# Patient Record
Sex: MALE | Race: WHITE
[De-identification: names, ages, dates, MRNs, and addresses within clinical notes are randomized per-mention and may not be internally consistent; named-entity substitution may affect disease eponyms.]

---

## 2017-04-13 ENCOUNTER — HOSPITAL ENCOUNTER (INPATIENT)
Dept: HOSPITAL 47 - 2ORMAIN | Age: 62
LOS: 29 days | Discharge: NURSING, REHAB OR LONG-TERM CARE | DRG: 235 | End: 2017-05-12
Payer: COMMERCIAL

## 2017-04-13 VITALS — BODY MASS INDEX: 30.7 KG/M2

## 2017-04-13 DIAGNOSIS — I48.0: ICD-10-CM

## 2017-04-13 DIAGNOSIS — D64.9: ICD-10-CM

## 2017-04-13 DIAGNOSIS — I25.2: ICD-10-CM

## 2017-04-13 DIAGNOSIS — I48.92: ICD-10-CM

## 2017-04-13 DIAGNOSIS — Z79.899: ICD-10-CM

## 2017-04-13 DIAGNOSIS — E86.1: ICD-10-CM

## 2017-04-13 DIAGNOSIS — E87.4: ICD-10-CM

## 2017-04-13 DIAGNOSIS — F17.220: ICD-10-CM

## 2017-04-13 DIAGNOSIS — J80: ICD-10-CM

## 2017-04-13 DIAGNOSIS — F10.231: ICD-10-CM

## 2017-04-13 DIAGNOSIS — K56.7: ICD-10-CM

## 2017-04-13 DIAGNOSIS — G72.81: ICD-10-CM

## 2017-04-13 DIAGNOSIS — T17.890A: ICD-10-CM

## 2017-04-13 DIAGNOSIS — R13.10: ICD-10-CM

## 2017-04-13 DIAGNOSIS — Z99.11: ICD-10-CM

## 2017-04-13 DIAGNOSIS — G62.81: ICD-10-CM

## 2017-04-13 DIAGNOSIS — J98.11: ICD-10-CM

## 2017-04-13 DIAGNOSIS — J95.89: ICD-10-CM

## 2017-04-13 DIAGNOSIS — I25.10: ICD-10-CM

## 2017-04-13 DIAGNOSIS — I11.0: ICD-10-CM

## 2017-04-13 DIAGNOSIS — K91.89: ICD-10-CM

## 2017-04-13 DIAGNOSIS — J44.0: ICD-10-CM

## 2017-04-13 DIAGNOSIS — J44.1: ICD-10-CM

## 2017-04-13 DIAGNOSIS — J95.821: ICD-10-CM

## 2017-04-13 DIAGNOSIS — B96.1: ICD-10-CM

## 2017-04-13 DIAGNOSIS — Z79.82: ICD-10-CM

## 2017-04-13 DIAGNOSIS — T82.855A: Primary | ICD-10-CM

## 2017-04-13 DIAGNOSIS — F17.210: ICD-10-CM

## 2017-04-13 DIAGNOSIS — M10.00: ICD-10-CM
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
## 2017-04-13 DIAGNOSIS — E11.65: ICD-10-CM

## 2017-04-13 DIAGNOSIS — Z79.01: ICD-10-CM

## 2017-04-13 DIAGNOSIS — Z82.49: ICD-10-CM

## 2017-04-13 DIAGNOSIS — E78.5: ICD-10-CM

## 2017-04-13 DIAGNOSIS — I50.9: ICD-10-CM

## 2017-04-13 DIAGNOSIS — J20.9: ICD-10-CM

## 2017-04-13 LAB
ALP SERPL-CCNC: 35 U/L (ref 38–126)
ALP SERPL-CCNC: 41 U/L (ref 38–126)
ALT SERPL-CCNC: 31 U/L (ref 21–72)
ALT SERPL-CCNC: 31 U/L (ref 21–72)
ANION GAP SERPL CALC-SCNC: 9 MMOL/L
ANION GAP SERPL CALC-SCNC: 9 MMOL/L
APTT BLD: 26.1 SEC (ref 22–30)
AST SERPL-CCNC: 21 U/L (ref 17–59)
AST SERPL-CCNC: 24 U/L (ref 17–59)
BASOPHILS # BLD AUTO: 0 K/UL (ref 0–0.2)
BASOPHILS NFR BLD AUTO: 0 %
BUN SERPL-SCNC: 10 MG/DL (ref 9–20)
BUN SERPL-SCNC: 9 MG/DL (ref 9–20)
CALCIUM SPEC-MCNC: 8.1 MG/DL (ref 8.4–10.2)
CALCIUM SPEC-MCNC: 8.3 MG/DL (ref 8.4–10.2)
CH: 33.5
CH: 33.5
CH: 33.8
CHCM: 34.7
CHCM: 34.9
CHCM: 35.9
CHLORIDE SERPL-SCNC: 100 MMOL/L (ref 98–107)
CHLORIDE SERPL-SCNC: 102 MMOL/L (ref 98–107)
CO2 BLDA-SCNC: 22 MMOL/L (ref 19–24)
CO2 BLDA-SCNC: 25 MMOL/L (ref 19–24)
CO2 SERPL-SCNC: 24 MMOL/L (ref 22–30)
CO2 SERPL-SCNC: 24 MMOL/L (ref 22–30)
EOSINOPHIL # BLD AUTO: 0.1 K/UL (ref 0–0.7)
EOSINOPHIL NFR BLD AUTO: 1 %
EOSINOPHIL NFR BLD AUTO: 2 %
EOSINOPHIL NFR BLD AUTO: 2 %
ERYTHROCYTE [DISTWIDTH] IN BLOOD BY AUTOMATED COUNT: 3.07 M/UL (ref 4.3–5.9)
ERYTHROCYTE [DISTWIDTH] IN BLOOD BY AUTOMATED COUNT: 3.38 M/UL (ref 4.3–5.9)
ERYTHROCYTE [DISTWIDTH] IN BLOOD BY AUTOMATED COUNT: 3.39 M/UL (ref 4.3–5.9)
ERYTHROCYTE [DISTWIDTH] IN BLOOD: 13.4 % (ref 11.5–15.5)
ERYTHROCYTE [DISTWIDTH] IN BLOOD: 13.7 % (ref 11.5–15.5)
ERYTHROCYTE [DISTWIDTH] IN BLOOD: 13.8 % (ref 11.5–15.5)
GLUCOSE BLD-MCNC: 108 MG/DL (ref 75–99)
GLUCOSE BLD-MCNC: 116 MG/DL (ref 75–99)
GLUCOSE BLD-MCNC: 117 MG/DL (ref 75–99)
GLUCOSE BLD-MCNC: 126 MG/DL (ref 75–99)
GLUCOSE BLD-MCNC: 128 MG/DL (ref 75–99)
GLUCOSE BLD-MCNC: 129 MG/DL (ref 75–99)
GLUCOSE BLD-MCNC: 138 MG/DL (ref 75–99)
GLUCOSE BLD-MCNC: 138 MG/DL (ref 75–99)
GLUCOSE BLD-MCNC: 140 MG/DL (ref 75–99)
GLUCOSE BLD-MCNC: 147 MG/DL (ref 75–99)
GLUCOSE BLD-MCNC: 192 MG/DL (ref 75–99)
GLUCOSE BLD-MCNC: 206 MG/DL (ref 75–99)
GLUCOSE SERPL-MCNC: 116 MG/DL (ref 74–99)
GLUCOSE SERPL-MCNC: 142 MG/DL (ref 74–99)
HCO3 BLDA-SCNC: 21 MMOL/L (ref 21–25)
HCO3 BLDA-SCNC: 24 MMOL/L (ref 21–25)
HCO3 BLDA-SCNC: 26 MMOL/L (ref 21–25)
HCT VFR BLD AUTO: 29.5 % (ref 39–53)
HCT VFR BLD AUTO: 32 % (ref 39–53)
HCT VFR BLD AUTO: 32.8 % (ref 39–53)
HDW: 2.31
HDW: 2.31
HDW: 2.4
HGB BLD-MCNC: 10 GM/DL (ref 13–17.5)
HGB BLD-MCNC: 11.1 GM/DL (ref 13–17.5)
HGB BLD-MCNC: 11.4 GM/DL (ref 13–17.5)
INR PPP: 1.2 (ref ?–1.1)
LUC NFR BLD AUTO: 1 %
LYMPHOCYTES # SPEC AUTO: 0.7 K/UL (ref 1–4.8)
LYMPHOCYTES # SPEC AUTO: 1 K/UL (ref 1–4.8)
LYMPHOCYTES # SPEC AUTO: 1.1 K/UL (ref 1–4.8)
LYMPHOCYTES NFR SPEC AUTO: 10 %
LYMPHOCYTES NFR SPEC AUTO: 16 %
LYMPHOCYTES NFR SPEC AUTO: 16 %
MAGNESIUM SPEC-SCNC: 1.9 MG/DL (ref 1.6–2.3)
MAGNESIUM SPEC-SCNC: 2 MG/DL (ref 1.6–2.3)
MCH RBC QN AUTO: 32.5 PG (ref 25–35)
MCH RBC QN AUTO: 32.8 PG (ref 25–35)
MCH RBC QN AUTO: 33.7 PG (ref 25–35)
MCHC RBC AUTO-ENTMCNC: 33.8 G/DL (ref 31–37)
MCHC RBC AUTO-ENTMCNC: 33.8 G/DL (ref 31–37)
MCHC RBC AUTO-ENTMCNC: 35.7 G/DL (ref 31–37)
MCV RBC AUTO: 94.4 FL (ref 80–100)
MCV RBC AUTO: 96.2 FL (ref 80–100)
MCV RBC AUTO: 97 FL (ref 80–100)
MONOCYTES # BLD AUTO: 0.1 K/UL (ref 0–1)
MONOCYTES # BLD AUTO: 0.2 K/UL (ref 0–1)
MONOCYTES # BLD AUTO: 0.2 K/UL (ref 0–1)
MONOCYTES NFR BLD AUTO: 2 %
MONOCYTES NFR BLD AUTO: 3 %
MONOCYTES NFR BLD AUTO: 3 %
NEUTROPHILS # BLD AUTO: 5.3 K/UL (ref 1.3–7.7)
NEUTROPHILS # BLD AUTO: 5.4 K/UL (ref 1.3–7.7)
NEUTROPHILS # BLD AUTO: 6.2 K/UL (ref 1.3–7.7)
NEUTROPHILS NFR BLD AUTO: 78 %
NEUTROPHILS NFR BLD AUTO: 79 %
NEUTROPHILS NFR BLD AUTO: 85 %
NON-AFRICAN AMERICAN GFR(MDRD): >60
NON-AFRICAN AMERICAN GFR(MDRD): >60
PCO2 BLDA: 30 MMHG (ref 35–45)
PCO2 BLDA: 39 MMHG (ref 35–45)
PCO2 BLDA: 43 MMHG (ref 35–45)
PH BLDA: 7.4 [PH] (ref 7.35–7.45)
PH BLDA: 7.41 [PH] (ref 7.35–7.45)
PH BLDA: 7.46 [PH] (ref 7.35–7.45)
PHOSPHATE SERPL-MCNC: 2.3 MG/DL (ref 2.5–4.5)
PO2 BLDA: 149 MMHG (ref 83–108)
PO2 BLDA: 84 MMHG (ref 83–108)
PO2 BLDA: 86 MMHG (ref 83–108)
POTASSIUM SERPL-SCNC: 3.9 MMOL/L (ref 3.5–5.1)
POTASSIUM SERPL-SCNC: 4.1 MMOL/L (ref 3.5–5.1)
PROT SERPL-MCNC: 5.7 G/DL (ref 6.3–8.2)
PROT SERPL-MCNC: 6.2 G/DL (ref 6.3–8.2)
PT BLD: 11.8 SEC (ref 9–12)
SODIUM SERPL-SCNC: 133 MMOL/L (ref 137–145)
SODIUM SERPL-SCNC: 135 MMOL/L (ref 137–145)
WBC # BLD AUTO: 0.06 10*3/UL
WBC # BLD AUTO: 0.07 10*3/UL
WBC # BLD AUTO: 0.08 10*3/UL
WBC # BLD AUTO: 6.7 K/UL (ref 3.8–10.6)
WBC # BLD AUTO: 6.9 K/UL (ref 3.8–10.6)
WBC # BLD AUTO: 7.3 K/UL (ref 3.8–10.6)
WBC (PEROX): 7.07
WBC (PEROX): 7.2
WBC (PEROX): 7.53

## 2017-04-13 PROCEDURE — 83735 ASSAY OF MAGNESIUM: CPT

## 2017-04-13 PROCEDURE — 74000: CPT

## 2017-04-13 PROCEDURE — 84132 ASSAY OF SERUM POTASSIUM: CPT

## 2017-04-13 PROCEDURE — 5A1955Z RESPIRATORY VENTILATION, GREATER THAN 96 CONSECUTIVE HOURS: ICD-10-PCS

## 2017-04-13 PROCEDURE — 80053 COMPREHEN METABOLIC PANEL: CPT

## 2017-04-13 PROCEDURE — 87252 VIRUS INOCULATION TISSUE: CPT

## 2017-04-13 PROCEDURE — 85520 HEPARIN ASSAY: CPT

## 2017-04-13 PROCEDURE — 80162 ASSAY OF DIGOXIN TOTAL: CPT

## 2017-04-13 PROCEDURE — 84439 ASSAY OF FREE THYROXINE: CPT

## 2017-04-13 PROCEDURE — 83036 HEMOGLOBIN GLYCOSYLATED A1C: CPT

## 2017-04-13 PROCEDURE — 0BCC8ZZ EXTIRPATION OF MATTER FROM RIGHT UPPER LUNG LOBE, VIA NATURAL OR ARTIFICIAL OPENING ENDOSCOPIC: ICD-10-PCS

## 2017-04-13 PROCEDURE — 80048 BASIC METABOLIC PNL TOTAL CA: CPT

## 2017-04-13 PROCEDURE — 87205 SMEAR GRAM STAIN: CPT

## 2017-04-13 PROCEDURE — 87086 URINE CULTURE/COLONY COUNT: CPT

## 2017-04-13 PROCEDURE — 84478 ASSAY OF TRIGLYCERIDES: CPT

## 2017-04-13 PROCEDURE — 82330 ASSAY OF CALCIUM: CPT

## 2017-04-13 PROCEDURE — 02100Z9 BYPASS CORONARY ARTERY, ONE ARTERY FROM LEFT INTERNAL MAMMARY, OPEN APPROACH: ICD-10-PCS

## 2017-04-13 PROCEDURE — 94002 VENT MGMT INPAT INIT DAY: CPT

## 2017-04-13 PROCEDURE — 94640 AIRWAY INHALATION TREATMENT: CPT

## 2017-04-13 PROCEDURE — 94760 N-INVAS EAR/PLS OXIMETRY 1: CPT

## 2017-04-13 PROCEDURE — 87186 SC STD MICRODIL/AGAR DIL: CPT

## 2017-04-13 PROCEDURE — 76604 US EXAM CHEST: CPT

## 2017-04-13 PROCEDURE — 86920 COMPATIBILITY TEST SPIN: CPT

## 2017-04-13 PROCEDURE — 82805 BLOOD GASES W/O2 SATURATION: CPT

## 2017-04-13 PROCEDURE — 0B9C8ZX DRAINAGE OF RIGHT UPPER LUNG LOBE, VIA NATURAL OR ARTIFICIAL OPENING ENDOSCOPIC, DIAGNOSTIC: ICD-10-PCS

## 2017-04-13 PROCEDURE — 86850 RBC ANTIBODY SCREEN: CPT

## 2017-04-13 PROCEDURE — 71260 CT THORAX DX C+: CPT

## 2017-04-13 PROCEDURE — 87102 FUNGUS ISOLATION CULTURE: CPT

## 2017-04-13 PROCEDURE — 87529 HSV DNA AMP PROBE: CPT

## 2017-04-13 PROCEDURE — 82272 OCCULT BLD FECES 1-3 TESTS: CPT

## 2017-04-13 PROCEDURE — 85027 COMPLETE CBC AUTOMATED: CPT

## 2017-04-13 PROCEDURE — 76937 US GUIDE VASCULAR ACCESS: CPT

## 2017-04-13 PROCEDURE — 31645 BRNCHSC W/THER ASPIR 1ST: CPT

## 2017-04-13 PROCEDURE — 87496 CYTOMEG DNA AMP PROBE: CPT

## 2017-04-13 PROCEDURE — 85730 THROMBOPLASTIN TIME PARTIAL: CPT

## 2017-04-13 PROCEDURE — 87116 MYCOBACTERIA CULTURE: CPT

## 2017-04-13 PROCEDURE — 85610 PROTHROMBIN TIME: CPT

## 2017-04-13 PROCEDURE — 74177 CT ABD & PELVIS W/CONTRAST: CPT

## 2017-04-13 PROCEDURE — 83880 ASSAY OF NATRIURETIC PEPTIDE: CPT

## 2017-04-13 PROCEDURE — 36620 INSERTION CATHETER ARTERY: CPT

## 2017-04-13 PROCEDURE — 36569 INSJ PICC 5 YR+ W/O IMAGING: CPT

## 2017-04-13 PROCEDURE — 87040 BLOOD CULTURE FOR BACTERIA: CPT

## 2017-04-13 PROCEDURE — 86901 BLOOD TYPING SEROLOGIC RH(D): CPT

## 2017-04-13 PROCEDURE — 84443 ASSAY THYROID STIM HORMONE: CPT

## 2017-04-13 PROCEDURE — 4A033BC MEASUREMENT OF ARTERIAL PRESSURE, CORONARY, PERCUTANEOUS APPROACH: ICD-10-PCS

## 2017-04-13 PROCEDURE — 31624 DX BRONCHOSCOPE/LAVAGE: CPT

## 2017-04-13 PROCEDURE — 87798 DETECT AGENT NOS DNA AMP: CPT

## 2017-04-13 PROCEDURE — 86900 BLOOD TYPING SEROLOGIC ABO: CPT

## 2017-04-13 PROCEDURE — 86891 AUTOLOGOUS BLOOD OP SALVAGE: CPT

## 2017-04-13 PROCEDURE — 94150 VITAL CAPACITY TEST: CPT

## 2017-04-13 PROCEDURE — 02100Z8 BYPASS CORONARY ARTERY, ONE ARTERY FROM RIGHT INTERNAL MAMMARY, OPEN APPROACH: ICD-10-PCS

## 2017-04-13 PROCEDURE — 94003 VENT MGMT INPAT SUBQ DAY: CPT

## 2017-04-13 PROCEDURE — 74230 X-RAY XM SWLNG FUNCJ C+: CPT

## 2017-04-13 PROCEDURE — 85025 COMPLETE CBC W/AUTO DIFF WBC: CPT

## 2017-04-13 PROCEDURE — B246ZZ4 ULTRASONOGRAPHY OF RIGHT AND LEFT HEART, TRANSESOPHAGEAL: ICD-10-PCS

## 2017-04-13 PROCEDURE — 84100 ASSAY OF PHOSPHORUS: CPT

## 2017-04-13 PROCEDURE — 87498 ENTEROVIRUS PROBE&REVRS TRNS: CPT

## 2017-04-13 PROCEDURE — 70450 CT HEAD/BRAIN W/O DYE: CPT

## 2017-04-13 PROCEDURE — 74020: CPT

## 2017-04-13 PROCEDURE — 87206 SMEAR FLUORESCENT/ACID STAI: CPT

## 2017-04-13 PROCEDURE — 36600 WITHDRAWAL OF ARTERIAL BLOOD: CPT

## 2017-04-13 PROCEDURE — 87077 CULTURE AEROBIC IDENTIFY: CPT

## 2017-04-13 PROCEDURE — 87502 INFLUENZA DNA AMP PROBE: CPT

## 2017-04-13 PROCEDURE — 87324 CLOSTRIDIUM AG IA: CPT

## 2017-04-13 PROCEDURE — 87070 CULTURE OTHR SPECIMN AEROBIC: CPT

## 2017-04-13 PROCEDURE — 0B958ZX DRAINAGE OF RIGHT MIDDLE LOBE BRONCHUS, VIA NATURAL OR ARTIFICIAL OPENING ENDOSCOPIC, DIAGNOSTIC: ICD-10-PCS

## 2017-04-13 PROCEDURE — 71010: CPT

## 2017-04-13 RX ADMIN — MAGNESIUM SULFATE IN DEXTROSE SCH MLS/HR: 10 INJECTION, SOLUTION INTRAVENOUS at 20:26

## 2017-04-13 RX ADMIN — MAGNESIUM SULFATE IN DEXTROSE SCH MLS/HR: 10 INJECTION, SOLUTION INTRAVENOUS at 21:25

## 2017-04-13 RX ADMIN — IPRATROPIUM BROMIDE AND ALBUTEROL SULFATE SCH ML: .5; 3 SOLUTION RESPIRATORY (INHALATION) at 23:15

## 2017-04-13 RX ADMIN — SODIUM CHLORIDE, PRESERVATIVE FREE SCH ML: 5 INJECTION INTRAVENOUS at 20:26

## 2017-04-13 RX ADMIN — INSULIN HUMAN SCH MLS/HR: 100 INJECTION, SOLUTION PARENTERAL at 21:16

## 2017-04-13 RX ADMIN — MORPHINE SULFATE PRN MG: 2 INJECTION, SOLUTION INTRAMUSCULAR; INTRAVENOUS at 14:30

## 2017-04-13 RX ADMIN — PROPOFOL SCH MLS/HR: 10 INJECTION, EMULSION INTRAVENOUS at 15:10

## 2017-04-13 RX ADMIN — PROPOFOL SCH MLS/HR: 10 INJECTION, EMULSION INTRAVENOUS at 21:50

## 2017-04-13 RX ADMIN — PROPOFOL SCH MLS/HR: 10 INJECTION, EMULSION INTRAVENOUS at 20:22

## 2017-04-13 RX ADMIN — CLEVIPIDINE SCH MLS/HR: 0.5 EMULSION INTRAVENOUS at 15:13

## 2017-04-13 RX ADMIN — MORPHINE SULFATE PRN MG: 2 INJECTION, SOLUTION INTRAMUSCULAR; INTRAVENOUS at 16:32

## 2017-04-13 RX ADMIN — PROPOFOL SCH MLS/HR: 10 INJECTION, EMULSION INTRAVENOUS at 16:45

## 2017-04-13 RX ADMIN — IPRATROPIUM BROMIDE AND ALBUTEROL SULFATE SCH ML: .5; 3 SOLUTION RESPIRATORY (INHALATION) at 16:44

## 2017-04-13 RX ADMIN — MORPHINE SULFATE PRN MG: 2 INJECTION, SOLUTION INTRAMUSCULAR; INTRAVENOUS at 18:07

## 2017-04-13 RX ADMIN — HEPARIN SODIUM SCH UNIT: 5000 INJECTION, SOLUTION INTRAVENOUS; SUBCUTANEOUS at 21:25

## 2017-04-13 RX ADMIN — METHYLPREDNISOLONE SODIUM SUCCINATE SCH MG: 40 INJECTION, POWDER, FOR SOLUTION INTRAMUSCULAR; INTRAVENOUS at 18:36

## 2017-04-13 RX ADMIN — IPRATROPIUM BROMIDE AND ALBUTEROL SULFATE SCH ML: .5; 3 SOLUTION RESPIRATORY (INHALATION) at 19:31

## 2017-04-13 RX ADMIN — ALBUMIN (HUMAN) PRN MLS/HR: 2.5 SOLUTION INTRAVENOUS at 21:51

## 2017-04-13 RX ADMIN — PROPOFOL SCH MLS/HR: 10 INJECTION, EMULSION INTRAVENOUS at 18:39

## 2017-04-13 RX ADMIN — POTASSIUM CHLORIDE SCH MLS/HR: 14.9 INJECTION, SOLUTION INTRAVENOUS at 15:11

## 2017-04-13 RX ADMIN — CLEVIPIDINE SCH MLS/HR: 0.5 EMULSION INTRAVENOUS at 19:00

## 2017-04-13 RX ADMIN — BUDESONIDE SCH MG: 1 SUSPENSION RESPIRATORY (INHALATION) at 19:31

## 2017-04-13 RX ADMIN — ACETAMINOPHEN SCH MLS/HR: 10 INJECTION, SOLUTION INTRAVENOUS at 18:12

## 2017-04-13 RX ADMIN — ALBUMIN (HUMAN) PRN MLS/HR: 2.5 SOLUTION INTRAVENOUS at 22:49

## 2017-04-13 NOTE — XR
EXAMINATION TYPE: XR chest 1V portable

 

DATE OF EXAM: 4/13/2017 4:52 PM

 

COMPARISON: NONE

 

INDICATION: Previous abnormal chest, post bronchoscopy.

 

TECHNIQUE: Single frontal view of the chest is obtained.

 

FINDINGS:  

The heart size is normal.  

The pulmonary vasculature is normal.  

Increased lung markings are on the right. An endotracheal tube is present with the tip above the enoch
na. Nasogastric tube transverses the thorax. There may be a mediastinal tube present on the right. [place]-Livier catheter is present with tip in the distal right pulmonary artery. Right-sided chest tube is 
present. No pneumothorax is evident. Left-sided chest tube is present. No left pneumothorax is presen
t.  

 

 

IMPRESSION:  

1. Improving aeration to the right lung compared to the previous abnormal chest x-ray.

2. Multiple lines and catheters discussed above.

## 2017-04-13 NOTE — CONS
DATE OF CONSULTATION:  



REASON FOR CONSULTATION: Management of elevated blood sugars.



Patient is a very pleasant 61-year-old gentleman who is admitted for 

coronary artery bypass grafting. Patient is intubated and sedated, 

calm at present. Patient is on 80% FiO2. Patient has 3 chest tubes, 

one right, left and sternal chest tube which is draining bloody fluid. 

Patient has near-complete whitening of the right chest, for which 

patient is undergoing extensive suctioning. Pulmonary will evaluate 

the patient as well. FiO2 is being tapered down. Patient will soon be 

extubated today or tomorrow, depending on his parameters and repeat 

chest x-ray findings. Patient's blood sugars are a bit elevated and 

patient is on IV insulin at this point of time. His elevated blood 

sugars are due to D5 nitroglycerin. Patient is getting IV insulin as 

per the protocol. Patient is also on clevidipine to prevent vasospasm. 

 



REVIEW OF SYSTEMS: Unable to obtain at this point of time because of 

his clinical condition.  



Medications were reviewed. Significant ones are discussed above. 



Past medical history is significant for: 

1. Coronary artery disease. 

2. Hypertension. 

3. Myocardial infarction in the past. 

4. Cardiac catheterization with stent placement in the past as well. 



SOCIAL HISTORY: Patient is a heavy smoker; smokes 3 to 4 cigarettes 

per day. Drinks about 6 to 8 beers a day. No drug abuse history.  



FAMILY HISTORY: Thyroid cancer. 



PHYSICAL EXAMINATION: 

VITAL SIGNS: Temperature 96.9, pulse of 68, respiratory rate of 12. 

Blood pressure is 117/77. Saturating at 98% on above-mentioned vent 

settings. Rest of the vent settings are PEEP of 5, set-up respiratory 

rate of 12, and patient is breathing over the ventilator. Patient is 

sedated, intubated, calm. RASS score of around minus 2 to minus 3.  

HEENT: Pupils are round and equally reacting to light. EOMI. No 

scleral icterus. No conjunctival pallor. Normocephalic, atraumatic. No 

pharyngeal erythema. No thyromegaly.  

CHEST EXAMINATION: Chest tubes as mentioned above. 

ABDOMEN: Soft, nontender, nondistended, normoactive bowel sounds. No 

palpable organomegaly.  

MUSCULOSKELETAL: No joint swelling or deformity. 

EXTREMITIES: No cyanosis, clubbing, or pedal edema. 

NEUROLOGICAL: RASS score minus 2 to minus 3. 

SKIN: No rashes. 



LABORATORY DATA: CBC, CMP essentially within normal limits. Chest 

x-ray findings as mentioned above. Moderate opacification of right 

hemithorax, for which patient already has a chest tube in, and also 

deep suctioning is being done.  



ASSESSMENT AND PLAN: 

1. Acute hypoxic respiratory failure post cardiac catheterization. 

Patient is intubated, sedated. Pulmonary will evaluate the patient as 

well. Patient has near-complete opacity of the right chest. Patient 

already has a right chest tube and also undergoing deep suctioning. 

Further management as per pulmonary service.  

2. Elevated blood sugars due to D5 with nitroglycerin. Continue with 

IV insulin as per the protocol from Cardiothoracic Surgery.  

3. Myocardial infarction in the past. 

4. Nicotine abuse. 

5. Alcohol abuse. Patient is expected to have withdrawals. Will watch 

for any alcohol withdrawals.  

6. Coronary artery disease, for which patient underwent CABG. 

Postoperative CABG management as per primary service.  



Thank you for letting me participate in this patient's care. Will 

continue to follow the patient.

## 2017-04-13 NOTE — XR
EXAMINATION TYPE: XR chest 1V portable

 

DATE OF EXAM: 4/13/2017 3:08 PM

 

HISTORY: Post Op CABG

 

COMPARISON: 4/13/2017

 

TECHNIQUE: Single view of the chest is submitted.

 

FINDINGS:

Endotracheal tube, NG tube, SG catheter, mediastianal drains and chest tubes are appropriately placed
.

 

Post operative changes of CABG.

 

No sizeable pneumothorax.

 

Moderate opacification right hemithorax with slight improvement in aeration. Correlate for atelectasi
s and/or pleural effusion. The left lung is clear.

 

The heart is not enlarged.

 

IMPRESSION: 

 

1.  Moderate opacification right hemithorax with slight improvement in aeration. Correlate for atelec
tasis and/or pleural effusion. The left lung is clear.

## 2017-04-13 NOTE — OP
DATE OF SERVICE:  



SURGEON:  Ines Madsen MD

ASSISTANT:  Marquez Rodríguez and Kathy VALDEZ



PREOPERATIVE DIAGNOSES:  

1. Coronary artery disease, status post prior stenting to his right 

coronary artery. 

2. Preserved Left ventricular function.

3. Hyperlipidemia.

4. Hypertension.

5. ETOH abuse. 



POSTOPERATIVE DIAGNOSES:  

1. Coronary artery disease, status post prior stenting to his right 

coronary artery. 

2. Preserved Left ventricular function.

3. Hyperlipidemia.

4. Hypertension.

5. ETOH abuse. 



OPERATION:       

1. Total arterial non- aortic touch off-pump double coronary artery 

bypass grafting using in situ skeletonized right internal mammary 

artery to the left anterior descending artery across the anterior 

midline, in situ totally skeletonized left internal mammary artery to 

the first obtuse marginal artery. 

2. Transesophageal echocardiogram and epiaortic scanning. 

3. Intraoperative graft flow measurements using the [employer] system. 



ANESTHESIA:  

ESTIMATED BLOOD LOSS:  

SPECIMENS REMOVED:  

COMPLICATIONS:  



OPERATIVE FINDINGS:  



INDICATION FOR SURGERY: Patient is a 61-year-old gentleman who 

underwent in 2001 stenting to his proximal right coronary artery. 

Patient failed a recent stress test and underwent cardiac 

catheterization that showed mild in-stent restenosis. However, he had 

severe stenosis of the left system. There is a small second obtuse 

marginal artery that is probably too small for bypass. Patient has 

been brought in for bypass to his LAD, and his first obtuse marginal 

artery with no plans to bypass the right coronary artery (non flow limiting in-
stent restenosis) and this was 

decided along with the patient cardiologist. We will be using 

bilateral internal mammary artery in view of the patient's age. Risks, 

benefits, and alternatives were discussed with him. He understood them 

and agreed to proceed.  



DESCRIPTION OF THE PROCEDURE: Patient had a right internal jugular 

[place]-Livier catheter and a right radial arterial line placed in the 

preoperative holding area. His PA pressure was 50/20 and cardiac index 

was 2.2. Subsequently he was brought to the operating room, where 

general endotracheal anesthesia was induced uneventfully. Patient 

received 2 grams of cefazolin intravenously. The Angelo catheter was 

inserted. The chest, abdomen and both lower extremities were prepped 

and draped using ChloraPrep. Ioban was used to cover the skin.  



Transesophageal echocardiogram revealed mild mitral valve 

regurgitation and overall preserved left ventricular function.  



Midline sternotomy was performed and no bone wax was used. The left 

hemisternum was elevated and the left internal mammary artery was 

harvested in a totally skeletonized fashion. The left pleura was 

intentionally opened and in this process and was drained with a 8 

[place] chest tube. The same was repeated on the right side. The right 

hemisternum was elevated and the right internal mammary artery was 

harvested in a totally skeletonized fashion. The right pleura was 

intentionally opened in this process was drained with 28 [place] chest 

tube the patient was given 5000 units of heparin initially before 

double clipping the left internal mammary artery before its 

bifurcation and transecting it and had an excellent pulsatile flow in 

it and was around 2.5 mm in diameter and we clipped the right internal 

mammary artery beyond the bifurcation and it had an excellent flow in 

it and was around 2 mm in diameter.  



Mediastinal fat was transected between 2 ties and epiaortic scanning 

revealed normal ascending aorta. Pericardium was opened in an inverted 

T fashion and pericardial cradle was created. Findings included a 

short aorta and a mildly enlarged heart.  



The [employer] system along with the exposed device were used to perform 

the surgery on a beating heart. Heparinization to achieve an ACT above 

250 seconds was administered. The ACT was repeated every 20 to 30 

minutes and additional heparin given if needed.  



The first distal anastomosis was between the in situ right internal 

mammary artery that crossed the midline and went anteriorly to the mid 

aspect of the left anterior descending artery, which was found in an 

intramyocardial position. That artery, accepted a 1.5 mm shunt, had 

profuse flow in it and the anastomosis was completed using Prolene 7-0 

in continuous fashion. The anastomosis appeared satisfactory and graft 

flow measurement revealed a flow of 68 mL per minute with diastolic filling at 

71%, showing excellent graft. Subsequently using the exposed device, 

we exposed the lateral wall. As predicted the second obtuse marginal 

artery was a small non- bypassable vessel and the first obtuse 

marginal artery site for bypass was seen proximally but was mainly 
intramyocardial. 

That artery was opened as it went intra-myocardial very proximally by the KAYLEIGH, 
accepted a 2 mm 

shunt, had profuse flow in it and the left internal mammary artery was 

anastomosed to it using Prolene 7-0 in continuous fashion. The shunt 

was removed before completing the anastomosis, which was well 

tolerated. A deep groove was made on the right and on the left pleural 

pericardial fat to accommodate respective mammary medial to the lung 

and away from the posterior sternal table. There was no kinking noted of the 
grafts.

Measurements of graft flow in the LIMA with the heart down and everything 

positioned remained excellent.  



Satisfied with the distal anastomoses, test dose and full dose 

protamine was given.  A 36 [place] chest tube was left in the 

substernal place. The pericardial fat was loosely approximated over 

the conduits however, I could not cover the last third of the right 

internal mammary artery on the left side in view of paucity of fat.  



After ensuring adequate hemostasis and hemodynamics and after correct 

sponge, instrument and needle count, the sternum was approximated 

using 5 figure-of-eight [place] cable after interposing fibrillar 

between the sternal edges. Thorough irrigation with cefazolin 

followed. The rest of the closure proceeded in layers. Skin glue was 

applied.  



Patient did not receive any blood bank product and received 250 mL of 

Cell Saver.Patient was transferred to the ICU on 

low-dose nitroglycerin with excellent hemodynamics and normal EKG.  

Transesophageal echocardiogram done at the end of the case showed no 

changes.  



MTDD

## 2017-04-13 NOTE — XR
EXAMINATION TYPE: XR chest 1V portable

 

DATE OF EXAM: 4/13/2017 8:29 PM

 

COMPARISON: Today

 

HISTORY: Hypoxemia

 

TECHNIQUE: Single frontal view of the chest is obtained.

 

FINDINGS:  There is no gross heart failure.. There is a right chest tube in good position. There is r
ight jugular catheter with the tip probably in the right ventricle. Nasogastric tube is noted. Endotr
acheal tube appears in good position. There is mild linear density at the lung bases. I see no pneumo
thorax. There is a left chest tube noted. There is blunting of costophrenic angles.

 

IMPRESSION:  Patchy atelectasis at the lung bases with pleural effusions. No significant change paula
red to exam earlier today at 5:00 PM.

## 2017-04-13 NOTE — XR
EXAMINATION TYPE: XR chest 1V portable

 

DATE OF EXAM: 4/13/2017 2:31 PM

 

HISTORY: Post Op CABG

 

COMPARISON: 4/6/2017

 

TECHNIQUE: Single view of the chest is submitted.

 

FINDINGS:

Endotracheal tube, NG tube, SG catheter, mediastianal drains and chest tubes are appropriately placed
.

 

Post operative changes of CABG.

 

No sizeable pneumothorax.

 

There is a near complete opacification of the right hemithorax which may reflect large pleural effusi
on or hemothorax.

 

The heart is not enlarged.

 

IMPRESSION: 

 

1.  Post operative changes of CABG.

2. There is a near complete opacification of the right hemithorax which may reflect large pleural eff
usion or hemothorax.

## 2017-04-14 LAB
ALP SERPL-CCNC: 33 U/L (ref 38–126)
ALT SERPL-CCNC: 22 U/L (ref 21–72)
ANION GAP SERPL CALC-SCNC: 17 MMOL/L
APTT BLD: 24.1 SEC (ref 22–30)
AST SERPL-CCNC: 20 U/L (ref 17–59)
BASOPHILS # BLD AUTO: 0 K/UL (ref 0–0.2)
BASOPHILS NFR BLD AUTO: 0 %
BUN SERPL-SCNC: 8 MG/DL (ref 9–20)
CALCIUM SPEC-MCNC: 8.5 MG/DL (ref 8.4–10.2)
CH: 33.2
CHCM: 34.3
CHLORIDE SERPL-SCNC: 102 MMOL/L (ref 98–107)
CO2 BLDA-SCNC: 20 MMOL/L (ref 19–24)
CO2 BLDA-SCNC: 21 MMOL/L (ref 19–24)
CO2 SERPL-SCNC: 19 MMOL/L (ref 22–30)
EOSINOPHIL # BLD AUTO: 0 K/UL (ref 0–0.7)
EOSINOPHIL NFR BLD AUTO: 0 %
ERYTHROCYTE [DISTWIDTH] IN BLOOD BY AUTOMATED COUNT: 3.12 M/UL (ref 4.3–5.9)
ERYTHROCYTE [DISTWIDTH] IN BLOOD: 13.7 % (ref 11.5–15.5)
GLUCOSE BLD-MCNC: 123 MG/DL (ref 75–99)
GLUCOSE BLD-MCNC: 124 MG/DL (ref 75–99)
GLUCOSE BLD-MCNC: 127 MG/DL (ref 75–99)
GLUCOSE BLD-MCNC: 127 MG/DL (ref 75–99)
GLUCOSE BLD-MCNC: 128 MG/DL (ref 75–99)
GLUCOSE BLD-MCNC: 131 MG/DL (ref 75–99)
GLUCOSE BLD-MCNC: 134 MG/DL (ref 75–99)
GLUCOSE BLD-MCNC: 141 MG/DL (ref 75–99)
GLUCOSE BLD-MCNC: 149 MG/DL (ref 75–99)
GLUCOSE BLD-MCNC: 159 MG/DL (ref 75–99)
GLUCOSE BLD-MCNC: 163 MG/DL (ref 75–99)
GLUCOSE BLD-MCNC: 165 MG/DL (ref 75–99)
GLUCOSE BLD-MCNC: 170 MG/DL (ref 75–99)
GLUCOSE BLD-MCNC: 170 MG/DL (ref 75–99)
GLUCOSE BLD-MCNC: 172 MG/DL (ref 75–99)
GLUCOSE BLD-MCNC: 175 MG/DL (ref 75–99)
GLUCOSE BLD-MCNC: 182 MG/DL (ref 75–99)
GLUCOSE SERPL-MCNC: 162 MG/DL (ref 74–99)
HCO3 BLDA-SCNC: 19 MMOL/L (ref 21–25)
HCO3 BLDA-SCNC: 20 MMOL/L (ref 21–25)
HCT VFR BLD AUTO: 30.3 % (ref 39–53)
HDW: 2.36
HGB BLD-MCNC: 10.4 GM/DL (ref 13–17.5)
INR PPP: 1.1 (ref ?–1.1)
LUC NFR BLD AUTO: 1 %
LYMPHOCYTES # SPEC AUTO: 0.4 K/UL (ref 1–4.8)
LYMPHOCYTES NFR SPEC AUTO: 6 %
MAGNESIUM SPEC-SCNC: 2.5 MG/DL (ref 1.6–2.3)
MCH RBC QN AUTO: 33.4 PG (ref 25–35)
MCHC RBC AUTO-ENTMCNC: 34.4 G/DL (ref 31–37)
MCV RBC AUTO: 97.3 FL (ref 80–100)
MONOCYTES # BLD AUTO: 0.1 K/UL (ref 0–1)
MONOCYTES NFR BLD AUTO: 2 %
NEUTROPHILS # BLD AUTO: 7.3 K/UL (ref 1.3–7.7)
NEUTROPHILS NFR BLD AUTO: 92 %
NON-AFRICAN AMERICAN GFR(MDRD): >60
PCO2 BLDA: 33 MMHG (ref 35–45)
PCO2 BLDA: 35 MMHG (ref 35–45)
PH BLDA: 7.36 [PH] (ref 7.35–7.45)
PH BLDA: 7.4 [PH] (ref 7.35–7.45)
PHOSPHATE SERPL-MCNC: 1.5 MG/DL (ref 2.5–4.5)
PO2 BLDA: 63 MMHG (ref 83–108)
PO2 BLDA: 76 MMHG (ref 83–108)
POTASSIUM SERPL-SCNC: 3.4 MMOL/L (ref 3.5–5.1)
PROT SERPL-MCNC: 6.1 G/DL (ref 6.3–8.2)
PT BLD: 11 SEC (ref 9–12)
SODIUM SERPL-SCNC: 138 MMOL/L (ref 137–145)
WBC # BLD AUTO: 0.04 10*3/UL
WBC # BLD AUTO: 7.9 K/UL (ref 3.8–10.6)
WBC (PEROX): 8.42

## 2017-04-14 RX ADMIN — ACETAMINOPHEN SCH MLS/HR: 10 INJECTION, SOLUTION INTRAVENOUS at 00:00

## 2017-04-14 RX ADMIN — ACETAMINOPHEN SCH MLS/HR: 10 INJECTION, SOLUTION INTRAVENOUS at 19:48

## 2017-04-14 RX ADMIN — METHYLPREDNISOLONE SODIUM SUCCINATE SCH MG: 40 INJECTION, POWDER, FOR SOLUTION INTRAMUSCULAR; INTRAVENOUS at 15:18

## 2017-04-14 RX ADMIN — METOPROLOL TARTRATE SCH MG: 25 TABLET, FILM COATED ORAL at 08:09

## 2017-04-14 RX ADMIN — PROPOFOL SCH MLS/HR: 10 INJECTION, EMULSION INTRAVENOUS at 18:38

## 2017-04-14 RX ADMIN — PROPOFOL SCH MLS/HR: 10 INJECTION, EMULSION INTRAVENOUS at 13:10

## 2017-04-14 RX ADMIN — PROPOFOL SCH MLS/HR: 10 INJECTION, EMULSION INTRAVENOUS at 14:07

## 2017-04-14 RX ADMIN — IPRATROPIUM BROMIDE AND ALBUTEROL SULFATE SCH ML: .5; 3 SOLUTION RESPIRATORY (INHALATION) at 15:18

## 2017-04-14 RX ADMIN — PROPOFOL SCH MLS/HR: 10 INJECTION, EMULSION INTRAVENOUS at 19:49

## 2017-04-14 RX ADMIN — METOPROLOL TARTRATE SCH: 25 TABLET, FILM COATED ORAL at 21:54

## 2017-04-14 RX ADMIN — INSULIN HUMAN SCH MLS/HR: 100 INJECTION, SOLUTION PARENTERAL at 19:50

## 2017-04-14 RX ADMIN — PROPOFOL SCH MLS/HR: 10 INJECTION, EMULSION INTRAVENOUS at 16:01

## 2017-04-14 RX ADMIN — MULTIVITAMIN SCH ML: LIQUID ORAL at 13:08

## 2017-04-14 RX ADMIN — METHYLPREDNISOLONE SODIUM SUCCINATE SCH MG: 40 INJECTION, POWDER, FOR SOLUTION INTRAMUSCULAR; INTRAVENOUS at 08:07

## 2017-04-14 RX ADMIN — DOCUSATE SODIUM AND SENNOSIDES SCH EACH: 50; 8.6 TABLET ORAL at 21:53

## 2017-04-14 RX ADMIN — PANTOPRAZOLE SODIUM SCH MG: 40 INJECTION, POWDER, FOR SOLUTION INTRAVENOUS at 08:10

## 2017-04-14 RX ADMIN — ACETAMINOPHEN SCH MLS/HR: 10 INJECTION, SOLUTION INTRAVENOUS at 13:08

## 2017-04-14 RX ADMIN — PROPOFOL SCH MLS/HR: 10 INJECTION, EMULSION INTRAVENOUS at 06:58

## 2017-04-14 RX ADMIN — BUDESONIDE SCH MG: 1 SUSPENSION RESPIRATORY (INHALATION) at 07:54

## 2017-04-14 RX ADMIN — METHYLPREDNISOLONE SODIUM SUCCINATE SCH MG: 40 INJECTION, POWDER, FOR SOLUTION INTRAMUSCULAR; INTRAVENOUS at 00:00

## 2017-04-14 RX ADMIN — SODIUM CHLORIDE, PRESERVATIVE FREE SCH ML: 5 INJECTION INTRAVENOUS at 21:51

## 2017-04-14 RX ADMIN — SODIUM CHLORIDE, PRESERVATIVE FREE SCH: 5 INJECTION INTRAVENOUS at 08:10

## 2017-04-14 RX ADMIN — ATORVASTATIN CALCIUM SCH MG: 40 TABLET, FILM COATED ORAL at 09:29

## 2017-04-14 RX ADMIN — LEUCINE, PHENYLALANINE, LYSINE, METHIONINE, ISOLEUCINE, VALINE, HISTIDINE, THREONINE, TRYPTOPHAN, ALANINE, GLYCINE, ARGININE, PROLINE, SERINE, TYROSINE, DEXTROSE SCH MLS/HR: 365; 280; 290; 200; 300; 290; 240; 210; 90; 1035; 515; 575; 340; 250; 20; 20 INJECTION INTRAVENOUS at 09:29

## 2017-04-14 RX ADMIN — POTASSIUM CHLORIDE SCH: 14.9 INJECTION, SOLUTION INTRAVENOUS at 14:08

## 2017-04-14 RX ADMIN — HEPARIN SODIUM SCH UNIT: 5000 INJECTION, SOLUTION INTRAVENOUS; SUBCUTANEOUS at 08:07

## 2017-04-14 RX ADMIN — POTASSIUM CHLORIDE SCH MLS/HR: 7.46 INJECTION, SOLUTION INTRAVENOUS at 08:05

## 2017-04-14 RX ADMIN — CLOPIDOGREL BISULFATE SCH MG: 75 TABLET ORAL at 09:29

## 2017-04-14 RX ADMIN — IPRATROPIUM BROMIDE AND ALBUTEROL SULFATE SCH ML: .5; 3 SOLUTION RESPIRATORY (INHALATION) at 23:24

## 2017-04-14 RX ADMIN — IPRATROPIUM BROMIDE AND ALBUTEROL SULFATE SCH ML: .5; 3 SOLUTION RESPIRATORY (INHALATION) at 07:54

## 2017-04-14 RX ADMIN — HEPARIN SODIUM SCH UNIT: 5000 INJECTION, SOLUTION INTRAVENOUS; SUBCUTANEOUS at 15:17

## 2017-04-14 RX ADMIN — LEUCINE, PHENYLALANINE, LYSINE, METHIONINE, ISOLEUCINE, VALINE, HISTIDINE, THREONINE, TRYPTOPHAN, ALANINE, GLYCINE, ARGININE, PROLINE, SERINE, TYROSINE, DEXTROSE SCH MLS/HR: 365; 280; 290; 200; 300; 290; 240; 210; 90; 1035; 515; 575; 340; 250; 20; 20 INJECTION INTRAVENOUS at 07:08

## 2017-04-14 RX ADMIN — POTASSIUM CHLORIDE SCH MLS/HR: 7.46 INJECTION, SOLUTION INTRAVENOUS at 07:00

## 2017-04-14 RX ADMIN — ASPIRIN 325 MG ORAL TABLET SCH MG: 325 PILL ORAL at 08:09

## 2017-04-14 RX ADMIN — ACETAMINOPHEN SCH MLS/HR: 10 INJECTION, SOLUTION INTRAVENOUS at 08:04

## 2017-04-14 RX ADMIN — BUDESONIDE SCH MG: 1 SUSPENSION RESPIRATORY (INHALATION) at 19:18

## 2017-04-14 RX ADMIN — IPRATROPIUM BROMIDE AND ALBUTEROL SULFATE SCH ML: .5; 3 SOLUTION RESPIRATORY (INHALATION) at 11:23

## 2017-04-14 RX ADMIN — PROPOFOL SCH MLS/HR: 10 INJECTION, EMULSION INTRAVENOUS at 08:26

## 2017-04-14 RX ADMIN — PROPOFOL SCH MLS/HR: 10 INJECTION, EMULSION INTRAVENOUS at 21:50

## 2017-04-14 RX ADMIN — PROPOFOL SCH MLS/HR: 10 INJECTION, EMULSION INTRAVENOUS at 05:14

## 2017-04-14 RX ADMIN — IPRATROPIUM BROMIDE AND ALBUTEROL SULFATE SCH ML: .5; 3 SOLUTION RESPIRATORY (INHALATION) at 19:18

## 2017-04-14 RX ADMIN — METOPROLOL TARTRATE SCH MG: 25 TABLET, FILM COATED ORAL at 21:51

## 2017-04-14 RX ADMIN — IPRATROPIUM BROMIDE AND ALBUTEROL SULFATE SCH ML: .5; 3 SOLUTION RESPIRATORY (INHALATION) at 03:07

## 2017-04-14 NOTE — P.PN
Subjective


Principal diagnosis: 





Status post CABG, postoperative day #1


This is a 61-year-old white male status post CABG, postoperative day #1, total 

arterial non-aortic patch off pump double coronary artery bypass grafting using 

in situ skeletonized right internal mammary artery to the left anterior 

descending across the anterior midline in situ totally skeletonized left 

internal mammary artery to the first obtuse marginal artery.  Postoperatively 

patient was on mechanical ventilation, and upon arrival to the ICU he was found 

to have opacified right lung, he required bronchoscopy and extraction of mucous 

plugs mostly in the right upper lobe and right middle lobe.  Remained on 

mechanical ventilation overnight, however this morning his gases are showing 

marginal oxygenation, his pO2 is only 63 on 50% and PEEP of 5.  Patient was 

given a weaning trial with a pressure support of 8 and CPAP, ABG in half an 

hour is definitely poor showing very poor oxygenation, and his O2 saturation 

was in the 91% range.  PO2 was only 63, hence I decided not to pursue any 

further weaning and extubation on this patient until his oxygenation improves 

better.  Gram stain and cultures from the right upper lobe are pending.  In the 

meantime the patient is receiving bronchodilators for underlying COPD, and he 

is also on steroids for significant wheezing noted yesterday after 

bronchoscopy.  Patient remains on DuoNeb updrafts 4 times a day and when 

necessary.








Objective





- Vital Signs


Vital signs: 


 Vital Signs











Temp  98.2 F   04/13/17 20:00


 


Pulse  77   04/14/17 13:00


 


Resp  5 L  04/14/17 13:00


 


BP  92/54   04/14/17 13:00


 


Pulse Ox  94 L  04/14/17 13:00








 Intake & Output











 04/13/17 04/14/17 04/14/17





 18:59 06:59 18:59


 


Intake Total 502.25 1819.469 871.466


 


Output Total 2351 1012 735


 


Balance -1848.75 807.469 136.466


 


Weight 89.61 kg 99.8 kg 99.8 kg


 


Intake:   


 


  Intake, IV Titration 502.25 1819.469 871.466





  Amount   


 


    ACETAMINOPHEN IV (For NPO  200 100





    ) 1,000 mg In Empty Bag 1   





    bag @ 400 mls/hr IVPB   





    Q6HR [facility] Rx#:[id number]   


 


    Albumin Human 5% 250 ml  500 





    In Empty Bag 1 bag @ 250   





    mls/hr IVPB Q1HR PRN Rx#:   





    [id number]   


 


    Clevidipine Butyrate 25 36 50.301 7.266





    mg In Empty Bag 1 bag @ 1   





    MG/HR 2 mls/hr IV .Q24H   





    [facility] Rx#:[id number]   


 


    Insulin Regular 100 unit 4 13.018 60.684





    In Sodium Chloride 0.9%   





    100 ml @ Per Protocol IV   





    .Q0M [facility] Rx#:[id number]   


 


    Lactated Ringers 1,000 ml 300 500 180





    @ 20 mls/hr IV .Q24H [facility]   





    Rx#:[id number]   


 


    Magnesium Sulfate-D5w Pmx  200 





    1 gm In Dextrose/Water 1   





    100ml.bag @ 100 mls/hr   





    IVPB Q1H [facility] Rx#:   





    [id number]   


 


    Nitroglycerin-D5w Pmx 50 10.5 52.65 17.600





    mg In Dextrose/Water 1   





    250ml.bag @ 5 MCG/MIN 1.5   





    mls/hr IV .Q24H [facility] Rx#:   





    [id number]   


 


    Potassium Chloride 10 meq   100





    In Water For Injection 1   





    100ml.bag @ 100 mls/hr   





    IVPB Q1H [facility] Rx#:   





    [id number]   


 


    Propofol 500 mg In Empty 151.75 178.50 55.916





    Bag 1 bag @ Titrate IV .   





    Q0M [facility] Rx#:[id number]   


 


    Sodium Phosphate 10 mmol  125 





    In Sodium Chloride 0.9%   





    250 ml @ 125 mls/hr IVPB   





    ONCE ONE Rx#:[id number]   


 


    Sodium Phosphate 10 mmol   250





    In Sodium Chloride 0.9%   





    250 ml @ 125 mls/hr IVPB   





    Q2H [facility] Rx#:[id number]   


 


    ceFAZolin 2 gm In Sodium   100





    Chloride 0.9% 100 ml @   





    100 mls/hr IVPB Q8HR [facility]   





    Rx#:[id number]   


 


Output:   


 


  Chest Tube Drainage 341 252 50


 


    Left Lateral Chest 20 62 20


 


    Mediastinal 185 160 0


 


    Right Lateral Chest 136 30 30


 


  Urine 1510 760 685


 


  Estimated Blood Loss 500  


 


Other:   


 


  Voiding Method Indwelling Catheter Indwelling Catheter Indwelling Catheter


 


  # Bowel Movements  0 0








 ABP, PAP, CO, CI - Last Documented











Arterial Blood Pressure        126/54


 


Pulmonary Artery Pressure      42/22


 


Cardiac Output                 7.7


 


Cardiac Index                  3.7

















- Exam








Physical Exam: Revealed a 61-year-old white male on mechanical ventilation, in 

no distress.


HEENT:[Neck is supple.] [No neck masses.] [No thyromegaly.] [No JVD.]

endotracheal tube was noted to be intact.


Chest: [diminished breath sounds on the right side with slight expiratory 

rhonchi and wheezes noted bilaterally.]


Cardiac Exam: [Normal S1 and S2, no S3 gallop, 2/6 systolic murmur throughout 

the precordium, positive pericardial rub]


Abdomen: [Soft, nontender,  no megaly, no rebound, no guarding, normal bowel 

sounds.]


Extremities: [No clubbing, no edema, no cyanosis.]


Neurological Exam: [No gross focal neurologic deficit.  Patient is awake, 

follows all instructions, but remains on mechanical ventilation.





- Labs


CBC & Chem 7: 


 04/14/17 04:30





 04/14/17 04:30


Labs: 


 Abnormal Lab Results - Last 24 Hours (Table)











  04/06/17 04/13/17 04/13/17 Range/Units





  10:10 14:10 14:11 


 


RBC    3.07 L  (4.30-5.90)  m/uL


 


Hgb    10.0 L D  (13.0-17.5)  gm/dL


 


Hct    29.5 L  (39.0-53.0)  %


 


Lymphocytes #     (1.0-4.8)  k/uL


 


ABG pH     (7.35-7.45)  


 


ABG pCO2     (35-45)  mmHg


 


ABG pO2     ()  mmHg


 


ABG HCO3     (21-25)  mmol/L


 


ABG Total CO2     (19-24)  mmol/L


 


ABG O2 Saturation     (94-97)  %


 


Sodium     (137-145)  mmol/L


 


Potassium     (3.5-5.1)  mmol/L


 


Carbon Dioxide     (22-30)  mmol/L


 


BUN     (9-20)  mg/dL


 


Glucose     (74-99)  mg/dL


 


POC Glucose (mg/dL)   117 H   (75-99)  mg/dL


 


Calcium     (8.4-10.2)  mg/dL


 


Phosphorus     (2.5-4.5)  mg/dL


 


Magnesium     (1.6-2.3)  mg/dL


 


Alkaline Phosphatase     ()  U/L


 


Total Protein     (6.3-8.2)  g/dL


 


Crossmatch  See Detail    














  04/13/17 04/13/17 04/13/17 Range/Units





  14:11 14:52 14:53 


 


RBC     (4.30-5.90)  m/uL


 


Hgb     (13.0-17.5)  gm/dL


 


Hct     (39.0-53.0)  %


 


Lymphocytes #     (1.0-4.8)  k/uL


 


ABG pH     (7.35-7.45)  


 


ABG pCO2     (35-45)  mmHg


 


ABG pO2   149 H   ()  mmHg


 


ABG HCO3     (21-25)  mmol/L


 


ABG Total CO2   25 H   (19-24)  mmol/L


 


ABG O2 Saturation   99.3 H   (94-97)  %


 


Sodium  135 L    (137-145)  mmol/L


 


Potassium     (3.5-5.1)  mmol/L


 


Carbon Dioxide     (22-30)  mmol/L


 


BUN     (9-20)  mg/dL


 


Glucose  116 H    (74-99)  mg/dL


 


POC Glucose (mg/dL)    138 H  (75-99)  mg/dL


 


Calcium  8.1 L    (8.4-10.2)  mg/dL


 


Phosphorus     (2.5-4.5)  mg/dL


 


Magnesium     (1.6-2.3)  mg/dL


 


Alkaline Phosphatase  35 L    ()  U/L


 


Total Protein  5.7 L    (6.3-8.2)  g/dL


 


Crossmatch     














  04/13/17 04/13/17 04/13/17 Range/Units





  16:03 16:55 16:55 


 


RBC    3.39 L  (4.30-5.90)  m/uL


 


Hgb    11.4 L  (13.0-17.5)  gm/dL


 


Hct    32.0 L  (39.0-53.0)  %


 


Lymphocytes #     (1.0-4.8)  k/uL


 


ABG pH     (7.35-7.45)  


 


ABG pCO2     (35-45)  mmHg


 


ABG pO2     ()  mmHg


 


ABG HCO3     (21-25)  mmol/L


 


ABG Total CO2     (19-24)  mmol/L


 


ABG O2 Saturation     (94-97)  %


 


Sodium     (137-145)  mmol/L


 


Potassium     (3.5-5.1)  mmol/L


 


Carbon Dioxide     (22-30)  mmol/L


 


BUN     (9-20)  mg/dL


 


Glucose     (74-99)  mg/dL


 


POC Glucose (mg/dL)  126 H  128 H   (75-99)  mg/dL


 


Calcium     (8.4-10.2)  mg/dL


 


Phosphorus     (2.5-4.5)  mg/dL


 


Magnesium     (1.6-2.3)  mg/dL


 


Alkaline Phosphatase     ()  U/L


 


Total Protein     (6.3-8.2)  g/dL


 


Crossmatch     














  04/13/17 04/13/17 04/13/17 Range/Units





  17:59 19:07 19:10 


 


RBC    3.38 L  (4.30-5.90)  m/uL


 


Hgb    11.1 L  (13.0-17.5)  gm/dL


 


Hct    32.8 L  (39.0-53.0)  %


 


Lymphocytes #    0.7 L  (1.0-4.8)  k/uL


 


ABG pH     (7.35-7.45)  


 


ABG pCO2     (35-45)  mmHg


 


ABG pO2     ()  mmHg


 


ABG HCO3     (21-25)  mmol/L


 


ABG Total CO2     (19-24)  mmol/L


 


ABG O2 Saturation     (94-97)  %


 


Sodium     (137-145)  mmol/L


 


Potassium     (3.5-5.1)  mmol/L


 


Carbon Dioxide     (22-30)  mmol/L


 


BUN     (9-20)  mg/dL


 


Glucose     (74-99)  mg/dL


 


POC Glucose (mg/dL)  140 H  147 H   (75-99)  mg/dL


 


Calcium     (8.4-10.2)  mg/dL


 


Phosphorus     (2.5-4.5)  mg/dL


 


Magnesium     (1.6-2.3)  mg/dL


 


Alkaline Phosphatase     ()  U/L


 


Total Protein     (6.3-8.2)  g/dL


 


Crossmatch     














  04/13/17 04/13/17 04/13/17 Range/Units





  19:10 19:58 19:59 


 


RBC     (4.30-5.90)  m/uL


 


Hgb     (13.0-17.5)  gm/dL


 


Hct     (39.0-53.0)  %


 


Lymphocytes #     (1.0-4.8)  k/uL


 


ABG pH   7.46 H   (7.35-7.45)  


 


ABG pCO2   30 L   (35-45)  mmHg


 


ABG pO2     ()  mmHg


 


ABG HCO3     (21-25)  mmol/L


 


ABG Total CO2     (19-24)  mmol/L


 


ABG O2 Saturation     (94-97)  %


 


Sodium  133 L    (137-145)  mmol/L


 


Potassium     (3.5-5.1)  mmol/L


 


Carbon Dioxide     (22-30)  mmol/L


 


BUN     (9-20)  mg/dL


 


Glucose  142 H    (74-99)  mg/dL


 


POC Glucose (mg/dL)    138 H  (75-99)  mg/dL


 


Calcium  8.3 L    (8.4-10.2)  mg/dL


 


Phosphorus  2.3 L    (2.5-4.5)  mg/dL


 


Magnesium     (1.6-2.3)  mg/dL


 


Alkaline Phosphatase     ()  U/L


 


Total Protein  6.2 L    (6.3-8.2)  g/dL


 


Crossmatch     














  04/13/17 04/13/17 04/13/17 Range/Units





  21:00 22:03 23:04 


 


RBC     (4.30-5.90)  m/uL


 


Hgb     (13.0-17.5)  gm/dL


 


Hct     (39.0-53.0)  %


 


Lymphocytes #     (1.0-4.8)  k/uL


 


ABG pH     (7.35-7.45)  


 


ABG pCO2     (35-45)  mmHg


 


ABG pO2     ()  mmHg


 


ABG HCO3     (21-25)  mmol/L


 


ABG Total CO2     (19-24)  mmol/L


 


ABG O2 Saturation     (94-97)  %


 


Sodium     (137-145)  mmol/L


 


Potassium     (3.5-5.1)  mmol/L


 


Carbon Dioxide     (22-30)  mmol/L


 


BUN     (9-20)  mg/dL


 


Glucose     (74-99)  mg/dL


 


POC Glucose (mg/dL)  192 H  206 H  209 H  (75-99)  mg/dL


 


Calcium     (8.4-10.2)  mg/dL


 


Phosphorus     (2.5-4.5)  mg/dL


 


Magnesium     (1.6-2.3)  mg/dL


 


Alkaline Phosphatase     ()  U/L


 


Total Protein     (6.3-8.2)  g/dL


 


Crossmatch     














  04/13/17 04/14/17 04/14/17 Range/Units





  23:58 01:31 02:34 


 


RBC     (4.30-5.90)  m/uL


 


Hgb     (13.0-17.5)  gm/dL


 


Hct     (39.0-53.0)  %


 


Lymphocytes #     (1.0-4.8)  k/uL


 


ABG pH     (7.35-7.45)  


 


ABG pCO2     (35-45)  mmHg


 


ABG pO2     ()  mmHg


 


ABG HCO3     (21-25)  mmol/L


 


ABG Total CO2     (19-24)  mmol/L


 


ABG O2 Saturation     (94-97)  %


 


Sodium     (137-145)  mmol/L


 


Potassium     (3.5-5.1)  mmol/L


 


Carbon Dioxide     (22-30)  mmol/L


 


BUN     (9-20)  mg/dL


 


Glucose     (74-99)  mg/dL


 


POC Glucose (mg/dL)  207 H  192 H  182 H  (75-99)  mg/dL


 


Calcium     (8.4-10.2)  mg/dL


 


Phosphorus     (2.5-4.5)  mg/dL


 


Magnesium     (1.6-2.3)  mg/dL


 


Alkaline Phosphatase     ()  U/L


 


Total Protein     (6.3-8.2)  g/dL


 


Crossmatch     














  04/14/17 04/14/17 04/14/17 Range/Units





  03:48 04:30 04:30 


 


RBC   3.12 L   (4.30-5.90)  m/uL


 


Hgb   10.4 L   (13.0-17.5)  gm/dL


 


Hct   30.3 L   (39.0-53.0)  %


 


Lymphocytes #   0.4 L   (1.0-4.8)  k/uL


 


ABG pH     (7.35-7.45)  


 


ABG pCO2     (35-45)  mmHg


 


ABG pO2     ()  mmHg


 


ABG HCO3     (21-25)  mmol/L


 


ABG Total CO2     (19-24)  mmol/L


 


ABG O2 Saturation     (94-97)  %


 


Sodium     (137-145)  mmol/L


 


Potassium    3.4 L  (3.5-5.1)  mmol/L


 


Carbon Dioxide    19 L  (22-30)  mmol/L


 


BUN    8 L  (9-20)  mg/dL


 


Glucose    162 H  (74-99)  mg/dL


 


POC Glucose (mg/dL)  175 H    (75-99)  mg/dL


 


Calcium     (8.4-10.2)  mg/dL


 


Phosphorus    1.5 L  (2.5-4.5)  mg/dL


 


Magnesium    2.5 H  (1.6-2.3)  mg/dL


 


Alkaline Phosphatase    33 L  ()  U/L


 


Total Protein    6.1 L  (6.3-8.2)  g/dL


 


Crossmatch     














  04/14/17 04/14/17 04/14/17 Range/Units





  04:43 05:47 06:49 


 


RBC     (4.30-5.90)  m/uL


 


Hgb     (13.0-17.5)  gm/dL


 


Hct     (39.0-53.0)  %


 


Lymphocytes #     (1.0-4.8)  k/uL


 


ABG pH     (7.35-7.45)  


 


ABG pCO2     (35-45)  mmHg


 


ABG pO2     ()  mmHg


 


ABG HCO3     (21-25)  mmol/L


 


ABG Total CO2     (19-24)  mmol/L


 


ABG O2 Saturation     (94-97)  %


 


Sodium     (137-145)  mmol/L


 


Potassium     (3.5-5.1)  mmol/L


 


Carbon Dioxide     (22-30)  mmol/L


 


BUN     (9-20)  mg/dL


 


Glucose     (74-99)  mg/dL


 


POC Glucose (mg/dL)  165 H  172 H  170 H  (75-99)  mg/dL


 


Calcium     (8.4-10.2)  mg/dL


 


Phosphorus     (2.5-4.5)  mg/dL


 


Magnesium     (1.6-2.3)  mg/dL


 


Alkaline Phosphatase     ()  U/L


 


Total Protein     (6.3-8.2)  g/dL


 


Crossmatch     














  04/14/17 04/14/17 04/14/17 Range/Units





  07:58 09:00 09:03 


 


RBC     (4.30-5.90)  m/uL


 


Hgb     (13.0-17.5)  gm/dL


 


Hct     (39.0-53.0)  %


 


Lymphocytes #     (1.0-4.8)  k/uL


 


ABG pH     (7.35-7.45)  


 


ABG pCO2     (35-45)  mmHg


 


ABG pO2    76 L  ()  mmHg


 


ABG HCO3    19 L  (21-25)  mmol/L


 


ABG Total CO2     (19-24)  mmol/L


 


ABG O2 Saturation     (94-97)  %


 


Sodium     (137-145)  mmol/L


 


Potassium     (3.5-5.1)  mmol/L


 


Carbon Dioxide     (22-30)  mmol/L


 


BUN     (9-20)  mg/dL


 


Glucose     (74-99)  mg/dL


 


POC Glucose (mg/dL)  163 H  170 H   (75-99)  mg/dL


 


Calcium     (8.4-10.2)  mg/dL


 


Phosphorus     (2.5-4.5)  mg/dL


 


Magnesium     (1.6-2.3)  mg/dL


 


Alkaline Phosphatase     ()  U/L


 


Total Protein     (6.3-8.2)  g/dL


 


Crossmatch     














  04/14/17 04/14/17 04/14/17 Range/Units





  10:10 11:08 11:13 


 


RBC     (4.30-5.90)  m/uL


 


Hgb     (13.0-17.5)  gm/dL


 


Hct     (39.0-53.0)  %


 


Lymphocytes #     (1.0-4.8)  k/uL


 


ABG pH     (7.35-7.45)  


 


ABG pCO2    33 L  (35-45)  mmHg


 


ABG pO2    63 L  ()  mmHg


 


ABG HCO3    20 L  (21-25)  mmol/L


 


ABG Total CO2     (19-24)  mmol/L


 


ABG O2 Saturation    92.0 L  (94-97)  %


 


Sodium     (137-145)  mmol/L


 


Potassium     (3.5-5.1)  mmol/L


 


Carbon Dioxide     (22-30)  mmol/L


 


BUN     (9-20)  mg/dL


 


Glucose     (74-99)  mg/dL


 


POC Glucose (mg/dL)  159 H  149 H   (75-99)  mg/dL


 


Calcium     (8.4-10.2)  mg/dL


 


Phosphorus     (2.5-4.5)  mg/dL


 


Magnesium     (1.6-2.3)  mg/dL


 


Alkaline Phosphatase     ()  U/L


 


Total Protein     (6.3-8.2)  g/dL


 


Crossmatch     














  04/14/17 Range/Units





  13:02 


 


RBC   (4.30-5.90)  m/uL


 


Hgb   (13.0-17.5)  gm/dL


 


Hct   (39.0-53.0)  %


 


Lymphocytes #   (1.0-4.8)  k/uL


 


ABG pH   (7.35-7.45)  


 


ABG pCO2   (35-45)  mmHg


 


ABG pO2   ()  mmHg


 


ABG HCO3   (21-25)  mmol/L


 


ABG Total CO2   (19-24)  mmol/L


 


ABG O2 Saturation   (94-97)  %


 


Sodium   (137-145)  mmol/L


 


Potassium   (3.5-5.1)  mmol/L


 


Carbon Dioxide   (22-30)  mmol/L


 


BUN   (9-20)  mg/dL


 


Glucose   (74-99)  mg/dL


 


POC Glucose (mg/dL)  127 H  (75-99)  mg/dL


 


Calcium   (8.4-10.2)  mg/dL


 


Phosphorus   (2.5-4.5)  mg/dL


 


Magnesium   (1.6-2.3)  mg/dL


 


Alkaline Phosphatase   ()  U/L


 


Total Protein   (6.3-8.2)  g/dL


 


Crossmatch   








 Microbiology - Last 24 Hours (Table)











 04/13/17 16:40 Gram Stain - Preliminary





 Lung Aspirate - Right Bronchial Washings Culture - Preliminary


 


 04/13/17 16:40 Fungal Culture - Preliminary





 Lung - Right 


 


 04/13/17 16:40 Acid Fast Bacilli Culture - Preliminary





 Lung - Right 














Assessment and Plan


Plan: 





impression:





1 status post CABG, postoperative day # 1.





2 history of nicotine dependence and suspect some component of COPD





3 history of documented coronary artery disease based on a recent cardiac 

catheterization





4 history ofpercutaneous revascularization of the proximal right coronary 

artery in 2001





5history of hypertension





6 history of myocardial infarction





7 status post bronchoscopy and bronchoalveolar lavage of the right upper lobe 

read lobe and right lower lobe upon arrival to the ICU.  From the OR 

postoperative day #1





8 failure to wean on his postoperative day #1 mostly because of his underlying 

COPD and marginal pO2 when patient was given a weaning trial with pressure 

support of 8 and CPAP, his pO2 was only 63 with FiO2 of 50% and PEEP of 5.  

Hence the patient will be placed back on PEEP of 8 and FiO2 of 50%.  We'll 

continue bronchodilators, and clearly not quite ready for extubation today.





Recommendation: Patient will be kept on mechanical ventilation today, continue 

bronchodilators, steroids, keep the patient on relatively high PEEP, and we 

will use a relatively higher tidal volumes.  Based on overall clinical 

improvement and improvement in his O2 saturation, may consider another weaning 

trial and extubation today.  If not we'll address this tomorrow in a.m.  

Critical care time is 32 minutes


Time with Patient: Greater than 30

## 2017-04-14 NOTE — PCN
DATE OF PROCEDURE:  



PROCEDURE: Bronchoscopy and bronchoalveolar lavage of the right upper 

lobe, right middle lobe and right lower lobe.  

PREOPERATIVE DIAGNOSIS: Acute right lung collapse secondary to mucus 

plugging involving mostly the right upper lobe and right middle lobe.  

POSTOPERATIVE DIAGNOSIS: Acute right lung collapse secondary to mucus 

plugging involving mostly the right upper lobe and right middle lobe.  

ANESTHESIA USED: Patient was already on propofol, and he received 

morphine sulfate 2 mg in addition to the propofol drip.  



PROCEDURE: Patient was already on mechanical ventilation 

postoperatively from CABG. He was monitored, we were able to monitor 

his O2 saturation continuously, blood pressure was monitored 

continuously and cardiac rhythm was also continuously monitored. 

Patient was already on mechanical ventilation with endotracheal tube 

in place, and an adapter was applied to the endotracheal tube, which 

was also connected to mechanical ventilation. After adequate sedation, 

the bronchoscope was advanced through the adapter down to the 

endotracheal tube and down to the distal the trachea and anderson. A 

thorough examination was done on the left side; there was no evidence 

of any endobronchial tumors or secretions on the left side. Left upper 

lobe, lingula, and left lower lobe were all well examined. However, as 

we entered the right mainstem bronchus, there was a significant mucous 

plugging involving the right upper lobe, completely occluding the 

right upper lobe. This was suctioned and the right upper lobe was 

lavaged. Minimal mucous plugs were noted in the right middle lobe and 

these were also suctioned. As we went down to the right lower lobe, 

there were minimal purulent secretions and these were suctioned 

easily. Procedure was well tolerated. No evidence of any immediate 

complications. The purulent secretions and mucous plugs were sent for 

different cultures.

## 2017-04-14 NOTE — CONS
DATE OF CONSULTATION:  



This is a 61-year-old gentleman who underwent aortocoronary bypass 

surgery with right internal mammary artery graft to LAD and a left 

internal mammary artery to OM.  He is still intubated. There is some 

atelectasis with some oxygenation issues and this patient is going to 

probably be bronchoscoped today. However, he is in sinus rhythm, not 

on any drips other than a small dose of nitroglycerin, seems to be 

stable hemodynamically.  



PAST MEDICAL HISTORY:  Remarkable for a recent cardiac cath by Dr. Almodovar, which revealed significant disease involving the circumflex 

and LAD with calcification, RCA had a moderate noncritical disease. 

His LV function revealed anteroapical hypokinesia was about 45% to 

50%. He also has a history of underlying hypertension and 

hyperlipidemia as well.  



Medications at home include aspirin, Lipitor, enalapril, metoprolol 

and thiamine.  



On examination, blood pressure is 150/80, pulse rate is about 90 per 

minute, sinus.  

HEENT unremarkable. Fundus was not examined by me. 

Neck is supple. No JVD. 

Heart exam reveals S1, S2 with distant heart sounds. No rub. 

Lungs reveal bilateral diminished air entry over both bases. 

Abdominal exam is soft. 

Central nervous system assessment was not performed. 



RECOMMENDATIONS: I am recommending that we will continue current 

medications.  Dr. Hall is considering bronchoscopy for this patient. 

I will resume his cardiac oral medications when he is extubated. For 

now we will continue supportive care and ventilatory management is per 

Dr. Hall.  



Thank you very much for the consult.

## 2017-04-14 NOTE — P.PN
<Makayla Enamorado - Last Filed: 04/14/17 11:11>





Subjective


Principal diagnosis: 





Coronary artery disease, status post prior stenting to his right coronary 

artery.  Preserved left ventricular function.  Hyperlipidemia.  Hypertension.  

ETOH abuse.


POD #1 total arterial non-aortic patch off-pump double coronary artery bypass 

grafting using in situ skeletonized right internal mammary artery to the left 

anterior descending artery across the anterior midline in situ totally 

skeletonized left internal mammary artery to the first obtuse marginal artery.  

Intraoperative transesophageal echocardiogram and epi-aortic scanning.  

Intraoperative graft flow measurements using the Medistim system





Patient currently sedated on mechanical ventilation.





Objective





- Vital Signs


Vital signs: 


 Vital Signs











Temp  98.2 F   04/13/17 20:00


 


Pulse  85   04/14/17 10:30


 


Resp  20   04/14/17 10:30


 


BP  158/82   04/14/17 10:30


 


Pulse Ox  93 L  04/14/17 10:30








 Intake & Output











 04/13/17 04/14/17 04/14/17





 18:59 06:59 18:59


 


Intake Total 502.25 1819.469 865.933


 


Output Total 2351 1012 735


 


Balance -1848.75 807.469 130.933


 


Weight 89.61 kg 99.8 kg 99.8 kg


 


Intake:   


 


  Intake, IV Titration 502.25 1819.469 865.933





  Amount   


 


    ACETAMINOPHEN IV (For NPO  200 100





    ) 1,000 mg In Empty Bag 1   





    bag @ 400 mls/hr IVPB   





    Q6HR [facility] Rx#:[id number]   


 


    Albumin Human 5% 250 ml  500 





    In Empty Bag 1 bag @ 250   





    mls/hr IVPB Q1HR PRN Rx#:   





    [id number]   


 


    Clevidipine Butyrate 25 36 50.301 1.733





    mg In Empty Bag 1 bag @ 1   





    MG/HR 2 mls/hr IV .Q24H   





    [facility] Rx#:[id number]   


 


    Insulin Regular 100 unit 4 13.018 60.684





    In Sodium Chloride 0.9%   





    100 ml @ Per Protocol IV   





    .Q0M [facility] Rx#:[id number]   


 


    Lactated Ringers 1,000 ml 300 500 180





    @ 20 mls/hr IV .Q24H [facility]   





    Rx#:[id number]   


 


    Magnesium Sulfate-D5w Pmx  200 





    1 gm In Dextrose/Water 1   





    100ml.bag @ 100 mls/hr   





    IVPB Q1H [facility] Rx#:   





    [id number]   


 


    Nitroglycerin-D5w Pmx 50 10.5 52.65 17.600





    mg In Dextrose/Water 1   





    250ml.bag @ 5 MCG/MIN 1.5   





    mls/hr IV .Q24H [facility] Rx#:   





    [id number]   


 


    Potassium Chloride 10 meq   100





    In Water For Injection 1   





    100ml.bag @ 100 mls/hr   





    IVPB Q1H [facility] Rx#:   





    [id number]   


 


    Propofol 500 mg In Empty 151.75 178.50 55.916





    Bag 1 bag @ Titrate IV .   





    Q0M [facility] Rx#:[id number]   


 


    Sodium Phosphate 10 mmol  125 





    In Sodium Chloride 0.9%   





    250 ml @ 125 mls/hr IVPB   





    ONCE ONE Rx#:[id number]   


 


    Sodium Phosphate 10 mmol   250





    In Sodium Chloride 0.9%   





    250 ml @ 125 mls/hr IVPB   





    Q2H [facility] Rx#:[id number]   


 


    ceFAZolin 2 gm In Sodium   100





    Chloride 0.9% 100 ml @   





    100 mls/hr IVPB Q8HR [facility]   





    Rx#:[id number]   


 


Output:   


 


  Chest Tube Drainage 341 252 50


 


    Left Lateral Chest 20 62 20


 


    Mediastinal 185 160 0


 


    Right Lateral Chest 136 30 30


 


  Urine 1510 760 685


 


  Estimated Blood Loss 500  


 


Other:   


 


  Voiding Method Indwelling Catheter Indwelling Catheter Indwelling Catheter


 


  # Bowel Movements  0 0








 ABP, PAP, CO, CI - Last Documented











Arterial Blood Pressure        158/63


 


Pulmonary Artery Pressure      53/27


 


Cardiac Output                 8.1


 


Cardiac Index                  3.9

















- Constitutional


General appearance: Present: no acute distress





- Respiratory


Details: 





Lungs sounds diminished bilaterally.  Respirations even, nonlabored on 

mechanical ventilation.  Current settings tidal Lyme 500, FiO2 50%, respiratory 

20, PEEP 8.  All chest tubes connected to -20 cm wall suction, no air leaks 

present.  Left pleural chest tube draining to 23 mL serosanguineous fluid in 

the last 8 hours, 100 mL since surgery.  Right pleural chest tube draining 10 

mL serosanguineous fluid in the last 8 hours 250 mL since surgery.  Mediastinal 

chest tube drained 110 mL serosanguineous fluid in the last 8 hours, 400 mL 

since surgery.





- Cardiovascular


Details: 





S1, S2 present.  Regular rate and rhythm, normal sinus rhythm on telemetry.  

Sternum stable.  Veblen-Livier catheter present through right internal jugular 

cordis line.  Left radial arterial line present.  Teds/SCDs present.





- Gastrointestinal


Gastrointestinal Comment(s): 





Abdomen soft, nontender, nondistended.  Hypoactive bowel sounds present.  OG 

tube present to low intermittent suction





- Genitourinary


Genitourinary Comment(s): 





Angelo present draining clear, yellow urine.  Urine output  mL per hour.





- Integumentary


Integumentary Comment(s): 





Anterior chest wall incision covered with dry intact silver dressing.





- Psychiatric


Psychiatric Comment(s): 





Currently sedated on mechanical ventilation.





- Labs


CBC & Chem 7: 


 04/14/17 04:30





 04/14/17 04:30


Labs: 


 Abnormal Lab Results - Last 24 Hours (Table)











  04/06/17 04/13/17 04/13/17 Range/Units





  10:10 11:14 11:58 


 


RBC     (4.30-5.90)  m/uL


 


Hgb     (13.0-17.5)  gm/dL


 


Hct     (39.0-53.0)  %


 


Lymphocytes #     (1.0-4.8)  k/uL


 


ABG pH     (7.35-7.45)  


 


ABG pCO2     (35-45)  mmHg


 


ABG pO2     ()  mmHg


 


ABG HCO3     (21-25)  mmol/L


 


ABG Total CO2     (19-24)  mmol/L


 


ABG O2 Saturation     (94-97)  %


 


Sodium     (137-145)  mmol/L


 


Potassium     (3.5-5.1)  mmol/L


 


Carbon Dioxide     (22-30)  mmol/L


 


BUN     (9-20)  mg/dL


 


Glucose     (74-99)  mg/dL


 


POC Glucose (mg/dL)   116 H  129 H  (75-99)  mg/dL


 


Calcium     (8.4-10.2)  mg/dL


 


Phosphorus     (2.5-4.5)  mg/dL


 


Magnesium     (1.6-2.3)  mg/dL


 


Alkaline Phosphatase     ()  U/L


 


Total Protein     (6.3-8.2)  g/dL


 


Crossmatch  See Detail    














  04/13/17 04/13/17 04/13/17 Range/Units





  12:52 14:10 14:11 


 


RBC    3.07 L  (4.30-5.90)  m/uL


 


Hgb    10.0 L D  (13.0-17.5)  gm/dL


 


Hct    29.5 L  (39.0-53.0)  %


 


Lymphocytes #     (1.0-4.8)  k/uL


 


ABG pH     (7.35-7.45)  


 


ABG pCO2     (35-45)  mmHg


 


ABG pO2     ()  mmHg


 


ABG HCO3     (21-25)  mmol/L


 


ABG Total CO2     (19-24)  mmol/L


 


ABG O2 Saturation     (94-97)  %


 


Sodium     (137-145)  mmol/L


 


Potassium     (3.5-5.1)  mmol/L


 


Carbon Dioxide     (22-30)  mmol/L


 


BUN     (9-20)  mg/dL


 


Glucose     (74-99)  mg/dL


 


POC Glucose (mg/dL)  128 H  117 H   (75-99)  mg/dL


 


Calcium     (8.4-10.2)  mg/dL


 


Phosphorus     (2.5-4.5)  mg/dL


 


Magnesium     (1.6-2.3)  mg/dL


 


Alkaline Phosphatase     ()  U/L


 


Total Protein     (6.3-8.2)  g/dL


 


Crossmatch     














  04/13/17 04/13/17 04/13/17 Range/Units





  14:11 14:52 14:53 


 


RBC     (4.30-5.90)  m/uL


 


Hgb     (13.0-17.5)  gm/dL


 


Hct     (39.0-53.0)  %


 


Lymphocytes #     (1.0-4.8)  k/uL


 


ABG pH     (7.35-7.45)  


 


ABG pCO2     (35-45)  mmHg


 


ABG pO2   149 H   ()  mmHg


 


ABG HCO3     (21-25)  mmol/L


 


ABG Total CO2   25 H   (19-24)  mmol/L


 


ABG O2 Saturation   99.3 H   (94-97)  %


 


Sodium  135 L    (137-145)  mmol/L


 


Potassium     (3.5-5.1)  mmol/L


 


Carbon Dioxide     (22-30)  mmol/L


 


BUN     (9-20)  mg/dL


 


Glucose  116 H    (74-99)  mg/dL


 


POC Glucose (mg/dL)    138 H  (75-99)  mg/dL


 


Calcium  8.1 L    (8.4-10.2)  mg/dL


 


Phosphorus     (2.5-4.5)  mg/dL


 


Magnesium     (1.6-2.3)  mg/dL


 


Alkaline Phosphatase  35 L    ()  U/L


 


Total Protein  5.7 L    (6.3-8.2)  g/dL


 


Crossmatch     














  04/13/17 04/13/17 04/13/17 Range/Units





  16:03 16:55 16:55 


 


RBC    3.39 L  (4.30-5.90)  m/uL


 


Hgb    11.4 L  (13.0-17.5)  gm/dL


 


Hct    32.0 L  (39.0-53.0)  %


 


Lymphocytes #     (1.0-4.8)  k/uL


 


ABG pH     (7.35-7.45)  


 


ABG pCO2     (35-45)  mmHg


 


ABG pO2     ()  mmHg


 


ABG HCO3     (21-25)  mmol/L


 


ABG Total CO2     (19-24)  mmol/L


 


ABG O2 Saturation     (94-97)  %


 


Sodium     (137-145)  mmol/L


 


Potassium     (3.5-5.1)  mmol/L


 


Carbon Dioxide     (22-30)  mmol/L


 


BUN     (9-20)  mg/dL


 


Glucose     (74-99)  mg/dL


 


POC Glucose (mg/dL)  126 H  128 H   (75-99)  mg/dL


 


Calcium     (8.4-10.2)  mg/dL


 


Phosphorus     (2.5-4.5)  mg/dL


 


Magnesium     (1.6-2.3)  mg/dL


 


Alkaline Phosphatase     ()  U/L


 


Total Protein     (6.3-8.2)  g/dL


 


Crossmatch     














  04/13/17 04/13/17 04/13/17 Range/Units





  17:59 19:07 19:10 


 


RBC    3.38 L  (4.30-5.90)  m/uL


 


Hgb    11.1 L  (13.0-17.5)  gm/dL


 


Hct    32.8 L  (39.0-53.0)  %


 


Lymphocytes #    0.7 L  (1.0-4.8)  k/uL


 


ABG pH     (7.35-7.45)  


 


ABG pCO2     (35-45)  mmHg


 


ABG pO2     ()  mmHg


 


ABG HCO3     (21-25)  mmol/L


 


ABG Total CO2     (19-24)  mmol/L


 


ABG O2 Saturation     (94-97)  %


 


Sodium     (137-145)  mmol/L


 


Potassium     (3.5-5.1)  mmol/L


 


Carbon Dioxide     (22-30)  mmol/L


 


BUN     (9-20)  mg/dL


 


Glucose     (74-99)  mg/dL


 


POC Glucose (mg/dL)  140 H  147 H   (75-99)  mg/dL


 


Calcium     (8.4-10.2)  mg/dL


 


Phosphorus     (2.5-4.5)  mg/dL


 


Magnesium     (1.6-2.3)  mg/dL


 


Alkaline Phosphatase     ()  U/L


 


Total Protein     (6.3-8.2)  g/dL


 


Crossmatch     














  04/13/17 04/13/17 04/13/17 Range/Units





  19:10 19:58 19:59 


 


RBC     (4.30-5.90)  m/uL


 


Hgb     (13.0-17.5)  gm/dL


 


Hct     (39.0-53.0)  %


 


Lymphocytes #     (1.0-4.8)  k/uL


 


ABG pH   7.46 H   (7.35-7.45)  


 


ABG pCO2   30 L   (35-45)  mmHg


 


ABG pO2     ()  mmHg


 


ABG HCO3     (21-25)  mmol/L


 


ABG Total CO2     (19-24)  mmol/L


 


ABG O2 Saturation     (94-97)  %


 


Sodium  133 L    (137-145)  mmol/L


 


Potassium     (3.5-5.1)  mmol/L


 


Carbon Dioxide     (22-30)  mmol/L


 


BUN     (9-20)  mg/dL


 


Glucose  142 H    (74-99)  mg/dL


 


POC Glucose (mg/dL)    138 H  (75-99)  mg/dL


 


Calcium  8.3 L    (8.4-10.2)  mg/dL


 


Phosphorus  2.3 L    (2.5-4.5)  mg/dL


 


Magnesium     (1.6-2.3)  mg/dL


 


Alkaline Phosphatase     ()  U/L


 


Total Protein  6.2 L    (6.3-8.2)  g/dL


 


Crossmatch     














  04/13/17 04/13/17 04/13/17 Range/Units





  21:00 22:03 23:04 


 


RBC     (4.30-5.90)  m/uL


 


Hgb     (13.0-17.5)  gm/dL


 


Hct     (39.0-53.0)  %


 


Lymphocytes #     (1.0-4.8)  k/uL


 


ABG pH     (7.35-7.45)  


 


ABG pCO2     (35-45)  mmHg


 


ABG pO2     ()  mmHg


 


ABG HCO3     (21-25)  mmol/L


 


ABG Total CO2     (19-24)  mmol/L


 


ABG O2 Saturation     (94-97)  %


 


Sodium     (137-145)  mmol/L


 


Potassium     (3.5-5.1)  mmol/L


 


Carbon Dioxide     (22-30)  mmol/L


 


BUN     (9-20)  mg/dL


 


Glucose     (74-99)  mg/dL


 


POC Glucose (mg/dL)  192 H  206 H  209 H  (75-99)  mg/dL


 


Calcium     (8.4-10.2)  mg/dL


 


Phosphorus     (2.5-4.5)  mg/dL


 


Magnesium     (1.6-2.3)  mg/dL


 


Alkaline Phosphatase     ()  U/L


 


Total Protein     (6.3-8.2)  g/dL


 


Crossmatch     














  04/13/17 04/14/17 04/14/17 Range/Units





  23:58 01:31 02:34 


 


RBC     (4.30-5.90)  m/uL


 


Hgb     (13.0-17.5)  gm/dL


 


Hct     (39.0-53.0)  %


 


Lymphocytes #     (1.0-4.8)  k/uL


 


ABG pH     (7.35-7.45)  


 


ABG pCO2     (35-45)  mmHg


 


ABG pO2     ()  mmHg


 


ABG HCO3     (21-25)  mmol/L


 


ABG Total CO2     (19-24)  mmol/L


 


ABG O2 Saturation     (94-97)  %


 


Sodium     (137-145)  mmol/L


 


Potassium     (3.5-5.1)  mmol/L


 


Carbon Dioxide     (22-30)  mmol/L


 


BUN     (9-20)  mg/dL


 


Glucose     (74-99)  mg/dL


 


POC Glucose (mg/dL)  207 H  192 H  182 H  (75-99)  mg/dL


 


Calcium     (8.4-10.2)  mg/dL


 


Phosphorus     (2.5-4.5)  mg/dL


 


Magnesium     (1.6-2.3)  mg/dL


 


Alkaline Phosphatase     ()  U/L


 


Total Protein     (6.3-8.2)  g/dL


 


Crossmatch     














  04/14/17 04/14/17 04/14/17 Range/Units





  03:48 04:30 04:30 


 


RBC   3.12 L   (4.30-5.90)  m/uL


 


Hgb   10.4 L   (13.0-17.5)  gm/dL


 


Hct   30.3 L   (39.0-53.0)  %


 


Lymphocytes #   0.4 L   (1.0-4.8)  k/uL


 


ABG pH     (7.35-7.45)  


 


ABG pCO2     (35-45)  mmHg


 


ABG pO2     ()  mmHg


 


ABG HCO3     (21-25)  mmol/L


 


ABG Total CO2     (19-24)  mmol/L


 


ABG O2 Saturation     (94-97)  %


 


Sodium     (137-145)  mmol/L


 


Potassium    3.4 L  (3.5-5.1)  mmol/L


 


Carbon Dioxide    19 L  (22-30)  mmol/L


 


BUN    8 L  (9-20)  mg/dL


 


Glucose    162 H  (74-99)  mg/dL


 


POC Glucose (mg/dL)  175 H    (75-99)  mg/dL


 


Calcium     (8.4-10.2)  mg/dL


 


Phosphorus    1.5 L  (2.5-4.5)  mg/dL


 


Magnesium    2.5 H  (1.6-2.3)  mg/dL


 


Alkaline Phosphatase    33 L  ()  U/L


 


Total Protein    6.1 L  (6.3-8.2)  g/dL


 


Crossmatch     














  04/14/17 04/14/17 04/14/17 Range/Units





  04:43 05:47 06:49 


 


RBC     (4.30-5.90)  m/uL


 


Hgb     (13.0-17.5)  gm/dL


 


Hct     (39.0-53.0)  %


 


Lymphocytes #     (1.0-4.8)  k/uL


 


ABG pH     (7.35-7.45)  


 


ABG pCO2     (35-45)  mmHg


 


ABG pO2     ()  mmHg


 


ABG HCO3     (21-25)  mmol/L


 


ABG Total CO2     (19-24)  mmol/L


 


ABG O2 Saturation     (94-97)  %


 


Sodium     (137-145)  mmol/L


 


Potassium     (3.5-5.1)  mmol/L


 


Carbon Dioxide     (22-30)  mmol/L


 


BUN     (9-20)  mg/dL


 


Glucose     (74-99)  mg/dL


 


POC Glucose (mg/dL)  165 H  172 H  170 H  (75-99)  mg/dL


 


Calcium     (8.4-10.2)  mg/dL


 


Phosphorus     (2.5-4.5)  mg/dL


 


Magnesium     (1.6-2.3)  mg/dL


 


Alkaline Phosphatase     ()  U/L


 


Total Protein     (6.3-8.2)  g/dL


 


Crossmatch     














  04/14/17 04/14/17 04/14/17 Range/Units





  07:58 09:00 09:03 


 


RBC     (4.30-5.90)  m/uL


 


Hgb     (13.0-17.5)  gm/dL


 


Hct     (39.0-53.0)  %


 


Lymphocytes #     (1.0-4.8)  k/uL


 


ABG pH     (7.35-7.45)  


 


ABG pCO2     (35-45)  mmHg


 


ABG pO2    76 L  ()  mmHg


 


ABG HCO3    19 L  (21-25)  mmol/L


 


ABG Total CO2     (19-24)  mmol/L


 


ABG O2 Saturation     (94-97)  %


 


Sodium     (137-145)  mmol/L


 


Potassium     (3.5-5.1)  mmol/L


 


Carbon Dioxide     (22-30)  mmol/L


 


BUN     (9-20)  mg/dL


 


Glucose     (74-99)  mg/dL


 


POC Glucose (mg/dL)  163 H  170 H   (75-99)  mg/dL


 


Calcium     (8.4-10.2)  mg/dL


 


Phosphorus     (2.5-4.5)  mg/dL


 


Magnesium     (1.6-2.3)  mg/dL


 


Alkaline Phosphatase     ()  U/L


 


Total Protein     (6.3-8.2)  g/dL


 


Crossmatch     














  04/14/17 04/14/17 Range/Units





  10:10 11:08 


 


RBC    (4.30-5.90)  m/uL


 


Hgb    (13.0-17.5)  gm/dL


 


Hct    (39.0-53.0)  %


 


Lymphocytes #    (1.0-4.8)  k/uL


 


ABG pH    (7.35-7.45)  


 


ABG pCO2    (35-45)  mmHg


 


ABG pO2    ()  mmHg


 


ABG HCO3    (21-25)  mmol/L


 


ABG Total CO2    (19-24)  mmol/L


 


ABG O2 Saturation    (94-97)  %


 


Sodium    (137-145)  mmol/L


 


Potassium    (3.5-5.1)  mmol/L


 


Carbon Dioxide    (22-30)  mmol/L


 


BUN    (9-20)  mg/dL


 


Glucose    (74-99)  mg/dL


 


POC Glucose (mg/dL)  159 H  149 H  (75-99)  mg/dL


 


Calcium    (8.4-10.2)  mg/dL


 


Phosphorus    (2.5-4.5)  mg/dL


 


Magnesium    (1.6-2.3)  mg/dL


 


Alkaline Phosphatase    ()  U/L


 


Total Protein    (6.3-8.2)  g/dL


 


Crossmatch    








 Microbiology - Last 24 Hours (Table)











 04/13/17 16:40 Gram Stain - Preliminary





 Lung Aspirate - Right Bronchial Washings Culture - Preliminary


 


 04/13/17 16:40 Fungal Culture - Preliminary





 Lung - Right 


 


 04/13/17 16:40 Acid Fast Bacilli Culture - Preliminary





 Lung - Right 














- Imaging and Cardiology


Chest x-ray: report reviewed, image reviewed





Assessment and Plan


(1) Status post coronary artery bypass graft


Status: Acute   





(2) Coronary artery disease


Status: Acute   





(3) Family history of heart disease


Status: Acute   





(4) History of PTCA


Status: Acute   





(5) Hyperlipidemia


Status: Acute   





(6) Hypertension


Status: Acute   





(7) Nicotine dependence, chewing tobacco, uncomplicated


Status: Acute   


Plan: 





1.  Continue aspirin, Lipitor, Plavix, heparin, Lopressor.


2.  Ventilator management per pulmonology, wean O2 as tolerated.


3.  Insulin drip/diabetic management per primary care service.


4.  Will DC Veblen today.


5.  Potassium, magnesium replacement per protocol.


6.  DC nitro drip.


7.  Daily labs, chest x-rays.


8.  GI/DVT prophylaxis.


9.  More recommendations as patient progresses.


Time with Patient: Greater than 30





<Oscar Porter - Last Filed: 04/14/17 16:32>





Objective





- Vital Signs


Vital signs: 


 Vital Signs











Temp  98.2 F   04/13/17 20:00


 


Pulse  62   04/14/17 16:00


 


Resp  20   04/14/17 16:00


 


BP  108/60   04/14/17 16:00


 


Pulse Ox  90 L  04/14/17 16:00








 Intake & Output











 04/13/17 04/14/17 04/14/17





 18:59 06:59 18:59


 


Intake Total 502.25 6112.929 7984.116


 


Output Total 2351 1012 1595


 


Balance -1848.75 807.469 -17.884


 


Weight 89.61 kg 99.8 kg 99.8 kg


 


Intake:   


 


  Intake, IV Titration 502.25 7957.417 4507.116





  Amount   


 


    ACETAMINOPHEN IV (For NPO  200 200





    ) 1,000 mg In Empty Bag 1   





    bag @ 400 mls/hr IVPB   





    Q6HR [facility] Rx#:[id number]   


 


    Albumin Human 5% 250 ml  500 





    In Empty Bag 1 bag @ 250   





    mls/hr IVPB Q1HR [facility] Rx#:   





    [id number]   


 


    Clevidipine Butyrate 25 36 50.301 7.266





    mg In Empty Bag 1 bag @ 1   





    MG/HR 2 mls/hr IV .Q24H   





    [facility] Rx#:[id number]   


 


    Insulin Regular 100 unit 4 13.018 60.684





    In Sodium Chloride 0.9%   





    100 ml @ Per Protocol IV   





    .Q0M [facility] Rx#:[id number]   


 


    Lactated Ringers 1,000 ml 300 500 430





    @ 20 mls/hr IV .Q24H [facility]   





    Rx#:[id number]   


 


    Magnesium Sulfate-D5w Pmx  200 





    1 gm In Dextrose/Water 1   





    100ml.bag @ 100 mls/hr   





    IVPB Q1H [facility] Rx#:   





    [id number]   


 


    Nitroglycerin-D5w Pmx 50 10.5 52.65 17.600





    mg In Dextrose/Water 1   





    250ml.bag @ 5 MCG/MIN 1.5   





    mls/hr IV .Q24H [facility] Rx#:   





    [id number]   


 


    Potassium Chloride 10 meq   100





    In Water For Injection 1   





    100ml.bag @ 100 mls/hr   





    IVPB Q1H [facility] Rx#:   





    [id number]   


 


    Potassium Chloride 20 meq   100





    In Water For Injection 1   





    100ml.bag @ 50 mls/hr   





    IVPB ONCE ONE Rx#:   





    [id number]   


 


    Propofol 500 mg In Empty 151.75 178.50 131.566





    Bag 1 bag @ Titrate IV .   





    Q0M [facility] Rx#:[id number]   


 


    Sodium Phosphate 10 mmol  125 





    In Sodium Chloride 0.9%   





    250 ml @ 125 mls/hr IVPB   





    ONCE ONE Rx#:[id number]   


 


    Sodium Phosphate 10 mmol   250





    In Sodium Chloride 0.9%   





    250 ml @ 125 mls/hr IVPB   





    Q2H [facility] Rx#:[id number]   


 


    ceFAZolin 2 gm In Sodium   100





    Chloride 0.9% 100 ml @   





    100 mls/hr IVPB Q8HR [facility]   





    Rx#:[id number]   


 


  Other   180


 


Output:   


 


  Chest Tube Drainage 341 252 140


 


    Left Lateral Chest 20 62 40


 


    Mediastinal 185 160 0


 


    Right Lateral Chest 136 30 100


 


  Urine 1414 383 9118


 


  Estimated Blood Loss 500  


 


Other:   


 


  Voiding Method Indwelling Catheter Indwelling Catheter Indwelling Catheter


 


  # Bowel Movements  0 0








 ABP, PAP, CO, CI - Last Documented











Arterial Blood Pressure        133/54


 


Pulmonary Artery Pressure      44/21


 


Cardiac Output                 6.8


 


Cardiac Index                  3.3

















- Labs


CBC & Chem 7: 


 04/14/17 04:30





 04/14/17 15:20


Labs: 


 Abnormal Lab Results - Last 24 Hours (Table)











  04/06/17 04/13/17 04/13/17 Range/Units





  10:10 16:55 16:55 


 


RBC    3.39 L  (4.30-5.90)  m/uL


 


Hgb    11.4 L  (13.0-17.5)  gm/dL


 


Hct    32.0 L  (39.0-53.0)  %


 


Lymphocytes #     (1.0-4.8)  k/uL


 


ABG pH     (7.35-7.45)  


 


ABG pCO2     (35-45)  mmHg


 


ABG pO2     ()  mmHg


 


ABG HCO3     (21-25)  mmol/L


 


ABG O2 Saturation     (94-97)  %


 


Sodium     (137-145)  mmol/L


 


Potassium     (3.5-5.1)  mmol/L


 


Carbon Dioxide     (22-30)  mmol/L


 


BUN     (9-20)  mg/dL


 


Glucose     (74-99)  mg/dL


 


POC Glucose (mg/dL)   128 H   (75-99)  mg/dL


 


Calcium     (8.4-10.2)  mg/dL


 


Phosphorus     (2.5-4.5)  mg/dL


 


Magnesium     (1.6-2.3)  mg/dL


 


Alkaline Phosphatase     ()  U/L


 


Total Protein     (6.3-8.2)  g/dL


 


Crossmatch  See Detail    














  04/13/17 04/13/17 04/13/17 Range/Units





  17:59 19:07 19:10 


 


RBC    3.38 L  (4.30-5.90)  m/uL


 


Hgb    11.1 L  (13.0-17.5)  gm/dL


 


Hct    32.8 L  (39.0-53.0)  %


 


Lymphocytes #    0.7 L  (1.0-4.8)  k/uL


 


ABG pH     (7.35-7.45)  


 


ABG pCO2     (35-45)  mmHg


 


ABG pO2     ()  mmHg


 


ABG HCO3     (21-25)  mmol/L


 


ABG O2 Saturation     (94-97)  %


 


Sodium     (137-145)  mmol/L


 


Potassium     (3.5-5.1)  mmol/L


 


Carbon Dioxide     (22-30)  mmol/L


 


BUN     (9-20)  mg/dL


 


Glucose     (74-99)  mg/dL


 


POC Glucose (mg/dL)  140 H  147 H   (75-99)  mg/dL


 


Calcium     (8.4-10.2)  mg/dL


 


Phosphorus     (2.5-4.5)  mg/dL


 


Magnesium     (1.6-2.3)  mg/dL


 


Alkaline Phosphatase     ()  U/L


 


Total Protein     (6.3-8.2)  g/dL


 


Crossmatch     














  04/13/17 04/13/17 04/13/17 Range/Units





  19:10 19:58 19:59 


 


RBC     (4.30-5.90)  m/uL


 


Hgb     (13.0-17.5)  gm/dL


 


Hct     (39.0-53.0)  %


 


Lymphocytes #     (1.0-4.8)  k/uL


 


ABG pH   7.46 H   (7.35-7.45)  


 


ABG pCO2   30 L   (35-45)  mmHg


 


ABG pO2     ()  mmHg


 


ABG HCO3     (21-25)  mmol/L


 


ABG O2 Saturation     (94-97)  %


 


Sodium  133 L    (137-145)  mmol/L


 


Potassium     (3.5-5.1)  mmol/L


 


Carbon Dioxide     (22-30)  mmol/L


 


BUN     (9-20)  mg/dL


 


Glucose  142 H    (74-99)  mg/dL


 


POC Glucose (mg/dL)    138 H  (75-99)  mg/dL


 


Calcium  8.3 L    (8.4-10.2)  mg/dL


 


Phosphorus  2.3 L    (2.5-4.5)  mg/dL


 


Magnesium     (1.6-2.3)  mg/dL


 


Alkaline Phosphatase     ()  U/L


 


Total Protein  6.2 L    (6.3-8.2)  g/dL


 


Crossmatch     














  04/13/17 04/13/17 04/13/17 Range/Units





  21:00 22:03 23:04 


 


RBC     (4.30-5.90)  m/uL


 


Hgb     (13.0-17.5)  gm/dL


 


Hct     (39.0-53.0)  %


 


Lymphocytes #     (1.0-4.8)  k/uL


 


ABG pH     (7.35-7.45)  


 


ABG pCO2     (35-45)  mmHg


 


ABG pO2     ()  mmHg


 


ABG HCO3     (21-25)  mmol/L


 


ABG O2 Saturation     (94-97)  %


 


Sodium     (137-145)  mmol/L


 


Potassium     (3.5-5.1)  mmol/L


 


Carbon Dioxide     (22-30)  mmol/L


 


BUN     (9-20)  mg/dL


 


Glucose     (74-99)  mg/dL


 


POC Glucose (mg/dL)  192 H  206 H  209 H  (75-99)  mg/dL


 


Calcium     (8.4-10.2)  mg/dL


 


Phosphorus     (2.5-4.5)  mg/dL


 


Magnesium     (1.6-2.3)  mg/dL


 


Alkaline Phosphatase     ()  U/L


 


Total Protein     (6.3-8.2)  g/dL


 


Crossmatch     














  04/13/17 04/14/17 04/14/17 Range/Units





  23:58 01:31 02:34 


 


RBC     (4.30-5.90)  m/uL


 


Hgb     (13.0-17.5)  gm/dL


 


Hct     (39.0-53.0)  %


 


Lymphocytes #     (1.0-4.8)  k/uL


 


ABG pH     (7.35-7.45)  


 


ABG pCO2     (35-45)  mmHg


 


ABG pO2     ()  mmHg


 


ABG HCO3     (21-25)  mmol/L


 


ABG O2 Saturation     (94-97)  %


 


Sodium     (137-145)  mmol/L


 


Potassium     (3.5-5.1)  mmol/L


 


Carbon Dioxide     (22-30)  mmol/L


 


BUN     (9-20)  mg/dL


 


Glucose     (74-99)  mg/dL


 


POC Glucose (mg/dL)  207 H  192 H  182 H  (75-99)  mg/dL


 


Calcium     (8.4-10.2)  mg/dL


 


Phosphorus     (2.5-4.5)  mg/dL


 


Magnesium     (1.6-2.3)  mg/dL


 


Alkaline Phosphatase     ()  U/L


 


Total Protein     (6.3-8.2)  g/dL


 


Crossmatch     














  04/14/17 04/14/17 04/14/17 Range/Units





  03:48 04:30 04:30 


 


RBC   3.12 L   (4.30-5.90)  m/uL


 


Hgb   10.4 L   (13.0-17.5)  gm/dL


 


Hct   30.3 L   (39.0-53.0)  %


 


Lymphocytes #   0.4 L   (1.0-4.8)  k/uL


 


ABG pH     (7.35-7.45)  


 


ABG pCO2     (35-45)  mmHg


 


ABG pO2     ()  mmHg


 


ABG HCO3     (21-25)  mmol/L


 


ABG O2 Saturation     (94-97)  %


 


Sodium     (137-145)  mmol/L


 


Potassium    3.4 L  (3.5-5.1)  mmol/L


 


Carbon Dioxide    19 L  (22-30)  mmol/L


 


BUN    8 L  (9-20)  mg/dL


 


Glucose    162 H  (74-99)  mg/dL


 


POC Glucose (mg/dL)  175 H    (75-99)  mg/dL


 


Calcium     (8.4-10.2)  mg/dL


 


Phosphorus    1.5 L  (2.5-4.5)  mg/dL


 


Magnesium    2.5 H  (1.6-2.3)  mg/dL


 


Alkaline Phosphatase    33 L  ()  U/L


 


Total Protein    6.1 L  (6.3-8.2)  g/dL


 


Crossmatch     














  04/14/17 04/14/17 04/14/17 Range/Units





  04:43 05:47 06:49 


 


RBC     (4.30-5.90)  m/uL


 


Hgb     (13.0-17.5)  gm/dL


 


Hct     (39.0-53.0)  %


 


Lymphocytes #     (1.0-4.8)  k/uL


 


ABG pH     (7.35-7.45)  


 


ABG pCO2     (35-45)  mmHg


 


ABG pO2     ()  mmHg


 


ABG HCO3     (21-25)  mmol/L


 


ABG O2 Saturation     (94-97)  %


 


Sodium     (137-145)  mmol/L


 


Potassium     (3.5-5.1)  mmol/L


 


Carbon Dioxide     (22-30)  mmol/L


 


BUN     (9-20)  mg/dL


 


Glucose     (74-99)  mg/dL


 


POC Glucose (mg/dL)  165 H  172 H  170 H  (75-99)  mg/dL


 


Calcium     (8.4-10.2)  mg/dL


 


Phosphorus     (2.5-4.5)  mg/dL


 


Magnesium     (1.6-2.3)  mg/dL


 


Alkaline Phosphatase     ()  U/L


 


Total Protein     (6.3-8.2)  g/dL


 


Crossmatch     














  04/14/17 04/14/17 04/14/17 Range/Units





  07:58 09:00 09:03 


 


RBC     (4.30-5.90)  m/uL


 


Hgb     (13.0-17.5)  gm/dL


 


Hct     (39.0-53.0)  %


 


Lymphocytes #     (1.0-4.8)  k/uL


 


ABG pH     (7.35-7.45)  


 


ABG pCO2     (35-45)  mmHg


 


ABG pO2    76 L  ()  mmHg


 


ABG HCO3    19 L  (21-25)  mmol/L


 


ABG O2 Saturation     (94-97)  %


 


Sodium     (137-145)  mmol/L


 


Potassium     (3.5-5.1)  mmol/L


 


Carbon Dioxide     (22-30)  mmol/L


 


BUN     (9-20)  mg/dL


 


Glucose     (74-99)  mg/dL


 


POC Glucose (mg/dL)  163 H  170 H   (75-99)  mg/dL


 


Calcium     (8.4-10.2)  mg/dL


 


Phosphorus     (2.5-4.5)  mg/dL


 


Magnesium     (1.6-2.3)  mg/dL


 


Alkaline Phosphatase     ()  U/L


 


Total Protein     (6.3-8.2)  g/dL


 


Crossmatch     














  04/14/17 04/14/17 04/14/17 Range/Units





  10:10 11:08 11:13 


 


RBC     (4.30-5.90)  m/uL


 


Hgb     (13.0-17.5)  gm/dL


 


Hct     (39.0-53.0)  %


 


Lymphocytes #     (1.0-4.8)  k/uL


 


ABG pH     (7.35-7.45)  


 


ABG pCO2    33 L  (35-45)  mmHg


 


ABG pO2    63 L  ()  mmHg


 


ABG HCO3    20 L  (21-25)  mmol/L


 


ABG O2 Saturation    92.0 L  (94-97)  %


 


Sodium     (137-145)  mmol/L


 


Potassium     (3.5-5.1)  mmol/L


 


Carbon Dioxide     (22-30)  mmol/L


 


BUN     (9-20)  mg/dL


 


Glucose     (74-99)  mg/dL


 


POC Glucose (mg/dL)  159 H  149 H   (75-99)  mg/dL


 


Calcium     (8.4-10.2)  mg/dL


 


Phosphorus     (2.5-4.5)  mg/dL


 


Magnesium     (1.6-2.3)  mg/dL


 


Alkaline Phosphatase     ()  U/L


 


Total Protein     (6.3-8.2)  g/dL


 


Crossmatch     














  04/14/17 04/14/17 04/14/17 Range/Units





  13:02 14:02 15:16 


 


RBC     (4.30-5.90)  m/uL


 


Hgb     (13.0-17.5)  gm/dL


 


Hct     (39.0-53.0)  %


 


Lymphocytes #     (1.0-4.8)  k/uL


 


ABG pH     (7.35-7.45)  


 


ABG pCO2     (35-45)  mmHg


 


ABG pO2     ()  mmHg


 


ABG HCO3     (21-25)  mmol/L


 


ABG O2 Saturation     (94-97)  %


 


Sodium     (137-145)  mmol/L


 


Potassium     (3.5-5.1)  mmol/L


 


Carbon Dioxide     (22-30)  mmol/L


 


BUN     (9-20)  mg/dL


 


Glucose     (74-99)  mg/dL


 


POC Glucose (mg/dL)  127 H  124 H  141 H  (75-99)  mg/dL


 


Calcium     (8.4-10.2)  mg/dL


 


Phosphorus     (2.5-4.5)  mg/dL


 


Magnesium     (1.6-2.3)  mg/dL


 


Alkaline Phosphatase     ()  U/L


 


Total Protein     (6.3-8.2)  g/dL


 


Crossmatch     














  04/14/17 Range/Units





  15:56 


 


RBC   (4.30-5.90)  m/uL


 


Hgb   (13.0-17.5)  gm/dL


 


Hct   (39.0-53.0)  %


 


Lymphocytes #   (1.0-4.8)  k/uL


 


ABG pH   (7.35-7.45)  


 


ABG pCO2   (35-45)  mmHg


 


ABG pO2   ()  mmHg


 


ABG HCO3   (21-25)  mmol/L


 


ABG O2 Saturation   (94-97)  %


 


Sodium   (137-145)  mmol/L


 


Potassium   (3.5-5.1)  mmol/L


 


Carbon Dioxide   (22-30)  mmol/L


 


BUN   (9-20)  mg/dL


 


Glucose   (74-99)  mg/dL


 


POC Glucose (mg/dL)  134 H  (75-99)  mg/dL


 


Calcium   (8.4-10.2)  mg/dL


 


Phosphorus   (2.5-4.5)  mg/dL


 


Magnesium   (1.6-2.3)  mg/dL


 


Alkaline Phosphatase   ()  U/L


 


Total Protein   (6.3-8.2)  g/dL


 


Crossmatch   








 Microbiology - Last 24 Hours (Table)











 04/13/17 16:40 Gram Stain - Preliminary





 Lung Aspirate - Right Bronchial Washings Culture - Preliminary


 


 04/13/17 16:40 Fungal Culture - Preliminary





 Lung - Right 


 


 04/13/17 16:40 Acid Fast Bacilli Culture - Preliminary





 Lung - Right 














Assessment and Plan


Plan: 


The patient was seen and examined.  I agree with above assessment and plan.  

Patient had bronchoscopy performed yesterday for mucous plug.  Today his chest x

-rays clear.  Pulmonary attempted to wean his ventilator setting afternoon 

however he failed.  He remains intubated at this time.  His Vebl[place] was removed.  

Otherwise he remains hemodynamically stable.  We will try extubation again in 

the morning.

## 2017-04-15 LAB
ALP SERPL-CCNC: 39 U/L (ref 38–126)
ALT SERPL-CCNC: 24 U/L (ref 21–72)
ANION GAP SERPL CALC-SCNC: 15 MMOL/L
AST SERPL-CCNC: 19 U/L (ref 17–59)
BASOPHILS # BLD AUTO: 0 K/UL (ref 0–0.2)
BASOPHILS NFR BLD AUTO: 0 %
BUN SERPL-SCNC: 8 MG/DL (ref 9–20)
CALCIUM SPEC-MCNC: 8.8 MG/DL (ref 8.4–10.2)
CH: 33.9
CHCM: 35.7
CHLORIDE SERPL-SCNC: 105 MMOL/L (ref 98–107)
CO2 BLDA-SCNC: 20 MMOL/L (ref 19–24)
CO2 SERPL-SCNC: 22 MMOL/L (ref 22–30)
EOSINOPHIL # BLD AUTO: 0 K/UL (ref 0–0.7)
EOSINOPHIL NFR BLD AUTO: 0 %
ERYTHROCYTE [DISTWIDTH] IN BLOOD BY AUTOMATED COUNT: 3.31 M/UL (ref 4.3–5.9)
ERYTHROCYTE [DISTWIDTH] IN BLOOD: 13.6 % (ref 11.5–15.5)
GLUCOSE BLD-MCNC: 121 MG/DL (ref 75–99)
GLUCOSE BLD-MCNC: 126 MG/DL (ref 75–99)
GLUCOSE BLD-MCNC: 128 MG/DL (ref 75–99)
GLUCOSE BLD-MCNC: 132 MG/DL (ref 75–99)
GLUCOSE BLD-MCNC: 135 MG/DL (ref 75–99)
GLUCOSE BLD-MCNC: 140 MG/DL (ref 75–99)
GLUCOSE BLD-MCNC: 146 MG/DL (ref 75–99)
GLUCOSE BLD-MCNC: 147 MG/DL (ref 75–99)
GLUCOSE BLD-MCNC: 154 MG/DL (ref 75–99)
GLUCOSE BLD-MCNC: 162 MG/DL (ref 75–99)
GLUCOSE BLD-MCNC: 164 MG/DL (ref 75–99)
GLUCOSE BLD-MCNC: 172 MG/DL (ref 75–99)
GLUCOSE SERPL-MCNC: 121 MG/DL (ref 74–99)
HCO3 BLDA-SCNC: 19 MMOL/L (ref 21–25)
HCT VFR BLD AUTO: 31.5 % (ref 39–53)
HDW: 2.5
HGB BLD-MCNC: 10.7 GM/DL (ref 13–17.5)
INR PPP: 1 (ref ?–1.1)
LUC NFR BLD AUTO: 1 %
LYMPHOCYTES # SPEC AUTO: 0.9 K/UL (ref 1–4.8)
LYMPHOCYTES NFR SPEC AUTO: 7 %
MAGNESIUM SPEC-SCNC: 2.3 MG/DL (ref 1.6–2.3)
MCH RBC QN AUTO: 32.3 PG (ref 25–35)
MCHC RBC AUTO-ENTMCNC: 33.9 G/DL (ref 31–37)
MCV RBC AUTO: 95.3 FL (ref 80–100)
MONOCYTES # BLD AUTO: 0.5 K/UL (ref 0–1)
MONOCYTES NFR BLD AUTO: 4 %
NEUTROPHILS # BLD AUTO: 12.5 K/UL (ref 1.3–7.7)
NEUTROPHILS NFR BLD AUTO: 89 %
NON-AFRICAN AMERICAN GFR(MDRD): >60
PCO2 BLDA: 33 MMHG (ref 35–45)
PH BLDA: 7.37 [PH] (ref 7.35–7.45)
PHOSPHATE SERPL-MCNC: 2.4 MG/DL (ref 2.5–4.5)
PO2 BLDA: 89 MMHG (ref 83–108)
POTASSIUM SERPL-SCNC: 3.8 MMOL/L (ref 3.5–5.1)
PROT SERPL-MCNC: 5.9 G/DL (ref 6.3–8.2)
PT BLD: 10.6 SEC (ref 9–12)
SODIUM SERPL-SCNC: 142 MMOL/L (ref 137–145)
WBC # BLD AUTO: 0.09 10*3/UL
WBC # BLD AUTO: 14 K/UL (ref 3.8–10.6)
WBC (PEROX): 14.51

## 2017-04-15 RX ADMIN — PROPOFOL SCH MLS/HR: 10 INJECTION, EMULSION INTRAVENOUS at 16:29

## 2017-04-15 RX ADMIN — METOPROLOL TARTRATE SCH: 25 TABLET, FILM COATED ORAL at 23:14

## 2017-04-15 RX ADMIN — CLOPIDOGREL BISULFATE SCH MG: 75 TABLET ORAL at 09:33

## 2017-04-15 RX ADMIN — LORAZEPAM PRN MG: 2 INJECTION, SOLUTION INTRAMUSCULAR; INTRAVENOUS at 12:02

## 2017-04-15 RX ADMIN — SODIUM CHLORIDE, PRESERVATIVE FREE SCH ML: 5 INJECTION INTRAVENOUS at 21:51

## 2017-04-15 RX ADMIN — IPRATROPIUM BROMIDE AND ALBUTEROL SULFATE SCH ML: .5; 3 SOLUTION RESPIRATORY (INHALATION) at 15:22

## 2017-04-15 RX ADMIN — ASPIRIN 325 MG ORAL TABLET SCH MG: 325 PILL ORAL at 09:33

## 2017-04-15 RX ADMIN — PROPOFOL SCH MLS/HR: 10 INJECTION, EMULSION INTRAVENOUS at 23:22

## 2017-04-15 RX ADMIN — HEPARIN SODIUM SCH UNIT: 5000 INJECTION, SOLUTION INTRAVENOUS; SUBCUTANEOUS at 16:16

## 2017-04-15 RX ADMIN — PROPOFOL SCH MLS/HR: 10 INJECTION, EMULSION INTRAVENOUS at 18:37

## 2017-04-15 RX ADMIN — BUDESONIDE SCH MG: 1 SUSPENSION RESPIRATORY (INHALATION) at 19:13

## 2017-04-15 RX ADMIN — Medication SCH MG: at 21:53

## 2017-04-15 RX ADMIN — MORPHINE SULFATE PRN MG: 2 INJECTION, SOLUTION INTRAMUSCULAR; INTRAVENOUS at 09:09

## 2017-04-15 RX ADMIN — PROPOFOL SCH MLS/HR: 10 INJECTION, EMULSION INTRAVENOUS at 09:35

## 2017-04-15 RX ADMIN — IPRATROPIUM BROMIDE AND ALBUTEROL SULFATE SCH ML: .5; 3 SOLUTION RESPIRATORY (INHALATION) at 11:29

## 2017-04-15 RX ADMIN — BUDESONIDE SCH MG: 1 SUSPENSION RESPIRATORY (INHALATION) at 07:52

## 2017-04-15 RX ADMIN — MORPHINE SULFATE PRN MG: 2 INJECTION, SOLUTION INTRAMUSCULAR; INTRAVENOUS at 14:22

## 2017-04-15 RX ADMIN — LORAZEPAM PRN MG: 2 INJECTION, SOLUTION INTRAMUSCULAR; INTRAVENOUS at 15:43

## 2017-04-15 RX ADMIN — METOPROLOL TARTRATE SCH MG: 25 TABLET, FILM COATED ORAL at 09:33

## 2017-04-15 RX ADMIN — METHYLPREDNISOLONE SODIUM SUCCINATE SCH MG: 40 INJECTION, POWDER, FOR SOLUTION INTRAMUSCULAR; INTRAVENOUS at 00:09

## 2017-04-15 RX ADMIN — LORAZEPAM STA: 2 INJECTION, SOLUTION INTRAMUSCULAR; INTRAVENOUS at 07:29

## 2017-04-15 RX ADMIN — MORPHINE SULFATE PRN MG: 2 INJECTION, SOLUTION INTRAMUSCULAR; INTRAVENOUS at 00:23

## 2017-04-15 RX ADMIN — PROPOFOL SCH MLS/HR: 10 INJECTION, EMULSION INTRAVENOUS at 12:02

## 2017-04-15 RX ADMIN — PANTOPRAZOLE SODIUM SCH MG: 40 INJECTION, POWDER, FOR SOLUTION INTRAVENOUS at 09:34

## 2017-04-15 RX ADMIN — SODIUM CHLORIDE, PRESERVATIVE FREE SCH ML: 5 INJECTION INTRAVENOUS at 09:34

## 2017-04-15 RX ADMIN — PROPOFOL SCH MLS/HR: 10 INJECTION, EMULSION INTRAVENOUS at 00:10

## 2017-04-15 RX ADMIN — LORAZEPAM STA MG: 2 INJECTION, SOLUTION INTRAMUSCULAR; INTRAVENOUS at 07:00

## 2017-04-15 RX ADMIN — IPRATROPIUM BROMIDE AND ALBUTEROL SULFATE SCH ML: .5; 3 SOLUTION RESPIRATORY (INHALATION) at 19:13

## 2017-04-15 RX ADMIN — HEPARIN SODIUM SCH UNIT: 5000 INJECTION, SOLUTION INTRAVENOUS; SUBCUTANEOUS at 09:33

## 2017-04-15 RX ADMIN — PROPOFOL SCH MLS/HR: 10 INJECTION, EMULSION INTRAVENOUS at 04:41

## 2017-04-15 RX ADMIN — IPRATROPIUM BROMIDE AND ALBUTEROL SULFATE SCH ML: .5; 3 SOLUTION RESPIRATORY (INHALATION) at 03:17

## 2017-04-15 RX ADMIN — IPRATROPIUM BROMIDE AND ALBUTEROL SULFATE SCH ML: .5; 3 SOLUTION RESPIRATORY (INHALATION) at 23:37

## 2017-04-15 RX ADMIN — IPRATROPIUM BROMIDE AND ALBUTEROL SULFATE SCH ML: .5; 3 SOLUTION RESPIRATORY (INHALATION) at 07:52

## 2017-04-15 RX ADMIN — CLEVIPIDINE SCH: 0.5 EMULSION INTRAVENOUS at 14:18

## 2017-04-15 RX ADMIN — PROPOFOL SCH MLS/HR: 10 INJECTION, EMULSION INTRAVENOUS at 07:17

## 2017-04-15 RX ADMIN — PROPOFOL SCH MLS/HR: 10 INJECTION, EMULSION INTRAVENOUS at 20:12

## 2017-04-15 RX ADMIN — Medication SCH MG: at 09:34

## 2017-04-15 RX ADMIN — POTASSIUM CHLORIDE SCH MLS/HR: 14.9 INJECTION, SOLUTION INTRAVENOUS at 14:19

## 2017-04-15 RX ADMIN — HEPARIN SODIUM SCH UNIT: 5000 INJECTION, SOLUTION INTRAVENOUS; SUBCUTANEOUS at 00:09

## 2017-04-15 RX ADMIN — METHYLPREDNISOLONE SODIUM SUCCINATE SCH MG: 40 INJECTION, POWDER, FOR SOLUTION INTRAMUSCULAR; INTRAVENOUS at 09:33

## 2017-04-15 RX ADMIN — PROPOFOL SCH MLS/HR: 10 INJECTION, EMULSION INTRAVENOUS at 21:49

## 2017-04-15 RX ADMIN — PROPOFOL SCH MLS/HR: 10 INJECTION, EMULSION INTRAVENOUS at 05:21

## 2017-04-15 RX ADMIN — LORAZEPAM PRN MG: 2 INJECTION, SOLUTION INTRAMUSCULAR; INTRAVENOUS at 09:47

## 2017-04-15 RX ADMIN — ATORVASTATIN CALCIUM SCH MG: 40 TABLET, FILM COATED ORAL at 09:33

## 2017-04-15 RX ADMIN — MULTIVITAMIN SCH ML: LIQUID ORAL at 12:06

## 2017-04-15 RX ADMIN — PROPOFOL SCH MLS/HR: 10 INJECTION, EMULSION INTRAVENOUS at 10:02

## 2017-04-15 RX ADMIN — METHYLPREDNISOLONE SODIUM SUCCINATE SCH MG: 40 INJECTION, POWDER, FOR SOLUTION INTRAMUSCULAR; INTRAVENOUS at 16:16

## 2017-04-15 RX ADMIN — DOCUSATE SODIUM AND SENNOSIDES SCH EACH: 50; 8.6 TABLET ORAL at 21:54

## 2017-04-15 NOTE — P.PN
Subjective


Principal diagnosis: 





Coronary artery disease, status post prior stenting to his right coronary 

artery.  Preserved left ventricular function.  Hyperlipidemia.  Hypertension.  

ETOH abuse.


POD #2 total arterial non-aortic patch off-pump double coronary artery bypass 

grafting using in situ skeletonized right internal mammary artery to the left 

anterior descending artery across the anterior midline in situ totally 

skeletonized left internal mammary artery to the first obtuse marginal artery.  

Intraoperative transesophageal echocardiogram and epi-aortic scanning.  

Intraoperative graft flow measurements using the [facility] system





Patient currently sedated on mechanical ventilation.  Issues this morning with 

alcohol withdrawal requiring increased sedation.





Objective





- Vital Signs


Vital signs: 


 Vital Signs











Temp  97.5 F L  04/15/17 09:00


 


Pulse  93   04/15/17 10:00


 


Resp  19   04/15/17 10:00


 


BP  109/57   04/15/17 10:00


 


Pulse Ox  95   04/15/17 10:00








 Intake & Output











 04/14/17 04/15/17 04/15/17





 18:59 06:59 18:59


 


Intake Total 1692.116 711.748 196.875


 


Output Total 1899 1422 185


 


Balance -206.884 -710.252 11.875


 


Weight 99.8 kg 98.3 kg 


 


Intake:   


 


  Intake, IV Titration 1512.116 711.748 196.875





  Amount   


 


    ACETAMINOPHEN IV (For   





    ) 1,000 mg In Empty Bag 1   





    bag @ 400 mls/hr IVPB   





    Q6HR [facility] Rx#:[id number]   


 


    Clevidipine Butyrate 25 7.266  





    mg In Empty Bag 1 bag @ 1   





    MG/HR 2 mls/hr IV .Q24H   





    [facility] Rx#:[id number]   


 


    Insulin Regular 100 unit 60.684 31.798 





    In Sodium Chloride 0.9%   





    100 ml @ Per Protocol IV   





    .Q0M [facility] Rx#:[id number]   


 


    Lactated Ringers 1,000 ml 495 480 80





    @ 20 mls/hr IV .Q24H [facility]   





    Rx#:[id number]   


 


    Nitroglycerin-D5w Pmx 50 17.600  





    mg In Dextrose/Water 1   





    250ml.bag @ 5 MCG/MIN 1.5   





    mls/hr IV .Q24H [facility] Rx#:   





    [id number]   


 


    Potassium Chloride 10 meq 100  





    In Water For Injection 1   





    100ml.bag @ 100 mls/hr   





    IVPB Q1H [facility] Rx#:   





    [id number]   


 


    Potassium Chloride 20 meq 100  





    In Water For Injection 1   





    100ml.bag @ 50 mls/hr   





    IVPB ONCE ONE Rx#:   





    [id number]   


 


    Propofol 500 mg In Empty 181.566 199.95 116.875





    Bag 1 bag @ Titrate IV .   





    Q0M [facility] Rx#:[id number]   


 


    Sodium Phosphate 10 mmol 250  





    In Sodium Chloride 0.9%   





    250 ml @ 125 mls/hr IVPB   





    Q2H [facility] Rx#:[id number]   


 


    ceFAZolin 2 gm In Sodium 100  





    Chloride 0.9% 100 ml @   





    100 mls/hr IVPB Q8HR [facility]   





    Rx#:[id number]   


 


  Other 180  


 


Output:   


 


  Chest Tube Drainage 224 172 10


 


    Left Lateral Chest 64 32 0


 


    Mediastinal 20 110 0


 


    Right Lateral Chest 140 30 10


 


  Urine 1675 1250 175


 


Other:   


 


  Voiding Method Indwelling Catheter Indwelling Catheter Indwelling Catheter


 


  # Bowel Movements 0 0 








 ABP, PAP, CO, CI - Last Documented











Arterial Blood Pressure        119/54


 


Pulmonary Artery Pressure      46/23


 


Cardiac Output                 6.8


 


Cardiac Index                  3.3

















- Constitutional


General appearance: Present: no acute distress





- Respiratory


Details: 





Lung sounds diminished bilaterally. Resp even/non-labored on mechanical 

ventilation.  Current settings: AC , FiO2 50%, RR 20, PEEP 8. Left 

pleural CT with 24 ml serosanguinous drainage in the last 8 hours, 50 ml in the 

last 24 hours.  Right pleural CT with 30 ml serosanguinous drainage in the last 

8 hours, 40 ml in the last 24 hours.  Mediastinal chest tube with 40 ml in the 

last 8 hours, 250 ml in the last 24 hours. No airleak present.





- Cardiovascular


Details: 





S1/S2 present.  Reg rate/rhythm, NSR on telemetry. Sternum stable. TEDs/SCDs 

present.  





- Gastrointestinal


Gastrointestinal Comment(s): 





Abd soft/NT/ND.  Hypoactive BS x 4 quad.  OGT to LIS. 





- Genitourinary


Genitourinary Comment(s): 





Angelo present draining clear, yellow urine.  Output 100-200 ml/hr.





- Integumentary


Integumentary Comment(s): 





Ant chest incision covered with dry/intact silver dressing.





- Psychiatric


Psychiatric Comment(s): 





Sedated on ventilator.  Increased sedation required 2/2 agitation.





- Allied health notes


Allied health notes reviewed: nursing





- Labs


CBC & Chem 7: 


 04/15/17 03:30





 04/15/17 03:30


Labs: 


 Abnormal Lab Results - Last 24 Hours (Table)











  04/14/17 04/14/17 04/14/17 Range/Units





  11:13 13:02 14:02 


 


WBC     (3.8-10.6)  k/uL


 


RBC     (4.30-5.90)  m/uL


 


Hgb     (13.0-17.5)  gm/dL


 


Hct     (39.0-53.0)  %


 


Neutrophils #     (1.3-7.7)  k/uL


 


Lymphocytes #     (1.0-4.8)  k/uL


 


ABG pCO2  33 L    (35-45)  mmHg


 


ABG pO2  63 L    ()  mmHg


 


ABG HCO3  20 L    (21-25)  mmol/L


 


ABG O2 Saturation  92.0 L    (94-97)  %


 


BUN     (9-20)  mg/dL


 


Glucose     (74-99)  mg/dL


 


POC Glucose (mg/dL)   127 H  124 H  (75-99)  mg/dL


 


Phosphorus     (2.5-4.5)  mg/dL


 


Total Protein     (6.3-8.2)  g/dL














  04/14/17 04/14/17 04/14/17 Range/Units





  15:16 15:56 17:18 


 


WBC     (3.8-10.6)  k/uL


 


RBC     (4.30-5.90)  m/uL


 


Hgb     (13.0-17.5)  gm/dL


 


Hct     (39.0-53.0)  %


 


Neutrophils #     (1.3-7.7)  k/uL


 


Lymphocytes #     (1.0-4.8)  k/uL


 


ABG pCO2     (35-45)  mmHg


 


ABG pO2     ()  mmHg


 


ABG HCO3     (21-25)  mmol/L


 


ABG O2 Saturation     (94-97)  %


 


BUN     (9-20)  mg/dL


 


Glucose     (74-99)  mg/dL


 


POC Glucose (mg/dL)  141 H  134 H  123 H  (75-99)  mg/dL


 


Phosphorus     (2.5-4.5)  mg/dL


 


Total Protein     (6.3-8.2)  g/dL














  04/14/17 04/14/17 04/14/17 Range/Units





  18:16 20:28 22:20 


 


WBC     (3.8-10.6)  k/uL


 


RBC     (4.30-5.90)  m/uL


 


Hgb     (13.0-17.5)  gm/dL


 


Hct     (39.0-53.0)  %


 


Neutrophils #     (1.3-7.7)  k/uL


 


Lymphocytes #     (1.0-4.8)  k/uL


 


ABG pCO2     (35-45)  mmHg


 


ABG pO2     ()  mmHg


 


ABG HCO3     (21-25)  mmol/L


 


ABG O2 Saturation     (94-97)  %


 


BUN     (9-20)  mg/dL


 


Glucose     (74-99)  mg/dL


 


POC Glucose (mg/dL)  127 H  131 H  128 H  (75-99)  mg/dL


 


Phosphorus     (2.5-4.5)  mg/dL


 


Total Protein     (6.3-8.2)  g/dL














  04/15/17 04/15/17 04/15/17 Range/Units





  00:36 03:27 03:30 


 


WBC     (3.8-10.6)  k/uL


 


RBC     (4.30-5.90)  m/uL


 


Hgb     (13.0-17.5)  gm/dL


 


Hct     (39.0-53.0)  %


 


Neutrophils #     (1.3-7.7)  k/uL


 


Lymphocytes #     (1.0-4.8)  k/uL


 


ABG pCO2     (35-45)  mmHg


 


ABG pO2     ()  mmHg


 


ABG HCO3     (21-25)  mmol/L


 


ABG O2 Saturation     (94-97)  %


 


BUN    8 L  (9-20)  mg/dL


 


Glucose    121 H  (74-99)  mg/dL


 


POC Glucose (mg/dL)  126 H  121 H   (75-99)  mg/dL


 


Phosphorus    2.4 L  (2.5-4.5)  mg/dL


 


Total Protein    5.9 L  (6.3-8.2)  g/dL














  04/15/17 04/15/17 04/15/17 Range/Units





  03:30 05:31 08:04 


 


WBC  14.0 H    (3.8-10.6)  k/uL


 


RBC  3.31 L    (4.30-5.90)  m/uL


 


Hgb  10.7 L    (13.0-17.5)  gm/dL


 


Hct  31.5 L    (39.0-53.0)  %


 


Neutrophils #  12.5 H    (1.3-7.7)  k/uL


 


Lymphocytes #  0.9 L    (1.0-4.8)  k/uL


 


ABG pCO2     (35-45)  mmHg


 


ABG pO2     ()  mmHg


 


ABG HCO3     (21-25)  mmol/L


 


ABG O2 Saturation     (94-97)  %


 


BUN     (9-20)  mg/dL


 


Glucose     (74-99)  mg/dL


 


POC Glucose (mg/dL)   147 H  146 H  (75-99)  mg/dL


 


Phosphorus     (2.5-4.5)  mg/dL


 


Total Protein     (6.3-8.2)  g/dL














  04/15/17 04/15/17 Range/Units





  09:26 09:54 


 


WBC    (3.8-10.6)  k/uL


 


RBC    (4.30-5.90)  m/uL


 


Hgb    (13.0-17.5)  gm/dL


 


Hct    (39.0-53.0)  %


 


Neutrophils #    (1.3-7.7)  k/uL


 


Lymphocytes #    (1.0-4.8)  k/uL


 


ABG pCO2  33 L   (35-45)  mmHg


 


ABG pO2    ()  mmHg


 


ABG HCO3  19 L   (21-25)  mmol/L


 


ABG O2 Saturation    (94-97)  %


 


BUN    (9-20)  mg/dL


 


Glucose    (74-99)  mg/dL


 


POC Glucose (mg/dL)   154 H  (75-99)  mg/dL


 


Phosphorus    (2.5-4.5)  mg/dL


 


Total Protein    (6.3-8.2)  g/dL








 Microbiology - Last 24 Hours (Table)











 04/13/17 16:40 Gram Stain - Final





 Lung Aspirate - Right Bronchial Washings Culture - Final


 


 04/13/17 16:40 Acid Fast Bacilli Smear - Final





 Lung - Right Acid Fast Bacilli Culture - Preliminary














- Imaging and Cardiology


Chest x-ray: report reviewed, image reviewed





Assessment and Plan


(1) Status post coronary artery bypass graft


Status: Acute   





(2) Coronary artery disease


Status: Acute   





(3) Family history of heart disease


Status: Acute   





(4) History of PTCA


Status: Acute   





(5) Hyperlipidemia


Status: Acute   





(6) Hypertension


Status: Acute   





(7) Nicotine dependence, chewing tobacco, uncomplicated


Status: Acute   


Plan: 





1.  Continue aspirin, Lipitor, Plavix, heparin, Lopressor.


2.  Ventilator management per pulmonology, wean O2 as tolerated.


3.  Insulin drip/diabetic management per primary care service.


4.  [facility] protocol added for alcohol withdrawal.


5.  Potassium replacement per protocol.


6.  Will DC mediastinal, right pleural CT today.


7.  Daily labs, chest x-rays.


8.  GI/DVT prophylaxis.


9.  More recommendations as patient progresses.


Time with Patient: Greater than 30

## 2017-04-15 NOTE — P.PN
Subjective


Principal diagnosis: 





Status post CABG, postoperative day #2


This is a 61-year-old white male status post CABG, postoperative day #1, total 

arterial non-aortic patch off pump double coronary artery bypass grafting using 

in situ skeletonized right internal mammary artery to the left anterior 

descending across the anterior midline in situ totally skeletonized left 

internal mammary artery to the first obtuse marginal artery.  Postoperatively 

patient was on mechanical ventilation, and upon arrival to the ICU he was found 

to have opacified right lung, he required bronchoscopy and extraction of mucous 

plugs mostly in the right upper lobe and right middle lobe.  Remained on 

mechanical ventilation overnight, however this morning his gases are showing 

marginal oxygenation, his pO2 is only 63 on 50% and PEEP of 5.  Patient was 

given a weaning trial with a pressure support of 8 and CPAP, ABG in half an 

hour is definitely poor showing very poor oxygenation, and his O2 saturation 

was in the 91% range.  PO2 was only 63, hence I decided not to pursue any 

further weaning and extubation on this patient until his oxygenation improves 

better.  Gram stain and cultures from the right upper lobe are pending.  In the 

meantime the patient is receiving bronchodilators for underlying COPD, and he 

is also on steroids for significant wheezing noted yesterday after 

bronchoscopy.  Patient remains on DuoNeb updrafts 4 times a day and when 

necessary.





Reevaluated today on 4/15/2017, patient is status post CABG, postoperative day #

2 remains intubated and on mechanical ventilation.  Yesterday patient could not 

be weaned and extubated mostly because of his marginal oxygenation.  His pO2 

was only 63 on 50% FiO2 and pressure support of 8 and CPAP.  Hence decided not 

to extubate the patient, today the patient is having what seems to be a picture 

of alcohol withdrawal as soon as the propofol was weaned off.  Hence we had to 

go back on the propofol, and the patient will be placed on the protocol.  For 

alcohol withdrawal.  ABG this morning seems to be a bit better, pO2 is 89 pCO2 

is 33 pH of 7.37.  Patient is slightly metabolically acidotic.  Bicarb is 19.  

Hemoglobin is 10.7 WBC count is 14.0.  Chest x-ray showed minimal areas of 

bands of atelectasis at the bases, but no further areas of collapse noted in 

the right upper lobe and right the lobe as seen 2 days ago.








Objective





- Vital Signs


Vital signs: 


 Vital Signs











Temp  97.5 F L  04/15/17 09:00


 


Pulse  74   04/15/17 11:30


 


Resp  20   04/15/17 11:00


 


BP  97/49   04/15/17 11:00


 


Pulse Ox  94 L  04/15/17 11:00








 Intake & Output











 04/14/17 04/15/17 04/15/17





 18:59 06:59 18:59


 


Intake Total 1692.116 711.748 216.875


 


Output Total 1899 1422 215


 


Balance -206.884 -710.252 1.875


 


Weight 99.8 kg 98.3 kg 


 


Intake:   


 


  Intake, IV Titration 1512.116 711.748 216.875





  Amount   


 


    ACETAMINOPHEN IV (For   





    ) 1,000 mg In Empty Bag 1   





    bag @ 400 mls/hr IVPB   





    Q6HR [facility] Rx#:[id number]   


 


    Clevidipine Butyrate 25 7.266  





    mg In Empty Bag 1 bag @ 1   





    MG/HR 2 mls/hr IV .Q24H   





    [facility] Rx#:[id number]   


 


    Insulin Regular 100 unit 60.684 31.798 





    In Sodium Chloride 0.9%   





    100 ml @ Per Protocol IV   





    .Q0M [facility] Rx#:[id number]   


 


    Lactated Ringers 1,000 ml 495 480 100





    @ 20 mls/hr IV .Q24H [facility]   





    Rx#:[id number]   


 


    Nitroglycerin-D5w Pmx 50 17.600  





    mg In Dextrose/Water 1   





    250ml.bag @ 5 MCG/MIN 1.5   





    mls/hr IV .Q24H [facility] Rx#:   





    [id number]   


 


    Potassium Chloride 10 meq 100  





    In Water For Injection 1   





    100ml.bag @ 100 mls/hr   





    IVPB Q1H [facility] Rx#:   





    [id number]   


 


    Potassium Chloride 20 meq 100  





    In Water For Injection 1   





    100ml.bag @ 50 mls/hr   





    IVPB ONCE ONE Rx#:   





    [id number]   


 


    Propofol 500 mg In Empty 181.566 199.95 116.875





    Bag 1 bag @ Titrate IV .   





    Q0M [facility] Rx#:[id number]   


 


    Sodium Phosphate 10 mmol 250  





    In Sodium Chloride 0.9%   





    250 ml @ 125 mls/hr IVPB   





    Q2H [facility] Rx#:[id number]   


 


    ceFAZolin 2 gm In Sodium 100  





    Chloride 0.9% 100 ml @   





    100 mls/hr IVPB Q8HR [facility]   





    Rx#:[id number]   


 


  Other 180  


 


Output:   


 


  Chest Tube Drainage 224 172 10


 


    Left Lateral Chest 64 32 0


 


    Mediastinal 20 110 0


 


    Right Lateral Chest 140 30 10


 


  Urine 1675 1250 205


 


Other:   


 


  Voiding Method Indwelling Catheter Indwelling Catheter Indwelling Catheter


 


  # Bowel Movements 0 0 








 ABP, PAP, CO, CI - Last Documented











Arterial Blood Pressure        100/47


 


Pulmonary Artery Pressure      46/23


 


Cardiac Output                 6.8


 


Cardiac Index                  3.3

















- Exam








Physical Exam: Revealed a 61-year-old white male on mechanical ventilation, in 

no distress.


HEENT:[Neck is supple.] [No neck masses.] [No thyromegaly.] [No JVD.]

endotracheal tube was noted to be intact.


Chest: [diminished breath sounds on the right side with slight expiratory 

rhonchi and wheezes noted bilaterally.]


Cardiac Exam: [Normal S1 and S2, no S3 gallop, 2/6 systolic murmur throughout 

the precordium, positive pericardial rub]


Abdomen: [Soft, nontender,  no megaly, no rebound, no guarding, normal bowel 

sounds.]


Extremities: [No clubbing, no edema, no cyanosis.]


Neurological Exam: [Cannot be assessed today because patient is on a relatively 

high dose of propofol, and this was given because of clinical picture of 

alcohol withdrawal.





- Labs


CBC & Chem 7: 


 04/15/17 03:30





 04/15/17 03:30


Labs: 


 Abnormal Lab Results - Last 24 Hours (Table)











  04/14/17 04/14/17 04/14/17 Range/Units





  13:02 14:02 15:16 


 


WBC     (3.8-10.6)  k/uL


 


RBC     (4.30-5.90)  m/uL


 


Hgb     (13.0-17.5)  gm/dL


 


Hct     (39.0-53.0)  %


 


Neutrophils #     (1.3-7.7)  k/uL


 


Lymphocytes #     (1.0-4.8)  k/uL


 


ABG pCO2     (35-45)  mmHg


 


ABG HCO3     (21-25)  mmol/L


 


BUN     (9-20)  mg/dL


 


Glucose     (74-99)  mg/dL


 


POC Glucose (mg/dL)  127 H  124 H  141 H  (75-99)  mg/dL


 


Phosphorus     (2.5-4.5)  mg/dL


 


Total Protein     (6.3-8.2)  g/dL














  04/14/17 04/14/17 04/14/17 Range/Units





  15:56 17:18 18:16 


 


WBC     (3.8-10.6)  k/uL


 


RBC     (4.30-5.90)  m/uL


 


Hgb     (13.0-17.5)  gm/dL


 


Hct     (39.0-53.0)  %


 


Neutrophils #     (1.3-7.7)  k/uL


 


Lymphocytes #     (1.0-4.8)  k/uL


 


ABG pCO2     (35-45)  mmHg


 


ABG HCO3     (21-25)  mmol/L


 


BUN     (9-20)  mg/dL


 


Glucose     (74-99)  mg/dL


 


POC Glucose (mg/dL)  134 H  123 H  127 H  (75-99)  mg/dL


 


Phosphorus     (2.5-4.5)  mg/dL


 


Total Protein     (6.3-8.2)  g/dL














  04/14/17 04/14/17 04/15/17 Range/Units





  20:28 22:20 00:36 


 


WBC     (3.8-10.6)  k/uL


 


RBC     (4.30-5.90)  m/uL


 


Hgb     (13.0-17.5)  gm/dL


 


Hct     (39.0-53.0)  %


 


Neutrophils #     (1.3-7.7)  k/uL


 


Lymphocytes #     (1.0-4.8)  k/uL


 


ABG pCO2     (35-45)  mmHg


 


ABG HCO3     (21-25)  mmol/L


 


BUN     (9-20)  mg/dL


 


Glucose     (74-99)  mg/dL


 


POC Glucose (mg/dL)  131 H  128 H  126 H  (75-99)  mg/dL


 


Phosphorus     (2.5-4.5)  mg/dL


 


Total Protein     (6.3-8.2)  g/dL














  04/15/17 04/15/17 04/15/17 Range/Units





  03:27 03:30 03:30 


 


WBC    14.0 H  (3.8-10.6)  k/uL


 


RBC    3.31 L  (4.30-5.90)  m/uL


 


Hgb    10.7 L  (13.0-17.5)  gm/dL


 


Hct    31.5 L  (39.0-53.0)  %


 


Neutrophils #    12.5 H  (1.3-7.7)  k/uL


 


Lymphocytes #    0.9 L  (1.0-4.8)  k/uL


 


ABG pCO2     (35-45)  mmHg


 


ABG HCO3     (21-25)  mmol/L


 


BUN   8 L   (9-20)  mg/dL


 


Glucose   121 H   (74-99)  mg/dL


 


POC Glucose (mg/dL)  121 H    (75-99)  mg/dL


 


Phosphorus   2.4 L   (2.5-4.5)  mg/dL


 


Total Protein   5.9 L   (6.3-8.2)  g/dL














  04/15/17 04/15/17 04/15/17 Range/Units





  05:31 08:04 09:26 


 


WBC     (3.8-10.6)  k/uL


 


RBC     (4.30-5.90)  m/uL


 


Hgb     (13.0-17.5)  gm/dL


 


Hct     (39.0-53.0)  %


 


Neutrophils #     (1.3-7.7)  k/uL


 


Lymphocytes #     (1.0-4.8)  k/uL


 


ABG pCO2    33 L  (35-45)  mmHg


 


ABG HCO3    19 L  (21-25)  mmol/L


 


BUN     (9-20)  mg/dL


 


Glucose     (74-99)  mg/dL


 


POC Glucose (mg/dL)  147 H  146 H   (75-99)  mg/dL


 


Phosphorus     (2.5-4.5)  mg/dL


 


Total Protein     (6.3-8.2)  g/dL














  04/15/17 Range/Units





  09:54 


 


WBC   (3.8-10.6)  k/uL


 


RBC   (4.30-5.90)  m/uL


 


Hgb   (13.0-17.5)  gm/dL


 


Hct   (39.0-53.0)  %


 


Neutrophils #   (1.3-7.7)  k/uL


 


Lymphocytes #   (1.0-4.8)  k/uL


 


ABG pCO2   (35-45)  mmHg


 


ABG HCO3   (21-25)  mmol/L


 


BUN   (9-20)  mg/dL


 


Glucose   (74-99)  mg/dL


 


POC Glucose (mg/dL)  154 H  (75-99)  mg/dL


 


Phosphorus   (2.5-4.5)  mg/dL


 


Total Protein   (6.3-8.2)  g/dL








 Microbiology - Last 24 Hours (Table)











 04/13/17 16:40 Gram Stain - Final





 Lung Aspirate - Right Bronchial Washings Culture - Final


 


 04/13/17 16:40 Acid Fast Bacilli Smear - Final





 Lung - Right Acid Fast Bacilli Culture - Preliminary














Assessment and Plan


Plan: 





impression:





1 status post CABG, postoperative day # 2





2 history of nicotine dependence and suspect some component of COPD





3 history of documented coronary artery disease based on a recent cardiac 

catheterization





4 history ofpercutaneous revascularization of the proximal right coronary 

artery in 2001





5history of hypertension





6 history of myocardial infarction





7 status post bronchoscopy and bronchoalveolar lavage of the right upper lobe 

read lobe and right lower lobe upon arrival to the ICU.  From the OR 

postoperative day #1





8 failure to wean on his postoperative day #1 mostly because of his underlying 

COPD and marginal pO2 when patient was given a weaning trial with pressure 

support of 8 and CPAP, his pO2 was only 63 with FiO2 of 50% and PEEP of 5.  

Hence the patient will be placed back on PEEP of 8 and FiO2 of 50%.  We'll 

continue bronchodilators, and clearly not quite ready for extubation today.





9 suspect acute alcohol withdrawal considering the patient is a heavy drinker, 

hence the patient will be placed on the protocol, and I will hold on weaning 

trials today.  PEEP will be placed on 5, FiO2 will remain at 50%.  Continue 

assist control mode of mechanical ventilation, and address nutritional support.





Recommendation: Patient will be kept on mechanical ventilation today, continue 

bronchodilators, steroids, we will address weeding and possibly extubation in 

the next 24 hours.  Critical care time is 34 minutes


Time with Patient: Greater than 30

## 2017-04-15 NOTE — P.PN
Subjective





Patient remains is intubated following coronary artery bypass grafting day to 

yesterday his weaning failed


He may have alcohol withdrawal


Electrolytes are reviewed





On examination he is afebrile 97.5F, pulse rate in the 70s blood pressure 97/

49 mmHg





Impression


Coronary artery disease status post coronary artery bypass grafting


History of smoking


COPD


PCI to the RCA in 2001 hypertension


Likely alcohol withdrawal





Suggest


Continue cardiac medications and continue ICU care post CABG and regarding 

alcohol withdrawal





Objective





- Vital Signs


Vital signs: 


 Vital Signs











Temp  97.5 F L  04/15/17 09:00


 


Pulse  74   04/15/17 13:00


 


Resp  20   04/15/17 13:00


 


BP  129/64   04/15/17 13:00


 


Pulse Ox  94 L  04/15/17 13:00








 Intake & Output











 04/14/17 04/15/17 04/15/17





 18:59 06:59 18:59


 


Intake Total 1692.116 711.748 372.458


 


Output Total 1899 1422 315


 


Balance -206.884 -710.252 57.458


 


Weight 99.8 kg 98.3 kg 


 


Intake:   


 


  Intake, IV Titration 1512.116 711.748 372.458





  Amount   


 


    ACETAMINOPHEN IV (For   





    ) 1,000 mg In Empty Bag 1   





    bag @ 400 mls/hr IVPB   





    Q6HR [facility] Rx#:[id number]   


 


    Clevidipine Butyrate 25 7.266  





    mg In Empty Bag 1 bag @ 1   





    MG/HR 2 mls/hr IV .Q24H   





    [facility] Rx#:[id number]   


 


    Insulin Regular 100 unit 60.684 31.798 65.583





    In Sodium Chloride 0.9%   





    100 ml @ Per Protocol IV   





    .Q0M [facility] Rx#:[id number]   


 


    Lactated Ringers 1,000 ml 495 480 140





    @ 20 mls/hr IV .Q24H [facility]   





    Rx#:[id number]   


 


    Nitroglycerin-D5w Pmx 50 17.600  





    mg In Dextrose/Water 1   





    250ml.bag @ 5 MCG/MIN 1.5   





    mls/hr IV .Q24H [facility] Rx#:   





    [id number]   


 


    Potassium Chloride 10 meq 100  





    In Water For Injection 1   





    100ml.bag @ 100 mls/hr   





    IVPB Q1H [facility] Rx#:   





    [id number]   


 


    Potassium Chloride 20 meq 100  





    In Water For Injection 1   





    100ml.bag @ 50 mls/hr   





    IVPB ONCE ONE Rx#:   





    [id number]   


 


    Propofol 500 mg In Empty 181.566 199.95 166.875





    Bag 1 bag @ Titrate IV .   





    Q0M [facility] Rx#:[id number]   


 


    Sodium Phosphate 10 mmol 250  





    In Sodium Chloride 0.9%   





    250 ml @ 125 mls/hr IVPB   





    Q2H [facility] Rx#:[id number]   


 


    ceFAZolin 2 gm In Sodium 100  





    Chloride 0.9% 100 ml @   





    100 mls/hr IVPB Q8HR [facility]   





    Rx#:[id number]   


 


  Other 180  


 


Output:   


 


  Chest Tube Drainage 224 172 40


 


    Left Lateral Chest 64 32 10


 


    Mediastinal 20 110 20


 


    Right Lateral Chest 140 30 10


 


  Urine 1675 1250 275


 


Other:   


 


  Voiding Method Indwelling Catheter Indwelling Catheter Indwelling Catheter


 


  # Bowel Movements 0 0 








 ABP, PAP, CO, CI - Last Documented











Arterial Blood Pressure        122/51


 


Pulmonary Artery Pressure      46/23


 


Cardiac Output                 6.8


 


Cardiac Index                  3.3

















- Labs


CBC & Chem 7: 


 04/15/17 03:30





 04/15/17 03:30


Labs: 


 Abnormal Lab Results - Last 24 Hours (Table)











  04/14/17 04/14/17 04/14/17 Range/Units





  14:02 15:16 15:56 


 


WBC     (3.8-10.6)  k/uL


 


RBC     (4.30-5.90)  m/uL


 


Hgb     (13.0-17.5)  gm/dL


 


Hct     (39.0-53.0)  %


 


Neutrophils #     (1.3-7.7)  k/uL


 


Lymphocytes #     (1.0-4.8)  k/uL


 


ABG pCO2     (35-45)  mmHg


 


ABG HCO3     (21-25)  mmol/L


 


BUN     (9-20)  mg/dL


 


Glucose     (74-99)  mg/dL


 


POC Glucose (mg/dL)  124 H  141 H  134 H  (75-99)  mg/dL


 


Phosphorus     (2.5-4.5)  mg/dL


 


Total Protein     (6.3-8.2)  g/dL














  04/14/17 04/14/17 04/14/17 Range/Units





  17:18 18:16 20:28 


 


WBC     (3.8-10.6)  k/uL


 


RBC     (4.30-5.90)  m/uL


 


Hgb     (13.0-17.5)  gm/dL


 


Hct     (39.0-53.0)  %


 


Neutrophils #     (1.3-7.7)  k/uL


 


Lymphocytes #     (1.0-4.8)  k/uL


 


ABG pCO2     (35-45)  mmHg


 


ABG HCO3     (21-25)  mmol/L


 


BUN     (9-20)  mg/dL


 


Glucose     (74-99)  mg/dL


 


POC Glucose (mg/dL)  123 H  127 H  131 H  (75-99)  mg/dL


 


Phosphorus     (2.5-4.5)  mg/dL


 


Total Protein     (6.3-8.2)  g/dL














  04/14/17 04/15/17 04/15/17 Range/Units





  22:20 00:36 03:27 


 


WBC     (3.8-10.6)  k/uL


 


RBC     (4.30-5.90)  m/uL


 


Hgb     (13.0-17.5)  gm/dL


 


Hct     (39.0-53.0)  %


 


Neutrophils #     (1.3-7.7)  k/uL


 


Lymphocytes #     (1.0-4.8)  k/uL


 


ABG pCO2     (35-45)  mmHg


 


ABG HCO3     (21-25)  mmol/L


 


BUN     (9-20)  mg/dL


 


Glucose     (74-99)  mg/dL


 


POC Glucose (mg/dL)  128 H  126 H  121 H  (75-99)  mg/dL


 


Phosphorus     (2.5-4.5)  mg/dL


 


Total Protein     (6.3-8.2)  g/dL














  04/15/17 04/15/17 04/15/17 Range/Units





  03:30 03:30 05:31 


 


WBC   14.0 H   (3.8-10.6)  k/uL


 


RBC   3.31 L   (4.30-5.90)  m/uL


 


Hgb   10.7 L   (13.0-17.5)  gm/dL


 


Hct   31.5 L   (39.0-53.0)  %


 


Neutrophils #   12.5 H   (1.3-7.7)  k/uL


 


Lymphocytes #   0.9 L   (1.0-4.8)  k/uL


 


ABG pCO2     (35-45)  mmHg


 


ABG HCO3     (21-25)  mmol/L


 


BUN  8 L    (9-20)  mg/dL


 


Glucose  121 H    (74-99)  mg/dL


 


POC Glucose (mg/dL)    147 H  (75-99)  mg/dL


 


Phosphorus  2.4 L    (2.5-4.5)  mg/dL


 


Total Protein  5.9 L    (6.3-8.2)  g/dL














  04/15/17 04/15/17 04/15/17 Range/Units





  08:04 09:26 09:54 


 


WBC     (3.8-10.6)  k/uL


 


RBC     (4.30-5.90)  m/uL


 


Hgb     (13.0-17.5)  gm/dL


 


Hct     (39.0-53.0)  %


 


Neutrophils #     (1.3-7.7)  k/uL


 


Lymphocytes #     (1.0-4.8)  k/uL


 


ABG pCO2   33 L   (35-45)  mmHg


 


ABG HCO3   19 L   (21-25)  mmol/L


 


BUN     (9-20)  mg/dL


 


Glucose     (74-99)  mg/dL


 


POC Glucose (mg/dL)  146 H   154 H  (75-99)  mg/dL


 


Phosphorus     (2.5-4.5)  mg/dL


 


Total Protein     (6.3-8.2)  g/dL














  04/15/17 Range/Units





  12:03 


 


WBC   (3.8-10.6)  k/uL


 


RBC   (4.30-5.90)  m/uL


 


Hgb   (13.0-17.5)  gm/dL


 


Hct   (39.0-53.0)  %


 


Neutrophils #   (1.3-7.7)  k/uL


 


Lymphocytes #   (1.0-4.8)  k/uL


 


ABG pCO2   (35-45)  mmHg


 


ABG HCO3   (21-25)  mmol/L


 


BUN   (9-20)  mg/dL


 


Glucose   (74-99)  mg/dL


 


POC Glucose (mg/dL)  172 H  (75-99)  mg/dL


 


Phosphorus   (2.5-4.5)  mg/dL


 


Total Protein   (6.3-8.2)  g/dL








 Microbiology - Last 24 Hours (Table)











 04/13/17 16:40 Gram Stain - Final





 Lung Aspirate - Right Bronchial Washings Culture - Final


 


 04/13/17 16:40 Acid Fast Bacilli Smear - Final





 Lung - Right Acid Fast Bacilli Culture - Preliminary

## 2017-04-15 NOTE — XR
EXAMINATION TYPE: XR chest 1V portable

 

DATE OF EXAM: 4/15/2017 6:56 AM

 

COMPARISON: Prior chest x-ray April 14th 2017

 

HISTORY: Status post cardiac surgery, intubated

 

TECHNIQUE: Single frontal view of the chest is obtained.

 

FINDINGS:  Endotracheal tube, NG tube are overlying appropriate positions, bilateral chest tubes are 
stable. No sizable pneumothorax or pleural effusion. Patient is post median sternotomy and the heart 
is enlarged. There are overlying cardiac leads. Lung volumes are somewhat low. Patchy basilar atelect
atic changes are present bilaterally.

 

IMPRESSION:  Similar findings. Basilar atelectasis and cardiomegaly. Postop findings.

## 2017-04-15 NOTE — PN
Patient is status post CABG, intubated at this point of time and 

patient is postoperative day 2. Please refer to pulmonology dictation 

for further details, unable to extubate. Suspicion is patient was 

having alcohol withdrawals as well and patient is presently on 

propofol and when it is being tapered off patient probably can go into 

withdrawals, right upper and lower lobe collapse appears to have 

improved. Patient continues to have 3 chest tubes.  



REVIEW OF SYSTEMS:  Unable to obtain due to his clinical condition. 



MEDICATIONS SIGNIFICANT ONES:  Patient is on atorvastatin, Plavix, 

clevidipine, hydrocodone acetaminophen, propofol, Reglan, metoprolol, 

morphine and thiamine supplementation. The patient is also on CIWA 

protocol.  



PHYSICAL EXAMINATION: Temperature is a 97.5, pulse of 74, respiratory 

rate of 19, blood pressure is 119/54.  

GENERAL: Patient is intubated, sedated, on propofol (      ) minus 2. 

HEENT: Pupils are round and equally reacting to light. EOMI. No 

scleral icterus. No conjunctival pallor. Normocephalic, atraumatic. No 

pharyngeal erythema. No thyromegaly.  

CARDIOVASCULAR: S1 and S2 present. A 2/6 systolic murmur in the aortic 

area.  

LUNG EXAMINATION: Minimal expiratory wheezing was appreciated. 

ABDOMEN: Soft, nontender, nondistended, normoactive bowel sounds. No 

palpable organomegaly.  

MUSCULOSKELETAL: No joint swelling or deformity. 

EXTREMITIES: No cyanosis, clubbing, or pedal edema. 

NEUROLOGICAL: Sedated on propofol. 

SKIN: No rashes. 



Laboratory data was reviewed. No significant abnormality since 

admission. Patient continues to be on IV insulin.  



ASSESSMENT AND PLAN: 

1. Respiratory failure, post coronary artery bypass grafting. Patient 

continues to be intubated.  

2. Chronic obstructive pulmonary disease with minimal exacerbation. 

3. Coronary artery disease, status post coronary artery bypass graft. 

4. Hypertension. 

5. Right upper and lower lobe collapse, status post bronchoscopy. 

6. Possible alcohol withdrawal. 



Plan is to continue with present medications. Weaning trials today and 

tomorrow.  Will (      ) with other consultants. Continue with IV 

insulin drip.

## 2017-04-16 LAB
ALP SERPL-CCNC: 38 U/L (ref 38–126)
ALT SERPL-CCNC: 30 U/L (ref 21–72)
ANION GAP SERPL CALC-SCNC: 11 MMOL/L
AST SERPL-CCNC: 22 U/L (ref 17–59)
BASOPHILS # BLD AUTO: 0 K/UL (ref 0–0.2)
BASOPHILS NFR BLD AUTO: 0 %
BUN SERPL-SCNC: 14 MG/DL (ref 9–20)
CALCIUM SPEC-MCNC: 8.6 MG/DL (ref 8.4–10.2)
CH: 33.6
CHCM: 35.2
CHLORIDE SERPL-SCNC: 106 MMOL/L (ref 98–107)
CO2 BLDA-SCNC: 25 MMOL/L (ref 19–24)
CO2 SERPL-SCNC: 24 MMOL/L (ref 22–30)
EOSINOPHIL # BLD AUTO: 0 K/UL (ref 0–0.7)
EOSINOPHIL NFR BLD AUTO: 0 %
ERYTHROCYTE [DISTWIDTH] IN BLOOD BY AUTOMATED COUNT: 3.14 M/UL (ref 4.3–5.9)
ERYTHROCYTE [DISTWIDTH] IN BLOOD: 14 % (ref 11.5–15.5)
GLUCOSE BLD-MCNC: 121 MG/DL (ref 75–99)
GLUCOSE BLD-MCNC: 133 MG/DL (ref 75–99)
GLUCOSE BLD-MCNC: 133 MG/DL (ref 75–99)
GLUCOSE BLD-MCNC: 139 MG/DL (ref 75–99)
GLUCOSE BLD-MCNC: 147 MG/DL (ref 75–99)
GLUCOSE BLD-MCNC: 151 MG/DL (ref 75–99)
GLUCOSE BLD-MCNC: 151 MG/DL (ref 75–99)
GLUCOSE BLD-MCNC: 153 MG/DL (ref 75–99)
GLUCOSE BLD-MCNC: 155 MG/DL (ref 75–99)
GLUCOSE BLD-MCNC: 161 MG/DL (ref 75–99)
GLUCOSE SERPL-MCNC: 151 MG/DL (ref 74–99)
HCO3 BLDA-SCNC: 24 MMOL/L (ref 21–25)
HCT VFR BLD AUTO: 30.2 % (ref 39–53)
HDW: 2.61
HGB BLD-MCNC: 10.8 GM/DL (ref 13–17.5)
LUC NFR BLD AUTO: 1 %
LYMPHOCYTES # SPEC AUTO: 0.9 K/UL (ref 1–4.8)
LYMPHOCYTES NFR SPEC AUTO: 6 %
MAGNESIUM SPEC-SCNC: 2.2 MG/DL (ref 1.6–2.3)
MCH RBC QN AUTO: 34.3 PG (ref 25–35)
MCHC RBC AUTO-ENTMCNC: 35.7 G/DL (ref 31–37)
MCV RBC AUTO: 96.1 FL (ref 80–100)
MONOCYTES # BLD AUTO: 0.6 K/UL (ref 0–1)
MONOCYTES NFR BLD AUTO: 4 %
NEUTROPHILS # BLD AUTO: 13.1 K/UL (ref 1.3–7.7)
NEUTROPHILS NFR BLD AUTO: 89 %
NON-AFRICAN AMERICAN GFR(MDRD): >60
PCO2 BLDA: 30 MMHG (ref 35–45)
PH BLDA: 7.51 [PH] (ref 7.35–7.45)
PO2 BLDA: 71 MMHG (ref 83–108)
POTASSIUM SERPL-SCNC: 3.8 MMOL/L (ref 3.5–5.1)
PROT SERPL-MCNC: 5.7 G/DL (ref 6.3–8.2)
SODIUM SERPL-SCNC: 141 MMOL/L (ref 137–145)
WBC # BLD AUTO: 0.08 10*3/UL
WBC # BLD AUTO: 14.7 K/UL (ref 3.8–10.6)
WBC (PEROX): 15.16

## 2017-04-16 RX ADMIN — PROPOFOL SCH MLS/HR: 10 INJECTION, EMULSION INTRAVENOUS at 23:22

## 2017-04-16 RX ADMIN — PROPOFOL SCH MLS/HR: 10 INJECTION, EMULSION INTRAVENOUS at 10:56

## 2017-04-16 RX ADMIN — DOCUSATE SODIUM AND SENNOSIDES SCH EACH: 50; 8.6 TABLET ORAL at 20:03

## 2017-04-16 RX ADMIN — METOPROLOL TARTRATE SCH MG: 25 TABLET, FILM COATED ORAL at 08:23

## 2017-04-16 RX ADMIN — MORPHINE SULFATE PRN MG: 2 INJECTION, SOLUTION INTRAMUSCULAR; INTRAVENOUS at 22:53

## 2017-04-16 RX ADMIN — IPRATROPIUM BROMIDE AND ALBUTEROL SULFATE SCH ML: .5; 3 SOLUTION RESPIRATORY (INHALATION) at 19:14

## 2017-04-16 RX ADMIN — ATORVASTATIN CALCIUM SCH MG: 40 TABLET, FILM COATED ORAL at 08:23

## 2017-04-16 RX ADMIN — MORPHINE SULFATE PRN MG: 2 INJECTION, SOLUTION INTRAMUSCULAR; INTRAVENOUS at 02:27

## 2017-04-16 RX ADMIN — IPRATROPIUM BROMIDE AND ALBUTEROL SULFATE SCH ML: .5; 3 SOLUTION RESPIRATORY (INHALATION) at 11:25

## 2017-04-16 RX ADMIN — SODIUM CHLORIDE, PRESERVATIVE FREE SCH ML: 5 INJECTION INTRAVENOUS at 19:35

## 2017-04-16 RX ADMIN — CHLORHEXIDINE GLUCONATE SCH ML: 1.2 RINSE ORAL at 20:03

## 2017-04-16 RX ADMIN — IPRATROPIUM BROMIDE AND ALBUTEROL SULFATE SCH ML: .5; 3 SOLUTION RESPIRATORY (INHALATION) at 03:15

## 2017-04-16 RX ADMIN — PROPOFOL SCH MLS/HR: 10 INJECTION, EMULSION INTRAVENOUS at 00:55

## 2017-04-16 RX ADMIN — PROPOFOL SCH MLS/HR: 10 INJECTION, EMULSION INTRAVENOUS at 19:34

## 2017-04-16 RX ADMIN — HEPARIN SODIUM SCH UNIT: 5000 INJECTION, SOLUTION INTRAVENOUS; SUBCUTANEOUS at 00:13

## 2017-04-16 RX ADMIN — METHYLPREDNISOLONE SODIUM SUCCINATE SCH MG: 40 INJECTION, POWDER, FOR SOLUTION INTRAMUSCULAR; INTRAVENOUS at 23:21

## 2017-04-16 RX ADMIN — MULTIVITAMIN SCH ML: LIQUID ORAL at 14:52

## 2017-04-16 RX ADMIN — METHYLPREDNISOLONE SODIUM SUCCINATE SCH MG: 40 INJECTION, POWDER, FOR SOLUTION INTRAMUSCULAR; INTRAVENOUS at 08:24

## 2017-04-16 RX ADMIN — IPRATROPIUM BROMIDE AND ALBUTEROL SULFATE SCH ML: .5; 3 SOLUTION RESPIRATORY (INHALATION) at 07:14

## 2017-04-16 RX ADMIN — HEPARIN SODIUM SCH UNIT: 5000 INJECTION, SOLUTION INTRAVENOUS; SUBCUTANEOUS at 16:25

## 2017-04-16 RX ADMIN — LORAZEPAM PRN MG: 2 INJECTION, SOLUTION INTRAMUSCULAR; INTRAVENOUS at 08:54

## 2017-04-16 RX ADMIN — BUDESONIDE SCH MG: 1 SUSPENSION RESPIRATORY (INHALATION) at 07:14

## 2017-04-16 RX ADMIN — PROPOFOL SCH MLS/HR: 10 INJECTION, EMULSION INTRAVENOUS at 10:29

## 2017-04-16 RX ADMIN — Medication SCH MG: at 08:24

## 2017-04-16 RX ADMIN — METOPROLOL TARTRATE SCH MG: 25 TABLET, FILM COATED ORAL at 20:04

## 2017-04-16 RX ADMIN — LORAZEPAM PRN MG: 2 INJECTION, SOLUTION INTRAMUSCULAR; INTRAVENOUS at 05:29

## 2017-04-16 RX ADMIN — PROPOFOL SCH MLS/HR: 10 INJECTION, EMULSION INTRAVENOUS at 04:01

## 2017-04-16 RX ADMIN — IPRATROPIUM BROMIDE AND ALBUTEROL SULFATE SCH ML: .5; 3 SOLUTION RESPIRATORY (INHALATION) at 15:27

## 2017-04-16 RX ADMIN — CHLORHEXIDINE GLUCONATE SCH ML: 1.2 RINSE ORAL at 10:27

## 2017-04-16 RX ADMIN — PROPOFOL SCH MLS/HR: 10 INJECTION, EMULSION INTRAVENOUS at 18:05

## 2017-04-16 RX ADMIN — Medication SCH MG: at 20:03

## 2017-04-16 RX ADMIN — PROPOFOL SCH MLS/HR: 10 INJECTION, EMULSION INTRAVENOUS at 02:28

## 2017-04-16 RX ADMIN — PANTOPRAZOLE SODIUM SCH MG: 40 INJECTION, POWDER, FOR SOLUTION INTRAVENOUS at 08:24

## 2017-04-16 RX ADMIN — IPRATROPIUM BROMIDE AND ALBUTEROL SULFATE SCH ML: .5; 3 SOLUTION RESPIRATORY (INHALATION) at 23:15

## 2017-04-16 RX ADMIN — POTASSIUM CHLORIDE SCH MLS/HR: 14.9 INJECTION, SOLUTION INTRAVENOUS at 14:52

## 2017-04-16 RX ADMIN — HEPARIN SODIUM SCH UNIT: 5000 INJECTION, SOLUTION INTRAVENOUS; SUBCUTANEOUS at 23:21

## 2017-04-16 RX ADMIN — PROPOFOL SCH MLS/HR: 10 INJECTION, EMULSION INTRAVENOUS at 05:29

## 2017-04-16 RX ADMIN — METHYLPREDNISOLONE SODIUM SUCCINATE SCH MG: 40 INJECTION, POWDER, FOR SOLUTION INTRAMUSCULAR; INTRAVENOUS at 16:25

## 2017-04-16 RX ADMIN — BUDESONIDE SCH MG: 1 SUSPENSION RESPIRATORY (INHALATION) at 19:14

## 2017-04-16 RX ADMIN — CLEVIPIDINE SCH: 0.5 EMULSION INTRAVENOUS at 13:54

## 2017-04-16 RX ADMIN — LORAZEPAM PRN MG: 2 INJECTION, SOLUTION INTRAMUSCULAR; INTRAVENOUS at 03:20

## 2017-04-16 RX ADMIN — SODIUM CHLORIDE, PRESERVATIVE FREE SCH ML: 5 INJECTION INTRAVENOUS at 08:24

## 2017-04-16 RX ADMIN — PROPOFOL SCH MLS/HR: 10 INJECTION, EMULSION INTRAVENOUS at 21:48

## 2017-04-16 RX ADMIN — METHYLPREDNISOLONE SODIUM SUCCINATE SCH MG: 40 INJECTION, POWDER, FOR SOLUTION INTRAMUSCULAR; INTRAVENOUS at 00:13

## 2017-04-16 RX ADMIN — INSULIN HUMAN SCH MLS/HR: 100 INJECTION, SOLUTION PARENTERAL at 06:44

## 2017-04-16 RX ADMIN — ASPIRIN 325 MG ORAL TABLET SCH MG: 325 PILL ORAL at 08:23

## 2017-04-16 RX ADMIN — PROPOFOL SCH MLS/HR: 10 INJECTION, EMULSION INTRAVENOUS at 13:59

## 2017-04-16 RX ADMIN — PROPOFOL SCH MLS/HR: 10 INJECTION, EMULSION INTRAVENOUS at 15:58

## 2017-04-16 RX ADMIN — PROPOFOL SCH MLS/HR: 10 INJECTION, EMULSION INTRAVENOUS at 06:59

## 2017-04-16 RX ADMIN — BISACODYL PRN MG: 10 SUPPOSITORY RECTAL at 10:27

## 2017-04-16 RX ADMIN — MORPHINE SULFATE PRN MG: 2 INJECTION, SOLUTION INTRAMUSCULAR; INTRAVENOUS at 04:07

## 2017-04-16 RX ADMIN — HEPARIN SODIUM SCH UNIT: 5000 INJECTION, SOLUTION INTRAVENOUS; SUBCUTANEOUS at 08:23

## 2017-04-16 RX ADMIN — CLOPIDOGREL BISULFATE SCH MG: 75 TABLET ORAL at 08:23

## 2017-04-16 NOTE — XR
EXAMINATION TYPE: XR chest 1V portable

 

DATE OF EXAM: 4/16/2017 6:02 AM

 

COMPARISON: Prior chest x-ray 15 April 2017

 

HISTORY: Postop cardiac surgery, intubated

 

TECHNIQUE: Single frontal view of the chest is obtained.

 

FINDINGS:  Endotracheal tube, NG tube, left chest tube are present and are overlying appropriate posi
tions. No sizable pneumothorax. Patient is rotated. Retrocardiac density is present, there is improve
d aeration at the right lung base, right-sided chest tube has been removed. Median sternal drain is n
o longer seen.

 

IMPRESSION:  Improvement in aeration, interval chest tube removal.

## 2017-04-16 NOTE — P.PN
Subjective


Principal diagnosis: 





Status post CABG, postoperative day #3


This is a 61-year-old white male status post CABG, postoperative day #1, total 

arterial non-aortic patch off pump double coronary artery bypass grafting using 

in situ skeletonized right internal mammary artery to the left anterior 

descending across the anterior midline in situ totally skeletonized left 

internal mammary artery to the first obtuse marginal artery.  Postoperatively 

patient was on mechanical ventilation, and upon arrival to the ICU he was found 

to have opacified right lung, he required bronchoscopy and extraction of mucous 

plugs mostly in the right upper lobe and right middle lobe.  Remained on 

mechanical ventilation overnight, however this morning his gases are showing 

marginal oxygenation, his pO2 is only 63 on 50% and PEEP of 5.  Patient was 

given a weaning trial with a pressure support of 8 and CPAP, ABG in half an 

hour is definitely poor showing very poor oxygenation, and his O2 saturation 

was in the 91% range.  PO2 was only 63, hence I decided not to pursue any 

further weaning and extubation on this patient until his oxygenation improves 

better.  Gram stain and cultures from the right upper lobe are pending.  In the 

meantime the patient is receiving bronchodilators for underlying COPD, and he 

is also on steroids for significant wheezing noted yesterday after 

bronchoscopy.  Patient remains on DuoNeb updrafts 4 times a day and when 

necessary.





Reevaluated today on 4/15/2017, patient is status post CABG, postoperative day #

2 remains intubated and on mechanical ventilation.  Yesterday patient could not 

be weaned and extubated mostly because of his marginal oxygenation.  His pO2 

was only 63 on 50% FiO2 and pressure support of 8 and CPAP.  Hence decided not 

to extubate the patient, today the patient is having what seems to be a picture 

of alcohol withdrawal as soon as the propofol was weaned off.  Hence we had to 

go back on the propofol, and the patient will be placed on the protocol.  For 

alcohol withdrawal.  ABG this morning seems to be a bit better, pO2 is 89 pCO2 

is 33 pH of 7.37.  Patient is slightly metabolically acidotic.  Bicarb is 19.  

Hemoglobin is 10.7 WBC count is 14.0.  Chest x-ray showed minimal areas of 

bands of atelectasis at the bases, but no further areas of collapse noted in 

the right upper lobe and right the lobe as seen 2 days ago.





Reevaluated today on 4/16/2017, patient is status post CABG, postoperative day #

3 remains intubated on mechanical ventilation.  His gases are marginal.  Today 

I awakened the patient, and I tried to evaluate his mental status, however the 

patient was noted to be extremely obtunded, his eyes were deviated upwards, and 

I could not assess his mental status because he was basically unresponsive.  

This was off propofol, patient also became extremely agitated, and we had to 

place him back on propofol.  In the meantime I ordered a CT of the brain.  

Ending at this point.  His blood pressure was noted to go high off propofol, 

patient was given Apresoline for high blood pressure.  He is also on beta 

blockers.  And he is on multiple other meds for hypertension.  ABG today showed 

a pO2 of 71 pCO2 of 30 pH of 7.51, and this is on a FiO2 of 50% and PEEP of 5.  

Chest x-ray showed minimal atelectasis at the bases.  And overall significant 

improvement in aeration of both lungs.  Patient remains on the protocol for 

alcohol withdrawal in the meantime.








Objective





- Vital Signs


Vital signs: 


 Vital Signs











Temp  97.4 F L  04/16/17 08:00


 


Pulse  63   04/16/17 11:35


 


Resp  16   04/16/17 10:00


 


BP  119/68   04/16/17 10:00


 


Pulse Ox  94 L  04/16/17 10:00








 Intake & Output











 04/15/17 04/16/17 04/16/17





 18:59 06:59 18:59


 


Intake Total 678.256 784.299 265.532


 


Output Total 1370 366 125


 


Balance -691.744 418.299 140.532


 


Weight 98.3 kg 96.4 kg 


 


Intake:   


 


  Intake, IV Titration 608.256 604.299 215.532





  Amount   


 


    Insulin Regular 100 unit 85.301 15.699 8.282





    In Sodium Chloride 0.9%   





    100 ml @ Per Protocol IV   





    .Q0M ELIER Rx#:[id number]   


 


    Lactated Ringers 1,000 ml 260 240 160





    @ 20 mls/hr IV .Q24H ELIER   





    Rx#:[id number]   


 


    Propofol 500 mg In Empty 262.955 348.6 47.25





    Bag 1 bag @ Titrate IV .   





    Q0M ELIER Rx#:[id number]   


 


  Tube Feeding 40 120 50


 


  Other 30 60 


 


Output:   


 


  Chest Tube Drainage 40 20 4


 


    Left Lateral Chest 10 20 4


 


    Mediastinal 20  


 


    Right Lateral Chest 10  


 


  Gastric Drainage 400  


 


  Urine 930 346 121


 


Other:   


 


  Voiding Method Indwelling Catheter Indwelling Catheter 


 


  # Bowel Movements  0 








 ABP, PAP, CO, CI - Last Documented











Arterial Blood Pressure        136/55


 


Pulmonary Artery Pressure      46/23


 


Cardiac Output                 6.8


 


Cardiac Index                  3.3

















- Exam








Physical Exam: Revealed a 61-year-old white male on mechanical ventilation, in 

no distress.


HEENT:[Neck is supple.] [No neck masses.] [No thyromegaly.] [No JVD.]

endotracheal tube was noted to be intact.


Chest: [diminished breath sounds on the right side with slight expiratory 

rhonchi and wheezes noted bilaterally.]


Cardiac Exam: [Normal S1 and S2, no S3 gallop, 2/6 systolic murmur throughout 

the precordium, positive pericardial rub]


Abdomen: [Soft, nontender,  no megaly, no rebound, no guarding, normal bowel 

sounds.]


Extremities: [No clubbing, no edema, no cyanosis.]


Neurological Exam: [Patient was taken off propofol to assess his mental status, 

however he became extremely obtunded, stiff and rigid, and his eyes were noted 

to be deviating upwards, hence I recommended placing him back on propofol, 

given Ativan, and recommended a CT of the brain.  Clearly the patient is not 

ready to be weaned or extubated today. 





- Labs


CBC & Chem 7: 


 04/16/17 05:20





 04/16/17 05:20


Labs: 


 Abnormal Lab Results - Last 24 Hours (Table)











  04/15/17 04/15/17 04/15/17 Range/Units





  14:14 16:18 17:55 


 


WBC     (3.8-10.6)  k/uL


 


RBC     (4.30-5.90)  m/uL


 


Hgb     (13.0-17.5)  gm/dL


 


Hct     (39.0-53.0)  %


 


Neutrophils #     (1.3-7.7)  k/uL


 


Lymphocytes #     (1.0-4.8)  k/uL


 


ABG pH     (7.35-7.45)  


 


ABG pCO2     (35-45)  mmHg


 


ABG pO2     ()  mmHg


 


ABG Total CO2     (19-24)  mmol/L


 


Glucose     (74-99)  mg/dL


 


POC Glucose (mg/dL)  140 H  128 H  132 H  (75-99)  mg/dL


 


Total Protein     (6.3-8.2)  g/dL


 


Albumin     (3.5-5.0)  g/dL














  04/15/17 04/15/17 04/15/17 Range/Units





  18:05 20:21 21:56 


 


WBC     (3.8-10.6)  k/uL


 


RBC     (4.30-5.90)  m/uL


 


Hgb     (13.0-17.5)  gm/dL


 


Hct     (39.0-53.0)  %


 


Neutrophils #     (1.3-7.7)  k/uL


 


Lymphocytes #     (1.0-4.8)  k/uL


 


ABG pH     (7.35-7.45)  


 


ABG pCO2     (35-45)  mmHg


 


ABG pO2     ()  mmHg


 


ABG Total CO2     (19-24)  mmol/L


 


Glucose     (74-99)  mg/dL


 


POC Glucose (mg/dL)  135 H  162 H  164 H  (75-99)  mg/dL


 


Total Protein     (6.3-8.2)  g/dL


 


Albumin     (3.5-5.0)  g/dL














  04/16/17 04/16/17 04/16/17 Range/Units





  00:06 02:18 04:09 


 


WBC     (3.8-10.6)  k/uL


 


RBC     (4.30-5.90)  m/uL


 


Hgb     (13.0-17.5)  gm/dL


 


Hct     (39.0-53.0)  %


 


Neutrophils #     (1.3-7.7)  k/uL


 


Lymphocytes #     (1.0-4.8)  k/uL


 


ABG pH     (7.35-7.45)  


 


ABG pCO2     (35-45)  mmHg


 


ABG pO2     ()  mmHg


 


ABG Total CO2     (19-24)  mmol/L


 


Glucose     (74-99)  mg/dL


 


POC Glucose (mg/dL)  151 H  155 H  147 H  (75-99)  mg/dL


 


Total Protein     (6.3-8.2)  g/dL


 


Albumin     (3.5-5.0)  g/dL














  04/16/17 04/16/17 04/16/17 Range/Units





  05:20 05:20 06:21 


 


WBC  14.7 H    (3.8-10.6)  k/uL


 


RBC  3.14 L    (4.30-5.90)  m/uL


 


Hgb  10.8 L    (13.0-17.5)  gm/dL


 


Hct  30.2 L    (39.0-53.0)  %


 


Neutrophils #  13.1 H    (1.3-7.7)  k/uL


 


Lymphocytes #  0.9 L    (1.0-4.8)  k/uL


 


ABG pH     (7.35-7.45)  


 


ABG pCO2     (35-45)  mmHg


 


ABG pO2     ()  mmHg


 


ABG Total CO2     (19-24)  mmol/L


 


Glucose   151 H   (74-99)  mg/dL


 


POC Glucose (mg/dL)    153 H  (75-99)  mg/dL


 


Total Protein   5.7 L   (6.3-8.2)  g/dL


 


Albumin   3.2 L   (3.5-5.0)  g/dL














  04/16/17 04/16/17 04/16/17 Range/Units





  08:08 08:48 10:10 


 


WBC     (3.8-10.6)  k/uL


 


RBC     (4.30-5.90)  m/uL


 


Hgb     (13.0-17.5)  gm/dL


 


Hct     (39.0-53.0)  %


 


Neutrophils #     (1.3-7.7)  k/uL


 


Lymphocytes #     (1.0-4.8)  k/uL


 


ABG pH   7.51 H   (7.35-7.45)  


 


ABG pCO2   30 L   (35-45)  mmHg


 


ABG pO2   71 L   ()  mmHg


 


ABG Total CO2   25 H   (19-24)  mmol/L


 


Glucose     (74-99)  mg/dL


 


POC Glucose (mg/dL)  161 H   147 H  (75-99)  mg/dL


 


Total Protein     (6.3-8.2)  g/dL


 


Albumin     (3.5-5.0)  g/dL








 Microbiology - Last 24 Hours (Table)











 04/13/17 16:40 Gram Stain - Final





 Lung Aspirate - Right Bronchial Washings Culture - Final














Assessment and Plan


Plan: 





impression:





1 status post CABG, postoperative day # 3





2 history of nicotine dependence and suspect some component of COPD





3 history of documented coronary artery disease based on a recent cardiac 

catheterization





4 history ofpercutaneous revascularization of the proximal right coronary 

artery in 2001





5history of hypertension





6 history of myocardial infarction





7 status post bronchoscopy and bronchoalveolar lavage of the right upper lobe 

read lobe and right lower lobe upon arrival to the ICU.





8 failure to wean mostly secondary to relative hypoxemia, O2 saturation is very 

marginal, and it is in the 70s at 50%.  And because of the mental status and 

suspected alcohol withdrawal is another major factor that delaying the weaning 

process.





9 suspect acute alcohol withdrawal considering the patient is a heavy drinker, 

hence the patient will be placed on the protocol, and I will hold on weaning 

trials today.  PEEP will be placed on 5, FiO2 will remain at 50%.  Continue 

assist control mode of mechanical ventilation, and address nutritional support.

  Arrange for a CT of the brain today





Recommendation: Patient will be kept on mechanical ventilation today, continue 

bronchodilators, steroids, we'll continue to assess weaning process on a daily 

basis.  Clearly the patient is not ready to be weaned or extubated today.  

Critical care time is 35 minutes.


Time with Patient: Greater than 30

## 2017-04-16 NOTE — P.PN
Subjective


Principal diagnosis: 





Coronary artery disease, status post prior stenting to his right coronary 

artery.  Preserved left ventricular function.  Hyperlipidemia.  Hypertension.  

ETOH abuse.





POD #3 total arterial non-aortic patch off-pump double coronary artery bypass 

grafting using in situ skeletonized right internal mammary artery to the left 

anterior descending artery across the anterior midline in situ totally 

skeletonized left internal mammary artery to the first obtuse marginal artery.  

Intraoperative transesophageal echocardiogram and epi-aortic scanning.  

Intraoperative graft flow measurements using the Flom[employer] system





Patient had postoperative intraparenchymal hemorrhage with increasing oxygen 

demand requiring bronchoscopy the night of surgery.  





Patient currently sedated on mechanical ventilation.  The patient had 

hypertension overnight, and upon assessment this morning appeared to have an 

upward gaze.





Objective





- Vital Signs


Vital signs: 


 Vital Signs











Temp  97.4 F L  04/16/17 08:00


 


Pulse  73   04/16/17 11:25


 


Resp  16   04/16/17 10:00


 


BP  119/68   04/16/17 10:00


 


Pulse Ox  94 L  04/16/17 10:00








 Intake & Output











 04/15/17 04/16/17 04/16/17





 18:59 06:59 18:59


 


Intake Total 678.256 784.299 265.532


 


Output Total 1370 366 125


 


Balance -691.744 418.299 140.532


 


Weight 98.3 kg 96.4 kg 


 


Intake:   


 


  Intake, IV Titration 608.256 604.299 215.532





  Amount   


 


    Insulin Regular 100 unit 85.301 15.699 8.282





    In Sodium Chloride 0.9%   





    100 ml @ Per Protocol IV   





    .Q0M ELIER Rx#:[id number]   


 


    Lactated Ringers 1,000 ml 260 240 160





    @ 20 mls/hr IV .Q24H ELIER   





    Rx#:[id number]   


 


    Propofol 500 mg In Empty 262.955 348.6 47.25





    Bag 1 bag @ Titrate IV .   





    Q0M ELIER Rx#:[id number]   


 


  Tube Feeding 40 120 50


 


  Other 30 60 


 


Output:   


 


  Chest Tube Drainage 40 20 4


 


    Left Lateral Chest 10 20 4


 


    Mediastinal 20  


 


    Right Lateral Chest 10  


 


  Gastric Drainage 400  


 


  Urine 930 346 121


 


Other:   


 


  Voiding Method Indwelling Catheter Indwelling Catheter 


 


  # Bowel Movements  0 








 ABP, PAP, CO, CI - Last Documented











Arterial Blood Pressure        136/55


 


Pulmonary Artery Pressure      46/23


 


Cardiac Output                 6.8


 


Cardiac Index                  3.3

















- Constitutional


General appearance: Present: no acute distress





- Respiratory


Details: 





Lungs sounds diminished bilaterally.  Respirations even, nonlabored on 

mechanical ventilation.  Current settings tidal volume 500, FiO2 50%, 

respiratory rate 20, PEEP 5.  Left pleural chest tube drained 14 mL serous 

drainage overnight, 50 mL in the last 24 hours.





- Cardiovascular


Details: 





S1, S2 present.  Regular rate and rhythm, normal sinus rhythm on telemetry, was 

sinus bradycardia in the 50s overnight.  Sternum stable.  No edema present.  

Teds, SCDs present.





- Gastrointestinal


Gastrointestinal Comment(s): 


Abdomen soft, nontender, nondistended.   Hypoactive bowel sounds 4 quadrants.  

Vital tube feeding at 20 mL/h.  





- Genitourinary


Genitourinary Comment(s): 





Angelo present draining clear, yellow urine.





- Integumentary


Integumentary Comment(s): 





Anterior chest wall incision covered with dry intact silver dressing.





- Musculoskeletal


Musculoskeletal: Present: generalized weakness





- Labs


CBC & Chem 7: 


 04/16/17 05:20





 04/16/17 05:20


Labs: 


 Abnormal Lab Results - Last 24 Hours (Table)











  04/15/17 04/15/17 04/15/17 Range/Units





  12:03 14:14 16:18 


 


WBC     (3.8-10.6)  k/uL


 


RBC     (4.30-5.90)  m/uL


 


Hgb     (13.0-17.5)  gm/dL


 


Hct     (39.0-53.0)  %


 


Neutrophils #     (1.3-7.7)  k/uL


 


Lymphocytes #     (1.0-4.8)  k/uL


 


ABG pH     (7.35-7.45)  


 


ABG pCO2     (35-45)  mmHg


 


ABG pO2     ()  mmHg


 


ABG Total CO2     (19-24)  mmol/L


 


Glucose     (74-99)  mg/dL


 


POC Glucose (mg/dL)  172 H  140 H  128 H  (75-99)  mg/dL


 


Total Protein     (6.3-8.2)  g/dL


 


Albumin     (3.5-5.0)  g/dL














  04/15/17 04/15/17 04/15/17 Range/Units





  17:55 18:05 20:21 


 


WBC     (3.8-10.6)  k/uL


 


RBC     (4.30-5.90)  m/uL


 


Hgb     (13.0-17.5)  gm/dL


 


Hct     (39.0-53.0)  %


 


Neutrophils #     (1.3-7.7)  k/uL


 


Lymphocytes #     (1.0-4.8)  k/uL


 


ABG pH     (7.35-7.45)  


 


ABG pCO2     (35-45)  mmHg


 


ABG pO2     ()  mmHg


 


ABG Total CO2     (19-24)  mmol/L


 


Glucose     (74-99)  mg/dL


 


POC Glucose (mg/dL)  132 H  135 H  162 H  (75-99)  mg/dL


 


Total Protein     (6.3-8.2)  g/dL


 


Albumin     (3.5-5.0)  g/dL














  04/15/17 04/16/17 04/16/17 Range/Units





  21:56 00:06 02:18 


 


WBC     (3.8-10.6)  k/uL


 


RBC     (4.30-5.90)  m/uL


 


Hgb     (13.0-17.5)  gm/dL


 


Hct     (39.0-53.0)  %


 


Neutrophils #     (1.3-7.7)  k/uL


 


Lymphocytes #     (1.0-4.8)  k/uL


 


ABG pH     (7.35-7.45)  


 


ABG pCO2     (35-45)  mmHg


 


ABG pO2     ()  mmHg


 


ABG Total CO2     (19-24)  mmol/L


 


Glucose     (74-99)  mg/dL


 


POC Glucose (mg/dL)  164 H  151 H  155 H  (75-99)  mg/dL


 


Total Protein     (6.3-8.2)  g/dL


 


Albumin     (3.5-5.0)  g/dL














  04/16/17 04/16/17 04/16/17 Range/Units





  04:09 05:20 05:20 


 


WBC   14.7 H   (3.8-10.6)  k/uL


 


RBC   3.14 L   (4.30-5.90)  m/uL


 


Hgb   10.8 L   (13.0-17.5)  gm/dL


 


Hct   30.2 L   (39.0-53.0)  %


 


Neutrophils #   13.1 H   (1.3-7.7)  k/uL


 


Lymphocytes #   0.9 L   (1.0-4.8)  k/uL


 


ABG pH     (7.35-7.45)  


 


ABG pCO2     (35-45)  mmHg


 


ABG pO2     ()  mmHg


 


ABG Total CO2     (19-24)  mmol/L


 


Glucose    151 H  (74-99)  mg/dL


 


POC Glucose (mg/dL)  147 H    (75-99)  mg/dL


 


Total Protein    5.7 L  (6.3-8.2)  g/dL


 


Albumin    3.2 L  (3.5-5.0)  g/dL














  04/16/17 04/16/17 04/16/17 Range/Units





  06:21 08:08 08:48 


 


WBC     (3.8-10.6)  k/uL


 


RBC     (4.30-5.90)  m/uL


 


Hgb     (13.0-17.5)  gm/dL


 


Hct     (39.0-53.0)  %


 


Neutrophils #     (1.3-7.7)  k/uL


 


Lymphocytes #     (1.0-4.8)  k/uL


 


ABG pH    7.51 H  (7.35-7.45)  


 


ABG pCO2    30 L  (35-45)  mmHg


 


ABG pO2    71 L  ()  mmHg


 


ABG Total CO2    25 H  (19-24)  mmol/L


 


Glucose     (74-99)  mg/dL


 


POC Glucose (mg/dL)  153 H  161 H   (75-99)  mg/dL


 


Total Protein     (6.3-8.2)  g/dL


 


Albumin     (3.5-5.0)  g/dL














  04/16/17 Range/Units





  10:10 


 


WBC   (3.8-10.6)  k/uL


 


RBC   (4.30-5.90)  m/uL


 


Hgb   (13.0-17.5)  gm/dL


 


Hct   (39.0-53.0)  %


 


Neutrophils #   (1.3-7.7)  k/uL


 


Lymphocytes #   (1.0-4.8)  k/uL


 


ABG pH   (7.35-7.45)  


 


ABG pCO2   (35-45)  mmHg


 


ABG pO2   ()  mmHg


 


ABG Total CO2   (19-24)  mmol/L


 


Glucose   (74-99)  mg/dL


 


POC Glucose (mg/dL)  147 H  (75-99)  mg/dL


 


Total Protein   (6.3-8.2)  g/dL


 


Albumin   (3.5-5.0)  g/dL








 Microbiology - Last 24 Hours (Table)











 04/13/17 16:40 Gram Stain - Final





 Lung Aspirate - Right Bronchial Washings Culture - Final














- Imaging and Cardiology


Chest x-ray: report reviewed, image reviewed





Assessment and Plan


(1) Status post coronary artery bypass graft


Status: Acute   





(2) Coronary artery disease


Status: Acute   





(3) Family history of heart disease


Status: Acute   





(4) History of PTCA


Status: Acute   





(5) Hyperlipidemia


Status: Acute   





(6) Hypertension


Status: Acute   





(7) Nicotine dependence, chewing tobacco, uncomplicated


Status: Acute   


Plan: 





1.  Continue aspirin, Lipitor, Plavix, heparin.  Increase Lopressor to 25 mg 

twice a day.  Will add lisinopril 5 mg daily.


2.  Ventilator management per pulmonology, wean O2 as tolerated.


3.  Will obtain CT of the brain per pulmonary.


4.  Insulin drip/diabetic management per primary care service.


5.  Continue [place] protocol for alcohol withdrawal.


6.  Obtain peripheral IVs, DC Cordis.  DC pleural chest tube.


7.  Give PRN Dulcolax suppository today.


8.  Daily labs, chest x-rays.


9.  GI/DVT prophylaxis.


10.  More recommendations as patient progresses.


Time with Patient: Greater than 30

## 2017-04-16 NOTE — CT
EXAMINATION TYPE: CT brain wo con

 

DATE OF EXAM: 4/16/2017 12:26 PM

 

COMPARISON: NONE

 

HISTORY: possible stroke post open heart

 

CT DLP: 1037.1 mGycm

Automated exposure control for dose reduction was used.

 

FINDINGS: 

There is cerebral cortical atrophy. There is no mass effect nor midline shift. There is no sign of in
tracranial hemorrhage. There is mild patchy hypodensity in the periventricular white matter. The calv
arium is intact.

 

IMPRESSION: 

Mild atrophy and chronic small vessel ischemia. No acute intracranial abnormality.

## 2017-04-17 LAB
ALP SERPL-CCNC: 46 U/L (ref 38–126)
ALT SERPL-CCNC: 25 U/L (ref 21–72)
ANION GAP SERPL CALC-SCNC: 8 MMOL/L
AST SERPL-CCNC: 21 U/L (ref 17–59)
BASOPHILS # BLD AUTO: 0 K/UL (ref 0–0.2)
BASOPHILS NFR BLD AUTO: 0 %
BUN SERPL-SCNC: 26 MG/DL (ref 9–20)
CALCIUM SPEC-MCNC: 8.7 MG/DL (ref 8.4–10.2)
CH: 33.3
CHCM: 34
CHLORIDE SERPL-SCNC: 107 MMOL/L (ref 98–107)
CO2 BLDA-SCNC: 22 MMOL/L (ref 19–24)
CO2 BLDA-SCNC: 25 MMOL/L (ref 19–24)
CO2 BLDA-SCNC: 26 MMOL/L (ref 19–24)
CO2 SERPL-SCNC: 25 MMOL/L (ref 22–30)
EOSINOPHIL # BLD AUTO: 0.1 K/UL (ref 0–0.7)
EOSINOPHIL NFR BLD AUTO: 1 %
ERYTHROCYTE [DISTWIDTH] IN BLOOD BY AUTOMATED COUNT: 3.3 M/UL (ref 4.3–5.9)
ERYTHROCYTE [DISTWIDTH] IN BLOOD: 14.1 % (ref 11.5–15.5)
GLUCOSE BLD-MCNC: 124 MG/DL (ref 75–99)
GLUCOSE BLD-MCNC: 126 MG/DL (ref 75–99)
GLUCOSE BLD-MCNC: 129 MG/DL (ref 75–99)
GLUCOSE BLD-MCNC: 131 MG/DL (ref 75–99)
GLUCOSE BLD-MCNC: 133 MG/DL (ref 75–99)
GLUCOSE BLD-MCNC: 143 MG/DL (ref 75–99)
GLUCOSE BLD-MCNC: 149 MG/DL (ref 75–99)
GLUCOSE BLD-MCNC: 150 MG/DL (ref 75–99)
GLUCOSE BLD-MCNC: 152 MG/DL (ref 75–99)
GLUCOSE BLD-MCNC: 158 MG/DL (ref 75–99)
GLUCOSE BLD-MCNC: 160 MG/DL (ref 75–99)
GLUCOSE BLD-MCNC: 181 MG/DL (ref 75–99)
GLUCOSE SERPL-MCNC: 160 MG/DL (ref 74–99)
HCO3 BLDA-SCNC: 21 MMOL/L (ref 21–25)
HCO3 BLDA-SCNC: 23 MMOL/L (ref 21–25)
HCO3 BLDA-SCNC: 24 MMOL/L (ref 21–25)
HCO3 BLDA-SCNC: 24 MMOL/L (ref 21–25)
HCO3 BLDA-SCNC: 25 MMOL/L (ref 21–25)
HCT VFR BLD AUTO: 32.4 % (ref 39–53)
HDW: 2.53
HGB BLD-MCNC: 11.3 GM/DL (ref 13–17.5)
LUC NFR BLD AUTO: 1 %
LYMPHOCYTES # SPEC AUTO: 1.3 K/UL (ref 1–4.8)
LYMPHOCYTES NFR SPEC AUTO: 10 %
MAGNESIUM SPEC-SCNC: 2.6 MG/DL (ref 1.6–2.3)
MCH RBC QN AUTO: 34.2 PG (ref 25–35)
MCHC RBC AUTO-ENTMCNC: 34.8 G/DL (ref 31–37)
MCV RBC AUTO: 98.3 FL (ref 80–100)
MONOCYTES # BLD AUTO: 0.6 K/UL (ref 0–1)
MONOCYTES NFR BLD AUTO: 4 %
NEUTROPHILS # BLD AUTO: 10.9 K/UL (ref 1.3–7.7)
NEUTROPHILS NFR BLD AUTO: 85 %
NON-AFRICAN AMERICAN GFR(MDRD): >60
PCO2 BLDA: 30 MMHG (ref 35–45)
PCO2 BLDA: 32 MMHG (ref 35–45)
PCO2 BLDA: 34 MMHG (ref 35–45)
PCO2 BLDA: 34 MMHG (ref 35–45)
PCO2 BLDA: 41 MMHG (ref 35–45)
PH BLDA: 7.38 [PH] (ref 7.35–7.45)
PH BLDA: 7.46 [PH] (ref 7.35–7.45)
PH BLDA: 7.46 [PH] (ref 7.35–7.45)
PH BLDA: 7.47 [PH] (ref 7.35–7.45)
PH BLDA: 7.48 [PH] (ref 7.35–7.45)
PH BLDA: 7.49 [PH] (ref 7.35–7.45)
PO2 BLDA: 108 MMHG (ref 83–108)
PO2 BLDA: 142 MMHG (ref 83–108)
PO2 BLDA: 149 MMHG (ref 83–108)
PO2 BLDA: 149 MMHG (ref 83–108)
PO2 BLDA: 84 MMHG (ref 83–108)
PO2 BLDA: 96 MMHG (ref 83–108)
POTASSIUM SERPL-SCNC: 4.6 MMOL/L (ref 3.5–5.1)
PROT SERPL-MCNC: 5.7 G/DL (ref 6.3–8.2)
SODIUM SERPL-SCNC: 140 MMOL/L (ref 137–145)
WBC # BLD AUTO: 0.09 10*3/UL
WBC # BLD AUTO: 12.9 K/UL (ref 3.8–10.6)
WBC (PEROX): 13.57

## 2017-04-17 RX ADMIN — DOCUSATE SODIUM AND SENNOSIDES SCH EACH: 50; 8.6 TABLET ORAL at 20:27

## 2017-04-17 RX ADMIN — LISINOPRIL SCH MG: 5 TABLET ORAL at 08:03

## 2017-04-17 RX ADMIN — MULTIVITAMIN SCH ML: LIQUID ORAL at 13:53

## 2017-04-17 RX ADMIN — HYDROCODONE BITARTRATE AND ACETAMINOPHEN PRN EACH: 5; 325 TABLET ORAL at 20:12

## 2017-04-17 RX ADMIN — POTASSIUM CHLORIDE SCH MLS/HR: 14.9 INJECTION, SOLUTION INTRAVENOUS at 17:21

## 2017-04-17 RX ADMIN — PROPOFOL SCH MLS/HR: 10 INJECTION, EMULSION INTRAVENOUS at 19:07

## 2017-04-17 RX ADMIN — CHLORHEXIDINE GLUCONATE SCH ML: 1.2 RINSE ORAL at 08:02

## 2017-04-17 RX ADMIN — PROPOFOL SCH MLS/HR: 10 INJECTION, EMULSION INTRAVENOUS at 22:18

## 2017-04-17 RX ADMIN — IPRATROPIUM BROMIDE AND ALBUTEROL SULFATE SCH ML: .5; 3 SOLUTION RESPIRATORY (INHALATION) at 07:27

## 2017-04-17 RX ADMIN — HEPARIN SODIUM SCH UNIT: 5000 INJECTION, SOLUTION INTRAVENOUS; SUBCUTANEOUS at 07:50

## 2017-04-17 RX ADMIN — CLOPIDOGREL BISULFATE SCH MG: 75 TABLET ORAL at 08:03

## 2017-04-17 RX ADMIN — LORAZEPAM PRN MG: 2 INJECTION, SOLUTION INTRAMUSCULAR; INTRAVENOUS at 23:48

## 2017-04-17 RX ADMIN — PANTOPRAZOLE SODIUM SCH MG: 40 INJECTION, POWDER, FOR SOLUTION INTRAVENOUS at 08:03

## 2017-04-17 RX ADMIN — METHYLPREDNISOLONE SODIUM SUCCINATE SCH MG: 40 INJECTION, POWDER, FOR SOLUTION INTRAMUSCULAR; INTRAVENOUS at 08:03

## 2017-04-17 RX ADMIN — HEPARIN SODIUM SCH UNIT: 5000 INJECTION, SOLUTION INTRAVENOUS; SUBCUTANEOUS at 23:48

## 2017-04-17 RX ADMIN — SODIUM CHLORIDE, PRESERVATIVE FREE SCH ML: 5 INJECTION INTRAVENOUS at 20:14

## 2017-04-17 RX ADMIN — HYDROCODONE BITARTRATE AND ACETAMINOPHEN PRN EACH: 5; 325 TABLET ORAL at 23:48

## 2017-04-17 RX ADMIN — LORAZEPAM PRN MG: 2 INJECTION, SOLUTION INTRAMUSCULAR; INTRAVENOUS at 20:12

## 2017-04-17 RX ADMIN — PROPOFOL SCH MLS/HR: 10 INJECTION, EMULSION INTRAVENOUS at 00:50

## 2017-04-17 RX ADMIN — METHYLPREDNISOLONE SODIUM SUCCINATE SCH MG: 40 INJECTION, POWDER, FOR SOLUTION INTRAMUSCULAR; INTRAVENOUS at 20:13

## 2017-04-17 RX ADMIN — SODIUM CHLORIDE, PRESERVATIVE FREE SCH ML: 5 INJECTION INTRAVENOUS at 08:04

## 2017-04-17 RX ADMIN — Medication SCH MG: at 08:04

## 2017-04-17 RX ADMIN — PROPOFOL SCH MLS/HR: 10 INJECTION, EMULSION INTRAVENOUS at 05:37

## 2017-04-17 RX ADMIN — IPRATROPIUM BROMIDE AND ALBUTEROL SULFATE SCH ML: .5; 3 SOLUTION RESPIRATORY (INHALATION) at 19:39

## 2017-04-17 RX ADMIN — PROPOFOL SCH MLS/HR: 10 INJECTION, EMULSION INTRAVENOUS at 23:46

## 2017-04-17 RX ADMIN — PROPOFOL SCH MLS/HR: 10 INJECTION, EMULSION INTRAVENOUS at 02:36

## 2017-04-17 RX ADMIN — IPRATROPIUM BROMIDE AND ALBUTEROL SULFATE SCH ML: .5; 3 SOLUTION RESPIRATORY (INHALATION) at 11:14

## 2017-04-17 RX ADMIN — METOPROLOL TARTRATE SCH MG: 25 TABLET, FILM COATED ORAL at 08:02

## 2017-04-17 RX ADMIN — BUDESONIDE SCH MG: 1 SUSPENSION RESPIRATORY (INHALATION) at 07:27

## 2017-04-17 RX ADMIN — PROPOFOL SCH MLS/HR: 10 INJECTION, EMULSION INTRAVENOUS at 13:51

## 2017-04-17 RX ADMIN — IPRATROPIUM BROMIDE AND ALBUTEROL SULFATE SCH ML: .5; 3 SOLUTION RESPIRATORY (INHALATION) at 02:59

## 2017-04-17 RX ADMIN — HEPARIN SODIUM SCH UNIT: 5000 INJECTION, SOLUTION INTRAVENOUS; SUBCUTANEOUS at 16:33

## 2017-04-17 RX ADMIN — CHLORHEXIDINE GLUCONATE SCH ML: 1.2 RINSE ORAL at 20:13

## 2017-04-17 RX ADMIN — ASPIRIN 325 MG ORAL TABLET SCH MG: 325 PILL ORAL at 08:02

## 2017-04-17 RX ADMIN — BUDESONIDE SCH MG: 1 SUSPENSION RESPIRATORY (INHALATION) at 19:39

## 2017-04-17 RX ADMIN — PROPOFOL SCH MLS/HR: 10 INJECTION, EMULSION INTRAVENOUS at 17:19

## 2017-04-17 RX ADMIN — IPRATROPIUM BROMIDE AND ALBUTEROL SULFATE SCH ML: .5; 3 SOLUTION RESPIRATORY (INHALATION) at 22:43

## 2017-04-17 RX ADMIN — Medication SCH MG: at 20:13

## 2017-04-17 RX ADMIN — ATORVASTATIN CALCIUM SCH MG: 40 TABLET, FILM COATED ORAL at 08:02

## 2017-04-17 RX ADMIN — PROPOFOL SCH MLS/HR: 10 INJECTION, EMULSION INTRAVENOUS at 11:41

## 2017-04-17 RX ADMIN — IPRATROPIUM BROMIDE AND ALBUTEROL SULFATE SCH ML: .5; 3 SOLUTION RESPIRATORY (INHALATION) at 15:49

## 2017-04-17 RX ADMIN — METOPROLOL TARTRATE SCH MG: 25 TABLET, FILM COATED ORAL at 20:13

## 2017-04-17 NOTE — P.PN
Subjective


Principal diagnosis: 





Coronary artery disease, status post prior stenting to his right coronary 

artery.  Preserved left ventricular function.  Hyperlipidemia.  Hypertension.  

ETOH abuse.





POD #4 total arterial non-aortic patch off-pump double coronary artery bypass 

grafting using in situ skeletonized right internal mammary artery to the left 

anterior descending artery across the anterior midline in situ totally 

skeletonized left internal mammary artery to the first obtuse marginal artery.  

Intraoperative transesophageal echocardiogram and epi-aortic scanning.  

Intraoperative graft flow measurements using the [employer] system





Patient had postoperative mucous plugging with increasing oxygen demand 

requiring bronchoscopy the night of surgery.  Bronchial washings negative for 

bacteria/viruses to date.





Pt developed postoperative alcohol withdrawal requiring increased sedation and 

continued mechanical ventilation.





Patient currently sedated on mechanical ventilation.  CT of brain performed 

yesterday negative for any acute process.





Objective





- Vital Signs


Vital signs: 


 Vital Signs











Temp  98.3 F   04/17/17 08:00


 


Pulse  58 L  04/17/17 09:00


 


Resp  15   04/17/17 09:00


 


BP  106/65   04/17/17 09:00


 


Pulse Ox  93 L  04/17/17 09:00








 Intake & Output











 04/16/17 04/17/17 04/17/17





 18:59 06:59 18:59


 


Intake Total 1367.276 3392.154 128.972


 


Output Total 396 506 40


 


Balance 762.628 927.154 88.972


 


Weight  97.4 kg 


 


Intake:   


 


  Intake, IV Titration 538.628 553.154 48.972





  Amount   


 


    Insulin Regular 100 unit 21.378 10.317 1.7





    In Sodium Chloride 0.9%   





    100 ml @ Per Protocol IV   





    .Q0M ELIER Rx#:[id number]   


 


    Lactated Ringers 1,000 ml 320 260 20





    @ 20 mls/hr IV .Q24H ELIER   





    Rx#:[id number]   


 


    Propofol 1,000 mg In   27.272





    Empty Bag 1 bag @ Titrate   





    IV .Q0M ELIER Rx#:   





    [id number]   


 


    Propofol 500 mg In Empty 197.25 282.837 





    Bag 1 bag @ Titrate IV .   





    Q0M ELIER Rx#:[id number]   


 


  Tube Feeding 560 790 50


 


  Other 60 90 30


 


Output:   


 


  Chest Tube Drainage 4  


 


    Left Lateral Chest 4  


 


  Urine 392 506 40


 


Other:   


 


  Voiding Method Indwelling Catheter Indwelling Catheter 


 


  # Bowel Movements 1 1 








 ABP, PAP, CO, CI - Last Documented











Arterial Blood Pressure        124/88


 


Pulmonary Artery Pressure      46/23


 


Cardiac Output                 6.8


 


Cardiac Index                  3.3

















- Constitutional


General appearance: Present: no acute distress





- Respiratory


Details: 





Lungs sounds diminished bilaterally.  Respirations even, nonlabored on 

mechanical ventilation.  Current settings tidal volume 500, FiO2 60%, 

respiratory rate 16, PEEP 5.





- Cardiovascular


Details: 





S1, S2 present.  Regular rate and rhythm, normal sinus rhythm on telemetry, no 

events noted overnight on telemetry.  Sternum stable.  Teds/SCDs present.





- Gastrointestinal


Gastrointestinal Comment(s): 





Abdomen soft, nontender, nondistended.  Active bowel sounds 4 quadrants.  

Vital tube feeding at 50 mL per hour through OG tube.





- Genitourinary


Genitourinary Comment(s): 





Angelo present draining clear, yellow urine.





- Integumentary


Integumentary Comment(s): 





Anterior chest incision covered dry intact silver dressing.





- Neurologic


Neurologic Comment(s): 





Patient Sedated overnight, sedation holiday to be trialed this morning with 

hopes of weaning from mechanical ventilation.





- Labs


CBC & Chem 7: 


 04/17/17 06:50





 04/17/17 06:50


Labs: 


 Abnormal Lab Results - Last 24 Hours (Table)











  04/16/17 04/16/17 04/16/17 Range/Units





  10:10 13:31 15:09 


 


WBC     (3.8-10.6)  k/uL


 


RBC     (4.30-5.90)  m/uL


 


Hgb     (13.0-17.5)  gm/dL


 


Hct     (39.0-53.0)  %


 


Neutrophils #     (1.3-7.7)  k/uL


 


ABG pH     (7.35-7.45)  


 


ABG pCO2     (35-45)  mmHg


 


ABG Total CO2     (19-24)  mmol/L


 


BUN     (9-20)  mg/dL


 


Glucose     (74-99)  mg/dL


 


POC Glucose (mg/dL)  147 H  121 H  133 H  (75-99)  mg/dL


 


Magnesium     (1.6-2.3)  mg/dL


 


Total Protein     (6.3-8.2)  g/dL


 


Albumin     (3.5-5.0)  g/dL














  04/16/17 04/16/17 04/16/17 Range/Units





  16:00 17:41 19:54 


 


WBC     (3.8-10.6)  k/uL


 


RBC     (4.30-5.90)  m/uL


 


Hgb     (13.0-17.5)  gm/dL


 


Hct     (39.0-53.0)  %


 


Neutrophils #     (1.3-7.7)  k/uL


 


ABG pH     (7.35-7.45)  


 


ABG pCO2     (35-45)  mmHg


 


ABG Total CO2     (19-24)  mmol/L


 


BUN     (9-20)  mg/dL


 


Glucose     (74-99)  mg/dL


 


POC Glucose (mg/dL)  147 H  139 H  133 H  (75-99)  mg/dL


 


Magnesium     (1.6-2.3)  mg/dL


 


Total Protein     (6.3-8.2)  g/dL


 


Albumin     (3.5-5.0)  g/dL














  04/16/17 04/17/17 04/17/17 Range/Units





  21:53 00:48 02:04 


 


WBC     (3.8-10.6)  k/uL


 


RBC     (4.30-5.90)  m/uL


 


Hgb     (13.0-17.5)  gm/dL


 


Hct     (39.0-53.0)  %


 


Neutrophils #     (1.3-7.7)  k/uL


 


ABG pH     (7.35-7.45)  


 


ABG pCO2     (35-45)  mmHg


 


ABG Total CO2     (19-24)  mmol/L


 


BUN     (9-20)  mg/dL


 


Glucose     (74-99)  mg/dL


 


POC Glucose (mg/dL)  151 H  133 H  181 H  (75-99)  mg/dL


 


Magnesium     (1.6-2.3)  mg/dL


 


Total Protein     (6.3-8.2)  g/dL


 


Albumin     (3.5-5.0)  g/dL














  04/17/17 04/17/17 04/17/17 Range/Units





  04:32 06:01 06:50 


 


WBC    12.9 H  (3.8-10.6)  k/uL


 


RBC    3.30 L  (4.30-5.90)  m/uL


 


Hgb    11.3 L  (13.0-17.5)  gm/dL


 


Hct    32.4 L  (39.0-53.0)  %


 


Neutrophils #    10.9 H  (1.3-7.7)  k/uL


 


ABG pH     (7.35-7.45)  


 


ABG pCO2     (35-45)  mmHg


 


ABG Total CO2     (19-24)  mmol/L


 


BUN     (9-20)  mg/dL


 


Glucose     (74-99)  mg/dL


 


POC Glucose (mg/dL)  152 H  160 H   (75-99)  mg/dL


 


Magnesium     (1.6-2.3)  mg/dL


 


Total Protein     (6.3-8.2)  g/dL


 


Albumin     (3.5-5.0)  g/dL














  04/17/17 04/17/17 04/17/17 Range/Units





  06:50 07:32 07:33 


 


WBC     (3.8-10.6)  k/uL


 


RBC     (4.30-5.90)  m/uL


 


Hgb     (13.0-17.5)  gm/dL


 


Hct     (39.0-53.0)  %


 


Neutrophils #     (1.3-7.7)  k/uL


 


ABG pH   7.48 H   (7.35-7.45)  


 


ABG pCO2   34 L   (35-45)  mmHg


 


ABG Total CO2   26 H   (19-24)  mmol/L


 


BUN  26 H    (9-20)  mg/dL


 


Glucose  160 H    (74-99)  mg/dL


 


POC Glucose (mg/dL)    149 H  (75-99)  mg/dL


 


Magnesium  2.6 H    (1.6-2.3)  mg/dL


 


Total Protein  5.7 L    (6.3-8.2)  g/dL


 


Albumin  2.9 L    (3.5-5.0)  g/dL














- Imaging and Cardiology


Chest x-ray: report reviewed, image reviewed





Assessment and Plan


(1) Status post coronary artery bypass graft


Status: Acute   





(2) Coronary artery disease


Status: Acute   





(3) Family history of heart disease


Status: Acute   





(4) History of PTCA


Status: Acute   





(5) Hyperlipidemia


Status: Acute   





(6) Hypertension


Status: Acute   





(7) Nicotine dependence, chewing tobacco, uncomplicated


Status: Acute   


Plan: 





1.  Continue aspirin, Lipitor, Plavix, heparin, Lopressor, lisinopril.


2.  Ventilator management per pulmonology, wean O2 as tolerated.


3.  Insulin/diabetic management per primary care service.


4.  Continue [facility] protocol for alcohol withdrawal.


5.  Daily labs, chest x-rays.


6.  GI/DVT prophylaxis.


7.  More recommendations as patient progresses.


Time with Patient: Greater than 30

## 2017-04-17 NOTE — P.PN
Subjective


Principal diagnosis: 





Status post coronary artery bypass grafting, postoperative day #4.





This is a 61-year-old white male status post CABG, postoperative day #1, total 

arterial non-aortic patch off pump double coronary artery bypass grafting using 

in situ skeletonized right internal mammary artery to the left anterior 

descending across the anterior midline in situ totally skeletonized left 

internal mammary artery to the first obtuse marginal artery.  Postoperatively 

patient was on mechanical ventilation, and upon arrival to the ICU he was found 

to have opacified right lung, he required bronchoscopy and extraction of mucous 

plugs mostly in the right upper lobe and right middle lobe.  Remained on 

mechanical ventilation overnight, however this morning his gases are showing 

marginal oxygenation, his pO2 is only 63 on 50% and PEEP of 5.  Patient was 

given a weaning trial with a pressure support of 8 and CPAP, ABG in half an 

hour is definitely poor showing very poor oxygenation, and his O2 saturation 

was in the 91% range.  PO2 was only 63, hence I decided not to pursue any 

further weaning and extubation on this patient until his oxygenation improves 

better.  Gram stain and cultures from the right upper lobe are pending.  In the 

meantime the patient is receiving bronchodilators for underlying COPD, and he 

is also on steroids for significant wheezing noted yesterday after 

bronchoscopy.  Patient remains on DuoNeb updrafts 4 times a day and when 

necessary.





Reevaluated today on 4/15/2017, patient is status post CABG, postoperative day #

2 remains intubated and on mechanical ventilation.  Yesterday patient could not 

be weaned and extubated mostly because of his marginal oxygenation.  His pO2 

was only 63 on 50% FiO2 and pressure support of 8 and CPAP.  Hence decided not 

to extubate the patient, today the patient is having what seems to be a picture 

of alcohol withdrawal as soon as the propofol was weaned off.  Hence we had to 

go back on the propofol, and the patient will be placed on the protocol.  For 

alcohol withdrawal.  ABG this morning seems to be a bit better, pO2 is 89 pCO2 

is 33 pH of 7.37.  Patient is slightly metabolically acidotic.  Bicarb is 19.  

Hemoglobin is 10.7 WBC count is 14.0.  Chest x-ray showed minimal areas of 

bands of atelectasis at the bases, but no further areas of collapse noted in 

the right upper lobe and right the lobe as seen 2 days ago.





Reevaluated today on 4/16/2017, patient is status post CABG, postoperative day #

3 remains intubated on mechanical ventilation.  His gases are marginal.  Today 

I awakened the patient, and I tried to evaluate his mental status, however the 

patient was noted to be extremely obtunded, his eyes were deviated upwards, and 

I could not assess his mental status because he was basically unresponsive.  

This was off propofol, patient also became extremely agitated, and we had to 

place him back on propofol.  In the meantime I ordered a CT of the brain.  

Ending at this point.  His blood pressure was noted to go high off propofol, 

patient was given Apresoline for high blood pressure.  He is also on beta 

blockers.  And he is on multiple other meds for hypertension.  ABG today showed 

a pO2 of 71 pCO2 of 30 pH of 7.51, and this is on a FiO2 of 50% and PEEP of 5.  

Chest x-ray showed minimal atelectasis at the bases.  And overall significant 

improvement in aeration of both lungs.  Patient remains on the protocol for 

alcohol withdrawal in the meantime.





The patient is seen again today 04/17/2017 in follow-up.  He is status post CABG

, postoperative day #4.  He remains intubated on mechanical ventilator.  Assist-

control of 16, tidal volume 500, FiO2 60%, PEEP of 5.  Arterial blood gases 

reveal a pO2 of 84, pCO2 34, pH 7.48.  Mild respiratory alkalosis.  Previous 

attempts to wean the patient failed based on significant hypertension with 

systolic pressure greater than 200, tachycardia as well.  There was concern 

regarding possible alcohol withdrawal and he was started on the CIWA protocol.  

His initial chest x-ray had revealed a near complete white out of the right 

lung and he had undergone bronchoscopy with extraction of mucous plugging in 

the right upper and middle lobes.  Today's chest x-ray show some mild pulmonary 

vascular congestion was small left pleural effusion/atelectasis.  His current 

receiving lactated Ringer's at a KVO, Brovana at 40 mcg/kg/m.  He is also 

receiving Vital HP tube feedings at 50 mL per hour which is at goal. 





Objective





- Vital Signs


Vital signs: 


 Vital Signs











Temp  98.3 F   04/17/17 08:00


 


Pulse  59 L  04/17/17 10:00


 


Resp  16   04/17/17 10:00


 


BP  119/69   04/17/17 10:00


 


Pulse Ox  94 L  04/17/17 10:00








 Intake & Output











 04/16/17 04/17/17 04/17/17





 18:59 06:59 18:59


 


Intake Total 7423.802 0200.154 319.527


 


Output Total 396 506 120


 


Balance 762.628 927.154 199.527


 


Weight  97.4 kg 


 


Intake:   


 


  Intake, IV Titration 538.628 553.154 139.527





  Amount   


 


    Insulin Regular 100 unit 21.378 10.317 1.7





    In Sodium Chloride 0.9%   





    100 ml @ Per Protocol IV   





    .Q0M ELIER Rx#:[id number]   


 


    Lactated Ringers 1,000 ml 320 260 70





    @ 20 mls/hr IV .Q24H ELIER   





    Rx#:[id number]   


 


    Propofol 1,000 mg In   67.827





    Empty Bag 1 bag @ Titrate   





    IV .Q0M ELIER Rx#:   





    [id number]   


 


    Propofol 500 mg In Empty 197.25 282.837 





    Bag 1 bag @ Titrate IV .   





    Q0M ELIER Rx#:[id number]   


 


  Tube Feeding 560 790 150


 


  Other 60 90 30


 


Output:   


 


  Chest Tube Drainage 4  


 


    Left Lateral Chest 4  


 


  Urine 392 506 120


 


Other:   


 


  Voiding Method Indwelling Catheter Indwelling Catheter 


 


  # Bowel Movements 1 1 








 ABP, PAP, CO, CI - Last Documented











Arterial Blood Pressure        126/60


 


Pulmonary Artery Pressure      46/23


 


Cardiac Output                 6.8


 


Cardiac Index                  3.3

















- Exam





GENERAL EXAM: Intubated, sedated.


HEAD: Normocephalic.


EYES: Sluggish reaction of pupils, equal size.


NOSE: Clear with pink turbinates.


THROAT: No erythema or exudates.


NECK: No masses, no JVD.


CHEST: No chest wall deformity.


LUNGS: Equal air entry with faint crackles in the bilateral posterior bases, 

more so on the left.


CVS: S1 and S2 normal with no audible murmurs, regular rhythm.


ABDOMEN: No hepatosplenomegaly, normal bowel sounds, no guarding or rigidity.


Extremities: There is trace peripheral edema.  No clubbing, no cyanosis.  

Peripheral pulses are intact.





- Labs


CBC & Chem 7: 


 04/17/17 06:50





 04/17/17 06:50


Labs: 


 Abnormal Lab Results - Last 24 Hours (Table)











  04/16/17 04/16/17 04/16/17 Range/Units





  13:31 15:09 16:00 


 


WBC     (3.8-10.6)  k/uL


 


RBC     (4.30-5.90)  m/uL


 


Hgb     (13.0-17.5)  gm/dL


 


Hct     (39.0-53.0)  %


 


Neutrophils #     (1.3-7.7)  k/uL


 


ABG pH     (7.35-7.45)  


 


ABG pCO2     (35-45)  mmHg


 


ABG Total CO2     (19-24)  mmol/L


 


BUN     (9-20)  mg/dL


 


Glucose     (74-99)  mg/dL


 


POC Glucose (mg/dL)  121 H  133 H  147 H  (75-99)  mg/dL


 


Magnesium     (1.6-2.3)  mg/dL


 


Total Protein     (6.3-8.2)  g/dL


 


Albumin     (3.5-5.0)  g/dL














  04/16/17 04/16/17 04/16/17 Range/Units





  17:41 19:54 21:53 


 


WBC     (3.8-10.6)  k/uL


 


RBC     (4.30-5.90)  m/uL


 


Hgb     (13.0-17.5)  gm/dL


 


Hct     (39.0-53.0)  %


 


Neutrophils #     (1.3-7.7)  k/uL


 


ABG pH     (7.35-7.45)  


 


ABG pCO2     (35-45)  mmHg


 


ABG Total CO2     (19-24)  mmol/L


 


BUN     (9-20)  mg/dL


 


Glucose     (74-99)  mg/dL


 


POC Glucose (mg/dL)  139 H  133 H  151 H  (75-99)  mg/dL


 


Magnesium     (1.6-2.3)  mg/dL


 


Total Protein     (6.3-8.2)  g/dL


 


Albumin     (3.5-5.0)  g/dL














  04/17/17 04/17/17 04/17/17 Range/Units





  00:48 02:04 04:32 


 


WBC     (3.8-10.6)  k/uL


 


RBC     (4.30-5.90)  m/uL


 


Hgb     (13.0-17.5)  gm/dL


 


Hct     (39.0-53.0)  %


 


Neutrophils #     (1.3-7.7)  k/uL


 


ABG pH     (7.35-7.45)  


 


ABG pCO2     (35-45)  mmHg


 


ABG Total CO2     (19-24)  mmol/L


 


BUN     (9-20)  mg/dL


 


Glucose     (74-99)  mg/dL


 


POC Glucose (mg/dL)  133 H  181 H  152 H  (75-99)  mg/dL


 


Magnesium     (1.6-2.3)  mg/dL


 


Total Protein     (6.3-8.2)  g/dL


 


Albumin     (3.5-5.0)  g/dL














  04/17/17 04/17/17 04/17/17 Range/Units





  06:01 06:50 06:50 


 


WBC   12.9 H   (3.8-10.6)  k/uL


 


RBC   3.30 L   (4.30-5.90)  m/uL


 


Hgb   11.3 L   (13.0-17.5)  gm/dL


 


Hct   32.4 L   (39.0-53.0)  %


 


Neutrophils #   10.9 H   (1.3-7.7)  k/uL


 


ABG pH     (7.35-7.45)  


 


ABG pCO2     (35-45)  mmHg


 


ABG Total CO2     (19-24)  mmol/L


 


BUN    26 H  (9-20)  mg/dL


 


Glucose    160 H  (74-99)  mg/dL


 


POC Glucose (mg/dL)  160 H    (75-99)  mg/dL


 


Magnesium    2.6 H  (1.6-2.3)  mg/dL


 


Total Protein    5.7 L  (6.3-8.2)  g/dL


 


Albumin    2.9 L  (3.5-5.0)  g/dL














  04/17/17 04/17/17 04/17/17 Range/Units





  07:32 07:33 09:46 


 


WBC     (3.8-10.6)  k/uL


 


RBC     (4.30-5.90)  m/uL


 


Hgb     (13.0-17.5)  gm/dL


 


Hct     (39.0-53.0)  %


 


Neutrophils #     (1.3-7.7)  k/uL


 


ABG pH  7.48 H    (7.35-7.45)  


 


ABG pCO2  34 L    (35-45)  mmHg


 


ABG Total CO2  26 H    (19-24)  mmol/L


 


BUN     (9-20)  mg/dL


 


Glucose     (74-99)  mg/dL


 


POC Glucose (mg/dL)   149 H  158 H  (75-99)  mg/dL


 


Magnesium     (1.6-2.3)  mg/dL


 


Total Protein     (6.3-8.2)  g/dL


 


Albumin     (3.5-5.0)  g/dL














Assessment and Plan


Plan: 





Impression:





1 status post CABG, postoperative day # 4





2 history of nicotine dependence and suspect some component of COPD





3 history of documented coronary artery disease based on a recent cardiac 

catheterization





4 history of percutaneous revascularization of the proximal right coronary 

artery in 2001





5 history of hypertension





6 history of myocardial infarction





7 status post bronchoscopy and bronchoalveolar lavage of the right upper lobe 

read lobe and right lower lobe upon arrival to the ICU.  Bronchial washings are

  pending.





8 failure to wean mostly secondary to relative hypoxemia, O2 saturation is very 

marginal, and it is in the 70s at 50%.  And because of the mental status and 

suspected alcohol withdrawal is another major factor that delaying the weaning 

process.





9 suspect acute alcohol withdrawal considering the patient is a heavy drinker





Plan:





The patient was seen and evaluated by Dr. Munoz.  His chest x-ray ABGs and labs 

were reviewed.  We will initiate a daily interruption of sedation and weaning 

parameters if tolerated.  We will continue with his current medications.  He 

remains on heparin for DVT prophylaxis and Protonix for GI prophylaxis.  

Continue bronchodilators and IV Solu-Medrol.  We will continue to follow and 

make further recommendations based on his clinical status.





Critical care time 38 minutes.


Time with Patient: Greater than 30

## 2017-04-17 NOTE — XR
EXAMINATION TYPE: XR chest 1V portable

 

DATE OF EXAM: 4/17/2017 6:56 AM

 

Comparison: 4/16/2017

 

Clinical History: 61 year-old male tube placement

 

Findings:

ET tube tip difficult to clearly seen but estimated at 2.3 cm from the anderson. NG tube courses below 
the diaphragm. Median sternotomy wires are present with post-CABG clips. Heart remains mildly enlarge
d with diffuse interstitial dominance. Small left pleural effusion with patchy left basilar and retro
cardiac opacity remains. No appreciable pneumothorax.

 

 

Impression:

Suspect small left pleural effusion with adjacent left basilar atelectasis and/or consolidation, not 
significantly changed from 4/16/2017. Correlate to exclude mild pulmonary vascular congestion.

## 2017-04-17 NOTE — P.PN
Subjective


Principal diagnosis: 


Status post bypass surgery





Patient is status post aorto coronary bypass surgery and has been having issues 

with respiratory failure requiring mechanical ventilation.  Patient's 

postoperative day 4.  Patient had acute pulmonary edema patient also had been 

having issues with alcohol withdrawal.  At times are being made to wean him off 

the respirator.  There is also history of COPD and any couldn't in dependents.  

Failure to wean seemed to mostly related to hypoxia.  His blood pressure 

running about 135/57.  Pulse rate is about 75.  Patient is unresponsive at this 

time.  Sedation is being held





Objective





- Vital Signs


Vital signs: 


 Vital Signs











Temp  98.6 F   04/17/17 16:00


 


Pulse  75   04/17/17 16:11


 


Resp  23   04/17/17 16:00


 


BP  97/61   04/17/17 16:00


 


Pulse Ox  91 L  04/17/17 16:00








 Intake & Output











 04/16/17 04/17/17 04/17/17





 18:59 06:59 18:59


 


Intake Total 8276.795 0211.154 873.292


 


Output Total 396 506 435


 


Balance 762.628 927.154 438.292


 


Weight  97.4 kg 97.4 kg


 


Intake:   


 


  Intake, IV Titration 538.628 553.154 333.292





  Amount   


 


    Insulin Regular 100 unit 21.378 10.317 3.167





    In Sodium Chloride 0.9%   





    100 ml @ Per Protocol IV   





    .Q0M ELIER Rx#:[id number]   


 


    Lactated Ringers 1,000 ml 320 260 190





    @ 20 mls/hr IV .Q24H ELIER   





    Rx#:[id number]   


 


    Propofol 1,000 mg In   82.724





    Empty Bag 1 bag @ Titrate   





    IV .Q0M ELIER Rx#:   





    [id number]   


 


    Propofol 500 mg In Empty 197.25 282.837 





    Bag 1 bag @ Titrate IV .   





    Q0M ELIER Rx#:[id number]   


 


    Propofol 500 mg In Empty   57.401





    Bag 1 bag @ Titrate IV .   





    Q0M ELIER Rx#:[id number]   


 


  Tube Feeding 560 790 450


 


  Other 60 90 90


 


Output:   


 


  Chest Tube Drainage 4  


 


    Left Lateral Chest 4  


 


  Urine 392 506 435


 


Other:   


 


  Voiding Method Indwelling Catheter Indwelling Catheter Indwelling Catheter


 


  # Bowel Movements 1 1 








 ABP, PAP, CO, CI - Last Documented











Arterial Blood Pressure        135/57


 


Pulmonary Artery Pressure      46/23


 


Cardiac Output                 6.8


 


Cardiac Index                  3.3

















- Labs


CBC & Chem 7: 


 04/17/17 06:50





 04/17/17 06:50


Labs: 


 Abnormal Lab Results - Last 24 Hours (Table)











  04/13/17 04/13/17 04/13/17 Range/Units





  08:53 10:35 11:13 


 


WBC     (3.8-10.6)  k/uL


 


RBC     (4.30-5.90)  m/uL


 


Hgb     (13.0-17.5)  gm/dL


 


Hct     (39.0-53.0)  %


 


Neutrophils #     (1.3-7.7)  k/uL


 


ABG pH  7.46 H  7.49 H  7.47 H  (7.35-7.45)  


 


ABG pCO2   32 L  30 L  (35-45)  mmHg


 


ABG pO2  149 H   149 H  ()  mmHg


 


ABG Total CO2   25 H   (19-24)  mmol/L


 


ABG O2 Saturation   98.7 H  99.5 H  (94-97)  %


 


ABG Hematocrit    31 L  (34.0-46.0)  %


 


BUN     (9-20)  mg/dL


 


Glucose     (74-99)  mg/dL


 


POC Glucose (mg/dL)     (75-99)  mg/dL


 


Magnesium     (1.6-2.3)  mg/dL


 


Total Protein     (6.3-8.2)  g/dL


 


Albumin     (3.5-5.0)  g/dL














  04/13/17 04/13/17 04/16/17 Range/Units





  11:59 12:52 17:41 


 


WBC     (3.8-10.6)  k/uL


 


RBC     (4.30-5.90)  m/uL


 


Hgb     (13.0-17.5)  gm/dL


 


Hct     (39.0-53.0)  %


 


Neutrophils #     (1.3-7.7)  k/uL


 


ABG pH  7.46 H    (7.35-7.45)  


 


ABG pCO2  34 L    (35-45)  mmHg


 


ABG pO2   142 H   ()  mmHg


 


ABG Total CO2  25 H  25 H   (19-24)  mmol/L


 


ABG O2 Saturation  97.9 H  99.2 H   (94-97)  %


 


ABG Hematocrit  31 L  30 L   (34.0-46.0)  %


 


BUN     (9-20)  mg/dL


 


Glucose     (74-99)  mg/dL


 


POC Glucose (mg/dL)    139 H  (75-99)  mg/dL


 


Magnesium     (1.6-2.3)  mg/dL


 


Total Protein     (6.3-8.2)  g/dL


 


Albumin     (3.5-5.0)  g/dL














  04/16/17 04/16/17 04/17/17 Range/Units





  19:54 21:53 00:48 


 


WBC     (3.8-10.6)  k/uL


 


RBC     (4.30-5.90)  m/uL


 


Hgb     (13.0-17.5)  gm/dL


 


Hct     (39.0-53.0)  %


 


Neutrophils #     (1.3-7.7)  k/uL


 


ABG pH     (7.35-7.45)  


 


ABG pCO2     (35-45)  mmHg


 


ABG pO2     ()  mmHg


 


ABG Total CO2     (19-24)  mmol/L


 


ABG O2 Saturation     (94-97)  %


 


ABG Hematocrit     (34.0-46.0)  %


 


BUN     (9-20)  mg/dL


 


Glucose     (74-99)  mg/dL


 


POC Glucose (mg/dL)  133 H  151 H  133 H  (75-99)  mg/dL


 


Magnesium     (1.6-2.3)  mg/dL


 


Total Protein     (6.3-8.2)  g/dL


 


Albumin     (3.5-5.0)  g/dL














  04/17/17 04/17/17 04/17/17 Range/Units





  02:04 04:32 06:01 


 


WBC     (3.8-10.6)  k/uL


 


RBC     (4.30-5.90)  m/uL


 


Hgb     (13.0-17.5)  gm/dL


 


Hct     (39.0-53.0)  %


 


Neutrophils #     (1.3-7.7)  k/uL


 


ABG pH     (7.35-7.45)  


 


ABG pCO2     (35-45)  mmHg


 


ABG pO2     ()  mmHg


 


ABG Total CO2     (19-24)  mmol/L


 


ABG O2 Saturation     (94-97)  %


 


ABG Hematocrit     (34.0-46.0)  %


 


BUN     (9-20)  mg/dL


 


Glucose     (74-99)  mg/dL


 


POC Glucose (mg/dL)  181 H  152 H  160 H  (75-99)  mg/dL


 


Magnesium     (1.6-2.3)  mg/dL


 


Total Protein     (6.3-8.2)  g/dL


 


Albumin     (3.5-5.0)  g/dL














  04/17/17 04/17/17 04/17/17 Range/Units





  06:50 06:50 07:32 


 


WBC  12.9 H    (3.8-10.6)  k/uL


 


RBC  3.30 L    (4.30-5.90)  m/uL


 


Hgb  11.3 L    (13.0-17.5)  gm/dL


 


Hct  32.4 L    (39.0-53.0)  %


 


Neutrophils #  10.9 H    (1.3-7.7)  k/uL


 


ABG pH    7.48 H  (7.35-7.45)  


 


ABG pCO2    34 L  (35-45)  mmHg


 


ABG pO2     ()  mmHg


 


ABG Total CO2    26 H  (19-24)  mmol/L


 


ABG O2 Saturation     (94-97)  %


 


ABG Hematocrit     (34.0-46.0)  %


 


BUN   26 H   (9-20)  mg/dL


 


Glucose   160 H   (74-99)  mg/dL


 


POC Glucose (mg/dL)     (75-99)  mg/dL


 


Magnesium   2.6 H   (1.6-2.3)  mg/dL


 


Total Protein   5.7 L   (6.3-8.2)  g/dL


 


Albumin   2.9 L   (3.5-5.0)  g/dL














  04/17/17 04/17/17 04/17/17 Range/Units





  07:33 09:46 11:35 


 


WBC     (3.8-10.6)  k/uL


 


RBC     (4.30-5.90)  m/uL


 


Hgb     (13.0-17.5)  gm/dL


 


Hct     (39.0-53.0)  %


 


Neutrophils #     (1.3-7.7)  k/uL


 


ABG pH     (7.35-7.45)  


 


ABG pCO2     (35-45)  mmHg


 


ABG pO2     ()  mmHg


 


ABG Total CO2     (19-24)  mmol/L


 


ABG O2 Saturation     (94-97)  %


 


ABG Hematocrit     (34.0-46.0)  %


 


BUN     (9-20)  mg/dL


 


Glucose     (74-99)  mg/dL


 


POC Glucose (mg/dL)  149 H  158 H  129 H  (75-99)  mg/dL


 


Magnesium     (1.6-2.3)  mg/dL


 


Total Protein     (6.3-8.2)  g/dL


 


Albumin     (3.5-5.0)  g/dL














  04/17/17 04/17/17 Range/Units





  13:57 15:34 


 


WBC    (3.8-10.6)  k/uL


 


RBC    (4.30-5.90)  m/uL


 


Hgb    (13.0-17.5)  gm/dL


 


Hct    (39.0-53.0)  %


 


Neutrophils #    (1.3-7.7)  k/uL


 


ABG pH    (7.35-7.45)  


 


ABG pCO2    (35-45)  mmHg


 


ABG pO2    ()  mmHg


 


ABG Total CO2    (19-24)  mmol/L


 


ABG O2 Saturation    (94-97)  %


 


ABG Hematocrit    (34.0-46.0)  %


 


BUN    (9-20)  mg/dL


 


Glucose    (74-99)  mg/dL


 


POC Glucose (mg/dL)  150 H  126 H  (75-99)  mg/dL


 


Magnesium    (1.6-2.3)  mg/dL


 


Total Protein    (6.3-8.2)  g/dL


 


Albumin    (3.5-5.0)  g/dL














Assessment and Plan


(1) Status post coronary artery bypass graft


Status: Acute   





(2) Family history of heart disease


Status: Acute   





(3) Hyperlipidemia


Status: Acute   





(4) Hypertension


Status: Acute   





(5) Nicotine dependence, chewing tobacco, uncomplicated


Status: Acute   


Plan: 


Continue current support.  Attempts to wean him of the ventilator.  Patient is 

currently on Plavix and aspirin and Lipitor and metoprolol 25 mg by mouth twice 

a day.  Patient will continue current medical therapy.  Continue current 

supportive measures.  Will follow

## 2017-04-18 LAB
ALP SERPL-CCNC: 38 U/L (ref 38–126)
ALT SERPL-CCNC: 26 U/L (ref 21–72)
ANION GAP SERPL CALC-SCNC: 7 MMOL/L
AST SERPL-CCNC: 23 U/L (ref 17–59)
BASOPHILS # BLD AUTO: 0 K/UL (ref 0–0.2)
BASOPHILS NFR BLD AUTO: 0 %
BUN SERPL-SCNC: 31 MG/DL (ref 9–20)
CALCIUM SPEC-MCNC: 8.5 MG/DL (ref 8.4–10.2)
CH: 33
CHCM: 33
CHLORIDE SERPL-SCNC: 106 MMOL/L (ref 98–107)
CO2 BLDA-SCNC: 27 MMOL/L (ref 19–24)
CO2 BLDA-SCNC: 29 MMOL/L (ref 19–24)
CO2 SERPL-SCNC: 27 MMOL/L (ref 22–30)
EOSINOPHIL # BLD AUTO: 0.1 K/UL (ref 0–0.7)
EOSINOPHIL NFR BLD AUTO: 1 %
ERYTHROCYTE [DISTWIDTH] IN BLOOD BY AUTOMATED COUNT: 3.19 M/UL (ref 4.3–5.9)
ERYTHROCYTE [DISTWIDTH] IN BLOOD: 14.3 % (ref 11.5–15.5)
GLUCOSE BLD-MCNC: 126 MG/DL (ref 75–99)
GLUCOSE BLD-MCNC: 135 MG/DL (ref 75–99)
GLUCOSE BLD-MCNC: 141 MG/DL (ref 75–99)
GLUCOSE BLD-MCNC: 141 MG/DL (ref 75–99)
GLUCOSE BLD-MCNC: 148 MG/DL (ref 75–99)
GLUCOSE BLD-MCNC: 148 MG/DL (ref 75–99)
GLUCOSE BLD-MCNC: 149 MG/DL (ref 75–99)
GLUCOSE BLD-MCNC: 153 MG/DL (ref 75–99)
GLUCOSE BLD-MCNC: 156 MG/DL (ref 75–99)
GLUCOSE BLD-MCNC: 166 MG/DL (ref 75–99)
GLUCOSE BLD-MCNC: 178 MG/DL (ref 75–99)
GLUCOSE SERPL-MCNC: 170 MG/DL (ref 74–99)
HCO3 BLDA-SCNC: 26 MMOL/L (ref 21–25)
HCO3 BLDA-SCNC: 28 MMOL/L (ref 21–25)
HCT VFR BLD AUTO: 32 % (ref 39–53)
HDW: 2.32
HGB BLD-MCNC: 10.3 GM/DL (ref 13–17.5)
LUC NFR BLD AUTO: 1 %
LYMPHOCYTES # SPEC AUTO: 1.5 K/UL (ref 1–4.8)
LYMPHOCYTES NFR SPEC AUTO: 14 %
MAGNESIUM SPEC-SCNC: 2.5 MG/DL (ref 1.6–2.3)
MCH RBC QN AUTO: 32.3 PG (ref 25–35)
MCHC RBC AUTO-ENTMCNC: 32.2 G/DL (ref 31–37)
MCV RBC AUTO: 100.4 FL (ref 80–100)
MONOCYTES # BLD AUTO: 0.5 K/UL (ref 0–1)
MONOCYTES NFR BLD AUTO: 5 %
NEUTROPHILS # BLD AUTO: 8.6 K/UL (ref 1.3–7.7)
NEUTROPHILS NFR BLD AUTO: 79 %
NON-AFRICAN AMERICAN GFR(MDRD): >60
PCO2 BLDA: 38 MMHG (ref 35–45)
PCO2 BLDA: 44 MMHG (ref 35–45)
PH BLDA: 7.4 [PH] (ref 7.35–7.45)
PH BLDA: 7.45 [PH] (ref 7.35–7.45)
PO2 BLDA: 77 MMHG (ref 83–108)
PO2 BLDA: 84 MMHG (ref 83–108)
POTASSIUM SERPL-SCNC: 4.8 MMOL/L (ref 3.5–5.1)
PROT SERPL-MCNC: 5.6 G/DL (ref 6.3–8.2)
SODIUM SERPL-SCNC: 140 MMOL/L (ref 137–145)
WBC # BLD AUTO: 0.14 10*3/UL
WBC # BLD AUTO: 10.9 K/UL (ref 3.8–10.6)
WBC (PEROX): 11.81

## 2017-04-18 RX ADMIN — PANTOPRAZOLE SODIUM SCH MG: 40 INJECTION, POWDER, FOR SOLUTION INTRAVENOUS at 08:12

## 2017-04-18 RX ADMIN — HEPARIN SODIUM SCH UNIT: 5000 INJECTION, SOLUTION INTRAVENOUS; SUBCUTANEOUS at 15:49

## 2017-04-18 RX ADMIN — Medication SCH MG: at 08:13

## 2017-04-18 RX ADMIN — MULTIVITAMIN SCH ML: LIQUID ORAL at 12:47

## 2017-04-18 RX ADMIN — Medication SCH MG: at 21:44

## 2017-04-18 RX ADMIN — PROPOFOL SCH MLS/HR: 10 INJECTION, EMULSION INTRAVENOUS at 15:50

## 2017-04-18 RX ADMIN — IPRATROPIUM BROMIDE AND ALBUTEROL SULFATE SCH ML: .5; 3 SOLUTION RESPIRATORY (INHALATION) at 23:13

## 2017-04-18 RX ADMIN — PROPOFOL SCH MLS/HR: 10 INJECTION, EMULSION INTRAVENOUS at 05:42

## 2017-04-18 RX ADMIN — SODIUM CHLORIDE, PRESERVATIVE FREE SCH ML: 5 INJECTION INTRAVENOUS at 08:13

## 2017-04-18 RX ADMIN — PROPOFOL SCH MLS/HR: 10 INJECTION, EMULSION INTRAVENOUS at 03:51

## 2017-04-18 RX ADMIN — PROPOFOL SCH MLS/HR: 10 INJECTION, EMULSION INTRAVENOUS at 23:08

## 2017-04-18 RX ADMIN — IPRATROPIUM BROMIDE AND ALBUTEROL SULFATE SCH ML: .5; 3 SOLUTION RESPIRATORY (INHALATION) at 11:27

## 2017-04-18 RX ADMIN — IPRATROPIUM BROMIDE AND ALBUTEROL SULFATE SCH ML: .5; 3 SOLUTION RESPIRATORY (INHALATION) at 15:26

## 2017-04-18 RX ADMIN — IPRATROPIUM BROMIDE AND ALBUTEROL SULFATE SCH ML: .5; 3 SOLUTION RESPIRATORY (INHALATION) at 07:53

## 2017-04-18 RX ADMIN — HEPARIN SODIUM SCH UNIT: 5000 INJECTION, SOLUTION INTRAVENOUS; SUBCUTANEOUS at 08:12

## 2017-04-18 RX ADMIN — HYDROCODONE BITARTRATE AND ACETAMINOPHEN PRN EACH: 5; 325 TABLET ORAL at 08:18

## 2017-04-18 RX ADMIN — METHYLPREDNISOLONE SODIUM SUCCINATE SCH MG: 40 INJECTION, POWDER, FOR SOLUTION INTRAMUSCULAR; INTRAVENOUS at 21:46

## 2017-04-18 RX ADMIN — LORAZEPAM PRN MG: 2 INJECTION, SOLUTION INTRAMUSCULAR; INTRAVENOUS at 18:19

## 2017-04-18 RX ADMIN — BUDESONIDE SCH MG: 1 SUSPENSION RESPIRATORY (INHALATION) at 07:53

## 2017-04-18 RX ADMIN — CHLORHEXIDINE GLUCONATE SCH ML: 1.2 RINSE ORAL at 08:12

## 2017-04-18 RX ADMIN — CHLORHEXIDINE GLUCONATE SCH ML: 1.2 RINSE ORAL at 21:46

## 2017-04-18 RX ADMIN — SODIUM CHLORIDE, PRESERVATIVE FREE SCH ML: 5 INJECTION INTRAVENOUS at 21:46

## 2017-04-18 RX ADMIN — PROPOFOL SCH MLS/HR: 10 INJECTION, EMULSION INTRAVENOUS at 01:11

## 2017-04-18 RX ADMIN — BUDESONIDE SCH MG: 1 SUSPENSION RESPIRATORY (INHALATION) at 19:37

## 2017-04-18 RX ADMIN — LISINOPRIL SCH MG: 5 TABLET ORAL at 09:25

## 2017-04-18 RX ADMIN — HYDROCODONE BITARTRATE AND ACETAMINOPHEN PRN EACH: 5; 325 TABLET ORAL at 03:52

## 2017-04-18 RX ADMIN — INSULIN HUMAN SCH MLS/HR: 100 INJECTION, SOLUTION PARENTERAL at 15:06

## 2017-04-18 RX ADMIN — IPRATROPIUM BROMIDE AND ALBUTEROL SULFATE SCH ML: .5; 3 SOLUTION RESPIRATORY (INHALATION) at 19:37

## 2017-04-18 RX ADMIN — HYDROCODONE BITARTRATE AND ACETAMINOPHEN PRN EACH: 5; 325 TABLET ORAL at 15:49

## 2017-04-18 RX ADMIN — POTASSIUM CHLORIDE SCH MLS/HR: 14.9 INJECTION, SOLUTION INTRAVENOUS at 12:48

## 2017-04-18 RX ADMIN — DOCUSATE SODIUM AND SENNOSIDES SCH EACH: 50; 8.6 TABLET ORAL at 23:07

## 2017-04-18 RX ADMIN — PROPOFOL SCH MLS/HR: 10 INJECTION, EMULSION INTRAVENOUS at 08:29

## 2017-04-18 RX ADMIN — ASPIRIN 325 MG ORAL TABLET SCH MG: 325 PILL ORAL at 08:12

## 2017-04-18 RX ADMIN — METHYLPREDNISOLONE SODIUM SUCCINATE SCH MG: 40 INJECTION, POWDER, FOR SOLUTION INTRAMUSCULAR; INTRAVENOUS at 08:12

## 2017-04-18 RX ADMIN — IPRATROPIUM BROMIDE AND ALBUTEROL SULFATE SCH ML: .5; 3 SOLUTION RESPIRATORY (INHALATION) at 03:25

## 2017-04-18 RX ADMIN — ATORVASTATIN CALCIUM SCH MG: 40 TABLET, FILM COATED ORAL at 08:12

## 2017-04-18 RX ADMIN — METOPROLOL TARTRATE SCH MG: 25 TABLET, FILM COATED ORAL at 08:12

## 2017-04-18 RX ADMIN — METOPROLOL TARTRATE SCH MG: 25 TABLET, FILM COATED ORAL at 23:06

## 2017-04-18 RX ADMIN — LORAZEPAM PRN MG: 2 INJECTION, SOLUTION INTRAMUSCULAR; INTRAVENOUS at 03:51

## 2017-04-18 RX ADMIN — PROPOFOL SCH MLS/HR: 10 INJECTION, EMULSION INTRAVENOUS at 12:47

## 2017-04-18 RX ADMIN — LORAZEPAM PRN MG: 2 INJECTION, SOLUTION INTRAMUSCULAR; INTRAVENOUS at 08:04

## 2017-04-18 RX ADMIN — LORAZEPAM PRN MG: 2 INJECTION, SOLUTION INTRAMUSCULAR; INTRAVENOUS at 01:43

## 2017-04-18 RX ADMIN — PROPOFOL SCH MLS/HR: 10 INJECTION, EMULSION INTRAVENOUS at 18:18

## 2017-04-18 RX ADMIN — LORAZEPAM PRN MG: 2 INJECTION, SOLUTION INTRAMUSCULAR; INTRAVENOUS at 21:45

## 2017-04-18 RX ADMIN — CLOPIDOGREL BISULFATE SCH MG: 75 TABLET ORAL at 08:13

## 2017-04-18 NOTE — P.PN
Subjective


Principal diagnosis: 


Status post bypass surgery, respiratory failure








This is 60-year-old gentleman is status post prior to coronary bypass rigidity, 

postop day #5.  Attempts to wean him up respirator have resulted in agitation 

and tachycardia.  Patient apparently also showed evidence of alcohol 

withdrawal.  Currently is back on full respiratory support and at times will be 

made for slow weaning process.  Hemodynamically relatively stable.  His blood 

[place] [address].  Respiration of 20.  Pulse is 71. 

chest x-ray shows left-sided pleural effusion.  His renal function appear to be 

maintaining and patient has good urinary output.  Lab values showed hemoglobin 

10.3.  His creatinine is 0.7





Objective





- Vital Signs


Vital signs: 


 Vital Signs











Temp  98.3 F   04/18/17 16:00


 


Pulse  73   04/18/17 20:03


 


Resp  20   04/18/17 19:00


 


BP  109/68   04/18/17 19:00


 


Pulse Ox  94 L  04/18/17 19:00








 Intake & Output











 04/18/17 04/18/17 04/19/17





 06:59 18:59 06:59


 


Intake Total 8225.169 3170.175 70


 


Output Total 600 815 


 


Balance 689.272 548.175 70


 


Weight 97.3 kg 97.3 kg 


 


Intake:   


 


  Intake, IV Titration 499.272 423.175 20





  Amount   


 


    Insulin Regular 100 unit  0 





    In Sodium Chloride 0.9%   





    100 ml @ Per Protocol IV   





    .Q0M [facility] Rx#:[id number]   


 


    Lactated Ringers 1,000 ml 240 240 20





    @ 20 mls/hr IV .Q24H EL[facility]   





    Rx#:[id number]   


 


    Propofol 500 mg In Empty 259.272 183.175 





    Bag 1 bag @ Titrate IV .   





    Q0M [facility] Rx#:[id number]   


 


  Tube Feeding 700 850 50


 


  Other 90 90 


 


Output:   


 


  Urine 600 815 


 


Other:   


 


  Voiding Method Indwelling Catheter Indwelling Catheter 








 ABP, PAP, CO, CI - Last Documented











Arterial Blood Pressure        87/58


 


Pulmonary Artery Pressure      46/23


 


Cardiac Output                 6.8


 


Cardiac Index                  3.3

















- Exam





GENERAL EXAM: Patient is intubated and sedated


HEENT: Normocephalic.


NECK: No masses, no nuchal rigidity.


CHEST: No chest wall deformity.


LUNGS: Breath sounds at bases


HEART: S1 and S2 normal with no audible mumurs or gallops. Regular rhythm, 

femorals equal on both sides..


ABDOMEN: No hepatosplenomegaly, normal bowel sounds, no guarding or rigidity.


SKIN: No rashes


CENTRAL NERVOUS SYSTEM:  Sedated


EXTREMITIES: No cyanosis, clubbing or edema.





- Labs


CBC & Chem 7: 


 04/18/17 04:00





 04/18/17 04:00


Labs: 


 Abnormal Lab Results - Last 24 Hours (Table)











  04/17/17 04/18/17 04/18/17 Range/Units





  22:37 00:27 02:27 


 


WBC     (3.8-10.6)  k/uL


 


RBC     (4.30-5.90)  m/uL


 


Hgb     (13.0-17.5)  gm/dL


 


Hct     (39.0-53.0)  %


 


MCV     (80.0-100.0)  fL


 


Neutrophils #     (1.3-7.7)  k/uL


 


ABG pO2     ()  mmHg


 


ABG HCO3     (21-25)  mmol/L


 


ABG Total CO2     (19-24)  mmol/L


 


BUN     (9-20)  mg/dL


 


Glucose     (74-99)  mg/dL


 


POC Glucose (mg/dL)  131 H  153 H  148 H  (75-99)  mg/dL


 


Magnesium     (1.6-2.3)  mg/dL


 


Total Protein     (6.3-8.2)  g/dL


 


Albumin     (3.5-5.0)  g/dL














  04/18/17 04/18/17 04/18/17 Range/Units





  03:59 04:00 04:00 


 


WBC    10.9 H  (3.8-10.6)  k/uL


 


RBC    3.19 L  (4.30-5.90)  m/uL


 


Hgb    10.3 L  (13.0-17.5)  gm/dL


 


Hct    32.0 L  (39.0-53.0)  %


 


MCV    100.4 H  (80.0-100.0)  fL


 


Neutrophils #    8.6 H  (1.3-7.7)  k/uL


 


ABG pO2     ()  mmHg


 


ABG HCO3     (21-25)  mmol/L


 


ABG Total CO2     (19-24)  mmol/L


 


BUN   31 H   (9-20)  mg/dL


 


Glucose   170 H   (74-99)  mg/dL


 


POC Glucose (mg/dL)  178 H    (75-99)  mg/dL


 


Magnesium   2.5 H   (1.6-2.3)  mg/dL


 


Total Protein   5.6 L   (6.3-8.2)  g/dL


 


Albumin   2.9 L   (3.5-5.0)  g/dL














  04/18/17 04/18/17 04/18/17 Range/Units





  05:01 07:37 10:24 


 


WBC     (3.8-10.6)  k/uL


 


RBC     (4.30-5.90)  m/uL


 


Hgb     (13.0-17.5)  gm/dL


 


Hct     (39.0-53.0)  %


 


MCV     (80.0-100.0)  fL


 


Neutrophils #     (1.3-7.7)  k/uL


 


ABG pO2     ()  mmHg


 


ABG HCO3  28 H    (21-25)  mmol/L


 


ABG Total CO2  29 H    (19-24)  mmol/L


 


BUN     (9-20)  mg/dL


 


Glucose     (74-99)  mg/dL


 


POC Glucose (mg/dL)   166 H  148 H  (75-99)  mg/dL


 


Magnesium     (1.6-2.3)  mg/dL


 


Total Protein     (6.3-8.2)  g/dL


 


Albumin     (3.5-5.0)  g/dL














  04/18/17 04/18/17 04/18/17 Range/Units





  10:56 12:44 14:14 


 


WBC     (3.8-10.6)  k/uL


 


RBC     (4.30-5.90)  m/uL


 


Hgb     (13.0-17.5)  gm/dL


 


Hct     (39.0-53.0)  %


 


MCV     (80.0-100.0)  fL


 


Neutrophils #     (1.3-7.7)  k/uL


 


ABG pO2  77 L    ()  mmHg


 


ABG HCO3  26 H    (21-25)  mmol/L


 


ABG Total CO2  27 H    (19-24)  mmol/L


 


BUN     (9-20)  mg/dL


 


Glucose     (74-99)  mg/dL


 


POC Glucose (mg/dL)   141 H  149 H  (75-99)  mg/dL


 


Magnesium     (1.6-2.3)  mg/dL


 


Total Protein     (6.3-8.2)  g/dL


 


Albumin     (3.5-5.0)  g/dL














  04/18/17 04/18/17 04/18/17 Range/Units





  16:08 18:10 20:12 


 


WBC     (3.8-10.6)  k/uL


 


RBC     (4.30-5.90)  m/uL


 


Hgb     (13.0-17.5)  gm/dL


 


Hct     (39.0-53.0)  %


 


MCV     (80.0-100.0)  fL


 


Neutrophils #     (1.3-7.7)  k/uL


 


ABG pO2     ()  mmHg


 


ABG HCO3     (21-25)  mmol/L


 


ABG Total CO2     (19-24)  mmol/L


 


BUN     (9-20)  mg/dL


 


Glucose     (74-99)  mg/dL


 


POC Glucose (mg/dL)  156 H  135 H  141 H  (75-99)  mg/dL


 


Magnesium     (1.6-2.3)  mg/dL


 


Total Protein     (6.3-8.2)  g/dL


 


Albumin     (3.5-5.0)  g/dL














Assessment and Plan


(1) Status post coronary artery bypass graft


Status: Acute   





(2) Family history of heart disease


Status: Acute   





(3) Hyperlipidemia


Status: Acute   





(4) Hypertension


Status: Acute   





(5) Nicotine dependence, chewing tobacco, uncomplicated


Status: Acute   


Plan: 


Patient has respiratory failure and difficulty weaning off.  Vital signs are 

otherwise stable and kidney functions remained stable.  Respirator management 

as per the pulmonary.  Further recommendations depend upon the clinical course.

  Patient may need a tracheostomy.

## 2017-04-18 NOTE — P.PN
Subjective





Date of service 04/17/2017.





Progress note being dictated for Dr. Griffin.











Interval history: This is a 61-year-old gentleman status post CABG, status post 

bronchoscopy related to significant mucus plugging, failure to wean, suspected 

EtOH abuse with early DTs and multiple other medical issues.  Preliminary 

Bronchoscopy cultures negative.Unsuccessful prior weaning trials as patient 

becomes significantly hypertensive, tachycardic. Vent dependent, maintained on 

FiO2 of 60%/5 of PEEP.  Continues on nebulized bronchodilators, IV steroids 

.Chest x-ray reporting mild pulmonary vascular congestion, small left pleural 

effusion, bibasilar atelectasis .Receiving vital HP at goal of 50ml/

hr.Maintained on insulin, Diprovan drips and CIWA protocol.  Brain CT nonacute.





Objective





- Vital Signs


Vital signs: 


 Vital Signs











Temp  98.6 F   04/17/17 16:00


 


Pulse  73   04/17/17 18:00


 


Resp  19   04/17/17 18:00


 


BP  119/68   04/17/17 18:00


 


Pulse Ox  96   04/17/17 18:00








 Intake & Output











 04/16/17 04/17/17 04/17/17





 18:59 06:59 18:59


 


Intake Total 7074.494 5474.154 1055.891


 


Output Total 396 506 520


 


Balance 762.628 927.154 535.891


 


Weight  97.4 kg 97.4 kg


 


Intake:   


 


  Intake, IV Titration 538.628 553.154 415.891





  Amount   


 


    Insulin Regular 100 unit 21.378 10.317 3.167





    In Sodium Chloride 0.9%   





    100 ml @ Per Protocol IV   





    .Q0M ELIER Rx#:[id number]   


 


    Lactated Ringers 1,000 ml 320 260 230





    @ 20 mls/hr IV .Q24H ELIER   





    Rx#:[id number]   


 


    Propofol 1,000 mg In   82.724





    Empty Bag 1 bag @ Titrate   





    IV .Q0M ELIER Rx#:   





    [id number]   


 


    Propofol 500 mg In Empty 197.25 282.837 





    Bag 1 bag @ Titrate IV .   





    Q0M ELIER Rx#:[id number]   


 


    Propofol 500 mg In Empty   100.000





    Bag 1 bag @ Titrate IV .   





    Q0M ELIER Rx#:[id number]   


 


  Tube Feeding 560 790 550


 


  Other 60 90 90


 


Output:   


 


  Chest Tube Drainage 4  


 


    Left Lateral Chest 4  


 


  Urine 392 506 520


 


Other:   


 


  Voiding Method Indwelling Catheter Indwelling Catheter Indwelling Catheter


 


  # Bowel Movements 1 1 








 ABP, PAP, CO, CI - Last Documented











Arterial Blood Pressure        148/59


 


Pulmonary Artery Pressure      46/23


 


Cardiac Output                 6.8


 


Cardiac Index                  3.3

















- Exam


PHYSICAL EXAM:


VITAL SIGNS: As above


GENERAL: [Sedated, intubated 


HEENT: [Pupils equal, conjunctiva normal.]


NECK: [Supple, no JVD]


RESPIRATORY EFFORT:[Normal]


LUNGS:  [Diminished throughout, bibasilar crackles]


CARDIOVASCULAR[regular S1 and S2, no murmurs rubs or gallops, mild edema]


GI: [Abdomen soft, nontender, positive bowel sounds.]


PSYCH: [Alert and oriented -3, mood and affect normal.]


NEURO: Unable to assess, patient sedated on Diprovan and vent dependent





 Microbiology





04/13/17 16:40   Lung Aspirate - Right   Gram Stain - Final


04/13/17 16:40   Lung Aspirate - Right   Bronchial Washings Culture - Final


04/13/17 16:40   Lung - Right   Acid Fast Bacilli Smear - Final


04/13/17 16:40   Lung - Right   Acid Fast Bacilli Culture - Preliminary


04/13/17 16:40   Lung - Right   Fungal Culture - Preliminary











- Labs


CBC & Chem 7: 


 04/18/17 04:00





 04/18/17 04:00


Labs: 


 Abnormal Lab Results - Last 24 Hours (Table)











  04/13/17 04/13/17 04/13/17 Range/Units





  08:53 10:35 11:13 


 


WBC     (3.8-10.6)  k/uL


 


RBC     (4.30-5.90)  m/uL


 


Hgb     (13.0-17.5)  gm/dL


 


Hct     (39.0-53.0)  %


 


Neutrophils #     (1.3-7.7)  k/uL


 


ABG pH  7.46 H  7.49 H  7.47 H  (7.35-7.45)  


 


ABG pCO2   32 L  30 L  (35-45)  mmHg


 


ABG pO2  149 H   149 H  ()  mmHg


 


ABG Total CO2   25 H   (19-24)  mmol/L


 


ABG O2 Saturation   98.7 H  99.5 H  (94-97)  %


 


ABG Hematocrit    31 L  (34.0-46.0)  %


 


BUN     (9-20)  mg/dL


 


Glucose     (74-99)  mg/dL


 


POC Glucose (mg/dL)     (75-99)  mg/dL


 


Magnesium     (1.6-2.3)  mg/dL


 


Total Protein     (6.3-8.2)  g/dL


 


Albumin     (3.5-5.0)  g/dL














  04/13/17 04/13/17 04/16/17 Range/Units





  11:59 12:52 19:54 


 


WBC     (3.8-10.6)  k/uL


 


RBC     (4.30-5.90)  m/uL


 


Hgb     (13.0-17.5)  gm/dL


 


Hct     (39.0-53.0)  %


 


Neutrophils #     (1.3-7.7)  k/uL


 


ABG pH  7.46 H    (7.35-7.45)  


 


ABG pCO2  34 L    (35-45)  mmHg


 


ABG pO2   142 H   ()  mmHg


 


ABG Total CO2  25 H  25 H   (19-24)  mmol/L


 


ABG O2 Saturation  97.9 H  99.2 H   (94-97)  %


 


ABG Hematocrit  31 L  30 L   (34.0-46.0)  %


 


BUN     (9-20)  mg/dL


 


Glucose     (74-99)  mg/dL


 


POC Glucose (mg/dL)    133 H  (75-99)  mg/dL


 


Magnesium     (1.6-2.3)  mg/dL


 


Total Protein     (6.3-8.2)  g/dL


 


Albumin     (3.5-5.0)  g/dL














  04/16/17 04/17/17 04/17/17 Range/Units





  21:53 00:48 02:04 


 


WBC     (3.8-10.6)  k/uL


 


RBC     (4.30-5.90)  m/uL


 


Hgb     (13.0-17.5)  gm/dL


 


Hct     (39.0-53.0)  %


 


Neutrophils #     (1.3-7.7)  k/uL


 


ABG pH     (7.35-7.45)  


 


ABG pCO2     (35-45)  mmHg


 


ABG pO2     ()  mmHg


 


ABG Total CO2     (19-24)  mmol/L


 


ABG O2 Saturation     (94-97)  %


 


ABG Hematocrit     (34.0-46.0)  %


 


BUN     (9-20)  mg/dL


 


Glucose     (74-99)  mg/dL


 


POC Glucose (mg/dL)  151 H  133 H  181 H  (75-99)  mg/dL


 


Magnesium     (1.6-2.3)  mg/dL


 


Total Protein     (6.3-8.2)  g/dL


 


Albumin     (3.5-5.0)  g/dL














  04/17/17 04/17/17 04/17/17 Range/Units





  04:32 06:01 06:50 


 


WBC    12.9 H  (3.8-10.6)  k/uL


 


RBC    3.30 L  (4.30-5.90)  m/uL


 


Hgb    11.3 L  (13.0-17.5)  gm/dL


 


Hct    32.4 L  (39.0-53.0)  %


 


Neutrophils #    10.9 H  (1.3-7.7)  k/uL


 


ABG pH     (7.35-7.45)  


 


ABG pCO2     (35-45)  mmHg


 


ABG pO2     ()  mmHg


 


ABG Total CO2     (19-24)  mmol/L


 


ABG O2 Saturation     (94-97)  %


 


ABG Hematocrit     (34.0-46.0)  %


 


BUN     (9-20)  mg/dL


 


Glucose     (74-99)  mg/dL


 


POC Glucose (mg/dL)  152 H  160 H   (75-99)  mg/dL


 


Magnesium     (1.6-2.3)  mg/dL


 


Total Protein     (6.3-8.2)  g/dL


 


Albumin     (3.5-5.0)  g/dL














  04/17/17 04/17/17 04/17/17 Range/Units





  06:50 07:32 07:33 


 


WBC     (3.8-10.6)  k/uL


 


RBC     (4.30-5.90)  m/uL


 


Hgb     (13.0-17.5)  gm/dL


 


Hct     (39.0-53.0)  %


 


Neutrophils #     (1.3-7.7)  k/uL


 


ABG pH   7.48 H   (7.35-7.45)  


 


ABG pCO2   34 L   (35-45)  mmHg


 


ABG pO2     ()  mmHg


 


ABG Total CO2   26 H   (19-24)  mmol/L


 


ABG O2 Saturation     (94-97)  %


 


ABG Hematocrit     (34.0-46.0)  %


 


BUN  26 H    (9-20)  mg/dL


 


Glucose  160 H    (74-99)  mg/dL


 


POC Glucose (mg/dL)    149 H  (75-99)  mg/dL


 


Magnesium  2.6 H    (1.6-2.3)  mg/dL


 


Total Protein  5.7 L    (6.3-8.2)  g/dL


 


Albumin  2.9 L    (3.5-5.0)  g/dL














  04/17/17 04/17/17 04/17/17 Range/Units





  09:46 11:35 13:57 


 


WBC     (3.8-10.6)  k/uL


 


RBC     (4.30-5.90)  m/uL


 


Hgb     (13.0-17.5)  gm/dL


 


Hct     (39.0-53.0)  %


 


Neutrophils #     (1.3-7.7)  k/uL


 


ABG pH     (7.35-7.45)  


 


ABG pCO2     (35-45)  mmHg


 


ABG pO2     ()  mmHg


 


ABG Total CO2     (19-24)  mmol/L


 


ABG O2 Saturation     (94-97)  %


 


ABG Hematocrit     (34.0-46.0)  %


 


BUN     (9-20)  mg/dL


 


Glucose     (74-99)  mg/dL


 


POC Glucose (mg/dL)  158 H  129 H  150 H  (75-99)  mg/dL


 


Magnesium     (1.6-2.3)  mg/dL


 


Total Protein     (6.3-8.2)  g/dL


 


Albumin     (3.5-5.0)  g/dL














  04/17/17 04/17/17 Range/Units





  15:34 18:09 


 


WBC    (3.8-10.6)  k/uL


 


RBC    (4.30-5.90)  m/uL


 


Hgb    (13.0-17.5)  gm/dL


 


Hct    (39.0-53.0)  %


 


Neutrophils #    (1.3-7.7)  k/uL


 


ABG pH    (7.35-7.45)  


 


ABG pCO2    (35-45)  mmHg


 


ABG pO2    ()  mmHg


 


ABG Total CO2    (19-24)  mmol/L


 


ABG O2 Saturation    (94-97)  %


 


ABG Hematocrit    (34.0-46.0)  %


 


BUN    (9-20)  mg/dL


 


Glucose    (74-99)  mg/dL


 


POC Glucose (mg/dL)  126 H  124 H  (75-99)  mg/dL


 


Magnesium    (1.6-2.3)  mg/dL


 


Total Protein    (6.3-8.2)  g/dL


 


Albumin    (3.5-5.0)  g/dL














Assessment and Plan


Plan: 


1.  CAD, [Status post CABG].


2. [Acute hypoxic respiratory failure secondary to the above].


3. [Exacerbation COPD].


4. [Possible early DTs, on CIWA protocol].


5.  Right upper and lower lobe collapse secondary to mucus plugging status post 

bronchoscopy].


6. [Hypertension].


7. [].














Plan: Continue on current medication regime ,monitoring and symptomatic 

treatment.  Maintain CIWA protocol. Weaning trials as per pulmonary/

intensivist.  Tube feeds at goal, maintain insulin drip.  GI and DVT 

prophylaxis in place.  Further recommendations to follow.








The impression and plan of care has been dictated as directed.





:


I performed a H&P examination of this patient and discussed the same with the 

dictator.  I agree with the dictator's note.  Any additional findings/opinions/

etc. will be noted.

## 2017-04-18 NOTE — P.PN
Subjective





Progress note dated 04/18/2017





This is a 61-year-old male who is status post bypass grafting.  He is postop 

day #5.  He remains intubated on mechanical ventilator.  His vent settings 

include the assist control mode rate is 16 to be increased to 20 a tidal [place] 

of 500 be decreased to 450 and FiO2 of 90% and a PEEP of 5 which will be 

increased to 10.  His arterial blood gases on those settings show a pO2 of only 

84 pCO2 of 45 and appears to some 0.40.  This was on 90%.  He is getting 

lactated Ringer's at 20 mL an hour the Diprovan at 25 mics per kilogram per 

minute and insulin drip at 1 unit per hour and vital HP 50 with a goal of 50.  

Chest x-rays essentially unchanged.  He does have a small left pleural effusion.





Objective





- Vital Signs


Vital signs: 


 Vital Signs











Temp  98.9 F   04/18/17 09:00


 


Pulse  71   04/18/17 09:00


 


Resp  20   04/18/17 09:00


 


BP  117/65   04/18/17 06:00


 


Pulse Ox  93 L  04/18/17 09:00








 Intake & Output











 04/17/17 04/18/17 04/18/17





 18:59 06:59 18:59


 


Intake Total 6432.794 5165.272 379.934


 


Output Total 520 600 180


 


Balance 535.891 689.272 199.934


 


Weight 97.4 kg 97.3 kg 


 


Intake:   


 


  Intake, IV Titration 415.891 499.272 99.934





  Amount   


 


    Insulin Regular 100 unit 3.167  





    In Sodium Chloride 0.9%   





    100 ml @ Per Protocol IV   





    .Q0M ELIER Rx#:[id number]   


 


    Lactated Ringers 1,000 ml 230 240 60





    @ 20 mls/hr IV .Q24H ELIER   





    Rx#:[id number]   


 


    Propofol 1,000 mg In 82.724  





    Empty Bag 1 bag @ Titrate   





    IV .Q0M ELIER Rx#:   





    [id number]   


 


    Propofol 500 mg In Empty 100.000 259.272 39.934





    Bag 1 bag @ Titrate IV .   





    Q0M ELIER Rx#:[id number]   


 


  Tube Feeding 550 700 250


 


  Other 90 90 30


 


Output:   


 


  Urine 520 600 180


 


Other:   


 


  Voiding Method Indwelling Catheter Indwelling Catheter Indwelling Catheter








 ABP, PAP, CO, CI - Last Documented











Arterial Blood Pressure        125/49


 


Pulmonary Artery Pressure      46/23


 


Cardiac Output                 6.8


 


Cardiac Index                  3.3

















- Exam





Now no acute distress.  The patient remains intubated and sedated.





HEENT examination is grossly unremarkable.  Mucous members are moist.  

Endotracheal and NG tube noted.





Neck supple.  Full range of motion.  No adenopathy or thyromegaly.  Neck veins 

are flat.





Cardiovascular examination reveals regular rhythm rate.  S1-S2 normal.  No 

distinct murmur noted.





Lungs reveal diminished breath sounds throughout.  A few scattered crackles.  A 

few rhonchi are noted.





Abdomen soft bowel sounds are heard.





Extremities are intact.  There is mild edema.





Skin without rash.





Neurologic examination cannot be adequately performed.





- Labs


CBC & Chem 7: 


 04/18/17 04:00





 04/18/17 04:00


Labs: 


 Abnormal Lab Results - Last 24 Hours (Table)











  04/13/17 04/13/17 04/13/17 Range/Units





  08:53 10:35 11:13 


 


WBC     (3.8-10.6)  k/uL


 


RBC     (4.30-5.90)  m/uL


 


Hgb     (13.0-17.5)  gm/dL


 


Hct     (39.0-53.0)  %


 


MCV     (80.0-100.0)  fL


 


Neutrophils #     (1.3-7.7)  k/uL


 


ABG pH  7.46 H  7.49 H  7.47 H  (7.35-7.45)  


 


ABG pCO2   32 L  30 L  (35-45)  mmHg


 


ABG pO2  149 H   149 H  ()  mmHg


 


ABG HCO3     (21-25)  mmol/L


 


ABG Total CO2   25 H   (19-24)  mmol/L


 


ABG O2 Saturation   98.7 H  99.5 H  (94-97)  %


 


ABG Hematocrit    31 L  (34.0-46.0)  %


 


BUN     (9-20)  mg/dL


 


Glucose     (74-99)  mg/dL


 


POC Glucose (mg/dL)     (75-99)  mg/dL


 


Magnesium     (1.6-2.3)  mg/dL


 


Total Protein     (6.3-8.2)  g/dL


 


Albumin     (3.5-5.0)  g/dL














  04/13/17 04/13/17 04/17/17 Range/Units





  11:59 12:52 09:46 


 


WBC     (3.8-10.6)  k/uL


 


RBC     (4.30-5.90)  m/uL


 


Hgb     (13.0-17.5)  gm/dL


 


Hct     (39.0-53.0)  %


 


MCV     (80.0-100.0)  fL


 


Neutrophils #     (1.3-7.7)  k/uL


 


ABG pH  7.46 H    (7.35-7.45)  


 


ABG pCO2  34 L    (35-45)  mmHg


 


ABG pO2   142 H   ()  mmHg


 


ABG HCO3     (21-25)  mmol/L


 


ABG Total CO2  25 H  25 H   (19-24)  mmol/L


 


ABG O2 Saturation  97.9 H  99.2 H   (94-97)  %


 


ABG Hematocrit  31 L  30 L   (34.0-46.0)  %


 


BUN     (9-20)  mg/dL


 


Glucose     (74-99)  mg/dL


 


POC Glucose (mg/dL)    158 H  (75-99)  mg/dL


 


Magnesium     (1.6-2.3)  mg/dL


 


Total Protein     (6.3-8.2)  g/dL


 


Albumin     (3.5-5.0)  g/dL














  04/17/17 04/17/17 04/17/17 Range/Units





  11:35 13:57 15:34 


 


WBC     (3.8-10.6)  k/uL


 


RBC     (4.30-5.90)  m/uL


 


Hgb     (13.0-17.5)  gm/dL


 


Hct     (39.0-53.0)  %


 


MCV     (80.0-100.0)  fL


 


Neutrophils #     (1.3-7.7)  k/uL


 


ABG pH     (7.35-7.45)  


 


ABG pCO2     (35-45)  mmHg


 


ABG pO2     ()  mmHg


 


ABG HCO3     (21-25)  mmol/L


 


ABG Total CO2     (19-24)  mmol/L


 


ABG O2 Saturation     (94-97)  %


 


ABG Hematocrit     (34.0-46.0)  %


 


BUN     (9-20)  mg/dL


 


Glucose     (74-99)  mg/dL


 


POC Glucose (mg/dL)  129 H  150 H  126 H  (75-99)  mg/dL


 


Magnesium     (1.6-2.3)  mg/dL


 


Total Protein     (6.3-8.2)  g/dL


 


Albumin     (3.5-5.0)  g/dL














  04/17/17 04/17/17 04/17/17 Range/Units





  18:09 20:25 22:37 


 


WBC     (3.8-10.6)  k/uL


 


RBC     (4.30-5.90)  m/uL


 


Hgb     (13.0-17.5)  gm/dL


 


Hct     (39.0-53.0)  %


 


MCV     (80.0-100.0)  fL


 


Neutrophils #     (1.3-7.7)  k/uL


 


ABG pH     (7.35-7.45)  


 


ABG pCO2     (35-45)  mmHg


 


ABG pO2     ()  mmHg


 


ABG HCO3     (21-25)  mmol/L


 


ABG Total CO2     (19-24)  mmol/L


 


ABG O2 Saturation     (94-97)  %


 


ABG Hematocrit     (34.0-46.0)  %


 


BUN     (9-20)  mg/dL


 


Glucose     (74-99)  mg/dL


 


POC Glucose (mg/dL)  124 H  143 H  131 H  (75-99)  mg/dL


 


Magnesium     (1.6-2.3)  mg/dL


 


Total Protein     (6.3-8.2)  g/dL


 


Albumin     (3.5-5.0)  g/dL














  04/18/17 04/18/17 04/18/17 Range/Units





  00:27 02:27 03:59 


 


WBC     (3.8-10.6)  k/uL


 


RBC     (4.30-5.90)  m/uL


 


Hgb     (13.0-17.5)  gm/dL


 


Hct     (39.0-53.0)  %


 


MCV     (80.0-100.0)  fL


 


Neutrophils #     (1.3-7.7)  k/uL


 


ABG pH     (7.35-7.45)  


 


ABG pCO2     (35-45)  mmHg


 


ABG pO2     ()  mmHg


 


ABG HCO3     (21-25)  mmol/L


 


ABG Total CO2     (19-24)  mmol/L


 


ABG O2 Saturation     (94-97)  %


 


ABG Hematocrit     (34.0-46.0)  %


 


BUN     (9-20)  mg/dL


 


Glucose     (74-99)  mg/dL


 


POC Glucose (mg/dL)  153 H  148 H  178 H  (75-99)  mg/dL


 


Magnesium     (1.6-2.3)  mg/dL


 


Total Protein     (6.3-8.2)  g/dL


 


Albumin     (3.5-5.0)  g/dL














  04/18/17 04/18/17 04/18/17 Range/Units





  04:00 04:00 05:01 


 


WBC   10.9 H   (3.8-10.6)  k/uL


 


RBC   3.19 L   (4.30-5.90)  m/uL


 


Hgb   10.3 L   (13.0-17.5)  gm/dL


 


Hct   32.0 L   (39.0-53.0)  %


 


MCV   100.4 H   (80.0-100.0)  fL


 


Neutrophils #   8.6 H   (1.3-7.7)  k/uL


 


ABG pH     (7.35-7.45)  


 


ABG pCO2     (35-45)  mmHg


 


ABG pO2     ()  mmHg


 


ABG HCO3    28 H  (21-25)  mmol/L


 


ABG Total CO2    29 H  (19-24)  mmol/L


 


ABG O2 Saturation     (94-97)  %


 


ABG Hematocrit     (34.0-46.0)  %


 


BUN  31 H    (9-20)  mg/dL


 


Glucose  170 H    (74-99)  mg/dL


 


POC Glucose (mg/dL)     (75-99)  mg/dL


 


Magnesium  2.5 H    (1.6-2.3)  mg/dL


 


Total Protein  5.6 L    (6.3-8.2)  g/dL


 


Albumin  2.9 L    (3.5-5.0)  g/dL














  04/18/17 Range/Units





  07:37 


 


WBC   (3.8-10.6)  k/uL


 


RBC   (4.30-5.90)  m/uL


 


Hgb   (13.0-17.5)  gm/dL


 


Hct   (39.0-53.0)  %


 


MCV   (80.0-100.0)  fL


 


Neutrophils #   (1.3-7.7)  k/uL


 


ABG pH   (7.35-7.45)  


 


ABG pCO2   (35-45)  mmHg


 


ABG pO2   ()  mmHg


 


ABG HCO3   (21-25)  mmol/L


 


ABG Total CO2   (19-24)  mmol/L


 


ABG O2 Saturation   (94-97)  %


 


ABG Hematocrit   (34.0-46.0)  %


 


BUN   (9-20)  mg/dL


 


Glucose   (74-99)  mg/dL


 


POC Glucose (mg/dL)  166 H  (75-99)  mg/dL


 


Magnesium   (1.6-2.3)  mg/dL


 


Total Protein   (6.3-8.2)  g/dL


 


Albumin   (3.5-5.0)  g/dL














Assessment and Plan


(1) Postoperative respiratory failure


Status: Acute   





(2) Status post coronary artery bypass graft


Status: Acute   





(3) Coronary artery disease


Status: Acute   





(4) History of PTCA


Status: Acute   





(5) Hyperlipidemia


Status: Acute   





(6) Hypertension


Status: Acute   





(7) Nicotine dependence, chewing tobacco, uncomplicated


Status: Acute   


Plan: 





Plan dated 04/18/2017





The patient's vent will be changed a bit.  We'll bump his rate up from 16-20.  

We'll decrease the tidal volume from 500 to 450.  We'll increase the PEEP of 5-

10.  I've asked respiratory to try to titrate down the FiO2.  Hopefully this 

will happen once we increase the PEEP from 5-10.  We'll continue to follow 

closely.  X-rays labs medications are all reviewed.  Prognosis is guarded.


Time with Patient: Greater than 30

## 2017-04-18 NOTE — XR
EXAMINATION TYPE: XR chest 1V portable

 

DATE OF EXAM: 4/18/2017 6:40 AM

 

COMPARISON: 4/17/2017

 

INDICATION: Endotracheal tube adjustment

 

TECHNIQUE: Single frontal view of the chest is obtained.

 

FINDINGS:  

The heart size is mildly prominent.  

The pulmonary vasculature is normal.  

Mild left lower lobe infiltrate is present. Small left pleural effusion is not excluded.  

 

 

IMPRESSION:  

1. Clinical correlation recommended for small left pleural effusion.

2. Endotracheal tube tip located 3 cm above the anderson.

## 2017-04-18 NOTE — PN
DATE OF SERVICE: 04/18/2017



This 61-year-old gentleman who admitted with CAD, CABG, also had 

multiple medical problems, including acute respiratory failure. The 

patient was in DTs. The patient is on mechanical ventilation. The FiO2 

is 90%. Seen and evaluated the patient along with the nurse 

practitioner. Please refer to the nurse practitioner's notes and 

impressions documented as a scribe for further information. Further 

recommendations to follow. Discussed with staff.

## 2017-04-18 NOTE — P.PN
<Makayla Enamorado - Last Filed: 04/18/17 08:42>





Subjective


Principal diagnosis: 





Coronary artery disease, status post prior stenting to his right coronary 

artery.  Preserved left ventricular function.  Hyperlipidemia.  Hypertension.  

ETOH abuse.





POD #5 total arterial non-aortic patch off-pump double coronary artery bypass 

grafting using in situ skeletonized right internal mammary artery to the left 

anterior descending artery across the anterior midline in situ totally 

skeletonized left internal mammary artery to the first obtuse marginal artery.  

Intraoperative transesophageal echocardiogram and epi-aortic scanning.  

Intraoperative graft flow measurements using the Medistim system





Patient had postoperative mucous plugging with increasing oxygen demand 

requiring bronchoscopy the night of surgery.  Bronchial washings negative for 

bacteria/viruses to date.





Pt developed postoperative alcohol withdrawal requiring increased sedation and 

continued mechanical ventilation.





Patient remains sedated on mechanical ventilation.  Trial wean yesterday 

resulted in pt having tachypnea and agitation, wean terminated quickly. Had 

increased oxygen demands last night, placed on 100% FiO2 per Dr. Munoz with 

orders to wean down. Night nurse decreased propofol and utilized ativan per 

[facility] protocol.





Objective





- Vital Signs


Vital signs: 


 Vital Signs











Temp  98.3 F   04/18/17 04:00


 


Pulse  73   04/18/17 08:16


 


Resp  20   04/18/17 08:00


 


BP  117/65   04/18/17 06:00


 


Pulse Ox  99   04/18/17 08:00








 Intake & Output











 04/17/17 04/18/17 04/18/17





 18:59 06:59 18:59


 


Intake Total 3395.249 9235.272 259.934


 


Output Total 520 600 90


 


Balance 535.891 689.272 169.934


 


Weight 97.4 kg 97.3 kg 


 


Intake:   


 


  Intake, IV Titration 415.891 499.272 79.934





  Amount   


 


    Insulin Regular 100 unit 3.167  





    In Sodium Chloride 0.9%   





    100 ml @ Per Protocol IV   





    .Q0M ELIER Rx#:[id number]   


 


    Lactated Ringers 1,000 ml 230 240 40





    @ 20 mls/hr IV .Q24H ELIER   





    Rx#:[id number]   


 


    Propofol 1,000 mg In 82.724  





    Empty Bag 1 bag @ Titrate   





    IV .Q0M ELIER Rx#:   





    [id number]   


 


    Propofol 500 mg In Empty 100.000 259.272 39.934





    Bag 1 bag @ Titrate IV .   





    Q0M ELIER Rx#:[id number]   


 


  Tube Feeding 550 700 150


 


  Other 90 90 30


 


Output:   


 


  Urine 520 600 90


 


Other:   


 


  Voiding Method Indwelling Catheter Indwelling Catheter Indwelling Catheter








 ABP, PAP, CO, CI - Last Documented











Arterial Blood Pressure        133/57


 


Pulmonary Artery Pressure      46/23


 


Cardiac Output                 6.8


 


Cardiac Index                  3.3

















- Constitutional


General appearance: Present: no acute distress





- Respiratory


Details: 





Lung sounds diminished with ins/exp wheezes present.  Resp even/non-labored on 

mechanical ventilation.  Current settings , FiO2 90%, RR 20, PEEP 10.  





- Cardiovascular


Details: 





S1/S2 present.  Reg rate/rhythm, NSR on telemetry, no events noted overnight.  

Sternum stable. Heart hugger/teds/scds present.  No edema present. 





- Gastrointestinal


Gastrointestinal Comment(s): 





Abd soft/NT/ND.  Active BS x 4 quad.  Vital TF @ 50 ml/hr through OGT.





- Genitourinary


Genitourinary Comment(s): 





Angelo present draining clear, yellow urine.  Output 45-60 ml/hr.





- Integumentary


Integumentary Comment(s): 





Ant chest incision covered with dry/intact silver dressing.  





- Psychiatric


Psychiatric Comment(s): 





Sedated on mechanical ventilation.





- Allied health notes


Allied health notes reviewed: nursing





- Labs


CBC & Chem 7: 


 04/18/17 04:00





 04/18/17 04:00


Labs: 


 Abnormal Lab Results - Last 24 Hours (Table)











  04/13/17 04/13/17 04/13/17 Range/Units





  08:53 10:35 11:13 


 


WBC     (3.8-10.6)  k/uL


 


RBC     (4.30-5.90)  m/uL


 


Hgb     (13.0-17.5)  gm/dL


 


Hct     (39.0-53.0)  %


 


MCV     (80.0-100.0)  fL


 


Neutrophils #     (1.3-7.7)  k/uL


 


ABG pH  7.46 H  7.49 H  7.47 H  (7.35-7.45)  


 


ABG pCO2   32 L  30 L  (35-45)  mmHg


 


ABG pO2  149 H   149 H  ()  mmHg


 


ABG HCO3     (21-25)  mmol/L


 


ABG Total CO2   25 H   (19-24)  mmol/L


 


ABG O2 Saturation   98.7 H  99.5 H  (94-97)  %


 


ABG Hematocrit    31 L  (34.0-46.0)  %


 


BUN     (9-20)  mg/dL


 


Glucose     (74-99)  mg/dL


 


POC Glucose (mg/dL)     (75-99)  mg/dL


 


Magnesium     (1.6-2.3)  mg/dL


 


Total Protein     (6.3-8.2)  g/dL


 


Albumin     (3.5-5.0)  g/dL














  04/13/17 04/13/17 04/17/17 Range/Units





  11:59 12:52 09:46 


 


WBC     (3.8-10.6)  k/uL


 


RBC     (4.30-5.90)  m/uL


 


Hgb     (13.0-17.5)  gm/dL


 


Hct     (39.0-53.0)  %


 


MCV     (80.0-100.0)  fL


 


Neutrophils #     (1.3-7.7)  k/uL


 


ABG pH  7.46 H    (7.35-7.45)  


 


ABG pCO2  34 L    (35-45)  mmHg


 


ABG pO2   142 H   ()  mmHg


 


ABG HCO3     (21-25)  mmol/L


 


ABG Total CO2  25 H  25 H   (19-24)  mmol/L


 


ABG O2 Saturation  97.9 H  99.2 H   (94-97)  %


 


ABG Hematocrit  31 L  30 L   (34.0-46.0)  %


 


BUN     (9-20)  mg/dL


 


Glucose     (74-99)  mg/dL


 


POC Glucose (mg/dL)    158 H  (75-99)  mg/dL


 


Magnesium     (1.6-2.3)  mg/dL


 


Total Protein     (6.3-8.2)  g/dL


 


Albumin     (3.5-5.0)  g/dL














  04/17/17 04/17/17 04/17/17 Range/Units





  11:35 13:57 15:34 


 


WBC     (3.8-10.6)  k/uL


 


RBC     (4.30-5.90)  m/uL


 


Hgb     (13.0-17.5)  gm/dL


 


Hct     (39.0-53.0)  %


 


MCV     (80.0-100.0)  fL


 


Neutrophils #     (1.3-7.7)  k/uL


 


ABG pH     (7.35-7.45)  


 


ABG pCO2     (35-45)  mmHg


 


ABG pO2     ()  mmHg


 


ABG HCO3     (21-25)  mmol/L


 


ABG Total CO2     (19-24)  mmol/L


 


ABG O2 Saturation     (94-97)  %


 


ABG Hematocrit     (34.0-46.0)  %


 


BUN     (9-20)  mg/dL


 


Glucose     (74-99)  mg/dL


 


POC Glucose (mg/dL)  129 H  150 H  126 H  (75-99)  mg/dL


 


Magnesium     (1.6-2.3)  mg/dL


 


Total Protein     (6.3-8.2)  g/dL


 


Albumin     (3.5-5.0)  g/dL














  04/17/17 04/17/17 04/17/17 Range/Units





  18:09 20:25 22:37 


 


WBC     (3.8-10.6)  k/uL


 


RBC     (4.30-5.90)  m/uL


 


Hgb     (13.0-17.5)  gm/dL


 


Hct     (39.0-53.0)  %


 


MCV     (80.0-100.0)  fL


 


Neutrophils #     (1.3-7.7)  k/uL


 


ABG pH     (7.35-7.45)  


 


ABG pCO2     (35-45)  mmHg


 


ABG pO2     ()  mmHg


 


ABG HCO3     (21-25)  mmol/L


 


ABG Total CO2     (19-24)  mmol/L


 


ABG O2 Saturation     (94-97)  %


 


ABG Hematocrit     (34.0-46.0)  %


 


BUN     (9-20)  mg/dL


 


Glucose     (74-99)  mg/dL


 


POC Glucose (mg/dL)  124 H  143 H  131 H  (75-99)  mg/dL


 


Magnesium     (1.6-2.3)  mg/dL


 


Total Protein     (6.3-8.2)  g/dL


 


Albumin     (3.5-5.0)  g/dL














  04/18/17 04/18/17 04/18/17 Range/Units





  00:27 02:27 03:59 


 


WBC     (3.8-10.6)  k/uL


 


RBC     (4.30-5.90)  m/uL


 


Hgb     (13.0-17.5)  gm/dL


 


Hct     (39.0-53.0)  %


 


MCV     (80.0-100.0)  fL


 


Neutrophils #     (1.3-7.7)  k/uL


 


ABG pH     (7.35-7.45)  


 


ABG pCO2     (35-45)  mmHg


 


ABG pO2     ()  mmHg


 


ABG HCO3     (21-25)  mmol/L


 


ABG Total CO2     (19-24)  mmol/L


 


ABG O2 Saturation     (94-97)  %


 


ABG Hematocrit     (34.0-46.0)  %


 


BUN     (9-20)  mg/dL


 


Glucose     (74-99)  mg/dL


 


POC Glucose (mg/dL)  153 H  148 H  178 H  (75-99)  mg/dL


 


Magnesium     (1.6-2.3)  mg/dL


 


Total Protein     (6.3-8.2)  g/dL


 


Albumin     (3.5-5.0)  g/dL














  04/18/17 04/18/17 04/18/17 Range/Units





  04:00 04:00 05:01 


 


WBC   10.9 H   (3.8-10.6)  k/uL


 


RBC   3.19 L   (4.30-5.90)  m/uL


 


Hgb   10.3 L   (13.0-17.5)  gm/dL


 


Hct   32.0 L   (39.0-53.0)  %


 


MCV   100.4 H   (80.0-100.0)  fL


 


Neutrophils #   8.6 H   (1.3-7.7)  k/uL


 


ABG pH     (7.35-7.45)  


 


ABG pCO2     (35-45)  mmHg


 


ABG pO2     ()  mmHg


 


ABG HCO3    28 H  (21-25)  mmol/L


 


ABG Total CO2    29 H  (19-24)  mmol/L


 


ABG O2 Saturation     (94-97)  %


 


ABG Hematocrit     (34.0-46.0)  %


 


BUN  31 H    (9-20)  mg/dL


 


Glucose  170 H    (74-99)  mg/dL


 


POC Glucose (mg/dL)     (75-99)  mg/dL


 


Magnesium  2.5 H    (1.6-2.3)  mg/dL


 


Total Protein  5.6 L    (6.3-8.2)  g/dL


 


Albumin  2.9 L    (3.5-5.0)  g/dL














  04/18/17 Range/Units





  07:37 


 


WBC   (3.8-10.6)  k/uL


 


RBC   (4.30-5.90)  m/uL


 


Hgb   (13.0-17.5)  gm/dL


 


Hct   (39.0-53.0)  %


 


MCV   (80.0-100.0)  fL


 


Neutrophils #   (1.3-7.7)  k/uL


 


ABG pH   (7.35-7.45)  


 


ABG pCO2   (35-45)  mmHg


 


ABG pO2   ()  mmHg


 


ABG HCO3   (21-25)  mmol/L


 


ABG Total CO2   (19-24)  mmol/L


 


ABG O2 Saturation   (94-97)  %


 


ABG Hematocrit   (34.0-46.0)  %


 


BUN   (9-20)  mg/dL


 


Glucose   (74-99)  mg/dL


 


POC Glucose (mg/dL)  166 H  (75-99)  mg/dL


 


Magnesium   (1.6-2.3)  mg/dL


 


Total Protein   (6.3-8.2)  g/dL


 


Albumin   (3.5-5.0)  g/dL














- Imaging and Cardiology


Chest x-ray: report reviewed, image reviewed





Assessment and Plan


(1) Status post coronary artery bypass graft


Status: Acute   





(2) Coronary artery disease


Status: Acute   





(3) Family history of heart disease


Status: Acute   





(4) History of PTCA


Status: Acute   





(5) Hyperlipidemia


Status: Acute   





(6) Hypertension


Status: Acute   





(7) Nicotine dependence, chewing tobacco, uncomplicated


Status: Acute   


Plan: 





1.  Continue aspirin, Lipitor, Plavix, heparin, Lopressor, lisinopril.


2.  Ventilator management per pulmonology, wean O2 as tolerated.


3.  Insulin/diabetic management per primary care service.


4.  Continue [facility] protocol for alcohol withdrawal.


5.  Daily labs, chest x-rays.


6.  GI/DVT prophylaxis.


7.  More recommendations as patient progresses.


Time with Patient: Greater than 30





<Oscar Porter - Last Filed: 04/18/17 16:31>





Objective





- Vital Signs


Vital signs: 


 Vital Signs











Temp  98.3 F   04/18/17 16:00


 


Pulse  55 L  04/18/17 16:00


 


Resp  20   04/18/17 16:00


 


BP  116/61   04/18/17 16:00


 


Pulse Ox  96   04/18/17 16:00








 Intake & Output











 04/17/17 04/18/17 04/18/17





 18:59 06:59 18:59


 


Intake Total 7151.887 7323.272 1179.934


 


Output Total 520 600 665


 


Balance 535.891 689.272 514.934


 


Weight 97.4 kg 97.3 kg 97.3 kg


 


Intake:   


 


  Intake, IV Titration 415.891 499.272 339.934





  Amount   


 


    Insulin Regular 100 unit 3.167  0





    In Sodium Chloride 0.9%   





    100 ml @ Per Protocol IV   





    .Q0M ELIER Rx#:[id number]   


 


    Lactated Ringers 1,000 ml 230 240 200





    @ 20 mls/hr IV .Q24H ELIER   





    Rx#:[id number]   


 


    Propofol 1,000 mg In 82.724  





    Empty Bag 1 bag @ Titrate   





    IV .Q0M ELIER Rx#:   





    [id number]   


 


    Propofol 500 mg In Empty 100.000 259.272 139.934





    Bag 1 bag @ Titrate IV .   





    Q0M ELIER Rx#:[id number]   


 


  Tube Feeding 550 700 750


 


  Other 90 90 90


 


Output:   


 


  Urine 520 600 665


 


Other:   


 


  Voiding Method Indwelling Catheter Indwelling Catheter Indwelling Catheter








 ABP, PAP, CO, CI - Last Documented











Arterial Blood Pressure        89/63


 


Pulmonary Artery Pressure      46/23


 


Cardiac Output                 6.8


 


Cardiac Index                  3.3

















- Labs


CBC & Chem 7: 


 04/18/17 04:00





 04/18/17 04:00


Labs: 


 Abnormal Lab Results - Last 24 Hours (Table)











  04/17/17 04/17/17 04/17/17 Range/Units





  18:09 20:25 22:37 


 


WBC     (3.8-10.6)  k/uL


 


RBC     (4.30-5.90)  m/uL


 


Hgb     (13.0-17.5)  gm/dL


 


Hct     (39.0-53.0)  %


 


MCV     (80.0-100.0)  fL


 


Neutrophils #     (1.3-7.7)  k/uL


 


ABG pO2     ()  mmHg


 


ABG HCO3     (21-25)  mmol/L


 


ABG Total CO2     (19-24)  mmol/L


 


BUN     (9-20)  mg/dL


 


Glucose     (74-99)  mg/dL


 


POC Glucose (mg/dL)  124 H  143 H  131 H  (75-99)  mg/dL


 


Magnesium     (1.6-2.3)  mg/dL


 


Total Protein     (6.3-8.2)  g/dL


 


Albumin     (3.5-5.0)  g/dL














  04/18/17 04/18/17 04/18/17 Range/Units





  00:27 02:27 03:59 


 


WBC     (3.8-10.6)  k/uL


 


RBC     (4.30-5.90)  m/uL


 


Hgb     (13.0-17.5)  gm/dL


 


Hct     (39.0-53.0)  %


 


MCV     (80.0-100.0)  fL


 


Neutrophils #     (1.3-7.7)  k/uL


 


ABG pO2     ()  mmHg


 


ABG HCO3     (21-25)  mmol/L


 


ABG Total CO2     (19-24)  mmol/L


 


BUN     (9-20)  mg/dL


 


Glucose     (74-99)  mg/dL


 


POC Glucose (mg/dL)  153 H  148 H  178 H  (75-99)  mg/dL


 


Magnesium     (1.6-2.3)  mg/dL


 


Total Protein     (6.3-8.2)  g/dL


 


Albumin     (3.5-5.0)  g/dL














  04/18/17 04/18/17 04/18/17 Range/Units





  04:00 04:00 05:01 


 


WBC   10.9 H   (3.8-10.6)  k/uL


 


RBC   3.19 L   (4.30-5.90)  m/uL


 


Hgb   10.3 L   (13.0-17.5)  gm/dL


 


Hct   32.0 L   (39.0-53.0)  %


 


MCV   100.4 H   (80.0-100.0)  fL


 


Neutrophils #   8.6 H   (1.3-7.7)  k/uL


 


ABG pO2     ()  mmHg


 


ABG HCO3    28 H  (21-25)  mmol/L


 


ABG Total CO2    29 H  (19-24)  mmol/L


 


BUN  31 H    (9-20)  mg/dL


 


Glucose  170 H    (74-99)  mg/dL


 


POC Glucose (mg/dL)     (75-99)  mg/dL


 


Magnesium  2.5 H    (1.6-2.3)  mg/dL


 


Total Protein  5.6 L    (6.3-8.2)  g/dL


 


Albumin  2.9 L    (3.5-5.0)  g/dL














  04/18/17 04/18/17 04/18/17 Range/Units





  07:37 10:24 10:56 


 


WBC     (3.8-10.6)  k/uL


 


RBC     (4.30-5.90)  m/uL


 


Hgb     (13.0-17.5)  gm/dL


 


Hct     (39.0-53.0)  %


 


MCV     (80.0-100.0)  fL


 


Neutrophils #     (1.3-7.7)  k/uL


 


ABG pO2    77 L  ()  mmHg


 


ABG HCO3    26 H  (21-25)  mmol/L


 


ABG Total CO2    27 H  (19-24)  mmol/L


 


BUN     (9-20)  mg/dL


 


Glucose     (74-99)  mg/dL


 


POC Glucose (mg/dL)  166 H  148 H   (75-99)  mg/dL


 


Magnesium     (1.6-2.3)  mg/dL


 


Total Protein     (6.3-8.2)  g/dL


 


Albumin     (3.5-5.0)  g/dL














  04/18/17 04/18/17 04/18/17 Range/Units





  12:44 14:14 16:08 


 


WBC     (3.8-10.6)  k/uL


 


RBC     (4.30-5.90)  m/uL


 


Hgb     (13.0-17.5)  gm/dL


 


Hct     (39.0-53.0)  %


 


MCV     (80.0-100.0)  fL


 


Neutrophils #     (1.3-7.7)  k/uL


 


ABG pO2     ()  mmHg


 


ABG HCO3     (21-25)  mmol/L


 


ABG Total CO2     (19-24)  mmol/L


 


BUN     (9-20)  mg/dL


 


Glucose     (74-99)  mg/dL


 


POC Glucose (mg/dL)  141 H  149 H  156 H  (75-99)  mg/dL


 


Magnesium     (1.6-2.3)  mg/dL


 


Total Protein     (6.3-8.2)  g/dL


 


Albumin     (3.5-5.0)  g/dL














Assessment and Plan


Plan: 


The patient was seen and examined.  I agree with the above assessment and plan.

  He is currently on CIWA protocol secondary to alcohol withdrawal.he remains 

ventilated on 10 of PEEP and 90% FiO2.  His chest x-ray appears to be 

relatively clear.  From a hemodynamic standpoint he is stable.  He is receiving 

tube feeds. His creatinine is normal and he continues to make urine. His white 

blood cell count appears to be trending down. We will continue with supportive 

care for now and wean the ventilator as tolerated.

## 2017-04-19 LAB
ALP SERPL-CCNC: 35 U/L (ref 38–126)
ALP SERPL-CCNC: 56 U/L (ref 38–126)
ALT SERPL-CCNC: 31 U/L (ref 21–72)
ALT SERPL-CCNC: 47 U/L (ref 21–72)
ANION GAP SERPL CALC-SCNC: 5 MMOL/L
ANION GAP SERPL CALC-SCNC: 9 MMOL/L
APTT BLD: 19.5 SEC (ref 22–30)
AST SERPL-CCNC: 38 U/L (ref 17–59)
AST SERPL-CCNC: 41 U/L (ref 17–59)
BASOPHILS # BLD AUTO: 0 K/UL (ref 0–0.2)
BASOPHILS NFR BLD AUTO: 0 %
BUN SERPL-SCNC: 33 MG/DL (ref 9–20)
BUN SERPL-SCNC: 40 MG/DL (ref 9–20)
CALCIUM SPEC-MCNC: 8.5 MG/DL (ref 8.4–10.2)
CALCIUM SPEC-MCNC: 8.5 MG/DL (ref 8.4–10.2)
CH: 32.8
CHCM: 32.8
CHLORIDE SERPL-SCNC: 104 MMOL/L (ref 98–107)
CHLORIDE SERPL-SCNC: 107 MMOL/L (ref 98–107)
CO2 BLDA-SCNC: 28 MMOL/L (ref 19–24)
CO2 SERPL-SCNC: 25 MMOL/L (ref 22–30)
CO2 SERPL-SCNC: 30 MMOL/L (ref 22–30)
EOSINOPHIL # BLD AUTO: 0.1 K/UL (ref 0–0.7)
EOSINOPHIL NFR BLD AUTO: 0 %
ERYTHROCYTE [DISTWIDTH] IN BLOOD BY AUTOMATED COUNT: 3.2 M/UL (ref 4.3–5.9)
ERYTHROCYTE [DISTWIDTH] IN BLOOD: 14.1 % (ref 11.5–15.5)
GLUCOSE BLD-MCNC: 112 MG/DL (ref 75–99)
GLUCOSE BLD-MCNC: 126 MG/DL (ref 75–99)
GLUCOSE BLD-MCNC: 136 MG/DL (ref 75–99)
GLUCOSE BLD-MCNC: 140 MG/DL (ref 75–99)
GLUCOSE BLD-MCNC: 143 MG/DL (ref 75–99)
GLUCOSE BLD-MCNC: 144 MG/DL (ref 75–99)
GLUCOSE BLD-MCNC: 144 MG/DL (ref 75–99)
GLUCOSE BLD-MCNC: 153 MG/DL (ref 75–99)
GLUCOSE BLD-MCNC: 153 MG/DL (ref 75–99)
GLUCOSE BLD-MCNC: 160 MG/DL (ref 75–99)
GLUCOSE SERPL-MCNC: 129 MG/DL (ref 74–99)
GLUCOSE SERPL-MCNC: 144 MG/DL (ref 74–99)
HCO3 BLDA-SCNC: 27 MMOL/L (ref 21–25)
HCT VFR BLD AUTO: 32.2 % (ref 39–53)
HDW: 2.39
HGB BLD-MCNC: 10.7 GM/DL (ref 13–17.5)
INR PPP: 1 (ref ?–1.1)
LUC NFR BLD AUTO: 2 %
LYMPHOCYTES # SPEC AUTO: 1.7 K/UL (ref 1–4.8)
LYMPHOCYTES NFR SPEC AUTO: 13 %
MAGNESIUM SPEC-SCNC: 2.4 MG/DL (ref 1.6–2.3)
MAGNESIUM SPEC-SCNC: 2.5 MG/DL (ref 1.6–2.3)
MCH RBC QN AUTO: 33.4 PG (ref 25–35)
MCHC RBC AUTO-ENTMCNC: 33.2 G/DL (ref 31–37)
MCV RBC AUTO: 100.6 FL (ref 80–100)
MONOCYTES # BLD AUTO: 0.9 K/UL (ref 0–1)
MONOCYTES NFR BLD AUTO: 7 %
NEUTROPHILS # BLD AUTO: 10.3 K/UL (ref 1.3–7.7)
NEUTROPHILS NFR BLD AUTO: 78 %
NON-AFRICAN AMERICAN GFR(MDRD): >60
NON-AFRICAN AMERICAN GFR(MDRD): >60
PCO2 BLDA: 39 MMHG (ref 35–45)
PH BLDA: 7.46 [PH] (ref 7.35–7.45)
PHOSPHATE SERPL-MCNC: 4.2 MG/DL (ref 2.5–4.5)
PO2 BLDA: 79 MMHG (ref 83–108)
POTASSIUM SERPL-SCNC: 4 MMOL/L (ref 3.5–5.1)
POTASSIUM SERPL-SCNC: 5.2 MMOL/L (ref 3.5–5.1)
PROT SERPL-MCNC: 5.5 G/DL (ref 6.3–8.2)
PROT SERPL-MCNC: 6 G/DL (ref 6.3–8.2)
PT BLD: 9.9 SEC (ref 9–12)
SODIUM SERPL-SCNC: 139 MMOL/L (ref 137–145)
SODIUM SERPL-SCNC: 141 MMOL/L (ref 137–145)
WBC # BLD AUTO: 0.27 10*3/UL
WBC # BLD AUTO: 13.2 K/UL (ref 3.8–10.6)
WBC (PEROX): 13.83

## 2017-04-19 PROCEDURE — 4A133B1 MONITORING OF ARTERIAL PRESSURE, PERIPHERAL, PERCUTANEOUS APPROACH: ICD-10-PCS

## 2017-04-19 PROCEDURE — 03HY32Z INSERTION OF MONITORING DEVICE INTO UPPER ARTERY, PERCUTANEOUS APPROACH: ICD-10-PCS

## 2017-04-19 PROCEDURE — 4A133J1 MONITORING OF ARTERIAL PULSE, PERIPHERAL, PERCUTANEOUS APPROACH: ICD-10-PCS

## 2017-04-19 RX ADMIN — PROPOFOL SCH MLS/HR: 10 INJECTION, EMULSION INTRAVENOUS at 16:55

## 2017-04-19 RX ADMIN — LISINOPRIL SCH MG: 5 TABLET ORAL at 09:16

## 2017-04-19 RX ADMIN — LISINOPRIL SCH: 5 TABLET ORAL at 22:03

## 2017-04-19 RX ADMIN — BUDESONIDE SCH MG: 1 SUSPENSION RESPIRATORY (INHALATION) at 07:44

## 2017-04-19 RX ADMIN — SODIUM CHLORIDE, PRESERVATIVE FREE SCH ML: 5 INJECTION INTRAVENOUS at 22:03

## 2017-04-19 RX ADMIN — MULTIVITAMIN SCH ML: LIQUID ORAL at 12:24

## 2017-04-19 RX ADMIN — PROPOFOL SCH MLS/HR: 10 INJECTION, EMULSION INTRAVENOUS at 14:39

## 2017-04-19 RX ADMIN — IPRATROPIUM BROMIDE AND ALBUTEROL SULFATE SCH ML: .5; 3 SOLUTION RESPIRATORY (INHALATION) at 11:25

## 2017-04-19 RX ADMIN — DOCUSATE SODIUM AND SENNOSIDES SCH EACH: 50; 8.6 TABLET ORAL at 22:05

## 2017-04-19 RX ADMIN — PROPOFOL SCH MLS/HR: 10 INJECTION, EMULSION INTRAVENOUS at 22:03

## 2017-04-19 RX ADMIN — PROPOFOL SCH MLS/HR: 10 INJECTION, EMULSION INTRAVENOUS at 09:58

## 2017-04-19 RX ADMIN — BUDESONIDE SCH MG: 1 SUSPENSION RESPIRATORY (INHALATION) at 20:04

## 2017-04-19 RX ADMIN — HEPARIN SODIUM SCH UNIT: 5000 INJECTION, SOLUTION INTRAVENOUS; SUBCUTANEOUS at 00:29

## 2017-04-19 RX ADMIN — CLOPIDOGREL BISULFATE SCH MG: 75 TABLET ORAL at 09:16

## 2017-04-19 RX ADMIN — Medication SCH MG: at 22:03

## 2017-04-19 RX ADMIN — METOPROLOL TARTRATE SCH MG: 25 TABLET, FILM COATED ORAL at 19:41

## 2017-04-19 RX ADMIN — PROPOFOL SCH MLS/HR: 10 INJECTION, EMULSION INTRAVENOUS at 03:04

## 2017-04-19 RX ADMIN — CHLORHEXIDINE GLUCONATE SCH ML: 1.2 RINSE ORAL at 22:03

## 2017-04-19 RX ADMIN — CHLORHEXIDINE GLUCONATE SCH ML: 1.2 RINSE ORAL at 09:16

## 2017-04-19 RX ADMIN — IPRATROPIUM BROMIDE AND ALBUTEROL SULFATE SCH ML: .5; 3 SOLUTION RESPIRATORY (INHALATION) at 15:43

## 2017-04-19 RX ADMIN — IPRATROPIUM BROMIDE AND ALBUTEROL SULFATE SCH ML: .5; 3 SOLUTION RESPIRATORY (INHALATION) at 23:51

## 2017-04-19 RX ADMIN — HEPARIN SODIUM SCH UNIT: 5000 INJECTION, SOLUTION INTRAVENOUS; SUBCUTANEOUS at 16:32

## 2017-04-19 RX ADMIN — LORAZEPAM PRN MG: 2 INJECTION, SOLUTION INTRAMUSCULAR; INTRAVENOUS at 01:38

## 2017-04-19 RX ADMIN — METHYLPREDNISOLONE SODIUM SUCCINATE SCH MG: 40 INJECTION, POWDER, FOR SOLUTION INTRAMUSCULAR; INTRAVENOUS at 09:16

## 2017-04-19 RX ADMIN — PROPOFOL SCH MLS/HR: 10 INJECTION, EMULSION INTRAVENOUS at 23:40

## 2017-04-19 RX ADMIN — IPRATROPIUM BROMIDE AND ALBUTEROL SULFATE SCH ML: .5; 3 SOLUTION RESPIRATORY (INHALATION) at 07:44

## 2017-04-19 RX ADMIN — METHYLPREDNISOLONE SODIUM SUCCINATE SCH MG: 40 INJECTION, POWDER, FOR SOLUTION INTRAMUSCULAR; INTRAVENOUS at 22:03

## 2017-04-19 RX ADMIN — METOPROLOL TARTRATE SCH MG: 25 TABLET, FILM COATED ORAL at 09:17

## 2017-04-19 RX ADMIN — IPRATROPIUM BROMIDE AND ALBUTEROL SULFATE SCH ML: .5; 3 SOLUTION RESPIRATORY (INHALATION) at 03:15

## 2017-04-19 RX ADMIN — PROPOFOL SCH MLS/HR: 10 INJECTION, EMULSION INTRAVENOUS at 18:56

## 2017-04-19 RX ADMIN — HYDROCODONE BITARTRATE AND ACETAMINOPHEN PRN EACH: 5; 325 TABLET ORAL at 00:36

## 2017-04-19 RX ADMIN — PANTOPRAZOLE SODIUM SCH MG: 40 INJECTION, POWDER, FOR SOLUTION INTRAVENOUS at 09:17

## 2017-04-19 RX ADMIN — ATORVASTATIN CALCIUM SCH MG: 40 TABLET, FILM COATED ORAL at 09:16

## 2017-04-19 RX ADMIN — BISACODYL PRN MG: 10 SUPPOSITORY RECTAL at 17:18

## 2017-04-19 RX ADMIN — ASPIRIN 325 MG ORAL TABLET SCH MG: 325 PILL ORAL at 09:16

## 2017-04-19 RX ADMIN — Medication SCH MG: at 09:18

## 2017-04-19 RX ADMIN — HEPARIN SODIUM SCH UNIT: 5000 INJECTION, SOLUTION INTRAVENOUS; SUBCUTANEOUS at 09:15

## 2017-04-19 RX ADMIN — IPRATROPIUM BROMIDE AND ALBUTEROL SULFATE SCH ML: .5; 3 SOLUTION RESPIRATORY (INHALATION) at 20:04

## 2017-04-19 RX ADMIN — POTASSIUM CHLORIDE SCH MLS/HR: 14.9 INJECTION, SOLUTION INTRAVENOUS at 16:33

## 2017-04-19 RX ADMIN — SODIUM CHLORIDE, PRESERVATIVE FREE SCH ML: 5 INJECTION INTRAVENOUS at 09:17

## 2017-04-19 NOTE — XR
EXAMINATION TYPE: XR chest 1V portable

 

DATE OF EXAM: 4/19/2017 7:08 AM

 

COMPARISON: 4/18/2017

 

HISTORY: SOB, Follow Up

 

FINDINGS:

 

Indwelling tubes and catheters are unchanged.

 

No change in bibasilar opacities.  

 

Stable appearance of the cardio-mediastinal structures at this time.

 

Pleural effusion unchanged.

 

IMPRESSION:

1.  Stable portable chest.  Clinical correlation and follow up until resolution is recommended.

## 2017-04-19 NOTE — P.PN
Subjective


Principal diagnosis: 


Status post CABG, respiratory failure








This 60-year-old gentleman is status post prior to coronary bypass surgery.  

Patient has been having difficulty oxygenating and has been ventilator 

dependent.  Patient also had history of alcohol withdrawal.  Adjustments are 

being made in the respiratory settings and cutting down on PEEP.  

Hemodynamically patient is otherwise stable.  Kidney function remained stable.  

He'll output is good.  Patient may require tracheostomy if his pulmonary status 

doesn't improve.





Objective





- Vital Signs


Vital signs: 


 Vital Signs











Temp  100.3 F H  04/19/17 16:00


 


Pulse  90   04/19/17 17:00


 


Resp  22   04/19/17 17:00


 


BP  84/52   04/19/17 15:00


 


Pulse Ox  91 L  04/19/17 17:00








 Intake & Output











 04/18/17 04/19/17 04/19/17





 18:59 06:59 18:59


 


Intake Total 1666.752 6117.083 1036.870


 


Output Total 815 1100 2210


 


Balance 548.175 48.083 -1173.130


 


Weight 97.3 kg 97.5 kg 97.5 kg


 


Intake:   


 


  Intake, IV Titration 423.175 348.083 376.870





  Amount   


 


    Insulin Regular 100 unit 0 12.142 6.870





    In Sodium Chloride 0.9%   





    100 ml @ Per Protocol IV   





    .Q0M ELIER Rx#:[id number]   


 


    Lactated Ringers 1,000 ml 240 240 220





    @ 20 mls/hr IV .Q24H ELIER   





    Rx#:[id number]   


 


    Propofol 500 mg In Empty 183.175 95.941 150.000





    Bag 1 bag @ Titrate IV .   





    Q0M ELIER Rx#:[id number]   


 


  Tube Feeding 850 800 550


 


  Other 90  110


 


Output:   


 


  Urine 815 1100 2210


 


Other:   


 


  Voiding Method Indwelling Catheter Indwelling Catheter Indwelling Catheter


 


  # Bowel Movements  0 0








 ABP, PAP, CO, CI - Last Documented











Arterial Blood Pressure        100/40


 


Pulmonary Artery Pressure      46/23


 


Cardiac Output                 6.8


 


Cardiac Index                  3.3

















- Exam





GENERAL EXAM: Patient is intubated and sedated


HEENT: Normocephalic.


NECK: No masses, no nuchal rigidity.


CHEST: No chest wall deformity.


LUNGS: Breath sounds at bases


HEART: S1 and S2 normal with no audible mumurs or gallops. Regular rhythm, 

femorals equal on both sides..


ABDOMEN: No hepatosplenomegaly, normal bowel sounds, no guarding or rigidity.


SKIN: No rashes


CENTRAL NERVOUS SYSTEM:  Sedated


EXTREMITIES: No cyanosis, clubbing or edema.





- Labs


CBC & Chem 7: 


 04/19/17 05:55





 04/19/17 05:55


Labs: 


 Abnormal Lab Results - Last 24 Hours (Table)











  04/18/17 04/18/17 04/18/17 Range/Units





  18:10 20:12 22:19 


 


WBC     (3.8-10.6)  k/uL


 


RBC     (4.30-5.90)  m/uL


 


Hgb     (13.0-17.5)  gm/dL


 


Hct     (39.0-53.0)  %


 


MCV     (80.0-100.0)  fL


 


Neutrophils #     (1.3-7.7)  k/uL


 


APTT     (22.0-30.0)  sec


 


ABG pH     (7.35-7.45)  


 


ABG pO2     ()  mmHg


 


ABG HCO3     (21-25)  mmol/L


 


ABG Total CO2     (19-24)  mmol/L


 


Potassium     (3.5-5.1)  mmol/L


 


BUN     (9-20)  mg/dL


 


Creatinine     (0.66-1.25)  mg/dL


 


Glucose     (74-99)  mg/dL


 


POC Glucose (mg/dL)  135 H  141 H  126 H  (75-99)  mg/dL


 


Magnesium     (1.6-2.3)  mg/dL


 


Alkaline Phosphatase     ()  U/L


 


Total Protein     (6.3-8.2)  g/dL


 


Albumin     (3.5-5.0)  g/dL














  04/19/17 04/19/17 04/19/17 Range/Units





  00:09 03:13 05:53 


 


WBC     (3.8-10.6)  k/uL


 


RBC     (4.30-5.90)  m/uL


 


Hgb     (13.0-17.5)  gm/dL


 


Hct     (39.0-53.0)  %


 


MCV     (80.0-100.0)  fL


 


Neutrophils #     (1.3-7.7)  k/uL


 


APTT     (22.0-30.0)  sec


 


ABG pH    7.46 H  (7.35-7.45)  


 


ABG pO2    79 L  ()  mmHg


 


ABG HCO3    27 H  (21-25)  mmol/L


 


ABG Total CO2    28 H  (19-24)  mmol/L


 


Potassium     (3.5-5.1)  mmol/L


 


BUN     (9-20)  mg/dL


 


Creatinine     (0.66-1.25)  mg/dL


 


Glucose     (74-99)  mg/dL


 


POC Glucose (mg/dL)  144 H  126 H   (75-99)  mg/dL


 


Magnesium     (1.6-2.3)  mg/dL


 


Alkaline Phosphatase     ()  U/L


 


Total Protein     (6.3-8.2)  g/dL


 


Albumin     (3.5-5.0)  g/dL














  04/19/17 04/19/17 04/19/17 Range/Units





  05:55 05:55 05:55 


 


WBC  13.2 H    (3.8-10.6)  k/uL


 


RBC  3.20 L    (4.30-5.90)  m/uL


 


Hgb  10.7 L    (13.0-17.5)  gm/dL


 


Hct  32.2 L    (39.0-53.0)  %


 


MCV  100.6 H    (80.0-100.0)  fL


 


Neutrophils #  10.3 H    (1.3-7.7)  k/uL


 


APTT    19.5 L  (22.0-30.0)  sec


 


ABG pH     (7.35-7.45)  


 


ABG pO2     ()  mmHg


 


ABG HCO3     (21-25)  mmol/L


 


ABG Total CO2     (19-24)  mmol/L


 


Potassium   5.2 H   (3.5-5.1)  mmol/L


 


BUN   33 H   (9-20)  mg/dL


 


Creatinine   0.62 L   (0.66-1.25)  mg/dL


 


Glucose   144 H   (74-99)  mg/dL


 


POC Glucose (mg/dL)     (75-99)  mg/dL


 


Magnesium   2.5 H   (1.6-2.3)  mg/dL


 


Alkaline Phosphatase   35 L   ()  U/L


 


Total Protein   6.0 L   (6.3-8.2)  g/dL


 


Albumin   3.0 L   (3.5-5.0)  g/dL














  04/19/17 04/19/17 04/19/17 Range/Units





  07:50 09:57 12:19 


 


WBC     (3.8-10.6)  k/uL


 


RBC     (4.30-5.90)  m/uL


 


Hgb     (13.0-17.5)  gm/dL


 


Hct     (39.0-53.0)  %


 


MCV     (80.0-100.0)  fL


 


Neutrophils #     (1.3-7.7)  k/uL


 


APTT     (22.0-30.0)  sec


 


ABG pH     (7.35-7.45)  


 


ABG pO2     ()  mmHg


 


ABG HCO3     (21-25)  mmol/L


 


ABG Total CO2     (19-24)  mmol/L


 


Potassium     (3.5-5.1)  mmol/L


 


BUN     (9-20)  mg/dL


 


Creatinine     (0.66-1.25)  mg/dL


 


Glucose     (74-99)  mg/dL


 


POC Glucose (mg/dL)  143 H  136 H  160 H  (75-99)  mg/dL


 


Magnesium     (1.6-2.3)  mg/dL


 


Alkaline Phosphatase     ()  U/L


 


Total Protein     (6.3-8.2)  g/dL


 


Albumin     (3.5-5.0)  g/dL














  04/19/17 04/19/17 Range/Units





  13:47 16:35 


 


WBC    (3.8-10.6)  k/uL


 


RBC    (4.30-5.90)  m/uL


 


Hgb    (13.0-17.5)  gm/dL


 


Hct    (39.0-53.0)  %


 


MCV    (80.0-100.0)  fL


 


Neutrophils #    (1.3-7.7)  k/uL


 


APTT    (22.0-30.0)  sec


 


ABG pH    (7.35-7.45)  


 


ABG pO2    ()  mmHg


 


ABG HCO3    (21-25)  mmol/L


 


ABG Total CO2    (19-24)  mmol/L


 


Potassium    (3.5-5.1)  mmol/L


 


BUN    (9-20)  mg/dL


 


Creatinine    (0.66-1.25)  mg/dL


 


Glucose    (74-99)  mg/dL


 


POC Glucose (mg/dL)  153 H  144 H  (75-99)  mg/dL


 


Magnesium    (1.6-2.3)  mg/dL


 


Alkaline Phosphatase    ()  U/L


 


Total Protein    (6.3-8.2)  g/dL


 


Albumin    (3.5-5.0)  g/dL














Assessment and Plan


(1) Status post coronary artery bypass graft


Status: Acute   





(2) Family history of heart disease


Status: Acute   





(3) Hyperlipidemia


Status: Acute   





(4) Hypertension


Status: Acute   





(5) Nicotine dependence, chewing tobacco, uncomplicated


Status: Acute   


Plan: 


Continue with respiratory support.  This is being managed by pulmonologist.  

Hemodynamically otherwise stable.  Continue current medical therapy.  Follow up 

as needed

## 2017-04-19 NOTE — P.PN
Subjective


Principal diagnosis: 





Coronary artery disease, status post prior stenting to his right coronary 

artery.  Preserved left ventricular function.  Hyperlipidemia.  Hypertension.  

ETOH abuse.





POD #6 total arterial non-aortic patch off-pump double coronary artery bypass 

grafting using in situ skeletonized right internal mammary artery to the left 

anterior descending artery across the anterior midline in situ totally 

skeletonized left internal mammary artery to the first obtuse marginal artery.  

Intraoperative transesophageal echocardiogram and epi-aortic scanning.  

Intraoperative graft flow measurements using the "[employer]im system





Patient had postoperative mucous plugging with increasing oxygen demand 

requiring bronchoscopy the night of surgery.  Bronchial washings negative for 

bacteria/viruses to date.





Pt developed postoperative alcohol withdrawal requiring increased sedation and 

continued mechanical ventilation.





Patient remains sedated on mechanical ventilation.  ABGs reviewed from this 

morning on 80% FiO2 with a PEEP of 10.  Per pulmonary PEEP increased to 13 with 

orders to try and wean down FiO2.  No weaning trial today, no sedation holiday 

per pulmonology.





Objective





- Vital Signs


Vital signs: 


 Vital Signs











Temp  99.0 F   04/19/17 08:00


 


Pulse  72   04/19/17 09:00


 


Resp  24   04/19/17 09:00


 


BP  120/71   04/19/17 09:00


 


Pulse Ox  95   04/19/17 09:00








 Intake & Output











 04/18/17 04/19/17 04/19/17





 18:59 06:59 18:59


 


Intake Total 5588.719 6099.083 360


 


Output Total 815 1100 550


 


Balance 548.175 48.083 -190


 


Weight 97.3 kg 97.5 kg 


 


Intake:   


 


  Intake, IV Titration 423.175 348.083 130





  Amount   


 


    Insulin Regular 100 unit 0 12.142 





    In Sodium Chloride 0.9%   





    100 ml @ Per Protocol IV   





    .Q0M [facility] Rx#:[id number]   


 


    Lactated Ringers 1,000 ml 240 240 80





    @ 20 mls/hr IV .Q24H [facility]   





    Rx#:[id number]   


 


    Propofol 500 mg In Empty 183.175 95.941 50





    Bag 1 bag @ Titrate IV .   





    Q0M [facility] Rx#:[id number]   


 


  Tube Feeding 850 800 200


 


  Other 90  30


 


Output:   


 


  Urine 815 1100 550


 


Other:   


 


  Voiding Method Indwelling Catheter Indwelling Catheter 


 


  # Bowel Movements  0 








 ABP, PAP, CO, CI - Last Documented











Arterial Blood Pressure        138/75


 


Pulmonary Artery Pressure      46/23


 


Cardiac Output                 6.8


 


Cardiac Index                  3.3

















- Constitutional


General appearance: Present: no acute distress





- Respiratory


Details: 





Lungs sounds diminished bilaterally.  Respirations even, nonlabored on 

mechanical ventilation.  Current settings title volume 450, FiO2 80%, 

respiratory rate 20, PEEP of 13.





- Cardiovascular


Details: 





S1, S2 present.  Regular rate and rhythm, normal sinus rhythm on telemetry, no 

events noted overnight on telemetry.  Heart hugger/SCDs/teds present.  Sternum 

stable.





- Gastrointestinal


Gastrointestinal Comment(s): 





Abdomen soft, nontender, nondistended.  Active bowel sounds 4 quadrants.  

Vital tube feeding infusing and 50 mL per hour through OG tube.





- Genitourinary


Genitourinary Comment(s): 





Angelo present draining clear, yellow urine.  Output approximately 100 mL/h 

overnight.





- Psychiatric


Psychiatric Comment(s): 





Remains sedated on mechanical ventilation.





- Allied health notes


Allied health notes reviewed: nursing





- Labs


CBC & Chem 7: 


 04/19/17 05:55





 04/19/17 05:55


Labs: 


 Abnormal Lab Results - Last 24 Hours (Table)











  04/18/17 04/18/17 04/18/17 Range/Units





  10:24 10:56 12:44 


 


WBC     (3.8-10.6)  k/uL


 


RBC     (4.30-5.90)  m/uL


 


Hgb     (13.0-17.5)  gm/dL


 


Hct     (39.0-53.0)  %


 


MCV     (80.0-100.0)  fL


 


Neutrophils #     (1.3-7.7)  k/uL


 


APTT     (22.0-30.0)  sec


 


ABG pH     (7.35-7.45)  


 


ABG pO2   77 L   ()  mmHg


 


ABG HCO3   26 H   (21-25)  mmol/L


 


ABG Total CO2   27 H   (19-24)  mmol/L


 


Potassium     (3.5-5.1)  mmol/L


 


BUN     (9-20)  mg/dL


 


Creatinine     (0.66-1.25)  mg/dL


 


Glucose     (74-99)  mg/dL


 


POC Glucose (mg/dL)  148 H   141 H  (75-99)  mg/dL


 


Magnesium     (1.6-2.3)  mg/dL


 


Alkaline Phosphatase     ()  U/L


 


Total Protein     (6.3-8.2)  g/dL


 


Albumin     (3.5-5.0)  g/dL














  04/18/17 04/18/17 04/18/17 Range/Units





  14:14 16:08 18:10 


 


WBC     (3.8-10.6)  k/uL


 


RBC     (4.30-5.90)  m/uL


 


Hgb     (13.0-17.5)  gm/dL


 


Hct     (39.0-53.0)  %


 


MCV     (80.0-100.0)  fL


 


Neutrophils #     (1.3-7.7)  k/uL


 


APTT     (22.0-30.0)  sec


 


ABG pH     (7.35-7.45)  


 


ABG pO2     ()  mmHg


 


ABG HCO3     (21-25)  mmol/L


 


ABG Total CO2     (19-24)  mmol/L


 


Potassium     (3.5-5.1)  mmol/L


 


BUN     (9-20)  mg/dL


 


Creatinine     (0.66-1.25)  mg/dL


 


Glucose     (74-99)  mg/dL


 


POC Glucose (mg/dL)  149 H  156 H  135 H  (75-99)  mg/dL


 


Magnesium     (1.6-2.3)  mg/dL


 


Alkaline Phosphatase     ()  U/L


 


Total Protein     (6.3-8.2)  g/dL


 


Albumin     (3.5-5.0)  g/dL














  04/18/17 04/18/17 04/19/17 Range/Units





  20:12 22:19 00:09 


 


WBC     (3.8-10.6)  k/uL


 


RBC     (4.30-5.90)  m/uL


 


Hgb     (13.0-17.5)  gm/dL


 


Hct     (39.0-53.0)  %


 


MCV     (80.0-100.0)  fL


 


Neutrophils #     (1.3-7.7)  k/uL


 


APTT     (22.0-30.0)  sec


 


ABG pH     (7.35-7.45)  


 


ABG pO2     ()  mmHg


 


ABG HCO3     (21-25)  mmol/L


 


ABG Total CO2     (19-24)  mmol/L


 


Potassium     (3.5-5.1)  mmol/L


 


BUN     (9-20)  mg/dL


 


Creatinine     (0.66-1.25)  mg/dL


 


Glucose     (74-99)  mg/dL


 


POC Glucose (mg/dL)  141 H  126 H  144 H  (75-99)  mg/dL


 


Magnesium     (1.6-2.3)  mg/dL


 


Alkaline Phosphatase     ()  U/L


 


Total Protein     (6.3-8.2)  g/dL


 


Albumin     (3.5-5.0)  g/dL














  04/19/17 04/19/17 04/19/17 Range/Units





  03:13 05:53 05:55 


 


WBC    13.2 H  (3.8-10.6)  k/uL


 


RBC    3.20 L  (4.30-5.90)  m/uL


 


Hgb    10.7 L  (13.0-17.5)  gm/dL


 


Hct    32.2 L  (39.0-53.0)  %


 


MCV    100.6 H  (80.0-100.0)  fL


 


Neutrophils #    10.3 H  (1.3-7.7)  k/uL


 


APTT     (22.0-30.0)  sec


 


ABG pH   7.46 H   (7.35-7.45)  


 


ABG pO2   79 L   ()  mmHg


 


ABG HCO3   27 H   (21-25)  mmol/L


 


ABG Total CO2   28 H   (19-24)  mmol/L


 


Potassium     (3.5-5.1)  mmol/L


 


BUN     (9-20)  mg/dL


 


Creatinine     (0.66-1.25)  mg/dL


 


Glucose     (74-99)  mg/dL


 


POC Glucose (mg/dL)  126 H    (75-99)  mg/dL


 


Magnesium     (1.6-2.3)  mg/dL


 


Alkaline Phosphatase     ()  U/L


 


Total Protein     (6.3-8.2)  g/dL


 


Albumin     (3.5-5.0)  g/dL














  04/19/17 04/19/17 04/19/17 Range/Units





  05:55 05:55 07:50 


 


WBC     (3.8-10.6)  k/uL


 


RBC     (4.30-5.90)  m/uL


 


Hgb     (13.0-17.5)  gm/dL


 


Hct     (39.0-53.0)  %


 


MCV     (80.0-100.0)  fL


 


Neutrophils #     (1.3-7.7)  k/uL


 


APTT   19.5 L   (22.0-30.0)  sec


 


ABG pH     (7.35-7.45)  


 


ABG pO2     ()  mmHg


 


ABG HCO3     (21-25)  mmol/L


 


ABG Total CO2     (19-24)  mmol/L


 


Potassium  5.2 H    (3.5-5.1)  mmol/L


 


BUN  33 H    (9-20)  mg/dL


 


Creatinine  0.62 L    (0.66-1.25)  mg/dL


 


Glucose  144 H    (74-99)  mg/dL


 


POC Glucose (mg/dL)    143 H  (75-99)  mg/dL


 


Magnesium  2.5 H    (1.6-2.3)  mg/dL


 


Alkaline Phosphatase  35 L    ()  U/L


 


Total Protein  6.0 L    (6.3-8.2)  g/dL


 


Albumin  3.0 L    (3.5-5.0)  g/dL














  04/19/17 Range/Units





  09:57 


 


WBC   (3.8-10.6)  k/uL


 


RBC   (4.30-5.90)  m/uL


 


Hgb   (13.0-17.5)  gm/dL


 


Hct   (39.0-53.0)  %


 


MCV   (80.0-100.0)  fL


 


Neutrophils #   (1.3-7.7)  k/uL


 


APTT   (22.0-30.0)  sec


 


ABG pH   (7.35-7.45)  


 


ABG pO2   ()  mmHg


 


ABG HCO3   (21-25)  mmol/L


 


ABG Total CO2   (19-24)  mmol/L


 


Potassium   (3.5-5.1)  mmol/L


 


BUN   (9-20)  mg/dL


 


Creatinine   (0.66-1.25)  mg/dL


 


Glucose   (74-99)  mg/dL


 


POC Glucose (mg/dL)  136 H  (75-99)  mg/dL


 


Magnesium   (1.6-2.3)  mg/dL


 


Alkaline Phosphatase   ()  U/L


 


Total Protein   (6.3-8.2)  g/dL


 


Albumin   (3.5-5.0)  g/dL














- Imaging and Cardiology


Chest x-ray: image reviewed





Assessment and Plan


(1) Status post coronary artery bypass graft


Status: Acute   





(2) Coronary artery disease


Status: Acute   





(3) Family history of heart disease


Status: Acute   





(4) History of PTCA


Status: Acute   





(5) Hyperlipidemia


Status: Acute   





(6) Hypertension


Status: Acute   





(7) Nicotine dependence, chewing tobacco, uncomplicated


Status: Acute   


Plan: 





1.  Continue aspirin, Lipitor, Plavix, heparin, Lopressor, lisinopril.


2.  Ventilator management per pulmonology, wean O2 as tolerated.  Wean Solu-

Medrol per pulmonology.


3.  Will give Lasix 20 mg IV push 1 today.


4.  Insulin/diabetic management per primary care service.


5.  Continue [facility] protocol for alcohol withdrawal.


6.  Daily labs, chest x-rays.


7.  GI/DVT prophylaxis.


8.  More recommendations as patient progresses.


Time with Patient: Greater than 30

## 2017-04-19 NOTE — PN
DATE OF SERVICE: 04/19/2017



This 61-year-old gentleman admitted to the hospital after CAD/CABG on 

mechanical ventilation.   The patient needs high flow, high percentage 

oxygen at this time.  



Seen and evaluated the patient along with nurse practitioner.  Please 

refer to the nurse practitioner notes and impression documented as a 

scribe for further information.     Continue to monitor.  Further 

recommendations to follow.

## 2017-04-19 NOTE — PCN
ARTERIAL LINE PLACEMENT



Indication:  Hemodynamic monitoring.



A time-out was completed verifying correct patient, procedure, site, 

positioning, and implant(s) or special equipment if applicable. 



Tavo's test was performed to ensure adequate perfusion.  The 

patient's right wrist was prepped and draped in sterile fashion.  1% 

Lidocaine was used to anesthetize the area.  An 18G Arrow arterial 

line was introduced into the  radial artery.  The catheter was 

threaded over the guide wire and the needle was removed with 

appropriate pulsatile blood return.  Blood loss was minimal.  The 

catheter was then sutured in place to the skin and a sterile dressing 

applied.  Perfusion to the extremity distal to the point of catheter 

insertion was checked and found to be adequate. 



The patient tolerated the procedure well and there were no 

complications.

## 2017-04-19 NOTE — P.PN
Subjective





Date of service 04/18/2017.





Progress note being dictated for Dr. Hess.











Interval history: This is a 61-year-old gentleman status post CABG, status post 

bronchoscopy related to significant mucus plugging, failure to wean, suspected 

DTs and multiple other medical issues.  Chest x-ray noted. Remains vent 

dependent currently on FiO2 of 90% with PEEP increased to +10.  Unsuccessful  

weaning trials as patient becomes significantly agitated, hypertensive, 

tachycardic.  Continues on nebulized bronchodilators, IV steroids .Receiving 

vital HP at goal of 50ml/hr.Maintained on insulin, Diprovan drips and CIWA 

protocol. 








Unable to perform review systems as patient sedated and on mechanical 

ventilation








Active Medications





Hydrocodone Bitart/Acetaminophen (Norco 5-325)  2 each PO Q4HR PRN


   PRN Reason: Severe Pain


   Last Admin: 04/19/17 00:36 Dose:  2 each


Hydrocodone Bitart/Acetaminophen (Norco 5-325)  1 each PO Q4HR PRN


   PRN Reason: Moderate Pain


Albuterol/Ipratropium (Duoneb 0.5 Mg-3 Mg/3 Ml Soln)  3 ml INHALATION RT-Q4H [facility]


   Last Admin: 04/19/17 15:43 Dose:  3 ml


Albuterol/Ipratropium (Duoneb 0.5 Mg-3 Mg/3 Ml Soln)  3 ml INHALATION RT-Q2H PRN


   PRN Reason: Shortness Of Breath Or Wheezing


Aspirin (Aspirin)  325 mg PO DAILY [facility]


   Last Admin: 04/19/17 09:16 Dose:  325 mg


Atorvastatin Calcium (Lipitor)  40 mg PO DAILY [facility]


   Last Admin: 04/19/17 09:16 Dose:  40 mg


Benzocaine (Hurricaine Spray)  1 applic MUCOUS MEM QID PRN


   PRN Reason: Mouth Irritation


   Last Admin: 04/17/17 11:36 Dose:  1 applic


Benzocaine/Menthol (Cepacol Lozenge)  1 each MUCOUS MEM Q2H PRN


   PRN Reason: Sore Throat


Bisacodyl (Dulcolax)  10 mg RECTAL DAILY PRN


   PRN Reason: Constipation


   Last Admin: 04/16/17 10:27 Dose:  10 mg


Budesonide (Pulmicort)  1 mg INHALATION RT-BID [facility]


   Last Admin: 04/19/17 07:44 Dose:  1 mg


Chlorhexidine Gluconate (Peridex)  15 ml MUCOUS MEM BID [facility]


   Last Admin: 04/19/17 09:16 Dose:  15 ml


Clopidogrel Bisulfate (Plavix)  75 mg PO DAILY [facility]


   Last Admin: 04/19/17 09:16 Dose:  75 mg


Heparin Sodium (Porcine) (Heparin)  5,000 unit SQ Q8HR [facility]


   Last Admin: 04/19/17 09:15 Dose:  5,000 unit


Insulin Human Regular 100 unit (/ Sodium Chloride)  101 mls @ 0 mls/hr IV .Q0M 

[facility]; Per Protocol


   PRN Reason: Protocol


   Last Titration: 04/19/17 12:26 Dose:  2.5 units/hr, 2.52 mls/hr


Lactated Ringer's (Lactated Ringers)  1,000 mls @ 20 mls/hr IV .Q24H [facility]


   Last Admin: 04/18/17 12:48 Dose:  20 mls/hr


Propofol 500 mg/ IV Solution  50 mls @ 0 mls/hr IV .Q0M [facility]; Titrate


   PRN Reason: Protocol


   Last Admin: 04/19/17 14:39 Dose:  40 mcg/kg/min, 23.37 mls/hr


Iron/Minerals/Multivitamins (Theragran Liquid)  15 ml PO DAILY@1200 [facility]


   Last Admin: 04/19/17 12:24 Dose:  15 ml


Lisinopril (Zestril)  5 mg PO DAILY [facility]


   Last Admin: 04/19/17 09:16 Dose:  5 mg


Lorazepam (Ativan)  1 mg IV Q2HR PRN


   PRN Reason: CIWA 8 or 9


   Last Admin: 04/18/17 21:45 Dose:  1 mg


Lorazepam (Ativan)  1 mg IV Q1HR PRN


   PRN Reason: CIWA 10 to 15


   Last Admin: 04/18/17 03:51 Dose:  1 mg


Lorazepam (Ativan)  2 mg IV Q1HR PRN


   PRN Reason: CIWA 16 or higher


   Last Admin: 04/19/17 01:38 Dose:  2 mg


Magnesium Hydroxide (Milk Of Magnesia)  2,400 mg PO BID PRN


   PRN Reason: Constipation


Methylprednisolone Sodium Succinate (Solu-Medrol)  40 mg IV Q12HR [facility]


   Last Admin: 04/19/17 09:16 Dose:  40 mg


Metoclopramide HCl (Reglan)  10 mg IVP Q4H PRN


   PRN Reason: Nausea And Vomiting


Metoprolol Tartrate (Lopressor)  25 mg PO BID [facility]


   Last Admin: 04/19/17 09:17 Dose:  25 mg


Miscellaneous Information (Magnesium Per Protocol)  1 each MISCELLANE DAILY PRN

; Protocol


   PRN Reason: Per Protocol


Miscellaneous Information (Phosphorus Per Protocol)  1 each MISCELLANE DAILY PRN

; Protocol


   PRN Reason: Per Protocol


Miscellaneous Information (Potassium Per Protocol)  1 each MISCELLANE DAILY PRN

; Protocol


   PRN Reason: Per Protocol


Morphine Sulfate (Morphine Sulfate (Inj))  2 mg IVP Q2H PRN


   PRN Reason: Severe Pain


   Last Admin: 04/16/17 22:53 Dose:  2 mg


Ondansetron HCl (Zofran)  4 mg IVP Q6HR PRN


   PRN Reason: Nausea And Vomiting


Pantoprazole Sodium (Protonix)  40 mg IVP DAILY [facility]


   Last Admin: 04/19/17 09:17 Dose:  40 mg


Senna/Docusate Sodium (Senokot-S)  2 each PO HS [facility]


   Last Admin: 04/18/17 23:07 Dose:  2 each


Sodium Chloride (Saline Flush)  10 ml IV BID [facility]


   Last Admin: 04/19/17 09:17 Dose:  10 ml


Thiamine HCl (Vitamin B-1)  100 mg PO BID [facility]


   Last Admin: 04/19/17 09:18 Dose:  100 mg











Objective





- Vital Signs


Vital signs: 


 Vital Signs











Temp  98.3 F   04/18/17 16:00


 


Pulse  52 L  04/18/17 17:00


 


Resp  20   04/18/17 17:00


 


BP  104/56   04/18/17 17:00


 


Pulse Ox  96   04/18/17 17:00








 Intake & Output











 04/17/17 04/18/17 04/18/17





 18:59 06:59 18:59


 


Intake Total 5288.559 5750.272 1179.934


 


Output Total 520 600 665


 


Balance 535.891 689.272 514.934


 


Weight 97.4 kg 97.3 kg 97.3 kg


 


Intake:   


 


  Intake, IV Titration 415.891 499.272 339.934





  Amount   


 


    Insulin Regular 100 unit 3.167  0





    In Sodium Chloride 0.9%   





    100 ml @ Per Protocol IV   





    .Q0M [facility] Rx#:[id number]   


 


    Lactated Ringers 1,000 ml 230 240 200





    @ 20 mls/hr IV .Q24H [facility]   





    Rx#:[id number]   


 


    Propofol 1,000 mg In 82.724  





    Empty Bag 1 bag @ Titrate   





    IV .Q0M [facility] Rx#:   





    [id number]   


 


    Propofol 500 mg In Empty 100.000 259.272 139.934





    Bag 1 bag @ Titrate IV .   





    Q0M [facility] Rx#:[id number]   


 


  Tube Feeding 550 700 750


 


  Other 90 90 90


 


Output:   


 


  Urine 520 600 665


 


Other:   


 


  Voiding Method Indwelling Catheter Indwelling Catheter Indwelling Catheter








 ABP, PAP, CO, CI - Last Documented











Arterial Blood Pressure        83/52


 


Pulmonary Artery Pressure      46/23


 


Cardiac Output                 6.8


 


Cardiac Index                  3.3

















- Exam


PHYSICAL EXAM:


VITAL SIGNS: As above


GENERAL: [Sedated, intubated 


HEENT: [Pupils equal, conjunctiva normal.  Mucosa moist. ]


NECK: [Supple, no JVD]


RESPIRATORY EFFORT:[Normal]


LUNGS:  [Diminished throughout, bibasilar crackles, occasional rhonchi]


CARDIOVASCULAR[regular S1 and S2, no murmurs rubs or gallops, mild edema]


GI: [Abdomen soft, nontender, positive bowel sounds.]


PSYCH: [Alert and oriented -3, mood and affect normal.]


NEURO: Unable to assess, patient sedated on Diprovan and vent dependent





 








 Microbiology





04/13/17 16:40   Lung Aspirate - Right   Gram Stain - Final


04/13/17 16:40   Lung Aspirate - Right   Bronchial Washings Culture - Final


04/13/17 16:40   Lung - Right   Acid Fast Bacilli Smear - Final


04/13/17 16:40   Lung - Right   Acid Fast Bacilli Culture - Preliminary


04/13/17 16:40   Lung - Right   Fungal Culture - Preliminary














- Labs


CBC & Chem 7: 


 04/19/17 05:55





 04/19/17 05:55


Labs: 


 Abnormal Lab Results - Last 24 Hours (Table)











  04/17/17 04/17/17 04/17/17 Range/Units





  18:09 20:25 22:37 


 


WBC     (3.8-10.6)  k/uL


 


RBC     (4.30-5.90)  m/uL


 


Hgb     (13.0-17.5)  gm/dL


 


Hct     (39.0-53.0)  %


 


MCV     (80.0-100.0)  fL


 


Neutrophils #     (1.3-7.7)  k/uL


 


ABG pO2     ()  mmHg


 


ABG HCO3     (21-25)  mmol/L


 


ABG Total CO2     (19-24)  mmol/L


 


BUN     (9-20)  mg/dL


 


Glucose     (74-99)  mg/dL


 


POC Glucose (mg/dL)  124 H  143 H  131 H  (75-99)  mg/dL


 


Magnesium     (1.6-2.3)  mg/dL


 


Total Protein     (6.3-8.2)  g/dL


 


Albumin     (3.5-5.0)  g/dL














  04/18/17 04/18/17 04/18/17 Range/Units





  00:27 02:27 03:59 


 


WBC     (3.8-10.6)  k/uL


 


RBC     (4.30-5.90)  m/uL


 


Hgb     (13.0-17.5)  gm/dL


 


Hct     (39.0-53.0)  %


 


MCV     (80.0-100.0)  fL


 


Neutrophils #     (1.3-7.7)  k/uL


 


ABG pO2     ()  mmHg


 


ABG HCO3     (21-25)  mmol/L


 


ABG Total CO2     (19-24)  mmol/L


 


BUN     (9-20)  mg/dL


 


Glucose     (74-99)  mg/dL


 


POC Glucose (mg/dL)  153 H  148 H  178 H  (75-99)  mg/dL


 


Magnesium     (1.6-2.3)  mg/dL


 


Total Protein     (6.3-8.2)  g/dL


 


Albumin     (3.5-5.0)  g/dL














  04/18/17 04/18/17 04/18/17 Range/Units





  04:00 04:00 05:01 


 


WBC   10.9 H   (3.8-10.6)  k/uL


 


RBC   3.19 L   (4.30-5.90)  m/uL


 


Hgb   10.3 L   (13.0-17.5)  gm/dL


 


Hct   32.0 L   (39.0-53.0)  %


 


MCV   100.4 H   (80.0-100.0)  fL


 


Neutrophils #   8.6 H   (1.3-7.7)  k/uL


 


ABG pO2     ()  mmHg


 


ABG HCO3    28 H  (21-25)  mmol/L


 


ABG Total CO2    29 H  (19-24)  mmol/L


 


BUN  31 H    (9-20)  mg/dL


 


Glucose  170 H    (74-99)  mg/dL


 


POC Glucose (mg/dL)     (75-99)  mg/dL


 


Magnesium  2.5 H    (1.6-2.3)  mg/dL


 


Total Protein  5.6 L    (6.3-8.2)  g/dL


 


Albumin  2.9 L    (3.5-5.0)  g/dL














  04/18/17 04/18/17 04/18/17 Range/Units





  07:37 10:24 10:56 


 


WBC     (3.8-10.6)  k/uL


 


RBC     (4.30-5.90)  m/uL


 


Hgb     (13.0-17.5)  gm/dL


 


Hct     (39.0-53.0)  %


 


MCV     (80.0-100.0)  fL


 


Neutrophils #     (1.3-7.7)  k/uL


 


ABG pO2    77 L  ()  mmHg


 


ABG HCO3    26 H  (21-25)  mmol/L


 


ABG Total CO2    27 H  (19-24)  mmol/L


 


BUN     (9-20)  mg/dL


 


Glucose     (74-99)  mg/dL


 


POC Glucose (mg/dL)  166 H  148 H   (75-99)  mg/dL


 


Magnesium     (1.6-2.3)  mg/dL


 


Total Protein     (6.3-8.2)  g/dL


 


Albumin     (3.5-5.0)  g/dL














  04/18/17 04/18/17 04/18/17 Range/Units





  12:44 14:14 16:08 


 


WBC     (3.8-10.6)  k/uL


 


RBC     (4.30-5.90)  m/uL


 


Hgb     (13.0-17.5)  gm/dL


 


Hct     (39.0-53.0)  %


 


MCV     (80.0-100.0)  fL


 


Neutrophils #     (1.3-7.7)  k/uL


 


ABG pO2     ()  mmHg


 


ABG HCO3     (21-25)  mmol/L


 


ABG Total CO2     (19-24)  mmol/L


 


BUN     (9-20)  mg/dL


 


Glucose     (74-99)  mg/dL


 


POC Glucose (mg/dL)  141 H  149 H  156 H  (75-99)  mg/dL


 


Magnesium     (1.6-2.3)  mg/dL


 


Total Protein     (6.3-8.2)  g/dL


 


Albumin     (3.5-5.0)  g/dL














Assessment and Plan


Plan: 


1.  CAD, [Status post CABG].


2. [Acute hypoxic respiratory failure secondary to the above].


3. [Exacerbation COPD].


4. [Possible early DTs, on CIWA protocol].


5.  Right upper and lower lobe collapse secondary to mucus plugging status post 

bronchoscopy].


6. [Hypertension].


7. [Ongoing Nicotine dependence, chewing tobacco














Plan: Continue on current medication regime ,monitoring and symptomatic 

treatment.  Maintain CIWA protocol.  Tube feeds at goal, maintained on IV 

steroids, continue with insulin drip.  GI and DVT prophylaxis in place.  

Further recommendations to follow.








The impression and plan of care has been dictated as directed.





.:


I performed a H&P examination of this patient and discussed the same with the 

dictator.  I agree with the dictator's note.  Any additional findings/opinions/

etc. will be noted.

## 2017-04-19 NOTE — P.PN
Subjective





Date of service 04/19/2017.





Progress note being dictated for Dr. Hess.











Interval history: This is a 61-year-old gentleman status post CABG, status post 

bronchoscopy related to significant mucus plugging, failure to wean, suspected 

DTs , history of EtOH abuse and multiple other medical issues.  ABGs noted, 

FiO2 maintained at 80%/PEEP increased to +13.  Chest x-ray stable. Maintained 

on diprovan, CIWA protocol and insulin drips. Continues on nebulized 

bronchodilators, IV steroids. No weaning trials today per pulmonary.  

Tolerating tube feeds at goal With minimal to no residuals.  T-max 99.3, and 

WBC mildly increased up to 13.2 in a patient on steroids.  Potassium 5.2 

.Telemetry sinus rhythm.  





Objective





- Vital Signs


Vital signs: 


 Vital Signs











Temp  99.0 F   04/19/17 08:00


 


Pulse  78   04/19/17 13:00


 


Resp  17   04/19/17 13:00


 


BP  103/70   04/19/17 13:00


 


Pulse Ox  93 L  04/19/17 13:00








 Intake & Output











 04/18/17 04/19/17 04/19/17





 18:59 06:59 18:59


 


Intake Total 4078.970 8591.083 639.574


 


Output Total 815 1100 1900


 


Balance 548.175 48.083 -1260.426


 


Weight 97.3 kg 97.5 kg 97.5 kg


 


Intake:   


 


  Intake, IV Titration 423.175 348.083 229.574





  Amount   


 


    Insulin Regular 100 unit 0 12.142 6.870





    In Sodium Chloride 0.9%   





    100 ml @ Per Protocol IV   





    .Q0M ELIER Rx#:[id number]   


 


    Lactated Ringers 1,000 ml 240 240 140





    @ 20 mls/hr IV .Q24H ELIER   





    Rx#:[id number]   


 


    Propofol 500 mg In Empty 183.175 95.941 82.704





    Bag 1 bag @ Titrate IV .   





    Q0M ELIER Rx#:[id number]   


 


  Tube Feeding 850 800 350


 


  Other 90  60


 


Output:   


 


  Urine 815 1100 1900


 


Other:   


 


  Voiding Method Indwelling Catheter Indwelling Catheter Indwelling Catheter


 


  # Bowel Movements  0 0








 ABP, PAP, CO, CI - Last Documented











Arterial Blood Pressure        124/64


 


Pulmonary Artery Pressure      46/23


 


Cardiac Output                 6.8


 


Cardiac Index                  3.3

















- Exam


PHYSICAL EXAM:


VITAL SIGNS: As above


GENERAL: [Sedated, intubated 


HEENT: [Pupils equal, conjunctiva normal.  Mucosa moist.  Endotracheal tube 

present. ]


NECK: [Supple, no JVD]


RESPIRATORY EFFORT:[Normal]


LUNGS:  [Diminished throughout, bibasilar crackles, rhonchi]


CARDIOVASCULAR[regular S1 and S2, no murmurs rubs or gallops, mild edema]


GI: [Abdomen soft, nontender, positive bowel sounds.]


PSYCH: [Alert and oriented -3, mood and affect normal.]


NEURO: Unable to assess, patient sedated on Diprovan and vent dependent





 








 Microbiology





04/13/17 16:40   Lung Aspirate - Right   Gram Stain - Final


04/13/17 16:40   Lung Aspirate - Right   Bronchial Washings Culture - Final


04/13/17 16:40   Lung - Right   Acid Fast Bacilli Smear - Final


04/13/17 16:40   Lung - Right   Acid Fast Bacilli Culture - Preliminary


04/13/17 16:40   Lung - Right   Fungal Culture - Preliminary














- Labs


CBC & Chem 7: 


 04/19/17 05:55





 04/19/17 05:55


Labs: 


 Abnormal Lab Results - Last 24 Hours (Table)











  04/18/17 04/18/17 04/18/17 Range/Units





  14:14 16:08 18:10 


 


WBC     (3.8-10.6)  k/uL


 


RBC     (4.30-5.90)  m/uL


 


Hgb     (13.0-17.5)  gm/dL


 


Hct     (39.0-53.0)  %


 


MCV     (80.0-100.0)  fL


 


Neutrophils #     (1.3-7.7)  k/uL


 


APTT     (22.0-30.0)  sec


 


ABG pH     (7.35-7.45)  


 


ABG pO2     ()  mmHg


 


ABG HCO3     (21-25)  mmol/L


 


ABG Total CO2     (19-24)  mmol/L


 


Potassium     (3.5-5.1)  mmol/L


 


BUN     (9-20)  mg/dL


 


Creatinine     (0.66-1.25)  mg/dL


 


Glucose     (74-99)  mg/dL


 


POC Glucose (mg/dL)  149 H  156 H  135 H  (75-99)  mg/dL


 


Magnesium     (1.6-2.3)  mg/dL


 


Alkaline Phosphatase     ()  U/L


 


Total Protein     (6.3-8.2)  g/dL


 


Albumin     (3.5-5.0)  g/dL














  04/18/17 04/18/17 04/19/17 Range/Units





  20:12 22:19 00:09 


 


WBC     (3.8-10.6)  k/uL


 


RBC     (4.30-5.90)  m/uL


 


Hgb     (13.0-17.5)  gm/dL


 


Hct     (39.0-53.0)  %


 


MCV     (80.0-100.0)  fL


 


Neutrophils #     (1.3-7.7)  k/uL


 


APTT     (22.0-30.0)  sec


 


ABG pH     (7.35-7.45)  


 


ABG pO2     ()  mmHg


 


ABG HCO3     (21-25)  mmol/L


 


ABG Total CO2     (19-24)  mmol/L


 


Potassium     (3.5-5.1)  mmol/L


 


BUN     (9-20)  mg/dL


 


Creatinine     (0.66-1.25)  mg/dL


 


Glucose     (74-99)  mg/dL


 


POC Glucose (mg/dL)  141 H  126 H  144 H  (75-99)  mg/dL


 


Magnesium     (1.6-2.3)  mg/dL


 


Alkaline Phosphatase     ()  U/L


 


Total Protein     (6.3-8.2)  g/dL


 


Albumin     (3.5-5.0)  g/dL














  04/19/17 04/19/17 04/19/17 Range/Units





  03:13 05:53 05:55 


 


WBC    13.2 H  (3.8-10.6)  k/uL


 


RBC    3.20 L  (4.30-5.90)  m/uL


 


Hgb    10.7 L  (13.0-17.5)  gm/dL


 


Hct    32.2 L  (39.0-53.0)  %


 


MCV    100.6 H  (80.0-100.0)  fL


 


Neutrophils #    10.3 H  (1.3-7.7)  k/uL


 


APTT     (22.0-30.0)  sec


 


ABG pH   7.46 H   (7.35-7.45)  


 


ABG pO2   79 L   ()  mmHg


 


ABG HCO3   27 H   (21-25)  mmol/L


 


ABG Total CO2   28 H   (19-24)  mmol/L


 


Potassium     (3.5-5.1)  mmol/L


 


BUN     (9-20)  mg/dL


 


Creatinine     (0.66-1.25)  mg/dL


 


Glucose     (74-99)  mg/dL


 


POC Glucose (mg/dL)  126 H    (75-99)  mg/dL


 


Magnesium     (1.6-2.3)  mg/dL


 


Alkaline Phosphatase     ()  U/L


 


Total Protein     (6.3-8.2)  g/dL


 


Albumin     (3.5-5.0)  g/dL














  04/19/17 04/19/17 04/19/17 Range/Units





  05:55 05:55 07:50 


 


WBC     (3.8-10.6)  k/uL


 


RBC     (4.30-5.90)  m/uL


 


Hgb     (13.0-17.5)  gm/dL


 


Hct     (39.0-53.0)  %


 


MCV     (80.0-100.0)  fL


 


Neutrophils #     (1.3-7.7)  k/uL


 


APTT   19.5 L   (22.0-30.0)  sec


 


ABG pH     (7.35-7.45)  


 


ABG pO2     ()  mmHg


 


ABG HCO3     (21-25)  mmol/L


 


ABG Total CO2     (19-24)  mmol/L


 


Potassium  5.2 H    (3.5-5.1)  mmol/L


 


BUN  33 H    (9-20)  mg/dL


 


Creatinine  0.62 L    (0.66-1.25)  mg/dL


 


Glucose  144 H    (74-99)  mg/dL


 


POC Glucose (mg/dL)    143 H  (75-99)  mg/dL


 


Magnesium  2.5 H    (1.6-2.3)  mg/dL


 


Alkaline Phosphatase  35 L    ()  U/L


 


Total Protein  6.0 L    (6.3-8.2)  g/dL


 


Albumin  3.0 L    (3.5-5.0)  g/dL














  04/19/17 04/19/17 04/19/17 Range/Units





  09:57 12:19 13:47 


 


WBC     (3.8-10.6)  k/uL


 


RBC     (4.30-5.90)  m/uL


 


Hgb     (13.0-17.5)  gm/dL


 


Hct     (39.0-53.0)  %


 


MCV     (80.0-100.0)  fL


 


Neutrophils #     (1.3-7.7)  k/uL


 


APTT     (22.0-30.0)  sec


 


ABG pH     (7.35-7.45)  


 


ABG pO2     ()  mmHg


 


ABG HCO3     (21-25)  mmol/L


 


ABG Total CO2     (19-24)  mmol/L


 


Potassium     (3.5-5.1)  mmol/L


 


BUN     (9-20)  mg/dL


 


Creatinine     (0.66-1.25)  mg/dL


 


Glucose     (74-99)  mg/dL


 


POC Glucose (mg/dL)  136 H  160 H  153 H  (75-99)  mg/dL


 


Magnesium     (1.6-2.3)  mg/dL


 


Alkaline Phosphatase     ()  U/L


 


Total Protein     (6.3-8.2)  g/dL


 


Albumin     (3.5-5.0)  g/dL














Assessment and Plan


Plan: 


1.  CAD, [Status post CABG].


2. [Acute hypoxic respiratory failure secondary to the above].


3. [Exacerbation COPD].


4. [Possible early DTs, on CIWA protocol].


5.  Right upper and lower lobe collapse secondary to mucus plugging status post 

bronchoscopy].


6. [Hypertension].


7. [Ongoing Nicotine dependence, chewing tobacco


8.  Bibasilar atelectasis

















Plan: Continue on current medication regime ,monitoring and symptomatic 

treatment.  Maintain CIWA protocol.  Continues on insulin drip, as patient is 

maintained on IV steroids.  Close monitoring of electrolytes. GI and DVT 

prophylaxis in place.  Further recommendations to follow.








The impression and plan of care has been dictated as directed.





:


I performed a H&P examination of this patient and discussed the same with the 

dictator.  I agree with the dictator's note.  Any additional findings/opinions/

etc. will be noted.

## 2017-04-19 NOTE — P.PN
Subjective





Progress note dated 04/18/2017





This is a 61-year-old male who is status post bypass grafting.  He is postop 

day #5.  He remains intubated on mechanical ventilator.  His vent settings 

include the assist control mode rate is 16 to be increased to 20 a tidal [place]yme 

of 500 be decreased to 450 and FiO2 of 90% and a PEEP of 5 which will be 

increased to 10.  His arterial blood gases on those settings show a pO2 of only 

84 pCO2 of 45 and appears to some 0.40.  This was on 90%.  He is getting 

lactated Ringer's at 20 mL an hour the Diprovan at 25 mics per kilogram per 

minute and insulin drip at 1 unit per hour and vital HP 50 with a goal of 50.  

Chest x-rays essentially unchanged.  He does have a small left pleural effusion.





Progress note dated 04/19/2017





61-year-old male who is status post bypass grafting.  He is postop day #6.  He 

is intubated and on mechanical ventilator.  He's had problems with hypoxemia.  

We will try to increase his PEEP up to 13.  Currently he is on the assist 

control mode rate of 2010 of I'm 4 5080% PEEP of 13.  It was just increased.  

Blood gases on a PEEP of 10 show a PaO2 of 79 a PaCO2 of 39 and a pH of 7.46.  

The patient is currently on propofol at 20 mics per kilogram per minute 

lactated Ringer's at 20 mL an hour insulin at 1 unit per hour and vital high 

protein at a rate of 50 with a goal of 50.  He has not made much in the way of 

progress.  Chest x-ray though is stable.





Objective





- Vital Signs


Vital signs: 


 Vital Signs











Temp  99.0 F   04/19/17 08:00


 


Pulse  86   04/19/17 10:00


 


Resp  20   04/19/17 10:00


 


BP  124/73   04/19/17 10:00


 


Pulse Ox  93 L  04/19/17 10:00








 Intake & Output











 04/18/17 04/19/17 04/19/17





 18:59 06:59 18:59


 


Intake Total 5566.292 3164.083 360


 


Output Total 815 1100 900


 


Balance 548.175 48.083 -540


 


Weight 97.3 kg 97.5 kg 


 


Intake:   


 


  Intake, IV Titration 423.175 348.083 130





  Amount   


 


    Insulin Regular 100 unit 0 12.142 





    In Sodium Chloride 0.9%   





    100 ml @ Per Protocol IV   





    .Q0M ELIER Rx#:[id number]   


 


    Lactated Ringers 1,000 ml 240 240 80





    @ 20 mls/hr IV .Q24H ELIER   





    Rx#:[id number]   


 


    Propofol 500 mg In Empty 183.175 95.941 50





    Bag 1 bag @ Titrate IV .   





    Q0M ELIER Rx#:[id number]   


 


  Tube Feeding 850 800 200


 


  Other 90  30


 


Output:   


 


  Urine 815 1100 900


 


Other:   


 


  Voiding Method Indwelling Catheter Indwelling Catheter 


 


  # Bowel Movements  0 








 ABP, PAP, CO, CI - Last Documented











Arterial Blood Pressure        151/88


 


Pulmonary Artery Pressure      46/23


 


Cardiac Output                 6.8


 


Cardiac Index                  3.3

















- Exam





Now no acute distress.  The patient remains intubated and sedated.





HEENT examination is grossly unremarkable.  Mucous members are moist.  

Endotracheal and NG tube noted.





Neck supple.  Full range of motion.  No adenopathy or thyromegaly.  Neck veins 

are flat.





Cardiovascular examination reveals regular rhythm rate.  S1-S2 normal.  No 

distinct murmur noted.





Lungs reveal diminished breath sounds throughout.  A few scattered crackles.  A 

few rhonchi are noted.





Abdomen soft bowel sounds are heard.





Extremities are intact.  There is mild edema.





Skin without rash.





Neurologic examination cannot be adequately performed.





- Labs


CBC & Chem 7: 


 04/19/17 05:55





 04/19/17 05:55


Labs: 


 Abnormal Lab Results - Last 24 Hours (Table)











  04/18/17 04/18/17 04/18/17 Range/Units





  10:24 10:56 12:44 


 


WBC     (3.8-10.6)  k/uL


 


RBC     (4.30-5.90)  m/uL


 


Hgb     (13.0-17.5)  gm/dL


 


Hct     (39.0-53.0)  %


 


MCV     (80.0-100.0)  fL


 


Neutrophils #     (1.3-7.7)  k/uL


 


APTT     (22.0-30.0)  sec


 


ABG pH     (7.35-7.45)  


 


ABG pO2   77 L   ()  mmHg


 


ABG HCO3   26 H   (21-25)  mmol/L


 


ABG Total CO2   27 H   (19-24)  mmol/L


 


Potassium     (3.5-5.1)  mmol/L


 


BUN     (9-20)  mg/dL


 


Creatinine     (0.66-1.25)  mg/dL


 


Glucose     (74-99)  mg/dL


 


POC Glucose (mg/dL)  148 H   141 H  (75-99)  mg/dL


 


Magnesium     (1.6-2.3)  mg/dL


 


Alkaline Phosphatase     ()  U/L


 


Total Protein     (6.3-8.2)  g/dL


 


Albumin     (3.5-5.0)  g/dL














  04/18/17 04/18/17 04/18/17 Range/Units





  14:14 16:08 18:10 


 


WBC     (3.8-10.6)  k/uL


 


RBC     (4.30-5.90)  m/uL


 


Hgb     (13.0-17.5)  gm/dL


 


Hct     (39.0-53.0)  %


 


MCV     (80.0-100.0)  fL


 


Neutrophils #     (1.3-7.7)  k/uL


 


APTT     (22.0-30.0)  sec


 


ABG pH     (7.35-7.45)  


 


ABG pO2     ()  mmHg


 


ABG HCO3     (21-25)  mmol/L


 


ABG Total CO2     (19-24)  mmol/L


 


Potassium     (3.5-5.1)  mmol/L


 


BUN     (9-20)  mg/dL


 


Creatinine     (0.66-1.25)  mg/dL


 


Glucose     (74-99)  mg/dL


 


POC Glucose (mg/dL)  149 H  156 H  135 H  (75-99)  mg/dL


 


Magnesium     (1.6-2.3)  mg/dL


 


Alkaline Phosphatase     ()  U/L


 


Total Protein     (6.3-8.2)  g/dL


 


Albumin     (3.5-5.0)  g/dL














  04/18/17 04/18/17 04/19/17 Range/Units





  20:12 22:19 00:09 


 


WBC     (3.8-10.6)  k/uL


 


RBC     (4.30-5.90)  m/uL


 


Hgb     (13.0-17.5)  gm/dL


 


Hct     (39.0-53.0)  %


 


MCV     (80.0-100.0)  fL


 


Neutrophils #     (1.3-7.7)  k/uL


 


APTT     (22.0-30.0)  sec


 


ABG pH     (7.35-7.45)  


 


ABG pO2     ()  mmHg


 


ABG HCO3     (21-25)  mmol/L


 


ABG Total CO2     (19-24)  mmol/L


 


Potassium     (3.5-5.1)  mmol/L


 


BUN     (9-20)  mg/dL


 


Creatinine     (0.66-1.25)  mg/dL


 


Glucose     (74-99)  mg/dL


 


POC Glucose (mg/dL)  141 H  126 H  144 H  (75-99)  mg/dL


 


Magnesium     (1.6-2.3)  mg/dL


 


Alkaline Phosphatase     ()  U/L


 


Total Protein     (6.3-8.2)  g/dL


 


Albumin     (3.5-5.0)  g/dL














  04/19/17 04/19/17 04/19/17 Range/Units





  03:13 05:53 05:55 


 


WBC    13.2 H  (3.8-10.6)  k/uL


 


RBC    3.20 L  (4.30-5.90)  m/uL


 


Hgb    10.7 L  (13.0-17.5)  gm/dL


 


Hct    32.2 L  (39.0-53.0)  %


 


MCV    100.6 H  (80.0-100.0)  fL


 


Neutrophils #    10.3 H  (1.3-7.7)  k/uL


 


APTT     (22.0-30.0)  sec


 


ABG pH   7.46 H   (7.35-7.45)  


 


ABG pO2   79 L   ()  mmHg


 


ABG HCO3   27 H   (21-25)  mmol/L


 


ABG Total CO2   28 H   (19-24)  mmol/L


 


Potassium     (3.5-5.1)  mmol/L


 


BUN     (9-20)  mg/dL


 


Creatinine     (0.66-1.25)  mg/dL


 


Glucose     (74-99)  mg/dL


 


POC Glucose (mg/dL)  126 H    (75-99)  mg/dL


 


Magnesium     (1.6-2.3)  mg/dL


 


Alkaline Phosphatase     ()  U/L


 


Total Protein     (6.3-8.2)  g/dL


 


Albumin     (3.5-5.0)  g/dL














  04/19/17 04/19/17 04/19/17 Range/Units





  05:55 05:55 07:50 


 


WBC     (3.8-10.6)  k/uL


 


RBC     (4.30-5.90)  m/uL


 


Hgb     (13.0-17.5)  gm/dL


 


Hct     (39.0-53.0)  %


 


MCV     (80.0-100.0)  fL


 


Neutrophils #     (1.3-7.7)  k/uL


 


APTT   19.5 L   (22.0-30.0)  sec


 


ABG pH     (7.35-7.45)  


 


ABG pO2     ()  mmHg


 


ABG HCO3     (21-25)  mmol/L


 


ABG Total CO2     (19-24)  mmol/L


 


Potassium  5.2 H    (3.5-5.1)  mmol/L


 


BUN  33 H    (9-20)  mg/dL


 


Creatinine  0.62 L    (0.66-1.25)  mg/dL


 


Glucose  144 H    (74-99)  mg/dL


 


POC Glucose (mg/dL)    143 H  (75-99)  mg/dL


 


Magnesium  2.5 H    (1.6-2.3)  mg/dL


 


Alkaline Phosphatase  35 L    ()  U/L


 


Total Protein  6.0 L    (6.3-8.2)  g/dL


 


Albumin  3.0 L    (3.5-5.0)  g/dL














  04/19/17 Range/Units





  09:57 


 


WBC   (3.8-10.6)  k/uL


 


RBC   (4.30-5.90)  m/uL


 


Hgb   (13.0-17.5)  gm/dL


 


Hct   (39.0-53.0)  %


 


MCV   (80.0-100.0)  fL


 


Neutrophils #   (1.3-7.7)  k/uL


 


APTT   (22.0-30.0)  sec


 


ABG pH   (7.35-7.45)  


 


ABG pO2   ()  mmHg


 


ABG HCO3   (21-25)  mmol/L


 


ABG Total CO2   (19-24)  mmol/L


 


Potassium   (3.5-5.1)  mmol/L


 


BUN   (9-20)  mg/dL


 


Creatinine   (0.66-1.25)  mg/dL


 


Glucose   (74-99)  mg/dL


 


POC Glucose (mg/dL)  136 H  (75-99)  mg/dL


 


Magnesium   (1.6-2.3)  mg/dL


 


Alkaline Phosphatase   ()  U/L


 


Total Protein   (6.3-8.2)  g/dL


 


Albumin   (3.5-5.0)  g/dL














Assessment and Plan


(1) Postoperative respiratory failure


Status: Acute   





(2) Status post coronary artery bypass graft


Status: Acute   





(3) Coronary artery disease


Status: Acute   





(4) History of PTCA


Status: Acute   





(5) Hyperlipidemia


Status: Acute   





(6) Hypertension


Status: Acute   





(7) Nicotine dependence, chewing tobacco, uncomplicated


Status: Acute   


Plan: 





Plan dated 04/18/2017





The patient's vent will be changed a bit.  We'll bump his rate up from 16-20.  

We'll decrease the tidal volume from 500 to 450.  We'll increase the PEEP of 5-

10.  I've asked respiratory to try to titrate down the FiO2.  Hopefully this 

will happen once we increase the PEEP from 5-10.  We'll continue to follow 

closely.  X-rays labs medications are all reviewed.  Prognosis is guarded.





Plan dated 04/19/2017





The patient's vent will be changed a bit as we increase the PEEP to 13.  I 

asked the respiratory therapist guarded titrate down the FiO2.  Labs x-rays and 

medications are all reviewed.  Additional recommendations suggestions are 

forthcoming.  Prognosis is guarded.


Time with Patient: Greater than 30

## 2017-04-20 LAB
ALP SERPL-CCNC: 57 U/L (ref 38–126)
ALT SERPL-CCNC: 42 U/L (ref 21–72)
ANION GAP SERPL CALC-SCNC: 9 MMOL/L
APTT BLD: 22.3 SEC (ref 22–30)
AST SERPL-CCNC: 31 U/L (ref 17–59)
BASOPHILS # BLD AUTO: 0 K/UL (ref 0–0.2)
BASOPHILS NFR BLD AUTO: 0 %
BUN SERPL-SCNC: 48 MG/DL (ref 9–20)
CALCIUM SPEC-MCNC: 8.6 MG/DL (ref 8.4–10.2)
CH: 33.1
CHCM: 34.4
CHLORIDE SERPL-SCNC: 105 MMOL/L (ref 98–107)
CO2 BLDA-SCNC: 21 MMOL/L (ref 19–24)
CO2 SERPL-SCNC: 25 MMOL/L (ref 22–30)
EOSINOPHIL # BLD AUTO: 0 K/UL (ref 0–0.7)
EOSINOPHIL NFR BLD AUTO: 0 %
ERYTHROCYTE [DISTWIDTH] IN BLOOD BY AUTOMATED COUNT: 3.16 M/UL (ref 4.3–5.9)
ERYTHROCYTE [DISTWIDTH] IN BLOOD: 14.3 % (ref 11.5–15.5)
GLUCOSE BLD-MCNC: 119 MG/DL (ref 75–99)
GLUCOSE BLD-MCNC: 127 MG/DL (ref 75–99)
GLUCOSE BLD-MCNC: 130 MG/DL (ref 75–99)
GLUCOSE BLD-MCNC: 134 MG/DL (ref 75–99)
GLUCOSE BLD-MCNC: 141 MG/DL (ref 75–99)
GLUCOSE BLD-MCNC: 142 MG/DL (ref 75–99)
GLUCOSE BLD-MCNC: 145 MG/DL (ref 75–99)
GLUCOSE BLD-MCNC: 145 MG/DL (ref 75–99)
GLUCOSE BLD-MCNC: 151 MG/DL (ref 75–99)
GLUCOSE BLD-MCNC: 155 MG/DL (ref 75–99)
GLUCOSE BLD-MCNC: 157 MG/DL (ref 75–99)
GLUCOSE BLD-MCNC: 160 MG/DL (ref 75–99)
GLUCOSE BLD-MCNC: 162 MG/DL (ref 75–99)
GLUCOSE BLD-MCNC: 174 MG/DL (ref 75–99)
GLUCOSE BLD-MCNC: 177 MG/DL (ref 75–99)
GLUCOSE BLD-MCNC: 178 MG/DL (ref 75–99)
GLUCOSE BLD-MCNC: 182 MG/DL (ref 75–99)
GLUCOSE BLD-MCNC: 191 MG/DL (ref 75–99)
GLUCOSE SERPL-MCNC: 162 MG/DL (ref 74–99)
HCO3 BLDA-SCNC: 21 MMOL/L (ref 21–25)
HCT VFR BLD AUTO: 30.5 % (ref 39–53)
HDW: 2.36
HGB BLD-MCNC: 10.5 GM/DL (ref 13–17.5)
INR PPP: 1 (ref ?–1.1)
LUC NFR BLD AUTO: 2 %
LYMPHOCYTES # SPEC AUTO: 1.2 K/UL (ref 1–4.8)
LYMPHOCYTES NFR SPEC AUTO: 9 %
MAGNESIUM SPEC-SCNC: 2.4 MG/DL (ref 1.6–2.3)
MCH RBC QN AUTO: 33.3 PG (ref 25–35)
MCHC RBC AUTO-ENTMCNC: 34.4 G/DL (ref 31–37)
MCV RBC AUTO: 96.7 FL (ref 80–100)
MONOCYTES # BLD AUTO: 1.1 K/UL (ref 0–1)
MONOCYTES NFR BLD AUTO: 8 %
NEUTROPHILS # BLD AUTO: 11.4 K/UL (ref 1.3–7.7)
NEUTROPHILS NFR BLD AUTO: 82 %
NON-AFRICAN AMERICAN GFR(MDRD): >60
PCO2 BLDA: 29 MMHG (ref 35–45)
PH BLDA: 7.47 [PH] (ref 7.35–7.45)
PHOSPHATE SERPL-MCNC: 5.2 MG/DL (ref 2.5–4.5)
PO2 BLDA: 169 MMHG (ref 83–108)
POTASSIUM SERPL-SCNC: 4.4 MMOL/L (ref 3.5–5.1)
PROT SERPL-MCNC: 5.4 G/DL (ref 6.3–8.2)
PT BLD: 9.9 SEC (ref 9–12)
SODIUM SERPL-SCNC: 139 MMOL/L (ref 137–145)
WBC # BLD AUTO: 0.25 10*3/UL
WBC # BLD AUTO: 14 K/UL (ref 3.8–10.6)
WBC (PEROX): 14.79

## 2017-04-20 PROCEDURE — B548ZZA ULTRASONOGRAPHY OF SUPERIOR VENA CAVA, GUIDANCE: ICD-10-PCS

## 2017-04-20 PROCEDURE — 02HV33Z INSERTION OF INFUSION DEVICE INTO SUPERIOR VENA CAVA, PERCUTANEOUS APPROACH: ICD-10-PCS

## 2017-04-20 RX ADMIN — PROPOFOL SCH MLS/HR: 10 INJECTION, EMULSION INTRAVENOUS at 03:14

## 2017-04-20 RX ADMIN — PROPOFOL SCH MLS/HR: 10 INJECTION, EMULSION INTRAVENOUS at 20:24

## 2017-04-20 RX ADMIN — HEPARIN SODIUM SCH UNIT: 5000 INJECTION, SOLUTION INTRAVENOUS; SUBCUTANEOUS at 23:58

## 2017-04-20 RX ADMIN — MORPHINE SULFATE PRN MG: 2 INJECTION, SOLUTION INTRAMUSCULAR; INTRAVENOUS at 22:59

## 2017-04-20 RX ADMIN — AMIODARONE HYDROCHLORIDE SCH MLS/HR: 50 INJECTION, SOLUTION INTRAVENOUS at 00:53

## 2017-04-20 RX ADMIN — PROPOFOL SCH MLS/HR: 10 INJECTION, EMULSION INTRAVENOUS at 09:41

## 2017-04-20 RX ADMIN — IPRATROPIUM BROMIDE AND ALBUTEROL SULFATE SCH ML: .5; 3 SOLUTION RESPIRATORY (INHALATION) at 19:22

## 2017-04-20 RX ADMIN — LORAZEPAM PRN MG: 2 INJECTION, SOLUTION INTRAMUSCULAR; INTRAVENOUS at 21:25

## 2017-04-20 RX ADMIN — CHLORHEXIDINE GLUCONATE SCH ML: 1.2 RINSE ORAL at 20:17

## 2017-04-20 RX ADMIN — DIGOXIN SCH MCG: 0.25 INJECTION INTRAMUSCULAR; INTRAVENOUS at 10:38

## 2017-04-20 RX ADMIN — IPRATROPIUM BROMIDE AND ALBUTEROL SULFATE SCH ML: .5; 3 SOLUTION RESPIRATORY (INHALATION) at 07:42

## 2017-04-20 RX ADMIN — PROPOFOL SCH MLS/HR: 10 INJECTION, EMULSION INTRAVENOUS at 13:08

## 2017-04-20 RX ADMIN — IPRATROPIUM BROMIDE AND ALBUTEROL SULFATE SCH ML: .5; 3 SOLUTION RESPIRATORY (INHALATION) at 11:44

## 2017-04-20 RX ADMIN — PROPOFOL SCH MLS/HR: 10 INJECTION, EMULSION INTRAVENOUS at 18:21

## 2017-04-20 RX ADMIN — PROPOFOL SCH MLS/HR: 10 INJECTION, EMULSION INTRAVENOUS at 15:06

## 2017-04-20 RX ADMIN — METHYLPREDNISOLONE SODIUM SUCCINATE SCH MG: 40 INJECTION, POWDER, FOR SOLUTION INTRAMUSCULAR; INTRAVENOUS at 20:08

## 2017-04-20 RX ADMIN — HYDROCODONE BITARTRATE AND ACETAMINOPHEN PRN EACH: 5; 325 TABLET ORAL at 21:24

## 2017-04-20 RX ADMIN — IPRATROPIUM BROMIDE AND ALBUTEROL SULFATE SCH ML: .5; 3 SOLUTION RESPIRATORY (INHALATION) at 17:02

## 2017-04-20 RX ADMIN — ATORVASTATIN CALCIUM SCH MG: 40 TABLET, FILM COATED ORAL at 08:11

## 2017-04-20 RX ADMIN — HEPARIN SODIUM SCH UNIT: 5000 INJECTION, SOLUTION INTRAVENOUS; SUBCUTANEOUS at 00:53

## 2017-04-20 RX ADMIN — PROPOFOL SCH MLS/HR: 10 INJECTION, EMULSION INTRAVENOUS at 07:27

## 2017-04-20 RX ADMIN — PANTOPRAZOLE SODIUM SCH MG: 40 INJECTION, POWDER, FOR SOLUTION INTRAVENOUS at 08:12

## 2017-04-20 RX ADMIN — METOPROLOL TARTRATE SCH: 25 TABLET, FILM COATED ORAL at 09:38

## 2017-04-20 RX ADMIN — PROPOFOL SCH MLS/HR: 10 INJECTION, EMULSION INTRAVENOUS at 12:46

## 2017-04-20 RX ADMIN — IPRATROPIUM BROMIDE AND ALBUTEROL SULFATE SCH ML: .5; 3 SOLUTION RESPIRATORY (INHALATION) at 23:21

## 2017-04-20 RX ADMIN — METOPROLOL TARTRATE SCH MG: 25 TABLET, FILM COATED ORAL at 21:24

## 2017-04-20 RX ADMIN — IPRATROPIUM BROMIDE AND ALBUTEROL SULFATE SCH ML: .5; 3 SOLUTION RESPIRATORY (INHALATION) at 03:32

## 2017-04-20 RX ADMIN — SODIUM CHLORIDE, PRESERVATIVE FREE SCH ML: 5 INJECTION INTRAVENOUS at 20:08

## 2017-04-20 RX ADMIN — HEPARIN SODIUM SCH UNIT: 5000 INJECTION, SOLUTION INTRAVENOUS; SUBCUTANEOUS at 08:11

## 2017-04-20 RX ADMIN — CHLORHEXIDINE GLUCONATE SCH ML: 1.2 RINSE ORAL at 08:11

## 2017-04-20 RX ADMIN — INSULIN HUMAN SCH MLS/HR: 100 INJECTION, SOLUTION PARENTERAL at 11:48

## 2017-04-20 RX ADMIN — DOCUSATE SODIUM AND SENNOSIDES SCH EACH: 50; 8.6 TABLET ORAL at 20:11

## 2017-04-20 RX ADMIN — METHYLPREDNISOLONE SODIUM SUCCINATE SCH MG: 40 INJECTION, POWDER, FOR SOLUTION INTRAMUSCULAR; INTRAVENOUS at 08:11

## 2017-04-20 RX ADMIN — BUDESONIDE SCH MG: 1 SUSPENSION RESPIRATORY (INHALATION) at 19:22

## 2017-04-20 RX ADMIN — AMIODARONE HYDROCHLORIDE SCH MLS/HR: 50 INJECTION, SOLUTION INTRAVENOUS at 08:05

## 2017-04-20 RX ADMIN — LISINOPRIL SCH: 5 TABLET ORAL at 09:38

## 2017-04-20 RX ADMIN — LISINOPRIL SCH: 5 TABLET ORAL at 19:37

## 2017-04-20 RX ADMIN — ASPIRIN 325 MG ORAL TABLET SCH MG: 325 PILL ORAL at 08:11

## 2017-04-20 RX ADMIN — CLOPIDOGREL BISULFATE SCH MG: 75 TABLET ORAL at 08:11

## 2017-04-20 RX ADMIN — MULTIVITAMIN SCH ML: LIQUID ORAL at 13:08

## 2017-04-20 RX ADMIN — Medication SCH MG: at 08:12

## 2017-04-20 RX ADMIN — HEPARIN SODIUM SCH UNIT: 5000 INJECTION, SOLUTION INTRAVENOUS; SUBCUTANEOUS at 17:12

## 2017-04-20 RX ADMIN — PROPOFOL SCH MLS/HR: 10 INJECTION, EMULSION INTRAVENOUS at 22:58

## 2017-04-20 RX ADMIN — SODIUM CHLORIDE, PRESERVATIVE FREE SCH ML: 5 INJECTION INTRAVENOUS at 08:12

## 2017-04-20 RX ADMIN — BUDESONIDE SCH MG: 1 SUSPENSION RESPIRATORY (INHALATION) at 07:42

## 2017-04-20 RX ADMIN — POTASSIUM CHLORIDE SCH MLS/HR: 14.9 INJECTION, SOLUTION INTRAVENOUS at 17:11

## 2017-04-20 RX ADMIN — PROPOFOL SCH MLS/HR: 10 INJECTION, EMULSION INTRAVENOUS at 00:52

## 2017-04-20 RX ADMIN — Medication SCH MG: at 20:08

## 2017-04-20 NOTE — XR
EXAMINATION TYPE: XR chest 1V portable

 

DATE OF EXAM: 4/20/2017 7:03 AM

 

COMPARISON: NONE

 

INDICATION: Difficulty breathing, previous abnormal chest

 

TECHNIQUE: Single frontal view of the chest is obtained.

 

FINDINGS:  

The heart size is normal.  

The pulmonary vasculature is normal.  

Left lower lobe infiltrate is present. Small left pleural effusion is not excluded. Findings appear s
table.  

Endotracheal tube is present with tip above the anderson. Nasogastric tube transverses the thorax. Ster
notomy wires are present.

 

IMPRESSION:  

1. Left lower lobe infiltrate and/or small left pleural effusion, stable.

2. Lines and catheters discussed above

## 2017-04-20 NOTE — US
EXAMINATION TYPE: US chest

 

DATE OF EXAM: 4/20/2017 12:29 PM

 

COMPARISON: NONE

 

CLINICAL HISTORY: left pleural effusion. left pleural effusion, exam done portable in ICU

 

EXAM MEASUREMENTS:

 

Left Pleural Effusion fluid pocket:  2.7 cm

**  Left skin to fluid thickness:  3.5 cm

 

Left side marked for possible thoracentesis outside the dept.

 

Pulmonologists are able to review the images in the patient?s EMR.  

 

 

 

 

 

IMPRESSIONS: 

1. Left-sided pleural effusion

## 2017-04-20 NOTE — P.PN
Subjective


Principal diagnosis: 


Status post CABG, respiratory failure, atrial fibrillation








This 60-year-old gentleman is status post prior to coronary bypass surgery.  

Patient has been having difficulty oxygenating and has been ventilator 

dependent.  Patient also had history of alcohol withdrawal.  Adjustments are 

being made in the respiratory settings and cutting down on PEEP.  

Hemodynamically patient is otherwise stable.  Kidney function remained stable.  

Patient went into atrial fibrillation last night.  He was started on IV 

amiodarone bolus and drip.  Additional amiodarone was given this morning.  His 

heart rate in the 60-90 range.  Patient also had a PICC line placement





Objective





- Vital Signs


Vital signs: 


 Vital Signs











Temp  99.4 F   04/20/17 20:00


 


Pulse  61   04/20/17 20:00


 


Resp  20   04/20/17 20:00


 


BP  99/64   04/20/17 18:00


 


Pulse Ox  90 L  04/20/17 20:00








 Intake & Output











 04/20/17 04/20/17 04/21/17





 06:59 18:59 06:59


 


Intake Total 3935.139 6784.702 247.384


 


Output Total 440 505 100


 


Balance 636.581 7626.702 147.384


 


Weight 99.2 kg 99.2 kg 99.2 kg


 


Intake:   


 


  Intake, IV Titration 437.205 847.702 131.384





  Amount   


 


    Amiodarone 450 mg In  248.616 





    Dextrose 5% in Water 250   





    ml @ 1 MG/MIN 34.53 mls/   





    hr IV .Q7H31M [facility] Rx#:   





    [id number]   


 


    Dextrose 5% in Water 100  150 





    ml @ 618 mls/hr IV .Q10M   





    ONE with Amiodarone 150   





    mg Rx#:[id number]   


 


    Insulin Regular 100 unit 34.902 60.517 11.190





    In Sodium Chloride 0.9%   





    100 ml @ Per Protocol IV   





    .Q0M ELIER Rx#:[id number]   


 


    Lactated Ringers 1,000 ml 220 220 40





    @ 20 mls/hr IV .Q24H ELIER   





    Rx#:[id number]   


 


    Propofol 500 mg In Empty 182.303 168.569 80.194





    Bag 1 bag @ Titrate IV .   





    Q0M ELIER Rx#:[id number]   


 


  Tube Feeding 750 658 86


 


  Other 30 90 30


 


Output:   


 


  Urine 440 505 100


 


Other:   


 


  Voiding Method Indwelling Catheter Indwelling Catheter Indwelling Catheter


 


  # Bowel Movements 1 1 








 ABP, PAP, CO, CI - Last Documented











Arterial Blood Pressure        119/72


 


Pulmonary Artery Pressure      46/23


 


Cardiac Output                 6.8


 


Cardiac Index                  3.3

















- Exam





GENERAL EXAM: Patient is intubated and sedated


HEENT: Normocephalic.


NECK: No masses, no nuchal rigidity.


CHEST: No chest wall deformity.


LUNGS: Breath sounds at bases


HEART: S1 and S2 normal with no audible mumurs or gallops. Regular rhythm, 

femorals equal on both sides..


ABDOMEN: No hepatosplenomegaly, normal bowel sounds, no guarding or rigidity.


SKIN: No rashes


CENTRAL NERVOUS SYSTEM:  Sedated


EXTREMITIES: No cyanosis, clubbing or edema.





- Labs


CBC & Chem 7: 


 04/20/17 04:00





 04/20/17 04:00


Labs: 


 Abnormal Lab Results - Last 24 Hours (Table)











  04/19/17 04/19/17 04/19/17 Range/Units





  22:00 22:10 23:08 


 


WBC     (3.8-10.6)  k/uL


 


RBC     (4.30-5.90)  m/uL


 


Hgb     (13.0-17.5)  gm/dL


 


Hct     (39.0-53.0)  %


 


Neutrophils #     (1.3-7.7)  k/uL


 


Monocytes #     (0-1.0)  k/uL


 


ABG pH     (7.35-7.45)  


 


ABG pCO2     (35-45)  mmHg


 


ABG pO2     ()  mmHg


 


ABG O2 Saturation     (94-97)  %


 


BUN  40 H    (9-20)  mg/dL


 


Glucose  129 H    (74-99)  mg/dL


 


POC Glucose (mg/dL)   126 H  153 H  (75-99)  mg/dL


 


Phosphorus     (2.5-4.5)  mg/dL


 


Magnesium  2.4 H    (1.6-2.3)  mg/dL


 


Total Protein  5.5 L    (6.3-8.2)  g/dL


 


Albumin  2.8 L    (3.5-5.0)  g/dL














  04/20/17 04/20/17 04/20/17 Range/Units





  02:17 04:00 04:00 


 


WBC   14.0 H   (3.8-10.6)  k/uL


 


RBC   3.16 L   (4.30-5.90)  m/uL


 


Hgb   10.5 L   (13.0-17.5)  gm/dL


 


Hct   30.5 L   (39.0-53.0)  %


 


Neutrophils #   11.4 H   (1.3-7.7)  k/uL


 


Monocytes #   1.1 H   (0-1.0)  k/uL


 


ABG pH     (7.35-7.45)  


 


ABG pCO2     (35-45)  mmHg


 


ABG pO2     ()  mmHg


 


ABG O2 Saturation     (94-97)  %


 


BUN    48 H  (9-20)  mg/dL


 


Glucose    162 H  (74-99)  mg/dL


 


POC Glucose (mg/dL)  191 H    (75-99)  mg/dL


 


Phosphorus    5.2 H  (2.5-4.5)  mg/dL


 


Magnesium    2.4 H  (1.6-2.3)  mg/dL


 


Total Protein    5.4 L  (6.3-8.2)  g/dL


 


Albumin    2.7 L  (3.5-5.0)  g/dL














  04/20/17 04/20/17 04/20/17 Range/Units





  04:57 06:46 08:04 


 


WBC     (3.8-10.6)  k/uL


 


RBC     (4.30-5.90)  m/uL


 


Hgb     (13.0-17.5)  gm/dL


 


Hct     (39.0-53.0)  %


 


Neutrophils #     (1.3-7.7)  k/uL


 


Monocytes #     (0-1.0)  k/uL


 


ABG pH  7.47 H    (7.35-7.45)  


 


ABG pCO2  29 L    (35-45)  mmHg


 


ABG pO2  169 H    ()  mmHg


 


ABG O2 Saturation  100.0 H    (94-97)  %


 


BUN     (9-20)  mg/dL


 


Glucose     (74-99)  mg/dL


 


POC Glucose (mg/dL)   134 H  162 H  (75-99)  mg/dL


 


Phosphorus     (2.5-4.5)  mg/dL


 


Magnesium     (1.6-2.3)  mg/dL


 


Total Protein     (6.3-8.2)  g/dL


 


Albumin     (3.5-5.0)  g/dL














  04/20/17 04/20/17 04/20/17 Range/Units





  10:04 10:20 11:46 


 


WBC     (3.8-10.6)  k/uL


 


RBC     (4.30-5.90)  m/uL


 


Hgb     (13.0-17.5)  gm/dL


 


Hct     (39.0-53.0)  %


 


Neutrophils #     (1.3-7.7)  k/uL


 


Monocytes #     (0-1.0)  k/uL


 


ABG pH     (7.35-7.45)  


 


ABG pCO2     (35-45)  mmHg


 


ABG pO2     ()  mmHg


 


ABG O2 Saturation     (94-97)  %


 


BUN     (9-20)  mg/dL


 


Glucose     (74-99)  mg/dL


 


POC Glucose (mg/dL)  157 H  155 H  174 H  (75-99)  mg/dL


 


Phosphorus     (2.5-4.5)  mg/dL


 


Magnesium     (1.6-2.3)  mg/dL


 


Total Protein     (6.3-8.2)  g/dL


 


Albumin     (3.5-5.0)  g/dL














  04/20/17 04/20/17 04/20/17 Range/Units





  12:17 13:05 14:00 


 


WBC     (3.8-10.6)  k/uL


 


RBC     (4.30-5.90)  m/uL


 


Hgb     (13.0-17.5)  gm/dL


 


Hct     (39.0-53.0)  %


 


Neutrophils #     (1.3-7.7)  k/uL


 


Monocytes #     (0-1.0)  k/uL


 


ABG pH     (7.35-7.45)  


 


ABG pCO2     (35-45)  mmHg


 


ABG pO2     ()  mmHg


 


ABG O2 Saturation     (94-97)  %


 


BUN     (9-20)  mg/dL


 


Glucose     (74-99)  mg/dL


 


POC Glucose (mg/dL)  177 H  178 H  160 H  (75-99)  mg/dL


 


Phosphorus     (2.5-4.5)  mg/dL


 


Magnesium     (1.6-2.3)  mg/dL


 


Total Protein     (6.3-8.2)  g/dL


 


Albumin     (3.5-5.0)  g/dL














  04/20/17 04/20/17 04/20/17 Range/Units





  15:03 16:00 17:10 


 


WBC     (3.8-10.6)  k/uL


 


RBC     (4.30-5.90)  m/uL


 


Hgb     (13.0-17.5)  gm/dL


 


Hct     (39.0-53.0)  %


 


Neutrophils #     (1.3-7.7)  k/uL


 


Monocytes #     (0-1.0)  k/uL


 


ABG pH     (7.35-7.45)  


 


ABG pCO2     (35-45)  mmHg


 


ABG pO2     ()  mmHg


 


ABG O2 Saturation     (94-97)  %


 


BUN     (9-20)  mg/dL


 


Glucose     (74-99)  mg/dL


 


POC Glucose (mg/dL)  145 H  141 H  151 H  (75-99)  mg/dL


 


Phosphorus     (2.5-4.5)  mg/dL


 


Magnesium     (1.6-2.3)  mg/dL


 


Total Protein     (6.3-8.2)  g/dL


 


Albumin     (3.5-5.0)  g/dL














  04/20/17 04/20/17 04/20/17 Range/Units





  18:15 19:04 20:05 


 


WBC     (3.8-10.6)  k/uL


 


RBC     (4.30-5.90)  m/uL


 


Hgb     (13.0-17.5)  gm/dL


 


Hct     (39.0-53.0)  %


 


Neutrophils #     (1.3-7.7)  k/uL


 


Monocytes #     (0-1.0)  k/uL


 


ABG pH     (7.35-7.45)  


 


ABG pCO2     (35-45)  mmHg


 


ABG pO2     ()  mmHg


 


ABG O2 Saturation     (94-97)  %


 


BUN     (9-20)  mg/dL


 


Glucose     (74-99)  mg/dL


 


POC Glucose (mg/dL)  182 H  145 H  127 H  (75-99)  mg/dL


 


Phosphorus     (2.5-4.5)  mg/dL


 


Magnesium     (1.6-2.3)  mg/dL


 


Total Protein     (6.3-8.2)  g/dL


 


Albumin     (3.5-5.0)  g/dL














  04/20/17 Range/Units





  21:13 


 


WBC   (3.8-10.6)  k/uL


 


RBC   (4.30-5.90)  m/uL


 


Hgb   (13.0-17.5)  gm/dL


 


Hct   (39.0-53.0)  %


 


Neutrophils #   (1.3-7.7)  k/uL


 


Monocytes #   (0-1.0)  k/uL


 


ABG pH   (7.35-7.45)  


 


ABG pCO2   (35-45)  mmHg


 


ABG pO2   ()  mmHg


 


ABG O2 Saturation   (94-97)  %


 


BUN   (9-20)  mg/dL


 


Glucose   (74-99)  mg/dL


 


POC Glucose (mg/dL)  130 H  (75-99)  mg/dL


 


Phosphorus   (2.5-4.5)  mg/dL


 


Magnesium   (1.6-2.3)  mg/dL


 


Total Protein   (6.3-8.2)  g/dL


 


Albumin   (3.5-5.0)  g/dL














Assessment and Plan


(1) Status post coronary artery bypass graft


Status: Acute   





(2) Family history of heart disease


Status: Acute   





(3) Hyperlipidemia


Status: Acute   





(4) Hypertension


Status: Acute   





(5) Nicotine dependence, chewing tobacco, uncomplicated


Status: Acute   





(6) Atrial fibrillation


Status: Acute   


Plan: 


Patient developed atrial fibrillation last night requiring IV amiodarone.  

Patient is still having difficulty with oxygenation.  Adjustments are being 

made by pulmonologist.  Patient may require tracheostomy.  Prognosis is still 

guarded

## 2017-04-20 NOTE — XR
EXAMINATION TYPE: XR chest 1V confirm line [facility]

 

DATE OF EXAM: 4/20/2017 2:49 PM

 

COMPARISON: Prior chest x-ray 20th of April 2017

 

HISTORY: Status post PICC line placement

 

TECHNIQUE: Single frontal view of the chest is obtained.

 

FINDINGS:  Interval placement of a left-sided PICC line, the tip is at the cavoatrial junction level.
 No pneumothorax. There may be small effusions. Heart remains enlarged, patient is post median sterno
jessica. Endotracheal and NG tube are overlying appropriate positions. There are overlying cardiac leads
. There is improvement in volume status, aeration.

 

IMPRESSION:  No evident complication status post PICC line placement.

## 2017-04-20 NOTE — P.PN
Subjective


Principal diagnosis: 





Coronary artery disease, status post prior stenting to his right coronary 

artery.  Preserved left ventricular function.  Hyperlipidemia.  Hypertension.  

ETOH abuse.





POD #7 total arterial non-aortic patch off-pump double coronary artery bypass 

grafting using in situ skeletonized right internal mammary artery to the left 

anterior descending artery across the anterior midline in situ totally 

skeletonized left internal mammary artery to the first obtuse marginal artery.  

Intraoperative transesophageal echocardiogram and epi-aortic scanning.  

Intraoperative graft flow measurements using the Stylyt system





Patient had postoperative mucous plugging with increasing oxygen demand 

requiring bronchoscopy the night of surgery.  Bronchial washings negative for 

bacteria/viruses to date.





Pt developed postoperative alcohol withdrawal requiring increased sedation and 

continued mechanical ventilation.





Patient remains sedated on mechanical ventilation.  ABGs reviewed from this 

morning on 70% FiO2 with a PEEP of 13.  Patient went into A. fib RVR last night

, started on amiodarone drip per cardiology.  Blood pressure now very marginal 

with systolic pressures in the high 80s low 90s and maps in the 50s.





Objective





- Vital Signs


Vital signs: 


 Vital Signs











Temp  98.8 F   04/20/17 08:00


 


Pulse  104 H  04/20/17 08:09


 


Resp  19   04/20/17 08:00


 


BP  85/60   04/20/17 08:00


 


Pulse Ox  97   04/20/17 08:00








 Intake & Output











 04/19/17 04/20/17 04/20/17





 18:59 06:59 18:59


 


Intake Total 1618.005 4171.205 468.616


 


Output Total 2310 440 55


 


Balance -1086.000 777.205 413.616


 


Weight 97.5 kg 99.2 kg 


 


Intake:   


 


  Intake, IV Titration 464.000 437.205 338.616





  Amount   


 


    Amiodarone 450 mg In   248.616





    Dextrose 5% in Water 250   





    ml @ 1 MG/MIN 34.53 mls/   





    hr IV .Q7H31M ELIER Rx#:   





    [id number]   


 


    Insulin Regular 100 unit 6.870 34.902 





    In Sodium Chloride 0.9%   





    100 ml @ Per Protocol IV   





    .Q0M ELIER Rx#:[id number]   


 


    Lactated Ringers 1,000 ml 260 220 40





    @ 20 mls/hr IV .Q24H ELIER   





    Rx#:[id number]   


 


    Propofol 500 mg In Empty 197.130 182.303 50





    Bag 1 bag @ Titrate IV .   





    Q0M ELIER Rx#:[id number]   


 


  Tube Feeding 650 750 100


 


  Other 110 30 30


 


Output:   


 


  Urine 2310 440 55


 


Other:   


 


  Voiding Method Indwelling Catheter Indwelling Catheter 


 


  # Bowel Movements 0 1 








 ABP, PAP, CO, CI - Last Documented











Arterial Blood Pressure        99/49


 


Pulmonary Artery Pressure      46/23


 


Cardiac Output                 6.8


 


Cardiac Index                  3.3

















- Constitutional


General appearance: Present: no acute distress





- Respiratory


Details: 





Lungs sounds diminished bilaterally.  Respirations even, nonlabored on 

mechanical ventilation.  Current settings tidal volume 450, FiO2 60%, 

respiratory rate 20, PEEP 13.  Chest x-ray reviewed.





- Cardiovascular


Details: 





S1, S2 present.  Tachycardia, irregular rate and rhythm, A. fib RVR on telemetry

, currently on amiodarone drip.  Sternum stable.  No edema present.  Heart 

hugger/teds/SCDs present.  Blood pressure marginal this time.





- Gastrointestinal


Gastrointestinal Comment(s): 





Abdomen soft, nontender, nondistended.  Active bowel sounds 4 quadrants.  Tube 

feeding infusing at 50 mL per hour per OG tube.





- Genitourinary


Genitourinary Comment(s): 





Angelo present draining marginal clear yellow urine.  Output 30-40 mL per hour 

overnight





- Integumentary


Integumentary Comment(s): 





Anterior chest incision covered with dry intact silver dressing, changed 

yesterday.





- Neurologic


Neurologic Comment(s): 





Patient does open eyes, but does not follow commands.





- Allied health notes


Allied health notes reviewed: nursing





- Labs


CBC & Chem 7: 


 04/20/17 04:00





 04/20/17 04:00


Labs: 


 Abnormal Lab Results - Last 24 Hours (Table)











  04/19/17 04/19/17 04/19/17 Range/Units





  09:57 12:19 13:47 


 


WBC     (3.8-10.6)  k/uL


 


RBC     (4.30-5.90)  m/uL


 


Hgb     (13.0-17.5)  gm/dL


 


Hct     (39.0-53.0)  %


 


Neutrophils #     (1.3-7.7)  k/uL


 


Monocytes #     (0-1.0)  k/uL


 


ABG pH     (7.35-7.45)  


 


ABG pCO2     (35-45)  mmHg


 


ABG pO2     ()  mmHg


 


ABG O2 Saturation     (94-97)  %


 


BUN     (9-20)  mg/dL


 


Glucose     (74-99)  mg/dL


 


POC Glucose (mg/dL)  136 H  160 H  153 H  (75-99)  mg/dL


 


Phosphorus     (2.5-4.5)  mg/dL


 


Magnesium     (1.6-2.3)  mg/dL


 


Total Protein     (6.3-8.2)  g/dL


 


Albumin     (3.5-5.0)  g/dL














  04/19/17 04/19/17 04/19/17 Range/Units





  16:35 18:07 19:12 


 


WBC     (3.8-10.6)  k/uL


 


RBC     (4.30-5.90)  m/uL


 


Hgb     (13.0-17.5)  gm/dL


 


Hct     (39.0-53.0)  %


 


Neutrophils #     (1.3-7.7)  k/uL


 


Monocytes #     (0-1.0)  k/uL


 


ABG pH     (7.35-7.45)  


 


ABG pCO2     (35-45)  mmHg


 


ABG pO2     ()  mmHg


 


ABG O2 Saturation     (94-97)  %


 


BUN     (9-20)  mg/dL


 


Glucose     (74-99)  mg/dL


 


POC Glucose (mg/dL)  144 H  126 H  140 H  (75-99)  mg/dL


 


Phosphorus     (2.5-4.5)  mg/dL


 


Magnesium     (1.6-2.3)  mg/dL


 


Total Protein     (6.3-8.2)  g/dL


 


Albumin     (3.5-5.0)  g/dL














  04/19/17 04/19/17 04/19/17 Range/Units





  21:00 22:00 22:10 


 


WBC     (3.8-10.6)  k/uL


 


RBC     (4.30-5.90)  m/uL


 


Hgb     (13.0-17.5)  gm/dL


 


Hct     (39.0-53.0)  %


 


Neutrophils #     (1.3-7.7)  k/uL


 


Monocytes #     (0-1.0)  k/uL


 


ABG pH     (7.35-7.45)  


 


ABG pCO2     (35-45)  mmHg


 


ABG pO2     ()  mmHg


 


ABG O2 Saturation     (94-97)  %


 


BUN   40 H   (9-20)  mg/dL


 


Glucose   129 H   (74-99)  mg/dL


 


POC Glucose (mg/dL)  112 H   126 H  (75-99)  mg/dL


 


Phosphorus     (2.5-4.5)  mg/dL


 


Magnesium   2.4 H   (1.6-2.3)  mg/dL


 


Total Protein   5.5 L   (6.3-8.2)  g/dL


 


Albumin   2.8 L   (3.5-5.0)  g/dL














  04/19/17 04/20/17 04/20/17 Range/Units





  23:08 02:17 04:00 


 


WBC    14.0 H  (3.8-10.6)  k/uL


 


RBC    3.16 L  (4.30-5.90)  m/uL


 


Hgb    10.5 L  (13.0-17.5)  gm/dL


 


Hct    30.5 L  (39.0-53.0)  %


 


Neutrophils #    11.4 H  (1.3-7.7)  k/uL


 


Monocytes #    1.1 H  (0-1.0)  k/uL


 


ABG pH     (7.35-7.45)  


 


ABG pCO2     (35-45)  mmHg


 


ABG pO2     ()  mmHg


 


ABG O2 Saturation     (94-97)  %


 


BUN     (9-20)  mg/dL


 


Glucose     (74-99)  mg/dL


 


POC Glucose (mg/dL)  153 H  191 H   (75-99)  mg/dL


 


Phosphorus     (2.5-4.5)  mg/dL


 


Magnesium     (1.6-2.3)  mg/dL


 


Total Protein     (6.3-8.2)  g/dL


 


Albumin     (3.5-5.0)  g/dL














  04/20/17 04/20/17 04/20/17 Range/Units





  04:00 04:57 06:46 


 


WBC     (3.8-10.6)  k/uL


 


RBC     (4.30-5.90)  m/uL


 


Hgb     (13.0-17.5)  gm/dL


 


Hct     (39.0-53.0)  %


 


Neutrophils #     (1.3-7.7)  k/uL


 


Monocytes #     (0-1.0)  k/uL


 


ABG pH   7.47 H   (7.35-7.45)  


 


ABG pCO2   29 L   (35-45)  mmHg


 


ABG pO2   169 H   ()  mmHg


 


ABG O2 Saturation   100.0 H   (94-97)  %


 


BUN  48 H    (9-20)  mg/dL


 


Glucose  162 H    (74-99)  mg/dL


 


POC Glucose (mg/dL)    134 H  (75-99)  mg/dL


 


Phosphorus  5.2 H    (2.5-4.5)  mg/dL


 


Magnesium  2.4 H    (1.6-2.3)  mg/dL


 


Total Protein  5.4 L    (6.3-8.2)  g/dL


 


Albumin  2.7 L    (3.5-5.0)  g/dL














  04/20/17 Range/Units





  08:04 


 


WBC   (3.8-10.6)  k/uL


 


RBC   (4.30-5.90)  m/uL


 


Hgb   (13.0-17.5)  gm/dL


 


Hct   (39.0-53.0)  %


 


Neutrophils #   (1.3-7.7)  k/uL


 


Monocytes #   (0-1.0)  k/uL


 


ABG pH   (7.35-7.45)  


 


ABG pCO2   (35-45)  mmHg


 


ABG pO2   ()  mmHg


 


ABG O2 Saturation   (94-97)  %


 


BUN   (9-20)  mg/dL


 


Glucose   (74-99)  mg/dL


 


POC Glucose (mg/dL)  162 H  (75-99)  mg/dL


 


Phosphorus   (2.5-4.5)  mg/dL


 


Magnesium   (1.6-2.3)  mg/dL


 


Total Protein   (6.3-8.2)  g/dL


 


Albumin   (3.5-5.0)  g/dL














- Imaging and Cardiology


Chest x-ray: report reviewed, image reviewed





Assessment and Plan


(1) Status post coronary artery bypass graft


Status: Acute   





(2) Coronary artery disease


Status: Acute   





(3) Family history of heart disease


Status: Acute   





(4) History of PTCA


Status: Acute   





(5) Hyperlipidemia


Status: Acute   





(6) Hypertension


Status: Acute   





(7) Nicotine dependence, chewing tobacco, uncomplicated


Status: Acute   


Plan: 





1.  Continue aspirin, Lipitor, Plavix, heparin. Lopressor and lisinopril to be 

held for systolic blood pressure less than 100.


2.  Continue amiodarone drip for atrial fibrillation.


3.  Added 250 g IV push digoxin 1 today.


4.  Ventilator management per pulmonology, wean O2 as tolerated.  Wean Solu-

Medrol per pulmonology.


5.  Insulin/diabetic management per primary care service.


6.  Continue [facility] protocol for alcohol withdrawal.


7.  Daily labs, chest x-rays.


8.  GI/DVT prophylaxis.


9.  More recommendations as patient progresses.


Time with Patient: Greater than 30

## 2017-04-20 NOTE — IR
EXAMINATION TYPE: IR cvc insert >=5 years

 

DATE OF EXAM: 4/20/2017 2:58 PM

 

COMPARISON: NONE

 

HISTORY: Needs long-term intravenous access for therapy, intubated, congestive heart failure

 

FINDINGS: Maximal barrier technique was utilized.  The skin overlying the left basilic vein was local
ized with ultrasound and noted to be compressible and patent by ultrasound.  An ultrasound image was 
obtained and submitted on patient's chart. Sterile technique utilized with the ultrasound machine. Th
e skin overlying was prepped and draped and Lidocaine used for local anesthesia.  A skin nick was mad
e with a scalpel.  Access was gained to the vein under direct ultrasound guidance with a 21-gauge nee
dle and a 0.018 inch wire was advanced.  Access site was dilated with a peel-away sheath and the cath
eter tailored to length.  Catheter advanced centrally and a post procedure chest x-ray verified place
ment with the tip at the cavoatrial junction.  Catheter was fixed to the skin with suture and a steri
le dressing placed.  Hemostasis achieved and the catheter was aspirated and flushed with sterile sali
ne.  The patient remained in stable condition.

 

IMPRESSION: STATUS POST ULTRASOUND GUIDED PICC LINE PLACEMENT, READY FOR USE.  THIS PROCEDURE WAS PER
FORMED BY THE UNDERSIGNED.

## 2017-04-20 NOTE — P.PN
Subjective


Principal diagnosis: 





Status post coronary artery bypass grafting, postoperative day #4.





This is a 61-year-old white male status post CABG, postoperative day #1, total 

arterial non-aortic patch off pump double coronary artery bypass grafting using 

in situ skeletonized right internal mammary artery to the left anterior 

descending across the anterior midline in situ totally skeletonized left 

internal mammary artery to the first obtuse marginal artery.  Postoperatively 

patient was on mechanical ventilation, and upon arrival to the ICU he was found 

to have opacified right lung, he required bronchoscopy and extraction of mucous 

plugs mostly in the right upper lobe and right middle lobe.  Remained on 

mechanical ventilation overnight, however this morning his gases are showing 

marginal oxygenation, his pO2 is only 63 on 50% and PEEP of 5.  Patient was 

given a weaning trial with a pressure support of 8 and CPAP, ABG in half an 

hour is definitely poor showing very poor oxygenation, and his O2 saturation 

was in the 91% range.  PO2 was only 63, hence I decided not to pursue any 

further weaning and extubation on this patient until his oxygenation improves 

better.  Gram stain and cultures from the right upper lobe are pending.  In the 

meantime the patient is receiving bronchodilators for underlying COPD, and he 

is also on steroids for significant wheezing noted yesterday after 

bronchoscopy.  Patient remains on DuoNeb updrafts 4 times a day and when 

necessary.





Reevaluated today on 4/15/2017, patient is status post CABG, postoperative day #

2 remains intubated and on mechanical ventilation.  Yesterday patient could not 

be weaned and extubated mostly because of his marginal oxygenation.  His pO2 

was only 63 on 50% FiO2 and pressure support of 8 and CPAP.  Hence decided not 

to extubate the patient, today the patient is having what seems to be a picture 

of alcohol withdrawal as soon as the propofol was weaned off.  Hence we had to 

go back on the propofol, and the patient will be placed on the protocol.  For 

alcohol withdrawal.  ABG this morning seems to be a bit better, pO2 is 89 pCO2 

is 33 pH of 7.37.  Patient is slightly metabolically acidotic.  Bicarb is 19.  

Hemoglobin is 10.7 WBC count is 14.0.  Chest x-ray showed minimal areas of 

bands of atelectasis at the bases, but no further areas of collapse noted in 

the right upper lobe and right the lobe as seen 2 days ago.





Reevaluated today on 4/16/2017, patient is status post CABG, postoperative day #

3 remains intubated on mechanical ventilation.  His gases are marginal.  Today 

I awakened the patient, and I tried to evaluate his mental status, however the 

patient was noted to be extremely obtunded, his eyes were deviated upwards, and 

I could not assess his mental status because he was basically unresponsive.  

This was off propofol, patient also became extremely agitated, and we had to 

place him back on propofol.  In the meantime I ordered a CT of the brain.  

Ending at this point.  His blood pressure was noted to go high off propofol, 

patient was given Apresoline for high blood pressure.  He is also on beta 

blockers.  And he is on multiple other meds for hypertension.  ABG today showed 

a pO2 of 71 pCO2 of 30 pH of 7.51, and this is on a FiO2 of 50% and PEEP of 5.  

Chest x-ray showed minimal atelectasis at the bases.  And overall significant 

improvement in aeration of both lungs.  Patient remains on the protocol for 

alcohol withdrawal in the meantime.





The patient is seen again today 04/17/2017 in follow-up.  He is status post CABG

, postoperative day #4.  He remains intubated on mechanical ventilator.  Assist-

control of 16, tidal volume 500, FiO2 60%, PEEP of 5.  Arterial blood gases 

reveal a pO2 of 84, pCO2 34, pH 7.48.  Mild respiratory alkalosis.  Previous 

attempts to wean the patient failed based on significant hypertension with 

systolic pressure greater than 200, tachycardia as well.  There was concern 

regarding possible alcohol withdrawal and he was started on the CIWA protocol.  

His initial chest x-ray had revealed a near complete white out of the right 

lung and he had undergone bronchoscopy with extraction of mucous plugging in 

the right upper and middle lobes.  Today's chest x-ray show some mild pulmonary 

vascular congestion was small left pleural effusion/atelectasis.  His current 

receiving lactated Ringer's at a KVO, Brovana at 40 mcg/kg/m.  He is also 

receiving Vital HP tube feedings at 50 mL per hour which is at goal. 





Progress note dated 04/18/2017





This is a 61-year-old male who is status post bypass grafting.  He is postop 

day #5.  He remains intubated on mechanical ventilator.  His vent settings 

include the assist control mode rate is 16 to be increased to 20 a tidal Lyme 

of 500 be decreased to 450 and FiO2 of 90% and a PEEP of 5 which will be 

increased to 10.  His arterial blood gases on those settings show a pO2 of only 

84 pCO2 of 45 and appears to some 0.40.  This was on 90%.  He is getting 

lactated Ringer's at 20 mL an hour the Diprovan at 25 mics per kilogram per 

minute and insulin drip at 1 unit per hour and vital HP 50 with a goal of 50.  

Chest x-rays essentially unchanged.  He does have a small left pleural effusion.





Progress note dated 04/19/2017





61-year-old male who is status post bypass grafting.  He is postop day #6.  He 

is intubated and on mechanical ventilator.  He's had problems with hypoxemia.  

We will try to increase his PEEP up to 13.  Currently he is on the assist 

control mode rate of 2010 of I'm 4 5080% PEEP of 13.  It was just increased.  

Blood gases on a PEEP of 10 show a PaO2 of 79 a PaCO2 of 39 and a pH of 7.46.  

The patient is currently on propofol at 20 mics per kilogram per minute 

lactated Ringer's at 20 mL an hour insulin at 1 unit per hour and vital high 

protein at a rate of 50 with a goal of 50.  He has not made much in the way of 

progress.  Chest x-ray though is stable.





The patient was seen again today 04/20/2017 in follow-up.  He is postoperative 

day #7.  He remains intubated and on the mechanical ventilator.  Current 

settings are assist-control 20 tidal volume 450 FiO2 60% and a PEEP of 13.  

Morning blood gases revealed a pO2 of 169, pCO2 of 29, pH 7.47 on 70% FiO2.  He 

remains on lactated Ringer's at KVO.  Insulin at 2.5 units per hour.  Propofol 

at 30 mcg/m.  Amiodarone at 0.5 mg/m.  He is on Vital HP 50 mL per hour which 

is at goal.  His chest x-ray reveals a left lower lobe infiltrate and evidence 

of fluid volume overload. 





Objective





- Vital Signs


Vital signs: 


 Vital Signs











Temp  98.8 F   04/20/17 08:00


 


Pulse  119 H  04/20/17 10:00


 


Resp  20   04/20/17 10:00


 


BP  85/60   04/20/17 08:00


 


Pulse Ox  95   04/20/17 10:00








 Intake & Output











 04/19/17 04/20/17 04/20/17





 18:59 06:59 18:59


 


Intake Total 3428.398 2502.205 680.818


 


Output Total 2310 440 130


 


Balance -1086.000 777.205 550.818


 


Weight 97.5 kg 99.2 kg 


 


Intake:   


 


  Intake, IV Titration 464.000 437.205 450.818





  Amount   


 


    Amiodarone 450 mg In   248.616





    Dextrose 5% in Water 250   





    ml @ 1 MG/MIN 34.53 mls/   





    hr IV .Q7H31M [facility] Rx#:   





    [id number]   


 


    Insulin Regular 100 unit 6.870 34.902 





    In Sodium Chloride 0.9%   





    100 ml @ Per Protocol IV   





    .Q0M [facility] Rx#:[id number]   


 


    Lactated Ringers 1,000 ml 260 220 80





    @ 20 mls/hr IV .Q24H [facility]   





    Rx#:[id number]   


 


    Propofol 500 mg In Empty 197.130 182.303 122.202





    Bag 1 bag @ Titrate IV .   





    Q0M [facility] Rx#:[id number]   


 


  Tube Feeding 650 750 200


 


  Other 110 30 30


 


Output:   


 


  Urine 2310 440 130


 


Other:   


 


  Voiding Method Indwelling Catheter Indwelling Catheter 


 


  # Bowel Movements 0 1 








 ABP, PAP, CO, CI - Last Documented











Arterial Blood Pressure        90/47


 


Pulmonary Artery Pressure      46/23


 


Cardiac Output                 6.8


 


Cardiac Index                  3.3

















- Exam





GENERAL EXAM: Intubated, sedated.


HEAD: Normocephalic.


EYES: Sluggish reaction of pupils, equal size.


NOSE: Clear with pink turbinates.


THROAT: No erythema or exudates.


NECK: No masses, no JVD.


CHEST: No chest wall deformity.


LUNGS: Equal air entry with faint crackles in the bilateral posterior bases, 

more so on the left.


CVS: S1 and S2 normal with no audible murmurs, regular rhythm.


ABDOMEN: No hepatosplenomegaly, normal bowel sounds, no guarding or rigidity.


Extremities: There is trace peripheral edema.  No clubbing, no cyanosis.  

Peripheral pulses are intact.





- Labs


CBC & Chem 7: 


 04/20/17 04:00





 04/20/17 04:00


Labs: 


 Abnormal Lab Results - Last 24 Hours (Table)











  04/19/17 04/19/17 04/19/17 Range/Units





  12:19 13:47 16:35 


 


WBC     (3.8-10.6)  k/uL


 


RBC     (4.30-5.90)  m/uL


 


Hgb     (13.0-17.5)  gm/dL


 


Hct     (39.0-53.0)  %


 


Neutrophils #     (1.3-7.7)  k/uL


 


Monocytes #     (0-1.0)  k/uL


 


ABG pH     (7.35-7.45)  


 


ABG pCO2     (35-45)  mmHg


 


ABG pO2     ()  mmHg


 


ABG O2 Saturation     (94-97)  %


 


BUN     (9-20)  mg/dL


 


Glucose     (74-99)  mg/dL


 


POC Glucose (mg/dL)  160 H  153 H  144 H  (75-99)  mg/dL


 


Phosphorus     (2.5-4.5)  mg/dL


 


Magnesium     (1.6-2.3)  mg/dL


 


Total Protein     (6.3-8.2)  g/dL


 


Albumin     (3.5-5.0)  g/dL














  04/19/17 04/19/17 04/19/17 Range/Units





  18:07 19:12 21:00 


 


WBC     (3.8-10.6)  k/uL


 


RBC     (4.30-5.90)  m/uL


 


Hgb     (13.0-17.5)  gm/dL


 


Hct     (39.0-53.0)  %


 


Neutrophils #     (1.3-7.7)  k/uL


 


Monocytes #     (0-1.0)  k/uL


 


ABG pH     (7.35-7.45)  


 


ABG pCO2     (35-45)  mmHg


 


ABG pO2     ()  mmHg


 


ABG O2 Saturation     (94-97)  %


 


BUN     (9-20)  mg/dL


 


Glucose     (74-99)  mg/dL


 


POC Glucose (mg/dL)  126 H  140 H  112 H  (75-99)  mg/dL


 


Phosphorus     (2.5-4.5)  mg/dL


 


Magnesium     (1.6-2.3)  mg/dL


 


Total Protein     (6.3-8.2)  g/dL


 


Albumin     (3.5-5.0)  g/dL














  04/19/17 04/19/17 04/19/17 Range/Units





  22:00 22:10 23:08 


 


WBC     (3.8-10.6)  k/uL


 


RBC     (4.30-5.90)  m/uL


 


Hgb     (13.0-17.5)  gm/dL


 


Hct     (39.0-53.0)  %


 


Neutrophils #     (1.3-7.7)  k/uL


 


Monocytes #     (0-1.0)  k/uL


 


ABG pH     (7.35-7.45)  


 


ABG pCO2     (35-45)  mmHg


 


ABG pO2     ()  mmHg


 


ABG O2 Saturation     (94-97)  %


 


BUN  40 H    (9-20)  mg/dL


 


Glucose  129 H    (74-99)  mg/dL


 


POC Glucose (mg/dL)   126 H  153 H  (75-99)  mg/dL


 


Phosphorus     (2.5-4.5)  mg/dL


 


Magnesium  2.4 H    (1.6-2.3)  mg/dL


 


Total Protein  5.5 L    (6.3-8.2)  g/dL


 


Albumin  2.8 L    (3.5-5.0)  g/dL














  04/20/17 04/20/17 04/20/17 Range/Units





  02:17 04:00 04:00 


 


WBC   14.0 H   (3.8-10.6)  k/uL


 


RBC   3.16 L   (4.30-5.90)  m/uL


 


Hgb   10.5 L   (13.0-17.5)  gm/dL


 


Hct   30.5 L   (39.0-53.0)  %


 


Neutrophils #   11.4 H   (1.3-7.7)  k/uL


 


Monocytes #   1.1 H   (0-1.0)  k/uL


 


ABG pH     (7.35-7.45)  


 


ABG pCO2     (35-45)  mmHg


 


ABG pO2     ()  mmHg


 


ABG O2 Saturation     (94-97)  %


 


BUN    48 H  (9-20)  mg/dL


 


Glucose    162 H  (74-99)  mg/dL


 


POC Glucose (mg/dL)  191 H    (75-99)  mg/dL


 


Phosphorus    5.2 H  (2.5-4.5)  mg/dL


 


Magnesium    2.4 H  (1.6-2.3)  mg/dL


 


Total Protein    5.4 L  (6.3-8.2)  g/dL


 


Albumin    2.7 L  (3.5-5.0)  g/dL














  04/20/17 04/20/17 04/20/17 Range/Units





  04:57 06:46 08:04 


 


WBC     (3.8-10.6)  k/uL


 


RBC     (4.30-5.90)  m/uL


 


Hgb     (13.0-17.5)  gm/dL


 


Hct     (39.0-53.0)  %


 


Neutrophils #     (1.3-7.7)  k/uL


 


Monocytes #     (0-1.0)  k/uL


 


ABG pH  7.47 H    (7.35-7.45)  


 


ABG pCO2  29 L    (35-45)  mmHg


 


ABG pO2  169 H    ()  mmHg


 


ABG O2 Saturation  100.0 H    (94-97)  %


 


BUN     (9-20)  mg/dL


 


Glucose     (74-99)  mg/dL


 


POC Glucose (mg/dL)   134 H  162 H  (75-99)  mg/dL


 


Phosphorus     (2.5-4.5)  mg/dL


 


Magnesium     (1.6-2.3)  mg/dL


 


Total Protein     (6.3-8.2)  g/dL


 


Albumin     (3.5-5.0)  g/dL














  04/20/17 04/20/17 Range/Units





  10:04 10:20 


 


WBC    (3.8-10.6)  k/uL


 


RBC    (4.30-5.90)  m/uL


 


Hgb    (13.0-17.5)  gm/dL


 


Hct    (39.0-53.0)  %


 


Neutrophils #    (1.3-7.7)  k/uL


 


Monocytes #    (0-1.0)  k/uL


 


ABG pH    (7.35-7.45)  


 


ABG pCO2    (35-45)  mmHg


 


ABG pO2    ()  mmHg


 


ABG O2 Saturation    (94-97)  %


 


BUN    (9-20)  mg/dL


 


Glucose    (74-99)  mg/dL


 


POC Glucose (mg/dL)  157 H  155 H  (75-99)  mg/dL


 


Phosphorus    (2.5-4.5)  mg/dL


 


Magnesium    (1.6-2.3)  mg/dL


 


Total Protein    (6.3-8.2)  g/dL


 


Albumin    (3.5-5.0)  g/dL














Assessment and Plan


Plan: 





Impression:





1 status post CABG, postoperative day #7





2 history of nicotine dependence and suspect some component of COPD





3 history of documented coronary artery disease based on a recent cardiac 

catheterization





4 history of percutaneous revascularization of the proximal right coronary 

artery in 2001





5 history of hypertension





6 history of myocardial infarction





7 status post bronchoscopy and bronchoalveolar lavage of the right upper lobe 

read lobe and right lower lobe upon arrival to the ICU.  Bronchial washings are

  pending.





8 failure to wean mostly secondary to relative hypoxemia, O2 saturation is very 

marginal, and it is in the 70s at 50%.  And because of the mental status and 

suspected alcohol withdrawal is another major factor that delaying the weaning 

process.





9 suspect acute alcohol withdrawal considering the patient is a heavy drinker





Plan:





The patient was seen and evaluated by Dr. Munoz.  His chest x-ray ABGs and labs 

were reviewed.  His shunt has improved on the higher PEEP.  We will continue to 

titrate down the FiO2 and while he maintains good O2 saturations we'll start to 

titrate down the PEEP as well.  In the interim we will obtain ultrasound of the 

left chest to rule out any significant left pleural effusion that may require 

thoracentesis.  We will continue to follow make further recommendations based 

on his clinical status.





Critical care time 36 minutes.


Time with Patient: Greater than 30

## 2017-04-21 LAB
ALP SERPL-CCNC: 57 U/L (ref 38–126)
ALT SERPL-CCNC: 47 U/L (ref 21–72)
ANION GAP SERPL CALC-SCNC: 8 MMOL/L
APTT BLD: 22.2 SEC (ref 22–30)
AST SERPL-CCNC: 32 U/L (ref 17–59)
BASOPHILS # BLD AUTO: 0 K/UL (ref 0–0.2)
BASOPHILS NFR BLD AUTO: 0 %
BUN SERPL-SCNC: 52 MG/DL (ref 9–20)
CALCIUM SPEC-MCNC: 8.4 MG/DL (ref 8.4–10.2)
CH: 32.9
CHCM: 33.4
CHLORIDE SERPL-SCNC: 106 MMOL/L (ref 98–107)
CO2 BLDA-SCNC: 27 MMOL/L (ref 19–24)
CO2 SERPL-SCNC: 27 MMOL/L (ref 22–30)
EOSINOPHIL # BLD AUTO: 0 K/UL (ref 0–0.7)
EOSINOPHIL NFR BLD AUTO: 0 %
ERYTHROCYTE [DISTWIDTH] IN BLOOD BY AUTOMATED COUNT: 2.96 M/UL (ref 4.3–5.9)
ERYTHROCYTE [DISTWIDTH] IN BLOOD: 13.9 % (ref 11.5–15.5)
GLUCOSE BLD-MCNC: 106 MG/DL (ref 75–99)
GLUCOSE BLD-MCNC: 109 MG/DL (ref 75–99)
GLUCOSE BLD-MCNC: 119 MG/DL (ref 75–99)
GLUCOSE BLD-MCNC: 119 MG/DL (ref 75–99)
GLUCOSE BLD-MCNC: 124 MG/DL (ref 75–99)
GLUCOSE BLD-MCNC: 124 MG/DL (ref 75–99)
GLUCOSE BLD-MCNC: 132 MG/DL (ref 75–99)
GLUCOSE BLD-MCNC: 132 MG/DL (ref 75–99)
GLUCOSE BLD-MCNC: 134 MG/DL (ref 75–99)
GLUCOSE BLD-MCNC: 136 MG/DL (ref 75–99)
GLUCOSE BLD-MCNC: 136 MG/DL (ref 75–99)
GLUCOSE BLD-MCNC: 137 MG/DL (ref 75–99)
GLUCOSE BLD-MCNC: 140 MG/DL (ref 75–99)
GLUCOSE SERPL-MCNC: 132 MG/DL (ref 74–99)
HCO3 BLDA-SCNC: 26 MMOL/L (ref 21–25)
HCT VFR BLD AUTO: 29.2 % (ref 39–53)
HDW: 2.26
HGB BLD-MCNC: 9.6 GM/DL (ref 13–17.5)
INR PPP: 1 (ref ?–1.1)
LUC NFR BLD AUTO: 2 %
LYMPHOCYTES # SPEC AUTO: 1.2 K/UL (ref 1–4.8)
LYMPHOCYTES NFR SPEC AUTO: 8 %
MCH RBC QN AUTO: 32.5 PG (ref 25–35)
MCHC RBC AUTO-ENTMCNC: 32.9 G/DL (ref 31–37)
MCV RBC AUTO: 98.8 FL (ref 80–100)
MONOCYTES # BLD AUTO: 0.9 K/UL (ref 0–1)
MONOCYTES NFR BLD AUTO: 6 %
NEUTROPHILS # BLD AUTO: 11.8 K/UL (ref 1.3–7.7)
NEUTROPHILS NFR BLD AUTO: 84 %
NON-AFRICAN AMERICAN GFR(MDRD): >60
PCO2 BLDA: 38 MMHG (ref 35–45)
PH BLDA: 7.46 [PH] (ref 7.35–7.45)
PHOSPHATE SERPL-MCNC: 4.4 MG/DL (ref 2.5–4.5)
PO2 BLDA: 92 MMHG (ref 83–108)
POTASSIUM SERPL-SCNC: 4.3 MMOL/L (ref 3.5–5.1)
PROT SERPL-MCNC: 5.3 G/DL (ref 6.3–8.2)
PT BLD: 10 SEC (ref 9–12)
SODIUM SERPL-SCNC: 141 MMOL/L (ref 137–145)
WBC # BLD AUTO: 0.22 10*3/UL
WBC # BLD AUTO: 14.1 K/UL (ref 3.8–10.6)
WBC (PEROX): 15.06

## 2017-04-21 RX ADMIN — Medication SCH MG: at 10:15

## 2017-04-21 RX ADMIN — PROPOFOL SCH MLS/HR: 10 INJECTION, EMULSION INTRAVENOUS at 05:44

## 2017-04-21 RX ADMIN — SODIUM CHLORIDE, PRESERVATIVE FREE SCH ML: 5 INJECTION INTRAVENOUS at 20:05

## 2017-04-21 RX ADMIN — BUDESONIDE SCH MG: 1 SUSPENSION RESPIRATORY (INHALATION) at 08:19

## 2017-04-21 RX ADMIN — CHLORHEXIDINE GLUCONATE SCH ML: 1.2 RINSE ORAL at 10:17

## 2017-04-21 RX ADMIN — AMIODARONE HYDROCHLORIDE SCH: 50 INJECTION, SOLUTION INTRAVENOUS at 05:43

## 2017-04-21 RX ADMIN — IPRATROPIUM BROMIDE AND ALBUTEROL SULFATE SCH ML: .5; 3 SOLUTION RESPIRATORY (INHALATION) at 08:19

## 2017-04-21 RX ADMIN — LISINOPRIL SCH MG: 10 TABLET ORAL at 20:03

## 2017-04-21 RX ADMIN — PANTOPRAZOLE SODIUM SCH MG: 40 INJECTION, POWDER, FOR SOLUTION INTRAVENOUS at 10:15

## 2017-04-21 RX ADMIN — BUDESONIDE SCH MG: 1 SUSPENSION RESPIRATORY (INHALATION) at 20:35

## 2017-04-21 RX ADMIN — SODIUM CHLORIDE, PRESERVATIVE FREE SCH ML: 5 INJECTION INTRAVENOUS at 10:16

## 2017-04-21 RX ADMIN — IPRATROPIUM BROMIDE AND ALBUTEROL SULFATE SCH ML: .5; 3 SOLUTION RESPIRATORY (INHALATION) at 23:39

## 2017-04-21 RX ADMIN — LORAZEPAM PRN MG: 2 INJECTION, SOLUTION INTRAMUSCULAR; INTRAVENOUS at 00:48

## 2017-04-21 RX ADMIN — IPRATROPIUM BROMIDE AND ALBUTEROL SULFATE SCH ML: .5; 3 SOLUTION RESPIRATORY (INHALATION) at 16:47

## 2017-04-21 RX ADMIN — LORAZEPAM PRN MG: 2 INJECTION, SOLUTION INTRAMUSCULAR; INTRAVENOUS at 05:46

## 2017-04-21 RX ADMIN — DOCUSATE SODIUM AND SENNOSIDES SCH EACH: 50; 8.6 TABLET ORAL at 20:03

## 2017-04-21 RX ADMIN — Medication SCH MG: at 20:03

## 2017-04-21 RX ADMIN — METOPROLOL TARTRATE SCH MG: 25 TABLET, FILM COATED ORAL at 20:03

## 2017-04-21 RX ADMIN — ATORVASTATIN CALCIUM SCH MG: 40 TABLET, FILM COATED ORAL at 10:36

## 2017-04-21 RX ADMIN — METHYLPREDNISOLONE SODIUM SUCCINATE SCH MG: 40 INJECTION, POWDER, FOR SOLUTION INTRAMUSCULAR; INTRAVENOUS at 20:05

## 2017-04-21 RX ADMIN — METOPROLOL TARTRATE SCH MG: 25 TABLET, FILM COATED ORAL at 10:35

## 2017-04-21 RX ADMIN — IPRATROPIUM BROMIDE AND ALBUTEROL SULFATE SCH ML: .5; 3 SOLUTION RESPIRATORY (INHALATION) at 11:32

## 2017-04-21 RX ADMIN — CHLORHEXIDINE GLUCONATE SCH ML: 1.2 RINSE ORAL at 20:05

## 2017-04-21 RX ADMIN — AMIODARONE HYDROCHLORIDE SCH MG: 200 TABLET ORAL at 20:04

## 2017-04-21 RX ADMIN — PROPOFOL SCH MLS/HR: 10 INJECTION, EMULSION INTRAVENOUS at 10:14

## 2017-04-21 RX ADMIN — IPRATROPIUM BROMIDE AND ALBUTEROL SULFATE SCH ML: .5; 3 SOLUTION RESPIRATORY (INHALATION) at 03:34

## 2017-04-21 RX ADMIN — IPRATROPIUM BROMIDE AND ALBUTEROL SULFATE SCH ML: .5; 3 SOLUTION RESPIRATORY (INHALATION) at 20:35

## 2017-04-21 RX ADMIN — ASPIRIN 325 MG ORAL TABLET SCH MG: 325 PILL ORAL at 10:17

## 2017-04-21 RX ADMIN — HEPARIN SODIUM SCH UNIT: 5000 INJECTION, SOLUTION INTRAVENOUS; SUBCUTANEOUS at 10:15

## 2017-04-21 RX ADMIN — METHYLPREDNISOLONE SODIUM SUCCINATE SCH MG: 40 INJECTION, POWDER, FOR SOLUTION INTRAMUSCULAR; INTRAVENOUS at 10:17

## 2017-04-21 RX ADMIN — HYDROCODONE BITARTRATE AND ACETAMINOPHEN PRN EACH: 5; 325 TABLET ORAL at 05:44

## 2017-04-21 RX ADMIN — CLOPIDOGREL BISULFATE SCH MG: 75 TABLET ORAL at 10:37

## 2017-04-21 RX ADMIN — POTASSIUM CHLORIDE SCH MLS/HR: 14.9 INJECTION, SOLUTION INTRAVENOUS at 17:49

## 2017-04-21 RX ADMIN — INSULIN HUMAN SCH MLS/HR: 100 INJECTION, SOLUTION PARENTERAL at 10:16

## 2017-04-21 RX ADMIN — AMIODARONE HYDROCHLORIDE SCH: 50 INJECTION, SOLUTION INTRAVENOUS at 04:52

## 2017-04-21 RX ADMIN — HEPARIN SODIUM SCH UNIT: 5000 INJECTION, SOLUTION INTRAVENOUS; SUBCUTANEOUS at 16:21

## 2017-04-21 RX ADMIN — HYDROCODONE BITARTRATE AND ACETAMINOPHEN PRN EACH: 5; 325 TABLET ORAL at 02:30

## 2017-04-21 RX ADMIN — MULTIVITAMIN SCH ML: LIQUID ORAL at 12:34

## 2017-04-21 NOTE — PN
DATE OF SERVICE: 04/20/2017 



This 61-year-old gentleman who was admitted after CAD, CABG also had 

acute hypoxic respiratory failure.  The patient is on 50% FiO2. 

Possible delirium tremens also seen. Seen and evaluated the patient 

along with the nurse practitioner. Please refer to the nurse 

practitioner notes and impression documented for further information.  

See orders for details.

## 2017-04-21 NOTE — PN
DATE OF SERVICE: 04/21/2017



This 61-year-old gentleman who was admitted after CAD, CABG also had 

acute hypoxic respiratory failure. The patient is necessitating 

currently 80% FiO2 and 450 tidal volume, 13 of PEEP. 



Seen and evaluated the patient along with nurse practitioner.  Please 

refer to the nurse practitioner notes and impression documented as a 

scribe for further information.     



The patient had features ARDS.  Please refer to nurse practitioner 

notes and impressions documented as a scribe for further information.

## 2017-04-21 NOTE — P.PN
Subjective





Date of service 04/21/2017.





Progress note being dictated for Dr. Hess.











Interval history: This is a 61-year-old gentleman status post CABG, status post 

bronchoscopy related to significant mucus plugging, failure to wean,  DTs and 

multiple other medical issues.   ABGs noted, FiO2 increased to 80%, +13 peep.  

Chest x-ray reporting mild pulmonary vascular congestion, small to moderate 

left pleural effusion with adjacent atelectasis/consolidation increased.  

Maintained on oral amiodarone, diprovan, CIWA protocol and insulin drips.  














Objective





- Vital Signs


Vital signs: 


 Vital Signs











Temp  99 F   04/21/17 11:00


 


Pulse  79   04/21/17 15:00


 


Resp  24   04/21/17 15:00


 


BP  120/64   04/21/17 15:00


 


Pulse Ox  94 L  04/21/17 15:00








 Intake & Output











 04/20/17 04/21/17 04/21/17





 18:59 06:59 18:59


 


Intake Total 9165.975 6609.947 857.099


 


Output Total 505 655 885


 


Balance 1090.702 510.947 -27.901


 


Weight 99.2 kg 96.5 kg 96.5 kg


 


Intake:   


 


  Intake, IV Titration 847.702 430.947 281.099





  Amount   


 


    Amiodarone 450 mg In 248.616  





    Dextrose 5% in Water 250   





    ml @ 1 MG/MIN 34.53 mls/   





    hr IV .Q7H31M [facility] Rx#:   





    [id number]   


 


    Dextrose 5% in Water 100 150  





    ml @ 618 mls/hr IV .Q10M   





    ONE with Amiodarone 150   





    mg Rx#:[id number]   


 


    Insulin Regular 100 unit 60.517 26.542 52.335





    In Sodium Chloride 0.9%   





    100 ml @ Per Protocol IV   





    .Q0M [facility] Rx#:[id number]   


 


    Lactated Ringers 1,000 ml 220 240 180





    @ 20 mls/hr IV .Q24H [facility]   





    Rx#:[id number]   


 


    Propofol 500 mg In Empty 168.569 164.405 48.764





    Bag 1 bag @ Titrate IV .   





    Q0M [facility] Rx#:[id number]   


 


  Tube Feeding 658 645 516


 


  Other 90 90 60


 


Output:   


 


  Urine 505 655 885


 


Other:   


 


  Voiding Method Indwelling Catheter Indwelling Catheter Indwelling Catheter


 


  # Bowel Movements 1  








 ABP, PAP, CO, CI - Last Documented











Arterial Blood Pressure        152/64


 


Pulmonary Artery Pressure      46/23


 


Cardiac Output                 6.8


 


Cardiac Index                  3.3

















- Exam


PHYSICAL EXAM:


VITAL SIGNS: As above


GENERAL: [Sedated, intubated 


HEENT: [Pupils equal, conjunctiva normal.  Mucosa moist.  Endotracheal tube 

present. ]


NECK: [Supple, no JVD]


RESPIRATORY EFFORT:[Normal]


LUNGS:  [Diminished throughout, bibasilar crackles greater on the left, rhonchi]


CARDIOVASCULAR[regular S1 and S2, no murmurs rubs or gallops, mild edema]


GI: [Abdomen soft, nontender, positive bowel sounds.]


NEURO: Unable to assess, patient sedated on Diprovan and vent dependent





 








 


 








 Microbiology





04/13/17 16:40   Lung Aspirate - Right   Gram Stain - Final


04/13/17 16:40   Lung Aspirate - Right   Bronchial Washings Culture - Final


04/13/17 16:40   Lung - Right   Acid Fast Bacilli Smear - Final


04/13/17 16:40   Lung - Right   Acid Fast Bacilli Culture - Preliminary


04/13/17 16:40   Lung - Right   Fungal Culture - Preliminary


























- Labs


CBC & Chem 7: 


 04/21/17 04:00





 04/21/17 04:00


Labs: 


 Abnormal Lab Results - Last 24 Hours (Table)











  04/20/17 04/20/17 04/20/17 Range/Units





  16:00 17:10 18:15 


 


WBC     (3.8-10.6)  k/uL


 


RBC     (4.30-5.90)  m/uL


 


Hgb     (13.0-17.5)  gm/dL


 


Hct     (39.0-53.0)  %


 


Neutrophils #     (1.3-7.7)  k/uL


 


ABG pH     (7.35-7.45)  


 


ABG HCO3     (21-25)  mmol/L


 


ABG Total CO2     (19-24)  mmol/L


 


ABG O2 Saturation     (94-97)  %


 


BUN     (9-20)  mg/dL


 


Glucose     (74-99)  mg/dL


 


POC Glucose (mg/dL)  141 H  151 H  182 H  (75-99)  mg/dL


 


Magnesium     (1.6-2.3)  mg/dL


 


Total Protein     (6.3-8.2)  g/dL


 


Albumin     (3.5-5.0)  g/dL














  04/20/17 04/20/17 04/20/17 Range/Units





  19:04 20:05 21:13 


 


WBC     (3.8-10.6)  k/uL


 


RBC     (4.30-5.90)  m/uL


 


Hgb     (13.0-17.5)  gm/dL


 


Hct     (39.0-53.0)  %


 


Neutrophils #     (1.3-7.7)  k/uL


 


ABG pH     (7.35-7.45)  


 


ABG HCO3     (21-25)  mmol/L


 


ABG Total CO2     (19-24)  mmol/L


 


ABG O2 Saturation     (94-97)  %


 


BUN     (9-20)  mg/dL


 


Glucose     (74-99)  mg/dL


 


POC Glucose (mg/dL)  145 H  127 H  130 H  (75-99)  mg/dL


 


Magnesium     (1.6-2.3)  mg/dL


 


Total Protein     (6.3-8.2)  g/dL


 


Albumin     (3.5-5.0)  g/dL














  04/20/17 04/20/17 04/21/17 Range/Units





  21:58 23:53 02:28 


 


WBC     (3.8-10.6)  k/uL


 


RBC     (4.30-5.90)  m/uL


 


Hgb     (13.0-17.5)  gm/dL


 


Hct     (39.0-53.0)  %


 


Neutrophils #     (1.3-7.7)  k/uL


 


ABG pH     (7.35-7.45)  


 


ABG HCO3     (21-25)  mmol/L


 


ABG Total CO2     (19-24)  mmol/L


 


ABG O2 Saturation     (94-97)  %


 


BUN     (9-20)  mg/dL


 


Glucose     (74-99)  mg/dL


 


POC Glucose (mg/dL)  119 H  142 H  140 H  (75-99)  mg/dL


 


Magnesium     (1.6-2.3)  mg/dL


 


Total Protein     (6.3-8.2)  g/dL


 


Albumin     (3.5-5.0)  g/dL














  04/21/17 04/21/17 04/21/17 Range/Units





  03:58 04:00 04:00 


 


WBC    14.1 H  (3.8-10.6)  k/uL


 


RBC    2.96 L  (4.30-5.90)  m/uL


 


Hgb    9.6 L  (13.0-17.5)  gm/dL


 


Hct    29.2 L  (39.0-53.0)  %


 


Neutrophils #    11.8 H  (1.3-7.7)  k/uL


 


ABG pH     (7.35-7.45)  


 


ABG HCO3     (21-25)  mmol/L


 


ABG Total CO2     (19-24)  mmol/L


 


ABG O2 Saturation     (94-97)  %


 


BUN   52 H   (9-20)  mg/dL


 


Glucose   132 H   (74-99)  mg/dL


 


POC Glucose (mg/dL)  137 H    (75-99)  mg/dL


 


Magnesium     (1.6-2.3)  mg/dL


 


Total Protein   5.3 L   (6.3-8.2)  g/dL


 


Albumin   2.6 L   (3.5-5.0)  g/dL














  04/21/17 04/21/17 04/21/17 Range/Units





  04:00 04:19 07:11 


 


WBC     (3.8-10.6)  k/uL


 


RBC     (4.30-5.90)  m/uL


 


Hgb     (13.0-17.5)  gm/dL


 


Hct     (39.0-53.0)  %


 


Neutrophils #     (1.3-7.7)  k/uL


 


ABG pH   7.46 H   (7.35-7.45)  


 


ABG HCO3   26 H   (21-25)  mmol/L


 


ABG Total CO2   27 H   (19-24)  mmol/L


 


ABG O2 Saturation   98.0 H   (94-97)  %


 


BUN     (9-20)  mg/dL


 


Glucose     (74-99)  mg/dL


 


POC Glucose (mg/dL)    119 H  (75-99)  mg/dL


 


Magnesium  2.8 H    (1.6-2.3)  mg/dL


 


Total Protein     (6.3-8.2)  g/dL


 


Albumin     (3.5-5.0)  g/dL














  04/21/17 04/21/17 04/21/17 Range/Units





  08:17 10:09 12:09 


 


WBC     (3.8-10.6)  k/uL


 


RBC     (4.30-5.90)  m/uL


 


Hgb     (13.0-17.5)  gm/dL


 


Hct     (39.0-53.0)  %


 


Neutrophils #     (1.3-7.7)  k/uL


 


ABG pH     (7.35-7.45)  


 


ABG HCO3     (21-25)  mmol/L


 


ABG Total CO2     (19-24)  mmol/L


 


ABG O2 Saturation     (94-97)  %


 


BUN     (9-20)  mg/dL


 


Glucose     (74-99)  mg/dL


 


POC Glucose (mg/dL)  132 H  124 H  109 H  (75-99)  mg/dL


 


Magnesium     (1.6-2.3)  mg/dL


 


Total Protein     (6.3-8.2)  g/dL


 


Albumin     (3.5-5.0)  g/dL














  04/21/17 04/21/17 Range/Units





  12:25 13:34 


 


WBC    (3.8-10.6)  k/uL


 


RBC    (4.30-5.90)  m/uL


 


Hgb    (13.0-17.5)  gm/dL


 


Hct    (39.0-53.0)  %


 


Neutrophils #    (1.3-7.7)  k/uL


 


ABG pH    (7.35-7.45)  


 


ABG HCO3    (21-25)  mmol/L


 


ABG Total CO2    (19-24)  mmol/L


 


ABG O2 Saturation    (94-97)  %


 


BUN    (9-20)  mg/dL


 


Glucose    (74-99)  mg/dL


 


POC Glucose (mg/dL)  124 H  134 H  (75-99)  mg/dL


 


Magnesium    (1.6-2.3)  mg/dL


 


Total Protein    (6.3-8.2)  g/dL


 


Albumin    (3.5-5.0)  g/dL














Assessment and Plan


Plan: 


1.  CAD, [Status post CABG].


2. [Acute hypoxic respiratory failure secondary to the above].


3. [Exacerbation COPD].


4. [Possible early DTs, on CIWA protocol].


5.  Right upper and lower lobe collapse secondary to mucus plugging status post 

bronchoscopy].


6. [Hypertension].


7. [Ongoing Nicotine dependence, chewing tobacco


8.  Bibasilar atelectasis


9.  Left pleural effusion


10.  A. fib with RVR

















Plan: Continue on current medication regime , amiodarone, IV steroids, 

nebulized bronchodilators , insulin drip monitoring and symptomatic treatment.  

Potential thoracentesis and PEG tube pending patient's progress over the 

weekend.  Maintain CIWA protocol.  GI and DVT prophylaxis in place.  Further 

recommendations to follow.








The impression and plan of care has been dictated as directed.





:


I performed a H&P examination of this patient and discussed the same with the 

dictator.  I agree with the dictator's note.  Any additional findings/opinions/

etc. will be noted.

## 2017-04-21 NOTE — P.PN
Subjective





Progress note dated 04/18/2017





This is a 61-year-old male who is status post bypass grafting.  He is postop 

day #5.  He remains intubated on mechanical ventilator.  His vent settings 

include the assist control mode rate is 16 to be increased to 20 a tidal [place] 

of 500 be decreased to 450 and FiO2 of 90% and a PEEP of 5 which will be 

increased to 10.  His arterial blood gases on those settings show a pO2 of only 

84 pCO2 of 45 and appears to some 0.40.  This was on 90%.  He is getting 

lactated Ringer's at 20 mL an hour the Diprovan at 25 mics per kilogram per 

minute and insulin drip at 1 unit per hour and vital HP 50 with a goal of 50.  

Chest x-rays essentially unchanged.  He does have a small left pleural effusion.





Progress note dated 04/19/2017





61-year-old male who is status post bypass grafting.  He is postop day #6.  He 

is intubated and on mechanical ventilator.  He's had problems with hypoxemia.  

We will try to increase his PEEP up to 13.  Currently he is on the assist 

control mode rate of 2010 of I'm 4 5080% PEEP of 13.  It was just increased.  

Blood gases on a PEEP of 10 show a PaO2 of 79 a PaCO2 of 39 and a pH of 7.46.  

The patient is currently on propofol at 20 mics per kilogram per minute 

lactated Ringer's at 20 mL an hour insulin at 1 unit per hour and vital high 

protein at a rate of 50 with a goal of 50.  He has not made much in the way of 

progress.  Chest x-ray though is stable.





Progress note dated 04/21/2017





61-year-old male status post bypass grafting.  He is postop day #8.  Surgery 

remains on the mechanical ventilator.  Still requiring high concentrations of 

oxygen and high PEEP levels.  Likely has developed acute lung injury/ARDS.  The 

patient is currently on lactated Ringer's at 20 mL an hour.  The patient's also 

on vital high protein at a rate of 43 with a goal of 43 mL an hour.  Receiving 

propofol at 15 mics per kilogram per minute and insulin drip at 4 units an 

hour.  The patient's vent settings with the assist control mode rate of 20 

tidal volume 450 FiO2 80% PEEP of 13.  Blood gases show a PaO2 of 92 a pCO2 of 

38 patient some 0.46.  In my opinion, all that's patient starts to show some 

significant improvement and, he likely end up with a tracheostomy and a feeding 

tube.  His overall prognosis remains very poor as he is not really making much 

progress.  Yesterday we did him down to 60% and 13 a PEEP and I thought that 

point I had broken shot and improved his respiratory status but today I see is 

back up to 80%.  Again I think if he doesn't show much improvement over the 

weekend, I next week he'll be a candidate for tracheostomy and PEG tube 

placement.





Objective





- Vital Signs


Vital signs: 


 Vital Signs











Temp  98.0 F   04/21/17 04:00


 


Pulse  67   04/21/17 08:19


 


Resp  21   04/21/17 07:00


 


BP  115/63   04/21/17 07:00


 


Pulse Ox  97   04/21/17 07:00








 Intake & Output











 04/20/17 04/21/17 04/21/17





 18:59 06:59 18:59


 


Intake Total 0595.361 1565.947 137.330


 


Output Total 505 655 150


 


Balance 1090.702 510.947 -12.670


 


Weight 99.2 kg 96.5 kg 


 


Intake:   


 


  Intake, IV Titration 847.702 430.947 94.330





  Amount   


 


    Amiodarone 450 mg In 248.616  





    Dextrose 5% in Water 250   





    ml @ 1 MG/MIN 34.53 mls/   





    hr IV .Q7H31M [facility] Rx#:   





    [id number]   


 


    Dextrose 5% in Water 100 150  





    ml @ 618 mls/hr IV .Q10M   





    ONE with Amiodarone 150   





    mg Rx#:[id number]   


 


    Insulin Regular 100 unit 60.517 26.542 42.504





    In Sodium Chloride 0.9%   





    100 ml @ Per Protocol IV   





    .Q0M [facility] Rx#:[id number]   


 


    Lactated Ringers 1,000 ml 220 240 40





    @ 20 mls/hr IV .Q24H [facility]   





    Rx#:[id number]   


 


    Propofol 500 mg In Empty 168.569 164.405 11.826





    Bag 1 bag @ Titrate IV .   





    Q0M [facility] Rx#:[id number]   


 


  Tube Feeding 658 645 43


 


  Other 90 90 


 


Output:   


 


  Urine 505 655 150


 


Other:   


 


  Voiding Method Indwelling Catheter Indwelling Catheter 


 


  # Bowel Movements 1  








 ABP, PAP, CO, CI - Last Documented











Arterial Blood Pressure        147/58


 


Pulmonary Artery Pressure      46/23


 


Cardiac Output                 6.8


 


Cardiac Index                  3.3

















- Exam





Now no acute distress.  The patient remains intubated and sedated.





HEENT examination is grossly unremarkable.  Mucous members are moist.  

Endotracheal and NG tube noted.





Neck supple.  Full range of motion.  No adenopathy or thyromegaly.  Neck veins 

are flat.





Cardiovascular examination reveals regular rhythm rate.  S1-S2 normal.  No 

distinct murmur noted.





Lungs reveal diminished breath sounds throughout.  A few scattered crackles.  A 

few rhonchi are noted.





Abdomen soft bowel sounds are heard.





Extremities are intact.  There is mild edema.





Skin without rash.





Neurologic examination cannot be adequately performed.





- Labs


CBC & Chem 7: 


 04/21/17 04:00





 04/21/17 04:00


Labs: 


 Abnormal Lab Results - Last 24 Hours (Table)











  04/20/17 04/20/17 04/20/17 Range/Units





  10:04 10:20 11:46 


 


WBC     (3.8-10.6)  k/uL


 


RBC     (4.30-5.90)  m/uL


 


Hgb     (13.0-17.5)  gm/dL


 


Hct     (39.0-53.0)  %


 


Neutrophils #     (1.3-7.7)  k/uL


 


ABG pH     (7.35-7.45)  


 


ABG HCO3     (21-25)  mmol/L


 


ABG Total CO2     (19-24)  mmol/L


 


ABG O2 Saturation     (94-97)  %


 


BUN     (9-20)  mg/dL


 


Glucose     (74-99)  mg/dL


 


POC Glucose (mg/dL)  157 H  155 H  174 H  (75-99)  mg/dL


 


Total Protein     (6.3-8.2)  g/dL


 


Albumin     (3.5-5.0)  g/dL














  04/20/17 04/20/17 04/20/17 Range/Units





  12:17 13:05 14:00 


 


WBC     (3.8-10.6)  k/uL


 


RBC     (4.30-5.90)  m/uL


 


Hgb     (13.0-17.5)  gm/dL


 


Hct     (39.0-53.0)  %


 


Neutrophils #     (1.3-7.7)  k/uL


 


ABG pH     (7.35-7.45)  


 


ABG HCO3     (21-25)  mmol/L


 


ABG Total CO2     (19-24)  mmol/L


 


ABG O2 Saturation     (94-97)  %


 


BUN     (9-20)  mg/dL


 


Glucose     (74-99)  mg/dL


 


POC Glucose (mg/dL)  177 H  178 H  160 H  (75-99)  mg/dL


 


Total Protein     (6.3-8.2)  g/dL


 


Albumin     (3.5-5.0)  g/dL














  04/20/17 04/20/17 04/20/17 Range/Units





  15:03 16:00 17:10 


 


WBC     (3.8-10.6)  k/uL


 


RBC     (4.30-5.90)  m/uL


 


Hgb     (13.0-17.5)  gm/dL


 


Hct     (39.0-53.0)  %


 


Neutrophils #     (1.3-7.7)  k/uL


 


ABG pH     (7.35-7.45)  


 


ABG HCO3     (21-25)  mmol/L


 


ABG Total CO2     (19-24)  mmol/L


 


ABG O2 Saturation     (94-97)  %


 


BUN     (9-20)  mg/dL


 


Glucose     (74-99)  mg/dL


 


POC Glucose (mg/dL)  145 H  141 H  151 H  (75-99)  mg/dL


 


Total Protein     (6.3-8.2)  g/dL


 


Albumin     (3.5-5.0)  g/dL














  04/20/17 04/20/17 04/20/17 Range/Units





  18:15 19:04 20:05 


 


WBC     (3.8-10.6)  k/uL


 


RBC     (4.30-5.90)  m/uL


 


Hgb     (13.0-17.5)  gm/dL


 


Hct     (39.0-53.0)  %


 


Neutrophils #     (1.3-7.7)  k/uL


 


ABG pH     (7.35-7.45)  


 


ABG HCO3     (21-25)  mmol/L


 


ABG Total CO2     (19-24)  mmol/L


 


ABG O2 Saturation     (94-97)  %


 


BUN     (9-20)  mg/dL


 


Glucose     (74-99)  mg/dL


 


POC Glucose (mg/dL)  182 H  145 H  127 H  (75-99)  mg/dL


 


Total Protein     (6.3-8.2)  g/dL


 


Albumin     (3.5-5.0)  g/dL














  04/20/17 04/20/17 04/20/17 Range/Units





  21:13 21:58 23:53 


 


WBC     (3.8-10.6)  k/uL


 


RBC     (4.30-5.90)  m/uL


 


Hgb     (13.0-17.5)  gm/dL


 


Hct     (39.0-53.0)  %


 


Neutrophils #     (1.3-7.7)  k/uL


 


ABG pH     (7.35-7.45)  


 


ABG HCO3     (21-25)  mmol/L


 


ABG Total CO2     (19-24)  mmol/L


 


ABG O2 Saturation     (94-97)  %


 


BUN     (9-20)  mg/dL


 


Glucose     (74-99)  mg/dL


 


POC Glucose (mg/dL)  130 H  119 H  142 H  (75-99)  mg/dL


 


Total Protein     (6.3-8.2)  g/dL


 


Albumin     (3.5-5.0)  g/dL














  04/21/17 04/21/17 04/21/17 Range/Units





  02:28 03:58 04:00 


 


WBC     (3.8-10.6)  k/uL


 


RBC     (4.30-5.90)  m/uL


 


Hgb     (13.0-17.5)  gm/dL


 


Hct     (39.0-53.0)  %


 


Neutrophils #     (1.3-7.7)  k/uL


 


ABG pH     (7.35-7.45)  


 


ABG HCO3     (21-25)  mmol/L


 


ABG Total CO2     (19-24)  mmol/L


 


ABG O2 Saturation     (94-97)  %


 


BUN    52 H  (9-20)  mg/dL


 


Glucose    132 H  (74-99)  mg/dL


 


POC Glucose (mg/dL)  140 H  137 H   (75-99)  mg/dL


 


Total Protein    5.3 L  (6.3-8.2)  g/dL


 


Albumin    2.6 L  (3.5-5.0)  g/dL














  04/21/17 04/21/17 04/21/17 Range/Units





  04:00 04:19 07:11 


 


WBC  14.1 H    (3.8-10.6)  k/uL


 


RBC  2.96 L    (4.30-5.90)  m/uL


 


Hgb  9.6 L    (13.0-17.5)  gm/dL


 


Hct  29.2 L    (39.0-53.0)  %


 


Neutrophils #  11.8 H    (1.3-7.7)  k/uL


 


ABG pH   7.46 H   (7.35-7.45)  


 


ABG HCO3   26 H   (21-25)  mmol/L


 


ABG Total CO2   27 H   (19-24)  mmol/L


 


ABG O2 Saturation   98.0 H   (94-97)  %


 


BUN     (9-20)  mg/dL


 


Glucose     (74-99)  mg/dL


 


POC Glucose (mg/dL)    119 H  (75-99)  mg/dL


 


Total Protein     (6.3-8.2)  g/dL


 


Albumin     (3.5-5.0)  g/dL














  04/21/17 Range/Units





  08:17 


 


WBC   (3.8-10.6)  k/uL


 


RBC   (4.30-5.90)  m/uL


 


Hgb   (13.0-17.5)  gm/dL


 


Hct   (39.0-53.0)  %


 


Neutrophils #   (1.3-7.7)  k/uL


 


ABG pH   (7.35-7.45)  


 


ABG HCO3   (21-25)  mmol/L


 


ABG Total CO2   (19-24)  mmol/L


 


ABG O2 Saturation   (94-97)  %


 


BUN   (9-20)  mg/dL


 


Glucose   (74-99)  mg/dL


 


POC Glucose (mg/dL)  132 H  (75-99)  mg/dL


 


Total Protein   (6.3-8.2)  g/dL


 


Albumin   (3.5-5.0)  g/dL














Assessment and Plan


(1) Postoperative respiratory failure


Status: Acute   





(2) Status post coronary artery bypass graft


Status: Acute   





(3) Coronary artery disease


Status: Acute   





(4) History of PTCA


Status: Acute   





(5) Hyperlipidemia


Status: Acute   





(6) Hypertension


Status: Acute   





(7) Nicotine dependence, chewing tobacco, uncomplicated


Status: Acute   


Plan: 





Plan dated 04/18/2017





The patient's vent will be changed a bit.  We'll bump his rate up from 16-20.  

We'll decrease the tidal volume from 500 to 450.  We'll increase the PEEP of 5-

10.  I've asked respiratory to try to titrate down the FiO2.  Hopefully this 

will happen once we increase the PEEP from 5-10.  We'll continue to follow 

closely.  X-rays labs medications are all reviewed.  Prognosis is guarded.





Plan dated 04/19/2017





The patient's vent will be changed a bit as we increase the PEEP to 13.  I 

asked the respiratory therapist guarded titrate down the FiO2.  Labs x-rays and 

medications are all reviewed.  Additional recommendations suggestions are 

forthcoming.  Prognosis is guarded.





Plan dated 04/21/2017





The patient is doing about the same.  Oxygen requirements have actually gone 

up.  Chest x-ray still showing a pattern of fluid overload.  Currently the 

patient is on all the appropriate medications.  We will attempt a daily 

eruption of sedation.  Labs x-rays a medications are all reviewed.  Again 

prognosis is very guarded.


Time with Patient: Greater than 30

## 2017-04-21 NOTE — P.PN
Subjective


Principal diagnosis: 


Status post CABG, respiratory failure, atrial fibrillation








This 60-year-old gentleman is status post prior to coronary bypass surgery.  

Patient has been having difficulty oxygenating and has been ventilator 

dependent.  Patient also had history of alcohol withdrawal.  Adjustments are 

being made in the respiratory settings and cutting down on PEEP.  

Hemodynamically patient is otherwise stable.  Kidney function remained stable.  

Patient went into atrial fibrillation last night.  He was started on IV 

amiodarone bolus and drip.  Additional amiodarone was given this morning.  His 

heart rate in the 60-90 range.  Patient also had a PICC line placement.





Patient is reevaluated on 21st of April.  Patient is still intubated and 

sedated.  Having difficulty with oxygenation.  Patient may need a tracheostomy.

  His heart rate is well controlled.  His kidney function remained stable.  We'

ll continue current medical therapy.





Objective





- Vital Signs


Vital signs: 


 Vital Signs











Temp  99 F   04/21/17 11:00


 


Pulse  79   04/21/17 15:00


 


Resp  24   04/21/17 15:00


 


BP  120/64   04/21/17 15:00


 


Pulse Ox  94 L  04/21/17 15:00








 Intake & Output











 04/20/17 04/21/17 04/21/17





 18:59 06:59 18:59


 


Intake Total 6290.182 2117.947 857.099


 


Output Total 505 655 885


 


Balance 1090.702 510.947 -27.901


 


Weight 99.2 kg 96.5 kg 96.5 kg


 


Intake:   


 


  Intake, IV Titration 847.702 430.947 281.099





  Amount   


 


    Amiodarone 450 mg In 248.616  





    Dextrose 5% in Water 250   





    ml @ 1 MG/MIN 34.53 mls/   





    hr IV .Q7H31M [facility] Rx#:   





    [id number]   


 


    Dextrose 5% in Water 100 150  





    ml @ 618 mls/hr IV .Q10M   





    ONE with Amiodarone 150   





    mg Rx#:[id number]   


 


    Insulin Regular 100 unit 60.517 26.542 52.335





    In Sodium Chloride 0.9%   





    100 ml @ Per Protocol IV   





    .Q0M [facility] Rx#:[id number]   


 


    Lactated Ringers 1,000 ml 220 240 180





    @ 20 mls/hr IV .Q24H [facility]   





    Rx#:[id number]   


 


    Propofol 500 mg In Empty 168.569 164.405 48.764





    Bag 1 bag @ Titrate IV .   





    Q0M [facility] Rx#:[id number]   


 


  Tube Feeding 658 645 516


 


  Other 90 90 60


 


Output:   


 


  Urine 505 655 885


 


Other:   


 


  Voiding Method Indwelling Catheter Indwelling Catheter Indwelling Catheter


 


  # Bowel Movements 1  








 ABP, PAP, CO, CI - Last Documented











Arterial Blood Pressure        152/64


 


Pulmonary Artery Pressure      46/23


 


Cardiac Output                 6.8


 


Cardiac Index                  3.3

















- Labs


CBC & Chem 7: 


 04/21/17 04:00





 04/21/17 04:00


Labs: 


 Abnormal Lab Results - Last 24 Hours (Table)











  04/20/17 04/20/17 04/20/17 Range/Units





  16:00 17:10 18:15 


 


WBC     (3.8-10.6)  k/uL


 


RBC     (4.30-5.90)  m/uL


 


Hgb     (13.0-17.5)  gm/dL


 


Hct     (39.0-53.0)  %


 


Neutrophils #     (1.3-7.7)  k/uL


 


ABG pH     (7.35-7.45)  


 


ABG HCO3     (21-25)  mmol/L


 


ABG Total CO2     (19-24)  mmol/L


 


ABG O2 Saturation     (94-97)  %


 


BUN     (9-20)  mg/dL


 


Glucose     (74-99)  mg/dL


 


POC Glucose (mg/dL)  141 H  151 H  182 H  (75-99)  mg/dL


 


Magnesium     (1.6-2.3)  mg/dL


 


Total Protein     (6.3-8.2)  g/dL


 


Albumin     (3.5-5.0)  g/dL














  04/20/17 04/20/17 04/20/17 Range/Units





  19:04 20:05 21:13 


 


WBC     (3.8-10.6)  k/uL


 


RBC     (4.30-5.90)  m/uL


 


Hgb     (13.0-17.5)  gm/dL


 


Hct     (39.0-53.0)  %


 


Neutrophils #     (1.3-7.7)  k/uL


 


ABG pH     (7.35-7.45)  


 


ABG HCO3     (21-25)  mmol/L


 


ABG Total CO2     (19-24)  mmol/L


 


ABG O2 Saturation     (94-97)  %


 


BUN     (9-20)  mg/dL


 


Glucose     (74-99)  mg/dL


 


POC Glucose (mg/dL)  145 H  127 H  130 H  (75-99)  mg/dL


 


Magnesium     (1.6-2.3)  mg/dL


 


Total Protein     (6.3-8.2)  g/dL


 


Albumin     (3.5-5.0)  g/dL














  04/20/17 04/20/17 04/21/17 Range/Units





  21:58 23:53 02:28 


 


WBC     (3.8-10.6)  k/uL


 


RBC     (4.30-5.90)  m/uL


 


Hgb     (13.0-17.5)  gm/dL


 


Hct     (39.0-53.0)  %


 


Neutrophils #     (1.3-7.7)  k/uL


 


ABG pH     (7.35-7.45)  


 


ABG HCO3     (21-25)  mmol/L


 


ABG Total CO2     (19-24)  mmol/L


 


ABG O2 Saturation     (94-97)  %


 


BUN     (9-20)  mg/dL


 


Glucose     (74-99)  mg/dL


 


POC Glucose (mg/dL)  119 H  142 H  140 H  (75-99)  mg/dL


 


Magnesium     (1.6-2.3)  mg/dL


 


Total Protein     (6.3-8.2)  g/dL


 


Albumin     (3.5-5.0)  g/dL














  04/21/17 04/21/17 04/21/17 Range/Units





  03:58 04:00 04:00 


 


WBC    14.1 H  (3.8-10.6)  k/uL


 


RBC    2.96 L  (4.30-5.90)  m/uL


 


Hgb    9.6 L  (13.0-17.5)  gm/dL


 


Hct    29.2 L  (39.0-53.0)  %


 


Neutrophils #    11.8 H  (1.3-7.7)  k/uL


 


ABG pH     (7.35-7.45)  


 


ABG HCO3     (21-25)  mmol/L


 


ABG Total CO2     (19-24)  mmol/L


 


ABG O2 Saturation     (94-97)  %


 


BUN   52 H   (9-20)  mg/dL


 


Glucose   132 H   (74-99)  mg/dL


 


POC Glucose (mg/dL)  137 H    (75-99)  mg/dL


 


Magnesium     (1.6-2.3)  mg/dL


 


Total Protein   5.3 L   (6.3-8.2)  g/dL


 


Albumin   2.6 L   (3.5-5.0)  g/dL














  04/21/17 04/21/17 04/21/17 Range/Units





  04:00 04:19 07:11 


 


WBC     (3.8-10.6)  k/uL


 


RBC     (4.30-5.90)  m/uL


 


Hgb     (13.0-17.5)  gm/dL


 


Hct     (39.0-53.0)  %


 


Neutrophils #     (1.3-7.7)  k/uL


 


ABG pH   7.46 H   (7.35-7.45)  


 


ABG HCO3   26 H   (21-25)  mmol/L


 


ABG Total CO2   27 H   (19-24)  mmol/L


 


ABG O2 Saturation   98.0 H   (94-97)  %


 


BUN     (9-20)  mg/dL


 


Glucose     (74-99)  mg/dL


 


POC Glucose (mg/dL)    119 H  (75-99)  mg/dL


 


Magnesium  2.8 H    (1.6-2.3)  mg/dL


 


Total Protein     (6.3-8.2)  g/dL


 


Albumin     (3.5-5.0)  g/dL














  04/21/17 04/21/17 04/21/17 Range/Units





  08:17 10:09 12:09 


 


WBC     (3.8-10.6)  k/uL


 


RBC     (4.30-5.90)  m/uL


 


Hgb     (13.0-17.5)  gm/dL


 


Hct     (39.0-53.0)  %


 


Neutrophils #     (1.3-7.7)  k/uL


 


ABG pH     (7.35-7.45)  


 


ABG HCO3     (21-25)  mmol/L


 


ABG Total CO2     (19-24)  mmol/L


 


ABG O2 Saturation     (94-97)  %


 


BUN     (9-20)  mg/dL


 


Glucose     (74-99)  mg/dL


 


POC Glucose (mg/dL)  132 H  124 H  109 H  (75-99)  mg/dL


 


Magnesium     (1.6-2.3)  mg/dL


 


Total Protein     (6.3-8.2)  g/dL


 


Albumin     (3.5-5.0)  g/dL














  04/21/17 04/21/17 Range/Units





  12:25 13:34 


 


WBC    (3.8-10.6)  k/uL


 


RBC    (4.30-5.90)  m/uL


 


Hgb    (13.0-17.5)  gm/dL


 


Hct    (39.0-53.0)  %


 


Neutrophils #    (1.3-7.7)  k/uL


 


ABG pH    (7.35-7.45)  


 


ABG HCO3    (21-25)  mmol/L


 


ABG Total CO2    (19-24)  mmol/L


 


ABG O2 Saturation    (94-97)  %


 


BUN    (9-20)  mg/dL


 


Glucose    (74-99)  mg/dL


 


POC Glucose (mg/dL)  124 H  134 H  (75-99)  mg/dL


 


Magnesium    (1.6-2.3)  mg/dL


 


Total Protein    (6.3-8.2)  g/dL


 


Albumin    (3.5-5.0)  g/dL














Assessment and Plan


(1) Status post coronary artery bypass graft


Status: Acute   





(2) Family history of heart disease


Status: Acute   





(3) Hyperlipidemia


Status: Acute   





(4) Hypertension


Status: Acute   





(5) Nicotine dependence, chewing tobacco, uncomplicated


Status: Acute   





(6) Atrial fibrillation


Status: Acute   


Plan: 


No significant improvement in clinical status.  Patient's heart rate is well 

controlled.  Patient is on an amiodarone.  He is not on anticoagulation at this 

time.  If patient were to go into atrial fibrillation again, anticoagulation 

therapy to be started.  Prognosis is guarded

## 2017-04-21 NOTE — P.PN
Subjective


Principal diagnosis: 





Coronary artery disease, status post prior stenting to his right coronary 

artery.  Preserved left ventricular function.  Hyperlipidemia.  Hypertension.  

ETOH abuse.





POD #8 total arterial non-aortic patch off-pump double coronary artery bypass 

grafting using in situ skeletonized right internal mammary artery to the left 

anterior descending artery across the anterior midline in situ totally 

skeletonized left internal mammary artery to the first obtuse marginal artery.  

Intraoperative transesophageal echocardiogram and epi-aortic scanning.  

Intraoperative graft flow measurements using the [employer] system





Patient had postoperative mucous plugging with increasing oxygen demand 

requiring bronchoscopy the night of surgery.  Bronchial washings negative for 

bacteria/viruses to date.





Pt developed postoperative alcohol withdrawal requiring increased sedation and 

continued mechanical ventilation.





Patient remains sedated on mechanical ventilation.  ABGs reviewed from this 

morning on 80% FiO2 with a PEEP of 13.  Patient continues to be intolerant of 

any attempt to wean down FiO2.  Patient started yesterday back into normal 

sinus rhythm.  Off amiodarone drip, transition to oral amiodarone with digoxin 

IV push daily.





Objective





- Vital Signs


Vital signs: 


 Vital Signs











Temp  98.0 F   04/21/17 04:00


 


Pulse  67   04/21/17 08:19


 


Resp  21   04/21/17 07:00


 


BP  115/63   04/21/17 07:00


 


Pulse Ox  97   04/21/17 07:00








 Intake & Output











 04/20/17 04/21/17 04/21/17





 18:59 06:59 18:59


 


Intake Total 0226.440 3505.947 137.330


 


Output Total 505 655 150


 


Balance 1090.702 510.947 -12.670


 


Weight 99.2 kg 96.5 kg 


 


Intake:   


 


  Intake, IV Titration 847.702 430.947 94.330





  Amount   


 


    Amiodarone 450 mg In 248.616  





    Dextrose 5% in Water 250   





    ml @ 1 MG/MIN 34.53 mls/   





    hr IV .Q7H31M [facility] Rx#:   





    [id number]   


 


    Dextrose 5% in Water 100 150  





    ml @ 618 mls/hr IV .Q10M   





    ONE with Amiodarone 150   





    mg Rx#:[id number]   


 


    Insulin Regular 100 unit 60.517 26.542 42.504





    In Sodium Chloride 0.9%   





    100 ml @ Per Protocol IV   





    .Q0M [facility] Rx#:[id number]   


 


    Lactated Ringers 1,000 ml 220 240 40





    @ 20 mls/hr IV .Q24H [facility]   





    Rx#:[id number]   


 


    Propofol 500 mg In Empty 168.569 164.405 11.826





    Bag 1 bag @ Titrate IV .   





    Q0M [facility] Rx#:[id number]   


 


  Tube Feeding 658 645 43


 


  Other 90 90 


 


Output:   


 


  Urine 505 655 150


 


Other:   


 


  Voiding Method Indwelling Catheter Indwelling Catheter 


 


  # Bowel Movements 1  








 ABP, PAP, CO, CI - Last Documented











Arterial Blood Pressure        147/58


 


Pulmonary Artery Pressure      46/23


 


Cardiac Output                 6.8


 


Cardiac Index                  3.3

















- Constitutional


General appearance: Present: no acute distress





- Respiratory


Details: 





Lungs sounds diminished bilaterally.  Respirations even, nonlabored on 

mechanical ventilation.  Current settings tidal volume 450, FiO2 80%, 

respiratory rate 20, PEEP 13.





- Cardiovascular


Details: 





S1, S2 present.  Bradycardic rate and rhythm, sinus bradycardia on telemetry.  

Sternum stable.  Heart hugger/teds for/SCDs present.  Left arm PICC line 

present.





- Gastrointestinal


Gastrointestinal Comment(s): 





Abdomen soft, nontender, nondistended.  Active bowel sounds 4 quadrants.  Tube 

feeding infusing and 43 mL/h through OG tube.





- Genitourinary


Genitourinary Comment(s): 





Angelo present draining clear, yellow urine.  Urine output 50 mL/h overnight.





- Integumentary


Integumentary Comment(s): 





Anterior chest incision covered with dry intact silver dressing.





- Psychiatric


Psychiatric Comment(s): 





Remains sedated on mechanical ventilation.





- Allied health notes


Allied health notes reviewed: nursing





- Labs


CBC & Chem 7: 


 04/21/17 04:00





 04/21/17 04:00


Labs: 


 Abnormal Lab Results - Last 24 Hours (Table)











  04/20/17 04/20/17 04/20/17 Range/Units





  10:04 10:20 11:46 


 


WBC     (3.8-10.6)  k/uL


 


RBC     (4.30-5.90)  m/uL


 


Hgb     (13.0-17.5)  gm/dL


 


Hct     (39.0-53.0)  %


 


Neutrophils #     (1.3-7.7)  k/uL


 


ABG pH     (7.35-7.45)  


 


ABG HCO3     (21-25)  mmol/L


 


ABG Total CO2     (19-24)  mmol/L


 


ABG O2 Saturation     (94-97)  %


 


BUN     (9-20)  mg/dL


 


Glucose     (74-99)  mg/dL


 


POC Glucose (mg/dL)  157 H  155 H  174 H  (75-99)  mg/dL


 


Total Protein     (6.3-8.2)  g/dL


 


Albumin     (3.5-5.0)  g/dL














  04/20/17 04/20/17 04/20/17 Range/Units





  12:17 13:05 14:00 


 


WBC     (3.8-10.6)  k/uL


 


RBC     (4.30-5.90)  m/uL


 


Hgb     (13.0-17.5)  gm/dL


 


Hct     (39.0-53.0)  %


 


Neutrophils #     (1.3-7.7)  k/uL


 


ABG pH     (7.35-7.45)  


 


ABG HCO3     (21-25)  mmol/L


 


ABG Total CO2     (19-24)  mmol/L


 


ABG O2 Saturation     (94-97)  %


 


BUN     (9-20)  mg/dL


 


Glucose     (74-99)  mg/dL


 


POC Glucose (mg/dL)  177 H  178 H  160 H  (75-99)  mg/dL


 


Total Protein     (6.3-8.2)  g/dL


 


Albumin     (3.5-5.0)  g/dL














  04/20/17 04/20/17 04/20/17 Range/Units





  15:03 16:00 17:10 


 


WBC     (3.8-10.6)  k/uL


 


RBC     (4.30-5.90)  m/uL


 


Hgb     (13.0-17.5)  gm/dL


 


Hct     (39.0-53.0)  %


 


Neutrophils #     (1.3-7.7)  k/uL


 


ABG pH     (7.35-7.45)  


 


ABG HCO3     (21-25)  mmol/L


 


ABG Total CO2     (19-24)  mmol/L


 


ABG O2 Saturation     (94-97)  %


 


BUN     (9-20)  mg/dL


 


Glucose     (74-99)  mg/dL


 


POC Glucose (mg/dL)  145 H  141 H  151 H  (75-99)  mg/dL


 


Total Protein     (6.3-8.2)  g/dL


 


Albumin     (3.5-5.0)  g/dL














  04/20/17 04/20/17 04/20/17 Range/Units





  18:15 19:04 20:05 


 


WBC     (3.8-10.6)  k/uL


 


RBC     (4.30-5.90)  m/uL


 


Hgb     (13.0-17.5)  gm/dL


 


Hct     (39.0-53.0)  %


 


Neutrophils #     (1.3-7.7)  k/uL


 


ABG pH     (7.35-7.45)  


 


ABG HCO3     (21-25)  mmol/L


 


ABG Total CO2     (19-24)  mmol/L


 


ABG O2 Saturation     (94-97)  %


 


BUN     (9-20)  mg/dL


 


Glucose     (74-99)  mg/dL


 


POC Glucose (mg/dL)  182 H  145 H  127 H  (75-99)  mg/dL


 


Total Protein     (6.3-8.2)  g/dL


 


Albumin     (3.5-5.0)  g/dL














  04/20/17 04/20/17 04/20/17 Range/Units





  21:13 21:58 23:53 


 


WBC     (3.8-10.6)  k/uL


 


RBC     (4.30-5.90)  m/uL


 


Hgb     (13.0-17.5)  gm/dL


 


Hct     (39.0-53.0)  %


 


Neutrophils #     (1.3-7.7)  k/uL


 


ABG pH     (7.35-7.45)  


 


ABG HCO3     (21-25)  mmol/L


 


ABG Total CO2     (19-24)  mmol/L


 


ABG O2 Saturation     (94-97)  %


 


BUN     (9-20)  mg/dL


 


Glucose     (74-99)  mg/dL


 


POC Glucose (mg/dL)  130 H  119 H  142 H  (75-99)  mg/dL


 


Total Protein     (6.3-8.2)  g/dL


 


Albumin     (3.5-5.0)  g/dL














  04/21/17 04/21/17 04/21/17 Range/Units





  02:28 03:58 04:00 


 


WBC     (3.8-10.6)  k/uL


 


RBC     (4.30-5.90)  m/uL


 


Hgb     (13.0-17.5)  gm/dL


 


Hct     (39.0-53.0)  %


 


Neutrophils #     (1.3-7.7)  k/uL


 


ABG pH     (7.35-7.45)  


 


ABG HCO3     (21-25)  mmol/L


 


ABG Total CO2     (19-24)  mmol/L


 


ABG O2 Saturation     (94-97)  %


 


BUN    52 H  (9-20)  mg/dL


 


Glucose    132 H  (74-99)  mg/dL


 


POC Glucose (mg/dL)  140 H  137 H   (75-99)  mg/dL


 


Total Protein    5.3 L  (6.3-8.2)  g/dL


 


Albumin    2.6 L  (3.5-5.0)  g/dL














  04/21/17 04/21/17 04/21/17 Range/Units





  04:00 04:19 07:11 


 


WBC  14.1 H    (3.8-10.6)  k/uL


 


RBC  2.96 L    (4.30-5.90)  m/uL


 


Hgb  9.6 L    (13.0-17.5)  gm/dL


 


Hct  29.2 L    (39.0-53.0)  %


 


Neutrophils #  11.8 H    (1.3-7.7)  k/uL


 


ABG pH   7.46 H   (7.35-7.45)  


 


ABG HCO3   26 H   (21-25)  mmol/L


 


ABG Total CO2   27 H   (19-24)  mmol/L


 


ABG O2 Saturation   98.0 H   (94-97)  %


 


BUN     (9-20)  mg/dL


 


Glucose     (74-99)  mg/dL


 


POC Glucose (mg/dL)    119 H  (75-99)  mg/dL


 


Total Protein     (6.3-8.2)  g/dL


 


Albumin     (3.5-5.0)  g/dL














  04/21/17 Range/Units





  08:17 


 


WBC   (3.8-10.6)  k/uL


 


RBC   (4.30-5.90)  m/uL


 


Hgb   (13.0-17.5)  gm/dL


 


Hct   (39.0-53.0)  %


 


Neutrophils #   (1.3-7.7)  k/uL


 


ABG pH   (7.35-7.45)  


 


ABG HCO3   (21-25)  mmol/L


 


ABG Total CO2   (19-24)  mmol/L


 


ABG O2 Saturation   (94-97)  %


 


BUN   (9-20)  mg/dL


 


Glucose   (74-99)  mg/dL


 


POC Glucose (mg/dL)  132 H  (75-99)  mg/dL


 


Total Protein   (6.3-8.2)  g/dL


 


Albumin   (3.5-5.0)  g/dL














- Imaging and Cardiology


Chest x-ray: image reviewed





Assessment and Plan


(1) Status post coronary artery bypass graft


Status: Acute   





(2) Coronary artery disease


Status: Acute   





(3) Family history of heart disease


Status: Acute   





(4) History of PTCA


Status: Acute   





(5) Hyperlipidemia


Status: Acute   





(6) Hypertension


Status: Acute   





(7) Nicotine dependence, chewing tobacco, uncomplicated


Status: Acute   


Plan: 





1.  Continue aspirin, Lipitor, Plavix, heparin, Lopressor, lisinopril, digoxin.


2.  Continue amiodarone for atrial fibrillation prophylaxis.


3.  Ventilator management per pulmonology, wean O2 as tolerated.  Wean Solu-

Medrol per pulmonology.


4.  If/when able to wean from ventilator, may want to consider switching 

propofol to Precedex for sedation.


5.  Insulin/diabetic management per primary care service.


6.  Continue CIWA protocol for alcohol withdrawal.  


7.  Daily labs, chest x-rays.


8.  GI/DVT prophylaxis.


9.  More recommendations as patient progresses.


Time with Patient: Greater than 30

## 2017-04-21 NOTE — CDI
In responding to this query, please exercise your independent professional 
judgment. The [facility] Coding Staff and Clinical Documentation Specialists

 appreciate your assistance in clarifying documentation, maintaining compliance 
with coding guidelines, accurately documenting patients condition

 and capturing severity of illness. The fact that a question is asked does not 
imply that any particular answer is desired or expected. Communication

 forms are a method of clarifying documentation and are not made part of the 
Legal Health Record. Thank you in advance for your clarification.

 Last Revision, March 2016



Chinmay Camarillo

[address]

[place] [address]

         Documentation Clarification Form

Date: 4/21/2017 9:36:00 AM

From: Yvonne Crooks RN, CCDS

Phone: [phone]

MRN: [id number]

Admit Date: 4/13/2017 5:40:00 AM

Patient Name: Epi Bear

Visit Number: [id number]



Dr. Ines Madsen/Makayla Enamorado CNP



Postoperative Respiratory failure is documented in the Pulmonary and Medical 
Progress Notes.  



Patients Admitting Diagnosis: CAD in for Elective CABG

Post-Operative Diagnosis: 1. Coronary artery disease, status post prior 
stenting to his right 

coronary artery. 2. Preserved Left ventricular function. 3. Hyperlipidemia. 4. 
Hypertension. 5. ETOH abuse. 

Procedure performed: Total arterial non- aortic touch off-pump double coronary 
artery bypass grafting using in situ skeletonized right internal mammary artery 
to the left anterior descending artery across the anterior midline, in situ 
totally skeletonized left internal mammary artery to the first obtuse marginal 
artery. 

2. Transesophageal echocardiogram and epiaortic scanning. 

3. Intraoperative graft flow measurements using the [employer]istim system. 



History/Risk Factors:

ETOH, current smoker, possible COPD, CABG with post operative mucus plugging 
and bronchoscopy



Clinical Indicators:  

4/21 Pulmonary Progress Note: "Patient is at current P-op Day #8. Still 
requiring high concentrations of oxygen and high PEEP levels. Likely has 
developed acute lung injury/ARDS. The patient's vent settings with the assist 
control mode rate of 20 tidal volume 450 FiO2 80% PEEP of 13. Blood gases show 
a PaO2 of 92 a pCO2 of 38 patients some 0.46. In my opinion, all that's patient 
starts to show some significant improvement and, he likely end up with a 
tracheostomy and a feeding tube. His overall prognosis remains very poor as he 
is not really making much progress. Yesterday we did him down to 60% and 13 a 
PEEP and I thought that point I had broken shot and improved his respiratory 
status but today I see is back up to 80%. Again I think if he doesn't show much 
improvement over the weekend, I next week he'll be a candidate for tracheostomy 
and PEG tube placement. Postoperative respiratory failure."



Treatment:  (see above Pulmonary Progress note)

Consults: Pulmonary, Cardiology

Ventilation is maintained beyond 48hrs after surgery 



In order to accurately reflect this patients severity of illness, please 
clarify if the post-operative diagnosis is:  



   An expected post-procedural or post-surgical condition

   Integral to the procedure

            Inherent to the procedure

   An unexpected post-procedural or post-surgical condition, related to the 
patients underlying medical comorbidities

   Other, please specify 

   Unable to determine

              

Please document in your progress notes and discharge summary in order to 
capture severity of illness and risk of mortality. Include clinical findings 
that support your diagnosis.



FYI: Press F11 to launch patient chart



_____ Place X here if this finding has no clinical significance, is not 
applicable or if you are not able to provide any additional documentation.

ARELI

## 2017-04-21 NOTE — XR
EXAMINATION TYPE: XR chest 1V portable

 

DATE OF EXAM: 4/21/2017 6:33 AM

 

Comparison: 4/20/2017

 

Clinical History: 61-year-old male with tube placement

 

Findings:

ET tube is satisfactory. NG tube courses below the diaphragm. Median sternotomy wires are present wit
h post-CABG clips in the mediastinum. Heart remains mildly enlarged. Mild diffuse interstitial promin
ence is relatively similar. Left PICC tip appears to be in the right atrium. There is a small-to-mode
rate left pleural effusion with adjacent left basilar opacity.

 

 

Impression:

 

1. Correlate for continued mild pulmonary vascular congestion.

2. Small to moderate left pleural effusion with adjacent atelectasis and/or consolidation, slightly i
ncreased in the interval.

## 2017-04-21 NOTE — P.PN
Subjective





Date of service 04/20/2017.





Progress note being dictated for Dr. Hess.











Interval history: This is a 61-year-old gentleman status post CABG, status post 

bronchoscopy related to significant mucus plugging, failure to wean, suspected 

DTs , history of EtOH abuse and multiple other medical issues.   ABGs noted, 

FiO2 weaned down to 50%, peep maintained at +13. Maintained on amiodarone, 

diprovan, CIWA protocol and insulin drips.  During the night patient went into 

A. fib with RVR bolused and placed on amiodarone drip .chest x-ray reporting 

left lower lobe infiltrate, fluid volume overload.Ultrasound reporting left 

pleural effusion fluid pocket 2.7 cm, marked for potential thoracentesis.  








Unable to perform review of systems: Patient intubated and sedated








Active Medications





Hydrocodone Bitart/Acetaminophen (Norco 5-325)  2 each PO Q4HR PRN


   PRN Reason: Severe Pain


   Last Admin: 04/21/17 05:44 Dose:  2 each


Hydrocodone Bitart/Acetaminophen (Norco 5-325)  1 each PO Q4HR PRN


   PRN Reason: Moderate Pain


Albuterol/Ipratropium (Duoneb 0.5 Mg-3 Mg/3 Ml Soln)  3 ml INHALATION RT-Q4H [facility]


   Last Admin: 04/21/17 03:34 Dose:  3 ml


Albuterol/Ipratropium (Duoneb 0.5 Mg-3 Mg/3 Ml Soln)  3 ml INHALATION RT-Q2H PRN


   PRN Reason: Shortness Of Breath Or Wheezing


Amiodarone HCl (Cordarone)  300 mg PO BID [facility]


Aspirin (Aspirin)  325 mg PO DAILY [facility]


   Last Admin: 04/20/17 08:11 Dose:  325 mg


Atorvastatin Calcium (Lipitor)  40 mg PO DAILY [facility]


   Last Admin: 04/20/17 08:11 Dose:  40 mg


Benzocaine (Hurricaine Spray)  1 applic MUCOUS MEM QID PRN


   PRN Reason: Mouth Irritation


   Last Admin: 04/17/17 11:36 Dose:  1 applic


Benzocaine/Menthol (Cepacol Lozenge)  1 each MUCOUS MEM Q2H PRN


   PRN Reason: Sore Throat


Bisacodyl (Dulcolax)  10 mg RECTAL DAILY PRN


   PRN Reason: Constipation


   Last Admin: 04/19/17 17:18 Dose:  10 mg


Budesonide (Pulmicort)  1 mg INHALATION RT-BID [facility]


   Last Admin: 04/20/17 19:22 Dose:  1 mg


Chlorhexidine Gluconate (Peridex)  15 ml MUCOUS MEM BID [facility]


   Last Admin: 04/20/17 20:17 Dose:  15 ml


Clopidogrel Bisulfate (Plavix)  75 mg PO DAILY [facility]


   Last Admin: 04/20/17 08:11 Dose:  75 mg


Digoxin (Lanoxin)  250 mcg IVP DAILY [facility]


   Last Admin: 04/20/17 10:38 Dose:  250 mcg


Heparin Sodium (Porcine) (Heparin)  5,000 unit SQ Q8HR [facility]


   Last Admin: 04/20/17 23:58 Dose:  5,000 unit


Insulin Human Regular 100 unit (/ Sodium Chloride)  101 mls @ 0 mls/hr IV .Q0M 

[facility]; Per Protocol


   PRN Reason: Protocol


   Last Titration: 04/20/17 23:55 Dose:  5 units/hr, 5.05 mls/hr


Lactated Ringer's (Lactated Ringers)  1,000 mls @ 20 mls/hr IV .Q24H [facility]


   Last Admin: 04/20/17 17:11 Dose:  20 mls/hr


Propofol 500 mg/ IV Solution  50 mls @ 0 mls/hr IV .Q0M [facility]; Titrate


   PRN Reason: Protocol


   Last Titration: 04/21/17 06:58 Dose:  15 mcg/kg/min, 8.76 mls/hr


Iron/Minerals/Multivitamins (Theragran Liquid)  15 ml PO DAILY@1200 [facility]


   Last Admin: 04/20/17 13:08 Dose:  15 ml


Lisinopril (Zestril)  5 mg PO BID [facility]


   Last Admin: 04/20/17 19:37 Dose:  Not Given


Lorazepam (Ativan)  1 mg IV Q2HR PRN


   PRN Reason: CIWA 8 or 9


   Last Admin: 04/20/17 21:25 Dose:  1 mg


Lorazepam (Ativan)  1 mg IV Q1HR PRN


   PRN Reason: CIWA 10 to 15


   Last Admin: 04/18/17 03:51 Dose:  1 mg


Lorazepam (Ativan)  2 mg IV Q1HR PRN


   PRN Reason: CIWA 16 or higher


   Last Admin: 04/21/17 05:46 Dose:  2 mg


Magnesium Hydroxide (Milk Of Magnesia)  2,400 mg PO BID PRN


   PRN Reason: Constipation


Methylprednisolone Sodium Succinate (Solu-Medrol)  40 mg IV Q12HR [facility]


   Last Admin: 04/20/17 20:08 Dose:  40 mg


Metoclopramide HCl (Reglan)  10 mg IVP Q4H PRN


   PRN Reason: Nausea And Vomiting


Metoprolol Tartrate (Lopressor)  25 mg PO BID [facility]


   Last Admin: 04/20/17 21:24 Dose:  25 mg


Miscellaneous Information (Magnesium Per Protocol)  1 each MISCELLANE DAILY PRN

; Protocol


   PRN Reason: Per Protocol


Miscellaneous Information (Phosphorus Per Protocol)  1 each MISCELLANE DAILY PRN

; Protocol


   PRN Reason: Per Protocol


Miscellaneous Information (Potassium Per Protocol)  1 each MISCELLANE DAILY PRN

; Protocol


   PRN Reason: Per Protocol


Morphine Sulfate (Morphine Sulfate (Inj))  2 mg IVP Q2H PRN


   PRN Reason: Severe Pain


   Last Admin: 04/20/17 22:59 Dose:  2 mg


Ondansetron HCl (Zofran)  4 mg IVP Q6HR PRN


   PRN Reason: Nausea And Vomiting


Pantoprazole Sodium (Protonix)  40 mg IVP DAILY [facility]


   Last Admin: 04/20/17 08:12 Dose:  40 mg


Senna/Docusate Sodium (Senokot-S)  2 each PO HS [facility]


   Last Admin: 04/20/17 20:11 Dose:  2 each


Sodium Chloride (Saline Flush)  10 ml IV BID [facility]


   Last Admin: 04/20/17 20:08 Dose:  10 ml


Sodium Chloride (Saline Flush)  20 ml IV Q4HR PRN


   PRN Reason: PICC Line


Sodium Chloride (Saline Flush)  10 ml IV WEEKLY [facility]


Sodium Chloride (Saline Flush)  10 ml IV Q4HR PRN


   PRN Reason: PICC Line


Thiamine HCl (Vitamin B-1)  100 mg PO BID [facility]


   Last Admin: 04/20/17 20:08 Dose:  100 mg











Objective





- Vital Signs


Vital signs: 


 Vital Signs











Temp  98.7 F   04/20/17 16:00


 


Pulse  64   04/20/17 17:21


 


Resp  22   04/20/17 17:00


 


BP  99/64   04/20/17 17:00


 


Pulse Ox  89 L  04/20/17 17:00








 Intake & Output











 04/19/17 04/20/17 04/20/17





 18:59 06:59 18:59


 


Intake Total 5607.674 6253.205 1427.023


 


Output Total 2310 440 455


 


Balance -1086.000 777.205 972.023


 


Weight 97.5 kg 99.2 kg 99.2 kg


 


Intake:   


 


  Intake, IV Titration 464.000 437.205 808.023





  Amount   


 


    Amiodarone 450 mg In   248.616





    Dextrose 5% in Water 250   





    ml @ 1 MG/MIN 34.53 mls/   





    hr IV .Q7H31M [facility] Rx#:   





    [id number]   


 


    Dextrose 5% in Water 100   150





    ml @ 618 mls/hr IV .Q10M   





    ONE with Amiodarone 150   





    mg Rx#:[id number]   


 


    Insulin Regular 100 unit 6.870 34.902 45.838





    In Sodium Chloride 0.9%   





    100 ml @ Per Protocol IV   





    .Q0M [facility] Rx#:[id number]   


 


    Lactated Ringers 1,000 ml 260 220 200





    @ 20 mls/hr IV .Q24H [facility]   





    Rx#:[id number]   


 


    Propofol 500 mg In Empty 197.130 182.303 163.569





    Bag 1 bag @ Titrate IV .   





    Q0M [facility] Rx#:[id number]   


 


  Tube Feeding 650 750 529


 


  Other 110 30 90


 


Output:   


 


  Urine 2310 440 455


 


Other:   


 


  Voiding Method Indwelling Catheter Indwelling Catheter Indwelling Catheter


 


  # Bowel Movements 0 1 1








 ABP, PAP, CO, CI - Last Documented











Arterial Blood Pressure        121/51


 


Pulmonary Artery Pressure      46/23


 


Cardiac Output                 6.8


 


Cardiac Index                  3.3

















- Exam


PHYSICAL EXAM:


VITAL SIGNS: As above


GENERAL: [Sedated, intubated 


HEENT: [Pupils equal, conjunctiva normal.  Mucosa moist.  Endotracheal tube 

present. ]


NECK: [Supple, no JVD]


RESPIRATORY EFFORT:[Normal]


LUNGS:  [Diminished throughout, bibasilar crackles greater on the left, rhonchi]


CARDIOVASCULAR[regular S1 and S2, no murmurs rubs or gallops, mild edema]


GI: [Abdomen soft, nontender, positive bowel sounds.]


NEURO: Unable to assess, patient sedated on Diprovan and vent dependent





 








 


 Microbiology





04/13/17 16:40   Lung Aspirate - Right   Gram Stain - Final


04/13/17 16:40   Lung Aspirate - Right   Bronchial Washings Culture - Final


04/13/17 16:40   Lung - Right   Acid Fast Bacilli Smear - Final


04/13/17 16:40   Lung - Right   Acid Fast Bacilli Culture - Preliminary


04/13/17 16:40   Lung - Right   Fungal Culture - Preliminary























- Labs


CBC & Chem 7: 


 04/21/17 04:00





 04/21/17 04:00


Labs: 


 Abnormal Lab Results - Last 24 Hours (Table)











  04/19/17 04/19/17 04/19/17 Range/Units





  18:07 19:12 21:00 


 


WBC     (3.8-10.6)  k/uL


 


RBC     (4.30-5.90)  m/uL


 


Hgb     (13.0-17.5)  gm/dL


 


Hct     (39.0-53.0)  %


 


Neutrophils #     (1.3-7.7)  k/uL


 


Monocytes #     (0-1.0)  k/uL


 


ABG pH     (7.35-7.45)  


 


ABG pCO2     (35-45)  mmHg


 


ABG pO2     ()  mmHg


 


ABG O2 Saturation     (94-97)  %


 


BUN     (9-20)  mg/dL


 


Glucose     (74-99)  mg/dL


 


POC Glucose (mg/dL)  126 H  140 H  112 H  (75-99)  mg/dL


 


Phosphorus     (2.5-4.5)  mg/dL


 


Magnesium     (1.6-2.3)  mg/dL


 


Total Protein     (6.3-8.2)  g/dL


 


Albumin     (3.5-5.0)  g/dL














  04/19/17 04/19/17 04/19/17 Range/Units





  22:00 22:10 23:08 


 


WBC     (3.8-10.6)  k/uL


 


RBC     (4.30-5.90)  m/uL


 


Hgb     (13.0-17.5)  gm/dL


 


Hct     (39.0-53.0)  %


 


Neutrophils #     (1.3-7.7)  k/uL


 


Monocytes #     (0-1.0)  k/uL


 


ABG pH     (7.35-7.45)  


 


ABG pCO2     (35-45)  mmHg


 


ABG pO2     ()  mmHg


 


ABG O2 Saturation     (94-97)  %


 


BUN  40 H    (9-20)  mg/dL


 


Glucose  129 H    (74-99)  mg/dL


 


POC Glucose (mg/dL)   126 H  153 H  (75-99)  mg/dL


 


Phosphorus     (2.5-4.5)  mg/dL


 


Magnesium  2.4 H    (1.6-2.3)  mg/dL


 


Total Protein  5.5 L    (6.3-8.2)  g/dL


 


Albumin  2.8 L    (3.5-5.0)  g/dL














  04/20/17 04/20/17 04/20/17 Range/Units





  02:17 04:00 04:00 


 


WBC   14.0 H   (3.8-10.6)  k/uL


 


RBC   3.16 L   (4.30-5.90)  m/uL


 


Hgb   10.5 L   (13.0-17.5)  gm/dL


 


Hct   30.5 L   (39.0-53.0)  %


 


Neutrophils #   11.4 H   (1.3-7.7)  k/uL


 


Monocytes #   1.1 H   (0-1.0)  k/uL


 


ABG pH     (7.35-7.45)  


 


ABG pCO2     (35-45)  mmHg


 


ABG pO2     ()  mmHg


 


ABG O2 Saturation     (94-97)  %


 


BUN    48 H  (9-20)  mg/dL


 


Glucose    162 H  (74-99)  mg/dL


 


POC Glucose (mg/dL)  191 H    (75-99)  mg/dL


 


Phosphorus    5.2 H  (2.5-4.5)  mg/dL


 


Magnesium    2.4 H  (1.6-2.3)  mg/dL


 


Total Protein    5.4 L  (6.3-8.2)  g/dL


 


Albumin    2.7 L  (3.5-5.0)  g/dL














  04/20/17 04/20/17 04/20/17 Range/Units





  04:57 06:46 08:04 


 


WBC     (3.8-10.6)  k/uL


 


RBC     (4.30-5.90)  m/uL


 


Hgb     (13.0-17.5)  gm/dL


 


Hct     (39.0-53.0)  %


 


Neutrophils #     (1.3-7.7)  k/uL


 


Monocytes #     (0-1.0)  k/uL


 


ABG pH  7.47 H    (7.35-7.45)  


 


ABG pCO2  29 L    (35-45)  mmHg


 


ABG pO2  169 H    ()  mmHg


 


ABG O2 Saturation  100.0 H    (94-97)  %


 


BUN     (9-20)  mg/dL


 


Glucose     (74-99)  mg/dL


 


POC Glucose (mg/dL)   134 H  162 H  (75-99)  mg/dL


 


Phosphorus     (2.5-4.5)  mg/dL


 


Magnesium     (1.6-2.3)  mg/dL


 


Total Protein     (6.3-8.2)  g/dL


 


Albumin     (3.5-5.0)  g/dL














  04/20/17 04/20/17 04/20/17 Range/Units





  10:04 10:20 11:46 


 


WBC     (3.8-10.6)  k/uL


 


RBC     (4.30-5.90)  m/uL


 


Hgb     (13.0-17.5)  gm/dL


 


Hct     (39.0-53.0)  %


 


Neutrophils #     (1.3-7.7)  k/uL


 


Monocytes #     (0-1.0)  k/uL


 


ABG pH     (7.35-7.45)  


 


ABG pCO2     (35-45)  mmHg


 


ABG pO2     ()  mmHg


 


ABG O2 Saturation     (94-97)  %


 


BUN     (9-20)  mg/dL


 


Glucose     (74-99)  mg/dL


 


POC Glucose (mg/dL)  157 H  155 H  174 H  (75-99)  mg/dL


 


Phosphorus     (2.5-4.5)  mg/dL


 


Magnesium     (1.6-2.3)  mg/dL


 


Total Protein     (6.3-8.2)  g/dL


 


Albumin     (3.5-5.0)  g/dL














  04/20/17 04/20/17 04/20/17 Range/Units





  12:17 13:05 14:00 


 


WBC     (3.8-10.6)  k/uL


 


RBC     (4.30-5.90)  m/uL


 


Hgb     (13.0-17.5)  gm/dL


 


Hct     (39.0-53.0)  %


 


Neutrophils #     (1.3-7.7)  k/uL


 


Monocytes #     (0-1.0)  k/uL


 


ABG pH     (7.35-7.45)  


 


ABG pCO2     (35-45)  mmHg


 


ABG pO2     ()  mmHg


 


ABG O2 Saturation     (94-97)  %


 


BUN     (9-20)  mg/dL


 


Glucose     (74-99)  mg/dL


 


POC Glucose (mg/dL)  177 H  178 H  160 H  (75-99)  mg/dL


 


Phosphorus     (2.5-4.5)  mg/dL


 


Magnesium     (1.6-2.3)  mg/dL


 


Total Protein     (6.3-8.2)  g/dL


 


Albumin     (3.5-5.0)  g/dL














  04/20/17 04/20/17 04/20/17 Range/Units





  15:03 16:00 17:10 


 


WBC     (3.8-10.6)  k/uL


 


RBC     (4.30-5.90)  m/uL


 


Hgb     (13.0-17.5)  gm/dL


 


Hct     (39.0-53.0)  %


 


Neutrophils #     (1.3-7.7)  k/uL


 


Monocytes #     (0-1.0)  k/uL


 


ABG pH     (7.35-7.45)  


 


ABG pCO2     (35-45)  mmHg


 


ABG pO2     ()  mmHg


 


ABG O2 Saturation     (94-97)  %


 


BUN     (9-20)  mg/dL


 


Glucose     (74-99)  mg/dL


 


POC Glucose (mg/dL)  145 H  141 H  151 H  (75-99)  mg/dL


 


Phosphorus     (2.5-4.5)  mg/dL


 


Magnesium     (1.6-2.3)  mg/dL


 


Total Protein     (6.3-8.2)  g/dL


 


Albumin     (3.5-5.0)  g/dL














Assessment and Plan


Plan: 


1.  CAD, [Status post CABG].


2. [Acute hypoxic respiratory failure secondary to the above].


3. [Exacerbation COPD].


4. [Possible early DTs, on CIWA protocol].


5.  Right upper and lower lobe collapse secondary to mucus plugging status post 

bronchoscopy].


6. [Hypertension].


7. [Ongoing Nicotine dependence, chewing tobacco


8.  Bibasilar atelectasis


9.  Left pleural effusion


10.  A. fib with RVR

















Plan: Continue on current medication regime , amiodarone drip, IV steroids, 

nebulized bronchodilators , insulin drip monitoring and symptomatic treatment.  

Possible thoracentesis, left chest marked per ultrasound.  Maintain CIWA 

protocol.  GI and DVT prophylaxis in place.  Further recommendations to follow.








The impression and plan of care has been dictated as directed.





:


I performed a H&P examination of this patient and discussed the same with the 

dictator.  I agree with the dictator's note.  Any additional findings/opinions/

etc. will be noted.

## 2017-04-22 LAB
ALP SERPL-CCNC: 64 U/L (ref 38–126)
ALT SERPL-CCNC: 71 U/L (ref 21–72)
ANION GAP SERPL CALC-SCNC: 8 MMOL/L
APTT BLD: 21 SEC (ref 22–30)
AST SERPL-CCNC: 59 U/L (ref 17–59)
BASOPHILS # BLD AUTO: 0 K/UL (ref 0–0.2)
BASOPHILS NFR BLD AUTO: 0 %
BUN SERPL-SCNC: 38 MG/DL (ref 9–20)
CALCIUM SPEC-MCNC: 8.5 MG/DL (ref 8.4–10.2)
CH: 32.9
CHCM: 34
CHLORIDE SERPL-SCNC: 109 MMOL/L (ref 98–107)
CO2 BLDA-SCNC: 26 MMOL/L (ref 19–24)
CO2 BLDA-SCNC: 27 MMOL/L (ref 19–24)
CO2 SERPL-SCNC: 27 MMOL/L (ref 22–30)
EOSINOPHIL # BLD AUTO: 0 K/UL (ref 0–0.7)
EOSINOPHIL NFR BLD AUTO: 0 %
ERYTHROCYTE [DISTWIDTH] IN BLOOD BY AUTOMATED COUNT: 3.11 M/UL (ref 4.3–5.9)
ERYTHROCYTE [DISTWIDTH] IN BLOOD: 13.9 % (ref 11.5–15.5)
GLUCOSE BLD-MCNC: 113 MG/DL (ref 75–99)
GLUCOSE BLD-MCNC: 114 MG/DL (ref 75–99)
GLUCOSE BLD-MCNC: 117 MG/DL (ref 75–99)
GLUCOSE BLD-MCNC: 118 MG/DL (ref 75–99)
GLUCOSE BLD-MCNC: 125 MG/DL (ref 75–99)
GLUCOSE BLD-MCNC: 125 MG/DL (ref 75–99)
GLUCOSE BLD-MCNC: 127 MG/DL (ref 75–99)
GLUCOSE BLD-MCNC: 127 MG/DL (ref 75–99)
GLUCOSE BLD-MCNC: 129 MG/DL (ref 75–99)
GLUCOSE BLD-MCNC: 130 MG/DL (ref 75–99)
GLUCOSE BLD-MCNC: 130 MG/DL (ref 75–99)
GLUCOSE BLD-MCNC: 146 MG/DL (ref 75–99)
GLUCOSE BLD-MCNC: 168 MG/DL (ref 75–99)
GLUCOSE SERPL-MCNC: 133 MG/DL (ref 74–99)
HCO3 BLDA-SCNC: 25 MMOL/L (ref 21–25)
HCO3 BLDA-SCNC: 26 MMOL/L (ref 21–25)
HCT VFR BLD AUTO: 30.1 % (ref 39–53)
HDW: 2.5
HGB BLD-MCNC: 10.3 GM/DL (ref 13–17.5)
INR PPP: 1 (ref ?–1.1)
LUC NFR BLD AUTO: 2 %
LYMPHOCYTES # SPEC AUTO: 1.1 K/UL (ref 1–4.8)
LYMPHOCYTES NFR SPEC AUTO: 8 %
MAGNESIUM SPEC-SCNC: 2.7 MG/DL (ref 1.6–2.3)
MCH RBC QN AUTO: 33.1 PG (ref 25–35)
MCHC RBC AUTO-ENTMCNC: 34.2 G/DL (ref 31–37)
MCV RBC AUTO: 97 FL (ref 80–100)
MONOCYTES # BLD AUTO: 1 K/UL (ref 0–1)
MONOCYTES NFR BLD AUTO: 7 %
NEUTROPHILS # BLD AUTO: 12.2 K/UL (ref 1.3–7.7)
NEUTROPHILS NFR BLD AUTO: 83 %
NON-AFRICAN AMERICAN GFR(MDRD): >60
PCO2 BLDA: 35 MMHG (ref 35–45)
PCO2 BLDA: 39 MMHG (ref 35–45)
PH BLDA: 7.43 [PH] (ref 7.35–7.45)
PH BLDA: 7.47 [PH] (ref 7.35–7.45)
PHOSPHATE SERPL-MCNC: 3.8 MG/DL (ref 2.5–4.5)
PO2 BLDA: 106 MMHG (ref 83–108)
PO2 BLDA: 84 MMHG (ref 83–108)
POTASSIUM SERPL-SCNC: 4.3 MMOL/L (ref 3.5–5.1)
POTASSIUM SERPL-SCNC: 4.4 MMOL/L (ref 3.5–5.1)
PROT SERPL-MCNC: 5.4 G/DL (ref 6.3–8.2)
PT BLD: 10.3 SEC (ref 9–12)
SODIUM SERPL-SCNC: 144 MMOL/L (ref 137–145)
WBC # BLD AUTO: 0.32 10*3/UL
WBC # BLD AUTO: 14.7 K/UL (ref 3.8–10.6)
WBC (PEROX): 14.96

## 2017-04-22 RX ADMIN — LEVALBUTEROL HYDROCHLORIDE SCH: 1.25 SOLUTION, CONCENTRATE RESPIRATORY (INHALATION) at 16:18

## 2017-04-22 RX ADMIN — BUDESONIDE SCH MG: 1 SUSPENSION RESPIRATORY (INHALATION) at 07:51

## 2017-04-22 RX ADMIN — DOCUSATE SODIUM AND SENNOSIDES SCH EACH: 50; 8.6 TABLET ORAL at 20:32

## 2017-04-22 RX ADMIN — MULTIVITAMIN SCH ML: LIQUID ORAL at 11:35

## 2017-04-22 RX ADMIN — IPRATROPIUM BROMIDE SCH: 0.5 SOLUTION RESPIRATORY (INHALATION) at 23:42

## 2017-04-22 RX ADMIN — HEPARIN SODIUM SCH UNIT: 5000 INJECTION, SOLUTION INTRAVENOUS; SUBCUTANEOUS at 00:29

## 2017-04-22 RX ADMIN — LEVALBUTEROL HYDROCHLORIDE SCH: 1.25 SOLUTION, CONCENTRATE RESPIRATORY (INHALATION) at 23:42

## 2017-04-22 RX ADMIN — CLOPIDOGREL BISULFATE SCH MG: 75 TABLET ORAL at 08:28

## 2017-04-22 RX ADMIN — INSULIN HUMAN SCH MLS/HR: 100 INJECTION, SOLUTION PARENTERAL at 12:48

## 2017-04-22 RX ADMIN — IPRATROPIUM BROMIDE SCH: 0.5 SOLUTION RESPIRATORY (INHALATION) at 16:18

## 2017-04-22 RX ADMIN — PROPOFOL SCH MLS/HR: 10 INJECTION, EMULSION INTRAVENOUS at 18:02

## 2017-04-22 RX ADMIN — POTASSIUM CHLORIDE SCH MLS/HR: 14.9 INJECTION, SOLUTION INTRAVENOUS at 15:06

## 2017-04-22 RX ADMIN — ATORVASTATIN CALCIUM SCH MG: 40 TABLET, FILM COATED ORAL at 11:34

## 2017-04-22 RX ADMIN — METHYLPREDNISOLONE SODIUM SUCCINATE SCH MG: 40 INJECTION, POWDER, FOR SOLUTION INTRAMUSCULAR; INTRAVENOUS at 08:29

## 2017-04-22 RX ADMIN — AMIODARONE HYDROCHLORIDE SCH MLS/HR: 50 INJECTION, SOLUTION INTRAVENOUS at 15:07

## 2017-04-22 RX ADMIN — METOPROLOL TARTRATE SCH MG: 25 TABLET, FILM COATED ORAL at 08:29

## 2017-04-22 RX ADMIN — CHLORHEXIDINE GLUCONATE SCH ML: 1.2 RINSE ORAL at 20:27

## 2017-04-22 RX ADMIN — IPRATROPIUM BROMIDE AND ALBUTEROL SULFATE SCH ML: .5; 3 SOLUTION RESPIRATORY (INHALATION) at 11:16

## 2017-04-22 RX ADMIN — AMIODARONE HYDROCHLORIDE SCH: 200 TABLET ORAL at 20:26

## 2017-04-22 RX ADMIN — CHLORHEXIDINE GLUCONATE SCH ML: 1.2 RINSE ORAL at 08:28

## 2017-04-22 RX ADMIN — AMIODARONE HYDROCHLORIDE SCH MG: 200 TABLET ORAL at 08:28

## 2017-04-22 RX ADMIN — METOPROLOL TARTRATE SCH MG: 25 TABLET, FILM COATED ORAL at 20:21

## 2017-04-22 RX ADMIN — HEPARIN SODIUM SCH UNIT: 5000 INJECTION, SOLUTION INTRAVENOUS; SUBCUTANEOUS at 16:08

## 2017-04-22 RX ADMIN — IPRATROPIUM BROMIDE AND ALBUTEROL SULFATE SCH: .5; 3 SOLUTION RESPIRATORY (INHALATION) at 07:52

## 2017-04-22 RX ADMIN — IPRATROPIUM BROMIDE AND ALBUTEROL SULFATE SCH ML: .5; 3 SOLUTION RESPIRATORY (INHALATION) at 07:51

## 2017-04-22 RX ADMIN — PROPOFOL SCH MLS/HR: 10 INJECTION, EMULSION INTRAVENOUS at 06:46

## 2017-04-22 RX ADMIN — IPRATROPIUM BROMIDE AND ALBUTEROL SULFATE SCH ML: .5; 3 SOLUTION RESPIRATORY (INHALATION) at 03:17

## 2017-04-22 RX ADMIN — Medication SCH MG: at 08:29

## 2017-04-22 RX ADMIN — BUDESONIDE SCH MG: 1 SUSPENSION RESPIRATORY (INHALATION) at 20:31

## 2017-04-22 RX ADMIN — ASPIRIN 325 MG ORAL TABLET SCH MG: 325 PILL ORAL at 08:28

## 2017-04-22 RX ADMIN — IPRATROPIUM BROMIDE SCH MG: 0.5 SOLUTION RESPIRATORY (INHALATION) at 20:30

## 2017-04-22 RX ADMIN — AMIODARONE HYDROCHLORIDE SCH MLS/HR: 50 INJECTION, SOLUTION INTRAVENOUS at 23:33

## 2017-04-22 RX ADMIN — PANTOPRAZOLE SODIUM SCH MG: 40 INJECTION, POWDER, FOR SOLUTION INTRAVENOUS at 08:29

## 2017-04-22 RX ADMIN — Medication SCH MG: at 20:21

## 2017-04-22 RX ADMIN — SODIUM CHLORIDE, PRESERVATIVE FREE SCH ML: 5 INJECTION INTRAVENOUS at 22:58

## 2017-04-22 RX ADMIN — LEVALBUTEROL HYDROCHLORIDE SCH MG: 1.25 SOLUTION, CONCENTRATE RESPIRATORY (INHALATION) at 20:31

## 2017-04-22 RX ADMIN — METHYLPREDNISOLONE SODIUM SUCCINATE SCH MG: 40 INJECTION, POWDER, FOR SOLUTION INTRAMUSCULAR; INTRAVENOUS at 20:21

## 2017-04-22 RX ADMIN — LISINOPRIL SCH MG: 10 TABLET ORAL at 08:29

## 2017-04-22 RX ADMIN — DIGOXIN SCH MCG: 0.25 INJECTION INTRAMUSCULAR; INTRAVENOUS at 08:27

## 2017-04-22 RX ADMIN — SODIUM CHLORIDE, PRESERVATIVE FREE SCH ML: 5 INJECTION INTRAVENOUS at 11:34

## 2017-04-22 RX ADMIN — LISINOPRIL SCH: 10 TABLET ORAL at 20:26

## 2017-04-22 RX ADMIN — HEPARIN SODIUM SCH UNIT: 5000 INJECTION, SOLUTION INTRAVENOUS; SUBCUTANEOUS at 08:28

## 2017-04-22 NOTE — XR
EXAMINATION TYPE: XR chest 1V portable

 

DATE OF EXAM: 4/22/2017 7:02 AM

 

COMPARISON: NONE

 

INDICATION: Previous abnormal chest, pleural effusion

 

TECHNIQUE: Single frontal view of the chest is obtained.

 

FINDINGS:  

The heart size is mildly prominent.  

The pulmonary vasculature is normal.  

Small left pleural effusion may be present. This is improving. Subsegmental atelectasis is present at
 the bilateral lung bases greater on the left.  

Endotracheal tube is present with tip above anderson. Nasogastric tube transverses the thorax. Catheter
 enters on the left the tips in the right atrium. EKG leads overlie the chest.

 

IMPRESSION:  

1. Small left pleural effusion with bibasilar subsegmental atelectasis. Findings are improving from c
omparison.

2. Lines and catheters discussed above

## 2017-04-22 NOTE — P.PN
<Odell Gómez L - Last Filed: 04/22/17 12:21>





Progress Note - Text





CV Surgery Nursing





Principal diagnosis: 





Coronary artery disease, status post prior stenting to his right coronary 

artery.  Preserved left ventricular function.  Hyperlipidemia.  Hypertension.  

ETOH abuse.





POD #9 total arterial non-aortic patch off-pump double coronary artery bypass 

grafting using in situ skeletonized right internal mammary artery to the left 

anterior descending artery across the anterior midline in situ totally 

skeletonized left internal mammary artery to the first obtuse marginal artery.  

Intraoperative transesophageal echocardiogram and epi-aortic scanning.  

Intraoperative graft flow measurements using the Medist[employer] system





Patient had postoperative mucous plugging with increasing oxygen demand 

requiring bronchoscopy the night of surgery.  Bronchial washings negative for 

bacteria/viruses to date.





Pt developed postoperative alcohol withdrawal requiring increased sedation and 

continued mechanical ventilation.





Patient remains sedated on mechanical ventilation support.  Patient is not 

moving his extremities to verbal command, although the patient is opening and 

closing his eyes to verbal stimuli and command.





Vital Signs: Afebrile


 Vital Signs - 24 hr











  04/21/17 04/21/17 04/21/17





  13:00 14:00 15:00


 


Temperature   


 


Pulse Rate 68 72 79


 


Respiratory 23 22 24





Rate   


 


Blood Pressure 110/60 120/64 120/64


 


O2 Sat by Pulse 95 93 L 94 L





Oximetry   














  04/21/17 04/21/17 04/21/17





  16:00 16:47 17:00


 


Temperature 99.9 F H  


 


Pulse Rate 77 81 79


 


Respiratory 25 H  19





Rate   


 


Blood Pressure 140/75  140/75


 


O2 Sat by Pulse 92 L  91 L





Oximetry   














  04/21/17 04/21/17 04/21/17





  18:00 19:00 20:00


 


Temperature 98.6 F  99.0 F


 


Pulse Rate 78 80 79


 


Respiratory 25 H 26 H 26 H





Rate   


 


Blood Pressure 122/72 122/72 148/89


 


O2 Sat by Pulse 93 L 94 L 93 L





Oximetry   














  04/21/17 04/21/17 04/21/17





  20:35 20:50 21:00


 


Temperature   


 


Pulse Rate 73 73 72


 


Respiratory   22





Rate   


 


Blood Pressure   148/89


 


O2 Sat by Pulse   96





Oximetry   














  04/21/17 04/21/17 04/21/17





  22:00 22:32 23:00


 


Temperature   


 


Pulse Rate 85 74 77


 


Respiratory 24 17 22





Rate   


 


Blood Pressure 127/68 127/68 127/68


 


O2 Sat by Pulse 93 L 95 94 L





Oximetry   














  04/21/17 04/21/17 04/22/17





  23:47 23:59 00:00


 


Temperature   99.1 F


 


Pulse Rate 77 80 79


 


Respiratory   22





Rate   


 


Blood Pressure   136/70


 


O2 Sat by Pulse   94 L





Oximetry   














  04/22/17 04/22/17 04/22/17





  01:00 02:00 03:00


 


Temperature   


 


Pulse Rate 79 138 H 88


 


Respiratory 23 23 21





Rate   


 


Blood Pressure 136/70 104/79 104/79


 


O2 Sat by Pulse 94 L 95 97





Oximetry   














  04/22/17 04/22/17 04/22/17





  03:22 03:55 04:00


 


Temperature   98.9 F


 


Pulse Rate 75 77 74


 


Respiratory   22





Rate   


 


Blood Pressure   104/67


 


O2 Sat by Pulse   96





Oximetry   














  04/22/17 04/22/17 04/22/17





  05:00 06:00 07:00


 


Temperature   


 


Pulse Rate 71 72 70


 


Respiratory 20 25 H 21





Rate   


 


Blood Pressure 104/67  


 


O2 Sat by Pulse 97 97 97





Oximetry   














  04/22/17 04/22/17 04/22/17





  07:54 08:00 09:00


 


Temperature  99.7 F H 


 


Pulse Rate 147 H 138 H 96


 


Respiratory  31 H 25 H





Rate   


 


Blood Pressure   113/78


 


O2 Sat by Pulse  95 94 L





Oximetry   














  04/22/17 04/22/17 04/22/17





  10:00 11:00 11:22


 


Temperature  98.8 F 


 


Pulse Rate 139 H 136 H 139 H


 


Respiratory 23 26 H 





Rate   


 


Blood Pressure 113/78  


 


O2 Sat by Pulse 94 L 95 





Oximetry   














  04/22/17





  11:52


 


Temperature 


 


Pulse Rate 138 H


 


Respiratory 





Rate 


 


Blood Pressure 


 


O2 Sat by Pulse 





Oximetry 








 ABP, PAP, CO, CI - Last 8 Hours











Arterial Blood Pressure        132/71


 


Arterial Blood Pressure        108/66


 


Arterial Blood Pressure        136/69


 


Arterial Blood Pressure        143/74


 


Arterial Blood Pressure        127/60


 


Arterial Blood Pressure        119/70


 


Arterial Blood Pressure        117/56

















Labs: 


 Short CBC











  04/22/17 Range/Units





  04:20 


 


WBC  14.7 H  (3.8-10.6)  k/uL


 


Hgb  10.3 L  (13.0-17.5)  gm/dL


 


Hct  30.1 L  (39.0-53.0)  %


 


Plt Count  422  (150-450)  k/uL


 


Neutrophils #  12.2 H  (1.3-7.7)  k/uL








 BMP











  04/22/17





  04:20


 


Sodium  144


 


Potassium  4.3


 


Chloride  109 H


 


Carbon Dioxide  27


 


BUN  38 H


 


Creatinine  0.80


 


Glucose  133 H


 


Calcium  8.5








 Liver Function











  04/22/17 Range/Units





  04:20 


 


Total Bilirubin  0.5  (0.2-1.3)  mg/dL


 


AST  59  (17-59)  U/L


 


ALT  71  (21-72)  U/L


 


Alkaline Phosphatase  64  ()  U/L


 


Albumin  2.6 L  (3.5-5.0)  g/dL














IV Fluids: 


Lactated Ringer's at 20 mL per hour


Insulin drip at 4 units per hour


Propofol at 10 mcg/kg/m.





Lungs: Few scattered rhonchi throughout, diminished bilateral bases.  

Respirations are unlabored with mechanical ventilator support.  Current 

ventilator settings are as follows, AC 20, , FiO2 45%, peep 10.





O2 sat: 95% with mechanical ventilator support, with FiO2 45%.





Heart:  S1S2, regular rhythm and rate, negative for S3, gallop or murmur.  

Bedside telemetry currently showing atrial fibrillation with rapid ventricular 

response heart rate 145.





Sternum stable, chest incision clean with silverlon dressing clean and dry.  

Heart hugger in place.





Knee-high CONNIE hose and sequential compression devices in place to bilateral 

lower extremities.





Abdomen:  Soft, Positive bowel sounds present in all 4 quadrants.  OG tube in 

place with vital high-protein tube feeding infusing at 43 mL per hour which is 

goal.





CBGs: 117-132 mg/dL with the last 24 hours.





U/O:  Adequate, Angelo catheter for accurate I&O.  635 mL output in the last 8 

hours.





24 hr Total: 


 Intake & Output











 04/20/17 04/21/17 04/22/17 04/23/17





 06:59 06:59 06:59 06:59


 


Intake Total 2441.205 2761.649 1430.326 379.690


 


Output Total 2750 1160 2290 415


 


Balance -311.344 3686.649 -859.674 -35.310


 


Weight 99.2 kg 96.5 kg 99.3 kg 








Active Medications





Hydrocodone Bitart/Acetaminophen (Norco 5-325)  2 each PO Q4HR PRN


   PRN Reason: Severe Pain


   Last Admin: 04/21/17 05:44 Dose:  2 each


Hydrocodone Bitart/Acetaminophen (Norco 5-325)  1 each PO Q4HR PRN


   PRN Reason: Moderate Pain


Albuterol/Ipratropium (Duoneb 0.5 Mg-3 Mg/3 Ml Soln)  3 ml INHALATION RT-Q4H [facility]


   Last Admin: 04/22/17 11:16 Dose:  3 ml


Albuterol/Ipratropium (Duoneb 0.5 Mg-3 Mg/3 Ml Soln)  3 ml INHALATION RT-Q2H PRN


   PRN Reason: Shortness Of Breath Or Wheezing


Amiodarone HCl (Cordarone)  200 mg PO BID [facility]


   Last Admin: 04/22/17 08:28 Dose:  200 mg


Aspirin (Aspirin)  325 mg PO DAILY [facility]


   Last Admin: 04/22/17 08:28 Dose:  325 mg


Atorvastatin Calcium (Lipitor)  40 mg PO DAILY [facility]


   Last Admin: 04/22/17 11:34 Dose:  40 mg


Benzocaine (Hurricaine Spray)  1 applic MUCOUS MEM QID PRN


   PRN Reason: Mouth Irritation


   Last Admin: 04/17/17 11:36 Dose:  1 applic


Benzocaine/Menthol (Cepacol Lozenge)  1 each MUCOUS MEM Q2H PRN


   PRN Reason: Sore Throat


Bisacodyl (Dulcolax)  10 mg RECTAL DAILY PRN


   PRN Reason: Constipation


   Last Admin: 04/19/17 17:18 Dose:  10 mg


Budesonide (Pulmicort)  1 mg INHALATION RT-BID [facility]


   Last Admin: 04/22/17 07:51 Dose:  1 mg


Chlorhexidine Gluconate (Peridex)  15 ml MUCOUS MEM BID [facility]


   Last Admin: 04/22/17 08:28 Dose:  15 ml


Clopidogrel Bisulfate (Plavix)  75 mg PO DAILY [facility]


   Last Admin: 04/22/17 08:28 Dose:  75 mg


Digoxin (Lanoxin)  125 mcg IVP DAILY [facility]


   Last Admin: 04/22/17 08:27 Dose:  125 mcg


Heparin Sodium (Porcine) (Heparin)  5,000 unit SQ Q8HR [facility]


   Last Admin: 04/22/17 08:28 Dose:  5,000 unit


Insulin Human Regular 100 unit (/ Sodium Chloride)  101 mls @ 0 mls/hr IV .Q0M 

[facility]; Per Protocol


   PRN Reason: Protocol


   Last Titration: 04/22/17 11:34 Dose:  3.5 units/hr, 3.53 mls/hr


Lactated Ringer's (Lactated Ringers)  1,000 mls @ 20 mls/hr IV .Q24H [facility]


   Last Admin: 04/21/17 17:49 Dose:  20 mls/hr


Propofol 500 mg/ IV Solution  50 mls @ 0 mls/hr IV .Q0M [facility]; Titrate


   PRN Reason: Protocol


   Last Titration: 04/22/17 11:34 Dose:  0 mcg/kg/min, 0 mls/hr


Iron/Minerals/Multivitamins (Theragran Liquid)  15 ml PO DAILY@1200 [facility]


   Last Admin: 04/22/17 11:35 Dose:  15 ml


Lisinopril (Zestril)  10 mg PO BID [facility]


   Last Admin: 04/22/17 08:29 Dose:  10 mg


Lorazepam (Ativan)  1 mg IV Q2HR PRN


   PRN Reason: CIWA 8 or 9


   Last Admin: 04/20/17 21:25 Dose:  1 mg


Lorazepam (Ativan)  1 mg IV Q1HR PRN


   PRN Reason: CIWA 10 to 15


   Last Admin: 04/18/17 03:51 Dose:  1 mg


Lorazepam (Ativan)  2 mg IV Q1HR PRN


   PRN Reason: CIWA 16 or higher


   Last Admin: 04/21/17 05:46 Dose:  2 mg


Magnesium Hydroxide (Milk Of Magnesia)  2,400 mg PO BID PRN


   PRN Reason: Constipation


Methylprednisolone Sodium Succinate (Solu-Medrol)  40 mg IV Q12HR [facility]


   Last Admin: 04/22/17 08:29 Dose:  40 mg


Metoclopramide HCl (Reglan)  10 mg IVP Q4H PRN


   PRN Reason: Nausea And Vomiting


Metoprolol Tartrate (Lopressor)  25 mg PO BID [facility]


   Last Admin: 04/22/17 08:29 Dose:  25 mg


Miscellaneous Information (Magnesium Per Protocol)  1 each MISCELLANE DAILY PRN

; Protocol


   PRN Reason: Per Protocol


Miscellaneous Information (Phosphorus Per Protocol)  1 each MISCELLANE DAILY PRN

; Protocol


   PRN Reason: Per Protocol


Miscellaneous Information (Potassium Per Protocol)  1 each MISCELLANE DAILY PRN

; Protocol


   PRN Reason: Per Protocol


Morphine Sulfate (Morphine Sulfate (Inj))  2 mg IVP Q2H PRN


   PRN Reason: Severe Pain


   Last Admin: 04/20/17 22:59 Dose:  2 mg


Ondansetron HCl (Zofran)  4 mg IVP Q6HR PRN


   PRN Reason: Nausea And Vomiting


Pantoprazole Sodium (Protonix)  40 mg IVP DAILY [facility]


   Last Admin: 04/22/17 08:29 Dose:  40 mg


Senna/Docusate Sodium (Senokot-S)  2 each PO HS [facility]


   Last Admin: 04/21/17 20:03 Dose:  2 each


Sodium Chloride (Saline Flush)  10 ml IV BID [facility]


   Last Admin: 04/22/17 11:34 Dose:  10 ml


Sodium Chloride (Saline Flush)  20 ml IV Q4HR PRN


   PRN Reason: PICC Line


Sodium Chloride (Saline Flush)  10 ml IV WEEKLY [facility]


Sodium Chloride (Saline Flush)  10 ml IV Q4HR PRN


   PRN Reason: PICC Line


Thiamine HCl (Vitamin B-1)  100 mg PO BID [facility]


   Last Admin: 04/22/17 08:29 Dose:  100 mg





Plan: 





1.  Continue aspirin, Lipitor, Plavix, heparin, Lopressor, lisinopril, digoxin.

  Extra dose of digoxin given this a.m. 0.125 g IV 1.


2.  Continue amiodarone for atrial fibrillation prophylaxis.  Extra dose of 

amiodarone was given last p.m.  Per the OG tube.


3.  Ventilator management per pulmonology, wean O2 as tolerated.  FiO2 

decreased to 45% and PEEP decreased to 10 this a.m.


4.  Physical therapy for range of motion exercises.


5.  Insulin/diabetic management per primary care service.


6.  Continue CIWA protocol for alcohol withdrawal.  


7.  Daily labs, chest x-rays.  Digoxin level in a.m.


8.  GI/DVT prophylaxis.


9.  Diprivan drip placed on hold to evaluate underlying mentation.


10.  More recommendations as patient progresses.





<Oscar Porter - Last Filed: 04/25/17 11:42>





Progress Note - Text


The patient was seen and examined.  I agree with the above assessment and plan.

  He is opening his eyes but is overall quite debilitated and not really moving 

his extremities.  He has been on CIWA protocol secondary to alcohol withdrawal.

  He has had intermittent atrial fibrillation but is currently in normal sinus 

rhythm.  We'll continue to wean the vent as tolerated.  He may need 

tracheostomy later this week if not extubated.

## 2017-04-22 NOTE — P.PN
Subjective


Principal diagnosis: 


Status post CABG, respiratory failure, atrial fibrillation





This patient is status post aortic coronary bypass surgery.  Patient has been 

intubated.  Has been having difficulty extubating and weaning him off the 

respirator because of oxygenation issues.  Patient is off sedation.  He seemed 

to be waking up and following simple commands.  He went into atrial fibrillation

/flutter last night.  Patient's heart rate is still in the range of 140.  We'll 

going to reinitiate his IV amiodarone.  Patient has received extra digoxin this 

morning his blood pressure is 150/76.  Respiration rate is about 29.  Pulse is 

in the 130s.





Objective





- Vital Signs


Vital signs: 


 Vital Signs











Temp  99.1 F   04/22/17 16:00


 


Pulse  135 H  04/22/17 16:00


 


Resp  29 H  04/22/17 16:00


 


BP  113/78   04/22/17 10:00


 


Pulse Ox  94 L  04/22/17 16:00








 Intake & Output











 04/21/17 04/22/17 04/22/17





 18:59 06:59 18:59


 


Intake Total 960.099 650.525 6858.708


 


Output Total 1285 1005 1055


 


Balance -324.901 -534.773 602.708


 


Weight 96.5 kg 99.3 kg 99.3 kg


 


Intake:   


 


  Intake, IV Titration 341.099 341.227 918.708





  Amount   


 


    Amiodarone 450 mg In   34.53





    Dextrose 5% in Water 250   





    ml @ 1 MG/MIN 34.53 mls/   





    hr IV .Q7H31M [facility] Rx#:   





    [id number]   


 


    Dextrose 5% in Water 100   618





    ml @ 618 mls/hr IV .Q10M   





    ONE with Amiodarone 150   





    mg Rx#:[id number]   


 


    Insulin Regular 100 unit 52.335 71.375 38.146





    In Sodium Chloride 0.9%   





    100 ml @ Per Protocol IV   





    .Q0M [facility] Rx#:[id number]   


 


    Lactated Ringers 1,000 ml 240 240 200





    @ 20 mls/hr IV .Q24H [facility]   





    Rx#:[id number]   


 


    Propofol 500 mg In Empty 48.764 29.852 28.032





    Bag 1 bag @ Titrate IV .   





    Q0M [facility] Rx#:[id number]   


 


  Tube Feeding 559 129 559


 


  Other 60  180


 


Output:   


 


  Urine 1285 1005 1055


 


Other:   


 


  Voiding Method Indwelling Catheter Indwelling Catheter Indwelling Catheter








 ABP, PAP, CO, CI - Last Documented











Arterial Blood Pressure        155/76


 


Pulmonary Artery Pressure      46/23


 


Cardiac Output                 6.8


 


Cardiac Index                  3.3

















- Exam





GENERAL EXAM: Patient is intubated but off sedation and seems to be waking up


HEENT: Normocephalic.


NECK: No masses, no nuchal rigidity.


CHEST: No chest wall deformity.


LUNGS: Breath sounds at bases


HEART: S1 and S2 normal with no audible mumurs or gallops. Regular rhythm, 

femorals equal on both sides..


ABDOMEN: No hepatosplenomegaly, normal bowel sounds, no guarding or rigidity.


SKIN: No rashes


CENTRAL NERVOUS SYSTEM:  Sedated


EXTREMITIES: No cyanosis, clubbing or edema.





- Labs


CBC & Chem 7: 


 04/22/17 04:20





 04/22/17 16:09


Labs: 


 Abnormal Lab Results - Last 24 Hours (Table)











  04/21/17 04/21/17 04/21/17 Range/Units





  18:14 19:59 22:15 


 


WBC     (3.8-10.6)  k/uL


 


RBC     (4.30-5.90)  m/uL


 


Hgb     (13.0-17.5)  gm/dL


 


Hct     (39.0-53.0)  %


 


Neutrophils #     (1.3-7.7)  k/uL


 


APTT     (22.0-30.0)  sec


 


ABG pH     (7.35-7.45)  


 


ABG HCO3     (21-25)  mmol/L


 


ABG Total CO2     (19-24)  mmol/L


 


ABG O2 Saturation     (94-97)  %


 


Chloride     ()  mmol/L


 


BUN     (9-20)  mg/dL


 


Glucose     (74-99)  mg/dL


 


POC Glucose (mg/dL)  136 H  119 H  106 H  (75-99)  mg/dL


 


Magnesium     (1.6-2.3)  mg/dL


 


Total Protein     (6.3-8.2)  g/dL


 


Albumin     (3.5-5.0)  g/dL














  04/21/17 04/22/17 04/22/17 Range/Units





  23:14 00:25 02:20 


 


WBC     (3.8-10.6)  k/uL


 


RBC     (4.30-5.90)  m/uL


 


Hgb     (13.0-17.5)  gm/dL


 


Hct     (39.0-53.0)  %


 


Neutrophils #     (1.3-7.7)  k/uL


 


APTT     (22.0-30.0)  sec


 


ABG pH     (7.35-7.45)  


 


ABG HCO3     (21-25)  mmol/L


 


ABG Total CO2     (19-24)  mmol/L


 


ABG O2 Saturation     (94-97)  %


 


Chloride     ()  mmol/L


 


BUN     (9-20)  mg/dL


 


Glucose     (74-99)  mg/dL


 


POC Glucose (mg/dL)  132 H  130 H  146 H  (75-99)  mg/dL


 


Magnesium     (1.6-2.3)  mg/dL


 


Total Protein     (6.3-8.2)  g/dL


 


Albumin     (3.5-5.0)  g/dL














  04/22/17 04/22/17 04/22/17 Range/Units





  03:56 04:20 04:20 


 


WBC     (3.8-10.6)  k/uL


 


RBC     (4.30-5.90)  m/uL


 


Hgb     (13.0-17.5)  gm/dL


 


Hct     (39.0-53.0)  %


 


Neutrophils #     (1.3-7.7)  k/uL


 


APTT   21.0 L   (22.0-30.0)  sec


 


ABG pH     (7.35-7.45)  


 


ABG HCO3     (21-25)  mmol/L


 


ABG Total CO2     (19-24)  mmol/L


 


ABG O2 Saturation     (94-97)  %


 


Chloride    109 H  ()  mmol/L


 


BUN    38 H  (9-20)  mg/dL


 


Glucose    133 H  (74-99)  mg/dL


 


POC Glucose (mg/dL)  129 H    (75-99)  mg/dL


 


Magnesium     (1.6-2.3)  mg/dL


 


Total Protein    5.4 L  (6.3-8.2)  g/dL


 


Albumin    2.6 L  (3.5-5.0)  g/dL














  04/22/17 04/22/17 04/22/17 Range/Units





  04:20 04:35 05:55 


 


WBC  14.7 H    (3.8-10.6)  k/uL


 


RBC  3.11 L    (4.30-5.90)  m/uL


 


Hgb  10.3 L    (13.0-17.5)  gm/dL


 


Hct  30.1 L    (39.0-53.0)  %


 


Neutrophils #  12.2 H    (1.3-7.7)  k/uL


 


APTT     (22.0-30.0)  sec


 


ABG pH     (7.35-7.45)  


 


ABG HCO3   26 H   (21-25)  mmol/L


 


ABG Total CO2   27 H   (19-24)  mmol/L


 


ABG O2 Saturation   98.0 H   (94-97)  %


 


Chloride     ()  mmol/L


 


BUN     (9-20)  mg/dL


 


Glucose     (74-99)  mg/dL


 


POC Glucose (mg/dL)    117 H  (75-99)  mg/dL


 


Magnesium     (1.6-2.3)  mg/dL


 


Total Protein     (6.3-8.2)  g/dL


 


Albumin     (3.5-5.0)  g/dL














  04/22/17 04/22/17 04/22/17 Range/Units





  08:30 10:10 10:13 


 


WBC     (3.8-10.6)  k/uL


 


RBC     (4.30-5.90)  m/uL


 


Hgb     (13.0-17.5)  gm/dL


 


Hct     (39.0-53.0)  %


 


Neutrophils #     (1.3-7.7)  k/uL


 


APTT     (22.0-30.0)  sec


 


ABG pH   7.47 H   (7.35-7.45)  


 


ABG HCO3     (21-25)  mmol/L


 


ABG Total CO2   26 H   (19-24)  mmol/L


 


ABG O2 Saturation     (94-97)  %


 


Chloride     ()  mmol/L


 


BUN     (9-20)  mg/dL


 


Glucose     (74-99)  mg/dL


 


POC Glucose (mg/dL)  113 H   125 H  (75-99)  mg/dL


 


Magnesium     (1.6-2.3)  mg/dL


 


Total Protein     (6.3-8.2)  g/dL


 


Albumin     (3.5-5.0)  g/dL














  04/22/17 04/22/17 04/22/17 Range/Units





  12:46 14:21 16:05 


 


WBC     (3.8-10.6)  k/uL


 


RBC     (4.30-5.90)  m/uL


 


Hgb     (13.0-17.5)  gm/dL


 


Hct     (39.0-53.0)  %


 


Neutrophils #     (1.3-7.7)  k/uL


 


APTT     (22.0-30.0)  sec


 


ABG pH     (7.35-7.45)  


 


ABG HCO3     (21-25)  mmol/L


 


ABG Total CO2     (19-24)  mmol/L


 


ABG O2 Saturation     (94-97)  %


 


Chloride     ()  mmol/L


 


BUN     (9-20)  mg/dL


 


Glucose     (74-99)  mg/dL


 


POC Glucose (mg/dL)  168 H  127 H  130 H  (75-99)  mg/dL


 


Magnesium     (1.6-2.3)  mg/dL


 


Total Protein     (6.3-8.2)  g/dL


 


Albumin     (3.5-5.0)  g/dL














  04/22/17 04/22/17 Range/Units





  16:09 17:50 


 


WBC    (3.8-10.6)  k/uL


 


RBC    (4.30-5.90)  m/uL


 


Hgb    (13.0-17.5)  gm/dL


 


Hct    (39.0-53.0)  %


 


Neutrophils #    (1.3-7.7)  k/uL


 


APTT    (22.0-30.0)  sec


 


ABG pH    (7.35-7.45)  


 


ABG HCO3    (21-25)  mmol/L


 


ABG Total CO2    (19-24)  mmol/L


 


ABG O2 Saturation    (94-97)  %


 


Chloride    ()  mmol/L


 


BUN    (9-20)  mg/dL


 


Glucose    (74-99)  mg/dL


 


POC Glucose (mg/dL)   127 H  (75-99)  mg/dL


 


Magnesium  2.7 H   (1.6-2.3)  mg/dL


 


Total Protein    (6.3-8.2)  g/dL


 


Albumin    (3.5-5.0)  g/dL














Assessment and Plan


(1) Status post coronary artery bypass graft


Status: Acute   





(2) Family history of heart disease


Status: Acute   





(3) Hyperlipidemia


Status: Acute   





(4) Hypertension


Status: Acute   





(5) Nicotine dependence, chewing tobacco, uncomplicated


Status: Acute   





(6) Atrial fibrillation


Status: Acute   


Plan: 


Initiate IV Cordarone drip with a bolus.  Continue the beta blockers and 

digoxin.  Attempts to wean him off the respirator continued.  Patient may need 

a tracheostomy.  Prognosis is guarded

## 2017-04-22 NOTE — PN
DATE OF SERVICE: 04/22/2017 



This is a 61-year-old gentleman who is status post coronary artery 

bypass grafting, postoperative day #9.  

The patient does remain on the mechanical ventilator with settings of 

assist control 20, tidal volume 450, FiO2 45%, PEEP of 10, arterial 

blood gases are pO2 of 106, pCO2 38, pH 7.43. His current  are 

lactated Ringer's at 20 mL per hour, Diprivan at 10 mcg/kg per minute, 

insulin drip at 2 units per hour. He is currently being nourished with 

Vital HP at 43 mL per hour, which is at goal. He did have issues with 

atrial fibrillation with rapid ventricular response last evening and 

the patient is currently tachycardic. We were able to wean down his 

FiO2 from 60% to 45% and his PEEP from 13 down to 10. Follow-up blood 

gases are pending.  



On physical exam, the patient remains sedated and intubated. Vital 

signs reveal blood pressure 143/74, heart rate 138, respirations 30, 

temperature is 99.7. He is 95% O2 saturation on 45% FiO2. His head is 

normocephalic. Sclerae anicteric. Oral endotracheal and gastric tubes 

are secured in place. His neck is supple. Trachea midline. His heart 

is currently irregularly irregular. S1, S2. His lungs have few 

scattered rhonchi, crackles in the posterior bases. His abdomen is 

soft. There is trace peripheral edema. No clubbing. No cyanosis. 

Peripheral pulses are intact.  



INVESTIGATIONS: Lab results reveal WBC 14.7, hemoglobin 10.3, platelet 

count 422,000. Sodium 144, potassium 4.3, chloride 109, CO2 of 27, BUN 

38, creatinine 0.80.  



His medications are reviewed. 



IMPRESSION: 

1. Coronary artery disease, status post coronary artery bypass 

grafting, postoperative day #9.  

2. Acute hypoxic respiratory failure requiring prolonged ventilator 

dependence.  

3. Hyperlipidemia. 

4. Hypertension. 

5. Atrial fibrillation. 



PLAN: The patient was seen and evaluated by Dr. Munoz. His chest 

x-ray, ABGs and labs were reviewed. We will continue with daily 

interruption of sedation and weaning trials. His overall prognosis is 

quite guarded. We will continue to follow and make further 

recommendations based on his clinical status. Critical care time is 38 

minutes.

## 2017-04-23 LAB
ALP SERPL-CCNC: 59 U/L (ref 38–126)
ALT SERPL-CCNC: 91 U/L (ref 21–72)
ANION GAP SERPL CALC-SCNC: 8 MMOL/L
ANION GAP SERPL CALC-SCNC: 9 MMOL/L
AST SERPL-CCNC: 52 U/L (ref 17–59)
BASOPHILS # BLD AUTO: 0 K/UL (ref 0–0.2)
BASOPHILS NFR BLD AUTO: 0 %
BUN SERPL-SCNC: 42 MG/DL (ref 9–20)
BUN SERPL-SCNC: 42 MG/DL (ref 9–20)
CALCIUM SPEC-MCNC: 8.4 MG/DL (ref 8.4–10.2)
CALCIUM SPEC-MCNC: 8.7 MG/DL (ref 8.4–10.2)
CH: 32.6
CHCM: 32.9
CHLORIDE SERPL-SCNC: 111 MMOL/L (ref 98–107)
CHLORIDE SERPL-SCNC: 111 MMOL/L (ref 98–107)
CO2 BLDA-SCNC: 23 MMOL/L (ref 19–24)
CO2 BLDA-SCNC: 24 MMOL/L (ref 19–24)
CO2 SERPL-SCNC: 24 MMOL/L (ref 22–30)
CO2 SERPL-SCNC: 25 MMOL/L (ref 22–30)
DIGOXIN SERPL-MCNC: 0.5 NG/ML
EOSINOPHIL # BLD AUTO: 0 K/UL (ref 0–0.7)
EOSINOPHIL NFR BLD AUTO: 0 %
ERYTHROCYTE [DISTWIDTH] IN BLOOD BY AUTOMATED COUNT: 3.14 M/UL (ref 4.3–5.9)
ERYTHROCYTE [DISTWIDTH] IN BLOOD: 14 % (ref 11.5–15.5)
GLUCOSE BLD-MCNC: 127 MG/DL (ref 75–99)
GLUCOSE BLD-MCNC: 129 MG/DL (ref 75–99)
GLUCOSE BLD-MCNC: 136 MG/DL (ref 75–99)
GLUCOSE BLD-MCNC: 141 MG/DL (ref 75–99)
GLUCOSE BLD-MCNC: 145 MG/DL (ref 75–99)
GLUCOSE BLD-MCNC: 145 MG/DL (ref 75–99)
GLUCOSE BLD-MCNC: 148 MG/DL (ref 75–99)
GLUCOSE BLD-MCNC: 150 MG/DL (ref 75–99)
GLUCOSE BLD-MCNC: 150 MG/DL (ref 75–99)
GLUCOSE BLD-MCNC: 151 MG/DL (ref 75–99)
GLUCOSE BLD-MCNC: 156 MG/DL (ref 75–99)
GLUCOSE SERPL-MCNC: 147 MG/DL (ref 74–99)
GLUCOSE SERPL-MCNC: 155 MG/DL (ref 74–99)
HCO3 BLDA-SCNC: 22 MMOL/L (ref 21–25)
HCO3 BLDA-SCNC: 23 MMOL/L (ref 21–25)
HCT VFR BLD AUTO: 31.3 % (ref 39–53)
HDW: 2.48
HGB BLD-MCNC: 10.5 GM/DL (ref 13–17.5)
LUC NFR BLD AUTO: 2 %
LYMPHOCYTES # SPEC AUTO: 1.3 K/UL (ref 1–4.8)
LYMPHOCYTES NFR SPEC AUTO: 9 %
MAGNESIUM SPEC-SCNC: 2.6 MG/DL (ref 1.6–2.3)
MAGNESIUM SPEC-SCNC: 2.7 MG/DL (ref 1.6–2.3)
MCH RBC QN AUTO: 33.5 PG (ref 25–35)
MCHC RBC AUTO-ENTMCNC: 33.6 G/DL (ref 31–37)
MCV RBC AUTO: 99.6 FL (ref 80–100)
MONOCYTES # BLD AUTO: 0.8 K/UL (ref 0–1)
MONOCYTES NFR BLD AUTO: 5 %
NEUTROPHILS # BLD AUTO: 13.1 K/UL (ref 1.3–7.7)
NEUTROPHILS NFR BLD AUTO: 85 %
NON-AFRICAN AMERICAN GFR(MDRD): >60
NON-AFRICAN AMERICAN GFR(MDRD): >60
PCO2 BLDA: 32 MMHG (ref 35–45)
PCO2 BLDA: 33 MMHG (ref 35–45)
PH BLDA: 7.45 [PH] (ref 7.35–7.45)
PH BLDA: 7.46 [PH] (ref 7.35–7.45)
PHOSPHATE SERPL-MCNC: 4.8 MG/DL (ref 2.5–4.5)
PO2 BLDA: 101 MMHG (ref 83–108)
PO2 BLDA: 99 MMHG (ref 83–108)
POTASSIUM SERPL-SCNC: 4.2 MMOL/L (ref 3.5–5.1)
POTASSIUM SERPL-SCNC: 4.5 MMOL/L (ref 3.5–5.1)
PROT SERPL-MCNC: 5.6 G/DL (ref 6.3–8.2)
SODIUM SERPL-SCNC: 143 MMOL/L (ref 137–145)
SODIUM SERPL-SCNC: 145 MMOL/L (ref 137–145)
WBC # BLD AUTO: 0.25 10*3/UL
WBC # BLD AUTO: 15.5 K/UL (ref 3.8–10.6)
WBC (PEROX): 15.8

## 2017-04-23 RX ADMIN — IPRATROPIUM BROMIDE SCH MG: 0.5 SOLUTION RESPIRATORY (INHALATION) at 15:46

## 2017-04-23 RX ADMIN — HEPARIN SODIUM SCH UNIT: 5000 INJECTION, SOLUTION INTRAVENOUS; SUBCUTANEOUS at 00:46

## 2017-04-23 RX ADMIN — METHYLPREDNISOLONE SODIUM SUCCINATE SCH MG: 40 INJECTION, POWDER, FOR SOLUTION INTRAMUSCULAR; INTRAVENOUS at 09:12

## 2017-04-23 RX ADMIN — AMIODARONE HYDROCHLORIDE SCH: 50 INJECTION, SOLUTION INTRAVENOUS at 18:38

## 2017-04-23 RX ADMIN — CHLORHEXIDINE GLUCONATE SCH ML: 1.2 RINSE ORAL at 20:07

## 2017-04-23 RX ADMIN — HEPARIN SODIUM PRN UNIT: 5000 INJECTION, SOLUTION INTRAVENOUS; SUBCUTANEOUS at 11:18

## 2017-04-23 RX ADMIN — HEPARIN SODIUM PRN UNIT: 5000 INJECTION, SOLUTION INTRAVENOUS; SUBCUTANEOUS at 16:59

## 2017-04-23 RX ADMIN — LEVALBUTEROL HYDROCHLORIDE SCH MG: 1.25 SOLUTION, CONCENTRATE RESPIRATORY (INHALATION) at 23:28

## 2017-04-23 RX ADMIN — BUDESONIDE SCH MG: 1 SUSPENSION RESPIRATORY (INHALATION) at 07:46

## 2017-04-23 RX ADMIN — Medication SCH MG: at 20:06

## 2017-04-23 RX ADMIN — LISINOPRIL SCH: 10 TABLET ORAL at 20:09

## 2017-04-23 RX ADMIN — PROPOFOL SCH MLS/HR: 10 INJECTION, EMULSION INTRAVENOUS at 14:00

## 2017-04-23 RX ADMIN — PROPOFOL SCH MLS/HR: 10 INJECTION, EMULSION INTRAVENOUS at 23:49

## 2017-04-23 RX ADMIN — LEVALBUTEROL HYDROCHLORIDE SCH MG: 1.25 SOLUTION, CONCENTRATE RESPIRATORY (INHALATION) at 07:47

## 2017-04-23 RX ADMIN — HEPARIN SODIUM SCH UNIT: 5000 INJECTION, SOLUTION INTRAVENOUS; SUBCUTANEOUS at 09:13

## 2017-04-23 RX ADMIN — AMIODARONE HYDROCHLORIDE SCH: 50 INJECTION, SOLUTION INTRAVENOUS at 18:39

## 2017-04-23 RX ADMIN — IPRATROPIUM BROMIDE SCH MG: 0.5 SOLUTION RESPIRATORY (INHALATION) at 19:45

## 2017-04-23 RX ADMIN — LORAZEPAM PRN MG: 2 INJECTION, SOLUTION INTRAMUSCULAR; INTRAVENOUS at 02:21

## 2017-04-23 RX ADMIN — SODIUM CHLORIDE, PRESERVATIVE FREE SCH ML: 5 INJECTION INTRAVENOUS at 09:14

## 2017-04-23 RX ADMIN — ASPIRIN 325 MG ORAL TABLET SCH MG: 325 PILL ORAL at 09:13

## 2017-04-23 RX ADMIN — LEVALBUTEROL HYDROCHLORIDE SCH: 1.25 SOLUTION, CONCENTRATE RESPIRATORY (INHALATION) at 02:43

## 2017-04-23 RX ADMIN — LEVALBUTEROL HYDROCHLORIDE SCH MG: 1.25 SOLUTION, CONCENTRATE RESPIRATORY (INHALATION) at 11:33

## 2017-04-23 RX ADMIN — AMIODARONE HYDROCHLORIDE SCH: 200 TABLET ORAL at 08:17

## 2017-04-23 RX ADMIN — IPRATROPIUM BROMIDE SCH: 0.5 SOLUTION RESPIRATORY (INHALATION) at 02:43

## 2017-04-23 RX ADMIN — METOPROLOL TARTRATE SCH: 50 TABLET, FILM COATED ORAL at 23:22

## 2017-04-23 RX ADMIN — SODIUM CHLORIDE SCH MLS/HR: 450 INJECTION, SOLUTION INTRAVENOUS at 11:19

## 2017-04-23 RX ADMIN — DOCUSATE SODIUM AND SENNOSIDES SCH EACH: 50; 8.6 TABLET ORAL at 20:07

## 2017-04-23 RX ADMIN — IPRATROPIUM BROMIDE SCH MG: 0.5 SOLUTION RESPIRATORY (INHALATION) at 07:47

## 2017-04-23 RX ADMIN — METHYLPREDNISOLONE SODIUM SUCCINATE SCH MG: 40 INJECTION, POWDER, FOR SOLUTION INTRAMUSCULAR; INTRAVENOUS at 20:07

## 2017-04-23 RX ADMIN — LORAZEPAM PRN MG: 2 INJECTION, SOLUTION INTRAMUSCULAR; INTRAVENOUS at 06:19

## 2017-04-23 RX ADMIN — INSULIN HUMAN SCH MLS/HR: 100 INJECTION, SOLUTION PARENTERAL at 11:24

## 2017-04-23 RX ADMIN — PANTOPRAZOLE SODIUM SCH MG: 40 INJECTION, POWDER, FOR SOLUTION INTRAVENOUS at 09:12

## 2017-04-23 RX ADMIN — BUDESONIDE SCH MG: 1 SUSPENSION RESPIRATORY (INHALATION) at 19:45

## 2017-04-23 RX ADMIN — DIGOXIN SCH MCG: 0.25 INJECTION INTRAMUSCULAR; INTRAVENOUS at 09:12

## 2017-04-23 RX ADMIN — Medication SCH MG: at 09:14

## 2017-04-23 RX ADMIN — LACTULOSE SCH GM: 20 SOLUTION ORAL at 20:08

## 2017-04-23 RX ADMIN — ATORVASTATIN CALCIUM SCH MG: 40 TABLET, FILM COATED ORAL at 09:13

## 2017-04-23 RX ADMIN — CEFAZOLIN SCH MLS/HR: 330 INJECTION, POWDER, FOR SOLUTION INTRAMUSCULAR; INTRAVENOUS at 11:20

## 2017-04-23 RX ADMIN — SODIUM CHLORIDE, PRESERVATIVE FREE SCH ML: 5 INJECTION INTRAVENOUS at 20:08

## 2017-04-23 RX ADMIN — LEVALBUTEROL HYDROCHLORIDE SCH MG: 1.25 SOLUTION, CONCENTRATE RESPIRATORY (INHALATION) at 15:47

## 2017-04-23 RX ADMIN — PROPOFOL SCH MLS/HR: 10 INJECTION, EMULSION INTRAVENOUS at 18:43

## 2017-04-23 RX ADMIN — IPRATROPIUM BROMIDE SCH MG: 0.5 SOLUTION RESPIRATORY (INHALATION) at 23:28

## 2017-04-23 RX ADMIN — LISINOPRIL SCH: 10 TABLET ORAL at 10:44

## 2017-04-23 RX ADMIN — AMIODARONE HYDROCHLORIDE SCH: 200 TABLET ORAL at 20:09

## 2017-04-23 RX ADMIN — CLOPIDOGREL BISULFATE SCH MG: 75 TABLET ORAL at 09:13

## 2017-04-23 RX ADMIN — METOPROLOL TARTRATE SCH: 50 TABLET, FILM COATED ORAL at 16:54

## 2017-04-23 RX ADMIN — IPRATROPIUM BROMIDE SCH MG: 0.5 SOLUTION RESPIRATORY (INHALATION) at 11:33

## 2017-04-23 RX ADMIN — CHLORHEXIDINE GLUCONATE SCH ML: 1.2 RINSE ORAL at 09:13

## 2017-04-23 RX ADMIN — MULTIVITAMIN SCH ML: LIQUID ORAL at 11:25

## 2017-04-23 RX ADMIN — LEVALBUTEROL HYDROCHLORIDE SCH MG: 1.25 SOLUTION, CONCENTRATE RESPIRATORY (INHALATION) at 19:45

## 2017-04-23 RX ADMIN — HYDROCODONE BITARTRATE AND ACETAMINOPHEN PRN EACH: 5; 325 TABLET ORAL at 00:04

## 2017-04-23 NOTE — PN
DATE OF SERVICE: 04/23/2017



This 61 -year-old gentleman admitted after CAD/CABG, acute respiratory 

failure. The patient is on  mechanical ventilation and sedated.  FIO2 

is being titrated down.   The most recent chest x-ray showed left 

lower lobe infiltrate. Pulmonary has seen the patient and is being 

closely monitored at this time. The vent settings are 450, tidal 

volume FIO2 40% and PEEP of 10. Blood gas are reviewed. The patient's 

insulin drip at 4.5 units per hour.   



Past medical history reviewed. 



Review of systems could not be obtained, the patient is mechanically 

sedated.    



Current medications are reviewed and include: 

1. Norco 5 mg q.4h p.r.n. 

2. Cordarone 200 mg p.o. b.i.d. 

3. Aspirin 320 mg daily. 

4. Lipitor 40 mg daily.

5. Hurricaine spray. 

7. Pulmicort 1 mg b.i.d. 

8. Peridex.

9. Heparin. 

10. Atrovent updrafts q.i.d. 

11. Xopenex. 

12. Zestril.

13. Ativan.

14. Solu-Medrol 40 IV b.i.d. 

15. Protonix 40 daily. 



PHYSICAL EXAMINATION: The patient is mechanically sedated and 

ventilated.   Pulse 82, blood pressure 120/61, respiratory  rate 23, 

temperature normal, pulse ox 98% on 40% FIO2.   

HEENT: Conjunctivae normal.  Oral mucosa moist.  

NECK: No jugular venous distention. No carotid bruit.  No lymph node 

enlargement.  

CARDIOVASCULAR: S1, S2 muffled.  

RESPIRATORY: Breath sounds diminished at the bases.  A few scattered 

rhonchi and crackles.  

ABDOMEN: Soft, nontender. Legs: No edema.  

CENTRAL NERVOUS SYSTEM: Mechanically sedated.  



LABS: At this time shows WBC 15.5, hemoglobin is 10.5. Otherwise, ABGs 

noted. Glucose 148, 114 and magnesium is 2.7.  Albumin is 2.8.  



ASSESSMENT:

1. Coronary artery disease, status post coronary artery bypass 

grafting.  

2. Acute hypoxic respiratory failure on mechanical ventilation. 

3. Chronic obstructive pulmonary disease, acute exacerbation.

4. Delirium tremens.   [facility] protocol.

5. Right upper and lower lobe collapse secondary to mucous plugging 

and bronchoscopy.  

6. Hypertension, history, essential.

7. Ongoing nicotine dependence. 

8. History of chewing tobacco.

9. Bibasilar atelectasis.

10. Left pleural effusion. 

11. Atrial fibrillation with rapid ventricular rate. 

12. FULL CODE.



RECOMMENDATIONS AND DISCUSSION: In this 61-year-old gentleman who 

presented with multiple complex medical issues, we will monitor the 

patient closely.  Continue current medications. Continue symptomatic 

treatment. Mechanical ventilation by Dr. Munoz. The white count is 

elevated.  Recommended to continue bronchodilators.  Steroids.   There 

is no recent fever. Recommend cultures, blood and urine and sputum.  

Continue to monitor. Chest x-ray reviewed.  Further recommendations to 

follow.   



MTDD

## 2017-04-23 NOTE — PN
DATE OF SERVICE: 04/22/2017



This 61-year-old gentleman who was admitted after CAD/CABG also had 

respiratory failure. The patient appears to be slightly more 

responsive today.  FIO2 down to 40%. The patient also had atrial 

fibrillation. The patient being closely monitored in the ICU.    



Past medical history reviewed.   Review of systems could not be taken. 



PHYSICAL EXAMINATION:  Pulse is 135 and regular.  Blood pressure is 

ntd  respiratory  rate 29, temperature normal, pulse ox 95% on FIO2.   

HEENT: Conjunctivae normal. Oral mucosa moist. 

NECK: Neck is no jugular venous distention. No carotid bruit. No lymph 

node enlargement.   

CARDIOVASCULAR: S1, S2 muffled. No S3, no S4.  

RESPIRATORY: Breath sounds diminished at the bases.  A few scattered 

rhonchi and crackles.  

ABDOMEN: Soft, nontender. No mass palpable. 

LEGS: No edema. No swelling. 

CENTRAL NERVOUS SYSTEM: No focal deficits. 



LABS:  ABGs noted. Otherwise, WBC 14.7.  Hemoglobin 10.3.   Sodium 144. 



ASSESSMENT:

1. Coronary artery disease status post coronary artery bypass grafting. 

2. Acute hypoxic respiratory failure, on mechanical ventilation. 

3. Chronic obstructive pulmonary disease, acute exacerbation. 

4. Early delirium tremens, CIWA protocol.  

5. Right upper and lower lobe collapse secondary to mucous plugging 

and chest bronchoscopy.  

6. Hypertension history. 

7. Ongoing nicotine dependence. 

8. Chewing tobacco.

9. Bibasilar atelectasis. 

10. Left pleural effusion.

11. Atrial fibrillation with rapid ventricular rate. 

12. FULL CODE. 



RECOMMENDATIONS AND DISCUSSION: In this 61-year-old gentleman who was 

admitted with multiple medical problems as mentioned earlier at this 

time I recommend to continue current medications, continue symptomatic 

treatment, bronchodilators, monitor blood sugars closely with insulin 

drip.  Otherwise mechanical ventilation by Dr. Munoz.  Further 

recommendations to follow.   



MTDD

## 2017-04-23 NOTE — XR
EXAMINATION TYPE: XR chest 1V portable

 

DATE OF EXAM: 4/23/2017 8:08 AM

 

COMPARISON: NONE

 

INDICATION: Shortness of breath

 

TECHNIQUE: Single frontal view of the chest is obtained.

 

FINDINGS:  

The heart size is mildly prominent.  

The pulmonary vasculature is normal.  

Left lower lobe infiltrate is present. There is silhouetting left diaphragm.  

Endotracheal tube is present above the anderson. Nasogastric tube transverses the thorax tip in the abd
omen. Catheter enters on the left tip in right atrium. Sternotomy wires are in the midline. EKG leads
 overlie the chest.

 

IMPRESSION:  

1. Left lower lobe infiltrate. Correlate for pneumonia. Follow-up is recommended.

2. Lines and catheters discussed above.

## 2017-04-23 NOTE — P.PN
Progress Note - Text





CV Surgery Nursing





Principal diagnosis: 





Coronary artery disease, status post prior stenting to his right coronary 

artery.  Preserved left ventricular function.  Hyperlipidemia.  Hypertension.  

ETOH abuse.





POD #10 total arterial non-aortic patch off-pump double coronary artery bypass 

grafting using in situ skeletonized right internal mammary artery to the left 

anterior descending artery across the anterior midline in situ totally 

skeletonized left internal mammary artery to the first obtuse marginal artery.  

Intraoperative transesophageal echocardiogram and epi-aortic scanning.  

Intraoperative graft flow measurements using the Medist[employer] system





Patient had postoperative mucous plugging with increasing oxygen demand 

requiring bronchoscopy the night of surgery.  Bronchial washings negative for 

bacteria/viruses to date.





Pt developed postoperative alcohol withdrawal requiring increased sedation and 

continued mechanical ventilation.





Patient is awake and alert, following simple commands appropriately moving all 

4 extremities. He is currently on a CPAP trial.





Vital Signs: Afebrile


 Vital Signs - 24 hr











  04/22/17 04/22/17 04/22/17





  11:22 11:52 12:00


 


Temperature   


 


Pulse Rate 139 H 138 H 139 H


 


Respiratory   24





Rate   


 


Blood Pressure   


 


O2 Sat by Pulse   93 L





Oximetry   














  04/22/17 04/22/17 04/22/17





  13:00 14:00 15:00


 


Temperature   


 


Pulse Rate 138 H 130 H 143 H


 


Respiratory 29 H 23 23





Rate   


 


Blood Pressure   


 


O2 Sat by Pulse 94 L 93 L 93 L





Oximetry   














  04/22/17 04/22/17 04/22/17





  16:00 17:00 18:00


 


Temperature 99.1 F  


 


Pulse Rate 135 H 141 H 139 H


 


Respiratory 29 H 20 20





Rate   


 


Blood Pressure  92/69 92/69


 


O2 Sat by Pulse 94 L 92 L 93 L





Oximetry   














  04/22/17 04/22/17 04/22/17





  19:00 20:00 21:00


 


Temperature 97.0 F L  


 


Pulse Rate 138 H 140 H 139 H


 


Respiratory 24 28 H 20





Rate   


 


Blood Pressure  109/80 109/80


 


O2 Sat by Pulse 93 L 92 L 92 L





Oximetry   














  04/22/17 04/22/17 04/22/17





  22:00 23:00 23:38


 


Temperature  99.1 F 


 


Pulse Rate 138 H 140 H 140 H


 


Respiratory 23 22 24





Rate   


 


Blood Pressure   


 


O2 Sat by Pulse 92 L 93 L 91 L





Oximetry   














  04/23/17 04/23/17 04/23/17





  00:00 01:00 02:00


 


Temperature   


 


Pulse Rate 141 H 134 H 133 H


 


Respiratory 24 20 29 H





Rate   


 


Blood Pressure  100/71 100/71


 


O2 Sat by Pulse 92 L 95 95





Oximetry   














  04/23/17 04/23/17 04/23/17





  03:00 04:00 05:00


 


Temperature   


 


Pulse Rate 113 H 102 H 122 H


 


Respiratory 20 20 22





Rate   


 


Blood Pressure   


 


O2 Sat by Pulse 93 L 95 95





Oximetry   














  04/23/17 04/23/17 04/23/17





  06:00 07:00 07:50


 


Temperature   


 


Pulse Rate 126 H 106 H 102 H


 


Respiratory 20 20 





Rate   


 


Blood Pressure  109/73 


 


O2 Sat by Pulse 95 94 L 





Oximetry   














  04/23/17 04/23/17 04/23/17





  08:00 08:15 09:00


 


Temperature 97.8 F  


 


Pulse Rate 71 107 H 141 H


 


Respiratory 22  22





Rate   


 


Blood Pressure 109/73  


 


O2 Sat by Pulse 97  97





Oximetry   














  04/23/17





  10:00


 


Temperature 


 


Pulse Rate 98


 


Respiratory 20





Rate 


 


Blood Pressure 


 


O2 Sat by Pulse 95





Oximetry 








 ABP, PAP, CO, CI - Last 8 Hours











Arterial Blood Pressure        153/77


 


Arterial Blood Pressure        153/85


 


Arterial Blood Pressure        161/82


 


Arterial Blood Pressure        110/62


 


Arterial Blood Pressure        143/83


 


Arterial Blood Pressure        121/72


 


Arterial Blood Pressure        111/56

















Labs: 


 Short CBC











  04/23/17 Range/Units





  04:45 


 


WBC  15.5 H  (3.8-10.6)  k/uL


 


Hgb  10.5 L  (13.0-17.5)  gm/dL


 


Hct  31.3 L  (39.0-53.0)  %


 


Plt Count  418  (150-450)  k/uL


 


Neutrophils #  13.1 H  (1.3-7.7)  k/uL








 BMP











  04/22/17 04/23/17





  16:09 04:45


 


Sodium   145


 


Potassium  4.4  4.5


 


Chloride   111 H


 


Carbon Dioxide   25


 


BUN   42 H


 


Creatinine   0.80


 


Glucose   155 H


 


Calcium   8.7








 Liver Function











  04/23/17 Range/Units





  04:45 


 


Total Bilirubin  0.7  (0.2-1.3)  mg/dL


 


AST  52  (17-59)  U/L


 


ALT  91 H  (21-72)  U/L


 


Alkaline Phosphatase  59  ()  U/L


 


Albumin  2.8 L  (3.5-5.0)  g/dL














IV Fluids: 


lactated Ringer's at 20 mL/h


Insulin drip at 3.5 units per hour.





Lungs: few scattered rhonchi throughout, diminished bilateral bases.  

Respirations are unlabored with mechanical ventilator support.  Current 

ventilator settings are as follows, CPAP 5, FiO2 40%, pressure support 5.





O2 sat: 98% with 40% FiO2 support.





Heart:  S1S2, irregular rhythm with a tachycardic rate, negative for S3, gallop 

or murmur. Bedside telemetry showing atrial flutter heart rate 143.





Sternum stable, chest incision clean with silverlon dressing clean and dry. 

Heart hugger in place.





Knee high CONNIE hose and sequential compression devices in place to bilateral 

lower extremities.





Abdomen:  Soft, Positive bowel sounds present in all 4 quadrants, OG tube in 

place with vital high-protein to feeding infusing at 43 mL per hour which is 

goal.  30 mL of water flushes every 4 hours.





CBGs: 114-150 mg/dL in the last 24 hours.





U/O:  adequate, Angelo catheter for accurate I&O.





24 hr Total: 


 Intake & Output











 04/21/17 04/22/17 04/23/17 04/24/17





 06:59 06:59 06:59 06:59


 


Intake Total 2761.649 1876.681 2488.276 406.938


 


Output Total 1160 2290 1910 565


 


Balance 1601.649 -859.674 864.276 -158.062


 


Weight 96.5 kg 99.3 kg 99.9 kg 99.9 kg








Active Medications





Hydrocodone Bitart/Acetaminophen (Norco 5-325)  2 each PO Q4HR PRN


   PRN Reason: Severe Pain


   Last Admin: 04/23/17 00:04 Dose:  2 each


Hydrocodone Bitart/Acetaminophen (Norco 5-325)  1 each PO Q4HR PRN


   PRN Reason: Moderate Pain


Amiodarone HCl (Cordarone)  200 mg PO BID [facility]


   Last Admin: 04/23/17 08:17 Dose:  Not Given


Aspirin (Aspirin)  325 mg PO DAILY [facility]


   Last Admin: 04/23/17 09:13 Dose:  325 mg


Atorvastatin Calcium (Lipitor)  40 mg PO DAILY [facility]


   Last Admin: 04/23/17 09:13 Dose:  40 mg


Benzocaine (Hurricaine Spray)  1 applic MUCOUS MEM QID PRN


   PRN Reason: Mouth Irritation


   Last Admin: 04/17/17 11:36 Dose:  1 applic


Benzocaine/Menthol (Cepacol Lozenge)  1 each MUCOUS MEM Q2H PRN


   PRN Reason: Sore Throat


Bisacodyl (Dulcolax)  10 mg RECTAL DAILY PRN


   PRN Reason: Constipation


   Last Admin: 04/19/17 17:18 Dose:  10 mg


Budesonide (Pulmicort)  1 mg INHALATION RT-BID [facility]


   Last Admin: 04/23/17 07:46 Dose:  1 mg


Chlorhexidine Gluconate (Peridex)  15 ml MUCOUS MEM BID [facility]


   Last Admin: 04/23/17 09:13 Dose:  15 ml


Digoxin (Lanoxin)  250 mcg IVP DAILY ELIER


Digoxin (Lanoxin Pediatric)  125 mcg IVP ONCE ONE


   Stop: 04/23/17 11:31


Heparin Sodium (Porcine) (Heparin)  0 unit IV PER PROTOCOL PRN; Protocol


   PRN Reason: Low PTT


Insulin Human Regular 100 unit (/ Sodium Chloride)  101 mls @ 0 mls/hr IV .Q0M 

ELIER; Per Protocol


   PRN Reason: Protocol


   Last Titration: 04/23/17 10:44 Dose:  4 units/hr, 4.04 mls/hr


Propofol 500 mg/ IV Solution  50 mls @ 0 mls/hr IV .Q0M ELIER; Titrate


   PRN Reason: Protocol


   Last Titration: 04/23/17 02:53 Dose:  Infused


Amiodarone HCl 450 mg/ (Dextrose/Water)  259 mls @ 34.53 mls/hr IV .Q7H31M EILER; 

1 MG/MIN


   PRN Reason: Protocol


   Stop: 04/23/17 15:16


   Last Admin: 04/22/17 23:33 Dose:  0.5 mg/min, 17.26 mls/hr


Heparin Sodium/Dextrose 25,000 (unit/ IV Solution)  500 mls @ 19.98 mls/hr IV 

.Q24H ELIER; 10 UNITS/KG/HR


   PRN Reason: Protocol


Sodium Chloride (Saline 0.9%)  1,000 mls @ 20 mls/hr IV .Q24H ELIER


Ipratropium Bromide (Atrovent Nebulized)  0.5 mg INHALATION RT-Q4H [facility]


   Last Admin: 04/23/17 07:47 Dose:  0.5 mg


Ipratropium Bromide (Atrovent Nebulized)  0.5 mg INHALATION RT-Q2H PRN


   PRN Reason: Shortness Of Breath Or Wheezing


Iron/Minerals/Multivitamins (Theragran Liquid)  15 ml PO DAILY@1200 [facility]


   Last Admin: 04/22/17 11:35 Dose:  15 ml


Levalbuterol HCl (Xopenex Nebulized (Conc))  1.25 mg INHALATION RT-Q4H [facility]


   Last Admin: 04/23/17 07:47 Dose:  1.25 mg


Levalbuterol HCl (Xopenex Nebulized (Conc))  1.25 mg INHALATION RT-Q2H PRN


   PRN Reason: Shortness Of Breath


Lisinopril (Zestril)  10 mg PO BID [facility]


   Last Admin: 04/23/17 10:44 Dose:  Not Given


Lorazepam (Ativan)  1 mg IV Q2HR PRN


   PRN Reason: CIWA 8 or 9


   Last Admin: 04/23/17 06:19 Dose:  1 mg


Lorazepam (Ativan)  1 mg IV Q1HR PRN


   PRN Reason: CIWA 10 to 15


   Last Admin: 04/18/17 03:51 Dose:  1 mg


Lorazepam (Ativan)  2 mg IV Q1HR PRN


   PRN Reason: CIWA 16 or higher


   Last Admin: 04/23/17 02:21 Dose:  2 mg


Magnesium Hydroxide (Milk Of Magnesia)  2,400 mg PO BID PRN


   PRN Reason: Constipation


Methylprednisolone Sodium Succinate (Solu-Medrol)  40 mg IV Q12HR [facility]


   Last Admin: 04/23/17 09:12 Dose:  40 mg


Metoclopramide HCl (Reglan)  10 mg IVP Q4H PRN


   PRN Reason: Nausea And Vomiting


Metoprolol Tartrate (Lopressor)  50 mg PO TID [facility]


Miscellaneous Information (Magnesium Per Protocol)  1 each MISCELLANE DAILY PRN

; Protocol


   PRN Reason: Per Protocol


Miscellaneous Information (Phosphorus Per Protocol)  1 each MISCELLANE DAILY PRN

; Protocol


   PRN Reason: Per Protocol


Miscellaneous Information (Potassium Per Protocol)  1 each MISCELLANE DAILY PRN

; Protocol


   PRN Reason: Per Protocol


Morphine Sulfate (Morphine Sulfate (Inj))  2 mg IVP Q2H PRN


   PRN Reason: Severe Pain


   Last Admin: 04/20/17 22:59 Dose:  2 mg


Ondansetron HCl (Zofran)  4 mg IVP Q6HR PRN


   PRN Reason: Nausea And Vomiting


Pantoprazole Sodium (Protonix)  40 mg IVP DAILY [facility]


   Last Admin: 04/23/17 09:12 Dose:  40 mg


Senna/Docusate Sodium (Senokot-S)  2 each PO HS [facility]


   Last Admin: 04/22/17 20:32 Dose:  2 each


Sodium Chloride (Saline Flush)  10 ml IV BID [facility]


   Last Admin: 04/23/17 09:14 Dose:  10 ml


Sodium Chloride (Saline Flush)  20 ml IV Q4HR PRN


   PRN Reason: PICC Line


Sodium Chloride (Saline Flush)  10 ml IV WEEKLY [facility]


Sodium Chloride (Saline Flush)  10 ml IV Q4HR PRN


   PRN Reason: PICC Line


Thiamine HCl (Vitamin B-1)  100 mg PO BID [facility]


   Last Admin: 04/23/17 09:14 Dose:  100 mg





Plan: 





1.  Continue aspirin, Lipitor, Lopressor, lisinopril, digoxin.  Extra dose of 

digoxin given this a.m. 0.125 mg IV 1. Digoxin will be increased to 0.25 mg IV 

daily.


2.  Continue amiodarone for atrial fibrillation/atrial flutter prophylaxis. 


3.  Ventilator management per Dr Munoz, wean O2 as tolerated.  CPAP trial and 

progress.


4.  Physical therapy for range of motion exercises.


5.  Insulin/diabetic management per primary care service.


6.  Continue CIWA protocol for alcohol withdrawal.  


7.  Daily labs, chest x-rays.  Digoxin level in a.m.


8.  GI/DVT prophylaxis.


9.  Diprivan drip placed on hold to evaluate underlying mentation.


10. Heparin protocol initiated for atrial fibrillation/atrial flutter.


11. Water flushes for the tube feeding have an increased to 100 mL every 4 

hours.


12.  More recommendations as patient progresses.

## 2017-04-23 NOTE — P.PN
Subjective


Principal diagnosis: 


Status post CABG, respiratory failure, atrial fibrillation





This patient is status post aorto coronary bypass surgery.  Patient has been in 

ICU for a long time requiring ventilatory support.  Patient is also in flutter 

with rapid ventricular response.  Patient has been resistant to the treatment.  

Patient has been treated with multiple medications including beta blockers and 

digoxin and also amiodarone.  Patient's metoprolol dose was increased to 50 mg 

3 times a day.  Rest of the medication to be continued.  His the sedation is 

being weaned off.  He seemed to be responding appropriately





Objective





- Vital Signs


Vital signs: 


 Vital Signs











Temp  98.4 F   04/23/17 16:00


 


Pulse  56 L  04/23/17 20:00


 


Resp  17   04/23/17 19:00


 


BP  109/73   04/23/17 08:00


 


Pulse Ox  94 L  04/23/17 19:00








 Intake & Output











 04/23/17 04/23/17 04/24/17





 06:59 18:59 06:59


 


Intake Total 305.198 2358.251 117.2


 


Output Total 755 1300 30


 


Balance 112.188 710.251 87.2


 


Weight 99.9 kg 99.9 kg 


 


Intake:   


 


  IV  20 


 


    0.9 at 20 ml/hr  20 


 


  Intake, IV Titration 695.188 855.251 31.2





  Amount   


 


    Amiodarone 450 mg In 257.814 249.407 





    Dextrose 5% in Water 250   





    ml @ 1 MG/MIN 34.53 mls/   





    hr IV .Q7H31M [facility] Rx#:   





    [id number]   


 


    Calcium Gluconate 1,000 100  





    mg In Sodium Chloride 0.9   





    % 100 ml @ 100 mls/hr   





    [facility] Rx#:   





    [id number]   


 


    Heparin Sodium,Porcine/  251.415 





    D5w Pmx 25,000 unit In   





    Dextrose/Water 1 500ml.   





    bag @ 10 UNITS/KG/HR 19.   





    98 mls/hr IV .Q24H [facility] Rx   





    #:[id number]   


 


    Insulin Regular 100 unit 47.374 42.706 





    In Sodium Chloride 0.9%   





    100 ml @ Per Protocol IV   





    .Q0M [facility] Rx#:[id number]   


 


    Lactated Ringers 1,000 ml 240 60 





    @ 20 mls/hr IV .Q24H [facility]   





    Rx#:[id number]   


 


    Propofol 150 ml As IV .  33.9 11.2





    [facility] Rx#:[id number]   


 


    Propofol 50 ml As IV .STK  11.2 





    [facility] Rx#:[id number]   


 


    Propofol 500 mg In Empty 50.000 46.623 





    Bag 1 bag @ Titrate IV .   





    Q0M [facility] Rx#:[id number]   


 


    Sodium Chloride 0.9% 1,  160 20





    000 ml @ 20 mls/hr IV .   





    Q24H [facility] Rx#:[id number]   


 


  Tube Feeding 172 645 86


 


  Other  490 


 


Output:   


 


  Urine 755 1300 30


 


Other:   


 


  Voiding Method Indwelling Catheter Indwelling Catheter 








 ABP, PAP, CO, CI - Last Documented











Arterial Blood Pressure        120/53


 


Pulmonary Artery Pressure      46/23


 


Cardiac Output                 6.8


 


Cardiac Index                  3.3

















- Exam





GENERAL EXAM: Patient is intubated but off sedation and seems to be waking up


HEENT: Normocephalic.


NECK: No masses, no nuchal rigidity.


CHEST: No chest wall deformity.


LUNGS: Breath sounds at bases


HEART: S1 and S2 normal with no audible mumurs or gallops. Regular rhythm, 

femorals equal on both sides..


ABDOMEN: No hepatosplenomegaly, normal bowel sounds, no guarding or rigidity.


SKIN: No rashes


CENTRAL NERVOUS SYSTEM:  Sedated


EXTREMITIES: No cyanosis, clubbing or edema.





- Labs


CBC & Chem 7: 


 04/23/17 04:45





 04/23/17 15:56


Labs: 


 Abnormal Lab Results - Last 24 Hours (Table)











  04/22/17 04/22/17 04/22/17 Range/Units





  20:05 22:08 23:22 


 


WBC     (3.8-10.6)  k/uL


 


RBC     (4.30-5.90)  m/uL


 


Hgb     (13.0-17.5)  gm/dL


 


Hct     (39.0-53.0)  %


 


Neutrophils #     (1.3-7.7)  k/uL


 


APTT     (22.0-30.0)  sec


 


ABG pH     (7.35-7.45)  


 


ABG pCO2     (35-45)  mmHg


 


ABG O2 Saturation     (94-97)  %


 


Chloride     ()  mmol/L


 


BUN     (9-20)  mg/dL


 


Glucose     (74-99)  mg/dL


 


POC Glucose (mg/dL)  125 H  118 H  114 H  (75-99)  mg/dL


 


Phosphorus     (2.5-4.5)  mg/dL


 


Magnesium     (1.6-2.3)  mg/dL


 


ALT     (21-72)  U/L


 


Total Protein     (6.3-8.2)  g/dL


 


Albumin     (3.5-5.0)  g/dL














  04/23/17 04/23/17 04/23/17 Range/Units





  01:54 04:10 04:30 


 


WBC     (3.8-10.6)  k/uL


 


RBC     (4.30-5.90)  m/uL


 


Hgb     (13.0-17.5)  gm/dL


 


Hct     (39.0-53.0)  %


 


Neutrophils #     (1.3-7.7)  k/uL


 


APTT     (22.0-30.0)  sec


 


ABG pH     (7.35-7.45)  


 


ABG pCO2    33 L  (35-45)  mmHg


 


ABG O2 Saturation    98.0 H  (94-97)  %


 


Chloride     ()  mmol/L


 


BUN     (9-20)  mg/dL


 


Glucose     (74-99)  mg/dL


 


POC Glucose (mg/dL)  150 H  151 H   (75-99)  mg/dL


 


Phosphorus     (2.5-4.5)  mg/dL


 


Magnesium     (1.6-2.3)  mg/dL


 


ALT     (21-72)  U/L


 


Total Protein     (6.3-8.2)  g/dL


 


Albumin     (3.5-5.0)  g/dL














  04/23/17 04/23/17 04/23/17 Range/Units





  04:45 04:45 04:45 


 


WBC   15.5 H   (3.8-10.6)  k/uL


 


RBC   3.14 L   (4.30-5.90)  m/uL


 


Hgb   10.5 L   (13.0-17.5)  gm/dL


 


Hct   31.3 L   (39.0-53.0)  %


 


Neutrophils #   13.1 H   (1.3-7.7)  k/uL


 


APTT     (22.0-30.0)  sec


 


ABG pH     (7.35-7.45)  


 


ABG pCO2     (35-45)  mmHg


 


ABG O2 Saturation     (94-97)  %


 


Chloride  111 H    ()  mmol/L


 


BUN  42 H    (9-20)  mg/dL


 


Glucose  155 H    (74-99)  mg/dL


 


POC Glucose (mg/dL)     (75-99)  mg/dL


 


Phosphorus    4.8 H  (2.5-4.5)  mg/dL


 


Magnesium    2.7 H  (1.6-2.3)  mg/dL


 


ALT  91 H    (21-72)  U/L


 


Total Protein  5.6 L    (6.3-8.2)  g/dL


 


Albumin  2.8 L    (3.5-5.0)  g/dL














  04/23/17 04/23/17 04/23/17 Range/Units





  06:37 08:02 09:40 


 


WBC     (3.8-10.6)  k/uL


 


RBC     (4.30-5.90)  m/uL


 


Hgb     (13.0-17.5)  gm/dL


 


Hct     (39.0-53.0)  %


 


Neutrophils #     (1.3-7.7)  k/uL


 


APTT     (22.0-30.0)  sec


 


ABG pH    7.46 H  (7.35-7.45)  


 


ABG pCO2    32 L  (35-45)  mmHg


 


ABG O2 Saturation    98.1 H  (94-97)  %


 


Chloride     ()  mmol/L


 


BUN     (9-20)  mg/dL


 


Glucose     (74-99)  mg/dL


 


POC Glucose (mg/dL)  127 H  145 H   (75-99)  mg/dL


 


Phosphorus     (2.5-4.5)  mg/dL


 


Magnesium     (1.6-2.3)  mg/dL


 


ALT     (21-72)  U/L


 


Total Protein     (6.3-8.2)  g/dL


 


Albumin     (3.5-5.0)  g/dL














  04/23/17 04/23/17 04/23/17 Range/Units





  10:12 10:16 12:24 


 


WBC     (3.8-10.6)  k/uL


 


RBC     (4.30-5.90)  m/uL


 


Hgb     (13.0-17.5)  gm/dL


 


Hct     (39.0-53.0)  %


 


Neutrophils #     (1.3-7.7)  k/uL


 


APTT  20.6 L    (22.0-30.0)  sec


 


ABG pH     (7.35-7.45)  


 


ABG pCO2     (35-45)  mmHg


 


ABG O2 Saturation     (94-97)  %


 


Chloride     ()  mmol/L


 


BUN     (9-20)  mg/dL


 


Glucose     (74-99)  mg/dL


 


POC Glucose (mg/dL)   150 H  129 H  (75-99)  mg/dL


 


Phosphorus     (2.5-4.5)  mg/dL


 


Magnesium     (1.6-2.3)  mg/dL


 


ALT     (21-72)  U/L


 


Total Protein     (6.3-8.2)  g/dL


 


Albumin     (3.5-5.0)  g/dL














  04/23/17 04/23/17 04/23/17 Range/Units





  13:54 15:50 15:56 


 


WBC     (3.8-10.6)  k/uL


 


RBC     (4.30-5.90)  m/uL


 


Hgb     (13.0-17.5)  gm/dL


 


Hct     (39.0-53.0)  %


 


Neutrophils #     (1.3-7.7)  k/uL


 


APTT    41.4 H  (22.0-30.0)  sec


 


ABG pH     (7.35-7.45)  


 


ABG pCO2     (35-45)  mmHg


 


ABG O2 Saturation     (94-97)  %


 


Chloride     ()  mmol/L


 


BUN     (9-20)  mg/dL


 


Glucose     (74-99)  mg/dL


 


POC Glucose (mg/dL)  148 H  145 H   (75-99)  mg/dL


 


Phosphorus     (2.5-4.5)  mg/dL


 


Magnesium     (1.6-2.3)  mg/dL


 


ALT     (21-72)  U/L


 


Total Protein     (6.3-8.2)  g/dL


 


Albumin     (3.5-5.0)  g/dL














  04/23/17 04/23/17 Range/Units





  15:56 19:54 


 


WBC    (3.8-10.6)  k/uL


 


RBC    (4.30-5.90)  m/uL


 


Hgb    (13.0-17.5)  gm/dL


 


Hct    (39.0-53.0)  %


 


Neutrophils #    (1.3-7.7)  k/uL


 


APTT    (22.0-30.0)  sec


 


ABG pH    (7.35-7.45)  


 


ABG pCO2    (35-45)  mmHg


 


ABG O2 Saturation    (94-97)  %


 


Chloride  111 H   ()  mmol/L


 


BUN  42 H   (9-20)  mg/dL


 


Glucose  147 H   (74-99)  mg/dL


 


POC Glucose (mg/dL)   136 H  (75-99)  mg/dL


 


Phosphorus    (2.5-4.5)  mg/dL


 


Magnesium  2.6 H   (1.6-2.3)  mg/dL


 


ALT    (21-72)  U/L


 


Total Protein    (6.3-8.2)  g/dL


 


Albumin    (3.5-5.0)  g/dL














Assessment and Plan


(1) Status post coronary artery bypass graft


Status: Acute   





(2) Family history of heart disease


Status: Acute   





(3) Hyperlipidemia


Status: Acute   





(4) Hypertension


Status: Acute   





(5) Nicotine dependence, chewing tobacco, uncomplicated


Status: Acute   





(6) Atrial fibrillation


Status: Acute   


Plan: 


Patient seemed to be showing some improvement.  He is heart rhythm is in atrial 

flutter and seems to be difficult to control his heart rate.  We'll going to 

increase the dose of the beta blocker as blood pressure seemed to be more 

stable.  We'll also give him IV beta blocker.  Rest of the medication to be 

continued.  Pulmonary management by Dr. Munoz.

## 2017-04-23 NOTE — PN
DATE OF SERVICE: 4/23/2017 



This is a 61-year-old gentleman who is status post coronary artery 

bypass grafting, postoperative day #10. He does remain on the 

mechanical ventilator at settings of assist control 20, tidal volume 

450, FiO2 of 40% and a PEEP of 10. His morning blood gases revealed a 

pO2 of 101, pCO2 of 33 and a pH of 7.45. His current drips are 

lactated Ringer's at 20 mL per hour, insulin at 3.5 units/h. Currently 

off Diprivan for daily interruption of sedation. He is on amiodarone 

drip at 0.5 mg per minute. He is being nourished with Vital HP at 43 

mL per hour, which is goal. He did have some issues with hypotension 

last night and received albumin well. He has had ongoing issues with 

atrial fibrillation. His chest x-ray today reveals atelectasis of the 

left mid lung.  



On physical exam, he is alert and following some simple commands, but 

remained very weak. Vital signs reveal blood pressure 153/85, heart 

rate 141, respirations 22, temperature is 97.8. He is 97% O2 

saturation on 40% FiO2. His head is normocephalic. Sclerae anicteric. 

Oral endotracheal and gastric tubes are secured in place. His neck is 

supple. Trachea midline. His heart is irregularly irregular. S1, S2, 

tachycardic. His lungs have few scattered rhonchi, crackles in the 

posterior bases. His abdomen is soft. There is trace peripheral edema. 

No clubbing. No cyanosis. Peripheral pulses are intact.  



INVESTIGATIONS: Chest x-ray reveals atelectasis of the left mid lung 

and infiltrate of the left lower lobe. Lab results reveal WBCs 15.5, 

hemoglobin 10.5, platelet count 418,000. Sodium 145, potassium 4.5, 

chloride 111, CO2 of 25, BUN 42, creatinine 0.80. Digoxin level 0.5.  



Medications are reviewed. 



IMPRESSION: 

1. Coronary artery disease, status post coronary artery bypass 

grafting, postoperative day #10.  

2. Acute hypoxic respiratory failure requiring prolonged ventilatory 

dependence, status post bronchoscopy with bronchioalveolar with 

negative cultures.  

3. Hyperlipidemia. 

4. Hypertension. 

5. Atrial fibrillation with a rapid ventricular response. 

6. History of excessive alcohol use with suspected alcohol withdrawal. 



PLAN: The patient was seen and evaluated by Dr. Munoz. His chest 

x-ray, ABGs and labs were reviewed. We did give the patient another a 

daily interruption of sedation. He remains quite weak and not pulling 

good tidal volumes on pressure support and CPAP. There was also no 

cuff leak detected. Will continue with his current vent settings other 

then we will drop from PEEP from 10 down to 5. We will continue to 

give him daily interruption of sedation and weaning trials. Will 

repeat his chest x-ray, ABGs and labs in the a.m. We will continue to 

follow and make further recommendations based on his clinical status. 

Critical care time is 35 minutes.

## 2017-04-24 LAB
ALP SERPL-CCNC: 60 U/L (ref 38–126)
ALT SERPL-CCNC: 100 U/L (ref 21–72)
ANION GAP SERPL CALC-SCNC: 10 MMOL/L
APTT BLD: 50.6 SEC (ref 22–30)
AST SERPL-CCNC: 55 U/L (ref 17–59)
BASOPHILS # BLD AUTO: 0 K/UL (ref 0–0.2)
BASOPHILS NFR BLD AUTO: 0 %
BUN SERPL-SCNC: 40 MG/DL (ref 9–20)
CALCIUM SPEC-MCNC: 8.4 MG/DL (ref 8.4–10.2)
CH: 32.6
CHCM: 33.5
CHLORIDE SERPL-SCNC: 110 MMOL/L (ref 98–107)
CO2 BLDA-SCNC: 22 MMOL/L (ref 19–24)
CO2 BLDA-SCNC: 23 MMOL/L (ref 19–24)
CO2 BLDA-SCNC: 23 MMOL/L (ref 19–24)
CO2 SERPL-SCNC: 24 MMOL/L (ref 22–30)
EOSINOPHIL # BLD AUTO: 0 K/UL (ref 0–0.7)
EOSINOPHIL NFR BLD AUTO: 0 %
ERYTHROCYTE [DISTWIDTH] IN BLOOD BY AUTOMATED COUNT: 3.03 M/UL (ref 4.3–5.9)
ERYTHROCYTE [DISTWIDTH] IN BLOOD: 13.8 % (ref 11.5–15.5)
GLUCOSE BLD-MCNC: 110 MG/DL (ref 75–99)
GLUCOSE BLD-MCNC: 119 MG/DL (ref 75–99)
GLUCOSE BLD-MCNC: 122 MG/DL (ref 75–99)
GLUCOSE BLD-MCNC: 122 MG/DL (ref 75–99)
GLUCOSE BLD-MCNC: 123 MG/DL (ref 75–99)
GLUCOSE BLD-MCNC: 125 MG/DL (ref 75–99)
GLUCOSE BLD-MCNC: 127 MG/DL (ref 75–99)
GLUCOSE BLD-MCNC: 127 MG/DL (ref 75–99)
GLUCOSE BLD-MCNC: 136 MG/DL (ref 75–99)
GLUCOSE BLD-MCNC: 182 MG/DL (ref 75–99)
GLUCOSE BLD-MCNC: 203 MG/DL (ref 75–99)
GLUCOSE BLD-MCNC: 91 MG/DL (ref 75–99)
GLUCOSE SERPL-MCNC: 177 MG/DL (ref 74–99)
HCO3 BLDA-SCNC: 21 MMOL/L (ref 21–25)
HCO3 BLDA-SCNC: 22 MMOL/L (ref 21–25)
HCO3 BLDA-SCNC: 22 MMOL/L (ref 21–25)
HCT VFR BLD AUTO: 29.6 % (ref 39–53)
HDW: 2.55
HGB BLD-MCNC: 10 GM/DL (ref 13–17.5)
INR PPP: 1.1 (ref ?–1.1)
LUC NFR BLD AUTO: 1 %
LYMPHOCYTES # SPEC AUTO: 1.2 K/UL (ref 1–4.8)
LYMPHOCYTES NFR SPEC AUTO: 7 %
MAGNESIUM SPEC-SCNC: 2.6 MG/DL (ref 1.6–2.3)
MCH RBC QN AUTO: 33 PG (ref 25–35)
MCHC RBC AUTO-ENTMCNC: 33.9 G/DL (ref 31–37)
MCV RBC AUTO: 97.5 FL (ref 80–100)
MONOCYTES # BLD AUTO: 0.7 K/UL (ref 0–1)
MONOCYTES NFR BLD AUTO: 4 %
NEUTROPHILS # BLD AUTO: 14.4 K/UL (ref 1.3–7.7)
NEUTROPHILS NFR BLD AUTO: 88 %
NON-AFRICAN AMERICAN GFR(MDRD): >60
PCO2 BLDA: 26 MMHG (ref 35–45)
PCO2 BLDA: 29 MMHG (ref 35–45)
PCO2 BLDA: 36 MMHG (ref 35–45)
PH BLDA: 7.41 [PH] (ref 7.35–7.45)
PH BLDA: 7.49 [PH] (ref 7.35–7.45)
PH BLDA: 7.53 [PH] (ref 7.35–7.45)
PHOSPHATE SERPL-MCNC: 5.3 MG/DL (ref 2.5–4.5)
PO2 BLDA: 101 MMHG (ref 83–108)
PO2 BLDA: 63 MMHG (ref 83–108)
PO2 BLDA: 77 MMHG (ref 83–108)
POTASSIUM SERPL-SCNC: 4 MMOL/L (ref 3.5–5.1)
PROT SERPL-MCNC: 5.3 G/DL (ref 6.3–8.2)
PT BLD: 11.5 SEC (ref 9–12)
SODIUM SERPL-SCNC: 144 MMOL/L (ref 137–145)
WBC # BLD AUTO: 0.14 10*3/UL
WBC # BLD AUTO: 16.5 K/UL (ref 3.8–10.6)
WBC (PEROX): 16.91

## 2017-04-24 RX ADMIN — SODIUM CHLORIDE, PRESERVATIVE FREE SCH: 5 INJECTION INTRAVENOUS at 09:04

## 2017-04-24 RX ADMIN — LEVALBUTEROL HYDROCHLORIDE SCH MG: 1.25 SOLUTION, CONCENTRATE RESPIRATORY (INHALATION) at 15:59

## 2017-04-24 RX ADMIN — LEVALBUTEROL HYDROCHLORIDE SCH MG: 1.25 SOLUTION, CONCENTRATE RESPIRATORY (INHALATION) at 23:21

## 2017-04-24 RX ADMIN — Medication SCH MG: at 09:04

## 2017-04-24 RX ADMIN — CEFAZOLIN SCH MLS/HR: 330 INJECTION, POWDER, FOR SOLUTION INTRAMUSCULAR; INTRAVENOUS at 11:28

## 2017-04-24 RX ADMIN — CHLORHEXIDINE GLUCONATE SCH ML: 1.2 RINSE ORAL at 20:49

## 2017-04-24 RX ADMIN — MORPHINE SULFATE PRN MG: 2 INJECTION, SOLUTION INTRAMUSCULAR; INTRAVENOUS at 00:35

## 2017-04-24 RX ADMIN — MULTIVITAMIN SCH: LIQUID ORAL at 12:35

## 2017-04-24 RX ADMIN — AMIODARONE HYDROCHLORIDE SCH MG: 200 TABLET ORAL at 20:49

## 2017-04-24 RX ADMIN — BUDESONIDE SCH MG: 1 SUSPENSION RESPIRATORY (INHALATION) at 20:06

## 2017-04-24 RX ADMIN — LEVALBUTEROL HYDROCHLORIDE SCH MG: 1.25 SOLUTION, CONCENTRATE RESPIRATORY (INHALATION) at 11:38

## 2017-04-24 RX ADMIN — PROPOFOL SCH MLS/HR: 10 INJECTION, EMULSION INTRAVENOUS at 05:05

## 2017-04-24 RX ADMIN — SODIUM CHLORIDE, PRESERVATIVE FREE SCH ML: 5 INJECTION INTRAVENOUS at 20:50

## 2017-04-24 RX ADMIN — PANTOPRAZOLE SODIUM SCH MG: 40 INJECTION, POWDER, FOR SOLUTION INTRAVENOUS at 09:04

## 2017-04-24 RX ADMIN — IPRATROPIUM BROMIDE SCH MG: 0.5 SOLUTION RESPIRATORY (INHALATION) at 07:51

## 2017-04-24 RX ADMIN — ALBUMIN HUMAN SCH MLS/HR: 0.25 SOLUTION INTRAVENOUS at 10:36

## 2017-04-24 RX ADMIN — LACTULOSE SCH: 20 SOLUTION ORAL at 09:03

## 2017-04-24 RX ADMIN — LORAZEPAM PRN MG: 2 INJECTION, SOLUTION INTRAMUSCULAR; INTRAVENOUS at 00:15

## 2017-04-24 RX ADMIN — LISINOPRIL SCH: 10 TABLET ORAL at 11:21

## 2017-04-24 RX ADMIN — METOPROLOL TARTRATE SCH: 50 TABLET, FILM COATED ORAL at 21:00

## 2017-04-24 RX ADMIN — MORPHINE SULFATE PRN MG: 2 INJECTION, SOLUTION INTRAMUSCULAR; INTRAVENOUS at 19:36

## 2017-04-24 RX ADMIN — PROPOFOL SCH MLS/HR: 10 INJECTION, EMULSION INTRAVENOUS at 06:56

## 2017-04-24 RX ADMIN — ASPIRIN 325 MG ORAL TABLET SCH MG: 325 PILL ORAL at 10:36

## 2017-04-24 RX ADMIN — AMIODARONE HYDROCHLORIDE SCH MG: 200 TABLET ORAL at 09:00

## 2017-04-24 RX ADMIN — LISINOPRIL SCH MG: 10 TABLET ORAL at 20:50

## 2017-04-24 RX ADMIN — IPRATROPIUM BROMIDE SCH MG: 0.5 SOLUTION RESPIRATORY (INHALATION) at 20:06

## 2017-04-24 RX ADMIN — Medication SCH MG: at 20:50

## 2017-04-24 RX ADMIN — CHLORHEXIDINE GLUCONATE SCH ML: 1.2 RINSE ORAL at 09:01

## 2017-04-24 RX ADMIN — METHYLPREDNISOLONE SODIUM SUCCINATE SCH MG: 40 INJECTION, POWDER, FOR SOLUTION INTRAMUSCULAR; INTRAVENOUS at 09:03

## 2017-04-24 RX ADMIN — IPRATROPIUM BROMIDE SCH MG: 0.5 SOLUTION RESPIRATORY (INHALATION) at 11:38

## 2017-04-24 RX ADMIN — ATORVASTATIN CALCIUM SCH MG: 40 TABLET, FILM COATED ORAL at 09:01

## 2017-04-24 RX ADMIN — IPRATROPIUM BROMIDE SCH MG: 0.5 SOLUTION RESPIRATORY (INHALATION) at 15:59

## 2017-04-24 RX ADMIN — LORAZEPAM PRN MG: 2 INJECTION, SOLUTION INTRAMUSCULAR; INTRAVENOUS at 21:48

## 2017-04-24 RX ADMIN — LEVALBUTEROL HYDROCHLORIDE SCH MG: 1.25 SOLUTION, CONCENTRATE RESPIRATORY (INHALATION) at 03:14

## 2017-04-24 RX ADMIN — BUDESONIDE SCH: 1 SUSPENSION RESPIRATORY (INHALATION) at 07:51

## 2017-04-24 RX ADMIN — ALBUMIN HUMAN SCH MLS/HR: 0.25 SOLUTION INTRAVENOUS at 11:25

## 2017-04-24 RX ADMIN — INSULIN HUMAN SCH MLS/HR: 100 INJECTION, SOLUTION PARENTERAL at 21:50

## 2017-04-24 RX ADMIN — DOCUSATE SODIUM AND SENNOSIDES SCH: 50; 8.6 TABLET ORAL at 20:51

## 2017-04-24 RX ADMIN — IPRATROPIUM BROMIDE SCH MG: 0.5 SOLUTION RESPIRATORY (INHALATION) at 03:14

## 2017-04-24 RX ADMIN — DIGOXIN SCH MCG: 0.25 INJECTION INTRAMUSCULAR; INTRAVENOUS at 09:03

## 2017-04-24 RX ADMIN — LEVALBUTEROL HYDROCHLORIDE SCH MG: 1.25 SOLUTION, CONCENTRATE RESPIRATORY (INHALATION) at 20:06

## 2017-04-24 RX ADMIN — SODIUM CHLORIDE SCH MLS/HR: 450 INJECTION, SOLUTION INTRAVENOUS at 09:04

## 2017-04-24 RX ADMIN — LACTULOSE SCH: 20 SOLUTION ORAL at 20:51

## 2017-04-24 RX ADMIN — METOPROLOL TARTRATE SCH MG: 50 TABLET, FILM COATED ORAL at 09:04

## 2017-04-24 RX ADMIN — LORAZEPAM PRN MG: 2 INJECTION, SOLUTION INTRAMUSCULAR; INTRAVENOUS at 18:38

## 2017-04-24 RX ADMIN — LEVALBUTEROL HYDROCHLORIDE SCH MG: 1.25 SOLUTION, CONCENTRATE RESPIRATORY (INHALATION) at 07:51

## 2017-04-24 RX ADMIN — IPRATROPIUM BROMIDE SCH MG: 0.5 SOLUTION RESPIRATORY (INHALATION) at 23:21

## 2017-04-24 NOTE — P.PN
Subjective


Principal diagnosis: 


Status post CABG, respiratory failure, atrial fibrillation





This 60-year-old gentleman with history of prior to coronary bypass surgery has 

been having difficulty with oxygenation.  Patient has been on ventilator 

support.  Patient had atrial fibrillation and flutter with a rapid ventricular 

response.  Patient converted back to sinus rhythm, yesterday.  He is 

maintaining sinus rhythm.  He chest x-ray shows left-sided pleural effusion.  

Patient is sedated.  It appears that patient is following commands and is fully 

awake.  Attempts are being made to see if he can tolerate extubation.  If not 

patient may require tracheostomy in the middle of the week.  Patient may also 

require pleural tap.  The








Objective





- Vital Signs


Vital signs: 


 Vital Signs











Temp  98.5 F   04/24/17 08:00


 


Pulse  57 L  04/24/17 08:09


 


Resp  20   04/24/17 08:00


 


BP  109/73   04/23/17 08:00


 


Pulse Ox  95   04/24/17 08:00








 Intake & Output











 04/23/17 04/24/17 04/24/17





 18:59 06:59 18:59


 


Intake Total 2010.251 1155.504 387.515


 


Output Total 1300 745 


 


Balance 710.251 410.504 387.515


 


Weight 99.9 kg 101 kg 


 


Intake:   


 


  IV 20 240 


 


    0.9 at 20 ml/hr 20 240 


 


  Intake, IV Titration 855.251 227.504 387.515





  Amount   


 


    Amiodarone 450 mg In 249.407  





    Dextrose 5% in Water 250   





    ml @ 1 MG/MIN 34.53 mls/   





    hr IV .Q7H31M [facility] Rx#:   





    [id number]   


 


    Heparin Sodium,Porcine/ 251.415  387.515





    D5w Pmx 25,000 unit In   





    Dextrose/Water 1 500ml.   





    bag @ 10 UNITS/KG/HR 19.   





    98 mls/hr IV .Q24H [facility] Rx   





    #:[id number]   


 


    Insulin Regular 100 unit 42.706 63.873 





    In Sodium Chloride 0.9%   





    100 ml @ Per Protocol IV   





    .Q0M [facility] Rx#:[id number]   


 


    Lactated Ringers 1,000 ml 60  





    @ 20 mls/hr IV .Q24H [facility]   





    Rx#:[id number]   


 


    Propofol 150 ml As IV . 33.9 11.2 





    [facility] Rx#:[id number]   


 


    Propofol 50 ml As IV .STK 11.2  





    [facility] Rx#:[id number]   


 


    Propofol 500 mg In Empty 46.623 132.431 





    Bag 1 bag @ Titrate IV .   





    Q0M [facility] Rx#:[id number]   


 


    Sodium Chloride 0.9% 1, 160 20 





    000 ml @ 20 mls/hr IV .   





    Q24H [facility] Rx#:[id number]   


 


  Tube Feeding 645 688 


 


  Other 490  


 


Output:   


 


  Urine 1300 745 


 


Other:   


 


  Voiding Method Indwelling Catheter Indwelling Catheter 


 


  # Bowel Movements  1 








 ABP, PAP, CO, CI - Last Documented











Arterial Blood Pressure        102/51


 


Pulmonary Artery Pressure      46/23


 


Cardiac Output                 6.8


 


Cardiac Index                  3.3

















- Exam





GENERAL EXAM: Patient is intubated but off sedation and seems to be waking up


HEENT: Normocephalic.


NECK: No masses, no nuchal rigidity.


CHEST: No chest wall deformity.


LUNGS: Breath sounds at bases


HEART: S1 and S2 normal with no audible mumurs or gallops. Regular rhythm, 

femorals equal on both sides..


ABDOMEN: No hepatosplenomegaly, normal bowel sounds, no guarding or rigidity.


SKIN: No rashes


CENTRAL NERVOUS SYSTEM:  Sedated


EXTREMITIES: No cyanosis, clubbing or edema.





- Labs


CBC & Chem 7: 


 04/24/17 04:00





 04/24/17 04:00


Labs: 


 Abnormal Lab Results - Last 24 Hours (Table)











  04/23/17 04/23/17 04/23/17 Range/Units





  09:40 10:12 10:16 


 


WBC     (3.8-10.6)  k/uL


 


RBC     (4.30-5.90)  m/uL


 


Hgb     (13.0-17.5)  gm/dL


 


Hct     (39.0-53.0)  %


 


Neutrophils #     (1.3-7.7)  k/uL


 


APTT   20.6 L   (22.0-30.0)  sec


 


ABG pH  7.46 H    (7.35-7.45)  


 


ABG pCO2  32 L    (35-45)  mmHg


 


ABG pO2     ()  mmHg


 


ABG O2 Saturation  98.1 H    (94-97)  %


 


Chloride     ()  mmol/L


 


BUN     (9-20)  mg/dL


 


Glucose     (74-99)  mg/dL


 


POC Glucose (mg/dL)    150 H  (75-99)  mg/dL


 


Phosphorus     (2.5-4.5)  mg/dL


 


Magnesium     (1.6-2.3)  mg/dL


 


ALT     (21-72)  U/L


 


Total Protein     (6.3-8.2)  g/dL


 


Albumin     (3.5-5.0)  g/dL














  04/23/17 04/23/17 04/23/17 Range/Units





  12:24 13:54 15:50 


 


WBC     (3.8-10.6)  k/uL


 


RBC     (4.30-5.90)  m/uL


 


Hgb     (13.0-17.5)  gm/dL


 


Hct     (39.0-53.0)  %


 


Neutrophils #     (1.3-7.7)  k/uL


 


APTT     (22.0-30.0)  sec


 


ABG pH     (7.35-7.45)  


 


ABG pCO2     (35-45)  mmHg


 


ABG pO2     ()  mmHg


 


ABG O2 Saturation     (94-97)  %


 


Chloride     ()  mmol/L


 


BUN     (9-20)  mg/dL


 


Glucose     (74-99)  mg/dL


 


POC Glucose (mg/dL)  129 H  148 H  145 H  (75-99)  mg/dL


 


Phosphorus     (2.5-4.5)  mg/dL


 


Magnesium     (1.6-2.3)  mg/dL


 


ALT     (21-72)  U/L


 


Total Protein     (6.3-8.2)  g/dL


 


Albumin     (3.5-5.0)  g/dL














  04/23/17 04/23/17 04/23/17 Range/Units





  15:56 15:56 19:54 


 


WBC     (3.8-10.6)  k/uL


 


RBC     (4.30-5.90)  m/uL


 


Hgb     (13.0-17.5)  gm/dL


 


Hct     (39.0-53.0)  %


 


Neutrophils #     (1.3-7.7)  k/uL


 


APTT  41.4 H    (22.0-30.0)  sec


 


ABG pH     (7.35-7.45)  


 


ABG pCO2     (35-45)  mmHg


 


ABG pO2     ()  mmHg


 


ABG O2 Saturation     (94-97)  %


 


Chloride   111 H   ()  mmol/L


 


BUN   42 H   (9-20)  mg/dL


 


Glucose   147 H   (74-99)  mg/dL


 


POC Glucose (mg/dL)    136 H  (75-99)  mg/dL


 


Phosphorus     (2.5-4.5)  mg/dL


 


Magnesium   2.6 H   (1.6-2.3)  mg/dL


 


ALT     (21-72)  U/L


 


Total Protein     (6.3-8.2)  g/dL


 


Albumin     (3.5-5.0)  g/dL














  04/23/17 04/23/17 04/23/17 Range/Units





  22:08 23:00 23:47 


 


WBC     (3.8-10.6)  k/uL


 


RBC     (4.30-5.90)  m/uL


 


Hgb     (13.0-17.5)  gm/dL


 


Hct     (39.0-53.0)  %


 


Neutrophils #     (1.3-7.7)  k/uL


 


APTT   60.7 H   (22.0-30.0)  sec


 


ABG pH     (7.35-7.45)  


 


ABG pCO2     (35-45)  mmHg


 


ABG pO2     ()  mmHg


 


ABG O2 Saturation     (94-97)  %


 


Chloride     ()  mmol/L


 


BUN     (9-20)  mg/dL


 


Glucose     (74-99)  mg/dL


 


POC Glucose (mg/dL)  156 H   141 H  (75-99)  mg/dL


 


Phosphorus     (2.5-4.5)  mg/dL


 


Magnesium     (1.6-2.3)  mg/dL


 


ALT     (21-72)  U/L


 


Total Protein     (6.3-8.2)  g/dL


 


Albumin     (3.5-5.0)  g/dL














  04/24/17 04/24/17 04/24/17 Range/Units





  02:57 04:00 04:00 


 


WBC    16.5 H  (3.8-10.6)  k/uL


 


RBC    3.03 L  (4.30-5.90)  m/uL


 


Hgb    10.0 L  (13.0-17.5)  gm/dL


 


Hct    29.6 L  (39.0-53.0)  %


 


Neutrophils #    14.4 H  (1.3-7.7)  k/uL


 


APTT     (22.0-30.0)  sec


 


ABG pH     (7.35-7.45)  


 


ABG pCO2     (35-45)  mmHg


 


ABG pO2     ()  mmHg


 


ABG O2 Saturation     (94-97)  %


 


Chloride   110 H   ()  mmol/L


 


BUN   40 H   (9-20)  mg/dL


 


Glucose   177 H   (74-99)  mg/dL


 


POC Glucose (mg/dL)  182 H    (75-99)  mg/dL


 


Phosphorus     (2.5-4.5)  mg/dL


 


Magnesium     (1.6-2.3)  mg/dL


 


ALT   100 H   (21-72)  U/L


 


Total Protein   5.3 L   (6.3-8.2)  g/dL


 


Albumin   2.6 L   (3.5-5.0)  g/dL














  04/24/17 04/24/17 04/24/17 Range/Units





  04:00 04:00 04:09 


 


WBC     (3.8-10.6)  k/uL


 


RBC     (4.30-5.90)  m/uL


 


Hgb     (13.0-17.5)  gm/dL


 


Hct     (39.0-53.0)  %


 


Neutrophils #     (1.3-7.7)  k/uL


 


APTT  50.6 H    (22.0-30.0)  sec


 


ABG pH     (7.35-7.45)  


 


ABG pCO2     (35-45)  mmHg


 


ABG pO2     ()  mmHg


 


ABG O2 Saturation     (94-97)  %


 


Chloride     ()  mmol/L


 


BUN     (9-20)  mg/dL


 


Glucose     (74-99)  mg/dL


 


POC Glucose (mg/dL)    203 H  (75-99)  mg/dL


 


Phosphorus   5.3 H   (2.5-4.5)  mg/dL


 


Magnesium   2.6 H   (1.6-2.3)  mg/dL


 


ALT     (21-72)  U/L


 


Total Protein     (6.3-8.2)  g/dL


 


Albumin     (3.5-5.0)  g/dL














  04/24/17 04/24/17 04/24/17 Range/Units





  04:50 06:32 07:56 


 


WBC     (3.8-10.6)  k/uL


 


RBC     (4.30-5.90)  m/uL


 


Hgb     (13.0-17.5)  gm/dL


 


Hct     (39.0-53.0)  %


 


Neutrophils #     (1.3-7.7)  k/uL


 


APTT     (22.0-30.0)  sec


 


ABG pH     (7.35-7.45)  


 


ABG pCO2     (35-45)  mmHg


 


ABG pO2  77 L    ()  mmHg


 


ABG O2 Saturation     (94-97)  %


 


Chloride     ()  mmol/L


 


BUN     (9-20)  mg/dL


 


Glucose     (74-99)  mg/dL


 


POC Glucose (mg/dL)   119 H  122 H  (75-99)  mg/dL


 


Phosphorus     (2.5-4.5)  mg/dL


 


Magnesium     (1.6-2.3)  mg/dL


 


ALT     (21-72)  U/L


 


Total Protein     (6.3-8.2)  g/dL


 


Albumin     (3.5-5.0)  g/dL








 Microbiology - Last 24 Hours (Table)











 04/23/17 17:44 Urine Culture - Preliminary





 Urine,Catheterized 














Assessment and Plan


(1) Status post coronary artery bypass graft


Status: Acute   





(2) Family history of heart disease


Status: Acute   





(3) Hyperlipidemia


Status: Acute   





(4) Hypertension


Status: Acute   





(5) Nicotine dependence, chewing tobacco, uncomplicated


Status: Acute   





(6) Atrial fibrillation


Status: Acute   


Plan: 


We will continue his current management.  Patient may need a pleural tap.  

Possible tracheostomy, if his mental status doesn't improve within next 48 hours

## 2017-04-24 NOTE — XR
EXAMINATION TYPE: XR chest 1V portable

 

DATE OF EXAM: 4/24/2017 7:00 AM

 

CLINICAL HISTORY: Difficulty breathing progress study.  

 

TECHNIQUE: Single AP portable upright view of the chest is obtained.

 

COMPARISON: Chest x-ray from one day earlier

 

FINDINGS:  An endotracheal, orogastric tube, and left-sided subclavian venous catheter are stable in 
appearance. Sternal wires and mediastinal clips are redemonstrated.

 

There are small bilateral pleural effusions redemonstrated. Increasing left lung opacity is now prese
nt. Cardiomegaly is again seen. Osseous structures are intact.

 

IMPRESSION: Cardiomegaly with small bilateral pleural effusions redemonstrated, worsening diffuse lef
t lung edema and/or infiltrates is noted.

## 2017-04-24 NOTE — P.PN
Subjective





61-year-old male patient with status post carotid bypass surgery and the 

patient is postop day #11.  The patient has failed to wean off the mechanical 

ventilator.  Immediately postop the patient had pulmonate complications 

including mucous plugs and atelectasis of the left lung.  He required 

bronchoscopy and therapeutic airway suctioning and all of the respiratory 

cultures came back negative.  This morning, the patient was on a volume cycled 

assist-control mode of ventilation at the rate of 20, tidal volume 450, FiO2 of 

40% and a PEEP of 5.  I reviewed the blood gases and I reviewed also the chest x

-ray.  I noted that the patient was unable to tolerate assist-control mode.  

Attempts to wean him off the sedation [place] as the patient was becoming 

restless and a successful the mechanical ventilator.  Based on this, I switched 

this patient to a pressure support mode of ventilation and I was able to 

completely wean him off sedation.  He was wide awake all he was hardly able to 

wiggle his toes or move his fingers and he was unable to raise his had or arms 

against gravity.  He was having copious amount of rest or secretions and he was 

requiring frequent suctioning.  At a later stage, switch this patient to a 

pressure control mode of ventilation with a PEEP of 7 and a pressure control of 

20 and his FiO2 was At 60%.  He remains hemodynamically stable.  He is 

producing adequate amount of urine output.  No pressors at this point.  No 

fever.  No chills.  Chest tubes have been all removed.  He is tolerating tube 

feeds.  Atelectatic discussion with the cardiothoracic surgeon and will plan to 

proceed with a PEG and trach if the patient ultimately failed weaning.  One 

concern is that he has significant neuromuscular weakness which is probably a 

critical illness polyneuropathy and myopathy.





Objective





- Vital Signs


Vital signs: 


 Vital Signs











Temp  97.9 F   04/24/17 12:00


 


Pulse  73   04/24/17 16:23


 


Resp  20   04/24/17 15:00


 


BP  109/73   04/23/17 08:00


 


Pulse Ox  93 L  04/24/17 16:00








 Intake & Output











 04/23/17 04/24/17 04/24/17





 18:59 06:59 18:59


 


Intake Total 2010.251 1155.504 667.554


 


Output Total 7536 683 6254


 


Balance 710.251 410.504 -392.446


 


Weight 99.9 kg 101 kg 101 kg


 


Intake:   


 


  IV 20 240 160


 


    0.9 at 20 ml/hr 20 240 60


 


    Sodium Chloride 0.9% 1,   100





    000 ml @ 20 mls/hr IV .   





    Q24H [facility] Rx#:[id number]   


 


  Intake, IV Titration 855.251 227.504 421.554





  Amount   


 


    Amiodarone 450 mg In 249.407  





    Dextrose 5% in Water 250   





    ml @ 1 MG/MIN 34.53 mls/   





    hr IV .Q7H31M [facility] Rx#:   





    [id number]   


 


    Heparin Sodium,Porcine/ 251.415  387.515





    D5w Pmx 25,000 unit In   





    Dextrose/Water 1 500ml.   





    bag @ 10 UNITS/KG/HR 19.   





    98 mls/hr IV .Q24H [facility] Rx   





    #:[id number]   


 


    Insulin Regular 100 unit 42.706 63.873 





    In Sodium Chloride 0.9%   





    100 ml @ Per Protocol IV   





    .Q0M [facility] Rx#:[id number]   


 


    Lactated Ringers 1,000 ml 60  





    @ 20 mls/hr IV .Q24H [facility]   





    Rx#:[id number]   


 


    Propofol 150 ml As IV . 33.9 11.2 





    [facility] Rx#:[id number]   


 


    Propofol 50 ml As IV .STK 11.2  





    [facility] Rx#:[id number]   


 


    Propofol 500 mg In Empty 46.623 132.431 34.039





    Bag 1 bag @ Titrate IV .   





    Q0M [facility] Rx#:[id number]   


 


    Sodium Chloride 0.9% 1, 160 20 





    000 ml @ 20 mls/hr IV .   





    Q24H [facility] Rx#:[id number]   


 


  Tube Feeding 645 688 86


 


  Other 490  


 


Output:   


 


  Urine 7237 974 8318


 


Other:   


 


  Voiding Method Indwelling Catheter Indwelling Catheter Indwelling Catheter


 


  # Bowel Movements  1 








 ABP, PAP, CO, CI - Last Documented











Arterial Blood Pressure        163/68


 


Pulmonary Artery Pressure      46/23


 


Cardiac Output                 6.8


 


Cardiac Index                  3.3

















- Exam





Head exam was generally normal. There was no scleral icterus or corneal arcus. 

Mucous membranes were moist.Neck was supple and without jugular venous 

distension, thyromegaly, or carotid bruits. Carotids were easily palpable 

bilaterally. There was no adenopathy.  Lung sounds are diminished in lung bases 

especially in the left lung base.  No wheezes overall currently crackles.  

Sounds are irregular, normal S1-S2, sternal stable clean and intact.  No chest 

tubes are in place.Abdominal exam revealed normal bowel sounds. The abdomen was 

soft, non-tender, and without masses, organomegaly, or appreciable enlargement 

of the abdominal aorta.Examination of the extremities revealed easily palpable 

radial, femoral and pedal pulses. There was no cyanosis, clubbing or edema.  

Neurologically the patient is awake and he can follow some simple commands.  

Nevertheless is very hard for him to move his extremities against gravity.  He 

has a positive cough and gag.  Unable to raise his head of the bed.  Reflexes 

are diminished in all extremities symmetrically.





- Labs


CBC & Chem 7: 


 04/24/17 04:00





 04/24/17 04:00


Labs: 


 Abnormal Lab Results - Last 24 Hours (Table)











  04/23/17 04/23/17 04/23/17 Range/Units





  19:54 22:08 23:00 


 


WBC     (3.8-10.6)  k/uL


 


RBC     (4.30-5.90)  m/uL


 


Hgb     (13.0-17.5)  gm/dL


 


Hct     (39.0-53.0)  %


 


Neutrophils #     (1.3-7.7)  k/uL


 


APTT    60.7 H  (22.0-30.0)  sec


 


ABG pH     (7.35-7.45)  


 


ABG pCO2     (35-45)  mmHg


 


ABG pO2     ()  mmHg


 


ABG O2 Saturation     (94-97)  %


 


Chloride     ()  mmol/L


 


BUN     (9-20)  mg/dL


 


Glucose     (74-99)  mg/dL


 


POC Glucose (mg/dL)  136 H  156 H   (75-99)  mg/dL


 


Phosphorus     (2.5-4.5)  mg/dL


 


Magnesium     (1.6-2.3)  mg/dL


 


ALT     (21-72)  U/L


 


Total Protein     (6.3-8.2)  g/dL


 


Albumin     (3.5-5.0)  g/dL














  04/23/17 04/24/17 04/24/17 Range/Units





  23:47 02:57 04:00 


 


WBC     (3.8-10.6)  k/uL


 


RBC     (4.30-5.90)  m/uL


 


Hgb     (13.0-17.5)  gm/dL


 


Hct     (39.0-53.0)  %


 


Neutrophils #     (1.3-7.7)  k/uL


 


APTT     (22.0-30.0)  sec


 


ABG pH     (7.35-7.45)  


 


ABG pCO2     (35-45)  mmHg


 


ABG pO2     ()  mmHg


 


ABG O2 Saturation     (94-97)  %


 


Chloride    110 H  ()  mmol/L


 


BUN    40 H  (9-20)  mg/dL


 


Glucose    177 H  (74-99)  mg/dL


 


POC Glucose (mg/dL)  141 H  182 H   (75-99)  mg/dL


 


Phosphorus     (2.5-4.5)  mg/dL


 


Magnesium     (1.6-2.3)  mg/dL


 


ALT    100 H  (21-72)  U/L


 


Total Protein    5.3 L  (6.3-8.2)  g/dL


 


Albumin    2.6 L  (3.5-5.0)  g/dL














  04/24/17 04/24/17 04/24/17 Range/Units





  04:00 04:00 04:00 


 


WBC  16.5 H    (3.8-10.6)  k/uL


 


RBC  3.03 L    (4.30-5.90)  m/uL


 


Hgb  10.0 L    (13.0-17.5)  gm/dL


 


Hct  29.6 L    (39.0-53.0)  %


 


Neutrophils #  14.4 H    (1.3-7.7)  k/uL


 


APTT   50.6 H   (22.0-30.0)  sec


 


ABG pH     (7.35-7.45)  


 


ABG pCO2     (35-45)  mmHg


 


ABG pO2     ()  mmHg


 


ABG O2 Saturation     (94-97)  %


 


Chloride     ()  mmol/L


 


BUN     (9-20)  mg/dL


 


Glucose     (74-99)  mg/dL


 


POC Glucose (mg/dL)     (75-99)  mg/dL


 


Phosphorus    5.3 H  (2.5-4.5)  mg/dL


 


Magnesium    2.6 H  (1.6-2.3)  mg/dL


 


ALT     (21-72)  U/L


 


Total Protein     (6.3-8.2)  g/dL


 


Albumin     (3.5-5.0)  g/dL














  04/24/17 04/24/17 04/24/17 Range/Units





  04:09 04:50 06:32 


 


WBC     (3.8-10.6)  k/uL


 


RBC     (4.30-5.90)  m/uL


 


Hgb     (13.0-17.5)  gm/dL


 


Hct     (39.0-53.0)  %


 


Neutrophils #     (1.3-7.7)  k/uL


 


APTT     (22.0-30.0)  sec


 


ABG pH     (7.35-7.45)  


 


ABG pCO2     (35-45)  mmHg


 


ABG pO2   77 L   ()  mmHg


 


ABG O2 Saturation     (94-97)  %


 


Chloride     ()  mmol/L


 


BUN     (9-20)  mg/dL


 


Glucose     (74-99)  mg/dL


 


POC Glucose (mg/dL)  203 H   119 H  (75-99)  mg/dL


 


Phosphorus     (2.5-4.5)  mg/dL


 


Magnesium     (1.6-2.3)  mg/dL


 


ALT     (21-72)  U/L


 


Total Protein     (6.3-8.2)  g/dL


 


Albumin     (3.5-5.0)  g/dL














  04/24/17 04/24/17 04/24/17 Range/Units





  07:56 09:48 11:49 


 


WBC     (3.8-10.6)  k/uL


 


RBC     (4.30-5.90)  m/uL


 


Hgb     (13.0-17.5)  gm/dL


 


Hct     (39.0-53.0)  %


 


Neutrophils #     (1.3-7.7)  k/uL


 


APTT     (22.0-30.0)  sec


 


ABG pH     (7.35-7.45)  


 


ABG pCO2     (35-45)  mmHg


 


ABG pO2     ()  mmHg


 


ABG O2 Saturation     (94-97)  %


 


Chloride     ()  mmol/L


 


BUN     (9-20)  mg/dL


 


Glucose     (74-99)  mg/dL


 


POC Glucose (mg/dL)  122 H  136 H  127 H  (75-99)  mg/dL


 


Phosphorus     (2.5-4.5)  mg/dL


 


Magnesium     (1.6-2.3)  mg/dL


 


ALT     (21-72)  U/L


 


Total Protein     (6.3-8.2)  g/dL


 


Albumin     (3.5-5.0)  g/dL














  04/24/17 04/24/17 04/24/17 Range/Units





  12:19 14:12 15:35 


 


WBC     (3.8-10.6)  k/uL


 


RBC     (4.30-5.90)  m/uL


 


Hgb     (13.0-17.5)  gm/dL


 


Hct     (39.0-53.0)  %


 


Neutrophils #     (1.3-7.7)  k/uL


 


APTT     (22.0-30.0)  sec


 


ABG pH  7.53 H   7.49 H  (7.35-7.45)  


 


ABG pCO2  26 L   29 L  (35-45)  mmHg


 


ABG pO2  63 L    ()  mmHg


 


ABG O2 Saturation    98.0 H  (94-97)  %


 


Chloride     ()  mmol/L


 


BUN     (9-20)  mg/dL


 


Glucose     (74-99)  mg/dL


 


POC Glucose (mg/dL)   122 H   (75-99)  mg/dL


 


Phosphorus     (2.5-4.5)  mg/dL


 


Magnesium     (1.6-2.3)  mg/dL


 


ALT     (21-72)  U/L


 


Total Protein     (6.3-8.2)  g/dL


 


Albumin     (3.5-5.0)  g/dL














  04/24/17 Range/Units





  16:10 


 


WBC   (3.8-10.6)  k/uL


 


RBC   (4.30-5.90)  m/uL


 


Hgb   (13.0-17.5)  gm/dL


 


Hct   (39.0-53.0)  %


 


Neutrophils #   (1.3-7.7)  k/uL


 


APTT   (22.0-30.0)  sec


 


ABG pH   (7.35-7.45)  


 


ABG pCO2   (35-45)  mmHg


 


ABG pO2   ()  mmHg


 


ABG O2 Saturation   (94-97)  %


 


Chloride   ()  mmol/L


 


BUN   (9-20)  mg/dL


 


Glucose   (74-99)  mg/dL


 


POC Glucose (mg/dL)  127 H  (75-99)  mg/dL


 


Phosphorus   (2.5-4.5)  mg/dL


 


Magnesium   (1.6-2.3)  mg/dL


 


ALT   (21-72)  U/L


 


Total Protein   (6.3-8.2)  g/dL


 


Albumin   (3.5-5.0)  g/dL








 Microbiology - Last 24 Hours (Table)











 04/24/17 04:40 Sputum Culture - Preliminary





 Sputum 


 


 04/13/17 16:40 Fungal Culture - Preliminary





 Lung - Right    Yeast species


 


 04/23/17 17:44 Urine Culture - Preliminary





 Urine,Catheterized 














Assessment and Plan


Plan: 





Assessment





1 Coronary artery disease status post carotid bypass surgery and the patient is 

postop day #11





2 prolonged postoperative ventilator-dependent history failure, multifactorial.





3 recurrent mucous plugs with excessive respiratory secretions requiring 

bronchoscopy and a peak airway suctioning and a bronchoscopy cultures came back 

negative for any microbial growth





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 

weakness





5 hypertension





6 hyperlipidemia





7 atrial fibrillation, paroxysmal currently on normal sinus rhythm, currently 

on a heparin drip and the patient's rhythm is sinus with a controlled rate





8 enteral feeding for nutritional support





9 postoperative anemia, currently hemoglobin is stable





10 alcoholism, recovered from DT





11 gout





12 diabetes mellitus, currently on an insulin drip at 2 units an hour.





Plan





My plan is to keep this patient off sedation for the longest period of time and 

assess his motor function and since the patient was handed any further weaning 

without having to resort to tracheostomy tube insertion and PEG tube insertion.

  If this measures failed, alternative weaning process needs to be considered 

including a PEG and trach.  I discussed this with Dr. Hipolito Angel from 

cardiothoracic surgery.  For now, we'll give the patient off sedation pedal 

check periodic weaning parameters.  We'll try to keep him on a pressure control 

mode of ventilation with seems to be much more comfortable for this patient as 

the patient has become much more synchronous to this mode of ventilation.  Will 

do daily chest x-rays.  We'll avoid Diprivan.  We'll diuresis patient gently 

with IV Lasix 20 mg every 12 hours.  We'll continue to follow make further 

recommendations based on his progress.  Is a critically care evaluation was 

done in 40 minutes.

## 2017-04-24 NOTE — P.PN
Progress Note - Text


[facility] Surgery Nursing 


POD: #11, off-pump double coronary artery bypass graft using in situ 

skeletonized right internal mammary artery to left anterior descending artery 

in situ totally skeletonized left internal mammary artery to the first obtuse 

marginal artery, intraoperative transesophageal echocardiogram and epi-aortic 

ultrasonography, intraoperative graft flow measurements using the Medistim 

system


Patient is sedated on the ventilator, no distress noted. 


Vital Signs: Afebrile, T-max 98.7F 


 Vital Signs - 24 hr











  04/23/17 04/23/17 04/23/17





  10:00 11:00 11:35


 


Temperature  98.7 F 


 


Pulse Rate 98 92 93


 


Respiratory 20 27 H 





Rate   


 


O2 Sat by Pulse 95 94 L 





Oximetry   














  04/23/17 04/23/17 04/23/17





  11:56 12:00 13:00


 


Temperature   


 


Pulse Rate 74 82 86


 


Respiratory  23 22





Rate   


 


O2 Sat by Pulse  92 L 92 L





Oximetry   














  04/23/17 04/23/17 04/23/17





  14:00 15:00 15:52


 


Temperature   


 


Pulse Rate 55 L 61 59 L


 


Respiratory 20 20 





Rate   


 


O2 Sat by Pulse 91 L 91 L 





Oximetry   














  04/23/17 04/23/17 04/23/17





  16:00 16:07 17:00


 


Temperature 98.4 F  


 


Pulse Rate 60 60 62


 


Respiratory 27 H  27 H





Rate   


 


O2 Sat by Pulse 93 L  93 L





Oximetry   














  04/23/17 04/23/17 04/23/17





  18:00 19:00 19:45


 


Temperature   


 


Pulse Rate 58 L 58 L 56 L


 


Respiratory 22 17 





Rate   


 


O2 Sat by Pulse 93 L 94 L 





Oximetry   














  04/23/17 04/23/17 04/23/17





  20:00 21:00 22:00


 


Temperature 97.9 F  


 


Pulse Rate 57 L 54 L 52 L


 


Respiratory 20 21 20





Rate   


 


O2 Sat by Pulse 93 L 95 91 L





Oximetry   














  04/23/17 04/23/17 04/24/17





  23:00 23:36 00:00


 


Temperature   98.5 F


 


Pulse Rate 58 L 62 60


 


Respiratory 20  20





Rate   


 


O2 Sat by Pulse 92 L  94 L





Oximetry   














  04/24/17 04/24/17 04/24/17





  00:05 01:00 02:00


 


Temperature   


 


Pulse Rate 60 55 L 59 L


 


Respiratory  20 20





Rate   


 


O2 Sat by Pulse  93 L 93 L





Oximetry   














  04/24/17 04/24/17 04/24/17





  03:00 03:18 03:42


 


Temperature   


 


Pulse Rate 61 55 L 63


 


Respiratory 26 H  





Rate   


 


O2 Sat by Pulse 93 L  





Oximetry   














  04/24/17 04/24/17 04/24/17





  04:00 05:00 06:00


 


Temperature 98.2 F  


 


Pulse Rate 66 57 L 55 L


 


Respiratory 30 H 20 20





Rate   


 


O2 Sat by Pulse 97 95 96





Oximetry   














  04/24/17 04/24/17 04/24/17





  07:00 07:51 08:00


 


Temperature   98.5 F


 


Pulse Rate 56 L 55 L 57 L


 


Respiratory 20  20





Rate   


 


O2 Sat by Pulse 96  95





Oximetry   














  04/24/17





  08:09


 


Temperature 


 


Pulse Rate 57 L


 


Respiratory 





Rate 


 


O2 Sat by Pulse 





Oximetry 








 ABP, PAP, CO, CI - Last 8 Hours











Arterial Blood Pressure        102/51


 


Arterial Blood Pressure        108/55


 


Arterial Blood Pressure        122/56


 


Arterial Blood Pressure        121/56


 


Arterial Blood Pressure        127/59


 


Arterial Blood Pressure        138/56


 


Arterial Blood Pressure        125/50














Labs: 


 Short CBC











  04/24/17 Range/Units





  04:00 


 


WBC  16.5 H  (3.8-10.6)  k/uL


 


Hgb  10.0 L  (13.0-17.5)  gm/dL


 


Hct  29.6 L  (39.0-53.0)  %


 


Plt Count  392  (150-450)  k/uL


 


Neutrophils #  14.4 H  (1.3-7.7)  k/uL








 BMP











  04/23/17 04/24/17





  15:56 04:00


 


Sodium  143  144


 


Potassium  4.2  4.0


 


Chloride  111 H  110 H


 


Carbon Dioxide  24  24


 


BUN  42 H  40 H


 


Creatinine  0.73  0.80


 


Glucose  147 H  177 H


 


Calcium  8.4  8.4








 Liver Function











  04/24/17 Range/Units





  04:00 


 


Total Bilirubin  0.5  (0.2-1.3)  mg/dL


 


AST  55  (17-59)  U/L


 


ALT  100 H  (21-72)  U/L


 


Alkaline Phosphatase  60  ()  U/L


 


Albumin  2.6 L  (3.5-5.0)  g/dL











IV Fluids: Propofol drip at 30 mics


Insulin drip at 4 units per hour


Heparin drip at 10 units per kilogram per hour


Lungs: Breath sounds diminished bilateral bases


O2 sat: 96% on ventilator with an FiO2 of 60% and PEEP of 5 


Heart: S1S2, regular rate and rhythm bedside telemetry shows a sinus 

bradycardia with a rate of 56


Sternum stable, chest incision clean with silverlon dressing clean and dry. 


Abdomen: Soft, Positive bowel sounds present in all 4 quadrants. 


CBGs: 119-203 mg/dL


U/O: Angelo catheter to dependent drainage drained 745 mL the last 12 hours


 Intake & Output











 04/22/17 04/23/17 04/24/17 04/25/17





 06:59 06:59 06:59 06:59


 


Intake Total 2622.293 1506.276 3165.755 387.515


 


Output Total 2290 1910 2045 


 


Balance -859.674 483.909 7069.755 387.515


 


Weight 99.3 kg 99.9 kg 101 kg 








Active Medications





Hydrocodone Bitart/Acetaminophen (Norco 5-325)  2 each PO Q4HR PRN


   PRN Reason: Severe Pain


   Last Admin: 04/23/17 00:04 Dose:  2 each


Hydrocodone Bitart/Acetaminophen (Norco 5-325)  1 each PO Q4HR PRN


   PRN Reason: Moderate Pain


Amiodarone HCl (Cordarone)  200 mg PO BID [facility]


   Last Admin: 04/24/17 09:00 Dose:  200 mg


Aspirin (Aspirin)  325 mg PO DAILY [facility]


   Last Admin: 04/23/17 09:13 Dose:  325 mg


Atorvastatin Calcium (Lipitor)  40 mg PO DAILY [facility]


   Last Admin: 04/24/17 09:01 Dose:  40 mg


Benzocaine (Hurricaine Spray)  1 applic MUCOUS MEM QID PRN


   PRN Reason: Mouth Irritation


   Last Admin: 04/17/17 11:36 Dose:  1 applic


Benzocaine/Menthol (Cepacol Lozenge)  1 each MUCOUS MEM Q2H PRN


   PRN Reason: Sore Throat


Bisacodyl (Dulcolax)  10 mg RECTAL DAILY PRN


   PRN Reason: Constipation


   Last Admin: 04/19/17 17:18 Dose:  10 mg


Budesonide (Pulmicort)  1 mg INHALATION RT-BID [facility]


   Last Admin: 04/24/17 07:51 Dose:  Not Given


Chlorhexidine Gluconate (Peridex)  15 ml MUCOUS MEM BID [facility]


   Last Admin: 04/24/17 09:01 Dose:  15 ml


Digoxin (Lanoxin)  250 mcg IVP DAILY [facility]


   Last Admin: 04/24/17 09:03 Dose:  250 mcg


Heparin Sodium (Porcine) (Heparin)  0 unit IV PER PROTOCOL PRN; Protocol


   PRN Reason: Low PTT


   Last Admin: 04/23/17 16:59 Dose:  2,497 unit


Insulin Human Regular 100 unit (/ Sodium Chloride)  101 mls @ 0 mls/hr IV .Q0M 

[facility]; Per Protocol


   PRN Reason: Protocol


   Last Titration: 04/24/17 06:00 Dose:  4 units/hr, 4.04 mls/hr


Propofol 500 mg/ IV Solution  50 mls @ 0 mls/hr IV .Q0M [facility]; Titrate


   PRN Reason: Protocol


   Last Admin: 04/24/17 06:56 Dose:  30 mcg/kg/min, 17.53 mls/hr


Heparin Sodium/Dextrose 25,000 (unit/ IV Solution)  500 mls @ 19.98 mls/hr IV 

.Q24H [facility]; 10 UNITS/KG/HR


   PRN Reason: Protocol


   Last Admin: 04/24/17 09:04 Dose:  12 units/kg/hr, 23.97 mls/hr


Sodium Chloride (Saline 0.9%)  1,000 mls @ 20 mls/hr IV .Q24H [facility]


   Last Admin: 04/23/17 11:20 Dose:  20 mls/hr


Ipratropium Bromide (Atrovent Nebulized)  0.5 mg INHALATION RT-Q4H [facility]


   Last Admin: 04/24/17 07:51 Dose:  0.5 mg


Ipratropium Bromide (Atrovent Nebulized)  0.5 mg INHALATION RT-Q2H PRN


   PRN Reason: Shortness Of Breath Or Wheezing


Iron/Minerals/Multivitamins (Theragran Liquid)  15 ml PO DAILY@1200 [facility]


   Last Admin: 04/23/17 11:25 Dose:  15 ml


Lactulose (Cephulac)  30 gm PO BID [facility]


   Last Admin: 04/24/17 09:03 Dose:  Not Given


Levalbuterol HCl (Xopenex Nebulized (Conc))  1.25 mg INHALATION RT-Q4H [facility]


   Last Admin: 04/24/17 07:51 Dose:  1.25 mg


Levalbuterol HCl (Xopenex Nebulized (Conc))  1.25 mg INHALATION RT-Q2H PRN


   PRN Reason: Shortness Of Breath


Lisinopril (Zestril)  10 mg PO BID [facility]


   Last Admin: 04/23/17 20:09 Dose:  Not Given


Lorazepam (Ativan)  1 mg IV Q2HR PRN


   PRN Reason: CIWA 8 or 9


   Last Admin: 04/24/17 00:15 Dose:  1 mg


Lorazepam (Ativan)  1 mg IV Q1HR PRN


   PRN Reason: CIWA 10 to 15


   Last Admin: 04/18/17 03:51 Dose:  1 mg


Lorazepam (Ativan)  2 mg IV Q1HR PRN


   PRN Reason: CIWA 16 or higher


   Last Admin: 04/23/17 02:21 Dose:  2 mg


Magnesium Hydroxide (Milk Of Magnesia)  2,400 mg PO BID PRN


   PRN Reason: Constipation


Methylprednisolone Sodium Succinate (Solu-Medrol)  40 mg IV Q12HR [facility]


   Last Admin: 04/24/17 09:03 Dose:  40 mg


Metoclopramide HCl (Reglan)  10 mg IVP Q4H PRN


   PRN Reason: Nausea And Vomiting


Metoprolol Tartrate (Lopressor)  50 mg PO TID [facility]


   Last Admin: 04/24/17 09:04 Dose:  50 mg


Miscellaneous Information (Magnesium Per Protocol)  1 each MISCELLANE DAILY PRN

; Protocol


   PRN Reason: Per Protocol


Miscellaneous Information (Phosphorus Per Protocol)  1 each MISCELLANE DAILY PRN

; Protocol


   PRN Reason: Per Protocol


Miscellaneous Information (Potassium Per Protocol)  1 each MISCELLANE DAILY PRN

; Protocol


   PRN Reason: Per Protocol


Morphine Sulfate (Morphine Sulfate (Inj))  2 mg IVP Q2H PRN


   PRN Reason: Severe Pain


   Last Admin: 04/24/17 00:35 Dose:  2 mg


Ondansetron HCl (Zofran)  4 mg IVP Q6HR PRN


   PRN Reason: Nausea And Vomiting


Pantoprazole Sodium (Protonix)  40 mg IVP DAILY [facility]


   Last Admin: 04/24/17 09:04 Dose:  40 mg


Senna/Docusate Sodium (Senokot-S)  2 each PO HS [facility]


   Last Admin: 04/23/17 20:07 Dose:  2 each


Sodium Chloride (Saline Flush)  10 ml IV BID [facility]


   Last Admin: 04/24/17 09:04 Dose:  Not Given


Sodium Chloride (Saline Flush)  20 ml IV Q4HR PRN


   PRN Reason: PICC Line


Sodium Chloride (Saline Flush)  10 ml IV WEEKLY [facility]


Sodium Chloride (Saline Flush)  10 ml IV Q4HR PRN


   PRN Reason: PICC Line


Thiamine HCl (Vitamin B-1)  100 mg PO BID [facility]


   Last Admin: 04/24/17 09:04 Dose:  100 mg





Plan: Continue vent wean as tolerated per pulmonary


Slow progress

## 2017-04-24 NOTE — P.PN
Subjective





Date of service 04/24/2017.





Progress note being dictated for Dr. Hess.











Interval history: This is a 61-year-old gentleman status post CABG, status post 

bronchoscopy related to significant mucus plugging, failure to wean,  DTs and 

multiple other medical issues.  Chest x-ray reporting small bilateral pleural 

effusions, worsening diffuse left edema and/or infiltrates.  FiO2 weaned down 

to 50%/+5 of PEEP.  Now on pressure support, Sedation currently on hold, wife 

at bedside, patient currently tolerating, calm.  Telemetry sinus rhythm.  

Receiving albumin followed by Lasix.














Objective





- Vital Signs


Vital signs: 


 Vital Signs











Temp  97.9 F   04/24/17 12:00


 


Pulse  72   04/24/17 16:03


 


Resp  20   04/24/17 15:00


 


BP  109/73   04/23/17 08:00


 


Pulse Ox  93 L  04/24/17 15:00








 Intake & Output











 04/23/17 04/24/17 04/24/17





 18:59 06:59 18:59


 


Intake Total 2010.251 1155.504 667.554


 


Output Total 3085 485 0681


 


Balance 710.251 410.504 -392.446


 


Weight 99.9 kg 101 kg 101 kg


 


Intake:   


 


  IV 20 240 160


 


    0.9 at 20 ml/hr 20 240 60


 


    Sodium Chloride 0.9% 1,   100





    000 ml @ 20 mls/hr IV .   





    Q24H [facility] Rx#:[id number]   


 


  Intake, IV Titration 855.251 227.504 421.554





  Amount   


 


    Amiodarone 450 mg In 249.407  





    Dextrose 5% in Water 250   





    ml @ 1 MG/MIN 34.53 mls/   





    hr IV .Q7H31M [facility] Rx#:   





    [id number]   


 


    Heparin Sodium,Porcine/ 251.415  387.515





    D5w Pmx 25,000 unit In   





    Dextrose/Water 1 500ml.   





    bag @ 10 UNITS/KG/HR 19.   





    98 mls/hr IV .Q24H [facility] Rx   





    #:[id number]   


 


    Insulin Regular 100 unit 42.706 63.873 





    In Sodium Chloride 0.9%   





    100 ml @ Per Protocol IV   





    .Q0M [facility] Rx#:[id number]   


 


    Lactated Ringers 1,000 ml 60  





    @ 20 mls/hr IV .Q24H [facility]   





    Rx#:[id number]   


 


    Propofol 150 ml As IV . 33.9 11.2 





    STK-MED [facility] Rx#:[id number]   


 


    Propofol 50 ml As IV .STK 11.2  





    -MED [facility] Rx#:[id number]   


 


    Propofol 500 mg In Empty 46.623 132.431 34.039





    Bag 1 bag @ Titrate IV .   





    Q0M [facility] Rx#:[id number]   


 


    Sodium Chloride 0.9% 1, 160 20 





    000 ml @ 20 mls/hr IV .   





    Q24H [facility] Rx#:[id number]   


 


  Tube Feeding 645 688 86


 


  Other 490  


 


Output:   


 


  Urine 2856 278 2640


 


Other:   


 


  Voiding Method Indwelling Catheter Indwelling Catheter 


 


  # Bowel Movements  1 








 ABP, PAP, CO, CI - Last Documented











Arterial Blood Pressure        163/68


 


Pulmonary Artery Pressure      46/23


 


Cardiac Output                 6.8


 


Cardiac Index                  3.3

















- Exam


PHYSICAL EXAM:


VITAL SIGNS: As above


GENERAL: Intubated, sedation recently turned off, eyes open, alert, calm, 

attempting to follow commands


HEENT: [Pupils equal, conjunctiva normal.  Mucosa moist.  Endotracheal tube 

present. ]


NECK: [Supple, no JVD]


RESPIRATORY EFFORT:[Normal]


LUNGS:  [Diminished throughout, fine bibasilar crackles, scattered rhonchi 


CARDIOVASCULAR[regular S1 and S2, occasional mild bradycardia, no murmurs rubs 

or gallops, mild edema]


GI: [Abdomen soft, nontender, positive bowel sounds.]





 








 


 








 





 Microbiology





04/24/17 04:40   Sputum   Sputum Culture - Preliminary


04/13/17 16:40   Lung - Right   Fungal Culture - Preliminary


                            Yeast species


04/23/17 17:44   Urine,Catheterized   Urine Culture - Preliminary


04/13/17 16:40   Lung Aspirate - Right   Gram Stain - Final


04/13/17 16:40   Lung Aspirate - Right   Bronchial Washings Culture - Final


04/13/17 16:40   Lung - Right   Acid Fast Bacilli Smear - Final


04/13/17 16:40   Lung - Right   Acid Fast Bacilli Culture - Preliminary

















- Labs


CBC & Chem 7: 


 04/24/17 04:00





 04/24/17 04:00


Labs: 


 Abnormal Lab Results - Last 24 Hours (Table)











  04/23/17 04/23/17 04/23/17 Range/Units





  15:56 15:56 19:54 


 


WBC     (3.8-10.6)  k/uL


 


RBC     (4.30-5.90)  m/uL


 


Hgb     (13.0-17.5)  gm/dL


 


Hct     (39.0-53.0)  %


 


Neutrophils #     (1.3-7.7)  k/uL


 


APTT  41.4 H    (22.0-30.0)  sec


 


ABG pH     (7.35-7.45)  


 


ABG pCO2     (35-45)  mmHg


 


ABG pO2     ()  mmHg


 


ABG O2 Saturation     (94-97)  %


 


Chloride   111 H   ()  mmol/L


 


BUN   42 H   (9-20)  mg/dL


 


Glucose   147 H   (74-99)  mg/dL


 


POC Glucose (mg/dL)    136 H  (75-99)  mg/dL


 


Phosphorus     (2.5-4.5)  mg/dL


 


Magnesium   2.6 H   (1.6-2.3)  mg/dL


 


ALT     (21-72)  U/L


 


Total Protein     (6.3-8.2)  g/dL


 


Albumin     (3.5-5.0)  g/dL














  04/23/17 04/23/17 04/23/17 Range/Units





  22:08 23:00 23:47 


 


WBC     (3.8-10.6)  k/uL


 


RBC     (4.30-5.90)  m/uL


 


Hgb     (13.0-17.5)  gm/dL


 


Hct     (39.0-53.0)  %


 


Neutrophils #     (1.3-7.7)  k/uL


 


APTT   60.7 H   (22.0-30.0)  sec


 


ABG pH     (7.35-7.45)  


 


ABG pCO2     (35-45)  mmHg


 


ABG pO2     ()  mmHg


 


ABG O2 Saturation     (94-97)  %


 


Chloride     ()  mmol/L


 


BUN     (9-20)  mg/dL


 


Glucose     (74-99)  mg/dL


 


POC Glucose (mg/dL)  156 H   141 H  (75-99)  mg/dL


 


Phosphorus     (2.5-4.5)  mg/dL


 


Magnesium     (1.6-2.3)  mg/dL


 


ALT     (21-72)  U/L


 


Total Protein     (6.3-8.2)  g/dL


 


Albumin     (3.5-5.0)  g/dL














  04/24/17 04/24/17 04/24/17 Range/Units





  02:57 04:00 04:00 


 


WBC    16.5 H  (3.8-10.6)  k/uL


 


RBC    3.03 L  (4.30-5.90)  m/uL


 


Hgb    10.0 L  (13.0-17.5)  gm/dL


 


Hct    29.6 L  (39.0-53.0)  %


 


Neutrophils #    14.4 H  (1.3-7.7)  k/uL


 


APTT     (22.0-30.0)  sec


 


ABG pH     (7.35-7.45)  


 


ABG pCO2     (35-45)  mmHg


 


ABG pO2     ()  mmHg


 


ABG O2 Saturation     (94-97)  %


 


Chloride   110 H   ()  mmol/L


 


BUN   40 H   (9-20)  mg/dL


 


Glucose   177 H   (74-99)  mg/dL


 


POC Glucose (mg/dL)  182 H    (75-99)  mg/dL


 


Phosphorus     (2.5-4.5)  mg/dL


 


Magnesium     (1.6-2.3)  mg/dL


 


ALT   100 H   (21-72)  U/L


 


Total Protein   5.3 L   (6.3-8.2)  g/dL


 


Albumin   2.6 L   (3.5-5.0)  g/dL














  04/24/17 04/24/17 04/24/17 Range/Units





  04:00 04:00 04:09 


 


WBC     (3.8-10.6)  k/uL


 


RBC     (4.30-5.90)  m/uL


 


Hgb     (13.0-17.5)  gm/dL


 


Hct     (39.0-53.0)  %


 


Neutrophils #     (1.3-7.7)  k/uL


 


APTT  50.6 H    (22.0-30.0)  sec


 


ABG pH     (7.35-7.45)  


 


ABG pCO2     (35-45)  mmHg


 


ABG pO2     ()  mmHg


 


ABG O2 Saturation     (94-97)  %


 


Chloride     ()  mmol/L


 


BUN     (9-20)  mg/dL


 


Glucose     (74-99)  mg/dL


 


POC Glucose (mg/dL)    203 H  (75-99)  mg/dL


 


Phosphorus   5.3 H   (2.5-4.5)  mg/dL


 


Magnesium   2.6 H   (1.6-2.3)  mg/dL


 


ALT     (21-72)  U/L


 


Total Protein     (6.3-8.2)  g/dL


 


Albumin     (3.5-5.0)  g/dL














  04/24/17 04/24/17 04/24/17 Range/Units





  04:50 06:32 07:56 


 


WBC     (3.8-10.6)  k/uL


 


RBC     (4.30-5.90)  m/uL


 


Hgb     (13.0-17.5)  gm/dL


 


Hct     (39.0-53.0)  %


 


Neutrophils #     (1.3-7.7)  k/uL


 


APTT     (22.0-30.0)  sec


 


ABG pH     (7.35-7.45)  


 


ABG pCO2     (35-45)  mmHg


 


ABG pO2  77 L    ()  mmHg


 


ABG O2 Saturation     (94-97)  %


 


Chloride     ()  mmol/L


 


BUN     (9-20)  mg/dL


 


Glucose     (74-99)  mg/dL


 


POC Glucose (mg/dL)   119 H  122 H  (75-99)  mg/dL


 


Phosphorus     (2.5-4.5)  mg/dL


 


Magnesium     (1.6-2.3)  mg/dL


 


ALT     (21-72)  U/L


 


Total Protein     (6.3-8.2)  g/dL


 


Albumin     (3.5-5.0)  g/dL














  04/24/17 04/24/17 04/24/17 Range/Units





  09:48 11:49 12:19 


 


WBC     (3.8-10.6)  k/uL


 


RBC     (4.30-5.90)  m/uL


 


Hgb     (13.0-17.5)  gm/dL


 


Hct     (39.0-53.0)  %


 


Neutrophils #     (1.3-7.7)  k/uL


 


APTT     (22.0-30.0)  sec


 


ABG pH    7.53 H  (7.35-7.45)  


 


ABG pCO2    26 L  (35-45)  mmHg


 


ABG pO2    63 L  ()  mmHg


 


ABG O2 Saturation     (94-97)  %


 


Chloride     ()  mmol/L


 


BUN     (9-20)  mg/dL


 


Glucose     (74-99)  mg/dL


 


POC Glucose (mg/dL)  136 H  127 H   (75-99)  mg/dL


 


Phosphorus     (2.5-4.5)  mg/dL


 


Magnesium     (1.6-2.3)  mg/dL


 


ALT     (21-72)  U/L


 


Total Protein     (6.3-8.2)  g/dL


 


Albumin     (3.5-5.0)  g/dL














  04/24/17 04/24/17 Range/Units





  14:12 15:35 


 


WBC    (3.8-10.6)  k/uL


 


RBC    (4.30-5.90)  m/uL


 


Hgb    (13.0-17.5)  gm/dL


 


Hct    (39.0-53.0)  %


 


Neutrophils #    (1.3-7.7)  k/uL


 


APTT    (22.0-30.0)  sec


 


ABG pH   7.49 H  (7.35-7.45)  


 


ABG pCO2   29 L  (35-45)  mmHg


 


ABG pO2    ()  mmHg


 


ABG O2 Saturation   98.0 H  (94-97)  %


 


Chloride    ()  mmol/L


 


BUN    (9-20)  mg/dL


 


Glucose    (74-99)  mg/dL


 


POC Glucose (mg/dL)  122 H   (75-99)  mg/dL


 


Phosphorus    (2.5-4.5)  mg/dL


 


Magnesium    (1.6-2.3)  mg/dL


 


ALT    (21-72)  U/L


 


Total Protein    (6.3-8.2)  g/dL


 


Albumin    (3.5-5.0)  g/dL








 Microbiology - Last 24 Hours (Table)











 04/24/17 04:40 Sputum Culture - Preliminary





 Sputum 


 


 04/13/17 16:40 Fungal Culture - Preliminary





 Lung - Right    Yeast species


 


 04/23/17 17:44 Urine Culture - Preliminary





 Urine,Catheterized 














Assessment and Plan


Plan: 


1.  CAD, [Status post CABG].


2. [Acute hypoxic respiratory failure secondary to the above].


3. [Acute Exacerbation COPD].


4. DTs, on CIWA protocol].


5.  Right upper and lower lobe collapse secondary to mucus plugging status post 

bronchoscopy].


6. [Hypertension].


7. [Ongoing Nicotine dependence, chewing tobacco


8.  Bibasilar atelectasis


9.  Left pleural effusion


10.  A. fib with RVR

















Plan: Continue on current medication regime , amiodarone, IV steroids, 

nebulized bronchodilators , insulin drip monitoring and symptomatic treatment.  

Sedation currently off, potential vent weaning as per pulmonary, otherwise 

tracheostomy and PEG tube being discussed.  Maintain CIWA protocol.  GI and DVT 

prophylaxis in place.  Follow cultures closely, afebrile, elevated WBC, though 

patient on steroids. Further recommendations to follow.








The impression and plan of care has been dictated as directed.





:


I performed a H&P examination of this patient and discussed the same with the 

dictator.  I agree with the dictator's note.  Any additional findings/opinions/

etc. will be noted.

## 2017-04-24 NOTE — PN
DATE OF SERVICE: 04/24/2017



This 61 -year-old gentleman was admitted with coronary artery disease, 

coronary artery bypass grafting is still on mechanical ventilation. 

The patient is on pressor support and ventilation.  Seen and evaluated 

the patient along with nurse practitioner.  Please refer to the nurse 

practitioner notes and impression documented as a scribe for further 

information.     



Further recommendations to follow.

## 2017-04-25 LAB
ALP SERPL-CCNC: 53 U/L (ref 38–126)
ALT SERPL-CCNC: 84 U/L (ref 21–72)
ANION GAP SERPL CALC-SCNC: 9 MMOL/L
APTT BLD: 55.4 SEC (ref 22–30)
AST SERPL-CCNC: 37 U/L (ref 17–59)
BASOPHILS # BLD AUTO: 0.1 K/UL (ref 0–0.2)
BASOPHILS NFR BLD AUTO: 0 %
BUN SERPL-SCNC: 34 MG/DL (ref 9–20)
CALCIUM SPEC-MCNC: 8.3 MG/DL (ref 8.4–10.2)
CH: 32.9
CHCM: 33.8
CHLORIDE SERPL-SCNC: 109 MMOL/L (ref 98–107)
CO2 BLDA-SCNC: 23 MMOL/L (ref 19–24)
CO2 SERPL-SCNC: 23 MMOL/L (ref 22–30)
DIGOXIN SERPL-MCNC: 0.7 NG/ML
EOSINOPHIL # BLD AUTO: 0.1 K/UL (ref 0–0.7)
EOSINOPHIL NFR BLD AUTO: 0 %
ERYTHROCYTE [DISTWIDTH] IN BLOOD BY AUTOMATED COUNT: 2.88 M/UL (ref 4.3–5.9)
ERYTHROCYTE [DISTWIDTH] IN BLOOD: 13.9 % (ref 11.5–15.5)
GLUCOSE BLD-MCNC: 101 MG/DL (ref 75–99)
GLUCOSE BLD-MCNC: 107 MG/DL (ref 75–99)
GLUCOSE BLD-MCNC: 110 MG/DL (ref 75–99)
GLUCOSE BLD-MCNC: 110 MG/DL (ref 75–99)
GLUCOSE BLD-MCNC: 124 MG/DL (ref 75–99)
GLUCOSE BLD-MCNC: 130 MG/DL (ref 75–99)
GLUCOSE BLD-MCNC: 135 MG/DL (ref 75–99)
GLUCOSE BLD-MCNC: 137 MG/DL (ref 75–99)
GLUCOSE BLD-MCNC: 150 MG/DL (ref 75–99)
GLUCOSE BLD-MCNC: 84 MG/DL (ref 75–99)
GLUCOSE BLD-MCNC: 91 MG/DL (ref 75–99)
GLUCOSE SERPL-MCNC: 107 MG/DL (ref 74–99)
HCO3 BLDA-SCNC: 22 MMOL/L (ref 21–25)
HCT VFR BLD AUTO: 28.2 % (ref 39–53)
HDW: 2.56
HEMOGLOBIN A1C: 6.3 % (ref 4.2–6.1)
HGB BLD-MCNC: 9.5 GM/DL (ref 13–17.5)
INR PPP: 1.2 (ref ?–1.1)
LUC NFR BLD AUTO: 2 %
LYMPHOCYTES # SPEC AUTO: 3 K/UL (ref 1–4.8)
LYMPHOCYTES NFR SPEC AUTO: 19 %
MAGNESIUM SPEC-SCNC: 2.3 MG/DL (ref 1.6–2.3)
MCH RBC QN AUTO: 33.1 PG (ref 25–35)
MCHC RBC AUTO-ENTMCNC: 33.9 G/DL (ref 31–37)
MCV RBC AUTO: 97.7 FL (ref 80–100)
MONOCYTES # BLD AUTO: 0.9 K/UL (ref 0–1)
MONOCYTES NFR BLD AUTO: 6 %
NEUTROPHILS # BLD AUTO: 11.8 K/UL (ref 1.3–7.7)
NEUTROPHILS NFR BLD AUTO: 74 %
NON-AFRICAN AMERICAN GFR(MDRD): >60
PCO2 BLDA: 27 MMHG (ref 35–45)
PH BLDA: 7.52 [PH] (ref 7.35–7.45)
PHOSPHATE SERPL-MCNC: 3.6 MG/DL (ref 2.5–4.5)
PO2 BLDA: 88 MMHG (ref 83–108)
POTASSIUM SERPL-SCNC: 3.5 MMOL/L (ref 3.5–5.1)
PROT SERPL-MCNC: 5.4 G/DL (ref 6.3–8.2)
PT BLD: 12 SEC (ref 9–12)
SODIUM SERPL-SCNC: 141 MMOL/L (ref 137–145)
WBC # BLD AUTO: 0.24 10*3/UL
WBC # BLD AUTO: 16 K/UL (ref 3.8–10.6)
WBC (PEROX): 15.95

## 2017-04-25 RX ADMIN — POTASSIUM CHLORIDE SCH MEQ: 1.5 SOLUTION ORAL at 10:21

## 2017-04-25 RX ADMIN — IPRATROPIUM BROMIDE SCH MG: 0.5 SOLUTION RESPIRATORY (INHALATION) at 15:34

## 2017-04-25 RX ADMIN — METOPROLOL TARTRATE SCH MG: 50 TABLET, FILM COATED ORAL at 20:04

## 2017-04-25 RX ADMIN — BUDESONIDE SCH MG: 1 SUSPENSION RESPIRATORY (INHALATION) at 07:14

## 2017-04-25 RX ADMIN — CHLORHEXIDINE GLUCONATE SCH ML: 1.2 RINSE ORAL at 08:51

## 2017-04-25 RX ADMIN — Medication SCH MG: at 08:53

## 2017-04-25 RX ADMIN — LEVALBUTEROL HYDROCHLORIDE SCH MG: 1.25 SOLUTION, CONCENTRATE RESPIRATORY (INHALATION) at 03:11

## 2017-04-25 RX ADMIN — LORAZEPAM PRN MG: 2 INJECTION, SOLUTION INTRAMUSCULAR; INTRAVENOUS at 00:57

## 2017-04-25 RX ADMIN — PROPOFOL SCH MLS/HR: 10 INJECTION, EMULSION INTRAVENOUS at 12:14

## 2017-04-25 RX ADMIN — LACTULOSE SCH: 20 SOLUTION ORAL at 08:52

## 2017-04-25 RX ADMIN — Medication SCH MG: at 20:04

## 2017-04-25 RX ADMIN — SODIUM CHLORIDE, PRESERVATIVE FREE SCH ML: 5 INJECTION INTRAVENOUS at 20:05

## 2017-04-25 RX ADMIN — IPRATROPIUM BROMIDE SCH MG: 0.5 SOLUTION RESPIRATORY (INHALATION) at 03:11

## 2017-04-25 RX ADMIN — DOCUSATE SODIUM AND SENNOSIDES SCH EACH: 50; 8.6 TABLET ORAL at 20:03

## 2017-04-25 RX ADMIN — LEVALBUTEROL HYDROCHLORIDE SCH MG: 1.25 SOLUTION, CONCENTRATE RESPIRATORY (INHALATION) at 19:09

## 2017-04-25 RX ADMIN — IPRATROPIUM BROMIDE SCH MG: 0.5 SOLUTION RESPIRATORY (INHALATION) at 11:30

## 2017-04-25 RX ADMIN — LEVALBUTEROL HYDROCHLORIDE SCH MG: 1.25 SOLUTION, CONCENTRATE RESPIRATORY (INHALATION) at 07:14

## 2017-04-25 RX ADMIN — ATORVASTATIN CALCIUM SCH MG: 40 TABLET, FILM COATED ORAL at 08:51

## 2017-04-25 RX ADMIN — LEVALBUTEROL HYDROCHLORIDE SCH MG: 1.25 SOLUTION, CONCENTRATE RESPIRATORY (INHALATION) at 11:30

## 2017-04-25 RX ADMIN — INSULIN LISPRO SCH UNIT: 100 INJECTION, SOLUTION INTRAVENOUS; SUBCUTANEOUS at 17:43

## 2017-04-25 RX ADMIN — AMIODARONE HYDROCHLORIDE SCH MG: 200 TABLET ORAL at 20:03

## 2017-04-25 RX ADMIN — CEFAZOLIN SCH MLS/HR: 330 INJECTION, POWDER, FOR SOLUTION INTRAMUSCULAR; INTRAVENOUS at 11:58

## 2017-04-25 RX ADMIN — SODIUM CHLORIDE SCH MLS/HR: 450 INJECTION, SOLUTION INTRAVENOUS at 08:50

## 2017-04-25 RX ADMIN — IPRATROPIUM BROMIDE SCH MG: 0.5 SOLUTION RESPIRATORY (INHALATION) at 07:14

## 2017-04-25 RX ADMIN — AMIODARONE HYDROCHLORIDE SCH MG: 200 TABLET ORAL at 08:50

## 2017-04-25 RX ADMIN — METOPROLOL TARTRATE SCH MG: 50 TABLET, FILM COATED ORAL at 08:52

## 2017-04-25 RX ADMIN — BUDESONIDE SCH MG: 1 SUSPENSION RESPIRATORY (INHALATION) at 19:09

## 2017-04-25 RX ADMIN — LISINOPRIL SCH MG: 10 TABLET ORAL at 20:04

## 2017-04-25 RX ADMIN — PANTOPRAZOLE SODIUM SCH MG: 40 INJECTION, POWDER, FOR SOLUTION INTRAVENOUS at 08:52

## 2017-04-25 RX ADMIN — MULTIVITAMIN SCH ML: LIQUID ORAL at 11:58

## 2017-04-25 RX ADMIN — LISINOPRIL SCH: 10 TABLET ORAL at 10:21

## 2017-04-25 RX ADMIN — DIGOXIN SCH MCG: 0.25 INJECTION INTRAMUSCULAR; INTRAVENOUS at 08:51

## 2017-04-25 RX ADMIN — PROPOFOL SCH MLS/HR: 10 INJECTION, EMULSION INTRAVENOUS at 15:46

## 2017-04-25 RX ADMIN — POTASSIUM CHLORIDE SCH MEQ: 1.5 SOLUTION ORAL at 08:49

## 2017-04-25 RX ADMIN — CHLORHEXIDINE GLUCONATE SCH ML: 1.2 RINSE ORAL at 20:03

## 2017-04-25 RX ADMIN — LEVALBUTEROL HYDROCHLORIDE SCH MG: 1.25 SOLUTION, CONCENTRATE RESPIRATORY (INHALATION) at 15:34

## 2017-04-25 RX ADMIN — IPRATROPIUM BROMIDE SCH MG: 0.5 SOLUTION RESPIRATORY (INHALATION) at 19:08

## 2017-04-25 RX ADMIN — ASPIRIN 325 MG ORAL TABLET SCH MG: 325 PILL ORAL at 08:50

## 2017-04-25 RX ADMIN — LEVALBUTEROL HYDROCHLORIDE SCH MG: 1.25 SOLUTION, CONCENTRATE RESPIRATORY (INHALATION) at 23:28

## 2017-04-25 RX ADMIN — LACTULOSE SCH: 20 SOLUTION ORAL at 20:04

## 2017-04-25 RX ADMIN — IPRATROPIUM BROMIDE SCH MG: 0.5 SOLUTION RESPIRATORY (INHALATION) at 23:28

## 2017-04-25 RX ADMIN — PROPOFOL SCH MLS/HR: 10 INJECTION, EMULSION INTRAVENOUS at 21:34

## 2017-04-25 RX ADMIN — INSULIN LISPRO SCH UNIT: 100 INJECTION, SOLUTION INTRAVENOUS; SUBCUTANEOUS at 20:04

## 2017-04-25 RX ADMIN — SODIUM CHLORIDE, PRESERVATIVE FREE SCH: 5 INJECTION INTRAVENOUS at 08:52

## 2017-04-25 RX ADMIN — LEVOFLOXACIN SCH MLS/HR: 750 INJECTION, SOLUTION INTRAVENOUS at 17:43

## 2017-04-25 NOTE — P.PN
Subjective


Principal diagnosis: 


Status post CABG, respiratory failure, atrial fibrillation and inability to 

extubate because of poor oxygenation





This 60-year-old gentleman with history of prior to coronary bypass surgery has 

been having difficulty with oxygenation.  Patient has been on ventilator 

support.  Patient had atrial fibrillation and flutter with a rapid ventricular 

response.  Patient converted back to sinus rhythm, yesterday.  He is 

maintaining sinus rhythm.  He chest x-ray shows left-sided pleural effusion.  

Patient is sedated.  It appears that patient is following commands and is fully 

awake.  Attempts are being made to see if he can tolerate extubation.  If not 

patient may require tracheostomy in the middle of the week.





Patient is reexamined on 25th of April.  Patient is still intubated.  Patient 

apparently has generalized weakness and unable cough or take deep breaths.  

Patient is being taken off the sedation and another trial of CPAP will be 

tried.  If patient fails, may need tracheostomy.  Patient went back into 

flutter with rapid ventricular response.  His blood pressure is low.  He is 

going to be taken off the sedation.  Once her blood pressure is more stable, 

will give IV Lopressor to control the heart rate.  He is on IV amiodarone and 

also digoxin.  Patient is on anticoagulation in the form of heparin.








Objective





- Vital Signs


Vital signs: 


 Vital Signs











Temp  99.0 F   04/25/17 08:00


 


Pulse  119 H  04/25/17 10:00


 


Resp  21   04/25/17 10:00


 


BP  109/73   04/23/17 08:00


 


Pulse Ox  96   04/25/17 10:00








 Intake & Output











 04/24/17 04/25/17 04/25/17





 18:59 06:59 18:59


 


Intake Total 739.635 792.753 359


 


Output Total 1560 910 215


 


Balance -820.365 -117.247 144


 


Weight 101 kg 100.5 kg 


 


Intake:   


 


   240 80


 


    0.9 at 20 ml/hr 60  


 


    Sodium Chloride 0.9% 1, 160 240 80





    000 ml @ 20 mls/hr IV .   





    Q24H [facility] Rx#:[id number]   


 


  Intake, IV Titration 433.635 509.753 0





  Amount   


 


    Heparin Sodium,Porcine/ 387.515 500 





    D5w Pmx 25,000 unit In   





    Dextrose/Water 1 500ml.   





    bag @ 10 UNITS/KG/HR 19.   





    98 mls/hr IV .Q24H [facility] Rx   





    #:[id number]   


 


    Insulin Regular 100 unit 12.081 9.753 





    In Sodium Chloride 0.9%   





    100 ml @ Per Protocol IV   





    .Q0M [facility] Rx#:[id number]   


 


    Propofol 500 mg In Empty 34.039  0





    Bag 1 bag @ Titrate IV .   





    Q0M [facility] Rx#:[id number]   


 


  Tube Feeding 86 43 129


 


  Other   150


 


Output:   


 


  Urine 1560 910 215


 


Other:   


 


  Voiding Method Indwelling Catheter Indwelling Catheter 








 ABP, PAP, CO, CI - Last Documented











Arterial Blood Pressure        84/52


 


Pulmonary Artery Pressure      46/23


 


Cardiac Output                 6.8


 


Cardiac Index                  3.3

















- Exam





GENERAL EXAM: Patient is sleepy but arousable


HEENT: Normocephalic.


NECK: No masses, no nuchal rigidity.


CHEST: No chest wall deformity.


LUNGS: Breath sounds at bases


HEART: S1 and S2 normal with no audible mumurs or gallops. Regular rhythm, 

femorals equal on both sides..


ABDOMEN: No hepatosplenomegaly, normal bowel sounds, no guarding or rigidity.


SKIN: No rashes


CENTRAL NERVOUS SYSTEM:  Sedated


EXTREMITIES: No cyanosis, clubbing or edema.





- Labs


CBC & Chem 7: 


 04/25/17 04:30





 04/25/17 04:30


Labs: 


 Abnormal Lab Results - Last 24 Hours (Table)











  04/24/17 04/24/17 04/24/17 Range/Units





  11:49 12:19 14:12 


 


WBC     (3.8-10.6)  k/uL


 


RBC     (4.30-5.90)  m/uL


 


Hgb     (13.0-17.5)  gm/dL


 


Hct     (39.0-53.0)  %


 


Neutrophils #     (1.3-7.7)  k/uL


 


APTT     (22.0-30.0)  sec


 


ABG pH   7.53 H   (7.35-7.45)  


 


ABG pCO2   26 L   (35-45)  mmHg


 


ABG pO2   63 L   ()  mmHg


 


ABG O2 Saturation     (94-97)  %


 


Chloride     ()  mmol/L


 


BUN     (9-20)  mg/dL


 


Glucose     (74-99)  mg/dL


 


POC Glucose (mg/dL)  127 H   122 H  (75-99)  mg/dL


 


Calcium     (8.4-10.2)  mg/dL


 


ALT     (21-72)  U/L


 


Total Protein     (6.3-8.2)  g/dL


 


Albumin     (3.5-5.0)  g/dL














  04/24/17 04/24/17 04/24/17 Range/Units





  15:35 16:10 18:22 


 


WBC     (3.8-10.6)  k/uL


 


RBC     (4.30-5.90)  m/uL


 


Hgb     (13.0-17.5)  gm/dL


 


Hct     (39.0-53.0)  %


 


Neutrophils #     (1.3-7.7)  k/uL


 


APTT     (22.0-30.0)  sec


 


ABG pH  7.49 H    (7.35-7.45)  


 


ABG pCO2  29 L    (35-45)  mmHg


 


ABG pO2     ()  mmHg


 


ABG O2 Saturation  98.0 H    (94-97)  %


 


Chloride     ()  mmol/L


 


BUN     (9-20)  mg/dL


 


Glucose     (74-99)  mg/dL


 


POC Glucose (mg/dL)   127 H  123 H  (75-99)  mg/dL


 


Calcium     (8.4-10.2)  mg/dL


 


ALT     (21-72)  U/L


 


Total Protein     (6.3-8.2)  g/dL


 


Albumin     (3.5-5.0)  g/dL














  04/24/17 04/24/17 04/25/17 Range/Units





  19:47 22:17 00:05 


 


WBC     (3.8-10.6)  k/uL


 


RBC     (4.30-5.90)  m/uL


 


Hgb     (13.0-17.5)  gm/dL


 


Hct     (39.0-53.0)  %


 


Neutrophils #     (1.3-7.7)  k/uL


 


APTT     (22.0-30.0)  sec


 


ABG pH     (7.35-7.45)  


 


ABG pCO2     (35-45)  mmHg


 


ABG pO2     ()  mmHg


 


ABG O2 Saturation     (94-97)  %


 


Chloride     ()  mmol/L


 


BUN     (9-20)  mg/dL


 


Glucose     (74-99)  mg/dL


 


POC Glucose (mg/dL)  125 H  110 H  130 H  (75-99)  mg/dL


 


Calcium     (8.4-10.2)  mg/dL


 


ALT     (21-72)  U/L


 


Total Protein     (6.3-8.2)  g/dL


 


Albumin     (3.5-5.0)  g/dL














  04/25/17 04/25/17 04/25/17 Range/Units





  04:30 04:30 04:30 


 


WBC   16.0 H   (3.8-10.6)  k/uL


 


RBC   2.88 L   (4.30-5.90)  m/uL


 


Hgb   9.5 L   (13.0-17.5)  gm/dL


 


Hct   28.2 L   (39.0-53.0)  %


 


Neutrophils #   11.8 H   (1.3-7.7)  k/uL


 


APTT    55.4 H  (22.0-30.0)  sec


 


ABG pH     (7.35-7.45)  


 


ABG pCO2     (35-45)  mmHg


 


ABG pO2     ()  mmHg


 


ABG O2 Saturation     (94-97)  %


 


Chloride  109 H    ()  mmol/L


 


BUN  34 H    (9-20)  mg/dL


 


Glucose  107 H    (74-99)  mg/dL


 


POC Glucose (mg/dL)     (75-99)  mg/dL


 


Calcium  8.3 L    (8.4-10.2)  mg/dL


 


ALT  84 H    (21-72)  U/L


 


Total Protein  5.4 L    (6.3-8.2)  g/dL


 


Albumin  2.7 L    (3.5-5.0)  g/dL














  04/25/17 04/25/17 04/25/17 Range/Units





  04:30 05:01 07:13 


 


WBC     (3.8-10.6)  k/uL


 


RBC     (4.30-5.90)  m/uL


 


Hgb     (13.0-17.5)  gm/dL


 


Hct     (39.0-53.0)  %


 


Neutrophils #     (1.3-7.7)  k/uL


 


APTT     (22.0-30.0)  sec


 


ABG pH   7.52 H   (7.35-7.45)  


 


ABG pCO2   27 L   (35-45)  mmHg


 


ABG pO2     ()  mmHg


 


ABG O2 Saturation   98.0 H   (94-97)  %


 


Chloride     ()  mmol/L


 


BUN     (9-20)  mg/dL


 


Glucose     (74-99)  mg/dL


 


POC Glucose (mg/dL)  110 H   110 H  (75-99)  mg/dL


 


Calcium     (8.4-10.2)  mg/dL


 


ALT     (21-72)  U/L


 


Total Protein     (6.3-8.2)  g/dL


 


Albumin     (3.5-5.0)  g/dL














  04/25/17 04/25/17 Range/Units





  08:08 10:03 


 


WBC    (3.8-10.6)  k/uL


 


RBC    (4.30-5.90)  m/uL


 


Hgb    (13.0-17.5)  gm/dL


 


Hct    (39.0-53.0)  %


 


Neutrophils #    (1.3-7.7)  k/uL


 


APTT    (22.0-30.0)  sec


 


ABG pH    (7.35-7.45)  


 


ABG pCO2    (35-45)  mmHg


 


ABG pO2    ()  mmHg


 


ABG O2 Saturation    (94-97)  %


 


Chloride    ()  mmol/L


 


BUN    (9-20)  mg/dL


 


Glucose    (74-99)  mg/dL


 


POC Glucose (mg/dL)  101 H  107 H  (75-99)  mg/dL


 


Calcium    (8.4-10.2)  mg/dL


 


ALT    (21-72)  U/L


 


Total Protein    (6.3-8.2)  g/dL


 


Albumin    (3.5-5.0)  g/dL








 Microbiology - Last 24 Hours (Table)











 04/24/17 02:00 Blood Culture - Preliminary





 Blood    No Growth after 24 hours


 


 04/24/17 04:40 Gram Stain - Preliminary





 Sputum Sputum Culture - Preliminary


 


 04/23/17 17:44 Urine Culture - Preliminary





 Urine,Catheterized    Gram Neg Bacilli


 


 04/13/17 16:40 Fungal Culture - Preliminary





 Lung - Right    Yeast species














Assessment and Plan


(1) Status post coronary artery bypass graft


Status: Acute   





(2) Family history of heart disease


Status: Acute   





(3) Hyperlipidemia


Status: Acute   





(4) Hypertension


Status: Acute   





(5) Nicotine dependence, chewing tobacco, uncomplicated


Status: Acute   





(6) Atrial fibrillation


Status: Acute   


Plan: 


Patient is still intubated.  May require tracheostomy if fails to extubate him 

failed within next 24 hours.  Patient also went back into atrial flutter with 

rapid ventricular response.  We will try to give IV Lopressor along with the 

digoxin and amiodarone.  Prognosis is guarded

## 2017-04-25 NOTE — P.PN
Subjective





61-year-old male patient with status post carotid bypass surgery and the 

patient is postop day #11.  The patient has failed to wean off the mechanical 

ventilator.  Immediately postop the patient had pulmonate complications 

including mucous plugs and atelectasis of the left lung.  He required 

bronchoscopy and therapeutic airway suctioning and all of the respiratory 

cultures came back negative.  This morning, the patient was on a volume cycled 

assist-control mode of ventilation at the rate of 20, tidal volume 450, FiO2 of 

40% and a PEEP of 5.  I reviewed the blood gases and I reviewed also the chest x

-ray.  I noted that the patient was unable to tolerate assist-control mode.  

Attempts to wean him off the sedation [employer] as the patient was becoming 

restless and a successful the mechanical ventilator.  Based on this, I switched 

this patient to a pressure support mode of ventilation and I was able to 

completely wean him off sedation.  He was wide awake all he was hardly able to 

wiggle his toes or move his fingers and he was unable to raise his had or arms 

against gravity.  He was having copious amount of rest or secretions and he was 

requiring frequent suctioning.  At a later stage, switch this patient to a 

pressure control mode of ventilation with a PEEP of 7 and a pressure control of 

20 and his FiO2 was At 60%.  He remains hemodynamically stable.  He is 

producing adequate amount of urine output.  No pressors at this point.  No 

fever.  No chills.  Chest tubes have been all removed.  He is tolerating tube 

feeds.  Atelectatic discussion with the cardiothoracic surgeon and will plan to 

proceed with a PEG and trach if the patient ultimately failed weaning.  One 

concern is that he has significant neuromuscular weakness which is probably a 

critical illness polyneuropathy and myopathy.





On 04/25/2017 the patient remains intubated on a mechanical ventilator.  I was 

able to the sedation completely on the patient.  He is awake at this point and 

he had been awake throughout the night.  He is following simple commands.  

Motor functions are nearly absent.  The patient is extremely weak.  He can 

wiggle his toes and occasionally bend his knees.  Otherwise he cannot raise his 

arms and legs against gravity.  He has a cough reflex.  He can blink his eyes 

and raises eyebrows.  Reflexes are significantly diminished in the upper and 

lower extremities bilaterally.  Is on a pressure control mode of ventilation at 

the rate of 18, PC 18, PEEP of 7 and FiO2 of 50%.  Still having significant 

respiratory secretions through the orotracheal tube.  The chest x-ray from 

today shows moderate parenchymal opacity within the left lung base and the 

right lung base.  There is some atelectatic changes in the lung bases more so 

on the left.  The patient has also postop changes related to coronary artery 

bypass surgery.  No fever.  No chills.  Hemodynamically stable.  Producing 

adequate amount of urine output and the patient was diuresis with a combination 

of albumin and Lasix.  He is on tube feeds.





Objective





- Vital Signs


Vital signs: 


 Vital Signs











Temp  98.9 F   04/25/17 04:00


 


Pulse  69   04/25/17 07:33


 


Resp  20   04/25/17 07:00


 


BP  109/73   04/23/17 08:00


 


Pulse Ox  92 L  04/25/17 07:00








 Intake & Output











 04/24/17 04/25/17 04/25/17





 18:59 06:59 18:59


 


Intake Total 739.635 292.753 20


 


Output Total 1560 910 60


 


Balance -820.365 -617.247 -40


 


Weight 101 kg 100.5 kg 


 


Intake:   


 


   240 20


 


    0.9 at 20 ml/hr 60  


 


    Sodium Chloride 0.9% 1, 160 240 20





    000 ml @ 20 mls/hr IV .   





    Q24H [facility] Rx#:[id number]   


 


  Intake, IV Titration 433.635 9.753 





  Amount   


 


    Heparin Sodium,Porcine/ 387.515  





    D5w Pmx 25,000 unit In   





    Dextrose/Water 1 500ml.   





    bag @ 10 UNITS/KG/HR 19.   





    98 mls/hr IV .Q24H [facility] Rx   





    #:[id number]   


 


    Insulin Regular 100 unit 12.081 9.753 





    In Sodium Chloride 0.9%   





    100 ml @ Per Protocol IV   





    .Q0M [facility] Rx#:[id number]   


 


    Propofol 500 mg In Empty 34.039  





    Bag 1 bag @ Titrate IV .   





    Q0M [facility] Rx#:[id number]   


 


  Tube Feeding 86 43 


 


Output:   


 


  Urine 1560 910 60


 


Other:   


 


  Voiding Method Indwelling Catheter Indwelling Catheter 








 ABP, PAP, CO, CI - Last Documented











Arterial Blood Pressure        112/48


 


Pulmonary Artery Pressure      46/23


 


Cardiac Output                 6.8


 


Cardiac Index                  3.3

















- Exam





Head exam was generally normal. There was no scleral icterus or corneal arcus. 

Mucous membranes were moist.Neck was supple and without jugular venous 

distension, thyromegaly, or carotid bruits. Carotids were easily palpable 

bilaterally. There was no adenopathy.  Lung sounds are diminished in lung bases 

especially in the left lung base.  No wheezes overall currently crackles.  

Sounds are irregular, normal S1-S2, sternal stable clean and intact.  No chest 

tubes are in place.Abdominal exam revealed normal bowel sounds. The abdomen was 

soft, non-tender, and without masses, organomegaly, or appreciable enlargement 

of the abdominal aorta.Examination of the extremities revealed easily palpable 

radial, femoral and pedal pulses. There was no cyanosis, clubbing or edema.  

Neurologically the patient is awake and he can follow some simple commands.  

Nevertheless is very hard for him to move his extremities against gravity.  He 

has a positive cough and gag.  Unable to raise his head of the bed.  Reflexes 

are diminished , nearly absent, in all extremities symmetrically.





- Labs


CBC & Chem 7: 


 04/25/17 04:30





 04/25/17 04:30


Labs: 


 Abnormal Lab Results - Last 24 Hours (Table)











  04/24/17 04/24/17 04/24/17 Range/Units





  09:48 11:49 12:19 


 


WBC     (3.8-10.6)  k/uL


 


RBC     (4.30-5.90)  m/uL


 


Hgb     (13.0-17.5)  gm/dL


 


Hct     (39.0-53.0)  %


 


Neutrophils #     (1.3-7.7)  k/uL


 


APTT     (22.0-30.0)  sec


 


ABG pH    7.53 H  (7.35-7.45)  


 


ABG pCO2    26 L  (35-45)  mmHg


 


ABG pO2    63 L  ()  mmHg


 


ABG O2 Saturation     (94-97)  %


 


Chloride     ()  mmol/L


 


BUN     (9-20)  mg/dL


 


Glucose     (74-99)  mg/dL


 


POC Glucose (mg/dL)  136 H  127 H   (75-99)  mg/dL


 


Calcium     (8.4-10.2)  mg/dL


 


ALT     (21-72)  U/L


 


Total Protein     (6.3-8.2)  g/dL


 


Albumin     (3.5-5.0)  g/dL














  04/24/17 04/24/17 04/24/17 Range/Units





  14:12 15:35 16:10 


 


WBC     (3.8-10.6)  k/uL


 


RBC     (4.30-5.90)  m/uL


 


Hgb     (13.0-17.5)  gm/dL


 


Hct     (39.0-53.0)  %


 


Neutrophils #     (1.3-7.7)  k/uL


 


APTT     (22.0-30.0)  sec


 


ABG pH   7.49 H   (7.35-7.45)  


 


ABG pCO2   29 L   (35-45)  mmHg


 


ABG pO2     ()  mmHg


 


ABG O2 Saturation   98.0 H   (94-97)  %


 


Chloride     ()  mmol/L


 


BUN     (9-20)  mg/dL


 


Glucose     (74-99)  mg/dL


 


POC Glucose (mg/dL)  122 H   127 H  (75-99)  mg/dL


 


Calcium     (8.4-10.2)  mg/dL


 


ALT     (21-72)  U/L


 


Total Protein     (6.3-8.2)  g/dL


 


Albumin     (3.5-5.0)  g/dL














  04/24/17 04/24/17 04/24/17 Range/Units





  18:22 19:47 22:17 


 


WBC     (3.8-10.6)  k/uL


 


RBC     (4.30-5.90)  m/uL


 


Hgb     (13.0-17.5)  gm/dL


 


Hct     (39.0-53.0)  %


 


Neutrophils #     (1.3-7.7)  k/uL


 


APTT     (22.0-30.0)  sec


 


ABG pH     (7.35-7.45)  


 


ABG pCO2     (35-45)  mmHg


 


ABG pO2     ()  mmHg


 


ABG O2 Saturation     (94-97)  %


 


Chloride     ()  mmol/L


 


BUN     (9-20)  mg/dL


 


Glucose     (74-99)  mg/dL


 


POC Glucose (mg/dL)  123 H  125 H  110 H  (75-99)  mg/dL


 


Calcium     (8.4-10.2)  mg/dL


 


ALT     (21-72)  U/L


 


Total Protein     (6.3-8.2)  g/dL


 


Albumin     (3.5-5.0)  g/dL














  04/25/17 04/25/17 04/25/17 Range/Units





  00:05 04:30 04:30 


 


WBC    16.0 H  (3.8-10.6)  k/uL


 


RBC    2.88 L  (4.30-5.90)  m/uL


 


Hgb    9.5 L  (13.0-17.5)  gm/dL


 


Hct    28.2 L  (39.0-53.0)  %


 


Neutrophils #    11.8 H  (1.3-7.7)  k/uL


 


APTT     (22.0-30.0)  sec


 


ABG pH     (7.35-7.45)  


 


ABG pCO2     (35-45)  mmHg


 


ABG pO2     ()  mmHg


 


ABG O2 Saturation     (94-97)  %


 


Chloride   109 H   ()  mmol/L


 


BUN   34 H   (9-20)  mg/dL


 


Glucose   107 H   (74-99)  mg/dL


 


POC Glucose (mg/dL)  130 H    (75-99)  mg/dL


 


Calcium   8.3 L   (8.4-10.2)  mg/dL


 


ALT   84 H   (21-72)  U/L


 


Total Protein   5.4 L   (6.3-8.2)  g/dL


 


Albumin   2.7 L   (3.5-5.0)  g/dL














  04/25/17 04/25/17 04/25/17 Range/Units





  04:30 04:30 05:01 


 


WBC     (3.8-10.6)  k/uL


 


RBC     (4.30-5.90)  m/uL


 


Hgb     (13.0-17.5)  gm/dL


 


Hct     (39.0-53.0)  %


 


Neutrophils #     (1.3-7.7)  k/uL


 


APTT  55.4 H    (22.0-30.0)  sec


 


ABG pH    7.52 H  (7.35-7.45)  


 


ABG pCO2    27 L  (35-45)  mmHg


 


ABG pO2     ()  mmHg


 


ABG O2 Saturation    98.0 H  (94-97)  %


 


Chloride     ()  mmol/L


 


BUN     (9-20)  mg/dL


 


Glucose     (74-99)  mg/dL


 


POC Glucose (mg/dL)   110 H   (75-99)  mg/dL


 


Calcium     (8.4-10.2)  mg/dL


 


ALT     (21-72)  U/L


 


Total Protein     (6.3-8.2)  g/dL


 


Albumin     (3.5-5.0)  g/dL














  04/25/17 04/25/17 Range/Units





  07:13 08:08 


 


WBC    (3.8-10.6)  k/uL


 


RBC    (4.30-5.90)  m/uL


 


Hgb    (13.0-17.5)  gm/dL


 


Hct    (39.0-53.0)  %


 


Neutrophils #    (1.3-7.7)  k/uL


 


APTT    (22.0-30.0)  sec


 


ABG pH    (7.35-7.45)  


 


ABG pCO2    (35-45)  mmHg


 


ABG pO2    ()  mmHg


 


ABG O2 Saturation    (94-97)  %


 


Chloride    ()  mmol/L


 


BUN    (9-20)  mg/dL


 


Glucose    (74-99)  mg/dL


 


POC Glucose (mg/dL)  110 H  101 H  (75-99)  mg/dL


 


Calcium    (8.4-10.2)  mg/dL


 


ALT    (21-72)  U/L


 


Total Protein    (6.3-8.2)  g/dL


 


Albumin    (3.5-5.0)  g/dL








 Microbiology - Last 24 Hours (Table)











 04/24/17 02:00 Blood Culture - Preliminary





 Blood    No Growth after 24 hours


 


 04/24/17 04:40 Gram Stain - Preliminary





 Sputum Sputum Culture - Preliminary


 


 04/23/17 17:44 Urine Culture - Preliminary





 Urine,Catheterized    Gram Neg Bacilli


 


 04/13/17 16:40 Fungal Culture - Preliminary





 Lung - Right    Yeast species














Assessment and Plan


Plan: 





Assessment





1 Coronary artery disease status post carotid bypass surgery and the patient is 

postop day #12





2 prolonged postoperative ventilator-dependent history failure, multifactorial.

  The active issue for now as the significant neuromuscular weakness that has 

complicated the patient's recovery.





3 recurrent mucous plugs with excessive respiratory secretions requiring 

bronchoscopy and a peak airway suctioning and a bronchoscopy cultures came back 

negative for any microbial growth





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 

weakness





5 hypertension





6 hyperlipidemia





7 atrial fibrillation, paroxysmal currently on normal sinus rhythm, currently 

on a heparin drip and the patient's rhythm is sinus with a controlled rate





8 enteral feeding for nutritional support





9 postoperative anemia, currently hemoglobin is stable





10 alcoholism, recovered from DT





11 gout





12 diabetes mellitus, currently on an insulin drip at 2 units an hour.





Plan





Keep the patient off sedation.  Proceed with daily physical therapy and with 

the range of motion.  Continue the pressure control mode of ventilation.  Cut 

on the pressure controlled down to 18 and keep the PEEP at 7 and gradually 

weaned off the FiO2.  I've already drop the FiO2 down to 50% I intend to drop 

even further to 40% and his saturations allow.  Chest x-ray was reviewed.  

Frequent suctioning.  Chest PT.  We'll check a set of weaning parameters 

although in my opinion I don't think the patient is ready for any further 

weaning based on his significant neuromuscular weakness.  The patient has been 

diuresed adequately.  Continue the tube feeds.  Neuromuscular weakness 

extensive and the patient may need a prolonged time for recovery.  I think it's 

reasonable to consider PEG and trach knowing that the patient's recovery may 

take prolonged period of time.  I discussed this further with a cardiothoracic 

surgeon.  We'll continue the supportive care.  Continue the tube feeds.  Avoid 

sedation if possible.  Continued IV heparin for now.  Continue insulin drip.  We

'll continue to follow.  This occurred cavers was done and 35 minutes.


Time with Patient: Greater than 30

## 2017-04-25 NOTE — P.PN
<Odell Gómez L - Last Filed: 04/25/17 11:54>





Progress Note - Text





CV Surgery Nursing





Principal diagnosis: 





Coronary artery disease, status post prior stenting to his right coronary 

artery.  Preserved left ventricular function.  Hyperlipidemia.  Hypertension.  

ETOH abuse.





POD #12 total arterial non-aortic patch off-pump double coronary artery bypass 

grafting using in situ skeletonized right internal mammary artery to the left 

anterior descending artery across the anterior midline in situ totally 

skeletonized left internal mammary artery to the first obtuse marginal artery.  

Intraoperative transesophageal echocardiogram and epi-aortic scanning.  

Intraoperative graft flow measurements using the [employer]ist[employer] system





Patient had postoperative mucous plugging with increasing oxygen demand 

requiring bronchoscopy the night of surgery.  Bronchial washings negative for 

bacteria/viruses to date.





Pt developed postoperative alcohol withdrawal requiring increased sedation and 

continued mechanical ventilation.





Patient awake and alert,, with postoperative weakness he is unable to move his 

upper extremities, some finger movement noted bilaterally, he is able to wiggle 

his toes bilaterally.  He remains intubated with mechanical ventilator support.





Vital Signs: Afebrile


 Vital Signs - 24 hr











  04/24/17 04/24/17 04/24/17





  13:00 14:00 15:00


 


Temperature   


 


Pulse Rate 61 66 66


 


Respiratory 21 22 20





Rate   


 


O2 Sat by Pulse 89 L 93 L 93 L





Oximetry   














  04/24/17 04/24/17 04/24/17





  16:00 16:03 16:23


 


Temperature   


 


Pulse Rate 71 72 73


 


Respiratory 20  





Rate   


 


O2 Sat by Pulse 75 L  





Oximetry   














  04/24/17 04/24/17 04/24/17





  17:00 18:00 19:00


 


Temperature   


 


Pulse Rate 75 72 71


 


Respiratory 20 19 13





Rate   


 


O2 Sat by Pulse 92 L 93 L 93 L





Oximetry   














  04/24/17 04/24/17 04/24/17





  20:00 20:07 20:28


 


Temperature   


 


Pulse Rate 69 67 73


 


Respiratory 20  





Rate   


 


O2 Sat by Pulse 89 L  





Oximetry   














  04/24/17 04/24/17 04/24/17





  21:00 22:00 23:00


 


Temperature   


 


Pulse Rate 67 65 73


 


Respiratory 20 20 20





Rate   


 


O2 Sat by Pulse 92 L 93 L 94 L





Oximetry   














  04/24/17 04/24/17 04/25/17





  23:13 23:24 00:00


 


Temperature   98 F


 


Pulse Rate 70 71 71


 


Respiratory   23





Rate   


 


O2 Sat by Pulse   99





Oximetry   














  04/25/17 04/25/17 04/25/17





  01:00 02:00 03:00


 


Temperature   


 


Pulse Rate 70 65 59 L


 


Respiratory 19 20 19





Rate   


 


O2 Sat by Pulse 95 94 L 95





Oximetry   














  04/25/17 04/25/17 04/25/17





  03:02 03:13 03:26


 


Temperature   


 


Pulse Rate 60 59 L 


 


Respiratory   19





Rate   


 


O2 Sat by Pulse   





Oximetry   














  04/25/17 04/25/17 04/25/17





  04:00 05:00 06:00


 


Temperature 98.9 F  


 


Pulse Rate 68 63 79


 


Respiratory 19 20 20





Rate   


 


O2 Sat by Pulse 95 95 93 L





Oximetry   














  04/25/17 04/25/17 04/25/17





  07:00 07:21 07:33


 


Temperature   


 


Pulse Rate 78 75 69


 


Respiratory 20  





Rate   


 


O2 Sat by Pulse 92 L  





Oximetry   














  04/25/17 04/25/17 04/25/17





  08:00 09:00 09:30


 


Temperature 99.0 F  


 


Pulse Rate 75 79 85


 


Respiratory 20 26 H 33 H





Rate   


 


O2 Sat by Pulse 94 L 97 97





Oximetry   














  04/25/17 04/25/17 04/25/17





  10:00 11:30 11:44


 


Temperature   


 


Pulse Rate 119 H 66 69


 


Respiratory 21  





Rate   


 


O2 Sat by Pulse 96  





Oximetry   








 ABP, PAP, CO, CI - Last 8 Hours











Arterial Blood Pressure        84/52


 


Arterial Blood Pressure        166/70


 


Arterial Blood Pressure        144/65


 


Arterial Blood Pressure        124/60


 


Arterial Blood Pressure        112/48


 


Arterial Blood Pressure        164/64


 


Arterial Blood Pressure        110/50

















Labs: 


 Short CBC











  04/25/17 Range/Units





  04:30 


 


WBC  16.0 H  (3.8-10.6)  k/uL


 


Hgb  9.5 L  (13.0-17.5)  gm/dL


 


Hct  28.2 L  (39.0-53.0)  %


 


Plt Count  389  (150-450)  k/uL


 


Neutrophils #  11.8 H  (1.3-7.7)  k/uL








 BMP











  04/25/17





  04:30


 


Sodium  141


 


Potassium  3.5


 


Chloride  109 H


 


Carbon Dioxide  23


 


BUN  34 H


 


Creatinine  0.70


 


Glucose  107 H


 


Calcium  8.3 L








 Liver Function











  04/25/17 Range/Units





  04:30 


 


Total Bilirubin  0.9  (0.2-1.3)  mg/dL


 


AST  37  (17-59)  U/L


 


ALT  84 H  (21-72)  U/L


 


Alkaline Phosphatase  53  ()  U/L


 


Albumin  2.7 L  (3.5-5.0)  g/dL








ABG











ABG pH  7.52  (7.35-7.45)  H  04/25/17  05:01    


 


ABG pCO2  27 mmHg (35-45)  L  04/25/17  05:01    


 


ABG pO2  88 mmHg ()   04/25/17  05:01    


 


ABG O2 Saturation  98.0 % (94-97)  H  04/25/17  05:01    





PT/INR, D-dimer











PT  12.0 sec (9.0-12.0)   04/25/17  04:30    


 


INR  1.2  (<1.1)   04/25/17  04:30    








Potassium replacement was completed per protocol.








IV Fluids: 


0.9% normal saline at 20 mL per hour


Insulin drip at 1 unit per hour


Heparin drip at 12 units per kilogram per hour following protocol.





Lungs: Few scattered rhonchi throughout, diminished bilateral bases.  

Respirations are unlabored with mechanical ventilator support.  Current 

ventilator settings are as follows: AC 20, pressure control ventilation 18 over 

0.80, FiO2 50%, peep 7.





O2 sat: 97% with mechanical ventilator support, current FiO2 setting 50%.





Heart:  S1S2, regular rhythm and rate, negative for S3, gallop or murmur.  

Bedside telemetry showing normal sinus rhythm heart rate 83.





Sternum stable, chest incision clean. Silver dressing was removed due to some 

serosanguineous drainage noted to the distal part of the dressing.  Sternal 

incision with mild serosanguineous drainage.





Knee-high CONNIE hose and sequential compression devices in place to bilateral 

lower x-rays.





Abdomen:  Soft, Positive bowel sounds present in all 4 quadrants, OG tube in 

place with vital high-protein to feeding infusing at 43 mL per hour which is 

goal.  150 mL of water flushes every 4 hours.





CBGs:  mg/dL in the last 24 hours.





U/O:  Adequate, Angelo catheter for accurate I&O.  585 mL output in the last 8 

hours.





24 hr Total: 


 Intake & Output











 04/23/17 04/24/17 04/25/17 04/26/17





 06:59 06:59 06:59 06:59


 


Intake Total 2774.276 3165.755 1532.388 374.961


 


Output Total 1910 2045 2470 215


 


Balance 090.026 4094.755 937.612 159.961


 


Weight 99.9 kg 101 kg 100.5 kg 








Active Medications





Hydrocodone Bitart/Acetaminophen (Norco 5-325)  2 each PO Q4HR PRN


   PRN Reason: Severe Pain


   Last Admin: 04/23/17 00:04 Dose:  2 each


Hydrocodone Bitart/Acetaminophen (Norco 5-325)  1 each PO Q4HR PRN


   PRN Reason: Moderate Pain


Amiodarone HCl (Cordarone)  200 mg PO BID [facility]


   Last Admin: 04/25/17 08:50 Dose:  200 mg


Aspirin (Aspirin)  325 mg PO DAILY [facility]


   Last Admin: 04/25/17 08:50 Dose:  325 mg


Atorvastatin Calcium (Lipitor)  40 mg PO DAILY [facility]


   Last Admin: 04/25/17 08:51 Dose:  40 mg


Benzocaine (Hurricaine Spray)  1 applic MUCOUS MEM QID PRN


   PRN Reason: Mouth Irritation


   Last Admin: 04/17/17 11:36 Dose:  1 applic


Benzocaine/Menthol (Cepacol Lozenge)  1 each MUCOUS MEM Q2H PRN


   PRN Reason: Sore Throat


Bisacodyl (Dulcolax)  10 mg RECTAL DAILY PRN


   PRN Reason: Constipation


   Last Admin: 04/19/17 17:18 Dose:  10 mg


Budesonide (Pulmicort)  1 mg INHALATION RT-BID [facility]


   Last Admin: 04/25/17 07:14 Dose:  1 mg


Chlorhexidine Gluconate (Peridex)  15 ml MUCOUS MEM BID [facility]


   Last Admin: 04/25/17 08:51 Dose:  15 ml


Digoxin (Lanoxin)  250 mcg IVP DAILY [facility]


   Last Admin: 04/25/17 08:51 Dose:  250 mcg


Heparin Sodium (Porcine) (Heparin)  0 unit IV PER PROTOCOL PRN; Protocol


   PRN Reason: Low PTT


   Last Admin: 04/23/17 16:59 Dose:  2,497 unit


Insulin Human Regular 100 unit (/ Sodium Chloride)  101 mls @ 0 mls/hr IV .Q0M 

[facility]; Per Protocol


   PRN Reason: Protocol


   Last Titration: 04/25/17 02:00 Dose:  0 units/hr, 0 mls/hr


Propofol 500 mg/ IV Solution  50 mls @ 0 mls/hr IV .Q0M [facility]; Titrate


   PRN Reason: Protocol


   Last Admin: 04/25/17 12:14 Dose:  20 mcg/kg/min, 11.68 mls/hr


Heparin Sodium/Dextrose 25,000 (unit/ IV Solution)  500 mls @ 19.98 mls/hr IV 

.Q24H [facility]; 10 UNITS/KG/HR


   PRN Reason: Protocol


   Last Admin: 04/25/17 08:50 Dose:  12 units/kg/hr, 23.97 mls/hr


Sodium Chloride (Saline 0.9%)  1,000 mls @ 20 mls/hr IV .Q24H [facility]


   Last Admin: 04/25/17 11:58 Dose:  20 mls/hr


Ipratropium Bromide (Atrovent Nebulized)  0.5 mg INHALATION RT-Q4H [facility]


   Last Admin: 04/25/17 07:14 Dose:  0.5 mg


Iron/Minerals/Multivitamins (Theragran Liquid)  15 ml PO DAILY@1200 [facility]


   Last Admin: 04/25/17 11:58 Dose:  15 ml


Lactulose (Cephulac)  30 gm PO BID [facility]


   Last Admin: 04/25/17 08:52 Dose:  Not Given


Levalbuterol HCl (Xopenex Nebulized (Conc))  1.25 mg INHALATION RT-Q4H [facility]


   Last Admin: 04/25/17 07:14 Dose:  1.25 mg


Lisinopril (Zestril)  10 mg PO BID [facility]


   Last Admin: 04/25/17 10:21 Dose:  Not Given


Lorazepam (Ativan)  1 mg IV Q2HR PRN


   PRN Reason: CIWA 8 or 9


   Last Admin: 04/25/17 00:57 Dose:  1 mg


Lorazepam (Ativan)  1 mg IV Q1HR PRN


   PRN Reason: CIWA 10 to 15


   Last Admin: 04/18/17 03:51 Dose:  1 mg


Lorazepam (Ativan)  2 mg IV Q1HR PRN


   PRN Reason: CIWA 16 or higher


   Last Admin: 04/23/17 02:21 Dose:  2 mg


Magnesium Hydroxide (Milk Of Magnesia)  2,400 mg PO BID PRN


   PRN Reason: Constipation


Metoclopramide HCl (Reglan)  10 mg IVP Q4H PRN


   PRN Reason: Nausea And Vomiting


Metoprolol Tartrate (Lopressor)  50 mg PO BID [facility]


   Last Admin: 04/25/17 08:52 Dose:  50 mg


Miscellaneous Information (Magnesium Per Protocol)  1 each MISCELLANE DAILY PRN

; Protocol


   PRN Reason: Per Protocol


Miscellaneous Information (Phosphorus Per Protocol)  1 each MISCELLANE DAILY PRN

; Protocol


   PRN Reason: Per Protocol


Miscellaneous Information (Potassium Per Protocol)  1 each MISCELLANE DAILY PRN

; Protocol


   PRN Reason: Per Protocol


Morphine Sulfate (Morphine Sulfate (Inj))  2 mg IVP Q2H PRN


   PRN Reason: Severe Pain


   Last Admin: 04/24/17 19:36 Dose:  2 mg


Ondansetron HCl (Zofran)  4 mg IVP Q6HR PRN


   PRN Reason: Nausea And Vomiting


Pantoprazole Sodium (Protonix)  40 mg IVP DAILY [facility]


   Last Admin: 04/25/17 08:52 Dose:  40 mg


Senna/Docusate Sodium (Senokot-S)  2 each PO HS [facility]


   Last Admin: 04/24/17 20:51 Dose:  Not Given


Sodium Chloride (Saline Flush)  10 ml IV BID [facility]


   Last Admin: 04/25/17 08:52 Dose:  Not Given


Sodium Chloride (Saline Flush)  20 ml IV Q4HR PRN


   PRN Reason: PICC Line


Sodium Chloride (Saline Flush)  10 ml IV WEEKLY [facility]


Sodium Chloride (Saline Flush)  10 ml IV Q4HR PRN


   PRN Reason: PICC Line


Thiamine HCl (Vitamin B-1)  100 mg PO BID [facility]


   Last Admin: 04/25/17 08:53 Dose:  100 mg





Plan: 





1.  Continue aspirin, Lipitor, Lopressor, lisinopril, digoxin.  


2.  Continue amiodarone for atrial fibrillation/atrial flutter prophylaxis.  

Cardiology to manage.


3.  Ventilator management and pulmonary management per Dr Mobley. 


4.  Physical therapy for range of motion exercises.


5.  Insulin/diabetic management per primary care service.


6.  Ativan discontinued.  


7.  Daily labs, chest x-rays.  Albumin 25% 1 followed by Lasix 20 mg IV for 

hypotension.


8.  GI/DVT prophylaxis.


9.  Diprivan drip placed on hold to evaluate underlying mentation.


10. Heparin protocol continued for atrial fibrillation/atrial flutter.


11. Tube feedings continued as ordered..


12.  More recommendations as patient progresses.





<Oscar Porter - Last Filed: 04/25/17 13:04>





Progress Note - Text


The patient was seen and examined.  Agree with the above assessment and plan.  

We will try to minimize his sedation as much as possible.  He seems more awake 

today but he remains profoundly weak.  He is currently on pressure control 

ventilation with 7 of PEEP and 50% FiO2.  He also has a fair amount of 

secretions.  I am worried that he will not tolerate extubation.  He will likely 

need tracheostomy later this week.  Otherwise he remains in atrial fibrillation 

with rapid ventricular response this morning.  He is currently on amiodarone, 

Lopressor, digoxin, and intravenous heparin.

## 2017-04-25 NOTE — PN
DATE OF SERVICE: 04/25/2017



This 61-year-old gentleman was admitted with CAD, CABG; still on 

mechanical ventilation. The patient failed weaning today. PEG tube and 

tracheostomy are also being considered at this time. Patient has a 

significant history of COPD and EtOH, also. Patient is mechanically 

ventilated and sedated. Patient also had an episode of atrial 

fibrillation, currently in normal sinus rhythm. 



PHYSICAL EXAMINATION: Pulse is 136, blood pressure 125/46, respiration 

67, temperature normal, pulse ox 96% on 50% FiO2.  

HEENT: Conjunctivae normal. 

NECK: No jugular venous distention. 

CARDIOVASCULAR SYSTEM: S1, S2 muffled. 

RESPIRATORY SYSTEM: Breath sounds diminished at the bases. A few 

rhonchi.  

ABDOMEN: Soft. 

NERVOUS SYSTEM: Patient is mechanically ventilated and sedated. 



LABS: Accu-Cheks 107, 124. ABGs noted. WBC 16. Hemoglobin is 9.5. 



ASSESSMENT: 

1. Coronary artery disease, status post coronary artery bypass 

grafting.  

2. Acute hypoxic respiratory failure, on mechanical ventilation. 

3. Chronic obstructive pulmonary disease, acute exacerbation. 

4. Delirium tremens, on CIWA protocol. 

5. Right upper and lower lobe collapse secondary to mucus plugging and 

status post bronchoscopy.  

6. Hypertension, essential. 

7. History of nicotine dependence. 

8. Bibasilar atelectasis. 

9. History of ethanol. 

10. Left pleural effusion. 

11. Paroxysmal atrial fibrillation with a rapid ventricular rate. 

12. Increased white count. 

13. Anemia, normocytic. 



RECOMMENDATIONS AND DISCUSSION: I recommend to continue with the 

current medications, continue with the monitoring, symptomatic 

treatment.  Continue with the bronchodilators. Closely follow with 

Pulmonary, Dr. Mobley. Possible PEG tube and tracheostomy. Guarded 

prognosis because of multiple complex medical issues. Monitor blood 

sugars closely. Further recommendations to follow.

## 2017-04-25 NOTE — XR
EXAMINATION TYPE: XR chest 1V portable

 

DATE OF EXAM: 4/25/2017 6:55 AM

 

HISTORY: Post Op CABG

 

COMPARISON: NONE

 

TECHNIQUE: Single view of the chest is submitted.

 

FINDINGS:

Endotracheal tube, NG tube, SG catheter are appropriately placed.

 

Post operative changes of CABG.

 

No sizeable pneumothorax.

 

Moderate pleural parenchymal opacity throughout the left lung and right lung base.

 

The heart is enlarged.

 

IMPRESSION: 

 

1.  Post operative changes of CABG.

## 2017-04-26 LAB
ANION GAP SERPL CALC-SCNC: 9 MMOL/L
BASOPHILS # BLD AUTO: 0 K/UL (ref 0–0.2)
BASOPHILS NFR BLD AUTO: 0 %
BUN SERPL-SCNC: 39 MG/DL (ref 9–20)
CALCIUM SPEC-MCNC: 7.8 MG/DL (ref 8.4–10.2)
CH: 32.4
CHCM: 34.2
CHLORIDE SERPL-SCNC: 110 MMOL/L (ref 98–107)
CO2 BLDA-SCNC: 22 MMOL/L (ref 19–24)
CO2 SERPL-SCNC: 21 MMOL/L (ref 22–30)
EOSINOPHIL # BLD AUTO: 0.2 K/UL (ref 0–0.7)
EOSINOPHIL NFR BLD AUTO: 2 %
ERYTHROCYTE [DISTWIDTH] IN BLOOD BY AUTOMATED COUNT: 2.57 M/UL (ref 4.3–5.9)
ERYTHROCYTE [DISTWIDTH] IN BLOOD: 13.9 % (ref 11.5–15.5)
GLUCOSE BLD-MCNC: 100 MG/DL (ref 75–99)
GLUCOSE BLD-MCNC: 133 MG/DL (ref 75–99)
GLUCOSE BLD-MCNC: 141 MG/DL (ref 75–99)
GLUCOSE BLD-MCNC: 143 MG/DL (ref 75–99)
GLUCOSE BLD-MCNC: 143 MG/DL (ref 75–99)
GLUCOSE BLD-MCNC: 157 MG/DL (ref 75–99)
GLUCOSE BLD-MCNC: 189 MG/DL (ref 75–99)
GLUCOSE SERPL-MCNC: 130 MG/DL (ref 74–99)
HCO3 BLDA-SCNC: 21 MMOL/L (ref 21–25)
HCT VFR BLD AUTO: 24.5 % (ref 39–53)
HDW: 2.6
HGB BLD-MCNC: 8.5 GM/DL (ref 13–17.5)
INR PPP: 1.3 (ref ?–1.1)
LUC NFR BLD AUTO: 1 %
LYMPHOCYTES # SPEC AUTO: 1 K/UL (ref 1–4.8)
LYMPHOCYTES NFR SPEC AUTO: 8 %
MAGNESIUM SPEC-SCNC: 2.2 MG/DL (ref 1.6–2.3)
MCH RBC QN AUTO: 33.2 PG (ref 25–35)
MCHC RBC AUTO-ENTMCNC: 34.8 G/DL (ref 31–37)
MCV RBC AUTO: 95.4 FL (ref 80–100)
MONOCYTES # BLD AUTO: 0.5 K/UL (ref 0–1)
MONOCYTES NFR BLD AUTO: 4 %
NEUTROPHILS # BLD AUTO: 10.8 K/UL (ref 1.3–7.7)
NEUTROPHILS NFR BLD AUTO: 85 %
NON-AFRICAN AMERICAN GFR(MDRD): >60
PCO2 BLDA: 26 MMHG (ref 35–45)
PH BLDA: 7.51 [PH] (ref 7.35–7.45)
PHOSPHATE SERPL-MCNC: 3.6 MG/DL (ref 2.5–4.5)
PO2 BLDA: 79 MMHG (ref 83–108)
POTASSIUM SERPL-SCNC: 3.8 MMOL/L (ref 3.5–5.1)
PT BLD: 12.9 SEC (ref 9–12)
SODIUM SERPL-SCNC: 140 MMOL/L (ref 137–145)
WBC # BLD AUTO: 0.11 10*3/UL
WBC # BLD AUTO: 12.6 K/UL (ref 3.8–10.6)
WBC (PEROX): 12.55

## 2017-04-26 RX ADMIN — SODIUM CHLORIDE, PRESERVATIVE FREE SCH: 5 INJECTION INTRAVENOUS at 20:30

## 2017-04-26 RX ADMIN — LEVALBUTEROL HYDROCHLORIDE SCH MG: 1.25 SOLUTION, CONCENTRATE RESPIRATORY (INHALATION) at 11:11

## 2017-04-26 RX ADMIN — METOPROLOL TARTRATE SCH MG: 25 TABLET, FILM COATED ORAL at 20:39

## 2017-04-26 RX ADMIN — METOPROLOL TARTRATE SCH: 25 TABLET, FILM COATED ORAL at 10:47

## 2017-04-26 RX ADMIN — PANTOPRAZOLE SODIUM SCH MG: 40 INJECTION, POWDER, FOR SOLUTION INTRAVENOUS at 08:28

## 2017-04-26 RX ADMIN — LEVALBUTEROL HYDROCHLORIDE SCH MG: 1.25 SOLUTION, CONCENTRATE RESPIRATORY (INHALATION) at 20:02

## 2017-04-26 RX ADMIN — HYDRALAZINE HYDROCHLORIDE PRN MG: 20 INJECTION INTRAMUSCULAR; INTRAVENOUS at 23:00

## 2017-04-26 RX ADMIN — IPRATROPIUM BROMIDE SCH MG: 0.5 SOLUTION RESPIRATORY (INHALATION) at 20:02

## 2017-04-26 RX ADMIN — Medication SCH MG: at 08:29

## 2017-04-26 RX ADMIN — PROPOFOL SCH MLS/HR: 10 INJECTION, EMULSION INTRAVENOUS at 23:40

## 2017-04-26 RX ADMIN — LISINOPRIL SCH: 10 TABLET ORAL at 08:28

## 2017-04-26 RX ADMIN — CEFAZOLIN SCH MLS/HR: 330 INJECTION, POWDER, FOR SOLUTION INTRAMUSCULAR; INTRAVENOUS at 11:29

## 2017-04-26 RX ADMIN — INSULIN LISPRO SCH UNIT: 100 INJECTION, SOLUTION INTRAVENOUS; SUBCUTANEOUS at 20:39

## 2017-04-26 RX ADMIN — CHLORHEXIDINE GLUCONATE SCH ML: 1.2 RINSE ORAL at 20:29

## 2017-04-26 RX ADMIN — ASPIRIN 325 MG ORAL TABLET SCH MG: 325 PILL ORAL at 08:27

## 2017-04-26 RX ADMIN — INSULIN LISPRO SCH UNIT: 100 INJECTION, SOLUTION INTRAVENOUS; SUBCUTANEOUS at 16:14

## 2017-04-26 RX ADMIN — IPRATROPIUM BROMIDE SCH MG: 0.5 SOLUTION RESPIRATORY (INHALATION) at 23:18

## 2017-04-26 RX ADMIN — INSULIN LISPRO SCH: 100 INJECTION, SOLUTION INTRAVENOUS; SUBCUTANEOUS at 00:12

## 2017-04-26 RX ADMIN — LISINOPRIL SCH MG: 10 TABLET ORAL at 21:35

## 2017-04-26 RX ADMIN — DIGOXIN SCH: 0.25 INJECTION INTRAMUSCULAR; INTRAVENOUS at 10:46

## 2017-04-26 RX ADMIN — HYDROCODONE BITARTRATE AND ACETAMINOPHEN PRN EACH: 5; 325 TABLET ORAL at 11:21

## 2017-04-26 RX ADMIN — AMIODARONE HYDROCHLORIDE SCH MG: 200 TABLET ORAL at 08:27

## 2017-04-26 RX ADMIN — IPRATROPIUM BROMIDE SCH MG: 0.5 SOLUTION RESPIRATORY (INHALATION) at 07:16

## 2017-04-26 RX ADMIN — IPRATROPIUM BROMIDE SCH MG: 0.5 SOLUTION RESPIRATORY (INHALATION) at 11:11

## 2017-04-26 RX ADMIN — LACTULOSE SCH GM: 20 SOLUTION ORAL at 20:29

## 2017-04-26 RX ADMIN — SODIUM CHLORIDE, PRESERVATIVE FREE SCH ML: 5 INJECTION INTRAVENOUS at 08:28

## 2017-04-26 RX ADMIN — AMIODARONE HYDROCHLORIDE SCH MG: 200 TABLET ORAL at 20:30

## 2017-04-26 RX ADMIN — ATORVASTATIN CALCIUM SCH MG: 40 TABLET, FILM COATED ORAL at 08:27

## 2017-04-26 RX ADMIN — MORPHINE SULFATE PRN MG: 2 INJECTION, SOLUTION INTRAMUSCULAR; INTRAVENOUS at 22:16

## 2017-04-26 RX ADMIN — METOPROLOL TARTRATE SCH MG: 50 TABLET, FILM COATED ORAL at 12:34

## 2017-04-26 RX ADMIN — INSULIN LISPRO SCH UNIT: 100 INJECTION, SOLUTION INTRAVENOUS; SUBCUTANEOUS at 08:26

## 2017-04-26 RX ADMIN — CHLORHEXIDINE GLUCONATE SCH ML: 1.2 RINSE ORAL at 08:27

## 2017-04-26 RX ADMIN — PROPOFOL SCH MLS/HR: 10 INJECTION, EMULSION INTRAVENOUS at 05:24

## 2017-04-26 RX ADMIN — LEVOFLOXACIN SCH MLS/HR: 750 INJECTION, SOLUTION INTRAVENOUS at 16:16

## 2017-04-26 RX ADMIN — BUDESONIDE SCH MG: 1 SUSPENSION RESPIRATORY (INHALATION) at 07:16

## 2017-04-26 RX ADMIN — SODIUM CHLORIDE SCH MLS/HR: 450 INJECTION, SOLUTION INTRAVENOUS at 11:27

## 2017-04-26 RX ADMIN — METOPROLOL TARTRATE SCH: 50 TABLET, FILM COATED ORAL at 08:29

## 2017-04-26 RX ADMIN — DIGOXIN SCH MCG: 250 TABLET ORAL at 08:29

## 2017-04-26 RX ADMIN — MORPHINE SULFATE PRN MG: 2 INJECTION, SOLUTION INTRAMUSCULAR; INTRAVENOUS at 01:05

## 2017-04-26 RX ADMIN — INSULIN LISPRO SCH UNIT: 100 INJECTION, SOLUTION INTRAVENOUS; SUBCUTANEOUS at 05:27

## 2017-04-26 RX ADMIN — LEVALBUTEROL HYDROCHLORIDE SCH MG: 1.25 SOLUTION, CONCENTRATE RESPIRATORY (INHALATION) at 23:18

## 2017-04-26 RX ADMIN — LEVALBUTEROL HYDROCHLORIDE SCH MG: 1.25 SOLUTION, CONCENTRATE RESPIRATORY (INHALATION) at 03:09

## 2017-04-26 RX ADMIN — INSULIN LISPRO SCH UNIT: 100 INJECTION, SOLUTION INTRAVENOUS; SUBCUTANEOUS at 12:56

## 2017-04-26 RX ADMIN — LACTULOSE SCH GM: 20 SOLUTION ORAL at 08:27

## 2017-04-26 RX ADMIN — DOCUSATE SODIUM AND SENNOSIDES SCH: 50; 8.6 TABLET ORAL at 22:40

## 2017-04-26 RX ADMIN — LEVALBUTEROL HYDROCHLORIDE SCH MG: 1.25 SOLUTION, CONCENTRATE RESPIRATORY (INHALATION) at 17:08

## 2017-04-26 RX ADMIN — IPRATROPIUM BROMIDE SCH MG: 0.5 SOLUTION RESPIRATORY (INHALATION) at 17:08

## 2017-04-26 RX ADMIN — Medication SCH MG: at 20:30

## 2017-04-26 RX ADMIN — MORPHINE SULFATE PRN MG: 2 INJECTION, SOLUTION INTRAMUSCULAR; INTRAVENOUS at 07:10

## 2017-04-26 RX ADMIN — IPRATROPIUM BROMIDE SCH MG: 0.5 SOLUTION RESPIRATORY (INHALATION) at 03:09

## 2017-04-26 RX ADMIN — LEVALBUTEROL HYDROCHLORIDE SCH MG: 1.25 SOLUTION, CONCENTRATE RESPIRATORY (INHALATION) at 07:16

## 2017-04-26 RX ADMIN — LISINOPRIL SCH: 5 TABLET ORAL at 10:47

## 2017-04-26 RX ADMIN — BUDESONIDE SCH MG: 1 SUSPENSION RESPIRATORY (INHALATION) at 20:02

## 2017-04-26 RX ADMIN — MULTIVITAMIN SCH ML: LIQUID ORAL at 11:29

## 2017-04-26 NOTE — P.PN
Subjective





61-year-old male patient with status post carotid bypass surgery and the 

patient is postop day #11.  The patient has failed to wean off the mechanical 

ventilator.  Immediately postop the patient had pulmonate complications 

including mucous plugs and atelectasis of the left lung.  He required 

bronchoscopy and therapeutic airway suctioning and all of the respiratory 

cultures came back negative.  This morning, the patient was on a volume cycled 

assist-control mode of ventilation at the rate of 20, tidal volume 450, FiO2 of 

40% and a PEEP of 5.  I reviewed the blood gases and I reviewed also the chest x

-ray.  I noted that the patient was unable to tolerate assist-control mode.  

Attempts to wean him off the sedation [place] as the patient was becoming 

restless and a successful the mechanical ventilator.  Based on this, I switched 

this patient to a pressure support mode of ventilation and I was able to 

completely wean him off sedation.  He was wide awake all he was hardly able to 

wiggle his toes or move his fingers and he was unable to raise his had or arms 

against gravity.  He was having copious amount of rest or secretions and he was 

requiring frequent suctioning.  At a later stage, switch this patient to a 

pressure control mode of ventilation with a PEEP of 7 and a pressure control of 

20 and his FiO2 was At 60%.  He remains hemodynamically stable.  He is 

producing adequate amount of urine output.  No pressors at this point.  No 

fever.  No chills.  Chest tubes have been all removed.  He is tolerating tube 

feeds.  Atelectatic discussion with the cardiothoracic surgeon and will plan to 

proceed with a PEG and trach if the patient ultimately failed weaning.  One 

concern is that he has significant neuromuscular weakness which is probably a 

critical illness polyneuropathy and myopathy.





On 04/25/2017 the patient remains intubated on a mechanical ventilator.  I was 

able to the sedation completely on the patient.  He is awake at this point and 

he had been awake throughout the night.  He is following simple commands.  

Motor functions are nearly absent.  The patient is extremely weak.  He can 

wiggle his toes and occasionally bend his knees.  Otherwise he cannot raise his 

arms and legs against gravity.  He has a cough reflex.  He can blink his eyes 

and raises eyebrows.  Reflexes are significantly diminished in the upper and 

lower extremities bilaterally.  Is on a pressure control mode of ventilation at 

the rate of 18, PC 18, PEEP of 7 and FiO2 of 50%.  Still having significant 

respiratory secretions through the orotracheal tube.  The chest x-ray from 

today shows moderate parenchymal opacity within the left lung base and the 

right lung base.  There is some atelectatic changes in the lung bases more so 

on the left.  The patient has also postop changes related to coronary artery 

bypass surgery.  No fever.  No chills.  Hemodynamically stable.  Producing 

adequate amount of urine output and the patient was diuresis with a combination 

of albumin and Lasix.  He is on tube feeds.





On 04/26/2017 the patient remains on a mechanical ventilator.  Seems to be much 

more awake compared to yesterday.  Motor functions remain weak over the patient 

is doing more activity and movement on today's evaluation.  We were able to 

bring the patient is sitting up position for a total of 2 hours today.  He 

tolerated well and following that he had a coughing spells and he had to be 

placed in bed.  He remains in the same vent setting with a pressure control of 

18, PEEP of 7, FiO2 of 50% and rate of 18.  Chest x-ray shows adequate 

expansion of the left lung with a few being in good location.  Sputum analysis 

showed Moraxella catarrhalis and the based on that the patient was started on 

Levaquin.  Note that he had also Klebsiella PNEUMONIA IN HIS URINE, AND BOTH OF 

THESE MICROORGANISMS SHOULD RESPOND TO LEVAQUIN.  Meanwhile, the patient is 

receiving enteral feeding for nutritional support.  IV Solu Medrol is been 

discontinued.  We'll doing daily physical therapy and passive range of motion.  

He remains on IV heparin.  He remains on IV insulin for blood sugar control.  

Morning blood gases showed a pH of 7.51 with a pCO2 of 26 and pO2 of 79.  

Weaning parameters are still poor as the patient has rapid shallow breathing 

index around 120 and the patient becomes tachypneic and the LOW tidal volumes.








Objective





- Vital Signs


Vital signs: 


 Vital Signs











Temp  100.3 F H  04/26/17 04:00


 


Pulse  90   04/26/17 11:33


 


Resp  28 H  04/26/17 07:00


 


BP  109/73   04/23/17 08:00


 


Pulse Ox  94 L  04/26/17 07:00








 Intake & Output











 04/25/17 04/26/17 04/26/17





 18:59 06:59 18:59


 


Intake Total 0891.483 9916 613


 


Output Total 950 840 40


 


Balance 492.230 231 573


 


Weight 100.5 kg 101.6 kg 


 


Intake:   


 


   240 20


 


    Sodium Chloride 0.9% 1, 240 240 20





    000 ml @ 20 mls/hr IV .   





    Q24H [facility] Rx#:[id number]   


 


  Intake, IV Titration 257.230 100 550





  Amount   


 


    Albumin Human 25% 50 ml 50  





    In Empty Bag 1 bag @ 100   





    mls/hr IVPB ONCE ONE Rx#:   





    [id number]   


 


    Heparin Sodium,Porcine/   500





    D5w Pmx 25,000 unit In   





    Dextrose/Water 1 500ml.   





    bag @ 10 UNITS/KG/HR 19.   





    98 mls/hr IV .Q24H [facility] Rx   





    #:[id number]   


 


    Levofloxacin 750Mg-D5w 150  





    Pmx 750 mg In Dextrose/   





    Water 1 150ml.bag @ 100   





    mls/hr IVPB Q24H [facility] Rx#:   





    [id number]   


 


    Propofol 500 mg In Empty 57.230 100 50





    Bag 1 bag @ Titrate IV .   





    Q0M [facility] Rx#:[id number]   


 


  Tube Feeding 645 731 43


 


  Other 300  


 


Output:   


 


  Urine 950 840 40


 


Other:   


 


  Voiding Method Indwelling Catheter Indwelling Catheter 








 ABP, PAP, CO, CI - Last Documented











Arterial Blood Pressure        119/38


 


Pulmonary Artery Pressure      46/23


 


Cardiac Output                 6.8


 


Cardiac Index                  3.3

















- Exam





Head exam was generally normal. There was no scleral icterus or corneal arcus. 

Mucous membranes were moist.Neck was supple and without jugular venous 

distension, thyromegaly, or carotid bruits. Carotids were easily palpable 

bilaterally. There was no adenopathy.  Lung sounds are diminished in lung bases 

especially in the left lung base.  No wheezes overall currently crackles.  

Sounds are irregular, normal S1-S2, sternal stable clean and intact.  No chest 

tubes are in place.Abdominal exam revealed normal bowel sounds. The abdomen was 

soft, non-tender, and without masses, organomegaly, or appreciable enlargement 

of the abdominal aorta.Examination of the extremities revealed easily palpable 

radial, femoral and pedal pulses. There was no cyanosis, clubbing or edema.  

Neurologically the patient is awake and he can follow some simple commands.  

Nevertheless is very hard for him to move his extremities against gravity.  He 

has a positive cough and gag.  Unable to raise his head of the bed.  Reflexes 

are diminished , nearly absent, in all extremities symmetrically.





- Labs


CBC & Chem 7: 


 04/26/17 04:05





 04/26/17 04:05


Labs: 


 Abnormal Lab Results - Last 24 Hours (Table)











  04/25/17 04/25/17 04/25/17 Range/Units





  04:30 16:00 18:04 


 


WBC     (3.8-10.6)  k/uL


 


RBC     (4.30-5.90)  m/uL


 


Hgb     (13.0-17.5)  gm/dL


 


Hct     (39.0-53.0)  %


 


Neutrophils #     (1.3-7.7)  k/uL


 


PT     (9.0-12.0)  sec


 


APTT     (22.0-30.0)  sec


 


ABG pH     (7.35-7.45)  


 


ABG pCO2     (35-45)  mmHg


 


ABG pO2     ()  mmHg


 


Chloride     ()  mmol/L


 


Carbon Dioxide     (22-30)  mmol/L


 


BUN     (9-20)  mg/dL


 


Glucose     (74-99)  mg/dL


 


POC Glucose (mg/dL)   137 H  150 H  (75-99)  mg/dL


 


Hemoglobin A1c  6.3 H    (4.2-6.1)  %


 


Calcium     (8.4-10.2)  mg/dL


 


TSH     (0.465-4.680)  mIU/L














  04/25/17 04/26/17 04/26/17 Range/Units





  19:59 00:09 04:05 


 


WBC    12.6 H  (3.8-10.6)  k/uL


 


RBC    2.57 L  (4.30-5.90)  m/uL


 


Hgb    8.5 L  (13.0-17.5)  gm/dL


 


Hct    24.5 L  (39.0-53.0)  %


 


Neutrophils #    10.8 H  (1.3-7.7)  k/uL


 


PT     (9.0-12.0)  sec


 


APTT     (22.0-30.0)  sec


 


ABG pH     (7.35-7.45)  


 


ABG pCO2     (35-45)  mmHg


 


ABG pO2     ()  mmHg


 


Chloride     ()  mmol/L


 


Carbon Dioxide     (22-30)  mmol/L


 


BUN     (9-20)  mg/dL


 


Glucose     (74-99)  mg/dL


 


POC Glucose (mg/dL)  135 H  100 H   (75-99)  mg/dL


 


Hemoglobin A1c     (4.2-6.1)  %


 


Calcium     (8.4-10.2)  mg/dL


 


TSH     (0.465-4.680)  mIU/L














  04/26/17 04/26/17 04/26/17 Range/Units





  04:05 04:05 04:05 


 


WBC     (3.8-10.6)  k/uL


 


RBC     (4.30-5.90)  m/uL


 


Hgb     (13.0-17.5)  gm/dL


 


Hct     (39.0-53.0)  %


 


Neutrophils #     (1.3-7.7)  k/uL


 


PT  12.9 H    (9.0-12.0)  sec


 


APTT     (22.0-30.0)  sec


 


ABG pH     (7.35-7.45)  


 


ABG pCO2     (35-45)  mmHg


 


ABG pO2     ()  mmHg


 


Chloride    110 H  ()  mmol/L


 


Carbon Dioxide    21 L  (22-30)  mmol/L


 


BUN    39 H  (9-20)  mg/dL


 


Glucose    130 H  (74-99)  mg/dL


 


POC Glucose (mg/dL)     (75-99)  mg/dL


 


Hemoglobin A1c     (4.2-6.1)  %


 


Calcium    7.8 L  (8.4-10.2)  mg/dL


 


TSH   0.454 L   (0.465-4.680)  mIU/L














  04/26/17 04/26/17 04/26/17 Range/Units





  04:05 04:47 05:26 


 


WBC     (3.8-10.6)  k/uL


 


RBC     (4.30-5.90)  m/uL


 


Hgb     (13.0-17.5)  gm/dL


 


Hct     (39.0-53.0)  %


 


Neutrophils #     (1.3-7.7)  k/uL


 


PT     (9.0-12.0)  sec


 


APTT  42.5 H    (22.0-30.0)  sec


 


ABG pH   7.51 H   (7.35-7.45)  


 


ABG pCO2   26 L   (35-45)  mmHg


 


ABG pO2   79 L   ()  mmHg


 


Chloride     ()  mmol/L


 


Carbon Dioxide     (22-30)  mmol/L


 


BUN     (9-20)  mg/dL


 


Glucose     (74-99)  mg/dL


 


POC Glucose (mg/dL)    157 H  (75-99)  mg/dL


 


Hemoglobin A1c     (4.2-6.1)  %


 


Calcium     (8.4-10.2)  mg/dL


 


TSH     (0.465-4.680)  mIU/L














  04/26/17 04/26/17 04/26/17 Range/Units





  07:31 12:00 12:55 


 


WBC     (3.8-10.6)  k/uL


 


RBC     (4.30-5.90)  m/uL


 


Hgb     (13.0-17.5)  gm/dL


 


Hct     (39.0-53.0)  %


 


Neutrophils #     (1.3-7.7)  k/uL


 


PT     (9.0-12.0)  sec


 


APTT     (22.0-30.0)  sec


 


ABG pH     (7.35-7.45)  


 


ABG pCO2     (35-45)  mmHg


 


ABG pO2     ()  mmHg


 


Chloride     ()  mmol/L


 


Carbon Dioxide     (22-30)  mmol/L


 


BUN     (9-20)  mg/dL


 


Glucose     (74-99)  mg/dL


 


POC Glucose (mg/dL)  143 H  141 H  189 H  (75-99)  mg/dL


 


Hemoglobin A1c     (4.2-6.1)  %


 


Calcium     (8.4-10.2)  mg/dL


 


TSH     (0.465-4.680)  mIU/L








 Microbiology - Last 24 Hours (Table)











 04/24/17 04:40 Gram Stain - Final





 Sputum Sputum Culture - Final





    Moraxella(branhamella) catarra


 


 04/24/17 02:00 Blood Culture - Preliminary





 Blood    No Growth after 48 hours


 


 04/23/17 17:44 Urine Culture - Final





 Urine,Catheterized    Klebsiella pneumoniae


 


 04/13/17 16:40 Fungal Culture - Preliminary





 Lung - Right    Candida tropicalis














Assessment and Plan


Plan: 





Assessment





1 Coronary artery disease status post carotid bypass surgery and the patient is 

postop day #13





2 prolonged postoperative ventilator-dependent history failure, multifactorial.

  The active issue for now as the significant neuromuscular weakness that has 

complicated the patient's recovery.





On 04/26/2017 the patient remains on a pressure control mode of ventilation.  

Weaning parameters are weak.  The patient has profound neuromuscular weakness 

which is the main obstacle to hours further weaning.





3 recurrent mucous plugs with excessive respiratory secretions requiring 

bronchoscopy and a peak airway suctioning and a bronchoscopy cultures came back 

negative for any microbial growth





The most recent sputum analysis showing Moraxella catarrhalis and the patient 

is currently on Levaquin.





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 

weakness, improving slowly





5 hypertension





6 hyperlipidemia





7 atrial fibrillation, paroxysmal currently on normal sinus rhythm, currently 

on a heparin drip and the patient's rhythm is sinus with a controlled rate





8 enteral feeding for nutritional support





9 postoperative anemia, currently hemoglobin is stable





10 alcoholism, recovered from DT





11 gout





12 diabetes mellitus, currently on an insulin drip 





Plan





Keep the patient off sedation.  Proceed with daily physical therapy and with 

the range of motion.  Continue the pressure control mode of ventilation.  Cut 

on the pressure controlled down to 18 and keep the PEEP at 7 and gradually 

weaned off the FiO2.  I've already drop the FiO2 down to 50% the patient 

remains profoundly weak and he has poor weaning parameters and does not seem to 

be a good candidate for further weaning at this point in time.  Nevertheless, 

he was able to sit up on a chair while being intubated and is gradually getting 

stronger and the motor functions gradually improving.  Mentation is appropriate 

and the patient is alert and this point.  The patient will be continued on vent 

support.  The patient will be treated with Levaquin regarding Moraxella 

catarrhalis in his sputum.  This will also cover for his Klebsiella pneumonia 

his urine.  The ultimate plan is still consider tracheostomy tube insertion 

within next 24-48 hours  specially if he fails to  wean further.  We'll 

continue to follow make further recommendations accordingly.  This is a 

critically care evaluation that was done and 32 minutes.


Time with Patient: Greater than 30

## 2017-04-26 NOTE — XR
EXAMINATION TYPE: XR chest 1V portable

 

DATE OF EXAM: 4/26/2017 7:04 AM

 

COMPARISON: 4/25/2017

 

INDICATION: Postop CABG previous abnormal chest

 

TECHNIQUE: Single frontal view of the chest is obtained.

 

FINDINGS:  

The heart size is borderline prominent.  

The pulmonary vasculature is normal.  

There is improving infiltrate at the left base. Mild residual silhouettes the left diaphragm.  

Endotracheal tube is present with tip above the anderson. Nasogastric tube transverses the thorax.

 

IMPRESSION:  

1. Improving left lower lobe infiltrate.

2. Lines and catheters discussed above

## 2017-04-26 NOTE — P.PN
Subjective


Principal diagnosis: 


Status post CABG, respiratory failure, atrial fibrillation and inability to 

extubate because of poor oxygenation





This 60-year-old gentleman with history of prior to coronary bypass surgery has 

been having difficulty with oxygenation.  Patient has been on ventilator 

support.  Patient had atrial fibrillation and flutter with a rapid ventricular 

response.  Patient converted back to sinus rhythm, yesterday.  He is 

maintaining sinus rhythm.  He chest x-ray shows left-sided pleural effusion.  

Patient is sedated.  It appears that patient is following commands and is fully 

awake.  Attempts are being made to see if he can tolerate extubation.  If not 

patient may require tracheostomy in the middle of the week.





Patient is reexamined on 25th of April.  Patient is still intubated.  Patient 

apparently has generalized weakness and unable cough or take deep breaths.  

Patient is being taken off the sedation and another trial of CPAP will be 

tried.  If patient fails, may need tracheostomy.  Patient went back into 

flutter with rapid ventricular response.  His blood pressure is low.  He is 

going to be taken off the sedation.  Once her blood pressure is more stable, 

will give IV Lopressor to control the heart rate.  He is on IV amiodarone and 

also digoxin.  Patient is on anticoagulation in the form of heparin.





Patient is examined on April 26.  Patient is still intubated.  There are making 

attempts to see if he can be extubated.  It appears that patient has been 

making more effort and seems to be gained some strength.  If attempts fail 

tracheostomy may be considered within next 24 hours and patient has been having 

intermittent atrial fibrillation and episodes of hypotension.  The dose of the 

Lopressor was cut back to 25 mg by mouth 3 times a day.  Otherwise patient 

clinical status is stable.  We'll follow








Objective





- Vital Signs


Vital signs: 


 Vital Signs











Temp  102.1 F H  04/26/17 17:00


 


Pulse  81   04/26/17 20:31


 


Resp  23   04/26/17 19:00


 


BP  94/62   04/26/17 12:00


 


Pulse Ox  95   04/26/17 19:00








 Intake & Output











 04/26/17 04/26/17 04/27/17





 06:59 18:59 06:59


 


Intake Total 1071 1826 43


 


Output Total 840 812 


 


Balance 231 1014 43


 


Weight 101.6 kg  


 


Intake:   


 


   260 


 


    Sodium Chloride 0.9% 1, 240 260 





    000 ml @ 20 mls/hr IV .   





    Q24H [facility] Rx#:[id number]   


 


  Intake, IV Titration 100 1050 





  Amount   


 


    Albumin Human 5% 250 ml  500 





    In Empty Bag 1 bag @ 250   





    mls/hr IVPB ONCE ONE Rx#:   





    [id number]   


 


    Heparin Sodium,Porcine/  500 





    D5w Pmx 25,000 unit In   





    Dextrose/Water 1 500ml.   





    bag @ 10 UNITS/KG/HR 19.   





    98 mls/hr IV .Q24H [facility] Rx   





    #:[id number]   


 


    Propofol 500 mg In Empty 100 50 





    Bag 1 bag @ Titrate IV .   





    Q0M [facility] Rx#:[id number]   


 


  Tube Feeding 731 516 43


 


Output:   


 


  Urine 840 812 


 


Other:   


 


  Voiding Method Indwelling Catheter Indwelling Catheter 


 


  # Bowel Movements  1 








 ABP, PAP, CO, CI - Last Documented











Arterial Blood Pressure        157/64


 


Pulmonary Artery Pressure      46/23


 


Cardiac Output                 6.8


 


Cardiac Index                  3.3

















- Exam





GENERAL EXAM: Patient is sleepy but arousable


HEENT: Normocephalic.


NECK: No masses, no nuchal rigidity.


CHEST: No chest wall deformity.


LUNGS: Breath sounds at bases


HEART: S1 and S2 normal with no audible mumurs or gallops. Regular rhythm, 

femorals equal on both sides..


ABDOMEN: No hepatosplenomegaly, normal bowel sounds, no guarding or rigidity.


SKIN: No rashes


CENTRAL NERVOUS SYSTEM:  Sedated


EXTREMITIES: No cyanosis, clubbing or edema.





- Labs


CBC & Chem 7: 


 04/26/17 04:05





 04/26/17 04:05


Labs: 


 Abnormal Lab Results - Last 24 Hours (Table)











  04/26/17 04/26/17 04/26/17 Range/Units





  00:09 04:05 04:05 


 


WBC   12.6 H   (3.8-10.6)  k/uL


 


RBC   2.57 L   (4.30-5.90)  m/uL


 


Hgb   8.5 L   (13.0-17.5)  gm/dL


 


Hct   24.5 L   (39.0-53.0)  %


 


Neutrophils #   10.8 H   (1.3-7.7)  k/uL


 


PT    12.9 H  (9.0-12.0)  sec


 


APTT     (22.0-30.0)  sec


 


ABG pH     (7.35-7.45)  


 


ABG pCO2     (35-45)  mmHg


 


ABG pO2     ()  mmHg


 


Chloride     ()  mmol/L


 


Carbon Dioxide     (22-30)  mmol/L


 


BUN     (9-20)  mg/dL


 


Glucose     (74-99)  mg/dL


 


POC Glucose (mg/dL)  100 H    (75-99)  mg/dL


 


Calcium     (8.4-10.2)  mg/dL


 


TSH     (0.465-4.680)  mIU/L














  04/26/17 04/26/17 04/26/17 Range/Units





  04:05 04:05 04:05 


 


WBC     (3.8-10.6)  k/uL


 


RBC     (4.30-5.90)  m/uL


 


Hgb     (13.0-17.5)  gm/dL


 


Hct     (39.0-53.0)  %


 


Neutrophils #     (1.3-7.7)  k/uL


 


PT     (9.0-12.0)  sec


 


APTT    42.5 H  (22.0-30.0)  sec


 


ABG pH     (7.35-7.45)  


 


ABG pCO2     (35-45)  mmHg


 


ABG pO2     ()  mmHg


 


Chloride   110 H   ()  mmol/L


 


Carbon Dioxide   21 L   (22-30)  mmol/L


 


BUN   39 H   (9-20)  mg/dL


 


Glucose   130 H   (74-99)  mg/dL


 


POC Glucose (mg/dL)     (75-99)  mg/dL


 


Calcium   7.8 L   (8.4-10.2)  mg/dL


 


TSH  0.454 L    (0.465-4.680)  mIU/L














  04/26/17 04/26/17 04/26/17 Range/Units





  04:47 05:26 07:31 


 


WBC     (3.8-10.6)  k/uL


 


RBC     (4.30-5.90)  m/uL


 


Hgb     (13.0-17.5)  gm/dL


 


Hct     (39.0-53.0)  %


 


Neutrophils #     (1.3-7.7)  k/uL


 


PT     (9.0-12.0)  sec


 


APTT     (22.0-30.0)  sec


 


ABG pH  7.51 H    (7.35-7.45)  


 


ABG pCO2  26 L    (35-45)  mmHg


 


ABG pO2  79 L    ()  mmHg


 


Chloride     ()  mmol/L


 


Carbon Dioxide     (22-30)  mmol/L


 


BUN     (9-20)  mg/dL


 


Glucose     (74-99)  mg/dL


 


POC Glucose (mg/dL)   157 H  143 H  (75-99)  mg/dL


 


Calcium     (8.4-10.2)  mg/dL


 


TSH     (0.465-4.680)  mIU/L














  04/26/17 04/26/17 04/26/17 Range/Units





  12:00 12:50 12:55 


 


WBC     (3.8-10.6)  k/uL


 


RBC     (4.30-5.90)  m/uL


 


Hgb     (13.0-17.5)  gm/dL


 


Hct     (39.0-53.0)  %


 


Neutrophils #     (1.3-7.7)  k/uL


 


PT     (9.0-12.0)  sec


 


APTT   56.5 H   (22.0-30.0)  sec


 


ABG pH     (7.35-7.45)  


 


ABG pCO2     (35-45)  mmHg


 


ABG pO2     ()  mmHg


 


Chloride     ()  mmol/L


 


Carbon Dioxide     (22-30)  mmol/L


 


BUN     (9-20)  mg/dL


 


Glucose     (74-99)  mg/dL


 


POC Glucose (mg/dL)  141 H   189 H  (75-99)  mg/dL


 


Calcium     (8.4-10.2)  mg/dL


 


TSH     (0.465-4.680)  mIU/L














  04/26/17 04/26/17 Range/Units





  16:14 20:38 


 


WBC    (3.8-10.6)  k/uL


 


RBC    (4.30-5.90)  m/uL


 


Hgb    (13.0-17.5)  gm/dL


 


Hct    (39.0-53.0)  %


 


Neutrophils #    (1.3-7.7)  k/uL


 


PT    (9.0-12.0)  sec


 


APTT    (22.0-30.0)  sec


 


ABG pH    (7.35-7.45)  


 


ABG pCO2    (35-45)  mmHg


 


ABG pO2    ()  mmHg


 


Chloride    ()  mmol/L


 


Carbon Dioxide    (22-30)  mmol/L


 


BUN    (9-20)  mg/dL


 


Glucose    (74-99)  mg/dL


 


POC Glucose (mg/dL)  133 H  143 H  (75-99)  mg/dL


 


Calcium    (8.4-10.2)  mg/dL


 


TSH    (0.465-4.680)  mIU/L








 Microbiology - Last 24 Hours (Table)











 04/24/17 04:40 Gram Stain - Final





 Sputum Sputum Culture - Final





    Moraxella(branhamella) catarra


 


 04/24/17 02:00 Blood Culture - Preliminary





 Blood    No Growth after 48 hours


 


 04/23/17 17:44 Urine Culture - Final





 Urine,Catheterized    Klebsiella pneumoniae














Assessment and Plan


(1) Status post coronary artery bypass graft


Status: Acute   





(2) Family history of heart disease


Status: Acute   





(3) Hyperlipidemia


Status: Acute   





(4) Hypertension


Status: Acute   





(5) Nicotine dependence, chewing tobacco, uncomplicated


Status: Acute   





(6) Atrial fibrillation


Status: Acute   


Plan: 


Patient is a status post CABG.  Having difficulty with oxygenation and has been 

intubated for long time.  It appears that patient has developed generalized 

weakness and hasn't been showing good physical effort.  However this seemed to 

be some encouragement and attempts were made again tomorrow.  If patient fails 

to be extubated within 24 hours, the [place] is being planned.  Low dose of beta 

blocker to be continued to control Cardec arrhythmias along with amiodarone and 

digoxin.  Patient is on heparin.  Once the patient has been made regarding 

tracheostomy, patient will be started on by mouth anticoagulant agent

## 2017-04-26 NOTE — P.PN
Progress Note - Text





CV Surgery Nursing





Principal diagnosis: 





Coronary artery disease, status post prior stenting to his right coronary 

artery.  Preserved left ventricular function.  Hyperlipidemia.  Hypertension.  

ETOH abuse.





POD #13 total arterial non-aortic patch off-pump double coronary artery bypass 

grafting using in situ skeletonized right internal mammary artery to the left 

anterior descending artery across the anterior midline in situ totally 

skeletonized left internal mammary artery to the first obtuse marginal artery.  

Intraoperative transesophageal echocardiogram and epi-aortic scanning.  

Intraoperative graft flow measurements using the [employer]ist[employer] system





Patient had postoperative mucous plugging with increasing oxygen demand 

requiring bronchoscopy the night of surgery.  Bronchial washings negative for 

bacteria/viruses to date.





Pt developed postoperative alcohol withdrawal requiring increased sedation and 

continued mechanical ventilation.





He remains sedated with mechanical ventilator support, he is alert and 

following some simple commands.  Still remains with generalized weakness, 

although moving his upper and lower extremities more so today.





Vital Signs: Afebrile


 Vital Signs - 24 hr











  04/25/17 04/25/17 04/25/17





  11:30 11:44 12:00


 


Temperature   98.1 F


 


Pulse Rate 74 69 64


 


Respiratory 24  20





Rate   


 


O2 Sat by Pulse 97  94 L





Oximetry   














  04/25/17 04/25/17 04/25/17





  12:30 13:00 13:30


 


Temperature   


 


Pulse Rate 67 63 66


 


Respiratory 22 19 25 H





Rate   


 


O2 Sat by Pulse 95 95 97





Oximetry   














  04/25/17 04/25/17 04/25/17





  14:00 14:30 15:00


 


Temperature   


 


Pulse Rate 61 67 136 H


 


Respiratory 21 20 67 H





Rate   


 


O2 Sat by Pulse 97 96 96





Oximetry   














  04/25/17 04/25/17 04/25/17





  15:20 15:30 16:00


 


Temperature   98.2 F


 


Pulse Rate 62 65 62


 


Respiratory  58 H 22





Rate   


 


O2 Sat by Pulse  94 L 95





Oximetry   














  04/25/17 04/25/17 04/25/17





  16:06 16:30 17:00


 


Temperature   


 


Pulse Rate 62 60 61


 


Respiratory  20 22





Rate   


 


O2 Sat by Pulse   





Oximetry   














  04/25/17 04/25/17 04/25/17





  17:30 18:00 18:30


 


Temperature   


 


Pulse Rate 60 59 L 65


 


Respiratory 22 22 25 H





Rate   


 


O2 Sat by Pulse   





Oximetry   














  04/25/17 04/25/17 04/25/17





  19:00 19:18 19:30


 


Temperature   


 


Pulse Rate 70 69 71


 


Respiratory 27 H  28 H





Rate   


 


O2 Sat by Pulse 94 L  





Oximetry   














  04/25/17 04/25/17 04/25/17





  19:31 20:00 20:30


 


Temperature  100.5 F H 


 


Pulse Rate 72 73 63


 


Respiratory  27 H 22





Rate   


 


O2 Sat by Pulse  95 98





Oximetry   














  04/25/17 04/25/17 04/25/17





  21:00 21:30 22:00


 


Temperature   98.9 F


 


Pulse Rate 64 61 61


 


Respiratory 22 30 H 26 H





Rate   


 


O2 Sat by Pulse 97 96 96





Oximetry   














  04/25/17 04/25/17 04/25/17





  22:30 23:00 23:12


 


Temperature   


 


Pulse Rate 60 60 60


 


Respiratory 29 H 22 24





Rate   


 


O2 Sat by Pulse 98 98 98





Oximetry   














  04/25/17 04/25/17 04/25/17





  23:20 23:30 23:32


 


Temperature   


 


Pulse Rate 70 77 67


 


Respiratory  30 H 





Rate   


 


O2 Sat by Pulse  95 





Oximetry   














  04/26/17 04/26/17 04/26/17





  00:00 01:00 02:00


 


Temperature   


 


Pulse Rate 66 74 75


 


Respiratory 22 32 H 33 H





Rate   


 


O2 Sat by Pulse 99 95 94 L





Oximetry   














  04/26/17 04/26/17 04/26/17





  03:00 03:11 04:00


 


Temperature   100.3 F H


 


Pulse Rate 74 71 77


 


Respiratory 29 H  25 H





Rate   


 


O2 Sat by Pulse 96  96





Oximetry   














  04/26/17 04/26/17 04/26/17





  05:00 06:00 07:00


 


Temperature   


 


Pulse Rate 74 80 86


 


Respiratory 29 H 26 H 28 H





Rate   


 


O2 Sat by Pulse 94 L 94 L 94 L





Oximetry   














  04/26/17 04/26/17





  07:21 07:41


 


Temperature  


 


Pulse Rate 78 79


 


Respiratory  





Rate  


 


O2 Sat by Pulse  





Oximetry  








 ABP, PAP, CO, CI - Last 8 Hours











Arterial Blood Pressure        119/38


 


Arterial Blood Pressure        130/43


 


Arterial Blood Pressure        90/40


 


Arterial Blood Pressure        91/41

















Labs: 


 Short CBC











  04/26/17 Range/Units





  04:05 


 


WBC  12.6 H  (3.8-10.6)  k/uL


 


Hgb  8.5 L  (13.0-17.5)  gm/dL


 


Hct  24.5 L  (39.0-53.0)  %


 


Plt Count  324  (150-450)  k/uL


 


Neutrophils #  10.8 H  (1.3-7.7)  k/uL








 BMP











  04/26/17





  04:05


 


Sodium  140


 


Potassium  3.8


 


Chloride  110 H


 


Carbon Dioxide  21 L


 


BUN  39 H


 


Creatinine  1.01


 


Glucose  130 H


 


Calcium  7.8 L











ABG











ABG pH  7.51  (7.35-7.45)  H  04/26/17  04:47    


 


ABG pCO2  26 mmHg (35-45)  L  04/26/17  04:47    


 


ABG pO2  79 mmHg ()  L  04/26/17  04:47    


 


ABG O2 Saturation  97.0 % (94-97)   04/26/17  04:47    





PT/INR, D-dimer











PT  12.9 sec (9.0-12.0)  H  04/26/17  04:05    


 


INR  1.3  (<1.1)   04/26/17  04:05    








 Microbiology





04/24/17 04:40   Sputum   Gram Stain - Final


04/24/17 04:40   Sputum   Sputum Culture - Final


                            Moraxella(branhamella) catarra


04/24/17 02:00   Blood   Blood Culture - Preliminary


                            No Growth after 48 hours


04/23/17 17:44   Urine,Catheterized   Urine Culture - Final


                            Klebsiella pneumoniae


04/13/17 16:40   Lung - Right   Fungal Culture - Preliminary


                            Lisandra tropicalis


04/13/17 16:40   Lung Aspirate - Right   Gram Stain - Final


04/13/17 16:40   Lung Aspirate - Right   Bronchial Washings Culture - Final


04/13/17 16:40   Lung - Right   Acid Fast Bacilli Smear - Final


04/13/17 16:40   Lung - Right   Acid Fast Bacilli Culture - Preliminary











IV Fluids: 


0.9% normal saline at 20 mL per hour


Heparin drip at 14 units per kilogram per hour.


Propofol drip currently on hold at this time for weaning trial.





Lungs: Few scattered rhonchi throughout, diminished bilateral bases.  

Respirations are unlabored with mechanical ventilator support.  Current 

ventilator settings are as follows: Before meals 20 pressure control 

ventilation 18/0.80, FiO2 50%, PEEP 7.





O2 sat: 95% with mechanical ventilator support, current ventilator setting FiO2 

is 50%.





Heart:  S1S2, regular rhythm and rate, negative for S3, gallop or murmur.  

Bedside telemetry showing normal sinus rhythm heart rate 77





Sternum stable, chest incision dressing clean and dry.  No drainage noted.  

Heart hugger in place, will need assistance using a heart hugger with coughing 

due to his generalized weakness.





Knee-high CONNIE hose and sequential compression devices in place to bilateral 

lower extremities.





Abdomen:  Soft, Positive bowel sounds present in all 4 quadrants, OG tube with 

tube feeding of vital high-protein infusing at 43 mL per hour which is goal and 

with 150 mL automatic water flushes every 4 hours.  Last bowel movement was 04/ 24/2017.





CBGs: 100-157 mg/dL in the last 24 hours.





U/O:  Adequate, Angelo catheter for accurate I&O.  520 mL output in the last 8 

hours.





24 hr Total: 


 Intake & Output











 04/24/17 04/25/17 04/26/17 04/27/17





 06:59 06:59 06:59 06:59


 


Intake Total 3165.755 3644.241 8150.230 563


 


Output Total 2045 2470 1790 40


 


Balance 1120.755 -937.612 723.230 523


 


Weight 101 kg 100.5 kg 101.6 kg 








Active Medications





Hydrocodone Bitart/Acetaminophen (Norco 5-325)  2 each PO Q4HR PRN


   PRN Reason: Severe Pain


   Last Admin: 04/26/17 11:21 Dose:  2 each


Hydrocodone Bitart/Acetaminophen (Norco 5-325)  1 each PO Q4HR PRN


   PRN Reason: Moderate Pain


Amiodarone HCl (Cordarone)  200 mg PO BID [facility]


   Last Admin: 04/26/17 08:27 Dose:  200 mg


Aspirin (Aspirin)  325 mg PO DAILY [facility]


   Last Admin: 04/26/17 08:27 Dose:  325 mg


Atorvastatin Calcium (Lipitor)  40 mg PO DAILY [facility]


   Last Admin: 04/26/17 08:27 Dose:  40 mg


Benzocaine (Hurricaine Spray)  1 applic MUCOUS MEM QID PRN


   PRN Reason: Mouth Irritation


   Last Admin: 04/17/17 11:36 Dose:  1 applic


Benzocaine/Menthol (Cepacol Lozenge)  1 each MUCOUS MEM Q2H PRN


   PRN Reason: Sore Throat


Bisacodyl (Dulcolax)  10 mg RECTAL DAILY PRN


   PRN Reason: Constipation


   Last Admin: 04/19/17 17:18 Dose:  10 mg


Budesonide (Pulmicort)  1 mg INHALATION RT-BID [facility]


   Last Admin: 04/26/17 07:16 Dose:  1 mg


Chlorhexidine Gluconate (Peridex)  15 ml MUCOUS MEM BID [facility]


   Last Admin: 04/26/17 08:27 Dose:  15 ml


Digoxin (Lanoxin)  250 mcg OG-TUBE DAILY [facility]


   Last Admin: 04/26/17 08:29 Dose:  250 mcg


Heparin Sodium (Porcine) (Heparin)  0 unit IV PER PROTOCOL PRN; Protocol


   PRN Reason: Low PTT


   Last Admin: 04/23/17 16:59 Dose:  2,497 unit


Propofol 500 mg/ IV Solution  50 mls @ 0 mls/hr IV .Q0M [facility]; Titrate


   PRN Reason: Protocol


   Last Admin: 04/26/17 05:24 Dose:  20 mcg/kg/min, 11.68 mls/hr


Heparin Sodium/Dextrose 25,000 (unit/ IV Solution)  500 mls @ 19.98 mls/hr IV 

.Q24H [facility]; 10 UNITS/KG/HR


   PRN Reason: Protocol


   Last Titration: 04/26/17 07:54 Dose:  Infused


Sodium Chloride (Saline 0.9%)  1,000 mls @ 20 mls/hr IV .Q24H [facility]


   Last Admin: 04/25/17 11:58 Dose:  20 mls/hr


Levofloxacin 750 mg/ IV (Solution)  150 mls @ 100 mls/hr IVPB Q24H [facility]


   Last Admin: 04/25/17 17:43 Dose:  100 mls/hr


Insulin Human Lispro (Humalog)  0 unit SQ Q4H [facility]


   PRN Reason: Protocol


   Last Admin: 04/26/17 08:26 Dose:  1 unit


Ipratropium Bromide (Atrovent Nebulized)  0.5 mg INHALATION RT-Q4H [facility]


   Last Admin: 04/26/17 11:11 Dose:  0.5 mg


Iron/Minerals/Multivitamins (Theragran Liquid)  15 ml PO DAILY@1200 [facility]


   Last Admin: 04/25/17 11:58 Dose:  15 ml


Lactulose (Cephulac)  30 gm PO BID [facility]


   Last Admin: 04/26/17 08:27 Dose:  30 gm


Levalbuterol HCl (Xopenex Nebulized (Conc))  1.25 mg INHALATION RT-Q4H [facility]


   Last Admin: 04/26/17 11:11 Dose:  1.25 mg


Lisinopril (Zestril)  10 mg PO BID [facility]


   Last Admin: 04/26/17 08:28 Dose:  Not Given


Magnesium Hydroxide (Milk Of Magnesia)  2,400 mg PO BID PRN


   PRN Reason: Constipation


Metoclopramide HCl (Reglan)  10 mg IVP Q4H PRN


   PRN Reason: Nausea And Vomiting


Metoprolol Tartrate (Lopressor)  50 mg PO BID [facility]


   Last Admin: 04/26/17 08:29 Dose:  Not Given


Miscellaneous Information (Magnesium Per Protocol)  1 each MISCELLANE DAILY PRN

; Protocol


   PRN Reason: Per Protocol


Miscellaneous Information (Phosphorus Per Protocol)  1 each MISCELLANE DAILY PRN

; Protocol


   PRN Reason: Per Protocol


Miscellaneous Information (Potassium Per Protocol)  1 each MISCELLANE DAILY PRN

; Protocol


   PRN Reason: Per Protocol


Morphine Sulfate (Morphine Sulfate (Inj))  2 mg IVP Q2H PRN


   PRN Reason: Severe Pain


   Last Admin: 04/26/17 07:10 Dose:  2 mg


Ondansetron HCl (Zofran)  4 mg IVP Q6HR PRN


   PRN Reason: Nausea And Vomiting


Pantoprazole Sodium (Protonix)  40 mg IVP DAILY [facility]


   Last Admin: 04/26/17 08:28 Dose:  40 mg


Senna/Docusate Sodium (Senokot-S)  2 each PO HS [facility]


   Last Admin: 04/25/17 20:03 Dose:  2 each


Sodium Chloride (Saline Flush)  10 ml IV BID [facility]


   Last Admin: 04/26/17 08:28 Dose:  10 ml


Sodium Chloride (Saline Flush)  20 ml IV Q4HR PRN


   PRN Reason: PICC Line


Sodium Chloride (Saline Flush)  10 ml IV WEEKLY [facility]


Sodium Chloride (Saline Flush)  10 ml IV Q4HR PRN


   PRN Reason: PICC Line


Thiamine HCl (Vitamin B-1)  100 mg PO BID [facility]


   Last Admin: 04/26/17 08:29 Dose:  100 mg








Plan: 





1.  Continue aspirin, Lipitor, Lopressor, lisinopril, digoxin.  


2.  Continue amiodarone for atrial fibrillation/atrial flutter prophylaxis.  

Cardiology to manage.


3.  Ventilator management and pulmonary management per Dr Mobley. 


4.  Physical therapy for range of motion exercises.


5.  Insulin/diabetic management per primary care service.


6.  Change Angelo catheter, patient had a positive urine culture yesterday for 

Klebsiella pneumoniae. 


7.  Daily labs, chest x-rays.  


8.  GI/DVT prophylaxis.


9.  Diprivan drip is on hold to evaluate underlying mentation and possible 

attempt to wean from the ventilator.


10. Heparin protocol continued for atrial fibrillation/atrial flutter.


11. Tube feedings continued as ordered.  Change automatic water flushes to 100 

mL every 4 hours.


12.  More recommendations as patient progresses.

## 2017-04-26 NOTE — P.PN
Subjective


This is a 60-year-old gentleman that is status post CABG postop day 11.  

Failure to wean from mechanical ventilator.





Patient is seen in consultation for medical management.  Patient is currently 

on pressure control ventilation with PTCA of 18 cm water Depo 7 FiO2 50% and 

respiratory rate of 18.





Patient is awake however not responsive appropriately.





Is moving his all 4 extremities to command





This a.m. patient apparently was having episodes of hypotension as patient was 

going into atrial fibrillation.





Weaning trials were attempted this a.m. with a pressure support of 7 according 

to verbal report patient apparently had tachypnea within 5 minutes hence was 

restarted on full pressure-controlled ventilation.








Objective





- Vital Signs


Vital signs: 


 Vital Signs











Temp  100.0 F H  04/26/17 12:00


 


Pulse  120 H  04/26/17 14:00


 


Resp  26 H  04/26/17 14:00


 


BP  94/62   04/26/17 12:00


 


Pulse Ox  96   04/26/17 14:00








 Intake & Output











 04/25/17 04/26/17 04/26/17





 18:59 06:59 18:59


 


Intake Total 4492.603 5380 1597


 


Output Total 950 840 522


 


Balance 492.


 


Weight 100.5 kg 101.6 kg 


 


Intake:   


 


   240 160


 


    Sodium Chloride 0.9% 1, 240 240 160





    000 ml @ 20 mls/hr IV .   





    Q24H [facility] Rx#:[id number]   


 


  Intake, IV Titration 257.





  Amount   


 


    Albumin Human 25% 50 ml 50  





    In Empty Bag 1 bag @ 100   





    mls/hr IVPB ONCE ONE Rx#:   





    [id number]   


 


    Albumin Human 5% 250 ml   500





    In Empty Bag 1 bag @ 250   





    mls/hr IVPB ONCE ONE Rx#:   





    [id number]   


 


    Heparin Sodium,Porcine/   500





    D5w Pmx 25,000 unit In   





    Dextrose/Water 1 500ml.   





    bag @ 10 UNITS/KG/HR 19.   





    98 mls/hr IV .Q24H [facility] Rx   





    #:[id number]   


 


    Levofloxacin 750Mg-D5w 150  





    Pmx 750 mg In Dextrose/   





    Water 1 150ml.bag @ 100   





    mls/hr IVPB Q24H [facility] Rx#:   





    [id number]   


 


    Propofol 500 mg In Empty 57.230 100 50





    Bag 1 bag @ Titrate IV .   





    Q0M [facility] Rx#:[id number]   


 


  Tube Feeding 645 731 387


 


  Other 300  


 


Output:   


 


  Urine 950 840 522


 


Other:   


 


  Voiding Method Indwelling Catheter Indwelling Catheter 








 ABP, PAP, CO, CI - Last Documented











Arterial Blood Pressure        71/44


 


Pulmonary Artery Pressure      46/23


 


Cardiac Output                 6.8


 


Cardiac Index                  3.3

















- Exam


Gen. appearance intubated awake however not appropriate to commands





Lungs diminished breath sounds no rhonchi wheezing or crackles appreciated





Heart S1-S2 heard no significant murmurs appreciated





Abdomen is soft nontender no organomegaly





Lower extremities no significant edema





Neuro is deferred





vent settings were discussed above














- Labs


CBC & Chem 7: 


 04/26/17 04:05





 04/26/17 04:05


Labs: 


 Abnormal Lab Results - Last 24 Hours (Table)











  04/25/17 04/25/17 04/26/17 Range/Units





  18:04 19:59 00:09 


 


WBC     (3.8-10.6)  k/uL


 


RBC     (4.30-5.90)  m/uL


 


Hgb     (13.0-17.5)  gm/dL


 


Hct     (39.0-53.0)  %


 


Neutrophils #     (1.3-7.7)  k/uL


 


PT     (9.0-12.0)  sec


 


APTT     (22.0-30.0)  sec


 


ABG pH     (7.35-7.45)  


 


ABG pCO2     (35-45)  mmHg


 


ABG pO2     ()  mmHg


 


Chloride     ()  mmol/L


 


Carbon Dioxide     (22-30)  mmol/L


 


BUN     (9-20)  mg/dL


 


Glucose     (74-99)  mg/dL


 


POC Glucose (mg/dL)  150 H  135 H  100 H  (75-99)  mg/dL


 


Calcium     (8.4-10.2)  mg/dL


 


TSH     (0.465-4.680)  mIU/L














  04/26/17 04/26/17 04/26/17 Range/Units





  04:05 04:05 04:05 


 


WBC  12.6 H    (3.8-10.6)  k/uL


 


RBC  2.57 L    (4.30-5.90)  m/uL


 


Hgb  8.5 L    (13.0-17.5)  gm/dL


 


Hct  24.5 L    (39.0-53.0)  %


 


Neutrophils #  10.8 H    (1.3-7.7)  k/uL


 


PT   12.9 H   (9.0-12.0)  sec


 


APTT     (22.0-30.0)  sec


 


ABG pH     (7.35-7.45)  


 


ABG pCO2     (35-45)  mmHg


 


ABG pO2     ()  mmHg


 


Chloride     ()  mmol/L


 


Carbon Dioxide     (22-30)  mmol/L


 


BUN     (9-20)  mg/dL


 


Glucose     (74-99)  mg/dL


 


POC Glucose (mg/dL)     (75-99)  mg/dL


 


Calcium     (8.4-10.2)  mg/dL


 


TSH    0.454 L  (0.465-4.680)  mIU/L














  04/26/17 04/26/17 04/26/17 Range/Units





  04:05 04:05 04:47 


 


WBC     (3.8-10.6)  k/uL


 


RBC     (4.30-5.90)  m/uL


 


Hgb     (13.0-17.5)  gm/dL


 


Hct     (39.0-53.0)  %


 


Neutrophils #     (1.3-7.7)  k/uL


 


PT     (9.0-12.0)  sec


 


APTT   42.5 H   (22.0-30.0)  sec


 


ABG pH    7.51 H  (7.35-7.45)  


 


ABG pCO2    26 L  (35-45)  mmHg


 


ABG pO2    79 L  ()  mmHg


 


Chloride  110 H    ()  mmol/L


 


Carbon Dioxide  21 L    (22-30)  mmol/L


 


BUN  39 H    (9-20)  mg/dL


 


Glucose  130 H    (74-99)  mg/dL


 


POC Glucose (mg/dL)     (75-99)  mg/dL


 


Calcium  7.8 L    (8.4-10.2)  mg/dL


 


TSH     (0.465-4.680)  mIU/L














  04/26/17 04/26/17 04/26/17 Range/Units





  05:26 07:31 12:00 


 


WBC     (3.8-10.6)  k/uL


 


RBC     (4.30-5.90)  m/uL


 


Hgb     (13.0-17.5)  gm/dL


 


Hct     (39.0-53.0)  %


 


Neutrophils #     (1.3-7.7)  k/uL


 


PT     (9.0-12.0)  sec


 


APTT     (22.0-30.0)  sec


 


ABG pH     (7.35-7.45)  


 


ABG pCO2     (35-45)  mmHg


 


ABG pO2     ()  mmHg


 


Chloride     ()  mmol/L


 


Carbon Dioxide     (22-30)  mmol/L


 


BUN     (9-20)  mg/dL


 


Glucose     (74-99)  mg/dL


 


POC Glucose (mg/dL)  157 H  143 H  141 H  (75-99)  mg/dL


 


Calcium     (8.4-10.2)  mg/dL


 


TSH     (0.465-4.680)  mIU/L














  04/26/17 04/26/17 04/26/17 Range/Units





  12:50 12:55 16:14 


 


WBC     (3.8-10.6)  k/uL


 


RBC     (4.30-5.90)  m/uL


 


Hgb     (13.0-17.5)  gm/dL


 


Hct     (39.0-53.0)  %


 


Neutrophils #     (1.3-7.7)  k/uL


 


PT     (9.0-12.0)  sec


 


APTT  56.5 H    (22.0-30.0)  sec


 


ABG pH     (7.35-7.45)  


 


ABG pCO2     (35-45)  mmHg


 


ABG pO2     ()  mmHg


 


Chloride     ()  mmol/L


 


Carbon Dioxide     (22-30)  mmol/L


 


BUN     (9-20)  mg/dL


 


Glucose     (74-99)  mg/dL


 


POC Glucose (mg/dL)   189 H  133 H  (75-99)  mg/dL


 


Calcium     (8.4-10.2)  mg/dL


 


TSH     (0.465-4.680)  mIU/L








 Microbiology - Last 24 Hours (Table)











 04/24/17 04:40 Gram Stain - Final





 Sputum Sputum Culture - Final





    Moraxella(branhamella) catarra


 


 04/24/17 02:00 Blood Culture - Preliminary





 Blood    No Growth after 48 hours


 


 04/23/17 17:44 Urine Culture - Final





 Urine,Catheterized    Klebsiella pneumoniae


 


 04/13/17 16:40 Fungal Culture - Preliminary





 Lung - Right    Candida tropicalis














Assessment and Plan


Plan: 


#1 acute hypoxic respiratory failure as expected however prolonged due to other 

comorbidities from the recent surgery





#2 CAD status post CABG postop day 13





#3 critical care polyneuropathy





#4 history of hypertension





#5 dyslipidemia





#6 proximal atrial fibrillation recurrent episodes of A. fib with RVR.





#7 history of significant alcohol use status post a prolonged episode of 

delirium tremens





#8 gout





#9 diabetes mellitus currently on insulin drip.





#10 dysphagia secondary to above





#11 M .catarrhalis tracheal bronchitis





Plan





Continue ongoing care patient will likely need a PEG and trach.  This is day 13 

of ventilation support.  The intensivist feels that it is likely due to 

significant weakness.  Microbiology was reviewed continue ongoing care with the 

current antibiotic therapy if susceptibilities change will likely need to add 

additional medications were axilla catarrhalis tracheobronchitis

## 2017-04-27 LAB
ANION GAP SERPL CALC-SCNC: 13 MMOL/L
APTT BLD: 65.6 SEC (ref 22–30)
BASOPHILS # BLD AUTO: 0.1 K/UL (ref 0–0.2)
BASOPHILS NFR BLD AUTO: 0 %
BUN SERPL-SCNC: 44 MG/DL (ref 9–20)
CALCIUM SPEC-MCNC: 8.1 MG/DL (ref 8.4–10.2)
CH: 32.4
CHCM: 33.2
CHLORIDE SERPL-SCNC: 109 MMOL/L (ref 98–107)
CO2 BLDA-SCNC: 22 MMOL/L (ref 19–24)
CO2 SERPL-SCNC: 21 MMOL/L (ref 22–30)
EOSINOPHIL # BLD AUTO: 0.1 K/UL (ref 0–0.7)
EOSINOPHIL NFR BLD AUTO: 1 %
ERYTHROCYTE [DISTWIDTH] IN BLOOD BY AUTOMATED COUNT: 2.46 M/UL (ref 4.3–5.9)
ERYTHROCYTE [DISTWIDTH] IN BLOOD: 14.4 % (ref 11.5–15.5)
GLUCOSE BLD-MCNC: 132 MG/DL (ref 75–99)
GLUCOSE BLD-MCNC: 139 MG/DL (ref 75–99)
GLUCOSE BLD-MCNC: 164 MG/DL (ref 75–99)
GLUCOSE BLD-MCNC: 170 MG/DL (ref 75–99)
GLUCOSE BLD-MCNC: 175 MG/DL (ref 75–99)
GLUCOSE BLD-MCNC: 176 MG/DL (ref 75–99)
GLUCOSE BLD-MCNC: 177 MG/DL (ref 75–99)
GLUCOSE SERPL-MCNC: 125 MG/DL (ref 74–99)
HCO3 BLDA-SCNC: 21 MMOL/L (ref 21–25)
HCT VFR BLD AUTO: 24.2 % (ref 39–53)
HDW: 2.72
HGB BLD-MCNC: 7.9 GM/DL (ref 13–17.5)
INR PPP: 1.4 (ref ?–1.1)
LUC NFR BLD AUTO: 1 %
LYMPHOCYTES # SPEC AUTO: 1.5 K/UL (ref 1–4.8)
LYMPHOCYTES NFR SPEC AUTO: 8 %
MAGNESIUM SPEC-SCNC: 2.5 MG/DL (ref 1.6–2.3)
MAGNESIUM SPEC-SCNC: 2.5 MG/DL (ref 1.6–2.3)
MCH RBC QN AUTO: 32 PG (ref 25–35)
MCHC RBC AUTO-ENTMCNC: 32.6 G/DL (ref 31–37)
MCV RBC AUTO: 98.2 FL (ref 80–100)
MONOCYTES # BLD AUTO: 0.5 K/UL (ref 0–1)
MONOCYTES NFR BLD AUTO: 3 %
NEUTROPHILS # BLD AUTO: 16.9 K/UL (ref 1.3–7.7)
NEUTROPHILS NFR BLD AUTO: 88 %
NON-AFRICAN AMERICAN GFR(MDRD): >60
PCO2 BLDA: 30 MMHG (ref 35–45)
PH BLDA: 7.46 [PH] (ref 7.35–7.45)
PHOSPHATE SERPL-MCNC: 3.4 MG/DL (ref 2.5–4.5)
PO2 BLDA: 108 MMHG (ref 83–108)
POTASSIUM SERPL-SCNC: 2.9 MMOL/L (ref 3.5–5.1)
POTASSIUM SERPL-SCNC: 3.6 MMOL/L (ref 3.5–5.1)
PT BLD: 13.5 SEC (ref 9–12)
SODIUM SERPL-SCNC: 143 MMOL/L (ref 137–145)
WBC # BLD AUTO: 0.15 10*3/UL
WBC # BLD AUTO: 19.2 K/UL (ref 3.8–10.6)
WBC (PEROX): 19.35

## 2017-04-27 RX ADMIN — INSULIN LISPRO SCH UNIT: 100 INJECTION, SOLUTION INTRAVENOUS; SUBCUTANEOUS at 23:30

## 2017-04-27 RX ADMIN — IPRATROPIUM BROMIDE SCH MG: 0.5 SOLUTION RESPIRATORY (INHALATION) at 11:08

## 2017-04-27 RX ADMIN — INSULIN LISPRO SCH UNIT: 100 INJECTION, SOLUTION INTRAVENOUS; SUBCUTANEOUS at 18:31

## 2017-04-27 RX ADMIN — IPRATROPIUM BROMIDE SCH MG: 0.5 SOLUTION RESPIRATORY (INHALATION) at 07:34

## 2017-04-27 RX ADMIN — POTASSIUM CHLORIDE SCH MEQ: 1.5 SOLUTION ORAL at 06:21

## 2017-04-27 RX ADMIN — INSULIN LISPRO SCH UNIT: 100 INJECTION, SOLUTION INTRAVENOUS; SUBCUTANEOUS at 03:22

## 2017-04-27 RX ADMIN — SODIUM CHLORIDE, PRESERVATIVE FREE SCH: 5 INJECTION INTRAVENOUS at 08:46

## 2017-04-27 RX ADMIN — LEVALBUTEROL HYDROCHLORIDE SCH MG: 1.25 SOLUTION, CONCENTRATE RESPIRATORY (INHALATION) at 23:21

## 2017-04-27 RX ADMIN — ATORVASTATIN CALCIUM SCH MG: 40 TABLET, FILM COATED ORAL at 08:45

## 2017-04-27 RX ADMIN — IPRATROPIUM BROMIDE SCH MG: 0.5 SOLUTION RESPIRATORY (INHALATION) at 19:09

## 2017-04-27 RX ADMIN — Medication SCH MG: at 21:18

## 2017-04-27 RX ADMIN — IPRATROPIUM BROMIDE SCH MG: 0.5 SOLUTION RESPIRATORY (INHALATION) at 15:28

## 2017-04-27 RX ADMIN — AMIODARONE HYDROCHLORIDE SCH MG: 200 TABLET ORAL at 21:16

## 2017-04-27 RX ADMIN — POTASSIUM CHLORIDE SCH MEQ: 1.5 SOLUTION ORAL at 06:04

## 2017-04-27 RX ADMIN — POTASSIUM CHLORIDE SCH MLS/HR: 7.46 INJECTION, SOLUTION INTRAVENOUS at 22:07

## 2017-04-27 RX ADMIN — BUDESONIDE SCH MG: 1 SUSPENSION RESPIRATORY (INHALATION) at 07:34

## 2017-04-27 RX ADMIN — ASPIRIN 325 MG ORAL TABLET SCH MG: 325 PILL ORAL at 08:45

## 2017-04-27 RX ADMIN — SODIUM CHLORIDE, PRESERVATIVE FREE SCH: 5 INJECTION INTRAVENOUS at 21:21

## 2017-04-27 RX ADMIN — CHLORHEXIDINE GLUCONATE SCH ML: 1.2 RINSE ORAL at 08:45

## 2017-04-27 RX ADMIN — PANTOPRAZOLE SODIUM SCH MG: 40 INJECTION, POWDER, FOR SOLUTION INTRAVENOUS at 08:46

## 2017-04-27 RX ADMIN — LEVALBUTEROL HYDROCHLORIDE SCH MG: 1.25 SOLUTION, CONCENTRATE RESPIRATORY (INHALATION) at 11:08

## 2017-04-27 RX ADMIN — INSULIN LISPRO SCH UNIT: 100 INJECTION, SOLUTION INTRAVENOUS; SUBCUTANEOUS at 14:35

## 2017-04-27 RX ADMIN — IPRATROPIUM BROMIDE SCH MG: 0.5 SOLUTION RESPIRATORY (INHALATION) at 23:21

## 2017-04-27 RX ADMIN — CEFAZOLIN SCH MLS/HR: 330 INJECTION, POWDER, FOR SOLUTION INTRAMUSCULAR; INTRAVENOUS at 12:22

## 2017-04-27 RX ADMIN — LEVALBUTEROL HYDROCHLORIDE SCH MG: 1.25 SOLUTION, CONCENTRATE RESPIRATORY (INHALATION) at 07:35

## 2017-04-27 RX ADMIN — AMIODARONE HYDROCHLORIDE SCH MG: 200 TABLET ORAL at 08:45

## 2017-04-27 RX ADMIN — LEVOFLOXACIN SCH MLS/HR: 750 INJECTION, SOLUTION INTRAVENOUS at 18:29

## 2017-04-27 RX ADMIN — DIGOXIN SCH MCG: 250 TABLET ORAL at 08:45

## 2017-04-27 RX ADMIN — METOPROLOL TARTRATE SCH MG: 25 TABLET, FILM COATED ORAL at 08:46

## 2017-04-27 RX ADMIN — Medication SCH MG: at 08:47

## 2017-04-27 RX ADMIN — LEVALBUTEROL HYDROCHLORIDE SCH MG: 1.25 SOLUTION, CONCENTRATE RESPIRATORY (INHALATION) at 03:13

## 2017-04-27 RX ADMIN — POTASSIUM CHLORIDE SCH MLS/HR: 7.46 INJECTION, SOLUTION INTRAVENOUS at 14:35

## 2017-04-27 RX ADMIN — MULTIVITAMIN SCH: LIQUID ORAL at 12:23

## 2017-04-27 RX ADMIN — INSULIN LISPRO SCH UNIT: 100 INJECTION, SOLUTION INTRAVENOUS; SUBCUTANEOUS at 00:22

## 2017-04-27 RX ADMIN — IPRATROPIUM BROMIDE SCH MG: 0.5 SOLUTION RESPIRATORY (INHALATION) at 03:12

## 2017-04-27 RX ADMIN — POTASSIUM CHLORIDE SCH MEQ: 1.5 SOLUTION ORAL at 06:22

## 2017-04-27 RX ADMIN — BUDESONIDE SCH MG: 1 SUSPENSION RESPIRATORY (INHALATION) at 19:09

## 2017-04-27 RX ADMIN — POTASSIUM CHLORIDE SCH MLS/HR: 7.46 INJECTION, SOLUTION INTRAVENOUS at 23:26

## 2017-04-27 RX ADMIN — POTASSIUM CHLORIDE SCH MLS/HR: 7.46 INJECTION, SOLUTION INTRAVENOUS at 16:30

## 2017-04-27 RX ADMIN — DEXTROSE MONOHYDRATE, SODIUM CHLORIDE, AND POTASSIUM CHLORIDE SCH MLS/HR: 50; 4.5; 1.49 INJECTION, SOLUTION INTRAVENOUS at 08:48

## 2017-04-27 RX ADMIN — INSULIN LISPRO SCH UNIT: 100 INJECTION, SOLUTION INTRAVENOUS; SUBCUTANEOUS at 21:13

## 2017-04-27 RX ADMIN — LISINOPRIL SCH: 10 TABLET ORAL at 12:22

## 2017-04-27 RX ADMIN — LISINOPRIL SCH MG: 10 TABLET ORAL at 21:16

## 2017-04-27 RX ADMIN — LACTULOSE SCH: 20 SOLUTION ORAL at 12:22

## 2017-04-27 RX ADMIN — HYDRALAZINE HYDROCHLORIDE PRN MG: 20 INJECTION INTRAMUSCULAR; INTRAVENOUS at 12:22

## 2017-04-27 RX ADMIN — METOPROLOL TARTRATE SCH MG: 25 TABLET, FILM COATED ORAL at 21:16

## 2017-04-27 RX ADMIN — SODIUM CHLORIDE SCH MLS/HR: 450 INJECTION, SOLUTION INTRAVENOUS at 08:47

## 2017-04-27 RX ADMIN — CHLORHEXIDINE GLUCONATE SCH: 1.2 RINSE ORAL at 21:10

## 2017-04-27 RX ADMIN — INSULIN LISPRO SCH: 100 INJECTION, SOLUTION INTRAVENOUS; SUBCUTANEOUS at 08:44

## 2017-04-27 RX ADMIN — LEVALBUTEROL HYDROCHLORIDE SCH MG: 1.25 SOLUTION, CONCENTRATE RESPIRATORY (INHALATION) at 19:09

## 2017-04-27 RX ADMIN — LEVALBUTEROL HYDROCHLORIDE SCH MG: 1.25 SOLUTION, CONCENTRATE RESPIRATORY (INHALATION) at 15:28

## 2017-04-27 NOTE — P.PN
Subjective


This is a 60-year-old gentleman that is status post CABG postop day 11.  

Failure to wean from mechanical ventilator.





Patient is seen in consultation for medical management.  Patient is currently 

on pressure control ventilation with PTCA of 18 cm water Depo 7 FiO2 50% and 

respiratory rate of 18.





Patient is awake however not responsive appropriately.





Is moving his all 4 extremities to command





This a.m. patient apparently was having episodes of hypotension as patient was 

going into atrial fibrillation.





Weaning trials were attempted this a.m. with a pressure support of 7 according 

to verbal report patient apparently had tachypnea within 5 minutes hence was 

restarted on full pressure-controlled ventilation.





4/27/17





No new overnight events





Pt was extubated


doing well


appears to be confused





Concern for abdominal distention


2 episodes of emesis 





No fevers, chills.





NG in place














Objective





- Vital Signs


Vital signs: 


 Vital Signs











Temp  98.2 F   04/27/17 12:00


 


Pulse  73   04/27/17 15:41


 


Resp  14   04/27/17 14:00


 


BP  94/62   04/26/17 12:00


 


Pulse Ox  98   04/27/17 14:00








 Intake & Output











 04/26/17 04/27/17 04/27/17





 18:59 06:59 18:59


 


Intake Total 1826 998 500


 


Output Total 812 1185 450


 


Balance 1014 -187 50


 


Weight  100.3 kg 100.3 kg


 


Intake:   


 


   240 20


 


    Sodium Chloride 0.9% 1, 260 240 20





    000 ml @ 20 mls/hr IV .   





    Q24H [facility] Rx#:[id number]   


 


  Intake, IV Titration 1050 500 480





  Amount   


 


    Albumin Human 5% 250 ml 500  





    In Empty Bag 1 bag @ 250   





    mls/hr IVPB ONCE ONE Rx#:   





    [id number]   


 


    D5-0.45% NaCl with KCl   480





    20Meq/l 1,000 ml @ 80 mls   





    /hr IV .[id number] [facility] Rx#:   





    [id number]   


 


    Heparin Sodium,Porcine/ 500 500 





    D5w Pmx 25,000 unit In   





    Dextrose/Water 1 500ml.   





    bag @ 10 UNITS/KG/HR 19.   





    98 mls/hr IV .Q24H [facility] Rx   





    #:[id number]   


 


    Propofol 500 mg In Empty 50  





    Bag 1 bag @ Titrate IV .   





    Q0M [facility] Rx#:[id number]   


 


  Tube Feeding 516 258 0


 


Output:   


 


  Urine 812 1185 450


 


Other:   


 


  Voiding Method Indwelling Catheter Indwelling Catheter 


 


  # Bowel Movements 1 1 








 ABP, PAP, CO, CI - Last Documented











Arterial Blood Pressure        138/50


 


Pulmonary Artery Pressure      46/23


 


Cardiac Output                 6.8


 


Cardiac Index                  3.3

















- Exam


Gen. appearanceawake answers some questions appropriately





Lungs diminished breath sounds no rhonchi wheezing or crackles appreciated





Heart S1-S2 heard no significant murmurs appreciated





Abdomen is soft nontender no organomegaly, some distention.





Lower extremities no significant edema





Neuro awake moving all four extremities




















- Labs


CBC & Chem 7: 


 04/27/17 04:25





 04/27/17 11:28


Labs: 


 Abnormal Lab Results - Last 24 Hours (Table)











  04/26/17 04/27/17 04/27/17 Range/Units





  20:38 00:22 03:22 


 


WBC     (3.8-10.6)  k/uL


 


RBC     (4.30-5.90)  m/uL


 


Hgb     (13.0-17.5)  gm/dL


 


Hct     (39.0-53.0)  %


 


Neutrophils #     (1.3-7.7)  k/uL


 


PT     (9.0-12.0)  sec


 


APTT     (22.0-30.0)  sec


 


ABG pH     (7.35-7.45)  


 


ABG pCO2     (35-45)  mmHg


 


ABG O2 Saturation     (94-97)  %


 


Potassium     (3.5-5.1)  mmol/L


 


Chloride     ()  mmol/L


 


Carbon Dioxide     (22-30)  mmol/L


 


BUN     (9-20)  mg/dL


 


Glucose     (74-99)  mg/dL


 


POC Glucose (mg/dL)  143 H  170 H  139 H  (75-99)  mg/dL


 


Calcium     (8.4-10.2)  mg/dL


 


Magnesium     (1.6-2.3)  mg/dL














  04/27/17 04/27/17 04/27/17 Range/Units





  04:25 04:25 04:25 


 


WBC  19.2 H    (3.8-10.6)  k/uL


 


RBC  2.46 L    (4.30-5.90)  m/uL


 


Hgb  7.9 L    (13.0-17.5)  gm/dL


 


Hct  24.2 L    (39.0-53.0)  %


 


Neutrophils #  16.9 H    (1.3-7.7)  k/uL


 


PT   13.5 H   (9.0-12.0)  sec


 


APTT   65.6 H   (22.0-30.0)  sec


 


ABG pH     (7.35-7.45)  


 


ABG pCO2     (35-45)  mmHg


 


ABG O2 Saturation     (94-97)  %


 


Potassium    2.9 L*  (3.5-5.1)  mmol/L


 


Chloride    109 H  ()  mmol/L


 


Carbon Dioxide    21 L  (22-30)  mmol/L


 


BUN    44 H  (9-20)  mg/dL


 


Glucose    125 H  (74-99)  mg/dL


 


POC Glucose (mg/dL)     (75-99)  mg/dL


 


Calcium    8.1 L  (8.4-10.2)  mg/dL


 


Magnesium    2.5 H  (1.6-2.3)  mg/dL














  04/27/17 04/27/17 04/27/17 Range/Units





  05:19 07:56 12:04 


 


WBC     (3.8-10.6)  k/uL


 


RBC     (4.30-5.90)  m/uL


 


Hgb     (13.0-17.5)  gm/dL


 


Hct     (39.0-53.0)  %


 


Neutrophils #     (1.3-7.7)  k/uL


 


PT     (9.0-12.0)  sec


 


APTT     (22.0-30.0)  sec


 


ABG pH  7.46 H    (7.35-7.45)  


 


ABG pCO2  30 L    (35-45)  mmHg


 


ABG O2 Saturation  99.0 H    (94-97)  %


 


Potassium     (3.5-5.1)  mmol/L


 


Chloride     ()  mmol/L


 


Carbon Dioxide     (22-30)  mmol/L


 


BUN     (9-20)  mg/dL


 


Glucose     (74-99)  mg/dL


 


POC Glucose (mg/dL)   132 H  175 H  (75-99)  mg/dL


 


Calcium     (8.4-10.2)  mg/dL


 


Magnesium     (1.6-2.3)  mg/dL








 Microbiology - Last 24 Hours (Table)











 04/13/17 16:40 Acid Fast Bacilli Smear - Final





 Lung - Right Acid Fast Bacilli Culture - Preliminary


 


 04/24/17 02:00 Blood Culture - Preliminary





 Blood    No Growth after 72 hours














Assessment and Plan


Plan: 


#1 acute hypoxic respiratory failure as expected however prolonged due to other 

comorbidities.





#2 CAD status post CABG postop day 13





#3 critical care polyneuropathy





#4 history of hypertension





#5 dyslipidemia





#6 proximal atrial fibrillation recurrent episodes of A. fib with RVR.





#7 history of significant alcohol use status post a prolonged episode of 

delirium tremens





#8 gout





#9 diabetes mellitus currently on insulin drip.





#10 dysphagia secondary to above





#11 M .catarrhalis tracheal bronchitis





12. Colonic distention. 





Plan





s/p extubation





tolerating  well





Abdominal xr reviewed


rectal tube to be placed





Continue ongoing care.

## 2017-04-27 NOTE — P.PN
Subjective





61-year-old male patient with status post carotid bypass surgery and the 

patient is postop day #11.  The patient has failed to wean off the mechanical 

ventilator.  Immediately postop the patient had pulmonate complications 

including mucous plugs and atelectasis of the left lung.  He required 

bronchoscopy and therapeutic airway suctioning and all of the respiratory 

cultures came back negative.  This morning, the patient was on a volume cycled 

assist-control mode of ventilation at the rate of 20, tidal volume 450, FiO2 of 

40% and a PEEP of 5.  I reviewed the blood gases and I reviewed also the chest x

-ray.  I noted that the patient was unable to tolerate assist-control mode.  

Attempts to wean him off the sedation [place] as the patient was becoming 

restless and a successful the mechanical ventilator.  Based on this, I switched 

this patient to a pressure support mode of ventilation and I was able to 

completely wean him off sedation.  He was wide awake all he was hardly able to 

wiggle his toes or move his fingers and he was unable to raise his had or arms 

against gravity.  He was having copious amount of rest or secretions and he was 

requiring frequent suctioning.  At a later stage, switch this patient to a 

pressure control mode of ventilation with a PEEP of 7 and a pressure control of 

20 and his FiO2 was At 60%.  He remains hemodynamically stable.  He is 

producing adequate amount of urine output.  No pressors at this point.  No 

fever.  No chills.  Chest tubes have been all removed.  He is tolerating tube 

feeds.  Atelectatic discussion with the cardiothoracic surgeon and will plan to 

proceed with a PEG and trach if the patient ultimately failed weaning.  One 

concern is that he has significant neuromuscular weakness which is probably a 

critical illness polyneuropathy and myopathy.





On 04/25/2017 the patient remains intubated on a mechanical ventilator.  I was 

able to the sedation completely on the patient.  He is awake at this point and 

he had been awake throughout the night.  He is following simple commands.  

Motor functions are nearly absent.  The patient is extremely weak.  He can 

wiggle his toes and occasionally bend his knees.  Otherwise he cannot raise his 

arms and legs against gravity.  He has a cough reflex.  He can blink his eyes 

and raises eyebrows.  Reflexes are significantly diminished in the upper and 

lower extremities bilaterally.  Is on a pressure control mode of ventilation at 

the rate of 18, PC 18, PEEP of 7 and FiO2 of 50%.  Still having significant 

respiratory secretions through the orotracheal tube.  The chest x-ray from 

today shows moderate parenchymal opacity within the left lung base and the 

right lung base.  There is some atelectatic changes in the lung bases more so 

on the left.  The patient has also postop changes related to coronary artery 

bypass surgery.  No fever.  No chills.  Hemodynamically stable.  Producing 

adequate amount of urine output and the patient was diuresis with a combination 

of albumin and Lasix.  He is on tube feeds.





On 04/26/2017 the patient remains on a mechanical ventilator.  Seems to be much 

more awake compared to yesterday.  Motor functions remain weak over the patient 

is doing more activity and movement on today's evaluation.  We were able to 

bring the patient is sitting up position for a total of 2 hours today.  He 

tolerated well and following that he had a coughing spells and he had to be 

placed in bed.  He remains in the same vent setting with a pressure control of 

18, PEEP of 7, FiO2 of 50% and rate of 18.  Chest x-ray shows adequate 

expansion of the left lung with a few being in good location.  Sputum analysis 

showed Moraxella catarrhalis and the based on that the patient was started on 

Levaquin.  Note that he had also Klebsiella PNEUMONIA IN HIS URINE, AND BOTH OF 

THESE MICROORGANISMS SHOULD RESPOND TO LEVAQUIN.  Meanwhile, the patient is 

receiving enteral feeding for nutritional support.  IV Solu Medrol is been 

discontinued.  We'll doing daily physical therapy and passive range of motion.  

He remains on IV heparin.  He remains on IV insulin for blood sugar control.  

Morning blood gases showed a pH of 7.51 with a pCO2 of 26 and pO2 of 79.  

Weaning parameters are still poor as the patient has rapid shallow breathing 

index around 120 and the patient becomes tachypneic and the LOW tidal volumes.








On 04/27/2017 the patient is being seen in follow-up.  Earlier this morning the 

patient was still mechanical ventilator on a pressure control mode.  Chest x-

ray from earlier this morning showed no significant abnormalities.  There was 

improvement in aeration in lung bases bilaterally especially on the left.  NG 

tube was still in a good location below the diaphragm.  Meanwhile affect 

abdominal film was done this morning and that showed a component of ileus.  

Note that overnight, the patient's tube feeds were discontinued knowing that he 

had 2 bouts of emesis.  Nevertheless, despite ongoing gastrointestinal 

abnormalities, the patient is doing very well while being on mechanical 

ventilator and is awake and alert.  We checked his weaning parameters this 

morning and the numbers looked very well.  He was given a pressure support of 5 

and PEEP of 5 and a breathing trial for a total of 30 minutes and following 

that we made a decision to extubate this patient.  This was a difficult 

decision to make knowing that the patient had an underlying abdominal ileus and 

he is still having significant motor weakness in the upper and lower 

extremities.  Nevertheless, I noted that his motor function is improved his 

cough improved and he had very decent weaning parameters.  I suspected that 

this will be his last chance of being extubated and if not successful, he will 

need a PEG and trach with the next 24 hours.  Based on this, I extubated this 

patient.  He remains on IV heparin.  He remains on IV insulin.  He is also on 

Levaquin.  He is afebrile.  He is awake and following commands.  Motor function 

is gradually improving.  No other new complaints otherwise for now.  Noted post 

extubation, the patient was placed on 5 L of oxygen nasal cannula with 

saturation of 94-95%.  He continued to bleed comfortably.  He was placed in 

sitting up position in ICU.





Objective





- Vital Signs


Vital signs: 


 Vital Signs











Temp  98.2 F   04/27/17 12:00


 


Pulse  76   04/27/17 12:30


 


Resp  42 H  04/27/17 12:30


 


BP  94/62   04/26/17 12:00


 


Pulse Ox  95   04/27/17 12:30








 Intake & Output











 04/26/17 04/27/17 04/27/17





 18:59 06:59 18:59


 


Intake Total 1826 998 340


 


Output Total 812 1185 270


 


Balance 1014 -187 70


 


Weight  100.3 kg 100.3 kg


 


Intake:   


 


   240 20


 


    Sodium Chloride 0.9% 1, 260 240 20





    000 ml @ 20 mls/hr IV .   





    Q24H [facility] Rx#:[id number]   


 


  Intake, IV Titration 1050 500 320





  Amount   


 


    Albumin Human 5% 250 ml 500  





    In Empty Bag 1 bag @ 250   





    mls/hr IVPB ONCE ONE Rx#:   





    [id number]   


 


    D5-0.45% NaCl with KCl   320





    20Meq/l 1,000 ml @ 80 mls   





    /hr IV .[id number] [facility] Rx#:   





    [id number]   


 


    Heparin Sodium,Porcine/ 500 500 





    D5w Pmx 25,000 unit In   





    Dextrose/Water 1 500ml.   





    bag @ 10 UNITS/KG/HR 19.   





    98 mls/hr IV .Q24H [facility] Rx   





    #:[id number]   


 


    Propofol 500 mg In Empty 50  





    Bag 1 bag @ Titrate IV .   





    Q0M [facility] Rx#:[id number]   


 


  Tube Feeding 516 258 0


 


Output:   


 


  Urine 812 1185 270


 


Other:   


 


  Voiding Method Indwelling Catheter Indwelling Catheter 


 


  # Bowel Movements 1 1 








 ABP, PAP, CO, CI - Last Documented











Arterial Blood Pressure        152/40


 


Pulmonary Artery Pressure      46/23


 


Cardiac Output                 6.8


 


Cardiac Index                  3.3

















- Exam





Head exam was generally normal. There was no scleral icterus or corneal arcus. 

Mucous membranes were moist.Neck was supple and without jugular venous 

distension, thyromegaly, or carotid bruits. Carotids were easily palpable 

bilaterally. There was no adenopathy.  The patient has an NG tube in place and 

the patient has no stridor.  He has some degree of crowding of the posterior 

oropharynx.  Mucosal membranes are dry.  Lung sounds are diminished in lung 

bases bilaterally otherwise clear.  Heart sounds are regular, positive S1-S2, 

no cervical murmurs appreciated and sternum stable clean and intact.  Abdomen 

is slightly distended.  This positive tympany.  Bowel sounds are hypoactive.  

There is no direct tenderness.  No rebound tensile guarding.Examination of the 

extremities revealed easily palpable radial, femoral and pedal pulses. There 

was no cyanosis, clubbing or edema.  Neurologically, the patient is awake and 

alert.  He is, indicating.  No focal neurological deficit.  Motor function is 

extremely weak overall improved compared to yesterday.





- Labs


CBC & Chem 7: 


 04/27/17 04:25





 04/27/17 11:28


Labs: 


 Abnormal Lab Results - Last 24 Hours (Table)











  04/26/17 04/26/17 04/27/17 Range/Units





  16:14 20:38 00:22 


 


WBC     (3.8-10.6)  k/uL


 


RBC     (4.30-5.90)  m/uL


 


Hgb     (13.0-17.5)  gm/dL


 


Hct     (39.0-53.0)  %


 


Neutrophils #     (1.3-7.7)  k/uL


 


PT     (9.0-12.0)  sec


 


APTT     (22.0-30.0)  sec


 


ABG pH     (7.35-7.45)  


 


ABG pCO2     (35-45)  mmHg


 


ABG O2 Saturation     (94-97)  %


 


Potassium     (3.5-5.1)  mmol/L


 


Chloride     ()  mmol/L


 


Carbon Dioxide     (22-30)  mmol/L


 


BUN     (9-20)  mg/dL


 


Glucose     (74-99)  mg/dL


 


POC Glucose (mg/dL)  133 H  143 H  170 H  (75-99)  mg/dL


 


Calcium     (8.4-10.2)  mg/dL


 


Magnesium     (1.6-2.3)  mg/dL














  04/27/17 04/27/17 04/27/17 Range/Units





  03:22 04:25 04:25 


 


WBC   19.2 H   (3.8-10.6)  k/uL


 


RBC   2.46 L   (4.30-5.90)  m/uL


 


Hgb   7.9 L   (13.0-17.5)  gm/dL


 


Hct   24.2 L   (39.0-53.0)  %


 


Neutrophils #   16.9 H   (1.3-7.7)  k/uL


 


PT    13.5 H  (9.0-12.0)  sec


 


APTT    65.6 H  (22.0-30.0)  sec


 


ABG pH     (7.35-7.45)  


 


ABG pCO2     (35-45)  mmHg


 


ABG O2 Saturation     (94-97)  %


 


Potassium     (3.5-5.1)  mmol/L


 


Chloride     ()  mmol/L


 


Carbon Dioxide     (22-30)  mmol/L


 


BUN     (9-20)  mg/dL


 


Glucose     (74-99)  mg/dL


 


POC Glucose (mg/dL)  139 H    (75-99)  mg/dL


 


Calcium     (8.4-10.2)  mg/dL


 


Magnesium     (1.6-2.3)  mg/dL














  04/27/17 04/27/17 04/27/17 Range/Units





  04:25 05:19 07:56 


 


WBC     (3.8-10.6)  k/uL


 


RBC     (4.30-5.90)  m/uL


 


Hgb     (13.0-17.5)  gm/dL


 


Hct     (39.0-53.0)  %


 


Neutrophils #     (1.3-7.7)  k/uL


 


PT     (9.0-12.0)  sec


 


APTT     (22.0-30.0)  sec


 


ABG pH   7.46 H   (7.35-7.45)  


 


ABG pCO2   30 L   (35-45)  mmHg


 


ABG O2 Saturation   99.0 H   (94-97)  %


 


Potassium  2.9 L*    (3.5-5.1)  mmol/L


 


Chloride  109 H    ()  mmol/L


 


Carbon Dioxide  21 L    (22-30)  mmol/L


 


BUN  44 H    (9-20)  mg/dL


 


Glucose  125 H    (74-99)  mg/dL


 


POC Glucose (mg/dL)    132 H  (75-99)  mg/dL


 


Calcium  8.1 L    (8.4-10.2)  mg/dL


 


Magnesium  2.5 H    (1.6-2.3)  mg/dL














  04/27/17 Range/Units





  12:04 


 


WBC   (3.8-10.6)  k/uL


 


RBC   (4.30-5.90)  m/uL


 


Hgb   (13.0-17.5)  gm/dL


 


Hct   (39.0-53.0)  %


 


Neutrophils #   (1.3-7.7)  k/uL


 


PT   (9.0-12.0)  sec


 


APTT   (22.0-30.0)  sec


 


ABG pH   (7.35-7.45)  


 


ABG pCO2   (35-45)  mmHg


 


ABG O2 Saturation   (94-97)  %


 


Potassium   (3.5-5.1)  mmol/L


 


Chloride   ()  mmol/L


 


Carbon Dioxide   (22-30)  mmol/L


 


BUN   (9-20)  mg/dL


 


Glucose   (74-99)  mg/dL


 


POC Glucose (mg/dL)  175 H  (75-99)  mg/dL


 


Calcium   (8.4-10.2)  mg/dL


 


Magnesium   (1.6-2.3)  mg/dL








 Microbiology - Last 24 Hours (Table)











 04/13/17 16:40 Acid Fast Bacilli Smear - Final





 Lung - Right Acid Fast Bacilli Culture - Preliminary


 


 04/24/17 02:00 Blood Culture - Preliminary





 Blood    No Growth after 72 hours














Assessment and Plan


Plan: 





Assessment





1 Coronary artery disease status post carotid bypass surgery and the patient is 

postop day #14





2 prolonged postoperative ventilator-dependent history failure, multifactorial.

  The active issue for now as the significant neuromuscular weakness that has 

complicated the patient's recovery.





On 04/26/2017 the patient remains on a pressure control mode of ventilation.  

Weaning parameters are weak.  The patient has profound neuromuscular weakness 

which is the main obstacle to hours further weaning.





On 04/27/2017, the patient was extubated with the above-mentioned concerns. .  

There is a chest and the patient may fail extubation based on the fact that he 

still weak and he has an abdominal ileus which probably may affect his 

breathing.  As such, we would monitor this patient very closely in the 

intensive care unit and consider reintubation if he fails and proceed with a 

PEG and trach in a.m. if he is unable to tolerate extubation.





3 recurrent mucous plugs with excessive respiratory secretions requiring 

bronchoscopy and a peak airway suctioning and a bronchoscopy cultures came back 

negative for any microbial growth


The most recent sputum analysis showing Moraxella catarrhalis and the patient 

is currently on Levaquin.





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 

weakness, improving slowly





5 hypertension





6 hyperlipidemia





7 atrial fibrillation, paroxysmal currently on normal sinus rhythm, currently 

on a heparin drip and the patient's rhythm is sinus with a controlled rate





8 enteral feeding for nutritional support





9 postoperative anemia, currently hemoglobin is stable





10 alcoholism, recovered from DT





11 gout





12 diabetes mellitus, currently on an insulin drip 





13 distention of the bowel, favored a colonic ileus.





Plan





Monitor the patient closely in the intensive care unit.  Consider reintubation 

if he failed extubation.  There is a concern that the patient may fill based on 

the fact that he is still having significant neuromuscular weakness and he has 

developed a colonic ileus.  Stop tube feeds for now.  He was given Reglan.  

Give Colace.  Continue the rest of the supportive care.  We'll proceed with a 

PEG and trach if the patient fails this extubation.  He is being monitored very 

closely is in the intensive care unit.  We'll continue to follow make further 

recommendations based on his progress.  The case was discussed with the 

cardiothoracic surgeon.


Evaluation was done in 40 minutes.


Time with Patient: Greater than 30

## 2017-04-27 NOTE — P.PN
Subjective


Principal diagnosis: 


Status post CABG, respiratory failure, atrial fibrillation and inability to 

extubate because of poor oxygenation





This is a 60-year-old gentleman who is status post prior to coronary bypass 

surgery.  Patient had a prolonged stay in ICU on mechanical ventilator support.

  He is also had some neuromuscular weakness.  Today patient was extubated and 

so far he seems to be tolerating.  He did develop some ileus and abdominal 

distention.  Patient does have all sounds and having some stools.  His 

potassium was low in the morning but it is supplemented.  Is a concern that if 

he continues to this problem.  Patient may require reintubation.  At this time.

  Patient is maintaining sinus rhythm.  We'll continue his current medical 

therapy.  Tube  Feeding is held for now








Objective





- Vital Signs


Vital signs: 


 Vital Signs











Temp  98.2 F   04/27/17 12:00


 


Pulse  70   04/27/17 14:00


 


Resp  14   04/27/17 14:00


 


BP  94/62   04/26/17 12:00


 


Pulse Ox  98   04/27/17 14:00








 Intake & Output











 04/26/17 04/27/17 04/27/17





 18:59 06:59 18:59


 


Intake Total 1826 998 500


 


Output Total 812 1185 450


 


Balance 1014 -187 50


 


Weight  100.3 kg 100.3 kg


 


Intake:   


 


   240 20


 


    Sodium Chloride 0.9% 1, 260 240 20





    000 ml @ 20 mls/hr IV .   





    Q24H [facility] Rx#:[id number]   


 


  Intake, IV Titration 1050 500 480





  Amount   


 


    Albumin Human 5% 250 ml 500  





    In Empty Bag 1 bag @ 250   





    mls/hr IVPB ONCE ONE Rx#:   





    [id number]   


 


    D5-0.45% NaCl with KCl   480





    20Meq/l 1,000 ml @ 80 mls   





    /hr IV .[id number] [facility] Rx#:   





    [id number]   


 


    Heparin Sodium,Porcine/ 500 500 





    D5w Pmx 25,000 unit In   





    Dextrose/Water 1 500ml.   





    bag @ 10 UNITS/KG/HR 19.   





    98 mls/hr IV .Q24H [facility] Rx   





    #:[id number]   


 


    Propofol 500 mg In Empty 50  





    Bag 1 bag @ Titrate IV .   





    Q0M [facility] Rx#:[id number]   


 


  Tube Feeding 516 258 0


 


Output:   


 


  Urine 812 1185 450


 


Other:   


 


  Voiding Method Indwelling Catheter Indwelling Catheter 


 


  # Bowel Movements 1 1 








 ABP, PAP, CO, CI - Last Documented











Arterial Blood Pressure        138/50


 


Pulmonary Artery Pressure      46/23


 


Cardiac Output                 6.8


 


Cardiac Index                  3.3

















- Exam





GENERAL EXAM: Patient is more alert


HEENT: Normocephalic.


NECK: No masses, no nuchal rigidity.


CHEST: No chest wall deformity.


LUNGS: Breath sounds at bases


HEART: S1 and S2 normal with no audible mumurs or gallops. Regular rhythm, 

femorals equal on both sides..


ABDOMEN: Distended and tympanic.  Bowel sounds are present.


SKIN: No rashes


CENTRAL NERVOUS SYSTEM:  Sedated


EXTREMITIES: No cyanosis, clubbing or edema.





- Labs


CBC & Chem 7: 


 04/27/17 04:25





 04/27/17 11:28


Labs: 


 Abnormal Lab Results - Last 24 Hours (Table)











  04/26/17 04/26/17 04/27/17 Range/Units





  16:14 20:38 00:22 


 


WBC     (3.8-10.6)  k/uL


 


RBC     (4.30-5.90)  m/uL


 


Hgb     (13.0-17.5)  gm/dL


 


Hct     (39.0-53.0)  %


 


Neutrophils #     (1.3-7.7)  k/uL


 


PT     (9.0-12.0)  sec


 


APTT     (22.0-30.0)  sec


 


ABG pH     (7.35-7.45)  


 


ABG pCO2     (35-45)  mmHg


 


ABG O2 Saturation     (94-97)  %


 


Potassium     (3.5-5.1)  mmol/L


 


Chloride     ()  mmol/L


 


Carbon Dioxide     (22-30)  mmol/L


 


BUN     (9-20)  mg/dL


 


Glucose     (74-99)  mg/dL


 


POC Glucose (mg/dL)  133 H  143 H  170 H  (75-99)  mg/dL


 


Calcium     (8.4-10.2)  mg/dL


 


Magnesium     (1.6-2.3)  mg/dL














  04/27/17 04/27/17 04/27/17 Range/Units





  03:22 04:25 04:25 


 


WBC   19.2 H   (3.8-10.6)  k/uL


 


RBC   2.46 L   (4.30-5.90)  m/uL


 


Hgb   7.9 L   (13.0-17.5)  gm/dL


 


Hct   24.2 L   (39.0-53.0)  %


 


Neutrophils #   16.9 H   (1.3-7.7)  k/uL


 


PT    13.5 H  (9.0-12.0)  sec


 


APTT    65.6 H  (22.0-30.0)  sec


 


ABG pH     (7.35-7.45)  


 


ABG pCO2     (35-45)  mmHg


 


ABG O2 Saturation     (94-97)  %


 


Potassium     (3.5-5.1)  mmol/L


 


Chloride     ()  mmol/L


 


Carbon Dioxide     (22-30)  mmol/L


 


BUN     (9-20)  mg/dL


 


Glucose     (74-99)  mg/dL


 


POC Glucose (mg/dL)  139 H    (75-99)  mg/dL


 


Calcium     (8.4-10.2)  mg/dL


 


Magnesium     (1.6-2.3)  mg/dL














  04/27/17 04/27/17 04/27/17 Range/Units





  04:25 05:19 07:56 


 


WBC     (3.8-10.6)  k/uL


 


RBC     (4.30-5.90)  m/uL


 


Hgb     (13.0-17.5)  gm/dL


 


Hct     (39.0-53.0)  %


 


Neutrophils #     (1.3-7.7)  k/uL


 


PT     (9.0-12.0)  sec


 


APTT     (22.0-30.0)  sec


 


ABG pH   7.46 H   (7.35-7.45)  


 


ABG pCO2   30 L   (35-45)  mmHg


 


ABG O2 Saturation   99.0 H   (94-97)  %


 


Potassium  2.9 L*    (3.5-5.1)  mmol/L


 


Chloride  109 H    ()  mmol/L


 


Carbon Dioxide  21 L    (22-30)  mmol/L


 


BUN  44 H    (9-20)  mg/dL


 


Glucose  125 H    (74-99)  mg/dL


 


POC Glucose (mg/dL)    132 H  (75-99)  mg/dL


 


Calcium  8.1 L    (8.4-10.2)  mg/dL


 


Magnesium  2.5 H    (1.6-2.3)  mg/dL














  04/27/17 Range/Units





  12:04 


 


WBC   (3.8-10.6)  k/uL


 


RBC   (4.30-5.90)  m/uL


 


Hgb   (13.0-17.5)  gm/dL


 


Hct   (39.0-53.0)  %


 


Neutrophils #   (1.3-7.7)  k/uL


 


PT   (9.0-12.0)  sec


 


APTT   (22.0-30.0)  sec


 


ABG pH   (7.35-7.45)  


 


ABG pCO2   (35-45)  mmHg


 


ABG O2 Saturation   (94-97)  %


 


Potassium   (3.5-5.1)  mmol/L


 


Chloride   ()  mmol/L


 


Carbon Dioxide   (22-30)  mmol/L


 


BUN   (9-20)  mg/dL


 


Glucose   (74-99)  mg/dL


 


POC Glucose (mg/dL)  175 H  (75-99)  mg/dL


 


Calcium   (8.4-10.2)  mg/dL


 


Magnesium   (1.6-2.3)  mg/dL








 Microbiology - Last 24 Hours (Table)











 04/13/17 16:40 Acid Fast Bacilli Smear - Final





 Lung - Right Acid Fast Bacilli Culture - Preliminary


 


 04/24/17 02:00 Blood Culture - Preliminary





 Blood    No Growth after 72 hours














Assessment and Plan


(1) Status post coronary artery bypass graft


Status: Acute   





(2) Family history of heart disease


Status: Acute   





(3) Hyperlipidemia


Status: Acute   





(4) Hypertension


Status: Acute   





(5) Nicotine dependence, chewing tobacco, uncomplicated


Status: Acute   





(6) Atrial fibrillation


Status: Acute   


Plan: 


Patient is extubated and alert and oriented and following commands.  Patient 

has ileus that is being addressed.  Patient is maintaining sinus rhythm.  Tube 

feeding is being held.  Continue the rest of the medication

## 2017-04-27 NOTE — XR
EXAMINATION TYPE: XR chest 1V portable

 

DATE OF EXAM: 4/27/2017 6:33 AM

 

Comparison: 4/26/2017

 

Clinical History: 61-year-old male on mechanical ventilator

 

Findings:

ET tube and NG tube remain in place. Left PICC tip in the right atrium. Median sternotomy wires are p
resent.

 

Heart remains mild to moderately enlarged. On vasculature appear within normal limits. Continued smal
l left pleural effusion with prominent left basilar and retrocardiac opacity.

 

 

Impression:

 

1. Stable mild-to-moderate cardiomegaly.

2. Continued small left effusion with prominent left basilar and retrocardiac consolidation and/or at
electasis.

## 2017-04-27 NOTE — XR
EXAMINATION TYPE: XR chest 1V portable

 

DATE OF EXAM: 4/27/2017 11:24 AM

 

Comparison: Earlier today

 

Clinical History: 61-year-old male NG tube placement

 

Findings:

Limited exam due to excessively lordotic positioning.

 

Left PICC tip within the right atrium. Median sternotomy wires. Cardiomegaly and small left effusion 
with retrocardiac and left basilar opacity.

 

NG tube appears to course into the stomach. However, it then loops around with tip near the location 
of the GE junction.

 

 

Impression:

 

1. NG tube placed and courses below the diaphragms but then appears to loop around with tip close to 
the region of the GE junction.

2. Lordotic exam is otherwise stable.

## 2017-04-27 NOTE — XR
EXAMINATION TYPE: XR abdomen 1V

 

DATE OF EXAM: 4/27/2017 6:33 AM

 

CLINICAL DATA:  61-year-old male with distended abdomen, PHH

 

COMPARISON:  None

 

FINDINGS:

Chest is reported separately.

 

Supine imaging limited for assessment of free air.

 

Diffusely air distended colon, mildly dilated at the level of the hepatic flexure at 9.8 cm. No defin
ite dilated small bowel loops seen.

 

 

 

IMPRESSION:

 

1. Supine imaging limited for assessment of free air.

2. Diffusely air distended colon with some segments dilated up to 9.8 cm. Diffuse colonic ileus is fa
vored. Distal colonic obstruction considered unlikely given the lack of significant stool.

3. No evidence for small bowel obstruction.

## 2017-04-27 NOTE — P.PN
<Odell Gómez MARIBETH - Last Filed: 04/27/17 16:46>





Progress Note - Text





[facility] Nursing





Principal diagnosis: 





Coronary artery disease, status post prior stenting to his right coronary 

artery.  Preserved left ventricular function.  Hyperlipidemia.  Hypertension.  

ETOH abuse.





POD #14 total arterial non-aortic patch off-pump double coronary artery bypass 

grafting using in situ skeletonized right internal mammary artery to the left 

anterior descending artery across the anterior midline in situ totally 

skeletonized left internal mammary artery to the first obtuse marginal artery.  

Intraoperative transesophageal echocardiogram and epi-aortic scanning.  

Intraoperative graft flow measurements using the Medist[employer] system





POD #14 ,Patient had postoperative mucous plugging with increasing oxygen 

demand requiring bronchoscopy the night of surgery.  Bronchial washings 

negative for bacteria/viruses to date.





Pt developed postoperative alcohol withdrawal requiring increased sedation and 

continued mechanical ventilation.





He remains with mechanical ventilator support, he is alert and following some 

simple commands.  Still remains with generalized weakness, although moving his 

upper and lower extremities more so today.  He is shaking his head 

appropriately to yes and no questions.








Vital Signs: Afebrile


 Vital Signs - 24 hr











  04/26/17 04/26/17 04/26/17





  11:16 11:33 12:00


 


Temperature   100.0 F H


 


Pulse Rate 80 90 88


 


Respiratory   25 H





Rate   


 


Blood Pressure   94/62


 


O2 Sat by Pulse   94 L





Oximetry   














  04/26/17 04/26/17 04/26/17





  13:00 14:00 15:00


 


Temperature   


 


Pulse Rate 131 H 120 H 122 H


 


Respiratory 27 H 26 H 31 H





Rate   


 


Blood Pressure   


 


O2 Sat by Pulse 95 96 96





Oximetry   














  04/26/17 04/26/17 04/26/17





  16:00 17:00 17:16


 


Temperature  102.1 F H 


 


Pulse Rate 79 79 82


 


Respiratory 23 25 H 





Rate   


 


Blood Pressure   


 


O2 Sat by Pulse 95 94 L 





Oximetry   














  04/26/17 04/26/17 04/26/17





  17:33 18:00 19:00


 


Temperature   


 


Pulse Rate 95 88 89


 


Respiratory  29 H 23





Rate   


 


Blood Pressure   


 


O2 Sat by Pulse  95 95





Oximetry   














  04/26/17 04/26/17 04/26/17





  20:00 20:20 20:31


 


Temperature 99.5 F  


 


Pulse Rate 77 80 81


 


Respiratory 19  





Rate   


 


Blood Pressure   


 


O2 Sat by Pulse 95  





Oximetry   














  04/26/17 04/26/17 04/26/17





  21:00 22:00 23:00


 


Temperature   


 


Pulse Rate 75 71 73


 


Respiratory 20 21 20





Rate   


 


Blood Pressure   


 


O2 Sat by Pulse 97 97 97





Oximetry   














  04/26/17 04/26/17 04/26/17





  23:31 23:41 23:42


 


Temperature   


 


Pulse Rate 74 76 77


 


Respiratory   24





Rate   


 


Blood Pressure   


 


O2 Sat by Pulse   96





Oximetry   














  04/26/17 04/27/17 04/27/17





  23:47 00:00 01:00


 


Temperature  99 F 


 


Pulse Rate  72 68


 


Respiratory  21 18





Rate   


 


Blood Pressure   


 


O2 Sat by Pulse 97 97 96





Oximetry   














  04/27/17 04/27/17 04/27/17





  02:00 03:00 03:10


 


Temperature   


 


Pulse Rate 69 71 


 


Respiratory 24 25 H 





Rate   


 


Blood Pressure   


 


O2 Sat by Pulse 97 97 97





Oximetry   














  04/27/17 04/27/17 04/27/17





  03:11 03:13 03:28


 


Temperature   


 


Pulse Rate  68 69


 


Respiratory 25 H  





Rate   


 


Blood Pressure   


 


O2 Sat by Pulse   





Oximetry   














  04/27/17 04/27/17 04/27/17





  04:00 05:00 06:00


 


Temperature   


 


Pulse Rate 68 68 69


 


Respiratory 16 24 30 H





Rate   


 


Blood Pressure   


 


O2 Sat by Pulse 98 97 99





Oximetry   














  04/27/17 04/27/17 04/27/17





  07:00 07:41 08:00


 


Temperature   98.1 F


 


Pulse Rate 69 60 64


 


Respiratory 25 H  21





Rate   


 


Blood Pressure   


 


O2 Sat by Pulse 97  98





Oximetry   














  04/27/17 04/27/17 04/27/17





  08:05 09:00 09:30


 


Temperature   


 


Pulse Rate 67 70 71


 


Respiratory  20 20





Rate   


 


Blood Pressure   


 


O2 Sat by Pulse  98 99





Oximetry   














  04/27/17 04/27/17





  10:00 10:30


 


Temperature  


 


Pulse Rate 63 65


 


Respiratory 23 18





Rate  


 


Blood Pressure  


 


O2 Sat by Pulse 99 98





Oximetry  








 ABP, PAP, CO, CI - Last 8 Hours











Arterial Blood Pressure        140/54


 


Arterial Blood Pressure        123/76


 


Arterial Blood Pressure        176/70


 


Arterial Blood Pressure        173/68


 


Arterial Blood Pressure        145/64


 


Arterial Blood Pressure        126/54


 


Arterial Blood Pressure        127/51


 


Arterial Blood Pressure        127/51


 


Arterial Blood Pressure        121/53

















Labs: 


 Short CBC











  04/27/17 Range/Units





  04:25 


 


WBC  19.2 H  (3.8-10.6)  k/uL


 


Hgb  7.9 L  (13.0-17.5)  gm/dL


 


Hct  24.2 L  (39.0-53.0)  %


 


Plt Count  280  (150-450)  k/uL


 


Neutrophils #  16.9 H  (1.3-7.7)  k/uL








 BMP











  04/27/17





  04:25


 


Sodium  143


 


Potassium  2.9 L*


 


Chloride  109 H


 


Carbon Dioxide  21 L


 


BUN  44 H


 


Creatinine  1.11


 


Glucose  125 H


 


Calcium  8.1 L











ABG











ABG pH  7.46  (7.35-7.45)  H  04/27/17  05:19    


 


ABG pCO2  30 mmHg (35-45)  L  04/27/17  05:19    


 


ABG pO2  108 mmHg ()   04/27/17  05:19    


 


ABG O2 Saturation  99.0 % (94-97)  H  04/27/17  05:19    





PT/INR, D-dimer











PT  13.5 sec (9.0-12.0)  H  04/27/17  04:25    


 


INR  1.4  (<1.1)   04/27/17  04:25    








 Microbiology





04/13/17 16:40   Lung - Right   Acid Fast Bacilli Smear - Final


04/13/17 16:40   Lung - Right   Acid Fast Bacilli Culture - Preliminary


04/24/17 02:00   Blood   Blood Culture - Preliminary


                            No Growth after 72 hours


04/24/17 04:40   Sputum   Gram Stain - Final


04/24/17 04:40   Sputum   Sputum Culture - Final


                            Moraxella(branhamella) catarra


04/23/17 17:44   Urine,Catheterized   Urine Culture - Final


                            Klebsiella pneumoniae


04/13/17 16:40   Lung - Right   Fungal Culture - Preliminary


                            Lisandra tropicalis


04/13/17 16:40   Lung Aspirate - Right   Gram Stain - Final


04/13/17 16:40   Lung Aspirate - Right   Bronchial Washings Culture - Final





IV Fluids: 


0.9% normal saline at 20 mL/h


Heparin drip at 14 units per kilogram per hour.


Diprivan drip is on hold at this time in in anticipation for possible 

extubation.





Lungs: Few scattered rhonchi throughout, diminished bilateral bases.  

Respirations are unlabored and symmetrical with mechanical ventilator support.  

Current ventilator settings are as follows: AC 20, pressure control 18/0.82, 

FiO2 45%, PEEP 7.  Intake adjacent





O2 sat: 98% with mechanical ventilator support, current FiO2 setting is at 45%.





Heart:  S1S2, Regular rhythm and rate, negative for S3, gallop or murmur.  

Bedside telemetry showing normal sinus rhythm heart rate 68.





Sternum stable, chest incision clean with dressing clean and dry. no drainage 

noted.  Heart hugger in place.  





Knee-high CONNIE hose and sequential compression devices in place to bilateral 

lower extremities.





Abdomen:  Soft, distended, Positive bowel sounds present in all 4 quadrants, 

patient has had 3 bowel movements in the last 24 hours.  NG tube in place, 600 

mL of drainage out in the last 8 hours.  Emesis 2 reported from the nurse, NG 

tube placed.





CBGs: 110-143 mg/dL in the last 24 hours.





U/O:  Adequate, Angelo catheter for accurate I&O, 635 mL output in the last 8 

hours.





24 hr Total: 


 Intake & Output











 04/25/17 04/26/17 04/27/17 04/28/17





 06:59 06:59 06:59 06:59


 


Intake Total 8051.511 7283.230 2824 260


 


Output Total 2470 1790 1997 210


 


Balance -937.612 723.230 827 50


 


Weight 100.5 kg 101.6 kg 100.3 kg 100.3 kg








Active Medications





Hydrocodone Bitart/Acetaminophen (Norco 5-325)  2 each PO Q4HR PRN


   PRN Reason: Severe Pain


   Last Admin: 04/26/17 11:21 Dose:  2 each


Hydrocodone Bitart/Acetaminophen (Norco 5-325)  1 each PO Q4HR PRN


   PRN Reason: Moderate Pain


Amiodarone HCl (Cordarone)  200 mg PO BID [facility]


   Last Admin: 04/27/17 08:45 Dose:  200 mg


Aspirin (Aspirin)  325 mg PO DAILY [facility]


   Last Admin: 04/27/17 08:45 Dose:  325 mg


Atorvastatin Calcium (Lipitor)  40 mg PO DAILY [facility]


   Last Admin: 04/27/17 08:45 Dose:  40 mg


Benzocaine (Hurricaine Spray)  1 applic MUCOUS MEM QID PRN


   PRN Reason: Mouth Irritation


   Last Admin: 04/17/17 11:36 Dose:  1 applic


Benzocaine/Menthol (Cepacol Lozenge)  1 each MUCOUS MEM Q2H PRN


   PRN Reason: Sore Throat


Bisacodyl (Dulcolax)  10 mg RECTAL DAILY PRN


   PRN Reason: Constipation


   Last Admin: 04/19/17 17:18 Dose:  10 mg


Budesonide (Pulmicort)  1 mg INHALATION RT-BID [facility]


   Last Admin: 04/27/17 07:34 Dose:  1 mg


Chlorhexidine Gluconate (Peridex)  15 ml MUCOUS MEM BID [facility]


   Last Admin: 04/27/17 08:45 Dose:  15 ml


Digoxin (Lanoxin)  250 mcg OG-TUBE DAILY [facility]


   Last Admin: 04/27/17 08:45 Dose:  250 mcg


Heparin Sodium (Porcine) (Heparin)  0 unit IV PER PROTOCOL PRN; Protocol


   PRN Reason: Low PTT


   Last Admin: 04/23/17 16:59 Dose:  2,497 unit


Hydralazine HCl (Apresoline)  10 mg IVP Q4HR PRN


   PRN Reason: Blood Pressure - High


   Last Admin: 04/26/17 23:00 Dose:  10 mg


Propofol 500 mg/ IV Solution  50 mls @ 0 mls/hr IV .Q0M [facility]; Titrate


   PRN Reason: Protocol


   Last Admin: 04/26/17 23:40 Dose:  20 mcg/kg/min, 11.68 mls/hr


Heparin Sodium/Dextrose 25,000 (unit/ IV Solution)  500 mls @ 19.98 mls/hr IV 

.Q24H [facility]; 10 UNITS/KG/HR


   PRN Reason: Protocol


   Last Admin: 04/27/17 08:47 Dose:  14 units/kg/hr, 27.97 mls/hr


Sodium Chloride (Saline 0.9%)  1,000 mls @ 20 mls/hr IV .Q24H [facility]


   Last Admin: 04/26/17 11:29 Dose:  20 mls/hr


Levofloxacin 750 mg/ IV (Solution)  150 mls @ 100 mls/hr IVPB Q24H [facility]


   Last Admin: 04/26/17 16:16 Dose:  100 mls/hr


Potassium Chloride/Dextrose/Sod Cl (D5%-1/2ns-Kcl 20 Meq/L Iv Solution)  1,000 

mls @ 80 mls/hr IV .[id number] [facility]


   Last Admin: 04/27/17 08:48 Dose:  80 mls/hr


Insulin Human Lispro (Humalog)  0 unit SQ Q4H [facility]


   PRN Reason: Protocol


   Last Admin: 04/27/17 08:44 Dose:  Not Given


Ipratropium Bromide (Atrovent Nebulized)  0.5 mg INHALATION RT-Q4H [facility]


   Last Admin: 04/27/17 11:08 Dose:  0.5 mg


Iron/Minerals/Multivitamins (Theragran Liquid)  15 ml PO DAILY@1200 [facility]


   Last Admin: 04/26/17 11:29 Dose:  15 ml


Lactulose (Cephulac)  30 gm PO BID [facility]


   Last Admin: 04/26/17 20:29 Dose:  30 gm


Levalbuterol HCl (Xopenex Nebulized (Conc))  1.25 mg INHALATION RT-Q4H [facility]


   Last Admin: 04/27/17 11:08 Dose:  1.25 mg


Lisinopril (Zestril)  10 mg PO BID [facility]


   Last Admin: 04/26/17 21:35 Dose:  10 mg


Magnesium Hydroxide (Milk Of Magnesia)  2,400 mg PO BID PRN


   PRN Reason: Constipation


Metoclopramide HCl (Reglan)  10 mg IVP Q4H PRN


   PRN Reason: Nausea And Vomiting


Metoprolol Tartrate (Lopressor)  25 mg PO BID [facility]


   Last Admin: 04/27/17 08:46 Dose:  25 mg


Miscellaneous Information (Magnesium Per Protocol)  1 each MISCELLANE DAILY PRN

; Protocol


   PRN Reason: Per Protocol


Miscellaneous Information (Phosphorus Per Protocol)  1 each MISCELLANE DAILY PRN

; Protocol


   PRN Reason: Per Protocol


Miscellaneous Information (Potassium Per Protocol)  1 each MISCELLANE DAILY PRN

; Protocol


   PRN Reason: Per Protocol


Miscellaneous Information (Potassium Per Protocol)  1 each MISCELLANE DAILY PRN

; Protocol


   PRN Reason: Per Protocol


Morphine Sulfate (Morphine Sulfate (Inj))  2 mg IVP Q2H PRN


   PRN Reason: Severe Pain


   Last Admin: 04/26/17 22:16 Dose:  2 mg


Ondansetron HCl (Zofran)  4 mg IVP Q6HR PRN


   PRN Reason: Nausea And Vomiting


Pantoprazole Sodium (Protonix)  40 mg IVP DAILY [facility]


   Last Admin: 04/27/17 08:46 Dose:  40 mg


Senna/Docusate Sodium (Senokot-S)  2 each PO HS [facility]


   Last Admin: 04/26/17 22:40 Dose:  Not Given


Sodium Chloride (Saline Flush)  10 ml IV BID [facility]


   Last Admin: 04/27/17 08:46 Dose:  Not Given


Sodium Chloride (Saline Flush)  20 ml IV Q4HR PRN


   PRN Reason: PICC Line


Sodium Chloride (Saline Flush)  10 ml IV WEEKLY [facility]


   Last Admin: 04/27/17 08:46 Dose:  Not Given


Sodium Chloride (Saline Flush)  10 ml IV Q4HR PRN


   PRN Reason: PICC Line


Thiamine HCl (Vitamin B-1)  100 mg PO BID [facility]


   Last Admin: 04/27/17 08:47 Dose:  100 mg





Plan: 





1.  Continue aspirin, Lipitor, Lopressor, lisinopril, digoxin.  


2.  Continue amiodarone for atrial fibrillation/atrial flutter prophylaxis.  

Cardiology managing.


3.  Ventilator management and pulmonary management per Dr Mobley.  Possible 

extubation today.


4.  Physical therapy for range of motion exercises.


5.  Insulin/diabetic management per primary care service.


6.  Potassium will be replaced per protocol, D5 0.45 normal saline with 20 of 

KCl initiated at 80 mL per hour. 


7.  Daily labs, chest x-rays.  


8.  GI/DVT prophylaxis.


9.  Diprivan drip is on hold to evaluate underlying mentation and possible 

attempt to wean from the ventilator.


10. Heparin protocol continued for atrial fibrillation/atrial flutter.


11. Tube feedings on hold at this time, patient had an emesis 2 early this 

a.m.  NG tube has been placed and is on low intermittent suction.


12.  More recommendations as patient progresses.





<Oscar Porter - Last Filed: 04/27/17 16:56>





Progress Note - Text


The patient was seen and examined.  I agree with the above assessment and plan.

  He was extubated earlier today.  He is currently awake and follows commands 

and moves all extremities.  However he is lethargic and significantly 

debilitated.  He appears to have some stridor this afternoon.  We will 

administer Solu-Medrol and keep a close eye on his pulmonary status.  His 

abdomen is distended but I am told has improved since earlier this morning.  

There are no peritoneal signs.  Rectal tube was placed secondary to dilated 

bowel.  He already has an NG tube.  He remains on intravenous heparin.  His 

blood pressure is stable.  We'll continue with physical therapy as tolerated.

## 2017-04-27 NOTE — XR
EXAMINATION TYPE: XR abdomen 2V

 

DATE OF EXAM: 4/27/2017 3:11 PM

 

CLINICAL DATA:  61-year-old male with abdominal distention, [facility]

 

COMPARISON: Earlier today

 

FINDINGS:

 

Retrocardiac and left basilar opacity is redemonstrated. NG tube is partially obscured by motion fabiana
fact. There appears to be progressive distention at the level of the right hemicolon now measuring up
 to 11.9 cm versus 9.8 cm, earlier today.

 

IMPRESSION:

 

Diffuse gaseous distention of the colon redemonstrated. There is progressive dilatation at the right 
hemicolon now measuring up to 11.9 cm versus 9.8 cm, previously.

## 2017-04-28 LAB
ALP SERPL-CCNC: 71 U/L (ref 38–126)
ALT SERPL-CCNC: 62 U/L (ref 21–72)
ANION GAP SERPL CALC-SCNC: 9 MMOL/L
APTT BLD: 40.1 SEC (ref 22–30)
AST SERPL-CCNC: 33 U/L (ref 17–59)
BASOPHILS # BLD AUTO: 0 K/UL (ref 0–0.2)
BASOPHILS NFR BLD AUTO: 0 %
BUN SERPL-SCNC: 24 MG/DL (ref 9–20)
CALCIUM SPEC-MCNC: 8.4 MG/DL (ref 8.4–10.2)
CH: 32.7
CHCM: 33.8
CHLORIDE SERPL-SCNC: 112 MMOL/L (ref 98–107)
CO2 SERPL-SCNC: 21 MMOL/L (ref 22–30)
DIGOXIN SERPL-MCNC: 0.7 NG/ML
EOSINOPHIL # BLD AUTO: 0 K/UL (ref 0–0.7)
EOSINOPHIL NFR BLD AUTO: 0 %
ERYTHROCYTE [DISTWIDTH] IN BLOOD BY AUTOMATED COUNT: 2.64 M/UL (ref 4.3–5.9)
ERYTHROCYTE [DISTWIDTH] IN BLOOD: 14.2 % (ref 11.5–15.5)
GLUCOSE BLD-MCNC: 109 MG/DL (ref 75–99)
GLUCOSE BLD-MCNC: 120 MG/DL (ref 75–99)
GLUCOSE BLD-MCNC: 123 MG/DL (ref 75–99)
GLUCOSE BLD-MCNC: 124 MG/DL (ref 75–99)
GLUCOSE BLD-MCNC: 127 MG/DL (ref 75–99)
GLUCOSE BLD-MCNC: 133 MG/DL (ref 75–99)
GLUCOSE BLD-MCNC: 136 MG/DL (ref 75–99)
GLUCOSE BLD-MCNC: 136 MG/DL (ref 75–99)
GLUCOSE BLD-MCNC: 138 MG/DL (ref 75–99)
GLUCOSE BLD-MCNC: 143 MG/DL (ref 75–99)
GLUCOSE BLD-MCNC: 154 MG/DL (ref 75–99)
GLUCOSE BLD-MCNC: 154 MG/DL (ref 75–99)
GLUCOSE BLD-MCNC: 160 MG/DL (ref 75–99)
GLUCOSE BLD-MCNC: 192 MG/DL (ref 75–99)
GLUCOSE BLD-MCNC: 192 MG/DL (ref 75–99)
GLUCOSE BLD-MCNC: 198 MG/DL (ref 75–99)
GLUCOSE BLD-MCNC: 208 MG/DL (ref 75–99)
GLUCOSE BLD-MCNC: 219 MG/DL (ref 75–99)
GLUCOSE SERPL-MCNC: 194 MG/DL (ref 74–99)
HCT VFR BLD AUTO: 25.7 % (ref 39–53)
HDW: 3.06
HGB BLD-MCNC: 8.7 GM/DL (ref 13–17.5)
INR PPP: 1.2 (ref ?–1.1)
LUC NFR BLD AUTO: 1 %
LYMPHOCYTES # SPEC AUTO: 0.7 K/UL (ref 1–4.8)
LYMPHOCYTES NFR SPEC AUTO: 5 %
MCH RBC QN AUTO: 33.1 PG (ref 25–35)
MCHC RBC AUTO-ENTMCNC: 33.9 G/DL (ref 31–37)
MCV RBC AUTO: 97.5 FL (ref 80–100)
MONOCYTES # BLD AUTO: 0.3 K/UL (ref 0–1)
MONOCYTES NFR BLD AUTO: 2 %
NEUTROPHILS # BLD AUTO: 13.9 K/UL (ref 1.3–7.7)
NEUTROPHILS NFR BLD AUTO: 92 %
NON-AFRICAN AMERICAN GFR(MDRD): >60
POTASSIUM SERPL-SCNC: 3.6 MMOL/L (ref 3.5–5.1)
PROT SERPL-MCNC: 5.8 G/DL (ref 6.3–8.2)
PT BLD: 11.7 SEC (ref 9–12)
SODIUM SERPL-SCNC: 142 MMOL/L (ref 137–145)
WBC # BLD AUTO: 0.1 10*3/UL
WBC # BLD AUTO: 15.1 K/UL (ref 3.8–10.6)
WBC (PEROX): 16

## 2017-04-28 RX ADMIN — AMIODARONE HYDROCHLORIDE SCH MG: 200 TABLET ORAL at 08:25

## 2017-04-28 RX ADMIN — Medication SCH MG: at 20:24

## 2017-04-28 RX ADMIN — CEFAZOLIN SCH: 330 INJECTION, POWDER, FOR SOLUTION INTRAMUSCULAR; INTRAVENOUS at 12:19

## 2017-04-28 RX ADMIN — LEVALBUTEROL HYDROCHLORIDE SCH MG: 1.25 SOLUTION, CONCENTRATE RESPIRATORY (INHALATION) at 03:08

## 2017-04-28 RX ADMIN — LEVALBUTEROL HYDROCHLORIDE SCH MG: 1.25 SOLUTION, CONCENTRATE RESPIRATORY (INHALATION) at 11:53

## 2017-04-28 RX ADMIN — DEXTROSE MONOHYDRATE, SODIUM CHLORIDE, AND POTASSIUM CHLORIDE SCH MLS/HR: 50; 4.5; 1.49 INJECTION, SOLUTION INTRAVENOUS at 05:00

## 2017-04-28 RX ADMIN — SODIUM CHLORIDE, PRESERVATIVE FREE SCH: 5 INJECTION INTRAVENOUS at 20:25

## 2017-04-28 RX ADMIN — POTASSIUM CHLORIDE SCH MLS/HR: 7.46 INJECTION, SOLUTION INTRAVENOUS at 15:22

## 2017-04-28 RX ADMIN — ATORVASTATIN CALCIUM SCH MG: 40 TABLET, FILM COATED ORAL at 08:25

## 2017-04-28 RX ADMIN — DEXTROSE MONOHYDRATE, SODIUM CHLORIDE, AND POTASSIUM CHLORIDE SCH MLS/HR: 50; 4.5; 1.49 INJECTION, SOLUTION INTRAVENOUS at 09:13

## 2017-04-28 RX ADMIN — BUDESONIDE SCH MG: 1 SUSPENSION RESPIRATORY (INHALATION) at 07:56

## 2017-04-28 RX ADMIN — LEVALBUTEROL HYDROCHLORIDE SCH MG: 1.25 SOLUTION, CONCENTRATE RESPIRATORY (INHALATION) at 19:39

## 2017-04-28 RX ADMIN — METOPROLOL TARTRATE SCH MG: 25 TABLET, FILM COATED ORAL at 08:26

## 2017-04-28 RX ADMIN — DOCUSATE SODIUM AND SENNOSIDES SCH: 50; 8.6 TABLET ORAL at 20:25

## 2017-04-28 RX ADMIN — LEVOFLOXACIN SCH MLS/HR: 750 INJECTION, SOLUTION INTRAVENOUS at 16:30

## 2017-04-28 RX ADMIN — AMIODARONE HYDROCHLORIDE SCH MG: 200 TABLET ORAL at 20:24

## 2017-04-28 RX ADMIN — IPRATROPIUM BROMIDE SCH MG: 0.5 SOLUTION RESPIRATORY (INHALATION) at 11:53

## 2017-04-28 RX ADMIN — POTASSIUM CHLORIDE SCH MLS/HR: 7.46 INJECTION, SOLUTION INTRAVENOUS at 14:13

## 2017-04-28 RX ADMIN — POTASSIUM CHLORIDE SCH MLS/HR: 7.46 INJECTION, SOLUTION INTRAVENOUS at 08:14

## 2017-04-28 RX ADMIN — ASPIRIN 325 MG ORAL TABLET SCH MG: 325 PILL ORAL at 08:25

## 2017-04-28 RX ADMIN — INSULIN LISPRO SCH UNIT: 100 INJECTION, SOLUTION INTRAVENOUS; SUBCUTANEOUS at 08:23

## 2017-04-28 RX ADMIN — CHLORHEXIDINE GLUCONATE SCH: 1.2 RINSE ORAL at 08:25

## 2017-04-28 RX ADMIN — POTASSIUM CHLORIDE SCH MLS/HR: 7.46 INJECTION, SOLUTION INTRAVENOUS at 09:11

## 2017-04-28 RX ADMIN — LACTULOSE SCH: 20 SOLUTION ORAL at 07:47

## 2017-04-28 RX ADMIN — SODIUM CHLORIDE SCH MLS/HR: 450 INJECTION, SOLUTION INTRAVENOUS at 09:21

## 2017-04-28 RX ADMIN — SODIUM CHLORIDE, PRESERVATIVE FREE SCH: 5 INJECTION INTRAVENOUS at 08:26

## 2017-04-28 RX ADMIN — Medication SCH MG: at 08:26

## 2017-04-28 RX ADMIN — INSULIN LISPRO SCH UNIT: 100 INJECTION, SOLUTION INTRAVENOUS; SUBCUTANEOUS at 04:52

## 2017-04-28 RX ADMIN — IPRATROPIUM BROMIDE SCH MG: 0.5 SOLUTION RESPIRATORY (INHALATION) at 03:08

## 2017-04-28 RX ADMIN — HEPARIN SODIUM PRN UNIT: 5000 INJECTION, SOLUTION INTRAVENOUS; SUBCUTANEOUS at 16:34

## 2017-04-28 RX ADMIN — LEVALBUTEROL HYDROCHLORIDE SCH MG: 1.25 SOLUTION, CONCENTRATE RESPIRATORY (INHALATION) at 15:52

## 2017-04-28 RX ADMIN — INSULIN HUMAN SCH MLS/HR: 100 INJECTION, SOLUTION PARENTERAL at 09:11

## 2017-04-28 RX ADMIN — LISINOPRIL SCH MG: 10 TABLET ORAL at 20:24

## 2017-04-28 RX ADMIN — IPRATROPIUM BROMIDE SCH MG: 0.5 SOLUTION RESPIRATORY (INHALATION) at 07:56

## 2017-04-28 RX ADMIN — LEVALBUTEROL HYDROCHLORIDE SCH MG: 1.25 SOLUTION, CONCENTRATE RESPIRATORY (INHALATION) at 07:56

## 2017-04-28 RX ADMIN — BUDESONIDE SCH MG: 1 SUSPENSION RESPIRATORY (INHALATION) at 19:39

## 2017-04-28 RX ADMIN — METOPROLOL TARTRATE SCH MG: 25 TABLET, FILM COATED ORAL at 20:24

## 2017-04-28 RX ADMIN — LISINOPRIL SCH MG: 10 TABLET ORAL at 09:13

## 2017-04-28 RX ADMIN — MULTIVITAMIN SCH ML: LIQUID ORAL at 13:08

## 2017-04-28 RX ADMIN — IPRATROPIUM BROMIDE SCH MG: 0.5 SOLUTION RESPIRATORY (INHALATION) at 15:52

## 2017-04-28 RX ADMIN — IPRATROPIUM BROMIDE SCH MG: 0.5 SOLUTION RESPIRATORY (INHALATION) at 19:39

## 2017-04-28 RX ADMIN — DIGOXIN SCH MCG: 250 TABLET ORAL at 08:25

## 2017-04-28 RX ADMIN — LACTULOSE SCH: 20 SOLUTION ORAL at 10:07

## 2017-04-28 RX ADMIN — DOCUSATE SODIUM AND SENNOSIDES SCH: 50; 8.6 TABLET ORAL at 07:47

## 2017-04-28 RX ADMIN — METOCLOPRAMIDE SCH MG: 5 INJECTION, SOLUTION INTRAMUSCULAR; INTRAVENOUS at 18:41

## 2017-04-28 RX ADMIN — PANTOPRAZOLE SODIUM SCH MG: 40 INJECTION, POWDER, FOR SOLUTION INTRAVENOUS at 08:25

## 2017-04-28 NOTE — XR
EXAMINATION TYPE: XR chest 1V portable

 

DATE OF EXAM: 4/28/2017 6:33 AM

 

CLINICAL HISTORY: Postop CABG procedure.

 

TECHNIQUE: Single AP portable upright view of the chest is obtained.

 

COMPARISON: Chest x-ray from one day earlier

 

FINDINGS:  A nasogastric tube is stable in appearance. Post CABG changes with mediastinal clips and s
ternal wires is redemonstrated. There is persistent cardiomegaly with central vascular congestion and
 small bilateral pleural effusions. There is more prominent left basilar opacity. No sizable pneumoth
orax is seen bilaterally. Osseous structures are intact.

 

IMPRESSION:  Overall stable findings,  cardiomegaly and central vascular congestion with small bilate
ral pleural effusions and more focal left basilar infiltrate and/or atelectasis all redemonstrated.

## 2017-04-28 NOTE — P.PN
Progress Note - Text





[facility]





Principal diagnosis: 





Coronary artery disease, status post prior stenting to his right coronary 

artery.  Preserved left ventricular function.  Hyperlipidemia.  Hypertension.  

ETOH abuse.





POD #15 total arterial non-aortic patch off-pump double coronary artery bypass 

grafting using in situ skeletonized right internal mammary artery to the left 

anterior descending artery across the anterior midline in situ totally 

skeletonized left internal mammary artery to the first obtuse marginal artery.  

Intraoperative transesophageal echocardiogram and epi-aortic scanning.  

Intraoperative graft flow measurements using the [employer] system





POD #54 ,Patient had postoperative mucous plugging with increasing oxygen 

demand requiring bronchoscopy the night of surgery.  Bronchial washings 

negative for bacteria/viruses to date.





Patient developed postoperative alcohol withdrawal requiring increased sedation 

and continued mechanical ventilation.





The patient is awake and alert, he shakes his head no when asked if having any 

pain.  Moving all 4 extremities appropriately.  Oriented 2, person and place. 

Reoriented to date.





Vital Signs: Afebrile


 Vital Signs - 24 hr











  04/27/17 04/27/17 04/27/17





  09:00 09:30 10:00


 


Temperature   


 


Pulse Rate 70 71 63


 


Respiratory 20 20 23





Rate   


 


Blood Pressure   


 


O2 Sat by Pulse 98 99 99





Oximetry   














  04/27/17 04/27/17 04/27/17





  10:30 11:00 11:12


 


Temperature   


 


Pulse Rate 65 76 67


 


Respiratory 18 25 H 





Rate   


 


Blood Pressure   


 


O2 Sat by Pulse 98 98 





Oximetry   














  04/27/17 04/27/17 04/27/17





  11:24 11:30 12:00


 


Temperature   98.2 F


 


Pulse Rate 68 77 75


 


Respiratory  19 24





Rate   


 


Blood Pressure   


 


O2 Sat by Pulse  96 98





Oximetry   














  04/27/17 04/27/17 04/27/17





  12:30 13:00 13:30


 


Temperature   


 


Pulse Rate 76 94 97


 


Respiratory 42 H 25 H 25 H





Rate   


 


Blood Pressure   


 


O2 Sat by Pulse 95 94 L 95





Oximetry   














  04/27/17 04/27/17 04/27/17





  14:00 15:00 15:29


 


Temperature   


 


Pulse Rate 70 80 77


 


Respiratory 14 25 H 





Rate   


 


Blood Pressure   


 


O2 Sat by Pulse 98 96 





Oximetry   














  04/27/17 04/27/17 04/27/17





  15:41 16:00 16:30


 


Temperature   


 


Pulse Rate 73 86 78


 


Respiratory  25 H 20





Rate   


 


Blood Pressure   132/67


 


O2 Sat by Pulse  90 L 99





Oximetry   














  04/27/17 04/27/17 04/27/17





  17:00 17:30 18:00


 


Temperature   


 


Pulse Rate 76 77 82


 


Respiratory 19 23 24





Rate   


 


Blood Pressure 132/67 130/70 130/70


 


O2 Sat by Pulse 98 99 96





Oximetry   














  04/27/17 04/27/17 04/27/17





  18:30 19:00 19:12


 


Temperature   


 


Pulse Rate 72 76 73


 


Respiratory 22 25 H 





Rate   


 


Blood Pressure 129/71 129/71 


 


O2 Sat by Pulse 99 97 





Oximetry   














  04/27/17 04/27/17 04/27/17





  19:27 20:00 20:30


 


Temperature  98 F 


 


Pulse Rate 87 78 75


 


Respiratory  19 26 H





Rate   


 


Blood Pressure  129/71 154/71


 


O2 Sat by Pulse  97 98





Oximetry   














  04/27/17 04/27/17 04/27/17





  21:00 21:30 22:00


 


Temperature   


 


Pulse Rate 76 72 64


 


Respiratory 23 20 19





Rate   


 


Blood Pressure 154/71 139/72 139/72


 


O2 Sat by Pulse 100 99 98





Oximetry   














  04/27/17 04/27/17 04/27/17





  23:00 23:24 23:37


 


Temperature   


 


Pulse Rate 66 74 73


 


Respiratory 19  





Rate   


 


Blood Pressure 134/70  


 


O2 Sat by Pulse 99  





Oximetry   














  04/28/17 04/28/17 04/28/17





  00:00 01:00 02:00


 


Temperature 98.1 F  


 


Pulse Rate 69 76 76


 


Respiratory 22 28 H 28 H





Rate   


 


Blood Pressure 142/73 131/69 149/76


 


O2 Sat by Pulse 99 100 97





Oximetry   














  04/28/17 04/28/17 04/28/17





  03:00 03:10 03:20


 


Temperature   


 


Pulse Rate 72 75 76


 


Respiratory 23  





Rate   


 


Blood Pressure 148/73  


 


O2 Sat by Pulse 95  





Oximetry   














  04/28/17 04/28/17 04/28/17





  04:00 05:00 06:00


 


Temperature 97.9 F  


 


Pulse Rate 81 75 73


 


Respiratory 25 H 21 24





Rate   


 


Blood Pressure 146/79 154/78 148/75


 


O2 Sat by Pulse 95 95 96





Oximetry   














  04/28/17 04/28/17 04/28/17





  07:00 07:56 08:13


 


Temperature   


 


Pulse Rate 77 107 H 113 H


 


Respiratory 20  





Rate   


 


Blood Pressure 147/73  


 


O2 Sat by Pulse 98 100 





Oximetry   








 ABP, PAP, CO, CI - Last 8 Hours











Arterial Blood Pressure        179/81


 


Arterial Blood Pressure        167/69


 


Arterial Blood Pressure        165/69


 


Arterial Blood Pressure        163/69


 


Arterial Blood Pressure        165/71


 


Arterial Blood Pressure        184/75


 


Arterial Blood Pressure        148/96

















Labs: 


 Short CBC











  04/28/17 Range/Units





  04:50 


 


WBC  15.1 H  (3.8-10.6)  k/uL


 


Hgb  8.7 L  (13.0-17.5)  gm/dL


 


Hct  25.7 L  (39.0-53.0)  %


 


Plt Count  292  (150-450)  k/uL


 


Neutrophils #  13.9 H  (1.3-7.7)  k/uL








 BMP











  04/27/17 04/27/17 04/28/17





  11:28 20:45 04:50


 


Sodium    142


 


Potassium  3.6  3.6  3.6


 


Chloride    112 H


 


Carbon Dioxide    21 L


 


BUN    24 H


 


Creatinine    0.70


 


Glucose    194 H


 


Calcium    8.4








 Liver Function











  04/28/17 Range/Units





  04:50 


 


Total Bilirubin  1.2  (0.2-1.3)  mg/dL


 


AST  33  (17-59)  U/L


 


ALT  62  (21-72)  U/L


 


Alkaline Phosphatase  71  ()  U/L


 


Albumin  2.9 L  (3.5-5.0)  g/dL











ABG











ABG pH  7.46  (7.35-7.45)  H  04/27/17  05:19    


 


ABG pCO2  30 mmHg (35-45)  L  04/27/17  05:19    


 


ABG pO2  108 mmHg ()   04/27/17  05:19    


 


ABG O2 Saturation  99.0 % (94-97)  H  04/27/17  05:19    





PT/INR, D-dimer











PT  11.7 sec (9.0-12.0)   04/28/17  08:10    


 


INR  1.2  (<1.1)   04/28/17  08:10    








 Microbiology





04/24/17 02:00   Blood   Blood Culture - Preliminary


                            No Growth after 96 hours


04/13/17 16:40   Lung - Right   Acid Fast Bacilli Smear - Final


04/13/17 16:40   Lung - Right   Acid Fast Bacilli Culture - Preliminary


04/24/17 04:40   Sputum   Gram Stain - Final


04/24/17 04:40   Sputum   Sputum Culture - Final


                            Moraxella(branhamella) catarra


04/23/17 17:44   Urine,Catheterized   Urine Culture - Final


                            Klebsiella pneumoniae


04/13/17 16:40   Lung - Right   Fungal Culture - Preliminary


                            Lisandra tropicalis


04/13/17 16:40   Lung Aspirate - Right   Gram Stain - Final


04/13/17 16:40   Lung Aspirate - Right   Bronchial Washings Culture - Final





IV Fluids: 


D5/0.45 normal saline with 20 mEq of KCl at 80 mL per hour


Heparin drip at 14 units per kilogram per hour





Lungs: Scattered rhonchi throughout, diminished bilateral bases left greater 

than right.  Respirations are symmetrical and unlabored.  He was extubated 

yesterday 04/27/2017 at 10:15 AM.  No further stridor noted this a.m.





O2 sat: 98% on 10 L high flow nasal cannula.





I/S: 250 mL to 500 mL, reviewed with the patient the importance of using his 

incentive spirometry.  The patient will need a lot of encouragement with the 

incentive spirometry.





Heart:  S1S2, regular rhythm and rate, negative for S3, gallop or murmur.  

Bedside telemetry showing normal sinus rhythm heart rate 78.





Sternum stable, chest incision clean with dressing clean and dry.  Heart hugger 

in place.





Knee-high CONNIE hose and sequential compression devices in place to bilateral 

lower extremities.





Abdomen:  Soft and nondistended. Positive tympanic bowel sounds present in all 

4 quadrants.  NG tube in place to low intermittent wall suction, with 400 mL of 

bile-colored drainage in the last 8 hours.  There are 5 bowel movements 

recorded in the last 24 hours.  Patient denies nausea.  No further emesis.  

Rectal tube has been placed off and on per secondary to his dilated bowel.





CBGs: 110-198 mg/dL in the last 24 hours.





U/O:  Adequate, Angelo catheter for accurate I&O.  700 mL of output in the last 

8 hours.





24 hr Total: 


 Intake & Output











 04/26/17 04/27/17 04/28/17 04/29/17





 06:59 06:59 06:59 06:59


 


Intake Total 2513.230 2824 2080 80


 


Output Total 1790 1997 6760 450


 


Balance 723.230 827 190 370


 


Weight 101.6 kg 100.3 kg 97.7 kg 








Active Medications





Hydrocodone Bitart/Acetaminophen (Norco 5-325)  2 each PO Q4HR PRN


   PRN Reason: Severe Pain


   Last Admin: 04/26/17 11:21 Dose:  2 each


Hydrocodone Bitart/Acetaminophen (Norco 5-325)  1 each PO Q4HR PRN


   PRN Reason: Moderate Pain


Amiodarone HCl (Cordarone)  200 mg PO BID [facility]


   Last Admin: 04/28/17 08:25 Dose:  200 mg


Aspirin (Aspirin)  325 mg PO DAILY [facility]


   Last Admin: 04/28/17 08:25 Dose:  325 mg


Atorvastatin Calcium (Lipitor)  40 mg PO DAILY [facility]


   Last Admin: 04/28/17 08:25 Dose:  40 mg


Benzocaine (Hurricaine Spray)  1 applic MUCOUS MEM QID PRN


   PRN Reason: Mouth Irritation


   Last Admin: 04/17/17 11:36 Dose:  1 applic


Bisacodyl (Dulcolax)  10 mg RECTAL DAILY PRN


   PRN Reason: Constipation


   Last Admin: 04/19/17 17:18 Dose:  10 mg


Budesonide (Pulmicort)  1 mg INHALATION RT-BID [facility]


   Last Admin: 04/28/17 07:56 Dose:  1 mg


Chlorhexidine Gluconate (Peridex)  15 ml MUCOUS MEM BID [facility]


   Last Admin: 04/28/17 08:25 Dose:  Not Given


Digoxin (Lanoxin)  250 mcg OG-TUBE DAILY [facility]


   Last Admin: 04/28/17 08:25 Dose:  250 mcg


Heparin Sodium (Porcine) (Heparin)  0 unit IV PER PROTOCOL PRN; Protocol


   PRN Reason: Low PTT


   Last Admin: 04/23/17 16:59 Dose:  2,497 unit


Hydralazine HCl (Apresoline)  10 mg IVP Q4HR PRN


   PRN Reason: Blood Pressure - High


   Last Admin: 04/27/17 12:22 Dose:  10 mg


Propofol 500 mg/ IV Solution  50 mls @ 0 mls/hr IV .Q0M [facility]; Titrate


   PRN Reason: Protocol


   Last Admin: 04/26/17 23:40 Dose:  20 mcg/kg/min, 11.68 mls/hr


Heparin Sodium/Dextrose 25,000 (unit/ IV Solution)  500 mls @ 19.98 mls/hr IV 

.Q24H [facility]; 10 UNITS/KG/HR


   PRN Reason: Protocol


   Last Admin: 04/27/17 08:47 Dose:  14 units/kg/hr, 27.97 mls/hr


Sodium Chloride (Saline 0.9%)  1,000 mls @ 20 mls/hr IV .Q24H [facility]


   Last Admin: 04/27/17 12:22 Dose:  20 mls/hr


Levofloxacin 750 mg/ IV (Solution)  150 mls @ 100 mls/hr IVPB Q24H [facility]


   Last Admin: 04/27/17 18:29 Dose:  100 mls/hr


Potassium Chloride/Dextrose/Sod Cl (D5%-1/2ns-Kcl 20 Meq/L Iv Solution)  1,000 

mls @ 80 mls/hr IV .[id number] [facility]


   Last Admin: 04/28/17 05:00 Dose:  80 mls/hr


Insulin Human Regular 100 unit (/ Sodium Chloride)  101 mls @ 0 mls/hr IV .Q0M 

[facility]; Per Protocol


   PRN Reason: Protocol


Ipratropium Bromide (Atrovent Nebulized)  0.5 mg INHALATION RT-Q4H [facility]


   Last Admin: 04/28/17 07:56 Dose:  0.5 mg


Iron/Minerals/Multivitamins (Theragran Liquid)  15 ml PO DAILY@1200 [facility]


   Last Admin: 04/27/17 12:23 Dose:  Not Given


Lactulose (Cephulac)  30 gm PO BID [facility]


   Last Admin: 04/28/17 07:47 Dose:  Not Given


Levalbuterol HCl (Xopenex Nebulized (Conc))  1.25 mg INHALATION RT-Q4H [facility]


   Last Admin: 04/28/17 07:56 Dose:  1.25 mg


Lisinopril (Zestril)  10 mg PO BID [facility]


   Last Admin: 04/27/17 21:16 Dose:  10 mg


Magnesium Hydroxide (Milk Of Magnesia)  2,400 mg PO BID PRN


   PRN Reason: Constipation


Metoclopramide HCl (Reglan)  10 mg IVP Q4H PRN


   PRN Reason: Nausea And Vomiting


Metoprolol Tartrate (Lopressor)  25 mg PO BID [facility]


   Last Admin: 04/28/17 08:26 Dose:  25 mg


Miscellaneous Information (Magnesium Per Protocol)  1 each MISCELLANE DAILY PRN

; Protocol


   PRN Reason: Per Protocol


Miscellaneous Information (Phosphorus Per Protocol)  1 each MISCELLANE DAILY PRN

; Protocol


   PRN Reason: Per Protocol


Miscellaneous Information (Potassium Per Protocol)  1 each MISCELLANE DAILY PRN

; Protocol


   PRN Reason: Per Protocol


Morphine Sulfate (Morphine Sulfate (Inj))  2 mg IVP Q4H PRN


   PRN Reason: Severe Pain


Ondansetron HCl (Zofran)  4 mg IVP Q6HR PRN


   PRN Reason: Nausea And Vomiting


Pantoprazole Sodium (Protonix)  40 mg IVP DAILY [facility]


   Last Admin: 04/28/17 08:25 Dose:  40 mg


Senna/Docusate Sodium (Senokot-S)  2 each PO HS [facility]


   Last Admin: 04/28/17 07:47 Dose:  Not Given


Sodium Chloride (Saline Flush)  10 ml IV BID [facility]


   Last Admin: 04/28/17 08:26 Dose:  Not Given


Sodium Chloride (Saline Flush)  20 ml IV Q4HR PRN


   PRN Reason: PICC Line


Sodium Chloride (Saline Flush)  10 ml IV WEEKLY [facility]


   Last Admin: 04/27/17 08:46 Dose:  Not Given


Sodium Chloride (Saline Flush)  10 ml IV Q4HR PRN


   PRN Reason: PICC Line


Thiamine HCl (Vitamin B-1)  100 mg PO BID [facility]


   Last Admin: 04/28/17 08:26 Dose:  100 mg





Plan: 





1.  Continue aspirin, Lipitor, Lopressor, lisinopril, digoxin.  


2.  Continue amiodarone for atrial fibrillation/atrial flutter prophylaxis.  

Cardiology managing.


3.  Pulmonary management per Dr Mobley.  The patient was extubated yesterday 

04/27/2017 at 10:15 AM.


4.  Physical therapy for increase activity as tolerated.


5.  Insulin/diabetic management per primary care service.


6.  Potassium will be replaced per protocol, D5 0.45 normal saline with 20 of 

KCl continued at 80 mL per hour. 


7.  Daily labs, chest x-rays.  


8.  GI/DVT prophylaxis.


9.  Continue Levaquin IV piggyback 750 mg per positive Moraxella sputum culture.


10. Heparin protocol continued for atrial fibrillation/atrial flutter.


11. Tube feedings on hold at this time, patient had an emesis 2 early this 

a.m.  NG tube has been placed and is on low intermittent suction.


12.  More recommendations as patient progresses.

## 2017-04-28 NOTE — P.PN
Subjective


This is a 60-year-old gentleman that is status post CABG postop day 11.  

Failure to wean from mechanical ventilator.





Patient is seen in consultation for medical management.  Patient is currently 

on pressure control ventilation with PTCA of 18 cm water Depo 7 FiO2 50% and 

respiratory rate of 18.





Patient is awake however not responsive appropriately.





Is moving his all 4 extremities to command





This a.m. patient apparently was having episodes of hypotension as patient was 

going into atrial fibrillation.





Weaning trials were attempted this a.m. with a pressure support of 7 according 

to verbal report patient apparently had tachypnea within 5 minutes hence was 

restarted on full pressure-controlled ventilation.





4/27/17





No new overnight events





Pt was extubated


doing well


appears to be confused





Concern for abdominal distention


2 episodes of emesis 





No fevers, chills.





NG in place





4/28/17





On 7 lo2


has a cough, with thick sputum, however pt is unable to produce it due to a 

weak cough





Is more appropriate today





No significant abdominal tenderness reported.











Objective





- Vital Signs


Vital signs: 


 Vital Signs











Temp  97.3 F L  04/28/17 16:00


 


Pulse  76   04/28/17 17:00


 


Resp  22   04/28/17 17:00


 


BP  131/70   04/28/17 17:00


 


Pulse Ox  94 L  04/28/17 17:00








 Intake & Output











 04/27/17 04/28/17 04/28/17





 18:59 06:59 18:59


 


Intake Total 1020 1560 1264.522


 


Output Total 1210 1060 1115


 


Balance -190 500 149.522


 


Weight 100.3 kg 97.7 kg 


 


Intake:   


 


  IV 20  30


 


    0.9 for pressure bag   30


 


    Sodium Chloride 0.9% 1, 20  





    000 ml @ 20 mls/hr IV .   





    Q24H [facility] Rx#:[id number]   


 


  Intake, IV Titration 1000 1500 1234.522





  Amount   


 


    D5-0.45% NaCl with KCl 800 800 480





    20Meq/l 1,000 ml @ 80 mls   





    /hr IV .[id number] [facility] Rx#:   





    [id number]   


 


    Heparin Sodium,Porcine/  500 227.981





    D5w Pmx 25,000 unit In   





    Dextrose/Water 1 500ml.   





    bag @ 10 UNITS/KG/HR 19.   





    98 mls/hr IV .Q24H [facility] Rx   





    #:[id number]   


 


    Insulin Regular 100 unit   26.541





    In Sodium Chloride 0.9%   





    100 ml @ Per Protocol IV   





    .Q0M [facility] Rx#:[id number]   


 


    Levofloxacin 750Mg-D5w   100





    Pmx 750 mg In Dextrose/   





    Water 1 150ml.bag @ 100   





    mls/hr IVPB Q24H [facility] Rx#:   





    [id number]   


 


    Potassium Chloride 10 meq 200  





    In Water For Injection 1   





    100ml.bag @ 100 mls/hr   





    IVPB Q1H [facility] Rx#:   





    [id number]   


 


    Potassium Chloride 10 meq  200 





    In Water For Injection 1   





    100ml.bag @ 100 mls/hr   





    IVPB Q1H [facility] Rx#:   





    [id number]   


 


    Potassium Chloride 10 meq   400





    In Water For Injection 1   





    100ml.bag @ 100 mls/hr   





    IVPB Q1H [facility] Rx#:   





    [id number]   


 


  Tube Feeding 0  


 


  Other  60 


 


Output:   


 


  Gastric Drainage 600  500


 


  Urine 610 1060 615


 


Other:   


 


  Voiding Method Indwelling Catheter Indwelling Catheter Indwelling Catheter


 


  # Bowel Movements 1  2








 ABP, PAP, CO, CI - Last Documented











Arterial Blood Pressure        174/58


 


Pulmonary Artery Pressure      46/23


 


Cardiac Output                 6.8


 


Cardiac Index                  3.3

















- Exam


Gen. appearanceawake answers some questions appropriately





Lungs diminished breath sounds wheezing appreciated


rhochi bilaterally.





Heart S1-S2 heard no significant murmurs appreciated





Abdomen is soft nontender no organomegaly, some distention.





Lower extremities no significant edema





Neuro awake moving all four extremities




















- Labs


CBC & Chem 7: 


 04/28/17 04:50





 04/28/17 12:15


Labs: 


 Abnormal Lab Results - Last 24 Hours (Table)











  04/27/17 04/27/17 04/27/17 Range/Units





  18:31 20:45 20:48 


 


WBC     (3.8-10.6)  k/uL


 


RBC     (4.30-5.90)  m/uL


 


Hgb     (13.0-17.5)  gm/dL


 


Hct     (39.0-53.0)  %


 


Neutrophils #     (1.3-7.7)  k/uL


 


Lymphocytes #     (1.0-4.8)  k/uL


 


APTT     (22.0-30.0)  sec


 


Chloride     ()  mmol/L


 


Carbon Dioxide     (22-30)  mmol/L


 


BUN     (9-20)  mg/dL


 


Glucose     (74-99)  mg/dL


 


POC Glucose (mg/dL)  164 H   177 H  (75-99)  mg/dL


 


Magnesium   2.5 H   (1.6-2.3)  mg/dL


 


Total Protein     (6.3-8.2)  g/dL


 


Albumin     (3.5-5.0)  g/dL














  04/27/17 04/28/17 04/28/17 Range/Units





  23:29 04:50 04:50 


 


WBC   15.1 H   (3.8-10.6)  k/uL


 


RBC   2.64 L   (4.30-5.90)  m/uL


 


Hgb   8.7 L   (13.0-17.5)  gm/dL


 


Hct   25.7 L   (39.0-53.0)  %


 


Neutrophils #   13.9 H   (1.3-7.7)  k/uL


 


Lymphocytes #   0.7 L   (1.0-4.8)  k/uL


 


APTT     (22.0-30.0)  sec


 


Chloride    112 H  ()  mmol/L


 


Carbon Dioxide    21 L  (22-30)  mmol/L


 


BUN    24 H  (9-20)  mg/dL


 


Glucose    194 H  (74-99)  mg/dL


 


POC Glucose (mg/dL)  176 H    (75-99)  mg/dL


 


Magnesium     (1.6-2.3)  mg/dL


 


Total Protein    5.8 L  (6.3-8.2)  g/dL


 


Albumin    2.9 L  (3.5-5.0)  g/dL














  04/28/17 04/28/17 04/28/17 Range/Units





  04:50 04:52 07:59 


 


WBC     (3.8-10.6)  k/uL


 


RBC     (4.30-5.90)  m/uL


 


Hgb     (13.0-17.5)  gm/dL


 


Hct     (39.0-53.0)  %


 


Neutrophils #     (1.3-7.7)  k/uL


 


Lymphocytes #     (1.0-4.8)  k/uL


 


APTT     (22.0-30.0)  sec


 


Chloride     ()  mmol/L


 


Carbon Dioxide     (22-30)  mmol/L


 


BUN     (9-20)  mg/dL


 


Glucose     (74-99)  mg/dL


 


POC Glucose (mg/dL)   198 H  219 H  (75-99)  mg/dL


 


Magnesium  2.7 H    (1.6-2.3)  mg/dL


 


Total Protein     (6.3-8.2)  g/dL


 


Albumin     (3.5-5.0)  g/dL














  04/28/17 04/28/17 04/28/17 Range/Units





  08:10 08:20 09:09 


 


WBC     (3.8-10.6)  k/uL


 


RBC     (4.30-5.90)  m/uL


 


Hgb     (13.0-17.5)  gm/dL


 


Hct     (39.0-53.0)  %


 


Neutrophils #     (1.3-7.7)  k/uL


 


Lymphocytes #     (1.0-4.8)  k/uL


 


APTT  40.1 H    (22.0-30.0)  sec


 


Chloride     ()  mmol/L


 


Carbon Dioxide     (22-30)  mmol/L


 


BUN     (9-20)  mg/dL


 


Glucose     (74-99)  mg/dL


 


POC Glucose (mg/dL)   208 H  192 H  (75-99)  mg/dL


 


Magnesium     (1.6-2.3)  mg/dL


 


Total Protein     (6.3-8.2)  g/dL


 


Albumin     (3.5-5.0)  g/dL














  04/28/17 04/28/17 04/28/17 Range/Units





  09:44 11:12 12:08 


 


WBC     (3.8-10.6)  k/uL


 


RBC     (4.30-5.90)  m/uL


 


Hgb     (13.0-17.5)  gm/dL


 


Hct     (39.0-53.0)  %


 


Neutrophils #     (1.3-7.7)  k/uL


 


Lymphocytes #     (1.0-4.8)  k/uL


 


APTT     (22.0-30.0)  sec


 


Chloride     ()  mmol/L


 


Carbon Dioxide     (22-30)  mmol/L


 


BUN     (9-20)  mg/dL


 


Glucose     (74-99)  mg/dL


 


POC Glucose (mg/dL)  192 H  154 H  154 H  (75-99)  mg/dL


 


Magnesium     (1.6-2.3)  mg/dL


 


Total Protein     (6.3-8.2)  g/dL


 


Albumin     (3.5-5.0)  g/dL














  04/28/17 04/28/17 04/28/17 Range/Units





  13:07 14:12 15:20 


 


WBC     (3.8-10.6)  k/uL


 


RBC     (4.30-5.90)  m/uL


 


Hgb     (13.0-17.5)  gm/dL


 


Hct     (39.0-53.0)  %


 


Neutrophils #     (1.3-7.7)  k/uL


 


Lymphocytes #     (1.0-4.8)  k/uL


 


APTT    43.8 H  (22.0-30.0)  sec


 


Chloride     ()  mmol/L


 


Carbon Dioxide     (22-30)  mmol/L


 


BUN     (9-20)  mg/dL


 


Glucose     (74-99)  mg/dL


 


POC Glucose (mg/dL)  136 H  143 H   (75-99)  mg/dL


 


Magnesium     (1.6-2.3)  mg/dL


 


Total Protein     (6.3-8.2)  g/dL


 


Albumin     (3.5-5.0)  g/dL














  04/28/17 04/28/17 04/28/17 Range/Units





  15:20 15:57 17:22 


 


WBC     (3.8-10.6)  k/uL


 


RBC     (4.30-5.90)  m/uL


 


Hgb     (13.0-17.5)  gm/dL


 


Hct     (39.0-53.0)  %


 


Neutrophils #     (1.3-7.7)  k/uL


 


Lymphocytes #     (1.0-4.8)  k/uL


 


APTT     (22.0-30.0)  sec


 


Chloride     ()  mmol/L


 


Carbon Dioxide     (22-30)  mmol/L


 


BUN     (9-20)  mg/dL


 


Glucose     (74-99)  mg/dL


 


POC Glucose (mg/dL)  133 H  124 H  138 H  (75-99)  mg/dL


 


Magnesium     (1.6-2.3)  mg/dL


 


Total Protein     (6.3-8.2)  g/dL


 


Albumin     (3.5-5.0)  g/dL








 Microbiology - Last 24 Hours (Table)











 04/24/17 02:00 Blood Culture - Preliminary





 Blood    No Growth after 96 hours














Assessment and Plan


Plan: 


#1 acute hypoxic respiratory failure as expected however prolonged due to other 

comorbidities.





#2 CAD status post CABG postop day 13





#3 critical care polyneuropathy





#4 history of hypertension





#5 dyslipidemia





#6 proximal atrial fibrillation recurrent episodes of A. fib with RVR.





#7 history of significant alcohol use status post a prolonged episode of 

delirium tremens





#8 gout





#9 diabetes mellitus currently on insulin drip.





#10 dysphagia secondary to above





#11 M .catarrhalis tracheal bronchitis





12.  ileus.





Plan


one dose of solumedrol





breathing tx


pulmonary hygeine





tolerating  well





Abdominal xr reviewed








Continue ongoing care.

## 2017-04-28 NOTE — P.PN
Subjective





61-year-old male patient with status post carotid bypass surgery and the 

patient is postop day #11.  The patient has failed to wean off the mechanical 

ventilator.  Immediately postop the patient had pulmonate complications 

including mucous plugs and atelectasis of the left lung.  He required 

bronchoscopy and therapeutic airway suctioning and all of the respiratory 

cultures came back negative.  This morning, the patient was on a volume cycled 

assist-control mode of ventilation at the rate of 20, tidal volume 450, FiO2 of 

40% and a PEEP of 5.  I reviewed the blood gases and I reviewed also the chest x

-ray.  I noted that the patient was unable to tolerate assist-control mode.  

Attempts to wean him off the sedation [place] as the patient was becoming 

restless and a successful the mechanical ventilator.  Based on this, I switched 

this patient to a pressure support mode of ventilation and I was able to 

completely wean him off sedation.  He was wide awake all he was hardly able to 

wiggle his toes or move his fingers and he was unable to raise his had or arms 

against gravity.  He was having copious amount of rest or secretions and he was 

requiring frequent suctioning.  At a later stage, switch this patient to a 

pressure control mode of ventilation with a PEEP of 7 and a pressure control of 

20 and his FiO2 was At 60%.  He remains hemodynamically stable.  He is 

producing adequate amount of urine output.  No pressors at this point.  No 

fever.  No chills.  Chest tubes have been all removed.  He is tolerating tube 

feeds.  Atelectatic discussion with the cardiothoracic surgeon and will plan to 

proceed with a PEG and trach if the patient ultimately failed weaning.  One 

concern is that he has significant neuromuscular weakness which is probably a 

critical illness polyneuropathy and myopathy.





On 04/25/2017 the patient remains intubated on a mechanical ventilator.  I was 

able to the sedation completely on the patient.  He is awake at this point and 

he had been awake throughout the night.  He is following simple commands.  

Motor functions are nearly absent.  The patient is extremely weak.  He can 

wiggle his toes and occasionally bend his knees.  Otherwise he cannot raise his 

arms and legs against gravity.  He has a cough reflex.  He can blink his eyes 

and raises eyebrows.  Reflexes are significantly diminished in the upper and 

lower extremities bilaterally.  Is on a pressure control mode of ventilation at 

the rate of 18, PC 18, PEEP of 7 and FiO2 of 50%.  Still having significant 

respiratory secretions through the orotracheal tube.  The chest x-ray from 

today shows moderate parenchymal opacity within the left lung base and the 

right lung base.  There is some atelectatic changes in the lung bases more so 

on the left.  The patient has also postop changes related to coronary artery 

bypass surgery.  No fever.  No chills.  Hemodynamically stable.  Producing 

adequate amount of urine output and the patient was diuresis with a combination 

of albumin and Lasix.  He is on tube feeds.





On 04/26/2017 the patient remains on a mechanical ventilator.  Seems to be much 

more awake compared to yesterday.  Motor functions remain weak over the patient 

is doing more activity and movement on today's evaluation.  We were able to 

bring the patient is sitting up position for a total of 2 hours today.  He 

tolerated well and following that he had a coughing spells and he had to be 

placed in bed.  He remains in the same vent setting with a pressure control of 

18, PEEP of 7, FiO2 of 50% and rate of 18.  Chest x-ray shows adequate 

expansion of the left lung with a few being in good location.  Sputum analysis 

showed Moraxella catarrhalis and the based on that the patient was started on 

Levaquin.  Note that he had also Klebsiella PNEUMONIA IN HIS URINE, AND BOTH OF 

THESE MICROORGANISMS SHOULD RESPOND TO LEVAQUIN.  Meanwhile, the patient is 

receiving enteral feeding for nutritional support.  IV Solu Medrol is been 

discontinued.  We'll doing daily physical therapy and passive range of motion.  

He remains on IV heparin.  He remains on IV insulin for blood sugar control.  

Morning blood gases showed a pH of 7.51 with a pCO2 of 26 and pO2 of 79.  

Weaning parameters are still poor as the patient has rapid shallow breathing 

index around 120 and the patient becomes tachypneic and the LOW tidal volumes.








On 04/27/2017 the patient is being seen in follow-up.  Earlier this morning the 

patient was still mechanical ventilator on a pressure control mode.  Chest x-

ray from earlier this morning showed no significant abnormalities.  There was 

improvement in aeration in lung bases bilaterally especially on the left.  NG 

tube was still in a good location below the diaphragm.  Meanwhile affect 

abdominal film was done this morning and that showed a component of ileus.  

Note that overnight, the patient's tube feeds were discontinued knowing that he 

had 2 bouts of emesis.  Nevertheless, despite ongoing gastrointestinal 

abnormalities, the patient is doing very well while being on mechanical 

ventilator and is awake and alert.  We checked his weaning parameters this 

morning and the numbers looked very well.  He was given a pressure support of 5 

and PEEP of 5 and a breathing trial for a total of 30 minutes and following 

that we made a decision to extubate this patient.  This was a difficult 

decision to make knowing that the patient had an underlying abdominal ileus and 

he is still having significant motor weakness in the upper and lower 

extremities.  Nevertheless, I noted that his motor function is improved his 

cough improved and he had very decent weaning parameters.  I suspected that 

this will be his last chance of being extubated and if not successful, he will 

need a PEG and trach with the next 24 hours.  Based on this, I extubated this 

patient.  He remains on IV heparin.  He remains on IV insulin.  He is also on 

Levaquin.  He is afebrile.  He is awake and following commands.  Motor function 

is gradually improving.  No other new complaints otherwise for now.  Noted post 

extubation, the patient was placed on 5 L of oxygen nasal cannula with 

saturation of 94-95%He continued to bleed comfortably.  He was placed in 

sitting up position in ICU.





04/28/2017, the patient is being seen in follow-up in the intensive care unit.  

The patient has been extubated and has been off the mechanical ventilator for 

the past 24 hours.  He is breathing comfortably.  No significant rest or 

distress.  NG tube is in place.  There is considerable amount of output from 

the NG tube still in the abdomen is distended although less compared to 

yesterday.  He had colonic ileus and some dilatation of the small bowel.  A 

rectal tube was inserted and abdomen was quite deflated.  He is producing some 

loose liquidy bowel movements yesterday and small amounts.  Rectal examination 

was done and the patient does not have any impacted stool.  No abdominal pain.  

No fever.  No chills.  He is in sinus rhythm for the time being and stable 

hemodynamics.  Is producing adequate amount of urine output.  All of the 

surgical wound sites are clean and intact.  Neurologically is awake.  There has 

been obvious improvement in the motor function and the patient is talking and 

communicating at this point.  He remains on IV heparin.  He remains on IV 

insulin for blood sugar control.  White cell count is at 15.1.  Hemoglobin is 

stable at 8.7.





Objective





- Vital Signs


Vital signs: 


 Vital Signs











Temp  97.3 F L  04/28/17 16:00


 


Pulse  68   04/28/17 16:04


 


Resp  20   04/28/17 16:00


 


BP  127/67   04/28/17 16:00


 


Pulse Ox  99   04/28/17 16:00








 Intake & Output











 04/27/17 04/28/17 04/28/17





 18:59 06:59 18:59


 


Intake Total 1020 1560 1157.658


 


Output Total 1210 1060 1065


 


Balance -190 500 92.658


 


Weight 100.3 kg 97.7 kg 


 


Intake:   


 


  IV 20  27


 


    0.9 for pressure bag   27


 


    Sodium Chloride 0.9% 1, 20  





    000 ml @ 20 mls/hr IV .   





    Q24H [facility] Rx#:[id number]   


 


  Intake, IV Titration 1000 1500 1130.658





  Amount   


 


    D5-0.45% NaCl with KCl 800 800 480





    20Meq/l 1,000 ml @ 80 mls   





    /hr IV .[id number] [facility] Rx#:   





    [id number]   


 


    Heparin Sodium,Porcine/  500 227.981





    D5w Pmx 25,000 unit In   





    Dextrose/Water 1 500ml.   





    bag @ 10 UNITS/KG/HR 19.   





    98 mls/hr IV .Q24H [facility] Rx   





    #:[id number]   


 


    Insulin Regular 100 unit   22.677





    In Sodium Chloride 0.9%   





    100 ml @ Per Protocol IV   





    .Q0M [facility] Rx#:[id number]   


 


    Potassium Chloride 10 meq 200  





    In Water For Injection 1   





    100ml.bag @ 100 mls/hr   





    IVPB Q1H [facility] Rx#:   





    [id number]   


 


    Potassium Chloride 10 meq  200 





    In Water For Injection 1   





    100ml.bag @ 100 mls/hr   





    IVPB Q1H [facility] Rx#:   





    [id number]   


 


    Potassium Chloride 10 meq   400





    In Water For Injection 1   





    100ml.bag @ 100 mls/hr   





    IVPB Q1H [facility] Rx#:   





    [id number]   


 


  Tube Feeding 0  


 


  Other  60 


 


Output:   


 


  Gastric Drainage 600  500


 


  Urine 610 1060 565


 


Other:   


 


  Voiding Method Indwelling Catheter Indwelling Catheter Indwelling Catheter


 


  # Bowel Movements 1  2








 ABP, PAP, CO, CI - Last Documented











Arterial Blood Pressure        178/87


 


Pulmonary Artery Pressure      46/23


 


Cardiac Output                 6.8


 


Cardiac Index                  3.3

















- Exam





Head exam was generally normal. There was no scleral icterus or corneal arcus. 

Mucous membranes were moist.Neck was supple and without jugular venous 

distension, thyromegaly, or carotid bruits. Carotids were easily palpable 

bilaterally. There was no adenopathy.  The patient has an NG tube in place and 

the patient has no stridor.  He has some degree of crowding of the posterior 

oropharynx.  Mucosal membranes are dry.  Lung sounds are diminished in lung 

bases bilaterally otherwise clear.  Heart sounds are regular, positive S1-S2, 

no cervical murmurs appreciated and sternum stable clean and intact.  Abdomen 

is slightly distended.  This positive tympany.  Bowel sounds are hypoactive.  

There is no direct tenderness.  No rebound tensile guarding.Examination of the 

extremities revealed easily palpable radial, femoral and pedal pulses. There 

was no cyanosis, clubbing or edema.  Neurologically, the patient is awake and 

alert.  He is, indicating.  No focal neurological deficit.  Motor function is 

weak although improved compared to yesterday.  The patient is able to raise his 

arms and legs against gravity.  There is no focal neurological deficit at this 

point.  He has a decent cough.  He is also communicating and verbal.





- Labs


CBC & Chem 7: 


 04/28/17 04:50





 04/28/17 12:15


Labs: 


 Abnormal Lab Results - Last 24 Hours (Table)











  04/27/17 04/27/17 04/27/17 Range/Units





  18:31 20:45 20:48 


 


WBC     (3.8-10.6)  k/uL


 


RBC     (4.30-5.90)  m/uL


 


Hgb     (13.0-17.5)  gm/dL


 


Hct     (39.0-53.0)  %


 


Neutrophils #     (1.3-7.7)  k/uL


 


Lymphocytes #     (1.0-4.8)  k/uL


 


APTT     (22.0-30.0)  sec


 


Chloride     ()  mmol/L


 


Carbon Dioxide     (22-30)  mmol/L


 


BUN     (9-20)  mg/dL


 


Glucose     (74-99)  mg/dL


 


POC Glucose (mg/dL)  164 H   177 H  (75-99)  mg/dL


 


Magnesium   2.5 H   (1.6-2.3)  mg/dL


 


Total Protein     (6.3-8.2)  g/dL


 


Albumin     (3.5-5.0)  g/dL














  04/27/17 04/28/17 04/28/17 Range/Units





  23:29 04:50 04:50 


 


WBC   15.1 H   (3.8-10.6)  k/uL


 


RBC   2.64 L   (4.30-5.90)  m/uL


 


Hgb   8.7 L   (13.0-17.5)  gm/dL


 


Hct   25.7 L   (39.0-53.0)  %


 


Neutrophils #   13.9 H   (1.3-7.7)  k/uL


 


Lymphocytes #   0.7 L   (1.0-4.8)  k/uL


 


APTT     (22.0-30.0)  sec


 


Chloride    112 H  ()  mmol/L


 


Carbon Dioxide    21 L  (22-30)  mmol/L


 


BUN    24 H  (9-20)  mg/dL


 


Glucose    194 H  (74-99)  mg/dL


 


POC Glucose (mg/dL)  176 H    (75-99)  mg/dL


 


Magnesium     (1.6-2.3)  mg/dL


 


Total Protein    5.8 L  (6.3-8.2)  g/dL


 


Albumin    2.9 L  (3.5-5.0)  g/dL














  04/28/17 04/28/17 04/28/17 Range/Units





  04:50 04:52 07:59 


 


WBC     (3.8-10.6)  k/uL


 


RBC     (4.30-5.90)  m/uL


 


Hgb     (13.0-17.5)  gm/dL


 


Hct     (39.0-53.0)  %


 


Neutrophils #     (1.3-7.7)  k/uL


 


Lymphocytes #     (1.0-4.8)  k/uL


 


APTT     (22.0-30.0)  sec


 


Chloride     ()  mmol/L


 


Carbon Dioxide     (22-30)  mmol/L


 


BUN     (9-20)  mg/dL


 


Glucose     (74-99)  mg/dL


 


POC Glucose (mg/dL)   198 H  219 H  (75-99)  mg/dL


 


Magnesium  2.7 H    (1.6-2.3)  mg/dL


 


Total Protein     (6.3-8.2)  g/dL


 


Albumin     (3.5-5.0)  g/dL














  04/28/17 04/28/17 04/28/17 Range/Units





  08:10 08:20 09:09 


 


WBC     (3.8-10.6)  k/uL


 


RBC     (4.30-5.90)  m/uL


 


Hgb     (13.0-17.5)  gm/dL


 


Hct     (39.0-53.0)  %


 


Neutrophils #     (1.3-7.7)  k/uL


 


Lymphocytes #     (1.0-4.8)  k/uL


 


APTT  40.1 H    (22.0-30.0)  sec


 


Chloride     ()  mmol/L


 


Carbon Dioxide     (22-30)  mmol/L


 


BUN     (9-20)  mg/dL


 


Glucose     (74-99)  mg/dL


 


POC Glucose (mg/dL)   208 H  192 H  (75-99)  mg/dL


 


Magnesium     (1.6-2.3)  mg/dL


 


Total Protein     (6.3-8.2)  g/dL


 


Albumin     (3.5-5.0)  g/dL














  04/28/17 04/28/17 04/28/17 Range/Units





  09:44 11:12 12:08 


 


WBC     (3.8-10.6)  k/uL


 


RBC     (4.30-5.90)  m/uL


 


Hgb     (13.0-17.5)  gm/dL


 


Hct     (39.0-53.0)  %


 


Neutrophils #     (1.3-7.7)  k/uL


 


Lymphocytes #     (1.0-4.8)  k/uL


 


APTT     (22.0-30.0)  sec


 


Chloride     ()  mmol/L


 


Carbon Dioxide     (22-30)  mmol/L


 


BUN     (9-20)  mg/dL


 


Glucose     (74-99)  mg/dL


 


POC Glucose (mg/dL)  192 H  154 H  154 H  (75-99)  mg/dL


 


Magnesium     (1.6-2.3)  mg/dL


 


Total Protein     (6.3-8.2)  g/dL


 


Albumin     (3.5-5.0)  g/dL














  04/28/17 04/28/17 04/28/17 Range/Units





  13:07 14:12 15:20 


 


WBC     (3.8-10.6)  k/uL


 


RBC     (4.30-5.90)  m/uL


 


Hgb     (13.0-17.5)  gm/dL


 


Hct     (39.0-53.0)  %


 


Neutrophils #     (1.3-7.7)  k/uL


 


Lymphocytes #     (1.0-4.8)  k/uL


 


APTT    43.8 H  (22.0-30.0)  sec


 


Chloride     ()  mmol/L


 


Carbon Dioxide     (22-30)  mmol/L


 


BUN     (9-20)  mg/dL


 


Glucose     (74-99)  mg/dL


 


POC Glucose (mg/dL)  136 H  143 H   (75-99)  mg/dL


 


Magnesium     (1.6-2.3)  mg/dL


 


Total Protein     (6.3-8.2)  g/dL


 


Albumin     (3.5-5.0)  g/dL














  04/28/17 04/28/17 Range/Units





  15:20 15:57 


 


WBC    (3.8-10.6)  k/uL


 


RBC    (4.30-5.90)  m/uL


 


Hgb    (13.0-17.5)  gm/dL


 


Hct    (39.0-53.0)  %


 


Neutrophils #    (1.3-7.7)  k/uL


 


Lymphocytes #    (1.0-4.8)  k/uL


 


APTT    (22.0-30.0)  sec


 


Chloride    ()  mmol/L


 


Carbon Dioxide    (22-30)  mmol/L


 


BUN    (9-20)  mg/dL


 


Glucose    (74-99)  mg/dL


 


POC Glucose (mg/dL)  133 H  124 H  (75-99)  mg/dL


 


Magnesium    (1.6-2.3)  mg/dL


 


Total Protein    (6.3-8.2)  g/dL


 


Albumin    (3.5-5.0)  g/dL








 Microbiology - Last 24 Hours (Table)











 04/24/17 02:00 Blood Culture - Preliminary





 Blood    No Growth after 96 hours














Assessment and Plan


Plan: 





Assessment





1 Coronary artery disease status post carotid bypass surgery and the patient is 

postop day #15





2 prolonged postoperative ventilator-dependent history failure, multifactorial.

  The active issue for now as the significant neuromuscular weakness that has 

complicated the patient's recovery.





On 04/26/2017 the patient remains on a pressure control mode of ventilation.  

Weaning parameters are weak.  The patient has profound neuromuscular weakness 

which is the main obstacle to hours further weaning.





On 04/27/2017, the patient was extubated with the above-mentioned concerns. .  

There is a chest and the patient may fail extubation based on the fact that he 

still weak and he has an abdominal ileus which probably may affect his 

breathing.  As such, we would monitor this patient very closely in the 

intensive care unit and consider reintubation if he fails and proceed with a 

PEG and trach in a.m. if he is unable to tolerate extubation.





On 04/28/2017, the patient is being seen in follow-up.  The patient is still 

extubated.  Chest x-ray findings are stable with atelectatic changes small 

effusion the lung bases.  Affect is slightly worse on the left.  Nevertheless, 

sedation status remains unchanged and the patient is still breathing 

comfortably despite the ongoing abdominal ileus and distention.





3 recurrent mucous plugs with excessive respiratory secretions requiring 

bronchoscopy and a peak airway suctioning and a bronchoscopy cultures came back 

negative for any microbial growth


The most recent sputum analysis showing Moraxella catarrhalis and the patient 

is currently on Levaquin.





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 

weakness, improving slowly





5 hypertension





6 hyperlipidemia





7 atrial fibrillation, paroxysmal currently on normal sinus rhythm, currently 

on a heparin drip and the patient's rhythm is sinus with a controlled rate





8 enteral feeding for nutritional support, currently on hold as the patient 

developed colonic ileus.  NG tube is in place.  Rectal tube was also inserted 

and then removed.





9 postoperative anemia, currently hemoglobin is stable





10 alcoholism, recovered from DT





11 gout





12 diabetes mellitus, currently on an insulin drip 





13 distention of the bowel, favored a colonic ileus.





Plan





Monitor the patient closely in the intensive care unit.  Consider reintubation 

there be any decompensation in the respiratory status.  Meanwhile, 

neurologically the patient is getting stronger.  NG tube will be kept in place.

  Keep the patient nothing by mouth for another 24 hours.  Add Reglan 10 mg 

every 6 hours for both motility.  Milk of magnesia twice a day.  Early mobility 

if possible.  Continue to the insulin drip for blood sugar control.  Continued 

IV heparin.  No signs of ischemic bowel.  No signs of intra-abdominal source of 

sepsis.  We'll continue to follow.  Aggressive pulmonary toileting.  Initiate 

use the incentive spirometer.  Keep NG tube in place.  Keep the patient ICU for 

another 24 hours.  Will need close monitoring.  This is a critically care 

evaluate her stool was done in 32 min


Time with Patient: Greater than 30

## 2017-04-28 NOTE — P.PN
Subjective


Principal diagnosis: 


Status post CABG, respiratory failure, atrial fibrillation and inability to 

extubate because of poor oxygenation





This is a 60-year-old gentleman who is status post prior to coronary bypass 

surgery.  Patient had a prolonged stay in ICU on mechanical ventilator support.

  He is also had some neuromuscular weakness.  Today patient was extubated and 

so far he seems to be tolerating.  He did develop some ileus and abdominal 

distention.  Patient does have all sounds and having some stools.  His 

potassium was low in the morning but it is supplemented.  Is a concern that if 

he continues to this problem.  Patient may require reintubation.  At this time.

  Patient is maintaining sinus rhythm.  We'll continue his current medical 

therapy.  Tube  Feeding is held for now.





This patient is reevaluated on 28th of April.  Patient remains extubated.  

Patient seemed to be tachypneic be tolerating very well so far.  His abdomen is 

still tympanic but bowel sounds are present.  Patient is having some loose 

bowel movements.  Patient is having intermittent atrial fibrillation.  He seems 

to be back in sinus rhythm.  We will continue current medical therapy.  

Pulmonary is keeping a close eye on his pulmonary status.  Incentive 

spirometry.  Increase activity as tolerated.








Objective





- Vital Signs


Vital signs: 


 Vital Signs











Temp  97.3 F L  04/28/17 16:00


 


Pulse  76   04/28/17 17:00


 


Resp  22   04/28/17 17:00


 


BP  131/70   04/28/17 17:00


 


Pulse Ox  94 L  04/28/17 17:00








 Intake & Output











 04/27/17 04/28/17 04/28/17





 18:59 06:59 18:59


 


Intake Total 1020 1560 1266.088


 


Output Total 1210 1060 1115


 


Balance -190 500 151.088


 


Weight 100.3 kg 97.7 kg 


 


Intake:   


 


  IV 20  30


 


    0.9 for pressure bag   30


 


    Sodium Chloride 0.9% 1, 20  





    000 ml @ 20 mls/hr IV .   





    Q24H [facility] Rx#:[id number]   


 


  Intake, IV Titration 1000 1500 1236.088





  Amount   


 


    D5-0.45% NaCl with KCl 800 800 480





    20Meq/l 1,000 ml @ 80 mls   





    /hr IV .[id number] [facility] Rx#:   





    [id number]   


 


    Heparin Sodium,Porcine/  500 227.981





    D5w Pmx 25,000 unit In   





    Dextrose/Water 1 500ml.   





    bag @ 10 UNITS/KG/HR 19.   





    98 mls/hr IV .Q24H [facility] Rx   





    #:[id number]   


 


    Insulin Regular 100 unit   28.107





    In Sodium Chloride 0.9%   





    100 ml @ Per Protocol IV   





    .Q0M [facility] Rx#:[id number]   


 


    Levofloxacin 750Mg-D5w   100





    Pmx 750 mg In Dextrose/   





    Water 1 150ml.bag @ 100   





    mls/hr IVPB Q24H [facility] Rx#:   





    [id number]   


 


    Potassium Chloride 10 meq 200  





    In Water For Injection 1   





    100ml.bag @ 100 mls/hr   





    IVPB Q1H [facility] Rx#:   





    [id number]   


 


    Potassium Chloride 10 meq  200 





    In Water For Injection 1   





    100ml.bag @ 100 mls/hr   





    IVPB Q1H [facility] Rx#:   





    [id number]   


 


    Potassium Chloride 10 meq   400





    In Water For Injection 1   





    100ml.bag @ 100 mls/hr   





    IVPB Q1H [facility] Rx#:   





    [id number]   


 


  Tube Feeding 0  


 


  Other  60 


 


Output:   


 


  Gastric Drainage 600  500


 


  Urine 610 1060 615


 


Other:   


 


  Voiding Method Indwelling Catheter Indwelling Catheter Indwelling Catheter


 


  # Bowel Movements 1  2








 ABP, PAP, CO, CI - Last Documented











Arterial Blood Pressure        174/58


 


Pulmonary Artery Pressure      46/23


 


Cardiac Output                 6.8


 


Cardiac Index                  3.3

















- Exam





GENERAL EXAM: Patient is more alert


HEENT: Normocephalic.


NECK: No masses, no nuchal rigidity.


CHEST: No chest wall deformity.


LUNGS: Breath sounds at bases


HEART: S1 and S2 normal with no audible mumurs or gallops. Regular rhythm, 

femorals equal on both sides..


ABDOMEN: Distended and tympanic.  Bowel sounds are present.


SKIN: No rashes


CENTRAL NERVOUS SYSTEM:  Sedated


EXTREMITIES: No cyanosis, clubbing or edema.





- Labs


CBC & Chem 7: 


 04/28/17 04:50





 04/28/17 12:15


Labs: 


 Abnormal Lab Results - Last 24 Hours (Table)











  04/27/17 04/27/17 04/27/17 Range/Units





  18:31 20:45 20:48 


 


WBC     (3.8-10.6)  k/uL


 


RBC     (4.30-5.90)  m/uL


 


Hgb     (13.0-17.5)  gm/dL


 


Hct     (39.0-53.0)  %


 


Neutrophils #     (1.3-7.7)  k/uL


 


Lymphocytes #     (1.0-4.8)  k/uL


 


APTT     (22.0-30.0)  sec


 


Chloride     ()  mmol/L


 


Carbon Dioxide     (22-30)  mmol/L


 


BUN     (9-20)  mg/dL


 


Glucose     (74-99)  mg/dL


 


POC Glucose (mg/dL)  164 H   177 H  (75-99)  mg/dL


 


Magnesium   2.5 H   (1.6-2.3)  mg/dL


 


Total Protein     (6.3-8.2)  g/dL


 


Albumin     (3.5-5.0)  g/dL














  04/27/17 04/28/17 04/28/17 Range/Units





  23:29 04:50 04:50 


 


WBC   15.1 H   (3.8-10.6)  k/uL


 


RBC   2.64 L   (4.30-5.90)  m/uL


 


Hgb   8.7 L   (13.0-17.5)  gm/dL


 


Hct   25.7 L   (39.0-53.0)  %


 


Neutrophils #   13.9 H   (1.3-7.7)  k/uL


 


Lymphocytes #   0.7 L   (1.0-4.8)  k/uL


 


APTT     (22.0-30.0)  sec


 


Chloride    112 H  ()  mmol/L


 


Carbon Dioxide    21 L  (22-30)  mmol/L


 


BUN    24 H  (9-20)  mg/dL


 


Glucose    194 H  (74-99)  mg/dL


 


POC Glucose (mg/dL)  176 H    (75-99)  mg/dL


 


Magnesium     (1.6-2.3)  mg/dL


 


Total Protein    5.8 L  (6.3-8.2)  g/dL


 


Albumin    2.9 L  (3.5-5.0)  g/dL














  04/28/17 04/28/17 04/28/17 Range/Units





  04:50 04:52 07:59 


 


WBC     (3.8-10.6)  k/uL


 


RBC     (4.30-5.90)  m/uL


 


Hgb     (13.0-17.5)  gm/dL


 


Hct     (39.0-53.0)  %


 


Neutrophils #     (1.3-7.7)  k/uL


 


Lymphocytes #     (1.0-4.8)  k/uL


 


APTT     (22.0-30.0)  sec


 


Chloride     ()  mmol/L


 


Carbon Dioxide     (22-30)  mmol/L


 


BUN     (9-20)  mg/dL


 


Glucose     (74-99)  mg/dL


 


POC Glucose (mg/dL)   198 H  219 H  (75-99)  mg/dL


 


Magnesium  2.7 H    (1.6-2.3)  mg/dL


 


Total Protein     (6.3-8.2)  g/dL


 


Albumin     (3.5-5.0)  g/dL














  04/28/17 04/28/17 04/28/17 Range/Units





  08:10 08:20 09:09 


 


WBC     (3.8-10.6)  k/uL


 


RBC     (4.30-5.90)  m/uL


 


Hgb     (13.0-17.5)  gm/dL


 


Hct     (39.0-53.0)  %


 


Neutrophils #     (1.3-7.7)  k/uL


 


Lymphocytes #     (1.0-4.8)  k/uL


 


APTT  40.1 H    (22.0-30.0)  sec


 


Chloride     ()  mmol/L


 


Carbon Dioxide     (22-30)  mmol/L


 


BUN     (9-20)  mg/dL


 


Glucose     (74-99)  mg/dL


 


POC Glucose (mg/dL)   208 H  192 H  (75-99)  mg/dL


 


Magnesium     (1.6-2.3)  mg/dL


 


Total Protein     (6.3-8.2)  g/dL


 


Albumin     (3.5-5.0)  g/dL














  04/28/17 04/28/17 04/28/17 Range/Units





  09:44 11:12 12:08 


 


WBC     (3.8-10.6)  k/uL


 


RBC     (4.30-5.90)  m/uL


 


Hgb     (13.0-17.5)  gm/dL


 


Hct     (39.0-53.0)  %


 


Neutrophils #     (1.3-7.7)  k/uL


 


Lymphocytes #     (1.0-4.8)  k/uL


 


APTT     (22.0-30.0)  sec


 


Chloride     ()  mmol/L


 


Carbon Dioxide     (22-30)  mmol/L


 


BUN     (9-20)  mg/dL


 


Glucose     (74-99)  mg/dL


 


POC Glucose (mg/dL)  192 H  154 H  154 H  (75-99)  mg/dL


 


Magnesium     (1.6-2.3)  mg/dL


 


Total Protein     (6.3-8.2)  g/dL


 


Albumin     (3.5-5.0)  g/dL














  04/28/17 04/28/17 04/28/17 Range/Units





  13:07 14:12 15:20 


 


WBC     (3.8-10.6)  k/uL


 


RBC     (4.30-5.90)  m/uL


 


Hgb     (13.0-17.5)  gm/dL


 


Hct     (39.0-53.0)  %


 


Neutrophils #     (1.3-7.7)  k/uL


 


Lymphocytes #     (1.0-4.8)  k/uL


 


APTT    43.8 H  (22.0-30.0)  sec


 


Chloride     ()  mmol/L


 


Carbon Dioxide     (22-30)  mmol/L


 


BUN     (9-20)  mg/dL


 


Glucose     (74-99)  mg/dL


 


POC Glucose (mg/dL)  136 H  143 H   (75-99)  mg/dL


 


Magnesium     (1.6-2.3)  mg/dL


 


Total Protein     (6.3-8.2)  g/dL


 


Albumin     (3.5-5.0)  g/dL














  04/28/17 04/28/17 04/28/17 Range/Units





  15:20 15:57 17:22 


 


WBC     (3.8-10.6)  k/uL


 


RBC     (4.30-5.90)  m/uL


 


Hgb     (13.0-17.5)  gm/dL


 


Hct     (39.0-53.0)  %


 


Neutrophils #     (1.3-7.7)  k/uL


 


Lymphocytes #     (1.0-4.8)  k/uL


 


APTT     (22.0-30.0)  sec


 


Chloride     ()  mmol/L


 


Carbon Dioxide     (22-30)  mmol/L


 


BUN     (9-20)  mg/dL


 


Glucose     (74-99)  mg/dL


 


POC Glucose (mg/dL)  133 H  124 H  138 H  (75-99)  mg/dL


 


Magnesium     (1.6-2.3)  mg/dL


 


Total Protein     (6.3-8.2)  g/dL


 


Albumin     (3.5-5.0)  g/dL














  04/28/17 Range/Units





  18:00 


 


WBC   (3.8-10.6)  k/uL


 


RBC   (4.30-5.90)  m/uL


 


Hgb   (13.0-17.5)  gm/dL


 


Hct   (39.0-53.0)  %


 


Neutrophils #   (1.3-7.7)  k/uL


 


Lymphocytes #   (1.0-4.8)  k/uL


 


APTT   (22.0-30.0)  sec


 


Chloride   ()  mmol/L


 


Carbon Dioxide   (22-30)  mmol/L


 


BUN   (9-20)  mg/dL


 


Glucose   (74-99)  mg/dL


 


POC Glucose (mg/dL)  160 H  (75-99)  mg/dL


 


Magnesium   (1.6-2.3)  mg/dL


 


Total Protein   (6.3-8.2)  g/dL


 


Albumin   (3.5-5.0)  g/dL








 Microbiology - Last 24 Hours (Table)











 04/24/17 02:00 Blood Culture - Preliminary





 Blood    No Growth after 96 hours














Assessment and Plan


(1) Status post coronary artery bypass graft


Status: Acute   





(2) Family history of heart disease


Status: Acute   





(3) Hyperlipidemia


Status: Acute   





(4) Hypertension


Status: Acute   





(5) Nicotine dependence, chewing tobacco, uncomplicated


Status: Acute   





(6) Atrial fibrillation


Status: Acute   


Plan: 


Patient clinical status seemed to be rather stable.  Abdomen still shows 

findings of ileus.  Is having intermittent atrial fibrillation.  Pulmonary is 

keeping a close eye on his respiratory status.  Patient is on amiodarone and 

Lopressor along with digoxin.  We'll continue current medical therapy.  

Incentive spirometry.

## 2017-04-28 NOTE — XR
EXAMINATION TYPE: XR abdomen 1V

 

DATE OF EXAM: 4/28/2017 9:09 AM

 

CLINICAL HISTORY: Abdominal distention progress study.

 

TECHNIQUE: 3 AP portable supine and upright views of the abdomen are acquired..

 

COMPARISON: Abdominal x-ray from one day earlier.

 

FINDINGS: Sternal wires and mediastinal clips are redemonstrated. There is persistent cardiomegaly wi
th left basilar opacity consistent with infiltrate and/or atelectasis and probable small left pleural
 effusion.

 

There is coiled nasogastric tube. There are persistent gaseous dilated small and large bowel loops th
roughout the visualized abdomen and pelvis. Degree of gaseous distention is most prominent in colonic
 loops and fairly stable. Monitoring device overlies pubic symphysis. No pneumoperitoneum is seen. Vi
sualized osseous structures are intact.

 

IMPRESSION: Persistent overall nonspecific bowel gas pattern favoring ileus not significantly changed
 from prior.

## 2017-04-29 LAB
ALP SERPL-CCNC: 65 U/L (ref 38–126)
ALT SERPL-CCNC: 62 U/L (ref 21–72)
ANION GAP SERPL CALC-SCNC: 7 MMOL/L
AST SERPL-CCNC: 38 U/L (ref 17–59)
BASOPHILS # BLD AUTO: 0 K/UL (ref 0–0.2)
BASOPHILS NFR BLD AUTO: 0 %
BUN SERPL-SCNC: 20 MG/DL (ref 9–20)
CALCIUM SPEC-MCNC: 8.3 MG/DL (ref 8.4–10.2)
CH: 32.1
CHCM: 32.9
CHLORIDE SERPL-SCNC: 113 MMOL/L (ref 98–107)
CO2 SERPL-SCNC: 24 MMOL/L (ref 22–30)
EOSINOPHIL # BLD AUTO: 0 K/UL (ref 0–0.7)
EOSINOPHIL NFR BLD AUTO: 0 %
ERYTHROCYTE [DISTWIDTH] IN BLOOD BY AUTOMATED COUNT: 2.6 M/UL (ref 4.3–5.9)
ERYTHROCYTE [DISTWIDTH] IN BLOOD: 14.6 % (ref 11.5–15.5)
GLUCOSE BLD-MCNC: 109 MG/DL (ref 75–99)
GLUCOSE BLD-MCNC: 113 MG/DL (ref 75–99)
GLUCOSE BLD-MCNC: 114 MG/DL (ref 75–99)
GLUCOSE BLD-MCNC: 116 MG/DL (ref 75–99)
GLUCOSE BLD-MCNC: 116 MG/DL (ref 75–99)
GLUCOSE BLD-MCNC: 121 MG/DL (ref 75–99)
GLUCOSE BLD-MCNC: 123 MG/DL (ref 75–99)
GLUCOSE BLD-MCNC: 123 MG/DL (ref 75–99)
GLUCOSE BLD-MCNC: 125 MG/DL (ref 75–99)
GLUCOSE BLD-MCNC: 127 MG/DL (ref 75–99)
GLUCOSE BLD-MCNC: 127 MG/DL (ref 75–99)
GLUCOSE BLD-MCNC: 129 MG/DL (ref 75–99)
GLUCOSE BLD-MCNC: 129 MG/DL (ref 75–99)
GLUCOSE BLD-MCNC: 130 MG/DL (ref 75–99)
GLUCOSE BLD-MCNC: 135 MG/DL (ref 75–99)
GLUCOSE BLD-MCNC: 99 MG/DL (ref 75–99)
GLUCOSE SERPL-MCNC: 104 MG/DL (ref 74–99)
HCT VFR BLD AUTO: 25.6 % (ref 39–53)
HDW: 3.07
HGB BLD-MCNC: 8.3 GM/DL (ref 13–17.5)
LUC NFR BLD AUTO: 1 %
LYMPHOCYTES # SPEC AUTO: 1.7 K/UL (ref 1–4.8)
LYMPHOCYTES NFR SPEC AUTO: 11 %
MAGNESIUM SPEC-SCNC: 2.6 MG/DL (ref 1.6–2.3)
MCH RBC QN AUTO: 31.9 PG (ref 25–35)
MCHC RBC AUTO-ENTMCNC: 32.5 G/DL (ref 31–37)
MCV RBC AUTO: 98.3 FL (ref 80–100)
MONOCYTES # BLD AUTO: 0.5 K/UL (ref 0–1)
MONOCYTES NFR BLD AUTO: 4 %
NEUTROPHILS # BLD AUTO: 12.9 K/UL (ref 1.3–7.7)
NEUTROPHILS NFR BLD AUTO: 84 %
NON-AFRICAN AMERICAN GFR(MDRD): >60
PHOSPHATE SERPL-MCNC: 2.3 MG/DL (ref 2.5–4.5)
POTASSIUM SERPL-SCNC: 3.8 MMOL/L (ref 3.5–5.1)
PROT SERPL-MCNC: 5.8 G/DL (ref 6.3–8.2)
SODIUM SERPL-SCNC: 144 MMOL/L (ref 137–145)
TRIGL SERPL-MCNC: 89 MG/DL (ref ?–150)
WBC # BLD AUTO: 0.2 10*3/UL
WBC # BLD AUTO: 15.5 K/UL (ref 3.8–10.6)
WBC (PEROX): 14.85

## 2017-04-29 RX ADMIN — LEVOFLOXACIN SCH MLS/HR: 750 INJECTION, SOLUTION INTRAVENOUS at 17:22

## 2017-04-29 RX ADMIN — METOCLOPRAMIDE SCH MG: 5 INJECTION, SOLUTION INTRAMUSCULAR; INTRAVENOUS at 06:30

## 2017-04-29 RX ADMIN — IOHEXOL PRN ML: 350 INJECTION, SOLUTION INTRAVENOUS at 13:19

## 2017-04-29 RX ADMIN — AMIODARONE HYDROCHLORIDE SCH MG: 200 TABLET ORAL at 20:56

## 2017-04-29 RX ADMIN — PANTOPRAZOLE SODIUM SCH MG: 40 INJECTION, POWDER, FOR SOLUTION INTRAVENOUS at 08:10

## 2017-04-29 RX ADMIN — DEXTROSE MONOHYDRATE, SODIUM CHLORIDE, AND POTASSIUM CHLORIDE SCH MLS/HR: 50; 4.5; 1.49 INJECTION, SOLUTION INTRAVENOUS at 17:22

## 2017-04-29 RX ADMIN — MULTIVITAMIN SCH ML: LIQUID ORAL at 19:35

## 2017-04-29 RX ADMIN — POTASSIUM CHLORIDE SCH MLS/HR: 7.46 INJECTION, SOLUTION INTRAVENOUS at 08:09

## 2017-04-29 RX ADMIN — DEXTROSE MONOHYDRATE, SODIUM CHLORIDE, AND POTASSIUM CHLORIDE SCH MLS/HR: 50; 4.5; 1.49 INJECTION, SOLUTION INTRAVENOUS at 00:09

## 2017-04-29 RX ADMIN — Medication SCH MG: at 08:11

## 2017-04-29 RX ADMIN — DIGOXIN SCH MCG: 250 TABLET ORAL at 08:13

## 2017-04-29 RX ADMIN — METOPROLOL TARTRATE SCH MG: 25 TABLET, FILM COATED ORAL at 20:57

## 2017-04-29 RX ADMIN — METOCLOPRAMIDE SCH MG: 5 INJECTION, SOLUTION INTRAMUSCULAR; INTRAVENOUS at 13:24

## 2017-04-29 RX ADMIN — IPRATROPIUM BROMIDE AND ALBUTEROL SULFATE SCH ML: .5; 3 SOLUTION RESPIRATORY (INHALATION) at 15:52

## 2017-04-29 RX ADMIN — DOCUSATE SODIUM AND SENNOSIDES SCH EACH: 50; 8.6 TABLET ORAL at 20:56

## 2017-04-29 RX ADMIN — METOPROLOL TARTRATE SCH MG: 25 TABLET, FILM COATED ORAL at 09:27

## 2017-04-29 RX ADMIN — ASPIRIN 325 MG ORAL TABLET SCH MG: 325 PILL ORAL at 09:27

## 2017-04-29 RX ADMIN — POTASSIUM CHLORIDE SCH MLS/HR: 7.46 INJECTION, SOLUTION INTRAVENOUS at 06:45

## 2017-04-29 RX ADMIN — Medication SCH MG: at 20:56

## 2017-04-29 RX ADMIN — SODIUM CHLORIDE, PRESERVATIVE FREE SCH ML: 5 INJECTION INTRAVENOUS at 08:10

## 2017-04-29 RX ADMIN — ATORVASTATIN CALCIUM SCH MG: 40 TABLET, FILM COATED ORAL at 08:10

## 2017-04-29 RX ADMIN — INSULIN HUMAN SCH MLS/HR: 100 INJECTION, SOLUTION PARENTERAL at 15:21

## 2017-04-29 RX ADMIN — METOCLOPRAMIDE SCH MG: 5 INJECTION, SOLUTION INTRAMUSCULAR; INTRAVENOUS at 19:35

## 2017-04-29 RX ADMIN — SODIUM CHLORIDE SCH MLS/HR: 450 INJECTION, SOLUTION INTRAVENOUS at 00:10

## 2017-04-29 RX ADMIN — SODIUM CHLORIDE, PRESERVATIVE FREE SCH ML: 5 INJECTION INTRAVENOUS at 20:57

## 2017-04-29 RX ADMIN — SODIUM CHLORIDE SCH MLS/HR: 450 INJECTION, SOLUTION INTRAVENOUS at 15:20

## 2017-04-29 RX ADMIN — LISINOPRIL SCH MG: 10 TABLET ORAL at 20:56

## 2017-04-29 RX ADMIN — AMIODARONE HYDROCHLORIDE SCH MG: 200 TABLET ORAL at 08:10

## 2017-04-29 RX ADMIN — IPRATROPIUM BROMIDE AND ALBUTEROL SULFATE SCH ML: .5; 3 SOLUTION RESPIRATORY (INHALATION) at 10:55

## 2017-04-29 RX ADMIN — LISINOPRIL SCH MG: 10 TABLET ORAL at 08:13

## 2017-04-29 RX ADMIN — IOHEXOL PRN ML: 350 INJECTION, SOLUTION INTRAVENOUS at 10:32

## 2017-04-29 RX ADMIN — IPRATROPIUM BROMIDE AND ALBUTEROL SULFATE SCH ML: .5; 3 SOLUTION RESPIRATORY (INHALATION) at 07:17

## 2017-04-29 RX ADMIN — BUDESONIDE SCH MG: 1 SUSPENSION RESPIRATORY (INHALATION) at 19:34

## 2017-04-29 RX ADMIN — BUDESONIDE SCH MG: 1 SUSPENSION RESPIRATORY (INHALATION) at 07:17

## 2017-04-29 RX ADMIN — METOCLOPRAMIDE SCH MG: 5 INJECTION, SOLUTION INTRAMUSCULAR; INTRAVENOUS at 00:09

## 2017-04-29 RX ADMIN — IPRATROPIUM BROMIDE AND ALBUTEROL SULFATE SCH ML: .5; 3 SOLUTION RESPIRATORY (INHALATION) at 19:34

## 2017-04-29 NOTE — XR
EXAMINATION TYPE: XR abdomen 1V

 

DATE OF EXAM: 4/29/2017 9:19 AM

 

CLINICAL HISTORY: Abdominal pain and ileus.

 

TECHNIQUE: 2 supine portable KUB images of the abdomen are obtained

 

COMPARISON: Abdominal x-ray from one day earlier..

 

FINDINGS: Sternal wires and mediastinal clips are redemonstrated. There is persistent cardiomegaly wi
th small left pleural effusion and left basilar atelectasis and/or infiltrate.

 

There is persistent coiled nasogastric tube. Gas is seen in nondistended stomach. There are gas promi
nent colonic loops along the periphery of the abdomen is redemonstrated. Degree of distention is felt
 stable or slightly improved. Most prominent area is the cecum which is felt stable. There is paucity
 of small bowel gas. Visualized gas is noted in nondistended small bowel loops in the lower abdomen a
nd pelvis. A monitoring device overlies pubic symphysis. Visualized osseous structures are intact.

 

IMPRESSION:  

 

Overall nonspecific bowel gas pattern remains present. Favor colonic ileus. No significant change fro
m one day earlier.

## 2017-04-29 NOTE — P.GSCN
History of Present Illness


Consult date: 04/29/17


Reason for Consult: 





Ileus


History of present illness: 





This is a 61-year-old male who underwent a CABG approximately 2-1/2 weeks ago.  

Patient had a prolonged ventilation due to issues with DTs.  The patient's been 

extubated for several days.  He's had abdominal distention.  His x-ray today 

shows evidence of a colonic ileus.  Patient denies any abdominal pain.  He 

states he feels mildly distended.  He does not appear to be in any significant 

abdominal discomfort.  He denies any chest pain.





Review of Systems





- Constitutional


Reports as per HPI





Past Medical History


Past Medical History: Coronary Artery Disease (CAD), Hypertension, Myocardial 

Infarction (MI)


Additional Past Medical History / Comment(s): gout


Last Myocardial Infarction Date:: 2000


History of Any Multi-Drug Resistant Organisms: None Reported


Past Surgical History: Heart Catheterization With Stent


Additional Past Surgical History / Comment(s): stent x1


Past Anesthesia/Blood Transfusion Reactions: No Reported Reaction


Date of Last Stent Placement:: 2000


Past Psychological History: No Psychological Hx Reported


Smoking Status: Current every day smoker


Past Alcohol Use History: Heavy


Additional Past Alcohol Use History / Comment(s): chews tobacco, one tin last 3-

4 days,   drinks 6-8 beers a day


Past Drug Use History: None Reported





- Past Family History


  ** Sister(s)


Family Medical History: Cancer


Additional Family Medical History / Comment(s): thyroid





Medications and Allergies


 Home Medications











 Medication  Instructions  Recorded  Confirmed  Type


 


Aspirin EC [Ecotrin] 325 mg PO DAILY 04/03/16 04/13/17 History


 


Metoprolol Tartrate [Lopressor] 25 mg PO BID 04/03/16 04/13/17 History


 


Atorvastatin [Lipitor] 40 mg PO HS 04/06/17 04/13/17 History


 


Enalapril/Hydrochlorothiazide 1 tab PO DAILY 04/06/17 04/13/17 History





[Vaseretic 10-25 mg]    


 


Thiamine [Vitamin B-1] 100 mg PO DAILY 04/10/17 04/13/17 History











 Allergies











Allergy/AdvReac Type Severity Reaction Status Date / Time


 


Penicillins Allergy  Rash/Hives Verified 04/10/17 14:29














Surgical - Exam


 Vital Signs











Temp Pulse Resp BP Pulse Ox


 


 98.1 F   53 L  18   163/81   99 


 


 04/13/17 06:02  04/13/17 06:02  04/13/17 06:02  04/13/17 06:02  04/13/17 06:02














- General


well developed, no distress





- Eyes


PERRL





- ENT


normal pinna





- Neck


no masses





- Respiratory


normal expansion





- Cardiovascular


Rhythm: regular





- Abdomen





Abdomen is soft.  It is distended.  There is no tenderness.  No rebound or 

guarding.





Results





- Labs





 04/29/17 04:10





 04/29/17 04:10


 Abnormal Lab Results - Last 24 Hours (Table)











  04/28/17 04/28/17 04/28/17 Range/Units





  11:12 12:08 13:07 


 


WBC     (3.8-10.6)  k/uL


 


RBC     (4.30-5.90)  m/uL


 


Hgb     (13.0-17.5)  gm/dL


 


Hct     (39.0-53.0)  %


 


Neutrophils #     (1.3-7.7)  k/uL


 


APTT     (22.0-30.0)  sec


 


Chloride     ()  mmol/L


 


Glucose     (74-99)  mg/dL


 


POC Glucose (mg/dL)  154 H  154 H  136 H  (75-99)  mg/dL


 


Calcium     (8.4-10.2)  mg/dL


 


Total Protein     (6.3-8.2)  g/dL


 


Albumin     (3.5-5.0)  g/dL














  04/28/17 04/28/17 04/28/17 Range/Units





  14:12 15:20 15:20 


 


WBC     (3.8-10.6)  k/uL


 


RBC     (4.30-5.90)  m/uL


 


Hgb     (13.0-17.5)  gm/dL


 


Hct     (39.0-53.0)  %


 


Neutrophils #     (1.3-7.7)  k/uL


 


APTT   43.8 H   (22.0-30.0)  sec


 


Chloride     ()  mmol/L


 


Glucose     (74-99)  mg/dL


 


POC Glucose (mg/dL)  143 H   133 H  (75-99)  mg/dL


 


Calcium     (8.4-10.2)  mg/dL


 


Total Protein     (6.3-8.2)  g/dL


 


Albumin     (3.5-5.0)  g/dL














  04/28/17 04/28/17 04/28/17 Range/Units





  15:57 17:22 18:00 


 


WBC     (3.8-10.6)  k/uL


 


RBC     (4.30-5.90)  m/uL


 


Hgb     (13.0-17.5)  gm/dL


 


Hct     (39.0-53.0)  %


 


Neutrophils #     (1.3-7.7)  k/uL


 


APTT     (22.0-30.0)  sec


 


Chloride     ()  mmol/L


 


Glucose     (74-99)  mg/dL


 


POC Glucose (mg/dL)  124 H  138 H  160 H  (75-99)  mg/dL


 


Calcium     (8.4-10.2)  mg/dL


 


Total Protein     (6.3-8.2)  g/dL


 


Albumin     (3.5-5.0)  g/dL














  04/28/17 04/28/17 04/28/17 Range/Units





  18:57 20:21 21:08 


 


WBC     (3.8-10.6)  k/uL


 


RBC     (4.30-5.90)  m/uL


 


Hgb     (13.0-17.5)  gm/dL


 


Hct     (39.0-53.0)  %


 


Neutrophils #     (1.3-7.7)  k/uL


 


APTT     (22.0-30.0)  sec


 


Chloride     ()  mmol/L


 


Glucose     (74-99)  mg/dL


 


POC Glucose (mg/dL)  136 H  123 H  120 H  (75-99)  mg/dL


 


Calcium     (8.4-10.2)  mg/dL


 


Total Protein     (6.3-8.2)  g/dL


 


Albumin     (3.5-5.0)  g/dL














  04/28/17 04/28/17 04/28/17 Range/Units





  22:03 22:05 23:05 


 


WBC     (3.8-10.6)  k/uL


 


RBC     (4.30-5.90)  m/uL


 


Hgb     (13.0-17.5)  gm/dL


 


Hct     (39.0-53.0)  %


 


Neutrophils #     (1.3-7.7)  k/uL


 


APTT   51.6 H   (22.0-30.0)  sec


 


Chloride     ()  mmol/L


 


Glucose     (74-99)  mg/dL


 


POC Glucose (mg/dL)  127 H   109 H  (75-99)  mg/dL


 


Calcium     (8.4-10.2)  mg/dL


 


Total Protein     (6.3-8.2)  g/dL


 


Albumin     (3.5-5.0)  g/dL














  04/29/17 04/29/17 04/29/17 Range/Units





  00:08 00:53 03:15 


 


WBC     (3.8-10.6)  k/uL


 


RBC     (4.30-5.90)  m/uL


 


Hgb     (13.0-17.5)  gm/dL


 


Hct     (39.0-53.0)  %


 


Neutrophils #     (1.3-7.7)  k/uL


 


APTT     (22.0-30.0)  sec


 


Chloride     ()  mmol/L


 


Glucose     (74-99)  mg/dL


 


POC Glucose (mg/dL)  127 H  129 H  114 H  (75-99)  mg/dL


 


Calcium     (8.4-10.2)  mg/dL


 


Total Protein     (6.3-8.2)  g/dL


 


Albumin     (3.5-5.0)  g/dL














  04/29/17 04/29/17 04/29/17 Range/Units





  04:10 04:10 04:10 


 


WBC  15.5 H    (3.8-10.6)  k/uL


 


RBC  2.60 L    (4.30-5.90)  m/uL


 


Hgb  8.3 L    (13.0-17.5)  gm/dL


 


Hct  25.6 L    (39.0-53.0)  %


 


Neutrophils #  12.9 H    (1.3-7.7)  k/uL


 


APTT    54.5 H  (22.0-30.0)  sec


 


Chloride   113 H   ()  mmol/L


 


Glucose   104 H   (74-99)  mg/dL


 


POC Glucose (mg/dL)     (75-99)  mg/dL


 


Calcium   8.3 L   (8.4-10.2)  mg/dL


 


Total Protein   5.8 L   (6.3-8.2)  g/dL


 


Albumin   2.7 L   (3.5-5.0)  g/dL














  04/29/17 04/29/17 04/29/17 Range/Units





  06:58 07:40 09:19 


 


WBC     (3.8-10.6)  k/uL


 


RBC     (4.30-5.90)  m/uL


 


Hgb     (13.0-17.5)  gm/dL


 


Hct     (39.0-53.0)  %


 


Neutrophils #     (1.3-7.7)  k/uL


 


APTT     (22.0-30.0)  sec


 


Chloride     ()  mmol/L


 


Glucose     (74-99)  mg/dL


 


POC Glucose (mg/dL)  113 H  116 H  116 H  (75-99)  mg/dL


 


Calcium     (8.4-10.2)  mg/dL


 


Total Protein     (6.3-8.2)  g/dL


 


Albumin     (3.5-5.0)  g/dL








 Microbiology - Last 24 Hours (Table)











 04/24/17 02:00 Blood Culture - Preliminary





 Blood    No Growth after 120 hours








 Diabetes panel











  04/28/17 04/28/17 04/28/17 Range/Units





  12:15 17:55 22:05 


 


Sodium     (137-145)  mmol/L


 


Potassium  3.7  3.5  4.0  (3.5-5.1)  mmol/L


 


Chloride     ()  mmol/L


 


Carbon Dioxide     (22-30)  mmol/L


 


BUN     (9-20)  mg/dL


 


Creatinine     (0.66-1.25)  mg/dL


 


Glucose     (74-99)  mg/dL


 


Calcium     (8.4-10.2)  mg/dL


 


AST     (17-59)  U/L


 


ALT     (21-72)  U/L


 


Alkaline Phosphatase     ()  U/L


 


Total Protein     (6.3-8.2)  g/dL


 


Albumin     (3.5-5.0)  g/dL














  04/29/17 Range/Units





  04:10 


 


Sodium  144  (137-145)  mmol/L


 


Potassium  3.8  (3.5-5.1)  mmol/L


 


Chloride  113 H  ()  mmol/L


 


Carbon Dioxide  24  (22-30)  mmol/L


 


BUN  20  (9-20)  mg/dL


 


Creatinine  0.70  (0.66-1.25)  mg/dL


 


Glucose  104 H  (74-99)  mg/dL


 


Calcium  8.3 L  (8.4-10.2)  mg/dL


 


AST  38  (17-59)  U/L


 


ALT  62  (21-72)  U/L


 


Alkaline Phosphatase  65  ()  U/L


 


Total Protein  5.8 L  (6.3-8.2)  g/dL


 


Albumin  2.7 L  (3.5-5.0)  g/dL








 Calcium panel











  04/29/17 Range/Units





  04:10 


 


Calcium  8.3 L  (8.4-10.2)  mg/dL


 


Albumin  2.7 L  (3.5-5.0)  g/dL








 Pituitary panel











  04/28/17 04/28/17 04/28/17 Range/Units





  12:15 17:55 22:05 


 


Sodium     (137-145)  mmol/L


 


Potassium  3.7  3.5  4.0  (3.5-5.1)  mmol/L


 


Chloride     ()  mmol/L


 


Carbon Dioxide     (22-30)  mmol/L


 


BUN     (9-20)  mg/dL


 


Creatinine     (0.66-1.25)  mg/dL


 


Glucose     (74-99)  mg/dL


 


Calcium     (8.4-10.2)  mg/dL














  04/29/17 Range/Units





  04:10 


 


Sodium  144  (137-145)  mmol/L


 


Potassium  3.8  (3.5-5.1)  mmol/L


 


Chloride  113 H  ()  mmol/L


 


Carbon Dioxide  24  (22-30)  mmol/L


 


BUN  20  (9-20)  mg/dL


 


Creatinine  0.70  (0.66-1.25)  mg/dL


 


Glucose  104 H  (74-99)  mg/dL


 


Calcium  8.3 L  (8.4-10.2)  mg/dL








 Adrenal panel











  04/28/17 04/28/17 04/28/17 Range/Units





  12:15 17:55 22:05 


 


Sodium     (137-145)  mmol/L


 


Potassium  3.7  3.5  4.0  (3.5-5.1)  mmol/L


 


Chloride     ()  mmol/L


 


Carbon Dioxide     (22-30)  mmol/L


 


BUN     (9-20)  mg/dL


 


Creatinine     (0.66-1.25)  mg/dL


 


Glucose     (74-99)  mg/dL


 


Calcium     (8.4-10.2)  mg/dL


 


Total Bilirubin     (0.2-1.3)  mg/dL


 


AST     (17-59)  U/L


 


ALT     (21-72)  U/L


 


Alkaline Phosphatase     ()  U/L


 


Total Protein     (6.3-8.2)  g/dL


 


Albumin     (3.5-5.0)  g/dL














  04/29/17 Range/Units





  04:10 


 


Sodium  144  (137-145)  mmol/L


 


Potassium  3.8  (3.5-5.1)  mmol/L


 


Chloride  113 H  ()  mmol/L


 


Carbon Dioxide  24  (22-30)  mmol/L


 


BUN  20  (9-20)  mg/dL


 


Creatinine  0.70  (0.66-1.25)  mg/dL


 


Glucose  104 H  (74-99)  mg/dL


 


Calcium  8.3 L  (8.4-10.2)  mg/dL


 


Total Bilirubin  1.2  (0.2-1.3)  mg/dL


 


AST  38  (17-59)  U/L


 


ALT  62  (21-72)  U/L


 


Alkaline Phosphatase  65  ()  U/L


 


Total Protein  5.8 L  (6.3-8.2)  g/dL


 


Albumin  2.7 L  (3.5-5.0)  g/dL














- Imaging


Abdominal x-ray: report reviewed (Dilated colon)





Assessment and Plan


Plan: 





Abdominal distention secondary to colonic ileus.  Patient left CAT scan of the 

abdomen and pelvis performed today.  He is currently on Reglan.  We will follow 

him closely.

## 2017-04-29 NOTE — P.PN
Progress Note - Text


CV Surgery Nursing 


POD: #16 off-pump double coronary artery bypass graft using in situ 

skeletonized right internal mammary artery to left anterior descending coronary 

artery across the anterior midline and in situ totally skeletonized left 

internal mammary artery to the first obtuse marginal branch, intraoperative 

transesophageal echocardiogram and epi-aortic ultrasonography, intraoperative 

graft flow measurements using the [employer] system.


Postop day #16 flexible bronchoscopy with bronchial washings


Patient awake and alert but very weak, no distress noted, no specific 

complaints. 


Vital Signs: Afebrile , T-max is 98.9F


 Vital Signs - 24 hr











  04/28/17 04/28/17 04/28/17





  11:00 11:53 12:00


 


Temperature   97.3 F L


 


Pulse Rate 68 69 66


 


Respiratory 19  19





Rate   


 


Blood Pressure 145/79  150/69


 


O2 Sat by Pulse 98  98





Oximetry   














  04/28/17 04/28/17 04/28/17





  12:06 13:00 14:00


 


Temperature   


 


Pulse Rate 71 72 69


 


Respiratory  25 H 18





Rate   


 


Blood Pressure  147/80 150/82


 


O2 Sat by Pulse  97 98





Oximetry   














  04/28/17 04/28/17 04/28/17





  15:00 15:52 16:00


 


Temperature   97.3 F L


 


Pulse Rate 64 67 69


 


Respiratory 14  20





Rate   


 


Blood Pressure 134/70  127/67


 


O2 Sat by Pulse 93 L  99





Oximetry   














  04/28/17 04/28/17 04/28/17





  16:04 17:00 18:00


 


Temperature   


 


Pulse Rate 68 76 72


 


Respiratory  22 20





Rate   


 


Blood Pressure  131/70 108/60


 


O2 Sat by Pulse  94 L 96





Oximetry   














  04/28/17 04/28/17 04/28/17





  19:00 19:39 20:00


 


Temperature   98.3 F


 


Pulse Rate 68 66 71


 


Respiratory 23  21





Rate   


 


Blood Pressure 125/64  120/67


 


O2 Sat by Pulse 96  100





Oximetry   














  04/28/17 04/28/17 04/28/17





  20:04 21:00 22:00


 


Temperature   


 


Pulse Rate 72 68 64


 


Respiratory  35 H 21





Rate   


 


Blood Pressure  118/60 129/65


 


O2 Sat by Pulse  94 L 97





Oximetry   














  04/28/17 04/28/17 04/29/17





  22:10 23:00 00:00


 


Temperature   98.3 F


 


Pulse Rate 65 65 68


 


Respiratory 22 18 21





Rate   


 


Blood Pressure 130/67 130/67 140/73


 


O2 Sat by Pulse 94 L 97 96





Oximetry   














  04/29/17 04/29/17 04/29/17





  01:00 02:00 03:00


 


Temperature   


 


Pulse Rate 69 63 61


 


Respiratory 16 19 18





Rate   


 


Blood Pressure 135/75 146/78 133/78


 


O2 Sat by Pulse 96 95 96





Oximetry   














  04/29/17 04/29/17 04/29/17





  04:00 05:00 06:00


 


Temperature 98.9 F  


 


Pulse Rate 66 65 66


 


Respiratory 18 17 22





Rate   


 


Blood Pressure 141/76 140/74 141/78


 


O2 Sat by Pulse 92 L 95 95





Oximetry   














  04/29/17 04/29/17 04/29/17





  07:00 07:18 07:33


 


Temperature   


 


Pulse Rate 65 69 70


 


Respiratory 18  





Rate   


 


Blood Pressure 149/82  


 


O2 Sat by Pulse 95  





Oximetry   














  04/29/17 04/29/17 04/29/17





  08:00 09:00 10:00


 


Temperature 97.7 F  


 


Pulse Rate 72 72 63


 


Respiratory 21 21 24





Rate   


 


Blood Pressure 149/70 142/69 145/81


 


O2 Sat by Pulse 97 97 96





Oximetry   











Labs: 


 Short CBC











  04/29/17 Range/Units





  04:10 


 


WBC  15.5 H  (3.8-10.6)  k/uL


 


Hgb  8.3 L  (13.0-17.5)  gm/dL


 


Hct  25.6 L  (39.0-53.0)  %


 


Plt Count  299  (150-450)  k/uL


 


Neutrophils #  12.9 H  (1.3-7.7)  k/uL








 BMP











  04/28/17 04/28/17 04/28/17





  12:15 17:55 22:05


 


Sodium   


 


Potassium  3.7  3.5  4.0


 


Chloride   


 


Carbon Dioxide   


 


BUN   


 


Creatinine   


 


Glucose   


 


Calcium   














  04/29/17





  04:10


 


Sodium  144


 


Potassium  3.8


 


Chloride  113 H


 


Carbon Dioxide  24


 


BUN  20


 


Creatinine  0.70


 


Glucose  104 H


 


Calcium  8.3 L








 Liver Function











  04/29/17 Range/Units





  04:10 


 


Total Bilirubin  1.2  (0.2-1.3)  mg/dL


 


AST  38  (17-59)  U/L


 


ALT  62  (21-72)  U/L


 


Alkaline Phosphatase  65  ()  U/L


 


Albumin  2.7 L  (3.5-5.0)  g/dL








 Microbiology





04/24/17 02:00   Blood   Blood Culture - Preliminary


                            No Growth after 120 hours


04/13/17 16:40   Lung - Right   Acid Fast Bacilli Smear - Final


04/13/17 16:40   Lung - Right   Acid Fast Bacilli Culture - Preliminary


04/24/17 04:40   Sputum   Gram Stain - Final


04/24/17 04:40   Sputum   Sputum Culture - Final


                            Moraxella(branhamella) catarra


04/23/17 17:44   Urine,Catheterized   Urine Culture - Final


                            Klebsiella pneumoniae


04/13/17 16:40   Lung - Right   Fungal Culture - Preliminary


                            Lisandra tropicalis


04/13/17 16:40   Lung Aspirate - Right   Gram Stain - Final


04/13/17 16:40   Lung Aspirate - Right   Bronchial Washings Culture - Final











Lungs: voice is hoarse, respirations are even and nonlabored, breath sounds 

with coarse scattered rhonchi bilaterally


O2 sat: 96% on 4 L of oxygen delivered via nasal cannula


I/S: 650 mL


Heart: S1S2, regular rate and rhythm, bedside telemetry shows a normal sinus 

rhythm with a rate of 69


Sternum stable, chest incision clean with silverlon dressing clean and dry. 


Abdomen: large, hyperactive bowel sounds present in all 4 quadrants. 


CBGs: 109-136 mg/dL


U/O: Angelo to dependent drainage with adequate urine output


 Intake & Output











 04/27/17 04/28/17 04/29/17 04/30/17





 06:59 06:59 06:59 06:59


 


Intake Total 2824 2580 1826.437 172


 


Output Total 1997 2270 1750 235


 


Balance 827 310 76.437 -63


 


Weight 100.3 kg 97.7 kg 98.9 kg 98.9 kg








Active Medications





Hydrocodone Bitart/Acetaminophen (Norco 5-325)  2 each PO Q4HR PRN


   PRN Reason: Severe Pain


   Last Admin: 04/26/17 11:21 Dose:  2 each


Hydrocodone Bitart/Acetaminophen (Norco 5-325)  1 each PO Q4HR PRN


   PRN Reason: Moderate Pain


Albuterol/Ipratropium (Duoneb 0.5 Mg-3 Mg/3 Ml Soln)  3 ml INHALATION RT-QID [facility]


   Last Admin: 04/29/17 07:17 Dose:  3 ml


Albuterol/Ipratropium (Duoneb 0.5 Mg-3 Mg/3 Ml Soln)  3 ml INHALATION RT-QID PRN


   PRN Reason: Shortness Of Breath Or Wheezing


Amiodarone HCl (Cordarone)  200 mg PO BID [facility]


   Last Admin: 04/29/17 08:10 Dose:  200 mg


Aspirin (Aspirin)  325 mg PO DAILY [facility]


   Last Admin: 04/29/17 09:27 Dose:  325 mg


Atorvastatin Calcium (Lipitor)  40 mg PO DAILY [facility]


   Last Admin: 04/29/17 08:10 Dose:  40 mg


Benzocaine (Hurricaine Spray)  1 applic MUCOUS MEM QID PRN


   PRN Reason: Mouth Irritation


   Last Admin: 04/17/17 11:36 Dose:  1 applic


Bisacodyl (Dulcolax)  10 mg RECTAL DAILY PRN


   PRN Reason: Constipation


   Last Admin: 04/19/17 17:18 Dose:  10 mg


Budesonide (Pulmicort)  1 mg INHALATION RT-BID [facility]


   Last Admin: 04/29/17 07:17 Dose:  1 mg


Digoxin (Lanoxin)  250 mcg OG-TUBE DAILY [facility]


   Last Admin: 04/29/17 08:13 Dose:  250 mcg


Heparin Sodium (Porcine) (Heparin)  0 unit IV PER PROTOCOL PRN; Protocol


   PRN Reason: Low PTT


   Last Admin: 04/28/17 16:34 Dose:  2,440 unit


Hydralazine HCl (Apresoline)  10 mg IVP Q4HR PRN


   PRN Reason: Blood Pressure - High


   Last Admin: 04/27/17 12:22 Dose:  10 mg


Heparin Sodium/Dextrose 25,000 (unit/ IV Solution)  500 mls @ 19.98 mls/hr IV 

.Q24H [facility]; 10 UNITS/KG/HR


   PRN Reason: Protocol


   Last Admin: 04/29/17 00:10 Dose:  18 units/kg/hr, 35.96 mls/hr


Levofloxacin 750 mg/ IV (Solution)  150 mls @ 100 mls/hr IVPB Q24H [facility]


   Last Admin: 04/28/17 16:30 Dose:  100 mls/hr


Potassium Chloride/Dextrose/Sod Cl (D5%-1/2ns-Kcl 20 Meq/L Iv Solution)  1,000 

mls @ 80 mls/hr IV .[id number] [facility]


   Last Admin: 04/29/17 00:09 Dose:  80 mls/hr


Insulin Human Regular 100 unit (/ Sodium Chloride)  101 mls @ 0 mls/hr IV .Q0M 

[facility]; Per Protocol


   PRN Reason: Protocol


   Last Titration: 04/29/17 07:59 Dose:  1.5 unit/hr, 1.51 mls/hr


Iohexol (Omnipaque 350 Mg/Ml (For Oral Use))  25 ml PO Q60M PRN


   PRN Reason: CT Scan


   Stop: 04/30/17 10:03


   Last Admin: 04/29/17 10:32 Dose:  25 ml


Iron/Minerals/Multivitamins (Theragran Liquid)  15 ml PO DAILY@1200 [facility]


   Last Admin: 04/28/17 13:08 Dose:  15 ml


Lisinopril (Zestril)  10 mg PO BID [facility]


   Last Admin: 04/29/17 08:13 Dose:  10 mg


Magnesium Hydroxide (Milk Of Magnesia)  2,400 mg PO BID PRN


   PRN Reason: Constipation


Metoclopramide HCl (Reglan)  10 mg IVP Q6HR [facility]


   Last Admin: 04/29/17 06:30 Dose:  10 mg


Metoprolol Tartrate (Lopressor)  25 mg PO BID [facility]


   Last Admin: 04/29/17 09:27 Dose:  25 mg


Miscellaneous Information (Magnesium Per Protocol)  1 each MISCELLANE DAILY PRN

; Protocol


   PRN Reason: Per Protocol


Miscellaneous Information (Phosphorus Per Protocol)  1 each MISCELLANE DAILY PRN

; Protocol


   PRN Reason: Per Protocol


Miscellaneous Information (Potassium Per Protocol)  1 each MISCELLANE DAILY PRN

; Protocol


   PRN Reason: Per Protocol


Miscellaneous Information (Rx Info: Iv Contrast Was Given)  1 each MISCELLANE 

DAILY PRN


   PRN Reason: Per Protocol


   Stop: 05/01/17 10:03


Morphine Sulfate (Morphine Sulfate (Inj))  2 mg IVP Q4H PRN


   PRN Reason: Severe Pain


Ondansetron HCl (Zofran)  4 mg IVP Q6HR PRN


   PRN Reason: Nausea And Vomiting


Pantoprazole Sodium (Protonix)  40 mg IVP DAILY [facility]


   Last Admin: 04/29/17 08:10 Dose:  40 mg


Senna/Docusate Sodium (Senokot-S)  2 each PO HS [facility]


   Last Admin: 04/28/17 20:25 Dose:  Not Given


Sodium Chloride (Saline Flush)  10 ml IV BID [facility]


   Last Admin: 04/29/17 08:10 Dose:  10 ml


Sodium Chloride (Saline Flush)  20 ml IV Q4HR PRN


   PRN Reason: PICC Line


Sodium Chloride (Saline Flush)  10 ml IV WEEKLY [facility]


   Last Admin: 04/27/17 08:46 Dose:  Not Given


Sodium Chloride (Saline Flush)  10 ml IV Q4HR PRN


   PRN Reason: PICC Line


Thiamine HCl (Vitamin B-1)  100 mg PO BID [facility]


   Last Admin: 04/29/17 08:11 Dose:  100 mg





plan:


Abdominal x-rays suggestive of ileusgeneral surgery on consult


Continue aggressive pulmonary toilet utilizing coughing and deep breathing, 

incentive spirometry, and inhalation treatments per respiratory therapy 

department


Continue to increase activity as toleratedPT and OT following

## 2017-04-29 NOTE — XR
EXAMINATION TYPE: XR chest 1V portable

 

DATE OF EXAM: 4/29/2017 6:34 AM

 

CLINICAL HISTORY: Difficulty breathing progress study.  

 

TECHNIQUE: Single AP portable upright view of the chest is obtained.

 

COMPARISON: Chest x-ray from one day earlier

 

FINDINGS: A nasogastric tube and left-sided PICC line are stable in appearance. Sternal wires are red
emonstrated. There is persistent cardiomegaly with small bilateral pleural effusions and left basilar
 atelectasis and/or infiltrate. Some multilevel spurring in the spine is present.

 

IMPRESSION:  Overall stable findings,  cardiomegaly with mild central vascular congestion and small b
ilateral pleural effusions as well as more focal left basilar atelectasis and/or infiltrate all redem
onstrated.

## 2017-04-29 NOTE — CT
EXAMINATION TYPE: CT ChestAbdPelvis w con

 

DATE OF EXAM: 4/29/2017 3:09 PM

 

COMPARISON: Chest x-ray and abdominal x-ray from earlier today

 

HISTORY: ileus and abnormal CXR. Post open cardiac procedure.

 

CT DLP: 2072.8 mGycm. Automated Exposure Control for Dose Reduction was Utilized.

 

 

CONTRAST: 

CT scan of the thorax, abdomen and pelvis is performed with oral and with IV Contrast, patient inject
ed with 100 mL of Omnipaque 300.

 

FINDINGS:

 

LUNGS: There is small to moderate-sized left pleural effusion with associated compressive atelectasis
 in the lower lobe. No significant right-sided effusion is seen. There is dependent atelectasis in th
e right lung as well as dependent atelectasis and/or consolidation in the right upper lobe abutting t
he fissure.

 

MEDIASTINUM: There are no greater than 1 cm hilar or mediastinal lymph nodes. There is native coronar
y artery calcification. There is post CABG changes with mediastinal clips and sternal wires however p
resent. There is cardiomegaly with small pericardial effusion. There is abnormal focal low density [place] units are between 20 and 30 could reflect fluid or soft tissue especially inferiorly causing
 some sternal diastases seen near level of xiphoid on axial image 34, mediastinitis for sternal infec
tion cannot be excluded at this level. Clinical correlation advised.

 

OTHER: There is asymmetric mild increased fluid over the right pectoralis muscle could reflect cellul
itis or soft tissue infection at this level. This is centered near axial image 8  in the upper thorax
.

 

LIVER/GB: No significant abnormality is appreciated.

 

PANCREAS: No significant abnormality is seen.

 

SPLEEN: No significant abnormality is seen.

 

ADRENALS: No significant abnormality is seen.

 

KIDNEYS: Angelo catheter is seen in somewhat decompressed bladder. 

 

BOWEL: There is nasogastric tube seen in decompressed stomach. There is no suspicious dilatation of s
mall bowel loops. Contrast extends to cecum which is wandering in the right mid abdomen anteriorly. T
erminal ileum is noted on axial image 77 posteriorly and appears unremarkable except for abnormal pos
ition. There is normal contrast filled appendix seen extending to right of midline on axial image 81.
 There is air-fluid level in slightly prominent colon. Colon remains prominent in its entire course t
o the level of the anus. Findings suggest postoperative ileus.

 

GENITAL ORGANS: No gross abnormality seen.

 

LYMPH NODES: No greater than 1cm abdominal or pelvic lymph nodes are appreciated.

 

OSSEOUS STRUCTURES: No significant abnormality is seen.

 

OTHER: There is mild to moderate diffuse soft tissue anasarca in the abdomen and pelvis. This is susp
ected product of desired fluid overload state. 

 

There is mild calcified atherosclerotic change in the pelvic iliac vessels.

 

IMPRESSION:

1. Overall nonobstructive bowel gas pattern. Diffuse colonic prominence is seen suggesting colonic il
eus. Wondering cecum is noted.

2. Post CABG changes with slightly more suspicious focal fluid or soft tissue at level of lower john
um, cannot exclude sternal infection or mediastinal infection at this level if suspected clinically. 
Clinical correlation advised.

3. Cardiomegaly with small pericardial effusion and small to moderate-sized left pleural effusion. Th
ere is left basilar atelectasis and/or consolidation. There is posterior right upper lung atelectasis
 and/or consolidation.

## 2017-04-29 NOTE — P.PN
Subjective





61-year-old male patient with status post carotid bypass surgery and the 

patient is postop day #11.  The patient has failed to wean off the mechanical 

ventilator.  Immediately postop the patient had pulmonate complications 

including mucous plugs and atelectasis of the left lung.  He required 

bronchoscopy and therapeutic airway suctioning and all of the respiratory 

cultures came back negative.  This morning, the patient was on a volume cycled 

assist-control mode of ventilation at the rate of 20, tidal volume 450, FiO2 of 

40% and a PEEP of 5.  I reviewed the blood gases and I reviewed also the chest x

-ray.  I noted that the patient was unable to tolerate assist-control mode.  

Attempts to wean him off the sedation Raymond as the patient was becoming 

restless and a successful the mechanical ventilator.  Based on this, I switched 

this patient to a pressure support mode of ventilation and I was able to 

completely wean him off sedation.  He was wide awake all he was hardly able to 

wiggle his toes or move his fingers and he was unable to raise his had or arms 

against gravity.  He was having copious amount of rest or secretions and he was 

requiring frequent suctioning.  At a later stage, switch this patient to a 

pressure control mode of ventilation with a PEEP of 7 and a pressure control of 

20 and his FiO2 was At 60%.  He remains hemodynamically stable.  He is 

producing adequate amount of urine output.  No pressors at this point.  No 

fever.  No chills.  Chest tubes have been all removed.  He is tolerating tube 

feeds.  Atelectatic discussion with the cardiothoracic surgeon and will plan to 

proceed with a PEG and trach if the patient ultimately failed weaning.  One 

concern is that he has significant neuromuscular weakness which is probably a 

critical illness polyneuropathy and myopathy.





On 04/25/2017 the patient remains intubated on a mechanical ventilator.  I was 

able to the sedation completely on the patient.  He is awake at this point and 

he had been awake throughout the night.  He is following simple commands.  

Motor functions are nearly absent.  The patient is extremely weak.  He can 

wiggle his toes and occasionally bend his knees.  Otherwise he cannot raise his 

arms and legs against gravity.  He has a cough reflex.  He can blink his eyes 

and raises eyebrows.  Reflexes are significantly diminished in the upper and 

lower extremities bilaterally.  Is on a pressure control mode of ventilation at 

the rate of 18, PC 18, PEEP of 7 and FiO2 of 50%.  Still having significant 

respiratory secretions through the orotracheal tube.  The chest x-ray from 

today shows moderate parenchymal opacity within the left lung base and the 

right lung base.  There is some atelectatic changes in the lung bases more so 

on the left.  The patient has also postop changes related to coronary artery 

bypass surgery.  No fever.  No chills.  Hemodynamically stable.  Producing 

adequate amount of urine output and the patient was diuresis with a combination 

of albumin and Lasix.  He is on tube feeds.





On 04/26/2017 the patient remains on a mechanical ventilator.  Seems to be much 

more awake compared to yesterday.  Motor functions remain weak over the patient 

is doing more activity and movement on today's evaluation.  We were able to 

bring the patient is sitting up position for a total of 2 hours today.  He 

tolerated well and following that he had a coughing spells and he had to be 

placed in bed.  He remains in the same vent setting with a pressure control of 

18, PEEP of 7, FiO2 of 50% and rate of 18.  Chest x-ray shows adequate 

expansion of the left lung with a few being in good location.  Sputum analysis 

showed Moraxella catarrhalis and the based on that the patient was started on 

Levaquin.  Note that he had also Klebsiella PNEUMONIA IN HIS URINE, AND BOTH OF 

THESE MICROORGANISMS SHOULD RESPOND TO LEVAQUIN.  Meanwhile, the patient is 

receiving enteral feeding for nutritional support.  IV Solu Medrol is been 

discontinued.  We'll doing daily physical therapy and passive range of motion.  

He remains on IV heparin.  He remains on IV insulin for blood sugar control.  

Morning blood gases showed a pH of 7.51 with a pCO2 of 26 and pO2 of 79.  

Weaning parameters are still poor as the patient has rapid shallow breathing 

index around 120 and the patient becomes tachypneic and the LOW tidal volumes.








On 04/27/2017 the patient is being seen in follow-up.  Earlier this morning the 

patient was still mechanical ventilator on a pressure control mode.  Chest x-

ray from earlier this morning showed no significant abnormalities.  There was 

improvement in aeration in lung bases bilaterally especially on the left.  NG 

tube was still in a good location below the diaphragm.  Meanwhile affect 

abdominal film was done this morning and that showed a component of ileus.  

Note that overnight, the patient's tube feeds were discontinued knowing that he 

had 2 bouts of emesis.  Nevertheless, despite ongoing gastrointestinal 

abnormalities, the patient is doing very well while being on mechanical 

ventilator and is awake and alert.  We checked his weaning parameters this 

morning and the numbers looked very well.  He was given a pressure support of 5 

and PEEP of 5 and a breathing trial for a total of 30 minutes and following 

that we made a decision to extubate this patient.  This was a difficult 

decision to make knowing that the patient had an underlying abdominal ileus and 

he is still having significant motor weakness in the upper and lower 

extremities.  Nevertheless, I noted that his motor function is improved his 

cough improved and he had very decent weaning parameters.  I suspected that 

this will be his last chance of being extubated and if not successful, he will 

need a PEG and trach with the next 24 hours.  Based on this, I extubated this 

patient.  He remains on IV heparin.  He remains on IV insulin.  He is also on 

Levaquin.  He is afebrile.  He is awake and following commands.  Motor function 

is gradually improving.  No other new complaints otherwise for now.  Noted post 

extubation, the patient was placed on 5 L of oxygen nasal cannula with 

saturation of 94-95%He continued to bleed comfortably.  He was placed in 

sitting up position in ICU.





04/28/2017, the patient is being seen in follow-up in the intensive care unit.  

The patient has been extubated and has been off the mechanical ventilator for 

the past 24 hours.  He is breathing comfortably.  No significant rest or 

distress.  NG tube is in place.  There is considerable amount of output from 

the NG tube still in the abdomen is distended although less compared to 

yesterday.  He had colonic ileus and some dilatation of the small bowel.  A 

rectal tube was inserted and abdomen was quite deflated.  He is producing some 

loose liquidy bowel movements yesterday and small amounts.  Rectal examination 

was done and the patient does not have any impacted stool.  No abdominal pain.  

No fever.  No chills.  He is in sinus rhythm for the time being and stable 

hemodynamics.  Is producing adequate amount of urine output.  All of the 

surgical wound sites are clean and intact.  Neurologically is awake.  There has 

been obvious improvement in the motor function and the patient is talking and 

communicating at this point.  He remains on IV heparin.  He remains on IV 

insulin for blood sugar control.  White cell count is at 15.1.  Hemoglobin is 

stable at 8.7.





On 04/29/2017, the patient is being seen in follow-up.  The patient is awake.  

Following commands and communicating.  As mentioned earlier, profoundly weak 

although that has been steady and slow improvement in his motor function.  NG 

tube in place.  Abdomen remains distended.  Flexeril of the abdomen shows ileus 

both large and small bowel.  No bowel functional mobility at all over the past 

12 hours.  The patient is on Reglan.  From the pulmonary standpoint, he is calm 

and comfortable.  No labored breathing.  Actually taking well on 3 L of oxygen 

by nasal cannula.  The chest exit from today shows stable at ectatic changes 

small effusion the lung bases bilaterally.  NG tube in place.  Output is 

minimal at this point.  Abdomen is tender get is soft and nontender.  Bowel 

sounds are very hypoactive.  Repeat abdominal films was reviewed and there is 

concern of ongoing large and small bowel ileus.  He is on IV heparin.  Remains 

on IV insulin for blood sugar control.  Remains nothing by mouth for the time 

being.  No leukocytosis.  Cardiac rhythm is sinus.





Objective





- Vital Signs


Vital signs: 


 Vital Signs











Temp  97.7 F   04/29/17 08:00


 


Pulse  72   04/29/17 09:00


 


Resp  21   04/29/17 09:00


 


BP  142/69   04/29/17 09:00


 


Pulse Ox  97   04/29/17 09:00








 Intake & Output











 04/28/17 04/29/17 04/29/17





 18:59 06:59 18:59


 


Intake Total 1363.552 462.885 169


 


Output Total 1150 600 190


 


Balance 213.552 -137.115 -21


 


Weight  98.9 kg 98.9 kg


 


Intake:   


 


  IV 33 33 9


 


    0.9 for pressure bag 33 33 9


 


  Intake, IV Titration 1330.552 369.885 160





  Amount   


 


    D5-0.45% NaCl with KCl 520 80 160





    20Meq/l 1,000 ml @ 80 mls   





    /hr IV .[id number] [facility] Rx#:   





    [id number]   


 


    Heparin Sodium,Porcine/ 227.981 272.019 





    D5w Pmx 25,000 unit In   





    Dextrose/Water 1 500ml.   





    bag @ 10 UNITS/KG/HR 19.   





    98 mls/hr IV .Q24H [facility] Rx   





    #:[id number]   


 


    Insulin Regular 100 unit 32.571 17.866 0





    In Sodium Chloride 0.9%   





    100 ml @ Per Protocol IV   





    .Q0M [facility] Rx#:[id number]   


 


    Levofloxacin 750Mg-D5w 150  





    Pmx 750 mg In Dextrose/   





    Water 1 150ml.bag @ 100   





    mls/hr IVPB Q24H [facility] Rx#:   





    [id number]   


 


    Potassium Chloride 10 meq 400  





    In Water For Injection 1   





    100ml.bag @ 100 mls/hr   





    IVPB Q1H [facility] Rx#:   





    [id number]   


 


  Other  60 


 


Output:   


 


  Gastric Drainage 500  


 


  Urine 650 600 190


 


Other:   


 


  Voiding Method Indwelling Catheter Indwelling Catheter 


 


  # Bowel Movements 2 2 








 ABP, PAP, CO, CI - Last Documented











Arterial Blood Pressure        172/58


 


Pulmonary Artery Pressure      46/23


 


Cardiac Output                 6.8


 


Cardiac Index                  3.3

















- Exam





Head exam was generally normal. There was no scleral icterus or corneal arcus. 

Mucous membranes were moist.Neck was supple and without jugular venous 

distension, thyromegaly, or carotid bruits. Carotids were easily palpable 

bilaterally. There was no adenopathy.  The patient has an NG tube in place and 

the patient has no stridor.  He has some degree of crowding of the posterior 

oropharynx.  Mucosal membranes are dry.  Lung sounds are diminished in lung 

bases bilaterally otherwise clear.  Heart sounds are regular, positive S1-S2, 

no cervical murmurs appreciated and sternum stable clean and intact.  Abdomen 

is  distended.  This positive tympany.  Bowel sounds are hypoactive.  There is 

no direct tenderness.  No rebound tensile guarding.Examination of the 

extremities revealed easily palpable radial, femoral and pedal pulses. There 

was no cyanosis, clubbing or edema.  Neurologically, the patient is awake and 

alert.  He is, indicating.  No focal neurological deficit.  Motor function is 

weak although improved compared to yesterday.  The patient is able to raise his 

arms and legs against gravity.  There is no focal neurological deficit at this 

point.  He has a decent cough.  He is also communicating and verbal.





- Labs


CBC & Chem 7: 


 04/29/17 04:10





 04/29/17 04:10


Labs: 


 Abnormal Lab Results - Last 24 Hours (Table)











  04/28/17 04/28/17 04/28/17 Range/Units





  11:12 12:08 13:07 


 


WBC     (3.8-10.6)  k/uL


 


RBC     (4.30-5.90)  m/uL


 


Hgb     (13.0-17.5)  gm/dL


 


Hct     (39.0-53.0)  %


 


Neutrophils #     (1.3-7.7)  k/uL


 


APTT     (22.0-30.0)  sec


 


Chloride     ()  mmol/L


 


Glucose     (74-99)  mg/dL


 


POC Glucose (mg/dL)  154 H  154 H  136 H  (75-99)  mg/dL


 


Calcium     (8.4-10.2)  mg/dL


 


Total Protein     (6.3-8.2)  g/dL


 


Albumin     (3.5-5.0)  g/dL














  04/28/17 04/28/17 04/28/17 Range/Units





  14:12 15:20 15:20 


 


WBC     (3.8-10.6)  k/uL


 


RBC     (4.30-5.90)  m/uL


 


Hgb     (13.0-17.5)  gm/dL


 


Hct     (39.0-53.0)  %


 


Neutrophils #     (1.3-7.7)  k/uL


 


APTT   43.8 H   (22.0-30.0)  sec


 


Chloride     ()  mmol/L


 


Glucose     (74-99)  mg/dL


 


POC Glucose (mg/dL)  143 H   133 H  (75-99)  mg/dL


 


Calcium     (8.4-10.2)  mg/dL


 


Total Protein     (6.3-8.2)  g/dL


 


Albumin     (3.5-5.0)  g/dL














  04/28/17 04/28/17 04/28/17 Range/Units





  15:57 17:22 18:00 


 


WBC     (3.8-10.6)  k/uL


 


RBC     (4.30-5.90)  m/uL


 


Hgb     (13.0-17.5)  gm/dL


 


Hct     (39.0-53.0)  %


 


Neutrophils #     (1.3-7.7)  k/uL


 


APTT     (22.0-30.0)  sec


 


Chloride     ()  mmol/L


 


Glucose     (74-99)  mg/dL


 


POC Glucose (mg/dL)  124 H  138 H  160 H  (75-99)  mg/dL


 


Calcium     (8.4-10.2)  mg/dL


 


Total Protein     (6.3-8.2)  g/dL


 


Albumin     (3.5-5.0)  g/dL














  04/28/17 04/28/17 04/28/17 Range/Units





  18:57 20:21 21:08 


 


WBC     (3.8-10.6)  k/uL


 


RBC     (4.30-5.90)  m/uL


 


Hgb     (13.0-17.5)  gm/dL


 


Hct     (39.0-53.0)  %


 


Neutrophils #     (1.3-7.7)  k/uL


 


APTT     (22.0-30.0)  sec


 


Chloride     ()  mmol/L


 


Glucose     (74-99)  mg/dL


 


POC Glucose (mg/dL)  136 H  123 H  120 H  (75-99)  mg/dL


 


Calcium     (8.4-10.2)  mg/dL


 


Total Protein     (6.3-8.2)  g/dL


 


Albumin     (3.5-5.0)  g/dL














  04/28/17 04/28/17 04/28/17 Range/Units





  22:03 22:05 23:05 


 


WBC     (3.8-10.6)  k/uL


 


RBC     (4.30-5.90)  m/uL


 


Hgb     (13.0-17.5)  gm/dL


 


Hct     (39.0-53.0)  %


 


Neutrophils #     (1.3-7.7)  k/uL


 


APTT   51.6 H   (22.0-30.0)  sec


 


Chloride     ()  mmol/L


 


Glucose     (74-99)  mg/dL


 


POC Glucose (mg/dL)  127 H   109 H  (75-99)  mg/dL


 


Calcium     (8.4-10.2)  mg/dL


 


Total Protein     (6.3-8.2)  g/dL


 


Albumin     (3.5-5.0)  g/dL














  04/29/17 04/29/17 04/29/17 Range/Units





  00:08 00:53 03:15 


 


WBC     (3.8-10.6)  k/uL


 


RBC     (4.30-5.90)  m/uL


 


Hgb     (13.0-17.5)  gm/dL


 


Hct     (39.0-53.0)  %


 


Neutrophils #     (1.3-7.7)  k/uL


 


APTT     (22.0-30.0)  sec


 


Chloride     ()  mmol/L


 


Glucose     (74-99)  mg/dL


 


POC Glucose (mg/dL)  127 H  129 H  114 H  (75-99)  mg/dL


 


Calcium     (8.4-10.2)  mg/dL


 


Total Protein     (6.3-8.2)  g/dL


 


Albumin     (3.5-5.0)  g/dL














  04/29/17 04/29/17 04/29/17 Range/Units





  04:10 04:10 04:10 


 


WBC  15.5 H    (3.8-10.6)  k/uL


 


RBC  2.60 L    (4.30-5.90)  m/uL


 


Hgb  8.3 L    (13.0-17.5)  gm/dL


 


Hct  25.6 L    (39.0-53.0)  %


 


Neutrophils #  12.9 H    (1.3-7.7)  k/uL


 


APTT    54.5 H  (22.0-30.0)  sec


 


Chloride   113 H   ()  mmol/L


 


Glucose   104 H   (74-99)  mg/dL


 


POC Glucose (mg/dL)     (75-99)  mg/dL


 


Calcium   8.3 L   (8.4-10.2)  mg/dL


 


Total Protein   5.8 L   (6.3-8.2)  g/dL


 


Albumin   2.7 L   (3.5-5.0)  g/dL














  04/29/17 04/29/17 04/29/17 Range/Units





  06:58 07:40 09:19 


 


WBC     (3.8-10.6)  k/uL


 


RBC     (4.30-5.90)  m/uL


 


Hgb     (13.0-17.5)  gm/dL


 


Hct     (39.0-53.0)  %


 


Neutrophils #     (1.3-7.7)  k/uL


 


APTT     (22.0-30.0)  sec


 


Chloride     ()  mmol/L


 


Glucose     (74-99)  mg/dL


 


POC Glucose (mg/dL)  113 H  116 H  116 H  (75-99)  mg/dL


 


Calcium     (8.4-10.2)  mg/dL


 


Total Protein     (6.3-8.2)  g/dL


 


Albumin     (3.5-5.0)  g/dL








 Microbiology - Last 24 Hours (Table)











 04/24/17 02:00 Blood Culture - Preliminary





 Blood    No Growth after 120 hours














Assessment and Plan


Plan: 





Assessment





1 Coronary artery disease status post carotid bypass surgery and the patient is 

postop day #16





2 prolonged postoperative ventilator-dependent history failure, multifactorial.

  Patient has extubated.  Nevertheless, the active ongoing problems for now is 

his neuromuscular weakness and ongoing ileus.  Patient has a combination of 

large and small bowel ileus as evident on the flat film the abdomen.  NG tube 

still in place.





3 recurrent mucous plugs with excessive respiratory secretions requiring 

bronchoscopy and a peak airway suctioning and a bronchoscopy cultures came back 

negative for any microbial growth. The most recent sputum analysis showing 

Moraxella catarrhalis and the patient is currently on Levaquin.  Chest x-ray 

from today is showing atelectatic changes in lung bases bilaterally.  

Nevertheless the patient is oxygenating well and there is no respiratory 

distress at this point.





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 

weakness, improving slowly





5 hypertension





6 hyperlipidemia





7 atrial fibrillation, paroxysmal currently on normal sinus rhythm, currently 

on a heparin drip and the patient's rhythm is sinus with a controlled rate





8 ileus, a combination of small and large bowel ileus.  Despite this, the 

patient is having a nontoxic abdomen.  No abdominal tenderness.  No 

leukocytosis.  No fever.  No acidosis.  NG tube is in place and the patient is 

still nothing by mouth.





9 postoperative anemia, currently hemoglobin is stable





10 alcoholism, recovered from DT





11 gout





12 diabetes mellitus, currently on an insulin drip 








Monitor the patient closely in the intensive care unit. I will keep NG tube in 

place.  I will keep the patient nothing by mouth.  I will consult general 

surgery.  I will order a CAT scan of the abdomen and pelvis with oral contrast 

and will do 4 hour prep and this will hopefully facilitate some bowel activity 

and movement and I will review the CAT scan findings once the images are 

completed.  Meanwhile, we will also sitter initiation of TPN for nutritional 

support.  We'll ask dietary to make recommendations on TPN formula.  Will need 

for antibiotic modification.  White cell count is not elevated.  Continue the 

Levaquin.  Physical therapy.  Sit up the patient on a cardiac chair.  We'll 

continue to follow.

## 2017-04-30 LAB
ANION GAP SERPL CALC-SCNC: 6 MMOL/L
BASOPHILS # BLD AUTO: 0 K/UL (ref 0–0.2)
BASOPHILS NFR BLD AUTO: 0 %
BUN SERPL-SCNC: 14 MG/DL (ref 9–20)
CALCIUM SPEC-MCNC: 8.1 MG/DL (ref 8.4–10.2)
CH: 32.1
CHCM: 33.1
CHLORIDE SERPL-SCNC: 107 MMOL/L (ref 98–107)
CO2 SERPL-SCNC: 24 MMOL/L (ref 22–30)
EOSINOPHIL # BLD AUTO: 0.3 K/UL (ref 0–0.7)
EOSINOPHIL NFR BLD AUTO: 3 %
ERYTHROCYTE [DISTWIDTH] IN BLOOD BY AUTOMATED COUNT: 2.71 M/UL (ref 4.3–5.9)
ERYTHROCYTE [DISTWIDTH] IN BLOOD: 14.6 % (ref 11.5–15.5)
GLUCOSE BLD-MCNC: 116 MG/DL (ref 75–99)
GLUCOSE BLD-MCNC: 122 MG/DL (ref 75–99)
GLUCOSE BLD-MCNC: 122 MG/DL (ref 75–99)
GLUCOSE BLD-MCNC: 128 MG/DL (ref 75–99)
GLUCOSE BLD-MCNC: 128 MG/DL (ref 75–99)
GLUCOSE BLD-MCNC: 129 MG/DL (ref 75–99)
GLUCOSE BLD-MCNC: 130 MG/DL (ref 75–99)
GLUCOSE BLD-MCNC: 131 MG/DL (ref 75–99)
GLUCOSE BLD-MCNC: 132 MG/DL (ref 75–99)
GLUCOSE BLD-MCNC: 133 MG/DL (ref 75–99)
GLUCOSE BLD-MCNC: 137 MG/DL (ref 75–99)
GLUCOSE BLD-MCNC: 143 MG/DL (ref 75–99)
GLUCOSE BLD-MCNC: 144 MG/DL (ref 75–99)
GLUCOSE BLD-MCNC: 147 MG/DL (ref 75–99)
GLUCOSE BLD-MCNC: 152 MG/DL (ref 75–99)
GLUCOSE BLD-MCNC: 155 MG/DL (ref 75–99)
GLUCOSE BLD-MCNC: 158 MG/DL (ref 75–99)
GLUCOSE BLD-MCNC: 170 MG/DL (ref 75–99)
GLUCOSE SERPL-MCNC: 132 MG/DL (ref 74–99)
HCT VFR BLD AUTO: 26.5 % (ref 39–53)
HDW: 2.92
HGB BLD-MCNC: 8.6 GM/DL (ref 13–17.5)
LUC NFR BLD AUTO: 2 %
LYMPHOCYTES # SPEC AUTO: 1.9 K/UL (ref 1–4.8)
LYMPHOCYTES NFR SPEC AUTO: 19 %
MAGNESIUM SPEC-SCNC: 2.1 MG/DL (ref 1.6–2.3)
MCH RBC QN AUTO: 31.8 PG (ref 25–35)
MCHC RBC AUTO-ENTMCNC: 32.5 G/DL (ref 31–37)
MCV RBC AUTO: 97.8 FL (ref 80–100)
MONOCYTES # BLD AUTO: 0.7 K/UL (ref 0–1)
MONOCYTES NFR BLD AUTO: 7 %
NEUTROPHILS # BLD AUTO: 7.3 K/UL (ref 1.3–7.7)
NEUTROPHILS NFR BLD AUTO: 70 %
NON-AFRICAN AMERICAN GFR(MDRD): >60
PHOSPHATE SERPL-MCNC: 3 MG/DL (ref 2.5–4.5)
POTASSIUM SERPL-SCNC: 3.9 MMOL/L (ref 3.5–5.1)
SODIUM SERPL-SCNC: 137 MMOL/L (ref 137–145)
WBC # BLD AUTO: 0.23 10*3/UL
WBC # BLD AUTO: 10.5 K/UL (ref 3.8–10.6)
WBC (PEROX): 10.16

## 2017-04-30 RX ADMIN — BUDESONIDE SCH MG: 1 SUSPENSION RESPIRATORY (INHALATION) at 07:37

## 2017-04-30 RX ADMIN — METOCLOPRAMIDE SCH MG: 5 INJECTION, SOLUTION INTRAMUSCULAR; INTRAVENOUS at 00:19

## 2017-04-30 RX ADMIN — METOCLOPRAMIDE SCH MG: 5 INJECTION, SOLUTION INTRAMUSCULAR; INTRAVENOUS at 17:44

## 2017-04-30 RX ADMIN — ASPIRIN 325 MG ORAL TABLET SCH MG: 325 PILL ORAL at 08:10

## 2017-04-30 RX ADMIN — LISINOPRIL SCH MG: 10 TABLET ORAL at 21:17

## 2017-04-30 RX ADMIN — DEXTROSE MONOHYDRATE, SODIUM CHLORIDE, AND POTASSIUM CHLORIDE SCH MLS/HR: 50; 4.5; 1.49 INJECTION, SOLUTION INTRAVENOUS at 00:19

## 2017-04-30 RX ADMIN — DOCUSATE SODIUM AND SENNOSIDES SCH: 50; 8.6 TABLET ORAL at 21:15

## 2017-04-30 RX ADMIN — FUROSEMIDE SCH MG: 10 INJECTION, SOLUTION INTRAMUSCULAR; INTRAVENOUS at 21:20

## 2017-04-30 RX ADMIN — IPRATROPIUM BROMIDE AND ALBUTEROL SULFATE SCH ML: .5; 3 SOLUTION RESPIRATORY (INHALATION) at 07:37

## 2017-04-30 RX ADMIN — IPRATROPIUM BROMIDE AND ALBUTEROL SULFATE SCH ML: .5; 3 SOLUTION RESPIRATORY (INHALATION) at 11:21

## 2017-04-30 RX ADMIN — DEXTROSE MONOHYDRATE, SODIUM CHLORIDE, AND POTASSIUM CHLORIDE SCH MLS/HR: 50; 4.5; 1.49 INJECTION, SOLUTION INTRAVENOUS at 21:17

## 2017-04-30 RX ADMIN — Medication SCH MG: at 21:17

## 2017-04-30 RX ADMIN — AMIODARONE HYDROCHLORIDE SCH MG: 200 TABLET ORAL at 21:17

## 2017-04-30 RX ADMIN — METOPROLOL TARTRATE SCH MG: 25 TABLET, FILM COATED ORAL at 08:09

## 2017-04-30 RX ADMIN — METOPROLOL TARTRATE SCH MG: 25 TABLET, FILM COATED ORAL at 17:10

## 2017-04-30 RX ADMIN — IPRATROPIUM BROMIDE AND ALBUTEROL SULFATE SCH ML: .5; 3 SOLUTION RESPIRATORY (INHALATION) at 16:24

## 2017-04-30 RX ADMIN — METOCLOPRAMIDE SCH MG: 5 INJECTION, SOLUTION INTRAMUSCULAR; INTRAVENOUS at 12:20

## 2017-04-30 RX ADMIN — SODIUM CHLORIDE, PRESERVATIVE FREE SCH ML: 5 INJECTION INTRAVENOUS at 21:32

## 2017-04-30 RX ADMIN — LEVOFLOXACIN SCH MLS/HR: 750 INJECTION, SOLUTION INTRAVENOUS at 18:03

## 2017-04-30 RX ADMIN — LISINOPRIL SCH MG: 10 TABLET ORAL at 08:10

## 2017-04-30 RX ADMIN — PANTOPRAZOLE SODIUM SCH MG: 40 INJECTION, POWDER, FOR SOLUTION INTRAVENOUS at 08:09

## 2017-04-30 RX ADMIN — Medication SCH MG: at 08:10

## 2017-04-30 RX ADMIN — AMIODARONE HYDROCHLORIDE SCH MG: 200 TABLET ORAL at 08:10

## 2017-04-30 RX ADMIN — METOPROLOL TARTRATE SCH MG: 25 TABLET, FILM COATED ORAL at 21:17

## 2017-04-30 RX ADMIN — POTASSIUM CHLORIDE SCH MLS/HR: 7.46 INJECTION, SOLUTION INTRAVENOUS at 09:12

## 2017-04-30 RX ADMIN — POTASSIUM CHLORIDE SCH MLS/HR: 7.46 INJECTION, SOLUTION INTRAVENOUS at 08:08

## 2017-04-30 RX ADMIN — METOCLOPRAMIDE SCH MG: 5 INJECTION, SOLUTION INTRAMUSCULAR; INTRAVENOUS at 06:40

## 2017-04-30 RX ADMIN — IPRATROPIUM BROMIDE AND ALBUTEROL SULFATE SCH ML: .5; 3 SOLUTION RESPIRATORY (INHALATION) at 20:04

## 2017-04-30 RX ADMIN — DIGOXIN SCH MCG: 250 TABLET ORAL at 08:10

## 2017-04-30 RX ADMIN — SODIUM CHLORIDE, PRESERVATIVE FREE SCH ML: 5 INJECTION INTRAVENOUS at 08:10

## 2017-04-30 RX ADMIN — SODIUM PHOSPHATE, MONOBASIC, MONOHYDRATE SCH MLS/HR: 276; 142 INJECTION, SOLUTION INTRAVENOUS at 21:50

## 2017-04-30 RX ADMIN — BUDESONIDE SCH MG: 1 SUSPENSION RESPIRATORY (INHALATION) at 20:05

## 2017-04-30 RX ADMIN — ATORVASTATIN CALCIUM SCH MG: 40 TABLET, FILM COATED ORAL at 08:10

## 2017-04-30 NOTE — P.PN
Subjective





61-year-old male patient with status post carotid bypass surgery and the 

patient is postop day #11.  The patient has failed to wean off the mechanical 

ventilator.  Immediately postop the patient had pulmonate complications 

including mucous plugs and atelectasis of the left lung.  He required 

bronchoscopy and therapeutic airway suctioning and all of the respiratory 

cultures came back negative.  This morning, the patient was on a volume cycled 

assist-control mode of ventilation at the rate of 20, tidal volume 450, FiO2 of 

40% and a PEEP of 5.  I reviewed the blood gases and I reviewed also the chest x

-ray.  I noted that the patient was unable to tolerate assist-control mode.  

Attempts to wean him off the sedation [place] as the patient was becoming 

restless and a successful the mechanical ventilator.  Based on this, I switched 

this patient to a pressure support mode of ventilation and I was able to 

completely wean him off sedation.  He was wide awake all he was hardly able to 

wiggle his toes or move his fingers and he was unable to raise his had or arms 

against gravity.  He was having copious amount of rest or secretions and he was 

requiring frequent suctioning.  At a later stage, switch this patient to a 

pressure control mode of ventilation with a PEEP of 7 and a pressure control of 

20 and his FiO2 was At 60%.  He remains hemodynamically stable.  He is 

producing adequate amount of urine output.  No pressors at this point.  No 

fever.  No chills.  Chest tubes have been all removed.  He is tolerating tube 

feeds.  Atelectatic discussion with the cardiothoracic surgeon and will plan to 

proceed with a PEG and trach if the patient ultimately failed weaning.  One 

concern is that he has significant neuromuscular weakness which is probably a 

critical illness polyneuropathy and myopathy.





On 04/25/2017 the patient remains intubated on a mechanical ventilator.  I was 

able to the sedation completely on the patient.  He is awake at this point and 

he had been awake throughout the night.  He is following simple commands.  

Motor functions are nearly absent.  The patient is extremely weak.  He can 

wiggle his toes and occasionally bend his knees.  Otherwise he cannot raise his 

arms and legs against gravity.  He has a cough reflex.  He can blink his eyes 

and raises eyebrows.  Reflexes are significantly diminished in the upper and 

lower extremities bilaterally.  Is on a pressure control mode of ventilation at 

the rate of 18, PC 18, PEEP of 7 and FiO2 of 50%.  Still having significant 

respiratory secretions through the orotracheal tube.  The chest x-ray from 

today shows moderate parenchymal opacity within the left lung base and the 

right lung base.  There is some atelectatic changes in the lung bases more so 

on the left.  The patient has also postop changes related to coronary artery 

bypass surgery.  No fever.  No chills.  Hemodynamically stable.  Producing 

adequate amount of urine output and the patient was diuresis with a combination 

of albumin and Lasix.  He is on tube feeds.





On 04/26/2017 the patient remains on a mechanical ventilator.  Seems to be much 

more awake compared to yesterday.  Motor functions remain weak over the patient 

is doing more activity and movement on today's evaluation.  We were able to 

bring the patient is sitting up position for a total of 2 hours today.  He 

tolerated well and following that he had a coughing spells and he had to be 

placed in bed.  He remains in the same vent setting with a pressure control of 

18, PEEP of 7, FiO2 of 50% and rate of 18.  Chest x-ray shows adequate 

expansion of the left lung with a few being in good location.  Sputum analysis 

showed Moraxella catarrhalis and the based on that the patient was started on 

Levaquin.  Note that he had also Klebsiella PNEUMONIA IN HIS URINE, AND BOTH OF 

THESE MICROORGANISMS SHOULD RESPOND TO LEVAQUIN.  Meanwhile, the patient is 

receiving enteral feeding for nutritional support.  IV Solu Medrol is been 

discontinued.  We'll doing daily physical therapy and passive range of motion.  

He remains on IV heparin.  He remains on IV insulin for blood sugar control.  

Morning blood gases showed a pH of 7.51 with a pCO2 of 26 and pO2 of 79.  

Weaning parameters are still poor as the patient has rapid shallow breathing 

index around 120 and the patient becomes tachypneic and the LOW tidal volumes.








On 04/27/2017 the patient is being seen in follow-up.  Earlier this morning the 

patient was still mechanical ventilator on a pressure control mode.  Chest x-

ray from earlier this morning showed no significant abnormalities.  There was 

improvement in aeration in lung bases bilaterally especially on the left.  NG 

tube was still in a good location below the diaphragm.  Meanwhile affect 

abdominal film was done this morning and that showed a component of ileus.  

Note that overnight, the patient's tube feeds were discontinued knowing that he 

had 2 bouts of emesis.  Nevertheless, despite ongoing gastrointestinal 

abnormalities, the patient is doing very well while being on mechanical 

ventilator and is awake and alert.  We checked his weaning parameters this 

morning and the numbers looked very well.  He was given a pressure support of 5 

and PEEP of 5 and a breathing trial for a total of 30 minutes and following 

that we made a decision to extubate this patient.  This was a difficult 

decision to make knowing that the patient had an underlying abdominal ileus and 

he is still having significant motor weakness in the upper and lower 

extremities.  Nevertheless, I noted that his motor function is improved his 

cough improved and he had very decent weaning parameters.  I suspected that 

this will be his last chance of being extubated and if not successful, he will 

need a PEG and trach with the next 24 hours.  Based on this, I extubated this 

patient.  He remains on IV heparin.  He remains on IV insulin.  He is also on 

Levaquin.  He is afebrile.  He is awake and following commands.  Motor function 

is gradually improving.  No other new complaints otherwise for now.  Noted post 

extubation, the patient was placed on 5 L of oxygen nasal cannula with 

saturation of 94-95%He continued to bleed comfortably.  He was placed in 

sitting up position in ICU.





04/28/2017, the patient is being seen in follow-up in the intensive care unit.  

The patient has been extubated and has been off the mechanical ventilator for 

the past 24 hours.  He is breathing comfortably.  No significant rest or 

distress.  NG tube is in place.  There is considerable amount of output from 

the NG tube still in the abdomen is distended although less compared to 

yesterday.  He had colonic ileus and some dilatation of the small bowel.  A 

rectal tube was inserted and abdomen was quite deflated.  He is producing some 

loose liquidy bowel movements yesterday and small amounts.  Rectal examination 

was done and the patient does not have any impacted stool.  No abdominal pain.  

No fever.  No chills.  He is in sinus rhythm for the time being and stable 

hemodynamics.  Is producing adequate amount of urine output.  All of the 

surgical wound sites are clean and intact.  Neurologically is awake.  There has 

been obvious improvement in the motor function and the patient is talking and 

communicating at this point.  He remains on IV heparin.  He remains on IV 

insulin for blood sugar control.  White cell count is at 15.1.  Hemoglobin is 

stable at 8.7.





On 04/29/2017, the patient is being seen in follow-up.  The patient is awake.  

Following commands and communicating.  As mentioned earlier, profoundly weak 

although that has been steady and slow improvement in his motor function.  NG 

tube in place.  Abdomen remains distended.  Flexeril of the abdomen shows ileus 

both large and small bowel.  No bowel functional mobility at all over the past 

12 hours.  The patient is on Reglan.  From the pulmonary standpoint, he is calm 

and comfortable.  No labored breathing.  Actually taking well on 3 L of oxygen 

by nasal cannula.  The chest exit from today shows stable at ectatic changes 

small effusion the lung bases bilaterally.  NG tube in place.  Output is 

minimal at this point.  Abdomen is tender get is soft and nontender.  Bowel 

sounds are very hypoactive.  Repeat abdominal films was reviewed and there is 

concern of ongoing large and small bowel ileus.  He is on IV heparin.  Remains 

on IV insulin for blood sugar control.  Remains nothing by mouth for the time 

being.  No leukocytosis.  Cardiac rhythm is sinus.





On 04/30/2017 I'm seeing this patient in follow-up.  He is awake.  He is 

communicating.  NG tube is in place.  Total amount of output from the NG tube 

is around 250 mL over the past 24 hours.  Abdomen remains distended and 

tympanic and bowel sounds are hypoactive.  CAT scan of the abdomen that was 

done showed a large bowel ileus.  The patient is on Reglan.  The patient was 

started on TPN for nutritional support.  The CAT scan of the chest also showed 

some early dehiscence in the lower surgical incision with some surrounding 

swelling/hematoma.  There is small better pleural effusion and atelectasis in 

the left lung base.  No fever.  No chills.  Still on insulin drip.  Still on a 

heparin drip.  Right upper extremity is swollen and Artline catheter needs to 

be removed.





Objective





- Vital Signs


Vital signs: 


 Vital Signs











Temp  99.5 F   04/30/17 04:00


 


Pulse  63   04/30/17 10:00


 


Resp  24   04/30/17 10:00


 


BP  109/70   04/30/17 10:00


 


Pulse Ox  96   04/30/17 10:00








 Intake & Output











 04/29/17 04/30/17 04/30/17





 18:59 06:59 18:59


 


Intake Total 2617.124 354.426 475.249


 


Output Total 700 975 680


 


Balance 1917.124 -620.574 -204.751


 


Weight 98.9 kg 98.8 kg 98.8 kg


 


Intake:   


 


  IV 36 186 302


 


    0.9 for pressure bag 36 36 12


 


    Mvi, Adult No.4 with Vit  150 90





    K 10 ml Trace (Conc-1Ml/   





    Dose) 1 ml Sodium Acetate   





    30 meq Potassium   





    Chloride 20 meq Calcium   





    Gluconate 1,000 mg In   





    Amino Acid 5%-D25w 1,000   





    ml @ 30 mls/hr IV .Q24H   





    [facility] Rx#:[id number]   


 


    Potassium Chloride 10 meq   200





    In Water For Injection 1   





    100ml.bag @ 100 mls/hr   





    IVPB Q1H [facility] Rx#:   





    [id number]   


 


  Intake, IV Titration 1381.124 108.426 173.249





  Amount   


 


    D5-0.45% NaCl with KCl  80 60





    20Meq/l 1,000 ml @ 20 mls   





    /hr IV .Q24H [facility] Rx#:   





    [id number]   


 


    D5-0.45% NaCl with KCl 720  





    20Meq/l 1,000 ml @ 80 mls   





    /hr IV .[id number] [facility] Rx#:   





    [id number]   


 


    Heparin Sodium,Porcine/ 500  





    D5w Pmx 25,000 unit In   





    Dextrose/Water 1 500ml.   





    bag @ 10 UNITS/KG/HR 19.   





    98 mls/hr IV .Q24H [facility] Rx   





    #:[id number]   


 


    Insulin Regular 100 unit 11.124 28.426 13.249





    In Sodium Chloride 0.9%   





    100 ml @ Per Protocol IV   





    .Q0M [facility] Rx#:[id number]   


 


    Levofloxacin 750Mg-D5w 150  





    Pmx 750 mg In Dextrose/   





    Water 1 150ml.bag @ 100   





    mls/hr IVPB Q24H [facility] Rx#:   





    [id number]   


 


    Potassium Chloride 10 meq   100





    In Water For Injection 1   





    100ml.bag @ 100 mls/hr   





    IVPB Q1H [facility] Rx#:   





    [id number]   


 


  Other 1200 60 


 


Output:   


 


  Gastric Drainage   250


 


  Urine 700 975 430


 


Other:   


 


  Voiding Method Indwelling Catheter Indwelling Catheter Indwelling Catheter


 


  # Bowel Movements 1  1








 ABP, PAP, CO, CI - Last Documented











Arterial Blood Pressure        172/58


 


Pulmonary Artery Pressure      46/23


 


Cardiac Output                 6.8


 


Cardiac Index                  3.3

















- Exam





Head exam was generally normal. There was no scleral icterus or corneal arcus. 

Mucous membranes were moist.Neck was supple and without jugular venous 

distension, thyromegaly, or carotid bruits. Carotids were easily palpable 

bilaterally. There was no adenopathy.  The patient has an NG tube in place and 

the patient has no stridor.  He has some degree of crowding of the posterior 

oropharynx.  Mucosal membranes are dry.  Lung sounds are diminished in lung 

bases bilaterally otherwise clear.  Heart sounds are regular, positive S1-S2, 

no cervical murmurs appreciated and sternum stable clean and intact.  Abdomen 

is  distended.  This positive tympany.  Bowel sounds are hypoactive.  There is 

no direct tenderness.  No rebound tensile guarding.Examination of the 

extremities revealed swelling in the right upper extremity compared to the 

left.  There is trace edema lower extremities bilaterally.. There was no 

cyanosis, clubbing or edema.  Neurologically, the patient is awake and alert.  

He is, indicating.  No focal neurological deficit.  Motor function is weak 

although improved compared to yesterday.  The patient is able to raise his arms 

and legs against gravity.  There is no focal neurological deficit at this 

point.  He has a decent cough.  He is also communicating and verbal.





- Labs


CBC & Chem 7: 


 04/30/17 04:30





 04/30/17 04:30


Labs: 


 Abnormal Lab Results - Last 24 Hours (Table)











  04/29/17 04/29/17 04/29/17 Range/Units





  04:10 12:29 13:46 


 


RBC     (4.30-5.90)  m/uL


 


Hgb     (13.0-17.5)  gm/dL


 


Hct     (39.0-53.0)  %


 


APTT     (22.0-30.0)  sec


 


Glucose     (74-99)  mg/dL


 


POC Glucose (mg/dL)   123 H  125 H  (75-99)  mg/dL


 


Calcium     (8.4-10.2)  mg/dL


 


Phosphorus  2.3 L    (2.5-4.5)  mg/dL


 


Magnesium  2.6 H    (1.6-2.3)  mg/dL














  04/29/17 04/29/17 04/29/17 Range/Units





  15:58 17:56 19:14 


 


RBC     (4.30-5.90)  m/uL


 


Hgb     (13.0-17.5)  gm/dL


 


Hct     (39.0-53.0)  %


 


APTT     (22.0-30.0)  sec


 


Glucose     (74-99)  mg/dL


 


POC Glucose (mg/dL)  123 H  130 H  127 H  (75-99)  mg/dL


 


Calcium     (8.4-10.2)  mg/dL


 


Phosphorus     (2.5-4.5)  mg/dL


 


Magnesium     (1.6-2.3)  mg/dL














  04/29/17 04/29/17 04/29/17 Range/Units





  20:11 21:03 21:56 


 


RBC     (4.30-5.90)  m/uL


 


Hgb     (13.0-17.5)  gm/dL


 


Hct     (39.0-53.0)  %


 


APTT     (22.0-30.0)  sec


 


Glucose     (74-99)  mg/dL


 


POC Glucose (mg/dL)  129 H  135 H  121 H  (75-99)  mg/dL


 


Calcium     (8.4-10.2)  mg/dL


 


Phosphorus     (2.5-4.5)  mg/dL


 


Magnesium     (1.6-2.3)  mg/dL














  04/29/17 04/30/17 04/30/17 Range/Units





  23:14 00:18 01:05 


 


RBC     (4.30-5.90)  m/uL


 


Hgb     (13.0-17.5)  gm/dL


 


Hct     (39.0-53.0)  %


 


APTT     (22.0-30.0)  sec


 


Glucose     (74-99)  mg/dL


 


POC Glucose (mg/dL)  109 H  128 H  133 H  (75-99)  mg/dL


 


Calcium     (8.4-10.2)  mg/dL


 


Phosphorus     (2.5-4.5)  mg/dL


 


Magnesium     (1.6-2.3)  mg/dL














  04/30/17 04/30/17 04/30/17 Range/Units





  02:15 04:16 04:30 


 


RBC     (4.30-5.90)  m/uL


 


Hgb     (13.0-17.5)  gm/dL


 


Hct     (39.0-53.0)  %


 


APTT     (22.0-30.0)  sec


 


Glucose    132 H  (74-99)  mg/dL


 


POC Glucose (mg/dL)  132 H  155 H   (75-99)  mg/dL


 


Calcium    8.1 L  (8.4-10.2)  mg/dL


 


Phosphorus     (2.5-4.5)  mg/dL


 


Magnesium     (1.6-2.3)  mg/dL














  04/30/17 04/30/17 04/30/17 Range/Units





  04:30 04:30 06:38 


 


RBC  2.71 L    (4.30-5.90)  m/uL


 


Hgb  8.6 L    (13.0-17.5)  gm/dL


 


Hct  26.5 L    (39.0-53.0)  %


 


APTT   53.0 H   (22.0-30.0)  sec


 


Glucose     (74-99)  mg/dL


 


POC Glucose (mg/dL)    137 H  (75-99)  mg/dL


 


Calcium     (8.4-10.2)  mg/dL


 


Phosphorus     (2.5-4.5)  mg/dL


 


Magnesium     (1.6-2.3)  mg/dL














  04/30/17 04/30/17 04/30/17 Range/Units





  08:02 09:05 10:13 


 


RBC     (4.30-5.90)  m/uL


 


Hgb     (13.0-17.5)  gm/dL


 


Hct     (39.0-53.0)  %


 


APTT     (22.0-30.0)  sec


 


Glucose     (74-99)  mg/dL


 


POC Glucose (mg/dL)  131 H  144 H  130 H  (75-99)  mg/dL


 


Calcium     (8.4-10.2)  mg/dL


 


Phosphorus     (2.5-4.5)  mg/dL


 


Magnesium     (1.6-2.3)  mg/dL








 Microbiology - Last 24 Hours (Table)











 04/24/17 02:00 Blood Culture - Final





 Blood    No Growth after 144 hours














Assessment and Plan


Plan: 





Assessment





1 Coronary artery disease status post carotid bypass surgery and the patient is 

postop day #17





2 prolonged postoperative ventilator-dependent history failure, multifactorial.

  Patient has extubated.  Nevertheless, the active ongoing problems for now is 

his neuromuscular weakness and ongoing ileus.  Patient has a combination of 

large bowel ileus as evident on the flat film the abdomen.  NG tube still in 

place.





3 recurrent mucous plugs with excessive respiratory secretions requiring 

bronchoscopy and a peak airway suctioning and a bronchoscopy cultures came back 

negative for any microbial growth. The most recent sputum analysis showing 

Moraxella catarrhalis and the patient is currently on Levaquin.  Chest x-ray 

from today is showing atelectatic changes in lung bases bilaterally.  

Nevertheless the patient is oxygenating well and there is no respiratory 

distress at this point.





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 

weakness, improving slowly





5 hypertension





6 hyperlipidemia





7 atrial fibrillation, paroxysmal,  currently on a heparin drip and the patient'

s rhythm is sinus with a controlled rate





8 ileus,  large bowel ileus.  Despite this, the patient is having a nontoxic 

abdomen.  No abdominal tenderness.  No leukocytosis.  No fever.  No acidosis.  

NG tube is in place and the patient is still nothing by mouth.  The patient was 

initiated on TPN for nutritional support





9 postoperative anemia, currently hemoglobin is stable





10 alcoholism, recovered from DT





11 gout





12 diabetes mellitus, currently on an insulin drip 





13 suspected early dehiscence of the lower sternal wound.  Doubt infection








And





We should be able to sit up this patient on a cardiac chair.  Keep NG tube in 

place.  Advance TPN.  Insulin drip for blood sugar control.  Remove the Artline 

from the right upper extremity.  And IV heparin.  Monitor the cardiac rhythm 

and the patient is still having paroxysmal atrial fibrillation.  Consider 

another rectal tube insertion for deflation of his abdominal distention/ileus.  

CAT scan of the chest abdomen and pelvis was noted.  The patient has 

essentially a large bowel ileus with atelectatic changes and small effusion the 

left lung base.  These are essentially postsurgical changes in the chest.  

Continue aggressive physical therapy as the patient shows crit limits per 

neuropathy and myopathy which is slowly improving.  Continue rest of the 

supportive care.  The patient be kept in ICU.  Gen. surgery, CT surgery, 

pulmonary and cardiology on the case.

## 2017-04-30 NOTE — P.PN
Progress Note - Text





The patient is sitting in his chair.  He has no complaints of abdominal pain.  

He had a large bowel movement early this morning.





On exam his vital signs are stable.  His abdomen is soft.  It is mildly 

distended.  There is no significant tenderness.





Improving colonic ileus.  No surgical intervention is planned.

## 2017-04-30 NOTE — XR
EXAMINATION TYPE: XR chest 1V portable

 

DATE OF EXAM: 4/30/2017 6:29 AM

 

CLINICAL HISTORY: Difficulty breathing progress study.  

 

TECHNIQUE: Single AP portable upright view of the chest is obtained.

 

COMPARISON: Chest x-ray and CT cap from one day earlier

 

FINDINGS:  A nasogastric tube and left-sided PICC line are stable in appearance. Sternal wires and me
diastinal clips from CABG procedure are redemonstrated. There is persistent cardiomegaly with small l
eft pleural effusion and left basilar atelectasis and/or infiltrate redemonstrated. Osseous structure
s are intact.

 

IMPRESSION:  Overall stable findings,  cardiomegaly with small left pleural effusion and left basilar
 atelectasis and/or infiltrate all redemonstrated.

## 2017-04-30 NOTE — P.PN
Progress Note - Text





This is a 61-year-old gentleman who is status post prior to coronary bypass 

surgery.  Patient had a prolonged stay in ICU on mechanical ventilator support.

  He is also had some neuromuscular weakness.  


The patient was in and out atrial fibrillation with RVR.


He is on metoprolol, amiodarone, and digoxin.


The blood pressure has been stable.





I am going to increase the dose of metoprolol 25 mg by mouth 3 times a day and 

follow-up with the patient.

## 2017-04-30 NOTE — P.PN
Subjective


Principal diagnosis: 





Coronary artery disease, status post prior stenting to his right coronary 

artery.  Preserved left ventricular function.  Hyperlipidemia.  Hypertension.  

ETOH abuse.





POD #17 total arterial non-aortic patch off-pump double coronary artery bypass 

grafting using in situ skeletonized right internal mammary artery to the left 

anterior descending artery across the anterior midline in situ totally 

skeletonized left internal mammary artery to the first obtuse marginal artery.  

Intraoperative transesophageal echocardiogram and epi-aortic scanning.  

Intraoperative graft flow measurements using the [employer] system





POD #17 flexible bronchoscopy with bronchial washings secondary to 

postoperative mucous plugging with increasing oxygen demand the night of 

surgery.  Sputum culture grew out Moraxella, blood cultures are negative to date

, urine culture grew Klebsiella.  Patient placed on Levaquin per pulmonology.





Pt developed postoperative alcohol withdrawal requiring increased sedation and 

prolonged mechanical ventilation.





Patient currently sitting up in bed in no apparent distress.  Currently 

extubated on 4 L nasal cannula with generalized weakness.





Objective





- Vital Signs


Vital signs: 


 Vital Signs











Temp  99.5 F   04/30/17 04:00


 


Pulse  68   04/30/17 09:00


 


Resp  17   04/30/17 09:00


 


BP  119/75   04/30/17 09:00


 


Pulse Ox  94 L  04/30/17 09:00








 Intake & Output











 04/29/17 04/30/17 04/30/17





 18:59 06:59 18:59


 


Intake Total 2617.124 354.426 417.649


 


Output Total 700 975 550


 


Balance 1917.124 -620.574 -132.351


 


Weight 98.9 kg 98.8 kg 98.8 kg


 


Intake:   


 


  IV 36 186 269


 


    0.9 for pressure bag 36 36 9


 


    Mvi, Adult No.4 with Vit  150 60





    K 10 ml Trace (Conc-1Ml/   





    Dose) 1 ml Sodium Acetate   





    30 meq Potassium   





    Chloride 20 meq Calcium   





    Gluconate 1,000 mg In   





    Amino Acid 5%-D25w 1,000   





    ml @ 30 mls/hr IV .Q24H   





    ELIER Rx#:[id number]   


 


    Potassium Chloride 10 meq   200





    In Water For Injection 1   





    100ml.bag @ 100 mls/hr   





    IVPB Q1H ELIER Rx#:   





    [id number]   


 


  Intake, IV Titration 1381.124 108.426 148.649





  Amount   


 


    D5-0.45% NaCl with KCl  80 40





    20Meq/l 1,000 ml @ 20 mls   





    /hr IV .Q24H ELIER Rx#:   





    [id number]   


 


    D5-0.45% NaCl with KCl 720  





    20Meq/l 1,000 ml @ 80 mls   





    /hr IV .[id number] ELIER Rx#:   





    [id number]   


 


    Heparin Sodium,Porcine/ 500  





    D5w Pmx 25,000 unit In   





    Dextrose/Water 1 500ml.   





    bag @ 10 UNITS/KG/HR 19.   





    98 mls/hr IV .Q24H ELIER Rx   





    #:[id number]   


 


    Insulin Regular 100 unit 11.124 28.426 8.649





    In Sodium Chloride 0.9%   





    100 ml @ Per Protocol IV   





    .Q0M ELIER Rx#:[id number]   


 


    Levofloxacin 750Mg-D5w 150  





    Pmx 750 mg In Dextrose/   





    Water 1 150ml.bag @ 100   





    mls/hr IVPB Q24H ELIER Rx#:   





    [id number]   


 


    Potassium Chloride 10 meq   100





    In Water For Injection 1   





    100ml.bag @ 100 mls/hr   





    IVPB Q1H ELIER Rx#:   





    [id number]   


 


  Other 1200 60 


 


Output:   


 


  Gastric Drainage   250


 


  Urine 700 975 300


 


Other:   


 


  Voiding Method Indwelling Catheter Indwelling Catheter Indwelling Catheter


 


  # Bowel Movements 1  1








 ABP, PAP, CO, CI - Last Documented











Arterial Blood Pressure        172/58


 


Pulmonary Artery Pressure      46/23


 


Cardiac Output                 6.8


 


Cardiac Index                  3.3

















- Constitutional


General appearance: Present: cooperative, no acute distress





- Respiratory


Details: 





Lungs sounds diminished bilaterally.  Respirations even, nonlabored.  Currently 

on 4 L nasal cannula.  Able to achieve 750 mL on his incentive spirometry.  

Weak cough present.  Chest x-ray reviewed.





- Cardiovascular


Details: 





S1, S2 present.  Irregular, tachycardia rate and rhythm, uncontrolled A. fib on 

telemetry.  Sternum stable.  Heart hugger in place with patient unable to 

demonstrate appropriate use.  No edema present.  Teds/SCDs present.  Left arm 

PICC line as well as right radial arterial line remain present.





- Gastrointestinal


Gastrointestinal Comment(s): 





Abdomen soft, nontender, slightly distended.  Hypoactive bowel sounds present.  

NG tube present lower intermittent suction.  Patient currently receiving 

enteral support via PICC line.





- Genitourinary


Genitourinary Comment(s): 





Angelo present draining clear, yellow urine.  Approximately 75 mL per hour.





- Integumentary


Integumentary Comment(s): 





Anterior chest incision covered dry intact dressing.





- Musculoskeletal


Musculoskeletal: Present: generalized weakness





- Psychiatric


Psychiatric: Present: A&O x's 3, appropriate affect, intact judgment & insight





- Allied health notes


Allied health notes reviewed: nursing





- Labs


CBC & Chem 7: 


 04/30/17 04:30





 04/30/17 04:30


Labs: 


 Abnormal Lab Results - Last 24 Hours (Table)











  04/29/17 04/29/17 04/29/17 Range/Units





  04:10 12:29 13:46 


 


RBC     (4.30-5.90)  m/uL


 


Hgb     (13.0-17.5)  gm/dL


 


Hct     (39.0-53.0)  %


 


APTT     (22.0-30.0)  sec


 


Glucose     (74-99)  mg/dL


 


POC Glucose (mg/dL)   123 H  125 H  (75-99)  mg/dL


 


Calcium     (8.4-10.2)  mg/dL


 


Phosphorus  2.3 L    (2.5-4.5)  mg/dL


 


Magnesium  2.6 H    (1.6-2.3)  mg/dL














  04/29/17 04/29/17 04/29/17 Range/Units





  15:58 17:56 19:14 


 


RBC     (4.30-5.90)  m/uL


 


Hgb     (13.0-17.5)  gm/dL


 


Hct     (39.0-53.0)  %


 


APTT     (22.0-30.0)  sec


 


Glucose     (74-99)  mg/dL


 


POC Glucose (mg/dL)  123 H  130 H  127 H  (75-99)  mg/dL


 


Calcium     (8.4-10.2)  mg/dL


 


Phosphorus     (2.5-4.5)  mg/dL


 


Magnesium     (1.6-2.3)  mg/dL














  04/29/17 04/29/17 04/29/17 Range/Units





  20:11 21:03 21:56 


 


RBC     (4.30-5.90)  m/uL


 


Hgb     (13.0-17.5)  gm/dL


 


Hct     (39.0-53.0)  %


 


APTT     (22.0-30.0)  sec


 


Glucose     (74-99)  mg/dL


 


POC Glucose (mg/dL)  129 H  135 H  121 H  (75-99)  mg/dL


 


Calcium     (8.4-10.2)  mg/dL


 


Phosphorus     (2.5-4.5)  mg/dL


 


Magnesium     (1.6-2.3)  mg/dL














  04/29/17 04/30/17 04/30/17 Range/Units





  23:14 00:18 01:05 


 


RBC     (4.30-5.90)  m/uL


 


Hgb     (13.0-17.5)  gm/dL


 


Hct     (39.0-53.0)  %


 


APTT     (22.0-30.0)  sec


 


Glucose     (74-99)  mg/dL


 


POC Glucose (mg/dL)  109 H  128 H  133 H  (75-99)  mg/dL


 


Calcium     (8.4-10.2)  mg/dL


 


Phosphorus     (2.5-4.5)  mg/dL


 


Magnesium     (1.6-2.3)  mg/dL














  04/30/17 04/30/17 04/30/17 Range/Units





  02:15 04:16 04:30 


 


RBC     (4.30-5.90)  m/uL


 


Hgb     (13.0-17.5)  gm/dL


 


Hct     (39.0-53.0)  %


 


APTT     (22.0-30.0)  sec


 


Glucose    132 H  (74-99)  mg/dL


 


POC Glucose (mg/dL)  132 H  155 H   (75-99)  mg/dL


 


Calcium    8.1 L  (8.4-10.2)  mg/dL


 


Phosphorus     (2.5-4.5)  mg/dL


 


Magnesium     (1.6-2.3)  mg/dL














  04/30/17 04/30/17 04/30/17 Range/Units





  04:30 04:30 06:38 


 


RBC  2.71 L    (4.30-5.90)  m/uL


 


Hgb  8.6 L    (13.0-17.5)  gm/dL


 


Hct  26.5 L    (39.0-53.0)  %


 


APTT   53.0 H   (22.0-30.0)  sec


 


Glucose     (74-99)  mg/dL


 


POC Glucose (mg/dL)    137 H  (75-99)  mg/dL


 


Calcium     (8.4-10.2)  mg/dL


 


Phosphorus     (2.5-4.5)  mg/dL


 


Magnesium     (1.6-2.3)  mg/dL














  04/30/17 04/30/17 Range/Units





  08:02 09:05 


 


RBC    (4.30-5.90)  m/uL


 


Hgb    (13.0-17.5)  gm/dL


 


Hct    (39.0-53.0)  %


 


APTT    (22.0-30.0)  sec


 


Glucose    (74-99)  mg/dL


 


POC Glucose (mg/dL)  131 H  144 H  (75-99)  mg/dL


 


Calcium    (8.4-10.2)  mg/dL


 


Phosphorus    (2.5-4.5)  mg/dL


 


Magnesium    (1.6-2.3)  mg/dL








 Microbiology - Last 24 Hours (Table)











 04/24/17 02:00 Blood Culture - Final





 Blood    No Growth after 144 hours














- Imaging and Cardiology


Chest x-ray: report reviewed, image reviewed


CT scan - abdomen: report reviewed, image reviewed





Assessment and Plan


(1) Status post coronary artery bypass graft


Status: Acute   





(2) Coronary artery disease


Status: Acute   





(3) Family history of heart disease


Status: Acute   





(4) History of PTCA


Status: Acute   





(5) Hyperlipidemia


Status: Acute   





(6) Hypertension


Status: Acute   





(7) Nicotine dependence, chewing tobacco, uncomplicated


Status: Acute   


Plan: 





1.  Continue ASA, Lipitor, heparin, Lopressor, lisinopril, digoxin.


2.  Continue amiodarone for atrial fibrillation.


3.  Wean O2 as tolerated.


4.  Continue TPN for now.


5.  Insulin/diabetic management per primary care service.


6.  Continue antibiotics per pulmonology.


7.  Increase activity, out of bed to chair.  Physical therapy following.  


8.  Daily labs, chest x-rays.


9.  GI/DVT prophylaxis.


10.  More recommendations as patient progresses.

## 2017-05-01 LAB
ANION GAP SERPL CALC-SCNC: 6 MMOL/L
BASOPHILS # BLD AUTO: 0 K/UL (ref 0–0.2)
BASOPHILS NFR BLD AUTO: 0 %
BUN SERPL-SCNC: 12 MG/DL (ref 9–20)
CALCIUM SPEC-MCNC: 8.2 MG/DL (ref 8.4–10.2)
CH: 32.5
CHCM: 33.9
CHLORIDE SERPL-SCNC: 104 MMOL/L (ref 98–107)
CO2 SERPL-SCNC: 27 MMOL/L (ref 22–30)
EOSINOPHIL # BLD AUTO: 0.3 K/UL (ref 0–0.7)
EOSINOPHIL NFR BLD AUTO: 3 %
ERYTHROCYTE [DISTWIDTH] IN BLOOD BY AUTOMATED COUNT: 2.7 M/UL (ref 4.3–5.9)
ERYTHROCYTE [DISTWIDTH] IN BLOOD: 14.6 % (ref 11.5–15.5)
GLUCOSE BLD-MCNC: 128 MG/DL (ref 75–99)
GLUCOSE BLD-MCNC: 133 MG/DL (ref 75–99)
GLUCOSE BLD-MCNC: 138 MG/DL (ref 75–99)
GLUCOSE BLD-MCNC: 139 MG/DL (ref 75–99)
GLUCOSE BLD-MCNC: 139 MG/DL (ref 75–99)
GLUCOSE BLD-MCNC: 146 MG/DL (ref 75–99)
GLUCOSE BLD-MCNC: 149 MG/DL (ref 75–99)
GLUCOSE BLD-MCNC: 156 MG/DL (ref 75–99)
GLUCOSE BLD-MCNC: 160 MG/DL (ref 75–99)
GLUCOSE BLD-MCNC: 167 MG/DL (ref 75–99)
GLUCOSE BLD-MCNC: 176 MG/DL (ref 75–99)
GLUCOSE BLD-MCNC: 180 MG/DL (ref 75–99)
GLUCOSE BLD-MCNC: 182 MG/DL (ref 75–99)
GLUCOSE SERPL-MCNC: 129 MG/DL (ref 74–99)
HCT VFR BLD AUTO: 26 % (ref 39–53)
HDW: 3.07
HGB BLD-MCNC: 8.8 GM/DL (ref 13–17.5)
LUC NFR BLD AUTO: 3 %
LYMPHOCYTES # SPEC AUTO: 1.8 K/UL (ref 1–4.8)
LYMPHOCYTES NFR SPEC AUTO: 18 %
MAGNESIUM SPEC-SCNC: 1.8 MG/DL (ref 1.6–2.3)
MCH RBC QN AUTO: 32.6 PG (ref 25–35)
MCHC RBC AUTO-ENTMCNC: 33.8 G/DL (ref 31–37)
MCV RBC AUTO: 96.3 FL (ref 80–100)
MONOCYTES # BLD AUTO: 0.7 K/UL (ref 0–1)
MONOCYTES NFR BLD AUTO: 7 %
NEUTROPHILS # BLD AUTO: 6.9 K/UL (ref 1.3–7.7)
NEUTROPHILS NFR BLD AUTO: 68 %
NON-AFRICAN AMERICAN GFR(MDRD): >60
PHOSPHATE SERPL-MCNC: 4.1 MG/DL (ref 2.5–4.5)
POTASSIUM SERPL-SCNC: 3.7 MMOL/L (ref 3.5–5.1)
SODIUM SERPL-SCNC: 137 MMOL/L (ref 137–145)
WBC # BLD AUTO: 0.31 10*3/UL
WBC # BLD AUTO: 10.1 K/UL (ref 3.8–10.6)
WBC (PEROX): 10.46

## 2017-05-01 RX ADMIN — LISINOPRIL SCH MG: 10 TABLET ORAL at 20:01

## 2017-05-01 RX ADMIN — SODIUM PHOSPHATE, MONOBASIC, MONOHYDRATE SCH MLS/HR: 276; 142 INJECTION, SOLUTION INTRAVENOUS at 10:32

## 2017-05-01 RX ADMIN — FUROSEMIDE SCH MG: 10 INJECTION, SOLUTION INTRAMUSCULAR; INTRAVENOUS at 20:01

## 2017-05-01 RX ADMIN — BUDESONIDE SCH: 1 SUSPENSION RESPIRATORY (INHALATION) at 08:57

## 2017-05-01 RX ADMIN — SOYBEAN OIL SCH MLS/HR: 20 INJECTION, SOLUTION INTRAVENOUS at 17:09

## 2017-05-01 RX ADMIN — METOCLOPRAMIDE SCH MG: 5 INJECTION, SOLUTION INTRAMUSCULAR; INTRAVENOUS at 17:31

## 2017-05-01 RX ADMIN — FUROSEMIDE SCH MG: 10 INJECTION, SOLUTION INTRAMUSCULAR; INTRAVENOUS at 09:43

## 2017-05-01 RX ADMIN — LISINOPRIL SCH: 10 TABLET ORAL at 10:32

## 2017-05-01 RX ADMIN — ASPIRIN 325 MG ORAL TABLET SCH MG: 325 PILL ORAL at 09:42

## 2017-05-01 RX ADMIN — METOCLOPRAMIDE SCH MG: 5 INJECTION, SOLUTION INTRAMUSCULAR; INTRAVENOUS at 06:18

## 2017-05-01 RX ADMIN — DOCUSATE SODIUM AND SENNOSIDES SCH: 50; 8.6 TABLET ORAL at 22:32

## 2017-05-01 RX ADMIN — SODIUM CHLORIDE SCH MLS/HR: 450 INJECTION, SOLUTION INTRAVENOUS at 09:44

## 2017-05-01 RX ADMIN — IPRATROPIUM BROMIDE AND ALBUTEROL SULFATE SCH: .5; 3 SOLUTION RESPIRATORY (INHALATION) at 08:57

## 2017-05-01 RX ADMIN — METOPROLOL TARTRATE SCH MG: 50 TABLET, FILM COATED ORAL at 20:02

## 2017-05-01 RX ADMIN — SODIUM CHLORIDE, PRESERVATIVE FREE SCH ML: 5 INJECTION INTRAVENOUS at 23:26

## 2017-05-01 RX ADMIN — METOCLOPRAMIDE SCH MG: 5 INJECTION, SOLUTION INTRAMUSCULAR; INTRAVENOUS at 01:18

## 2017-05-01 RX ADMIN — AMIODARONE HYDROCHLORIDE SCH MG: 200 TABLET ORAL at 20:01

## 2017-05-01 RX ADMIN — PANTOPRAZOLE SODIUM SCH MG: 40 INJECTION, POWDER, FOR SOLUTION INTRAVENOUS at 09:43

## 2017-05-01 RX ADMIN — ATORVASTATIN CALCIUM SCH MG: 40 TABLET, FILM COATED ORAL at 09:42

## 2017-05-01 RX ADMIN — DEXTROSE MONOHYDRATE, SODIUM CHLORIDE, AND POTASSIUM CHLORIDE SCH: 50; 4.5; 1.49 INJECTION, SOLUTION INTRAVENOUS at 17:09

## 2017-05-01 RX ADMIN — IPRATROPIUM BROMIDE AND ALBUTEROL SULFATE SCH ML: .5; 3 SOLUTION RESPIRATORY (INHALATION) at 20:29

## 2017-05-01 RX ADMIN — INSULIN HUMAN SCH MLS/HR: 100 INJECTION, SOLUTION PARENTERAL at 02:59

## 2017-05-01 RX ADMIN — METOCLOPRAMIDE SCH MG: 5 INJECTION, SOLUTION INTRAMUSCULAR; INTRAVENOUS at 12:37

## 2017-05-01 RX ADMIN — METOPROLOL TARTRATE SCH MG: 50 TABLET, FILM COATED ORAL at 12:37

## 2017-05-01 RX ADMIN — Medication SCH MG: at 20:02

## 2017-05-01 RX ADMIN — IPRATROPIUM BROMIDE AND ALBUTEROL SULFATE SCH ML: .5; 3 SOLUTION RESPIRATORY (INHALATION) at 16:46

## 2017-05-01 RX ADMIN — BUDESONIDE SCH MG: 1 SUSPENSION RESPIRATORY (INHALATION) at 20:29

## 2017-05-01 RX ADMIN — DIGOXIN SCH MCG: 250 TABLET ORAL at 09:43

## 2017-05-01 RX ADMIN — AMIODARONE HYDROCHLORIDE SCH MG: 200 TABLET ORAL at 09:41

## 2017-05-01 RX ADMIN — SODIUM PHOSPHATE, MONOBASIC, MONOHYDRATE SCH MLS/HR: 276; 142 INJECTION, SOLUTION INTRAVENOUS at 23:26

## 2017-05-01 RX ADMIN — Medication SCH MG: at 09:43

## 2017-05-01 RX ADMIN — LEVOFLOXACIN SCH MLS/HR: 750 INJECTION, SOLUTION INTRAVENOUS at 17:09

## 2017-05-01 RX ADMIN — IPRATROPIUM BROMIDE AND ALBUTEROL SULFATE SCH ML: .5; 3 SOLUTION RESPIRATORY (INHALATION) at 11:57

## 2017-05-01 RX ADMIN — SODIUM CHLORIDE, PRESERVATIVE FREE SCH: 5 INJECTION INTRAVENOUS at 09:43

## 2017-05-01 NOTE — XR
EXAMINATION TYPE: XR chest 1V portable

 

DATE OF EXAM: 5/1/2017 6:42 AM

 

HISTORY: Post Op CABG

 

COMPARISON: 4/30/2017

 

TECHNIQUE: Single view of the chest is submitted.

 

FINDINGS:

Endotracheal, NG tube and left-sided PICC line are in place.

 

Post operative changes of CABG.

 

No sizeable pneumothorax.

 

Stable left basilar opacity.

 

The heart is enlarged.

 

IMPRESSION: 

 

1.  Stable chest

## 2017-05-01 NOTE — P.PN
Subjective


Principal diagnosis: 





Coronary artery disease, status post prior stenting to his right coronary 

artery.  Preserved left ventricular function.  Hyperlipidemia.  Hypertension.  

ETOH abuse.





POD #18 total arterial non-aortic patch off-pump double coronary artery bypass 

grafting using in situ skeletonized right internal mammary artery to the left 

anterior descending artery across the anterior midline in situ totally 

skeletonized left internal mammary artery to the first obtuse marginal artery.  

Intraoperative transesophageal echocardiogram and epi-aortic scanning.  

Intraoperative graft flow measurements using the [employer] system





POD #18 flexible bronchoscopy with bronchial washings secondary to 

postoperative mucous plugging with increasing oxygen demand the night of 

surgery.  Sputum culture grew out Moraxella, blood cultures are negative to date

, urine culture grew Klebsiella.  Patient placed on Levaquin per pulmonology.





Pt developed postoperative alcohol withdrawal requiring increased sedation and 

prolonged mechanical ventilation.





Patient developed postoperative ileus.  Currently receiving TPN for nutrition.  

General surgery and consult, no intervention at this time.





Patient currently sitting up in chair in no apparent distress.  Denies any new 

questions or concerns.  States he is feeling better every day.





Objective





- Vital Signs


Vital signs: 


 Vital Signs











Temp  98.6 F   05/01/17 04:00


 


Pulse  75   05/01/17 11:00


 


Resp  21   05/01/17 11:00


 


BP  140/70   05/01/17 11:00


 


Pulse Ox  96   05/01/17 11:00








 Intake & Output











 04/30/17 05/01/17 05/01/17





 18:59 06:59 18:59


 


Intake Total 223.060 7341.340 460.452


 


Output Total 1855 2215 370


 


Balance -932.512 -563.660 90.452


 


Weight 98.8 kg 98.6 kg 


 


Intake:   


 


   1078 395


 


    0.9 for pressure bag 36 3 


 


    Mvi, Adult No.4 with Vit 330 855 375





    K 10 ml Trace (Conc-1Ml/   





    Dose) 1 ml Sodium Acetate   





    30 meq Potassium   





    Chloride 20 meq Calcium   





    Gluconate 1,000 mg In   





    Amino Acid 5%-D25w 1,000   





    ml @ 30 mls/hr IV .Q24H   





    ELIER Rx#:[id number]   


 


    Potassium Chloride 10 meq 200 220 20





    In Water For Injection 1   





    100ml.bag @ 100 mls/hr   





    IVPB Q1H ELIER Rx#:   





    [id number]   


 


  Intake, IV Titration 356.488 573.340 65.452





  Amount   


 


    D5-0.45% NaCl with KCl 220 20 40





    20Meq/l 1,000 ml @ 20 mls   





    /hr IV .Q24H ELIER Rx#:   





    [id number]   


 


    Heparin Sodium,Porcine/  500 





    D5w Pmx 25,000 unit In   





    Dextrose/Water 1 500ml.   





    bag @ 10 UNITS/KG/HR 19.   





    98 mls/hr IV .Q24H ELIER Rx   





    #:[id number]   


 


    Insulin Regular 100 unit 36.488 53.340 25.452





    In Sodium Chloride 0.9%   





    100 ml @ Per Protocol IV   





    .Q0M ELIER Rx#:[id number]   


 


    Potassium Chloride 10 meq 100  





    In Water For Injection 1   





    100ml.bag @ 100 mls/hr   





    IVPB Q1H ELIER Rx#:   





    [id number]   


 


Output:   


 


  Gastric Drainage 250  


 


  Urine 1605 2215 370


 


Other:   


 


  Voiding Method Indwelling Catheter Indwelling Catheter 


 


  # Bowel Movements 1  








 ABP, PAP, CO, CI - Last Documented











Arterial Blood Pressure        172/58


 


Pulmonary Artery Pressure      46/23


 


Cardiac Output                 6.8


 


Cardiac Index                  3.3

















- Constitutional


General appearance: Present: cooperative, no acute distress





- Respiratory


Details: 





Lungs sounds diminished bilaterally with coarse breath sounds and expiratory 

wheeze.  Respirations even, nonlabored.  Currently on 2 L nasal cannula.  Able 

to achieve 750 mL on his incentive's premature.  Effective cough.





- Cardiovascular


Details: 





S1, S2 present.  Regular rate and rhythm, normal sinus rhythm on telemetry.  

Superior sternal area remained stable, movement can be felt in the inferior 

border when patient coughs.  Heart hugger in place with patient unable to 

demonstrate appropriate use.  Teds/SCDs present.





- Gastrointestinal


Gastrointestinal Comment(s): 





Abdomen soft, nontender, slightly distended.  Active bowel sounds present.  

Currently receiving TPN.  NG tube to low intermittent suction with 250 mL 

output in 24 hours.





- Genitourinary


Genitourinary Comment(s): 





Angelo present draining clear yellow urine.  Approximately 120-225 L/h since 

being started on IV Lasix.





- Musculoskeletal


Musculoskeletal: Present: generalized weakness





- Psychiatric


Psychiatric: Present: A&O x's 3, appropriate affect, intact judgment & insight





- Allied health notes


Allied health notes reviewed: nursing





- Labs


CBC & Chem 7: 


 05/01/17 04:45





 05/01/17 04:45


Labs: 


 Abnormal Lab Results - Last 24 Hours (Table)











  04/30/17 04/30/17 04/30/17 Range/Units





  12:25 13:04 15:07 


 


RBC     (4.30-5.90)  m/uL


 


Hgb     (13.0-17.5)  gm/dL


 


Hct     (39.0-53.0)  %


 


APTT     (22.0-30.0)  sec


 


Glucose     (74-99)  mg/dL


 


POC Glucose (mg/dL)  122 H  129 H  128 H  (75-99)  mg/dL


 


Calcium     (8.4-10.2)  mg/dL














  04/30/17 04/30/17 04/30/17 Range/Units





  16:48 18:10 18:59 


 


RBC     (4.30-5.90)  m/uL


 


Hgb     (13.0-17.5)  gm/dL


 


Hct     (39.0-53.0)  %


 


APTT     (22.0-30.0)  sec


 


Glucose     (74-99)  mg/dL


 


POC Glucose (mg/dL)  170 H  116 H  158 H  (75-99)  mg/dL


 


Calcium     (8.4-10.2)  mg/dL














  04/30/17 04/30/17 04/30/17 Range/Units





  20:10 21:31 23:07 


 


RBC     (4.30-5.90)  m/uL


 


Hgb     (13.0-17.5)  gm/dL


 


Hct     (39.0-53.0)  %


 


APTT     (22.0-30.0)  sec


 


Glucose     (74-99)  mg/dL


 


POC Glucose (mg/dL)  147 H  143 H  152 H  (75-99)  mg/dL


 


Calcium     (8.4-10.2)  mg/dL














  05/01/17 05/01/17 05/01/17 Range/Units





  00:10 01:17 02:59 


 


RBC     (4.30-5.90)  m/uL


 


Hgb     (13.0-17.5)  gm/dL


 


Hct     (39.0-53.0)  %


 


APTT     (22.0-30.0)  sec


 


Glucose     (74-99)  mg/dL


 


POC Glucose (mg/dL)  138 H  139 H  146 H  (75-99)  mg/dL


 


Calcium     (8.4-10.2)  mg/dL














  05/01/17 05/01/17 05/01/17 Range/Units





  04:44 04:45 04:45 


 


RBC    2.70 L  (4.30-5.90)  m/uL


 


Hgb    8.8 L  (13.0-17.5)  gm/dL


 


Hct    26.0 L  (39.0-53.0)  %


 


APTT     (22.0-30.0)  sec


 


Glucose   129 H   (74-99)  mg/dL


 


POC Glucose (mg/dL)  128 H    (75-99)  mg/dL


 


Calcium   8.2 L   (8.4-10.2)  mg/dL














  05/01/17 05/01/17 05/01/17 Range/Units





  04:45 06:23 08:05 


 


RBC     (4.30-5.90)  m/uL


 


Hgb     (13.0-17.5)  gm/dL


 


Hct     (39.0-53.0)  %


 


APTT  46.3 H    (22.0-30.0)  sec


 


Glucose     (74-99)  mg/dL


 


POC Glucose (mg/dL)   160 H  182 H  (75-99)  mg/dL


 


Calcium     (8.4-10.2)  mg/dL














  05/01/17 Range/Units





  10:34 


 


RBC   (4.30-5.90)  m/uL


 


Hgb   (13.0-17.5)  gm/dL


 


Hct   (39.0-53.0)  %


 


APTT   (22.0-30.0)  sec


 


Glucose   (74-99)  mg/dL


 


POC Glucose (mg/dL)  180 H  (75-99)  mg/dL


 


Calcium   (8.4-10.2)  mg/dL














- Imaging and Cardiology


Chest x-ray: report reviewed, image reviewed





Assessment and Plan


(1) Status post coronary artery bypass graft


Status: Acute   





(2) Coronary artery disease


Status: Acute   





(3) Family history of heart disease


Status: Acute   





(4) History of PTCA


Status: Acute   





(5) Hyperlipidemia


Status: Acute   





(6) Hypertension


Status: Acute   





(7) Nicotine dependence, chewing tobacco, uncomplicated


Status: Acute   


Plan: 





1.  Continue ASA, Lipitor, heparin, Lopressor, lisinopril, digoxin.


2.  Continue amiodarone for atrial fibrillation prophylaxis.


3.  Wean O2 as tolerated.


4.  Lasix 20 mg IV push every 12 hours started yesterday.


5.  Continue TPN for until patient able to tolerate oral intake.


6.  Insulin/diabetic management per primary care service.


7.  Continue antibiotics per pulmonology.


8.  Increase activity, out of bed to chair.  Physical therapy following.  


9.  Daily labs, chest x-rays.


10.  GI/DVT prophylaxis.





Time with Patient: Greater than 30

## 2017-05-01 NOTE — P.PN
Subjective


Principal diagnosis: 





Status post CABG, postoperative day #18





61-year-old male patient with status post carotid bypass surgery and the 

patient is postop day #11.  The patient has failed to wean off the mechanical 

ventilator.  Immediately postop the patient had pulmonate complications 

including mucous plugs and atelectasis of the left lung.  He required 

bronchoscopy and therapeutic airway suctioning and all of the respiratory 

cultures came back negative.  This morning, the patient was on a volume cycled 

assist-control mode of ventilation at the rate of 20, tidal volume 450, FiO2 of 

40% and a PEEP of 5.  I reviewed the blood gases and I reviewed also the chest x

-ray.  I noted that the patient was unable to tolerate assist-control mode.  

Attempts to wean him off the sedation Haja as the patient was becoming 

restless and a successful the mechanical ventilator.  Based on this, I switched 

this patient to a pressure support mode of ventilation and I was able to 

completely wean him off sedation.  He was wide awake all he was hardly able to 

wiggle his toes or move his fingers and he was unable to raise his had or arms 

against gravity.  He was having copious amount of rest or secretions and he was 

requiring frequent suctioning.  At a later stage, switch this patient to a 

pressure control mode of ventilation with a PEEP of 7 and a pressure control of 

20 and his FiO2 was At 60%.  He remains hemodynamically stable.  He is 

producing adequate amount of urine output.  No pressors at this point.  No 

fever.  No chills.  Chest tubes have been all removed.  He is tolerating tube 

feeds.  Atelectatic discussion with the cardiothoracic surgeon and will plan to 

proceed with a PEG and trach if the patient ultimately failed weaning.  One 

concern is that he has significant neuromuscular weakness which is probably a 

critical illness polyneuropathy and myopathy.





On 04/25/2017 the patient remains intubated on a mechanical ventilator.  I was 

able to the sedation completely on the patient.  He is awake at this point and 

he had been awake throughout the night.  He is following simple commands.  

Motor functions are nearly absent.  The patient is extremely weak.  He can 

wiggle his toes and occasionally bend his knees.  Otherwise he cannot raise his 

arms and legs against gravity.  He has a cough reflex.  He can blink his eyes 

and raises eyebrows.  Reflexes are significantly diminished in the upper and 

lower extremities bilaterally.  Is on a pressure control mode of ventilation at 

the rate of 18, PC 18, PEEP of 7 and FiO2 of 50%.  Still having significant 

respiratory secretions through the orotracheal tube.  The chest x-ray from 

today shows moderate parenchymal opacity within the left lung base and the 

right lung base.  There is some atelectatic changes in the lung bases more so 

on the left.  The patient has also postop changes related to coronary artery 

bypass surgery.  No fever.  No chills.  Hemodynamically stable.  Producing 

adequate amount of urine output and the patient was diuresis with a combination 

of albumin and Lasix.  He is on tube feeds.





On 04/26/2017 the patient remains on a mechanical ventilator.  Seems to be much 

more awake compared to yesterday.  Motor functions remain weak over the patient 

is doing more activity and movement on today's evaluation.  We were able to 

bring the patient is sitting up position for a total of 2 hours today.  He 

tolerated well and following that he had a coughing spells and he had to be 

placed in bed.  He remains in the same vent setting with a pressure control of 

18, PEEP of 7, FiO2 of 50% and rate of 18.  Chest x-ray shows adequate 

expansion of the left lung with a few being in good location.  Sputum analysis 

showed Moraxella catarrhalis and the based on that the patient was started on 

Levaquin.  Note that he had also Klebsiella PNEUMONIA IN HIS URINE, AND BOTH OF 

THESE MICROORGANISMS SHOULD RESPOND TO LEVAQUIN.  Meanwhile, the patient is 

receiving enteral feeding for nutritional support.  IV Solu Medrol is been 

discontinued.  We'll doing daily physical therapy and passive range of motion.  

He remains on IV heparin.  He remains on IV insulin for blood sugar control.  

Morning blood gases showed a pH of 7.51 with a pCO2 of 26 and pO2 of 79.  

Weaning parameters are still poor as the patient has rapid shallow breathing 

index around 120 and the patient becomes tachypneic and the LOW tidal volumes.








On 04/27/2017 the patient is being seen in follow-up.  Earlier this morning the 

patient was still mechanical ventilator on a pressure control mode.  Chest x-

ray from earlier this morning showed no significant abnormalities.  There was 

improvement in aeration in lung bases bilaterally especially on the left.  NG 

tube was still in a good location below the diaphragm.  Meanwhile affect 

abdominal film was done this morning and that showed a component of ileus.  

Note that overnight, the patient's tube feeds were discontinued knowing that he 

had 2 bouts of emesis.  Nevertheless, despite ongoing gastrointestinal 

abnormalities, the patient is doing very well while being on mechanical 

ventilator and is awake and alert.  We checked his weaning parameters this 

morning and the numbers looked very well.  He was given a pressure support of 5 

and PEEP of 5 and a breathing trial for a total of 30 minutes and following 

that we made a decision to extubate this patient.  This was a difficult 

decision to make knowing that the patient had an underlying abdominal ileus and 

he is still having significant motor weakness in the upper and lower 

extremities.  Nevertheless, I noted that his motor function is improved his 

cough improved and he had very decent weaning parameters.  I suspected that 

this will be his last chance of being extubated and if not successful, he will 

need a PEG and trach with the next 24 hours.  Based on this, I extubated this 

patient.  He remains on IV heparin.  He remains on IV insulin.  He is also on 

Levaquin.  He is afebrile.  He is awake and following commands.  Motor function 

is gradually improving.  No other new complaints otherwise for now.  Noted post 

extubation, the patient was placed on 5 L of oxygen nasal cannula with 

saturation of 94-95%He continued to bleed comfortably.  He was placed in 

sitting up position in ICU.





04/28/2017, the patient is being seen in follow-up in the intensive care unit.  

The patient has been extubated and has been off the mechanical ventilator for 

the past 24 hours.  He is breathing comfortably.  No significant rest or 

distress.  NG tube is in place.  There is considerable amount of output from 

the NG tube still in the abdomen is distended although less compared to 

yesterday.  He had colonic ileus and some dilatation of the small bowel.  A 

rectal tube was inserted and abdomen was quite deflated.  He is producing some 

loose liquidy bowel movements yesterday and small amounts.  Rectal examination 

was done and the patient does not have any impacted stool.  No abdominal pain.  

No fever.  No chills.  He is in sinus rhythm for the time being and stable 

hemodynamics.  Is producing adequate amount of urine output.  All of the 

surgical wound sites are clean and intact.  Neurologically is awake.  There has 

been obvious improvement in the motor function and the patient is talking and 

communicating at this point.  He remains on IV heparin.  He remains on IV 

insulin for blood sugar control.  White cell count is at 15.1.  Hemoglobin is 

stable at 8.7.





On 04/29/2017, the patient is being seen in follow-up.  The patient is awake.  

Following commands and communicating.  As mentioned earlier, profoundly weak 

although that has been steady and slow improvement in his motor function.  NG 

tube in place.  Abdomen remains distended.  Flexeril of the abdomen shows ileus 

both large and small bowel.  No bowel functional mobility at all over the past 

12 hours.  The patient is on Reglan.  From the pulmonary standpoint, he is calm 

and comfortable.  No labored breathing.  Actually taking well on 3 L of oxygen 

by nasal cannula.  The chest exit from today shows stable at ectatic changes 

small effusion the lung bases bilaterally.  NG tube in place.  Output is 

minimal at this point.  Abdomen is tender get is soft and nontender.  Bowel 

sounds are very hypoactive.  Repeat abdominal films was reviewed and there is 

concern of ongoing large and small bowel ileus.  He is on IV heparin.  Remains 

on IV insulin for blood sugar control.  Remains nothing by mouth for the time 

being.  No leukocytosis.  Cardiac rhythm is sinus.





On 04/30/2017 I'm seeing this patient in follow-up.  He is awake.  He is 

communicating.  NG tube is in place.  Total amount of output from the NG tube 

is around 250 mL over the past 24 hours.  Abdomen remains distended and 

tympanic and bowel sounds are hypoactive.  CAT scan of the abdomen that was 

done showed a large bowel ileus.  The patient is on Reglan.  The patient was 

started on TPN for nutritional support.  The CAT scan of the chest also showed 

some early dehiscence in the lower surgical incision with some surrounding 

swelling/hematoma.  There is small better pleural effusion and atelectasis in 

the left lung base.  No fever.  No chills.  Still on insulin drip.  Still on a 

heparin drip.  Right upper extremity is swollen and Artline catheter needs to 

be removed.





On 5/1/2017, patient was seen on follow-up, continues to be about the same.  

Continues to have nasogastric tube in place, continues to have hypoactive bowel 

sounds and what seems to be an ileus.  Patient remains on TPN for nutritional 

support, ultrasound of the right upper extremity showed no evidence of DVT.  

Chest x-ray showed cardiomegaly and small left pleural effusion.  All labs were 

reviewed, PTT is therapeutic.  Hemoglobin is 8.8 today.  Basic metabolic 

profile is relatively normal.








Objective





- Vital Signs


Vital signs: 


 Vital Signs











Temp  98.2 F   05/01/17 12:00


 


Pulse  75   05/01/17 13:00


 


Resp  15   05/01/17 13:00


 


BP  109/61   05/01/17 13:00


 


Pulse Ox  94 L  05/01/17 13:00








 Intake & Output











 04/30/17 05/01/17 05/01/17





 18:59 06:59 18:59


 


Intake Total 983.478 7700.340 621.823


 


Output Total 1855 2215 1820


 


Balance -932.512 -563.660 -1198.177


 


Weight 98.8 kg 98.6 kg 98.6 kg


 


Intake:   


 


   1078 470


 


    0.9 for pressure bag 36 3 


 


    Mvi, Adult No.4 with Vit 330 855 450





    K 10 ml Trace (Conc-1Ml/   





    Dose) 1 ml Sodium Acetate   





    30 meq Potassium   





    Chloride 20 meq Calcium   





    Gluconate 1,000 mg In   





    Amino Acid 5%-D25w 1,000   





    ml @ 30 mls/hr IV .Q24H   





    [facility] Rx#:[id number]   


 


    Potassium Chloride 10 meq 200 220 20





    In Water For Injection 1   





    100ml.bag @ 100 mls/hr   





    IVPB Q1H [facility] Rx#:   





    [id number]   


 


  Intake, IV Titration 356.488 573.340 151.823





  Amount   


 


    Amino Acid 5%-D25w+Lytes*   75





    E* 1,000 ml @ 75 mls/hr   





    IV .BY DURATION [facility] Rx#:   





    [id number]   


 


    D5-0.45% NaCl with KCl 220 20 40





    20Meq/l 1,000 ml @ 20 mls   





    /hr IV .Q24H [facility] Rx#:   





    [id number]   


 


    Heparin Sodium,Porcine/  500 





    D5w Pmx 25,000 unit In   





    Dextrose/Water 1 500ml.   





    bag @ 10 UNITS/KG/HR 19.   





    98 mls/hr IV .Q24H [facility] Rx   





    #:[id number]   


 


    Insulin Regular 100 unit 36.488 53.340 36.823





    In Sodium Chloride 0.9%   





    100 ml @ Per Protocol IV   





    .Q0M [facility] Rx#:[id number]   


 


    Potassium Chloride 10 meq 100  





    In Water For Injection 1   





    100ml.bag @ 100 mls/hr   





    IVPB Q1H [facility] Rx#:   





    [id number]   


 


Output:   


 


  Gastric Drainage 250  


 


  Urine 1605 2215 1820


 


Other:   


 


  Voiding Method Indwelling Catheter Indwelling Catheter 


 


  # Bowel Movements 1  2








 ABP, PAP, CO, CI - Last Documented











Arterial Blood Pressure        172/58


 


Pulmonary Artery Pressure      46/23


 


Cardiac Output                 6.8


 


Cardiac Index                  3.3

















- Exam





Head exam was generally normal. There was no scleral icterus or corneal arcus. 

Mucous membranes were moist.Neck was supple and without jugular venous 

distension, thyromegaly, or carotid bruits. Carotids were easily palpable 

bilaterally. There was no adenopathy.  The patient has an NG tube in place and 

the patient has no stridor.  He has some degree of crowding of the posterior 

oropharynx.  Mucosal membranes are dry.  Lung sounds are diminished in lung 

bases bilaterally otherwise clear.  Heart sounds are regular, positive S1-S2, 

no cervical murmurs appreciated and sternum stable clean and intact.  Abdomen 

is  distended.  This positive tympany.  Bowel sounds are hypoactive.  There is 

no direct tenderness.  No rebound tensile guarding.Examination of the 

extremities revealed swelling in the right upper extremity compared to the 

left.  There is trace edema lower extremities bilaterally.. There was no 

cyanosis, clubbing or edema.  Neurologically, the patient is awake and alert.  

He is, indicating.  No focal neurological deficit.  Motor function is weak 

although improved compared to yesterday.  The patient is able to raise his arms 

and legs against gravity.  There is no focal neurological deficit at this 

point.  He has a decent cough.  He is also communicating and verbal.








- Labs


CBC & Chem 7: 


 05/01/17 04:45





 05/01/17 04:45


Labs: 


 Abnormal Lab Results - Last 24 Hours (Table)











  04/30/17 04/30/17 04/30/17 Range/Units





  16:48 18:10 18:59 


 


RBC     (4.30-5.90)  m/uL


 


Hgb     (13.0-17.5)  gm/dL


 


Hct     (39.0-53.0)  %


 


APTT     (22.0-30.0)  sec


 


Glucose     (74-99)  mg/dL


 


POC Glucose (mg/dL)  170 H  116 H  158 H  (75-99)  mg/dL


 


Calcium     (8.4-10.2)  mg/dL














  04/30/17 04/30/17 04/30/17 Range/Units





  20:10 21:31 23:07 


 


RBC     (4.30-5.90)  m/uL


 


Hgb     (13.0-17.5)  gm/dL


 


Hct     (39.0-53.0)  %


 


APTT     (22.0-30.0)  sec


 


Glucose     (74-99)  mg/dL


 


POC Glucose (mg/dL)  147 H  143 H  152 H  (75-99)  mg/dL


 


Calcium     (8.4-10.2)  mg/dL














  05/01/17 05/01/17 05/01/17 Range/Units





  00:10 01:17 02:59 


 


RBC     (4.30-5.90)  m/uL


 


Hgb     (13.0-17.5)  gm/dL


 


Hct     (39.0-53.0)  %


 


APTT     (22.0-30.0)  sec


 


Glucose     (74-99)  mg/dL


 


POC Glucose (mg/dL)  138 H  139 H  146 H  (75-99)  mg/dL


 


Calcium     (8.4-10.2)  mg/dL














  05/01/17 05/01/17 05/01/17 Range/Units





  04:44 04:45 04:45 


 


RBC    2.70 L  (4.30-5.90)  m/uL


 


Hgb    8.8 L  (13.0-17.5)  gm/dL


 


Hct    26.0 L  (39.0-53.0)  %


 


APTT     (22.0-30.0)  sec


 


Glucose   129 H   (74-99)  mg/dL


 


POC Glucose (mg/dL)  128 H    (75-99)  mg/dL


 


Calcium   8.2 L   (8.4-10.2)  mg/dL














  05/01/17 05/01/17 05/01/17 Range/Units





  04:45 06:23 08:05 


 


RBC     (4.30-5.90)  m/uL


 


Hgb     (13.0-17.5)  gm/dL


 


Hct     (39.0-53.0)  %


 


APTT  46.3 H    (22.0-30.0)  sec


 


Glucose     (74-99)  mg/dL


 


POC Glucose (mg/dL)   160 H  182 H  (75-99)  mg/dL


 


Calcium     (8.4-10.2)  mg/dL














  05/01/17 05/01/17 05/01/17 Range/Units





  10:34 12:19 12:35 


 


RBC     (4.30-5.90)  m/uL


 


Hgb     (13.0-17.5)  gm/dL


 


Hct     (39.0-53.0)  %


 


APTT    55.9 H  (22.0-30.0)  sec


 


Glucose     (74-99)  mg/dL


 


POC Glucose (mg/dL)  180 H  133 H   (75-99)  mg/dL


 


Calcium     (8.4-10.2)  mg/dL














  05/01/17 Range/Units





  14:04 


 


RBC   (4.30-5.90)  m/uL


 


Hgb   (13.0-17.5)  gm/dL


 


Hct   (39.0-53.0)  %


 


APTT   (22.0-30.0)  sec


 


Glucose   (74-99)  mg/dL


 


POC Glucose (mg/dL)  139 H  (75-99)  mg/dL


 


Calcium   (8.4-10.2)  mg/dL














Assessment and Plan


Plan: 





1 Coronary artery disease status post cardiac bypass surgery and the patient is 

postop day # 18





2 prolonged postoperative ventilator-dependent history failure, multifactorial.

  Patient has extubated.  Nevertheless, the active ongoing problems for now is 

his neuromuscular weakness and ongoing ileus.  Patient has a combination of 

large bowel ileus as evident on the flat film the abdomen.  NG tube still in 

place.





3 recurrent mucous plugs with excessive respiratory secretions requiring 

bronchoscopy and a peak airway suctioning and a bronchoscopy cultures came back 

negative for any microbial growth. The most recent sputum analysis showing 

Moraxella catarrhalis and the patient is currently on Levaquin.  Chest x-ray 

from today is showing atelectatic changes in lung bases bilaterally.  

Nevertheless the patient is oxygenating well and there is no respiratory 

distress at this point.





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 

weakness, improving slowly





5 hypertension





6 hyperlipidemia





7 atrial fibrillation, paroxysmal,  currently on a heparin drip and the patient'

s rhythm is sinus with a controlled rate





8 ileus,  large bowel ileus.  Despite this, the patient is having a nontoxic 

abdomen.  No abdominal tenderness.  No leukocytosis.  No fever.  No acidosis.  

NG tube is in place and the patient is still nothing by mouth.  The patient was 

initiated on TPN for nutritional support





9 postoperative anemia, currently hemoglobin is stable





10 alcoholism, recovered from DT





11 gout





12 diabetes mellitus, currently on an insulin drip 





13 suspected early dehiscence of the lower sternal wound.  Doubt infection





Recommendation: Continue present supportive care measures, advanced TPN, 

continue incentive spirometry, continue nasogastric tube on suction, aggressive 

physical therapy, keep patient in the ICU, not quite ready for any discharge 

planning at this point.  Prognosis remains guarded.  And the patient remains 

relatively critically ill.











Time with Patient: Less than 30

## 2017-05-01 NOTE — US
EXAMINATION TYPE: US venous doppler duplex UE RT

 

DATE OF EXAM: 5/1/2017 9:32 AM

 

COMPARISON: NONE

 

CLINICAL HISTORY: swollen right arm. Right lower arm edema

 

SIDE PERFORMED: right

 

 

 

Right Arm: NO evidence of DVT as visualized. Limited evaluation of Cephalic vein

 

 

 

 

IMPRESSION: 

No evidence for right upper extremity DVT.

## 2017-05-01 NOTE — P.PN
Progress Note - Text





This is a 61-year-old gentleman who is status post prior to coronary bypass 

surgery.  Patient had a prolonged stay in ICU on mechanical ventilator support.

  He is also had some neuromuscular weakness.  


The patient was in and out atrial fibrillation with RVR.


He is on metoprolol, amiodarone, and digoxin.


The blood pressure has been stable.


Yesterday I did increase the dose of metoprolol to 25 mg by mouth 3 times a day.


This area morning the patient converted to normal sinus mechanism.


He continues to be on Coumadin for anticoagulation.

## 2017-05-01 NOTE — P.PN
Progress Note - Text


Patient is evaluated at bedside.  Denies chills, nausea, vomiting, or abdominal 

pain.  Patient denies flatus.  Patient had 2 bowel movements today.





On exam, vital signs stable, abdomen soft, nontender, hyperactive bowel sounds, 

distended.  No peritoneal signs.





Impression: Improving colonic ileus.  





Plan: Continue nasogastric tube.  Keep patient nothing by mouth.  Continue TPN.

  Continue Reglan.  Increase activity.  No surgical intervention at this time.





The above impression and plan have been discussed and directed by Dr. Inman. 

Camron HANNON acting as scribe for Dr. Inman.

## 2017-05-02 LAB
ANION GAP SERPL CALC-SCNC: 6 MMOL/L
BASOPHILS # BLD AUTO: 0.1 K/UL (ref 0–0.2)
BASOPHILS NFR BLD AUTO: 1 %
BUN SERPL-SCNC: 13 MG/DL (ref 9–20)
CALCIUM SPEC-MCNC: 8.2 MG/DL (ref 8.4–10.2)
CH: 32.2
CHCM: 33.1
CHLORIDE SERPL-SCNC: 101 MMOL/L (ref 98–107)
CO2 SERPL-SCNC: 27 MMOL/L (ref 22–30)
EOSINOPHIL # BLD AUTO: 0.5 K/UL (ref 0–0.7)
EOSINOPHIL NFR BLD AUTO: 5 %
ERYTHROCYTE [DISTWIDTH] IN BLOOD BY AUTOMATED COUNT: 2.68 M/UL (ref 4.3–5.9)
ERYTHROCYTE [DISTWIDTH] IN BLOOD: 14.9 % (ref 11.5–15.5)
GLUCOSE BLD-MCNC: 126 MG/DL (ref 75–99)
GLUCOSE BLD-MCNC: 137 MG/DL (ref 75–99)
GLUCOSE BLD-MCNC: 141 MG/DL (ref 75–99)
GLUCOSE BLD-MCNC: 141 MG/DL (ref 75–99)
GLUCOSE BLD-MCNC: 143 MG/DL (ref 75–99)
GLUCOSE BLD-MCNC: 158 MG/DL (ref 75–99)
GLUCOSE BLD-MCNC: 164 MG/DL (ref 75–99)
GLUCOSE BLD-MCNC: 166 MG/DL (ref 75–99)
GLUCOSE BLD-MCNC: 169 MG/DL (ref 75–99)
GLUCOSE BLD-MCNC: 176 MG/DL (ref 75–99)
GLUCOSE BLD-MCNC: 177 MG/DL (ref 75–99)
GLUCOSE BLD-MCNC: 180 MG/DL (ref 75–99)
GLUCOSE SERPL-MCNC: 161 MG/DL (ref 74–99)
HCT VFR BLD AUTO: 26.2 % (ref 39–53)
HDW: 2.95
HGB BLD-MCNC: 8.6 GM/DL (ref 13–17.5)
LUC NFR BLD AUTO: 3 %
LYMPHOCYTES # SPEC AUTO: 2 K/UL (ref 1–4.8)
LYMPHOCYTES NFR SPEC AUTO: 19 %
MAGNESIUM SPEC-SCNC: 1.6 MG/DL (ref 1.6–2.3)
MCH RBC QN AUTO: 32.1 PG (ref 25–35)
MCHC RBC AUTO-ENTMCNC: 32.9 G/DL (ref 31–37)
MCV RBC AUTO: 97.7 FL (ref 80–100)
MONOCYTES # BLD AUTO: 0.6 K/UL (ref 0–1)
MONOCYTES NFR BLD AUTO: 6 %
NEUTROPHILS # BLD AUTO: 7 K/UL (ref 1.3–7.7)
NEUTROPHILS NFR BLD AUTO: 67 %
NON-AFRICAN AMERICAN GFR(MDRD): >60
PHOSPHATE SERPL-MCNC: 3.7 MG/DL (ref 2.5–4.5)
POTASSIUM SERPL-SCNC: 3.6 MMOL/L (ref 3.5–5.1)
SODIUM SERPL-SCNC: 134 MMOL/L (ref 137–145)
WBC # BLD AUTO: 0.34 10*3/UL
WBC # BLD AUTO: 10.5 K/UL (ref 3.8–10.6)
WBC (PEROX): 10.21

## 2017-05-02 RX ADMIN — BUDESONIDE SCH MG: 1 SUSPENSION RESPIRATORY (INHALATION) at 20:12

## 2017-05-02 RX ADMIN — ATORVASTATIN CALCIUM SCH MG: 40 TABLET, FILM COATED ORAL at 08:28

## 2017-05-02 RX ADMIN — IPRATROPIUM BROMIDE AND ALBUTEROL SULFATE SCH ML: .5; 3 SOLUTION RESPIRATORY (INHALATION) at 12:47

## 2017-05-02 RX ADMIN — FUROSEMIDE SCH MG: 10 INJECTION, SOLUTION INTRAMUSCULAR; INTRAVENOUS at 20:03

## 2017-05-02 RX ADMIN — Medication SCH MG: at 08:29

## 2017-05-02 RX ADMIN — METOCLOPRAMIDE SCH MG: 5 INJECTION, SOLUTION INTRAMUSCULAR; INTRAVENOUS at 23:15

## 2017-05-02 RX ADMIN — METOPROLOL TARTRATE SCH MG: 50 TABLET, FILM COATED ORAL at 20:04

## 2017-05-02 RX ADMIN — SODIUM CHLORIDE, PRESERVATIVE FREE SCH: 5 INJECTION INTRAVENOUS at 08:29

## 2017-05-02 RX ADMIN — INSULIN HUMAN SCH MLS/HR: 100 INJECTION, SOLUTION PARENTERAL at 06:20

## 2017-05-02 RX ADMIN — SODIUM CHLORIDE, PRESERVATIVE FREE SCH ML: 5 INJECTION INTRAVENOUS at 20:04

## 2017-05-02 RX ADMIN — METOCLOPRAMIDE SCH MG: 5 INJECTION, SOLUTION INTRAMUSCULAR; INTRAVENOUS at 01:11

## 2017-05-02 RX ADMIN — METOPROLOL TARTRATE SCH MG: 50 TABLET, FILM COATED ORAL at 08:29

## 2017-05-02 RX ADMIN — ASPIRIN 325 MG ORAL TABLET SCH MG: 325 PILL ORAL at 08:28

## 2017-05-02 RX ADMIN — POTASSIUM CHLORIDE SCH MEQ: 1.5 SOLUTION ORAL at 23:15

## 2017-05-02 RX ADMIN — IPRATROPIUM BROMIDE AND ALBUTEROL SULFATE SCH ML: .5; 3 SOLUTION RESPIRATORY (INHALATION) at 16:02

## 2017-05-02 RX ADMIN — LISINOPRIL SCH MG: 10 TABLET ORAL at 20:03

## 2017-05-02 RX ADMIN — AMIODARONE HYDROCHLORIDE SCH MG: 200 TABLET ORAL at 08:28

## 2017-05-02 RX ADMIN — INSULIN HUMAN SCH MLS/HR: 100 INJECTION, SOLUTION PARENTERAL at 22:08

## 2017-05-02 RX ADMIN — DOCUSATE SODIUM AND SENNOSIDES SCH: 50; 8.6 TABLET ORAL at 20:04

## 2017-05-02 RX ADMIN — Medication SCH MG: at 20:04

## 2017-05-02 RX ADMIN — METOCLOPRAMIDE SCH MG: 5 INJECTION, SOLUTION INTRAMUSCULAR; INTRAVENOUS at 13:43

## 2017-05-02 RX ADMIN — PANTOPRAZOLE SODIUM SCH MG: 40 INJECTION, POWDER, FOR SOLUTION INTRAVENOUS at 08:29

## 2017-05-02 RX ADMIN — SODIUM CHLORIDE SCH MLS/HR: 450 INJECTION, SOLUTION INTRAVENOUS at 11:46

## 2017-05-02 RX ADMIN — BUDESONIDE SCH MG: 1 SUSPENSION RESPIRATORY (INHALATION) at 09:08

## 2017-05-02 RX ADMIN — LISINOPRIL SCH MG: 10 TABLET ORAL at 08:28

## 2017-05-02 RX ADMIN — IPRATROPIUM BROMIDE AND ALBUTEROL SULFATE SCH ML: .5; 3 SOLUTION RESPIRATORY (INHALATION) at 20:13

## 2017-05-02 RX ADMIN — SODIUM PHOSPHATE, MONOBASIC, MONOHYDRATE SCH MLS/HR: 276; 142 INJECTION, SOLUTION INTRAVENOUS at 13:42

## 2017-05-02 RX ADMIN — DIGOXIN SCH MCG: 250 TABLET ORAL at 08:28

## 2017-05-02 RX ADMIN — METOCLOPRAMIDE SCH MG: 5 INJECTION, SOLUTION INTRAMUSCULAR; INTRAVENOUS at 06:18

## 2017-05-02 RX ADMIN — FUROSEMIDE SCH MG: 10 INJECTION, SOLUTION INTRAMUSCULAR; INTRAVENOUS at 08:28

## 2017-05-02 RX ADMIN — METOCLOPRAMIDE SCH MG: 5 INJECTION, SOLUTION INTRAMUSCULAR; INTRAVENOUS at 16:33

## 2017-05-02 RX ADMIN — LEVOFLOXACIN SCH MLS/HR: 750 INJECTION, SOLUTION INTRAVENOUS at 16:33

## 2017-05-02 RX ADMIN — IPRATROPIUM BROMIDE AND ALBUTEROL SULFATE SCH ML: .5; 3 SOLUTION RESPIRATORY (INHALATION) at 09:08

## 2017-05-02 RX ADMIN — AMIODARONE HYDROCHLORIDE SCH MG: 200 TABLET ORAL at 20:03

## 2017-05-02 RX ADMIN — MAGNESIUM SULFATE IN DEXTROSE SCH MLS/HR: 10 INJECTION, SOLUTION INTRAVENOUS at 13:42

## 2017-05-02 RX ADMIN — MAGNESIUM SULFATE IN DEXTROSE SCH MLS/HR: 10 INJECTION, SOLUTION INTRAVENOUS at 16:30

## 2017-05-02 NOTE — P.PN
Progress Note - Text


Patient is evaluated at bedside.  Denies chills, nausea, vomiting, or abdominal 

pain.  Patient had 2 bowel movements yesterday.  Nasogastric output 250 mL the 

last 24 hours.





On exam, vital signs stable, abdomen soft, nontender, active bowel sounds, 

distended.  No peritoneal signs. 





Impression: Improving colonic ileus.  





Plan: Discontinue nasogastric tube.  Start patient on a full liquid diet.  

Continue TPN.  Continue Reglan.  Increase activity.  No surgical intervention 

at this time.





The above impression and plan have been discussed and directed by Dr. Inman. 

Camron HANNON acting as scribe for Dr. Inman.

## 2017-05-02 NOTE — P.PN
Subjective





This is a 61-year-old gentleman who is status post prior to coronary bypass 

surgery.  Patient had a prolonged stay in ICU on mechanical ventilator support.

  He is also had some neuromuscular weakness.  


The patient was in and out atrial fibrillation with RVR.  But he was converted 

to normal sinus mechanism 2 days ago.


He is on metoprolol, amiodarone, and digoxin.


The blood pressure has been stable.


He continues to be on Coumadin for anticoagulation.





From the cardiovascular standpoint overview, we'll continue the current medical 

treatment.  The patient is feeling at her slowly.








Objective





- Vital Signs


Vital signs: 


 Vital Signs











Temp  99.1 F   05/02/17 08:00


 


Pulse  73   05/02/17 08:00


 


Resp  36 H  05/02/17 08:00


 


BP  146/64   05/02/17 08:00


 


Pulse Ox  96   05/02/17 08:00








 Intake & Output











 05/01/17 05/02/17 05/02/17





 18:59 06:59 18:59


 


Intake Total 2241.999 249.747 131.968


 


Output Total 2740 2505 125


 


Balance -498.001 -2255.253 6.968


 


Weight 98.6 kg 99.4 kg 


 


Intake:   


 


   220 40


 


    .9 NaCL  220 40


 


    Mvi, Adult No.4 with Vit 450  





    K 10 ml Trace (Conc-1Ml/   





    Dose) 1 ml Sodium Acetate   





    30 meq Potassium   





    Chloride 20 meq Calcium   





    Gluconate 1,000 mg In   





    Amino Acid 5%-D25w 1,000   





    ml @ 30 mls/hr IV .Q24H   





    ELIER Rx#:[id number]   


 


    Potassium Chloride 10 meq 20  





    In Water For Injection 1   





    100ml.bag @ 100 mls/hr   





    IVPB Q1H ELIER Rx#:   





    [id number]   


 


  Intake, IV Titration 1771.999 29.747 91.968





  Amount   


 


    Amino Acid 5%-D25w+Lytes* 525  





    E* 1,000 ml @ 75 mls/hr   





    IV .BY DURATION ELIER Rx#:   





    [id number]   


 


    D5-0.45% NaCl with KCl 40  





    20Meq/l 1,000 ml @ 20 mls   





    /hr IV .Q24H ELIER Rx#:   





    [id number]   


 


    Fat Emulsion 20% 250 ml 42  





    In Empty Bag 1 bag @ 21   





    mls/hr IV MoWeFr ELIER Rx#:   





    [id number]   


 


    Insulin Regular 100 unit 53.999 29.747 16.968





    In Sodium Chloride 0.9%   





    100 ml @ Per Protocol IV   





    .Q0M ELIER Rx#:[id number]   


 


    Levofloxacin 750Mg-D5w 100  





    Pmx 750 mg In Dextrose/   





    Water 1 150ml.bag @ 100   





    mls/hr IVPB Q24H ELIER Rx#:   





    [id number]   


 


    Mvi, Adult No.4 with Vit 1011  





    K 10 ml Trace (Conc-1Ml/   





    Dose) 1 ml In Amino Acid   





    5%-D25w+Lytes*E* 1,000 ml   





    @ 75 mls/hr IV .BY   





    DURATION ELIER Rx#:   





    [id number]   


 


    Mvi, Adult No.4 with Vit   75





    K 10 ml Trace (Conc-1Ml/   





    Dose) 1 ml Sodium Acetate   





    30 meq Potassium   





    Chloride 20 meq Calcium   





    Gluconate 1,000 mg In   





    Amino Acid 5%-D25w 1,000   





    ml @ 75 mls/hr IV .   





    [id number] ELIER Rx#:[id number]   


 


Output:   


 


  Urine 2740 2505 125


 


Other:   


 


  Voiding Method Indwelling Catheter Indwelling Catheter 


 


  # Bowel Movements 2  








 ABP, PAP, CO, CI - Last Documented











Arterial Blood Pressure        172/58


 


Pulmonary Artery Pressure      46/23


 


Cardiac Output                 6.8


 


Cardiac Index                  3.3

















- Constitutional


General appearance: Present: no acute distress





- Respiratory


Respiratory: bilateral: diminished





- Cardiovascular


Rhythm: regular


Heart sounds: normal: S1, S2





- Labs


CBC & Chem 7: 


 05/02/17 04:50





 05/02/17 04:50


Labs: 


 Abnormal Lab Results - Last 24 Hours (Table)











  05/01/17 05/01/17 05/01/17 Range/Units





  10:34 12:19 12:35 


 


RBC     (4.30-5.90)  m/uL


 


Hgb     (13.0-17.5)  gm/dL


 


Hct     (39.0-53.0)  %


 


APTT    55.9 H  (22.0-30.0)  sec


 


Sodium     (137-145)  mmol/L


 


Glucose     (74-99)  mg/dL


 


POC Glucose (mg/dL)  180 H  133 H   (75-99)  mg/dL


 


Calcium     (8.4-10.2)  mg/dL














  05/01/17 05/01/17 05/01/17 Range/Units





  14:04 15:54 18:00 


 


RBC     (4.30-5.90)  m/uL


 


Hgb     (13.0-17.5)  gm/dL


 


Hct     (39.0-53.0)  %


 


APTT     (22.0-30.0)  sec


 


Sodium     (137-145)  mmol/L


 


Glucose     (74-99)  mg/dL


 


POC Glucose (mg/dL)  139 H  156 H  176 H  (75-99)  mg/dL


 


Calcium     (8.4-10.2)  mg/dL














  05/01/17 05/01/17 05/02/17 Range/Units





  20:13 22:08 01:15 


 


RBC     (4.30-5.90)  m/uL


 


Hgb     (13.0-17.5)  gm/dL


 


Hct     (39.0-53.0)  %


 


APTT     (22.0-30.0)  sec


 


Sodium     (137-145)  mmol/L


 


Glucose     (74-99)  mg/dL


 


POC Glucose (mg/dL)  149 H  167 H  158 H  (75-99)  mg/dL


 


Calcium     (8.4-10.2)  mg/dL














  05/02/17 05/02/17 05/02/17 Range/Units





  03:10 04:49 04:50 


 


RBC     (4.30-5.90)  m/uL


 


Hgb     (13.0-17.5)  gm/dL


 


Hct     (39.0-53.0)  %


 


APTT     (22.0-30.0)  sec


 


Sodium    134 L  (137-145)  mmol/L


 


Glucose    161 H  (74-99)  mg/dL


 


POC Glucose (mg/dL)  169 H  166 H   (75-99)  mg/dL


 


Calcium    8.2 L  (8.4-10.2)  mg/dL














  05/02/17 05/02/17 05/02/17 Range/Units





  04:50 04:50 06:20 


 


RBC  2.68 L    (4.30-5.90)  m/uL


 


Hgb  8.6 L    (13.0-17.5)  gm/dL


 


Hct  26.2 L    (39.0-53.0)  %


 


APTT   65.6 H   (22.0-30.0)  sec


 


Sodium     (137-145)  mmol/L


 


Glucose     (74-99)  mg/dL


 


POC Glucose (mg/dL)    180 H  (75-99)  mg/dL


 


Calcium     (8.4-10.2)  mg/dL














  05/02/17 Range/Units





  08:00 


 


RBC   (4.30-5.90)  m/uL


 


Hgb   (13.0-17.5)  gm/dL


 


Hct   (39.0-53.0)  %


 


APTT   (22.0-30.0)  sec


 


Sodium   (137-145)  mmol/L


 


Glucose   (74-99)  mg/dL


 


POC Glucose (mg/dL)  177 H  (75-99)  mg/dL


 


Calcium   (8.4-10.2)  mg/dL














Assessment and Plan


Plan: 





Assessment


#1 status post CABG


#2 acute respiratory failure which has resolved


#3 paroxysmal atrial fibrillation


#4 multiple comorbid conditions





Plan


#1 continue the current medical treatment


#2 there is no reason for any further cardiac workup


#3 we'll continue following up with the patient

## 2017-05-02 NOTE — CDI
In responding to this query, please exercise your independent professional 
judgment. The [facility] Coding Staff and Clinical Documentation Specialists

 appreciate your assistance in clarifying documentation, maintaining compliance 
with coding guidelines, accurately documenting patients condition

 and capturing severity of illness. The fact that a question is asked does not 
imply that any particular answer is desired or expected. Communication

 forms are a method of clarifying documentation and are not made part of the 
Legal Health Record. Thank you in advance for your clarification.

 Last Revision, March 2016



Chinmay Camarillo

[address]

[place] [address]

         Documentation Clarification Form

            2nd Request

Date: 4/21/2017 9:36:00 AM

From: Yvonne Crooks RN, CCDS

Phone: [phone]

MRN: [id number]

Admit Date: 4/13/2017 5:40:00 AM

Patient Name: Epi Bear

Visit Number: [id number]



Dr. Ines Mdasen/Makayla Enamorado CNP



Postoperative Respiratory failure is documented in the Pulmonary and Medical 
Progress Notes .  



Patients Admitting Diagnosis: CAD in for Elective CABG

Post-Operative Diagnosis: 1. Coronary artery disease, status post prior 
stenting to his right 

coronary artery. 2. Preserved Left ventricular function. 3. Hyperlipidemia. 4. 
Hypertension. 5. ETOH abuse. 

Procedure performed: Total arterial non- aortic touch off-pump double coronary 
artery bypass grafting using in situ skeletonized right internal mammary artery 
to the left anterior descending artery across the anterior midline, in situ 
totally skeletonized left internal mammary artery to the first obtuse marginal 
artery. 

2. Transesophageal echocardiogram and epiaortic scanning. 

3. Intraoperative graft flow measurements using the medistim system. 



History/Risk Factors:

ETOH, current smoker, possible COPD

Clinical Indicators:  

4/21 Pulmonary Progress Note: "Patient is at current P-op Day #8. Still 
requiring high concentrations of oxygen and high PEEP levels. Likely has 
developed acute lung injury/ARDS. The patient's vent settings with the assist 
control mode rate of 20 tidal volume 450 FiO2 80% PEEP of  13. Blood gases show 
a PaO2 of 92 a pCO2 of 38 patient some 0.46. In my opinion, all that's patient 
starts to show some significant improvement and, he likely end up with a 
tracheostomy and a feeding tube. His overall prognosis remains very poor as he 
is not really making much progress. Yesterday we did him down to 60% and 13 a 
PEEP and I thought that point I had broken shot and improved his respiratory 
status but today I see is back up to 80%. Again I think if he doesn't show much 
improvement over the weekend, I next week he'll be a candidate for tracheostomy 
and PEG tube placement. Postoperative respiratory failure."

Treatment:  (see above Pulmonary Progress note)

Consults: Pulmonary, Cardiology

Ventilation is maintained beyond 48hrs after surgery 



In order to accurately reflect this patients severity of illness, please 
clarify if the post-operative diagnosis is:  



   An expected post-procedural or post-surgical condition

   Integral to the procedure

            Inherent to the procedure

   An unexpected post-procedural or post-surgical condition, related to the 
patients underlying medical comorbidities

   Other, please specify 

   Unable to determine

              

Please document in your progress notes and discharge summary in order to 
capture severity of illness and risk of mortality. Include clinical findings 
that support your diagnosis.



FYI: Press F11 to launch patient chart



_____ Place X here if this finding has no clinical significance, is not 
applicable or if you are not able to provide any additional documentation.

ARELI

## 2017-05-02 NOTE — CDI
In responding to this query, please exercise your independent professional 
judgment. The [facility] Coding Staff and Clinical Documentation Specialists

 appreciate your assistance in clarifying documentation, maintaining compliance 
with coding guidelines, accurately documenting patients condition

 and capturing severity of illness. The fact that a question is asked does not 
imply that any particular answer is desired or expected. Communication

 forms are a method of clarifying documentation and are not made part of the 
Legal Health Record. Thank you in advance for your clarification.

 Last Revision, November 2015



Chinmay Camarillo

[address]

[place] [address]

            Documentation Clarification Form

Date: 5/2/2017 12:01:00 PM

From: Yvonne Crooks RN, CCDS

Phone: [phone]

MRN: [id number]

Admit Date: 4/13/2017 5:40:00 AM

Patient Name: Epi Bear

Visit Number: [id number]



Dr. Joshua Powell



History/Risk factors:

The patient was admitted for elective CABG, Catheter inserted in OR.



Clinical Indicators:

4/23 Pan Cx ordered by Medical

4/26 Pulmonary Progress Note: "Sputum analysis showed Moraxella catarrhalis and 
the based on that the patient was started on Levaquin. Note that he had also 
Klebsiella PNEUMONIA IN HIS URINE, AND BOTH OF THESE MICROORGANISMS SHOULD 
RESPOND TO LEVAQUIN."

4/6 Urinalysis: clean, there is no repeat U/A on this admission

4/23 Urine culture: + Kleb. Pneumoniae

Lab results: elevated WBC elevated Neutrophils



Treatment: 

Levaquin 750mg IVPB Q 24 hrs

IVF



In your professional opinion, can you please clarify the etiology of the UTI, 
if known?

   UTI r/t Angelo catheter

   UTI not related to catheter

   Other condition, please specify   

   Unable to determine



If an infective organism is present, please specify cause and effect 
relationship if applicable.



Please document in your progress notes and discharge summary in order to 
capture severity of illness and risk of mortality. Include clinical findings 
that support your diagnosis.



FYI: Press F11 to launch patient chart



_____ Place X here if this finding has no clinical significance, is not 
applicable or if you are not able to provide any additional documentation.

ARELI

## 2017-05-02 NOTE — CDI
In responding to this query, please exercise your independent professional 
judgment. The [facility] Coding Staff and Clinical Documentation Specialists

 appreciate your assistance in clarifying documentation, maintaining compliance 
with coding guidelines, accurately documenting patients condition

 and capturing severity of illness. The fact that a question is asked does not 
imply that any particular answer is desired or expected. Communication

 forms are a method of clarifying documentation and are not made part of the 
Legal Health Record. Thank you in advance for your clarification.

 Last Revision, November 2015



Chinmay Camarillo

[address]

[place] [address]

         Documentation Clarification Form

Date: 5/2/2017 11:31:00 AM

From: Yvonne Crooks RN, CCDS

Phone: [phone]

MRN: [id number]

Admit Date: 4/13/2017 5:40:00 AM

Patient Name: Epi Bear

Visit Number: [id number]



Dr. Ines Madsen/Makayla Enamorado CNP



History/Risk Factors: 

CAD, HTN, MI, SOB, ETOH with delirium tremens this admission, prolonged post-
operative mechanical ventilation this admission



Clinical Indicators:

     4/26 Pulmonary Progress Note: "Sputum analysis showed Moraxella 
catarrhalis and the based on that the patient was started on Levaquin. Note 
that he had also Klebsiella PNEUMONIA IN HIS URINE, AND BOTH OF THESE 
MICROORGANISMS SHOULD RESPOND TO LEVAQUIN."

    4/30 Pulmonary Progress Note: "Immediately postop the patient had pulmonary 
complications including mucous plugs and atelectasis of the left lung."

     WBC: 4/23-15.5, 4/24- 16.5, 4/25-16, 4/26-12.6

     Left shift: 13.1/14.4/11.8/10.8

     4/24 CXR: Cardiomegaly with small bilateral pleural effusions 
redemonstrated, worsening diffuse left lung edema and/or infiltrates is noted. 

     4/24 Pulmonary Lung/Breathing assessment: "Lung sounds are diminished in 
lung bases especially in the left lung base."

     4/24 Sputum CX: + Moraxella catarra

     4/13 Fungal Cx: R lung + Candida tropicalis

     4/23 Urine Cx: + Klebsiella Pneumonia



Treatment:

     Antibiotics: Levaquin 750 mg IVPB Q 24 hrs

     O2: mechanical vent

     IV Steroids: Solumedrol 40mg IVP Q 12 hrs

     Breathing TX: Pulmicort 1mg INH BID, Duoneb QID and Q 2 hrs PRN SOB

 

In order to capture the severity of condition, please clarify if the condition 
signifies and you are treating for:



   Ventilator Associated Pneumonia (please identify causative organism if known)

   Aspiration Pneumonia, identify if:

      Due to solids or liquids

   Bacterial Pneumonia, specify causal organism (if known)

      Gram Negative Pneumonia

      Due to Strep

      Due to Staph

      Due to E. coli

            Other bacteria (specify)

   Viral Pneumonia, specify casual organism (if known) 

   Unable to determine



In order to accurately reflect this patients severity of illness, please 
clarify if the post-operative diagnosis is:  



   An expected post-procedural or post-surgical condition

   Integral to the procedure

           Inherent to the procedure

   An unexpected post-procedural or post-surgical condition, related to the 
patients underlying medical comorbidities

   Other, please specify __________

   Unable to determine



Link any associated conditions to the pneumonia:

   Influenza with secondary gram negative pneumonia

   Sepsis due to pneumonia 

   Acute respiratory failure due to pneumonia

   Other, please specify



Please document in your progress notes and discharge summary in order to 
capture severity of illness and risk of mortality. Include clinical findings 
that support your diagnosis.



FYI: Press F11 to launch patient chart.



_____ Place X here if this finding has no clinical significance, is not 
applicable or if you are not able to provide any additional documentation.



BHAVESHD

## 2017-05-02 NOTE — XR
EXAMINATION TYPE: XR chest 1V portable

 

DATE OF EXAM: 5/2/2017 6:31 AM

 

CLINICAL HISTORY: Difficulty breathing progress study. Post open cardiac surgery. 

 

TECHNIQUE: Single AP portable upright view of the chest is obtained.

 

COMPARISON: Chest x-ray from one day earlier

 

FINDINGS:  A left-sided PICC line and orogastric tube are stable in appearance. Sternal wires and med
iastinal clips from CABG procedure are redemonstrated.

 

There is persistent cardiomegaly with small to moderate-sized left pleural effusion and associated le
ft basilar atelectasis and/or infiltrate. Small right pleural effusion is suspected. Mild central vas
cular congestion is seen. No sizable pneumothorax is evident bilaterally. Osseous structures are inta
ct.

 

IMPRESSION:  Overall stable findings,  cardiomegaly with mild central vascular congestion, small to m
oderate-sized left greater than right pleural effusions and associated left basilar atelectasis and/o
r infiltrate all redemonstrated without significant interval change.

## 2017-05-02 NOTE — P.PN
Subjective


Principal diagnosis: 





Status post CABG, postoperative day # 19





61-year-old male patient with status post carotid bypass surgery and the 

patient is postop day #11.  The patient has failed to wean off the mechanical 

ventilator.  Immediately postop the patient had pulmonate complications 

including mucous plugs and atelectasis of the left lung.  He required 

bronchoscopy and therapeutic airway suctioning and all of the respiratory 

cultures came back negative.  This morning, the patient was on a volume cycled 

assist-control mode of ventilation at the rate of 20, tidal volume 450, FiO2 of 

40% and a PEEP of 5.  I reviewed the blood gases and I reviewed also the chest x

-ray.  I noted that the patient was unable to tolerate assist-control mode.  

Attempts to wean him off the sedation Haja as the patient was becoming 

restless and a successful the mechanical ventilator.  Based on this, I switched 

this patient to a pressure support mode of ventilation and I was able to 

completely wean him off sedation.  He was wide awake all he was hardly able to 

wiggle his toes or move his fingers and he was unable to raise his had or arms 

against gravity.  He was having copious amount of rest or secretions and he was 

requiring frequent suctioning.  At a later stage, switch this patient to a 

pressure control mode of ventilation with a PEEP of 7 and a pressure control of 

20 and his FiO2 was At 60%.  He remains hemodynamically stable.  He is 

producing adequate amount of urine output.  No pressors at this point.  No 

fever.  No chills.  Chest tubes have been all removed.  He is tolerating tube 

feeds.  Atelectatic discussion with the cardiothoracic surgeon and will plan to 

proceed with a PEG and trach if the patient ultimately failed weaning.  One 

concern is that he has significant neuromuscular weakness which is probably a 

critical illness polyneuropathy and myopathy.





On 04/25/2017 the patient remains intubated on a mechanical ventilator.  I was 

able to the sedation completely on the patient.  He is awake at this point and 

he had been awake throughout the night.  He is following simple commands.  

Motor functions are nearly absent.  The patient is extremely weak.  He can 

wiggle his toes and occasionally bend his knees.  Otherwise he cannot raise his 

arms and legs against gravity.  He has a cough reflex.  He can blink his eyes 

and raises eyebrows.  Reflexes are significantly diminished in the upper and 

lower extremities bilaterally.  Is on a pressure control mode of ventilation at 

the rate of 18, PC 18, PEEP of 7 and FiO2 of 50%.  Still having significant 

respiratory secretions through the orotracheal tube.  The chest x-ray from 

today shows moderate parenchymal opacity within the left lung base and the 

right lung base.  There is some atelectatic changes in the lung bases more so 

on the left.  The patient has also postop changes related to coronary artery 

bypass surgery.  No fever.  No chills.  Hemodynamically stable.  Producing 

adequate amount of urine output and the patient was diuresis with a combination 

of albumin and Lasix.  He is on tube feeds.





On 04/26/2017 the patient remains on a mechanical ventilator.  Seems to be much 

more awake compared to yesterday.  Motor functions remain weak over the patient 

is doing more activity and movement on today's evaluation.  We were able to 

bring the patient is sitting up position for a total of 2 hours today.  He 

tolerated well and following that he had a coughing spells and he had to be 

placed in bed.  He remains in the same vent setting with a pressure control of 

18, PEEP of 7, FiO2 of 50% and rate of 18.  Chest x-ray shows adequate 

expansion of the left lung with a few being in good location.  Sputum analysis 

showed Moraxella catarrhalis and the based on that the patient was started on 

Levaquin.  Note that he had also Klebsiella PNEUMONIA IN HIS URINE, AND BOTH OF 

THESE MICROORGANISMS SHOULD RESPOND TO LEVAQUIN.  Meanwhile, the patient is 

receiving enteral feeding for nutritional support.  IV Solu Medrol is been 

discontinued.  We'll doing daily physical therapy and passive range of motion.  

He remains on IV heparin.  He remains on IV insulin for blood sugar control.  

Morning blood gases showed a pH of 7.51 with a pCO2 of 26 and pO2 of 79.  

Weaning parameters are still poor as the patient has rapid shallow breathing 

index around 120 and the patient becomes tachypneic and the LOW tidal volumes.








On 04/27/2017 the patient is being seen in follow-up.  Earlier this morning the 

patient was still mechanical ventilator on a pressure control mode.  Chest x-

ray from earlier this morning showed no significant abnormalities.  There was 

improvement in aeration in lung bases bilaterally especially on the left.  NG 

tube was still in a good location below the diaphragm.  Meanwhile affect 

abdominal film was done this morning and that showed a component of ileus.  

Note that overnight, the patient's tube feeds were discontinued knowing that he 

had 2 bouts of emesis.  Nevertheless, despite ongoing gastrointestinal 

abnormalities, the patient is doing very well while being on mechanical 

ventilator and is awake and alert.  We checked his weaning parameters this 

morning and the numbers looked very well.  He was given a pressure support of 5 

and PEEP of 5 and a breathing trial for a total of 30 minutes and following 

that we made a decision to extubate this patient.  This was a difficult 

decision to make knowing that the patient had an underlying abdominal ileus and 

he is still having significant motor weakness in the upper and lower 

extremities.  Nevertheless, I noted that his motor function is improved his 

cough improved and he had very decent weaning parameters.  I suspected that 

this will be his last chance of being extubated and if not successful, he will 

need a PEG and trach with the next 24 hours.  Based on this, I extubated this 

patient.  He remains on IV heparin.  He remains on IV insulin.  He is also on 

Levaquin.  He is afebrile.  He is awake and following commands.  Motor function 

is gradually improving.  No other new complaints otherwise for now.  Noted post 

extubation, the patient was placed on 5 L of oxygen nasal cannula with 

saturation of 94-95%He continued to bleed comfortably.  He was placed in 

sitting up position in ICU.





04/28/2017, the patient is being seen in follow-up in the intensive care unit.  

The patient has been extubated and has been off the mechanical ventilator for 

the past 24 hours.  He is breathing comfortably.  No significant rest or 

distress.  NG tube is in place.  There is considerable amount of output from 

the NG tube still in the abdomen is distended although less compared to 

yesterday.  He had colonic ileus and some dilatation of the small bowel.  A 

rectal tube was inserted and abdomen was quite deflated.  He is producing some 

loose liquidy bowel movements yesterday and small amounts.  Rectal examination 

was done and the patient does not have any impacted stool.  No abdominal pain.  

No fever.  No chills.  He is in sinus rhythm for the time being and stable 

hemodynamics.  Is producing adequate amount of urine output.  All of the 

surgical wound sites are clean and intact.  Neurologically is awake.  There has 

been obvious improvement in the motor function and the patient is talking and 

communicating at this point.  He remains on IV heparin.  He remains on IV 

insulin for blood sugar control.  White cell count is at 15.1.  Hemoglobin is 

stable at 8.7.





On 04/29/2017, the patient is being seen in follow-up.  The patient is awake.  

Following commands and communicating.  As mentioned earlier, profoundly weak 

although that has been steady and slow improvement in his motor function.  NG 

tube in place.  Abdomen remains distended.  Flexeril of the abdomen shows ileus 

both large and small bowel.  No bowel functional mobility at all over the past 

12 hours.  The patient is on Reglan.  From the pulmonary standpoint, he is calm 

and comfortable.  No labored breathing.  Actually taking well on 3 L of oxygen 

by nasal cannula.  The chest exit from today shows stable at ectatic changes 

small effusion the lung bases bilaterally.  NG tube in place.  Output is 

minimal at this point.  Abdomen is tender get is soft and nontender.  Bowel 

sounds are very hypoactive.  Repeat abdominal films was reviewed and there is 

concern of ongoing large and small bowel ileus.  He is on IV heparin.  Remains 

on IV insulin for blood sugar control.  Remains nothing by mouth for the time 

being.  No leukocytosis.  Cardiac rhythm is sinus.





On 04/30/2017 I'm seeing this patient in follow-up.  He is awake.  He is 

communicating.  NG tube is in place.  Total amount of output from the NG tube 

is around 250 mL over the past 24 hours.  Abdomen remains distended and 

tympanic and bowel sounds are hypoactive.  CAT scan of the abdomen that was 

done showed a large bowel ileus.  The patient is on Reglan.  The patient was 

started on TPN for nutritional support.  The CAT scan of the chest also showed 

some early dehiscence in the lower surgical incision with some surrounding 

swelling/hematoma.  There is small better pleural effusion and atelectasis in 

the left lung base.  No fever.  No chills.  Still on insulin drip.  Still on a 

heparin drip.  Right upper extremity is swollen and Artline catheter needs to 

be removed.





On 5/1/2017, patient was seen on follow-up, continues to be about the same.  

Continues to have nasogastric tube in place, continues to have hypoactive bowel 

sounds and what seems to be an ileus.  Patient remains on TPN for nutritional 

support, ultrasound of the right upper extremity showed no evidence of DVT.  

Chest x-ray showed cardiomegaly and small left pleural effusion.  All labs were 

reviewed, PTT is therapeutic.  Hemoglobin is 8.8 today.  Basic metabolic 

profile is relatively normal.





On 5/2/2017, patient seems to be a bit more active, and bit more verbal, and 

more responsive.  Denies any shortness of breath, no cough no wheezing, no 

chest pain, no fever, no chills, no hemoptysis.  Continues to have nasogastric 

tube in place, and his abdomen remains a bit distended.  Remains on TPN, and I 

would like to have the swallow evaluation done by speech therapy hoping we 

could place the patient on oral diet.  All his labs were reviewed.  

Electrolytes are normal PTT therapeutic CBC is normal hemoglobin is 8.6.  

Patient remains on physical therapy








Objective





- Vital Signs


Vital signs: 


 Vital Signs











Temp  98.8 F   05/02/17 12:00


 


Pulse  72   05/02/17 16:13


 


Resp  23   05/02/17 15:00


 


BP  113/61   05/02/17 15:00


 


Pulse Ox  93 L  05/02/17 15:00








 Intake & Output











 05/01/17 05/02/17 05/02/17





 18:59 06:59 18:59


 


Intake Total 2241.999 1749.747 524.945


 


Output Total 2740 2505 1575


 


Balance -498.001 -755.253 -1050.055


 


Weight 98.6 kg 99.4 kg 99.4 kg


 


Intake:   


 


   220 180


 


    .9 NaCL  220 180


 


    Mvi, Adult No.4 with Vit 450  





    K 10 ml Trace (Conc-1Ml/   





    Dose) 1 ml Sodium Acetate   





    30 meq Potassium   





    Chloride 20 meq Calcium   





    Gluconate 1,000 mg In   





    Amino Acid 5%-D25w 1,000   





    ml @ 30 mls/hr IV .Q24H   





    [facility] Rx#:[id number]   


 


    Potassium Chloride 10 meq 20  





    In Water For Injection 1   





    100ml.bag @ 100 mls/hr   





    IVPB Q1H [facility] Rx#:   





    [id number]   


 


  Intake, IV Titration 7960.719 0846.747 344.945





  Amount   


 


    Amino Acid 5%-D25w+Lytes* 525 1000 





    E* 1,000 ml @ 75 mls/hr   





    IV .BY DURATION [facility] Rx#:   





    [id number]   


 


    D5-0.45% NaCl with KCl 40  





    20Meq/l 1,000 ml @ 20 mls   





    /hr IV .Q24H [facility] Rx#:   





    [id number]   


 


    Fat Emulsion 20% 250 ml 42  





    In Empty Bag 1 bag @ 21   





    mls/hr IV MoWeFr [facility] Rx#:   





    [id number]   


 


    Heparin Sodium,Porcine/  500 





    D5w Pmx 25,000 unit In   





    Dextrose/Water 1 500ml.   





    bag @ 10 UNITS/KG/HR 19.   





    98 mls/hr IV .Q24H [facility] Rx   





    #:[id number]   


 


    Insulin Regular 100 unit 53.999 29.747 44.945





    In Sodium Chloride 0.9%   





    100 ml @ Per Protocol IV   





    .Q0M [facility] Rx#:[id number]   


 


    Levofloxacin 750Mg-D5w 100  





    Pmx 750 mg In Dextrose/   





    Water 1 150ml.bag @ 100   





    mls/hr IVPB Q24H [facility] Rx#:   





    [id number]   


 


    Mvi, Adult No.4 with Vit 1011  





    K 10 ml Trace (Conc-1Ml/   





    Dose) 1 ml In Amino Acid   





    5%-D25w+Lytes*E* 1,000 ml   





    @ 75 mls/hr IV .BY   





    DURATION [facility] Rx#:   





    [id number]   


 


    Mvi, Adult No.4 with Vit   300





    K 10 ml Trace (Conc-1Ml/   





    Dose) 1 ml Sodium Acetate   





    30 meq Potassium   





    Chloride 20 meq Calcium   





    Gluconate 1,000 mg In   





    Amino Acid 5%-D25w 1,000   





    ml @ 75 mls/hr IV .   





    [id number] [facility] Rx#:[id number]   


 


Output:   


 


  Urine 2740 2505 1575


 


Other:   


 


  Voiding Method Indwelling Catheter Indwelling Catheter Indwelling Catheter


 


  # Bowel Movements 2  








 ABP, PAP, CO, CI - Last Documented











Arterial Blood Pressure        172/58


 


Pulmonary Artery Pressure      46/23


 


Cardiac Output                 6.8


 


Cardiac Index                  3.3

















- Exam





Head exam was generally normal. There was no scleral icterus or corneal arcus. 

Mucous membranes were moist.Neck was supple and without jugular venous 

distension, thyromegaly, or carotid bruits. Carotids were easily palpable 

bilaterally. There was no adenopathy.  The patient has an NG tube in place and 

the patient has no stridor.  He has some degree of crowding of the posterior 

oropharynx.  Mucosal membranes are dry.  Lung sounds are diminished in lung 

bases bilaterally otherwise clear.  Heart sounds are regular, positive S1-S2, 

no cervical murmurs appreciated and sternum stable clean and intact.  Abdomen 

is  distended.  This positive tympany.  Bowel sounds are hypoactive.  There is 

no direct tenderness.  No rebound tensile guarding.Examination of the 

extremities revealed swelling in the right upper extremity compared to the 

left.  There is trace edema lower extremities bilaterally.. There was no 

cyanosis, clubbing or edema.  Neurologically, the patient is awake and alert.  

He is, indicating.  No focal neurological deficit.  Motor function is weak 

although improved compared to yesterday.  The patient is able to raise his arms 

and legs against gravity.  There is no focal neurological deficit at this 

point.  He has a decent cough.  He is also communicating and verbal.








- Labs


CBC & Chem 7: 


 05/02/17 04:50





 05/02/17 04:50


Labs: 


 Abnormal Lab Results - Last 24 Hours (Table)











  05/01/17 05/01/17 05/01/17 Range/Units





  18:00 20:13 22:08 


 


RBC     (4.30-5.90)  m/uL


 


Hgb     (13.0-17.5)  gm/dL


 


Hct     (39.0-53.0)  %


 


APTT     (22.0-30.0)  sec


 


Sodium     (137-145)  mmol/L


 


Glucose     (74-99)  mg/dL


 


POC Glucose (mg/dL)  176 H  149 H  167 H  (75-99)  mg/dL


 


Calcium     (8.4-10.2)  mg/dL














  05/02/17 05/02/17 05/02/17 Range/Units





  01:15 03:10 04:49 


 


RBC     (4.30-5.90)  m/uL


 


Hgb     (13.0-17.5)  gm/dL


 


Hct     (39.0-53.0)  %


 


APTT     (22.0-30.0)  sec


 


Sodium     (137-145)  mmol/L


 


Glucose     (74-99)  mg/dL


 


POC Glucose (mg/dL)  158 H  169 H  166 H  (75-99)  mg/dL


 


Calcium     (8.4-10.2)  mg/dL














  05/02/17 05/02/17 05/02/17 Range/Units





  04:50 04:50 04:50 


 


RBC   2.68 L   (4.30-5.90)  m/uL


 


Hgb   8.6 L   (13.0-17.5)  gm/dL


 


Hct   26.2 L   (39.0-53.0)  %


 


APTT    65.6 H  (22.0-30.0)  sec


 


Sodium  134 L    (137-145)  mmol/L


 


Glucose  161 H    (74-99)  mg/dL


 


POC Glucose (mg/dL)     (75-99)  mg/dL


 


Calcium  8.2 L    (8.4-10.2)  mg/dL














  05/02/17 05/02/17 05/02/17 Range/Units





  06:20 08:00 10:03 


 


RBC     (4.30-5.90)  m/uL


 


Hgb     (13.0-17.5)  gm/dL


 


Hct     (39.0-53.0)  %


 


APTT     (22.0-30.0)  sec


 


Sodium     (137-145)  mmol/L


 


Glucose     (74-99)  mg/dL


 


POC Glucose (mg/dL)  180 H  177 H  176 H  (75-99)  mg/dL


 


Calcium     (8.4-10.2)  mg/dL














  05/02/17 05/02/17 Range/Units





  11:55 12:11 


 


RBC    (4.30-5.90)  m/uL


 


Hgb    (13.0-17.5)  gm/dL


 


Hct    (39.0-53.0)  %


 


APTT    (22.0-30.0)  sec


 


Sodium    (137-145)  mmol/L


 


Glucose    (74-99)  mg/dL


 


POC Glucose (mg/dL)  141 H  143 H  (75-99)  mg/dL


 


Calcium    (8.4-10.2)  mg/dL














Assessment and Plan


Plan: 





1 Coronary artery disease status post cardiac bypass surgery and the patient is 

postop day # 19





2 prolonged postoperative ventilator-dependent history failure, multifactorial.

  Patient has extubated.  Nevertheless, the active ongoing problems for now is 

his neuromuscular weakness and ongoing ileus.  Patient has a combination of 

large bowel ileus as evident on the flat film the abdomen.  NG tube still in 

place.





3 recurrent mucous plugs with excessive respiratory secretions requiring 

bronchoscopy and a peak airway suctioning and a bronchoscopy cultures came back 

negative for any microbial growth. The most recent sputum analysis showing 

Moraxella catarrhalis and the patient is currently on Levaquin.  Chest x-ray 

from today is showing atelectatic changes in lung bases bilaterally.  

Nevertheless the patient is oxygenating well and there is no respiratory 

distress at this point.





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 

weakness, improving slowly





5 hypertension





6 hyperlipidemia





7 atrial fibrillation, paroxysmal,  currently on a heparin drip and the patient'

s rhythm is sinus with a controlled rate





8 ileus,  large bowel ileus.  Despite this, the patient is having a nontoxic 

abdomen.  No abdominal tenderness.  No leukocytosis.  No fever.  No acidosis.  

NG tube is in place and the patient is still nothing by mouth.  The patient was 

initiated on TPN for nutritional support





9 postoperative anemia, currently hemoglobin is stable





10 alcoholism, recovered from DT





11 gout





12 diabetes mellitus, currently on an insulin drip 





13 suspected early dehiscence of the lower sternal wound.  Doubt infection





Recommendation: Continue present supportive care measures, all evaluation to be 

done, continue physical therapy, consider discharge planning over the next one 

week.  Possibly we could arrange for the patient to go to some sort of rehab 

facility eventually.  Patient will definitely need long-term rehab.





Time with Patient: Less than 30

## 2017-05-02 NOTE — P.PN
Subjective


Principal diagnosis: 





Coronary artery disease, status post prior stenting to his right coronary 

artery.  Preserved left ventricular function.  Hyperlipidemia.  Hypertension.  

ETOH abuse.





POD #19 total arterial non-aortic patch off-pump double coronary artery bypass 

grafting using in situ skeletonized right internal mammary artery to the left 

anterior descending artery across the anterior midline in situ totally 

skeletonized left internal mammary artery to the first obtuse marginal artery.  

Intraoperative transesophageal echocardiogram and epi-aortic scanning.  

Intraoperative graft flow measurements using the Medistim system





POD #19 flexible bronchoscopy with bronchial washings secondary to 

postoperative mucous plugging with increasing oxygen demand the night of 

surgery.  Pt had acute hypoxic post operative respiratory failure as an 

unexpected post-surgical condition, related to the patient's underlying medical 

comorbidities. Sputum culture grew out Moraxella, blood cultures are negative 

to date, urine culture grew Klebsiella.  Patient placed on Levaquin per 

pulmonology.





Pt developed postoperative alcohol withdrawal requiring increased sedation and 

prolonged mechanical ventilation.





Patient developed postoperative ileus, an unexpected post-surgical condition, 

currently resolving.   Currently receiving TPN for nutrition.  General surgery 

and consult, no intervention at this time.





Patient currently sitting up in bed in no apparent distress.  Denies any new 

questions or concerns.  States he is feeling better every day.





Objective





- Vital Signs


Vital signs: 


 Vital Signs











Temp  98.8 F   05/02/17 12:00


 


Pulse  80   05/02/17 15:00


 


Resp  23   05/02/17 15:00


 


BP  113/61   05/02/17 15:00


 


Pulse Ox  93 L  05/02/17 15:00








 Intake & Output











 05/01/17 05/02/17 05/02/17





 18:59 06:59 18:59


 


Intake Total 2241.999 1749.747 524.945


 


Output Total 2740 2505 1575


 


Balance -498.001 -755.253 -1050.055


 


Weight 98.6 kg 99.4 kg 99.4 kg


 


Intake:   


 


   220 180


 


    .9 NaCL  220 180


 


    Mvi, Adult No.4 with Vit 450  





    K 10 ml Trace (Conc-1Ml/   





    Dose) 1 ml Sodium Acetate   





    30 meq Potassium   





    Chloride 20 meq Calcium   





    Gluconate 1,000 mg In   





    Amino Acid 5%-D25w 1,000   





    ml @ 30 mls/hr IV .Q24H   





    [facility] Rx#:[id number]   


 


    Potassium Chloride 10 meq 20  





    In Water For Injection 1   





    100ml.bag @ 100 mls/hr   





    IVPB Q1H [facility] Rx#:   





    [id number]   


 


  Intake, IV Titration 5569.614 0756.747 344.945





  Amount   


 


    Amino Acid 5%-D25w+Lytes* 525 1000 





    E* 1,000 ml @ 75 mls/hr   





    IV .BY DURATION [facility] Rx#:   





    [id number]   


 


    D5-0.45% NaCl with KCl 40  





    20Meq/l 1,000 ml @ 20 mls   





    /hr IV .Q24H [facility] Rx#:   





    [id number]   


 


    Fat Emulsion 20% 250 ml 42  





    In Empty Bag 1 bag @ 21   





    mls/hr IV MoWeFr [facility] Rx#:   





    [id number]   


 


    Heparin Sodium,Porcine/  500 





    D5w Pmx 25,000 unit In   





    Dextrose/Water 1 500ml.   





    bag @ 10 UNITS/KG/HR 19.   





    98 mls/hr IV .Q24H [facility] Rx   





    #:[id number]   


 


    Insulin Regular 100 unit 53.999 29.747 44.945





    In Sodium Chloride 0.9%   





    100 ml @ Per Protocol IV   





    .Q0M [facility] Rx#:[id number]   


 


    Levofloxacin 750Mg-D5w 100  





    Pmx 750 mg In Dextrose/   





    Water 1 150ml.bag @ 100   





    mls/hr IVPB Q24H [facility] Rx#:   





    [id number]   


 


    Mvi, Adult No.4 with Vit 1011  





    K 10 ml Trace (Conc-1Ml/   





    Dose) 1 ml In Amino Acid   





    5%-D25w+Lytes*E* 1,000 ml   





    @ 75 mls/hr IV .BY   





    DURATION [facility] Rx#:   





    [id number]   


 


    Mvi, Adult No.4 with Vit   300





    K 10 ml Trace (Conc-1Ml/   





    Dose) 1 ml Sodium Acetate   





    30 meq Potassium   





    Chloride 20 meq Calcium   





    Gluconate 1,000 mg In   





    Amino Acid 5%-D25w 1,000   





    ml @ 75 mls/hr IV .   





    [id number] [facility] Rx#:[id number]   


 


Output:   


 


  Urine 2740 2505 1575


 


Other:   


 


  Voiding Method Indwelling Catheter Indwelling Catheter Indwelling Catheter


 


  # Bowel Movements 2  








 ABP, PAP, CO, CI - Last Documented











Arterial Blood Pressure        172/58


 


Pulmonary Artery Pressure      46/23


 


Cardiac Output                 6.8


 


Cardiac Index                  3.3

















- Constitutional


General appearance: Present: cooperative, no acute distress





- Respiratory


Details: 





Lungs sounds diminished bilaterally with expiratory wheezes present.  

Respirations even, nonlabored.  Currently on 2 L nasal cannula.  Able to 

achieve 500 mL on his incentive spirometry.





- Cardiovascular


Details: 





S1, S2 present.  Regular rate and rhythm, normal sinus rhythm on telemetry.  

Sternum stable.  Heart hugger in place with patient unable to use 

appropriately.  Trace bilateral lower extremity edema present.





- Gastrointestinal


Gastrointestinal Comment(s): 





Abdomen soft, nontender, nondistended.  Active bowel sounds 4 quadrants.  

Bowel movement 2 yesterday.





- Genitourinary


Genitourinary Comment(s): 





Angelo present draining clear, yellow urine.  Output  mL/h.





- Integumentary


Integumentary Comment(s): 





Anterior chest incision covered with dry intact dressing.





- Musculoskeletal


Musculoskeletal: Present: generalized weakness





- Psychiatric


Psychiatric: Present: A&O x's 3, appropriate affect, intact judgment & insight





- Allied health notes


Allied health notes reviewed: nursing





- Labs


CBC & Chem 7: 


 05/02/17 04:50





 05/02/17 04:50


Labs: 


 Abnormal Lab Results - Last 24 Hours (Table)











  05/01/17 05/01/17 05/01/17 Range/Units





  18:00 20:13 22:08 


 


RBC     (4.30-5.90)  m/uL


 


Hgb     (13.0-17.5)  gm/dL


 


Hct     (39.0-53.0)  %


 


APTT     (22.0-30.0)  sec


 


Sodium     (137-145)  mmol/L


 


Glucose     (74-99)  mg/dL


 


POC Glucose (mg/dL)  176 H  149 H  167 H  (75-99)  mg/dL


 


Calcium     (8.4-10.2)  mg/dL














  05/02/17 05/02/17 05/02/17 Range/Units





  01:15 03:10 04:49 


 


RBC     (4.30-5.90)  m/uL


 


Hgb     (13.0-17.5)  gm/dL


 


Hct     (39.0-53.0)  %


 


APTT     (22.0-30.0)  sec


 


Sodium     (137-145)  mmol/L


 


Glucose     (74-99)  mg/dL


 


POC Glucose (mg/dL)  158 H  169 H  166 H  (75-99)  mg/dL


 


Calcium     (8.4-10.2)  mg/dL














  05/02/17 05/02/17 05/02/17 Range/Units





  04:50 04:50 04:50 


 


RBC   2.68 L   (4.30-5.90)  m/uL


 


Hgb   8.6 L   (13.0-17.5)  gm/dL


 


Hct   26.2 L   (39.0-53.0)  %


 


APTT    65.6 H  (22.0-30.0)  sec


 


Sodium  134 L    (137-145)  mmol/L


 


Glucose  161 H    (74-99)  mg/dL


 


POC Glucose (mg/dL)     (75-99)  mg/dL


 


Calcium  8.2 L    (8.4-10.2)  mg/dL














  05/02/17 05/02/17 05/02/17 Range/Units





  06:20 08:00 10:03 


 


RBC     (4.30-5.90)  m/uL


 


Hgb     (13.0-17.5)  gm/dL


 


Hct     (39.0-53.0)  %


 


APTT     (22.0-30.0)  sec


 


Sodium     (137-145)  mmol/L


 


Glucose     (74-99)  mg/dL


 


POC Glucose (mg/dL)  180 H  177 H  176 H  (75-99)  mg/dL


 


Calcium     (8.4-10.2)  mg/dL














  05/02/17 05/02/17 Range/Units





  11:55 12:11 


 


RBC    (4.30-5.90)  m/uL


 


Hgb    (13.0-17.5)  gm/dL


 


Hct    (39.0-53.0)  %


 


APTT    (22.0-30.0)  sec


 


Sodium    (137-145)  mmol/L


 


Glucose    (74-99)  mg/dL


 


POC Glucose (mg/dL)  141 H  143 H  (75-99)  mg/dL


 


Calcium    (8.4-10.2)  mg/dL














- Imaging and Cardiology


Chest x-ray: report reviewed, image reviewed





Assessment and Plan


(1) Status post coronary artery bypass graft


Status: Acute   





(2) Coronary artery disease


Status: Acute   





(3) Family history of heart disease


Status: Acute   





(4) History of PTCA


Status: Acute   





(5) Hyperlipidemia


Status: Acute   





(6) Hypertension


Status: Acute   





(7) Nicotine dependence, chewing tobacco, uncomplicated


Status: Acute   


Plan: 





1.  Continue ASA, Lipitor, heparin, Lopressor, lisinopril, digoxin, Lasix.


2.  Continue amiodarone for atrial fibrillation prophylaxis.


3.  Wean O2 as tolerated.


4.  NG tube okay to be removed per Dr. Arce once able to tolerate oral 

intake.  Swallow eval ordered.


5.  Continue TPN for until patient able to tolerate oral intake.


6.  Insulin/diabetic management per primary care service.


7.  Continue antibiotics per pulmonology.


8.  Increase activity, out of bed to chair.  Physical therapy following.  


9.  Daily labs, chest x-rays.


10.  GI/DVT prophylaxis.


11.  Potassium, magnesium replacement per protocol.





Time with Patient: Greater than 30

## 2017-05-02 NOTE — CDI
In responding to this query, please exercise your independent professional 
judgment. The [facility] Coding Staff and Clinical Documentation Specialists

 appreciate your assistance in clarifying documentation, maintaining compliance 
with coding guidelines, accurately documenting patients condition

 and capturing severity of illness. The fact that a question is asked does not 
imply that any particular answer is desired or expected. Communication

 forms are a method of clarifying documentation and are not made part of the 
Legal Health Record. Thank you in advance for your clarification.

 Last Revision, March 2016



[facility] [place]

[address] [place] [address]

         Documentation Clarification Form

Date: 5/2/2017 12:13:00 PM

From: Yvonne Crooks RN, CCDS

Phone: [phone]

MRN: [id number]

Admit Date: 4/13/2017 5:40:00 AM

Patient Name: Epi Bear

Visit Number: [id number]



Dr. Scott Araujo/ Elis Kim DNP



CHF is documented as the reason for Picc Line insertion- "Needs long-term 
intravenous access for therapy, intubated, congestive heart failure."  

History/Risk Factors:

ETOH, CAD, HTN, hyperlidemia, nicotine dependence



Clinical Indicators:  

4/6/17 BNP: 784

Echocardiogram Results: not done-4/6 Left ventriculogram- mild anteroapical 
hypokinesis with EF 45-50%

5/2 Chest X Ray: "Overall stable findings, cardiomegaly with mild central 
vascular congestion, small to moderate-sized left greater than right pleural 
effusions and associated left basilar atelectasis and/or infiltrate all 
redemonstrated without significant interval change."



Treatment: 

Lasix 20mg IVP Q 12 hrs 

Consults: Cardiology, Pulmonology



In your professional opinion, can you please clarify the acuity and type of CHF 
if known?

   Acute 

   Chronic

   Acute on Chronic

AND

   Systolic

   Diastolic

   Systolic and Diastolic

   Cor Pulmonale (Right Sided HF w/ Pulmonary HTN)

   Unable to determine

   Other, please specify   

   

If known, please specify if Heart Failure is due to:

   Hypertension

   Rheumatic Fever



Please document in your progress notes and discharge summary in order to 
capture severity of illness and risk of mortality. Include clinical findings 
that support your diagnosis.



FYI: Press F11 to launch patient chart.



_____ Place X here if this finding has no clinical significance, is not 
applicable or if you are not able to provide any additional documentation.



MTDD

## 2017-05-03 LAB
ANION GAP SERPL CALC-SCNC: 6 MMOL/L
BASOPHILS # BLD AUTO: 0.1 K/UL (ref 0–0.2)
BASOPHILS NFR BLD AUTO: 0 %
BUN SERPL-SCNC: 14 MG/DL (ref 9–20)
CALCIUM SPEC-MCNC: 8 MG/DL (ref 8.4–10.2)
CH: 32.1
CHCM: 33
CHLORIDE SERPL-SCNC: 102 MMOL/L (ref 98–107)
CO2 SERPL-SCNC: 27 MMOL/L (ref 22–30)
EOSINOPHIL # BLD AUTO: 0.5 K/UL (ref 0–0.7)
EOSINOPHIL NFR BLD AUTO: 4 %
ERYTHROCYTE [DISTWIDTH] IN BLOOD BY AUTOMATED COUNT: 2.66 M/UL (ref 4.3–5.9)
ERYTHROCYTE [DISTWIDTH] IN BLOOD: 15 % (ref 11.5–15.5)
GLUCOSE BLD-MCNC: 124 MG/DL (ref 75–99)
GLUCOSE BLD-MCNC: 127 MG/DL (ref 75–99)
GLUCOSE BLD-MCNC: 130 MG/DL (ref 75–99)
GLUCOSE BLD-MCNC: 136 MG/DL (ref 75–99)
GLUCOSE BLD-MCNC: 141 MG/DL (ref 75–99)
GLUCOSE BLD-MCNC: 142 MG/DL (ref 75–99)
GLUCOSE BLD-MCNC: 142 MG/DL (ref 75–99)
GLUCOSE BLD-MCNC: 143 MG/DL (ref 75–99)
GLUCOSE BLD-MCNC: 149 MG/DL (ref 75–99)
GLUCOSE BLD-MCNC: 151 MG/DL (ref 75–99)
GLUCOSE BLD-MCNC: 165 MG/DL (ref 75–99)
GLUCOSE BLD-MCNC: 178 MG/DL (ref 75–99)
GLUCOSE SERPL-MCNC: 122 MG/DL (ref 74–99)
HCT VFR BLD AUTO: 26 % (ref 39–53)
HDW: 3
HGB BLD-MCNC: 8.7 GM/DL (ref 13–17.5)
LUC NFR BLD AUTO: 3 %
LYMPHOCYTES # SPEC AUTO: 2.3 K/UL (ref 1–4.8)
LYMPHOCYTES NFR SPEC AUTO: 19 %
MAGNESIUM SPEC-SCNC: 1.8 MG/DL (ref 1.6–2.3)
MCH RBC QN AUTO: 32.5 PG (ref 25–35)
MCHC RBC AUTO-ENTMCNC: 33.3 G/DL (ref 31–37)
MCV RBC AUTO: 97.8 FL (ref 80–100)
MONOCYTES # BLD AUTO: 0.7 K/UL (ref 0–1)
MONOCYTES NFR BLD AUTO: 6 %
NEUTROPHILS # BLD AUTO: 8.4 K/UL (ref 1.3–7.7)
NEUTROPHILS NFR BLD AUTO: 68 %
NON-AFRICAN AMERICAN GFR(MDRD): >60
PHOSPHATE SERPL-MCNC: 3.2 MG/DL (ref 2.5–4.5)
POTASSIUM SERPL-SCNC: 3.8 MMOL/L (ref 3.5–5.1)
SODIUM SERPL-SCNC: 135 MMOL/L (ref 137–145)
WBC # BLD AUTO: 0.39 10*3/UL
WBC # BLD AUTO: 12.4 K/UL (ref 3.8–10.6)
WBC (PEROX): 12.15

## 2017-05-03 RX ADMIN — LISINOPRIL SCH MG: 10 TABLET ORAL at 21:04

## 2017-05-03 RX ADMIN — METOCLOPRAMIDE SCH MG: 5 INJECTION, SOLUTION INTRAMUSCULAR; INTRAVENOUS at 11:50

## 2017-05-03 RX ADMIN — SODIUM PHOSPHATE, MONOBASIC, MONOHYDRATE SCH MLS/HR: 276; 142 INJECTION, SOLUTION INTRAVENOUS at 16:18

## 2017-05-03 RX ADMIN — METOCLOPRAMIDE SCH MG: 5 INJECTION, SOLUTION INTRAMUSCULAR; INTRAVENOUS at 18:14

## 2017-05-03 RX ADMIN — MAGNESIUM SULFATE IN DEXTROSE SCH MLS/HR: 10 INJECTION, SOLUTION INTRAVENOUS at 06:36

## 2017-05-03 RX ADMIN — Medication SCH MG: at 21:04

## 2017-05-03 RX ADMIN — LISINOPRIL SCH MG: 10 TABLET ORAL at 08:31

## 2017-05-03 RX ADMIN — METOPROLOL TARTRATE SCH MG: 50 TABLET, FILM COATED ORAL at 08:30

## 2017-05-03 RX ADMIN — FUROSEMIDE SCH MG: 10 INJECTION, SOLUTION INTRAMUSCULAR; INTRAVENOUS at 21:03

## 2017-05-03 RX ADMIN — SODIUM CHLORIDE, PRESERVATIVE FREE SCH: 5 INJECTION INTRAVENOUS at 08:31

## 2017-05-03 RX ADMIN — Medication SCH MG: at 08:31

## 2017-05-03 RX ADMIN — BUDESONIDE SCH MG: 1 SUSPENSION RESPIRATORY (INHALATION) at 07:53

## 2017-05-03 RX ADMIN — FUROSEMIDE SCH MG: 10 INJECTION, SOLUTION INTRAMUSCULAR; INTRAVENOUS at 08:30

## 2017-05-03 RX ADMIN — SODIUM PHOSPHATE, MONOBASIC, MONOHYDRATE SCH MLS/HR: 276; 142 INJECTION, SOLUTION INTRAVENOUS at 03:19

## 2017-05-03 RX ADMIN — SODIUM CHLORIDE SCH MLS/HR: 450 INJECTION, SOLUTION INTRAVENOUS at 13:52

## 2017-05-03 RX ADMIN — INSULIN HUMAN SCH MLS/HR: 100 INJECTION, SOLUTION PARENTERAL at 10:17

## 2017-05-03 RX ADMIN — SODIUM CHLORIDE SCH MLS/HR: 450 INJECTION, SOLUTION INTRAVENOUS at 00:32

## 2017-05-03 RX ADMIN — POTASSIUM CHLORIDE SCH MEQ: 1.5 SOLUTION ORAL at 00:36

## 2017-05-03 RX ADMIN — INSULIN HUMAN SCH MLS/HR: 100 INJECTION, SOLUTION PARENTERAL at 21:02

## 2017-05-03 RX ADMIN — LEVOFLOXACIN SCH MLS/HR: 750 INJECTION, SOLUTION INTRAVENOUS at 16:18

## 2017-05-03 RX ADMIN — IPRATROPIUM BROMIDE AND ALBUTEROL SULFATE SCH ML: .5; 3 SOLUTION RESPIRATORY (INHALATION) at 16:12

## 2017-05-03 RX ADMIN — MAGNESIUM SULFATE IN DEXTROSE SCH MLS/HR: 10 INJECTION, SOLUTION INTRAVENOUS at 07:51

## 2017-05-03 RX ADMIN — SOYBEAN OIL SCH MLS/HR: 20 INJECTION, SOLUTION INTRAVENOUS at 16:18

## 2017-05-03 RX ADMIN — BUDESONIDE SCH MG: 1 SUSPENSION RESPIRATORY (INHALATION) at 19:30

## 2017-05-03 RX ADMIN — DOCUSATE SODIUM AND SENNOSIDES SCH: 50; 8.6 TABLET ORAL at 21:04

## 2017-05-03 RX ADMIN — IPRATROPIUM BROMIDE AND ALBUTEROL SULFATE SCH ML: .5; 3 SOLUTION RESPIRATORY (INHALATION) at 19:30

## 2017-05-03 RX ADMIN — DIGOXIN SCH MCG: 250 TABLET ORAL at 08:30

## 2017-05-03 RX ADMIN — ASPIRIN 325 MG ORAL TABLET SCH MG: 325 PILL ORAL at 08:29

## 2017-05-03 RX ADMIN — ATORVASTATIN CALCIUM SCH MG: 40 TABLET, FILM COATED ORAL at 08:30

## 2017-05-03 RX ADMIN — SODIUM CHLORIDE, PRESERVATIVE FREE SCH ML: 5 INJECTION INTRAVENOUS at 21:04

## 2017-05-03 RX ADMIN — IPRATROPIUM BROMIDE AND ALBUTEROL SULFATE SCH ML: .5; 3 SOLUTION RESPIRATORY (INHALATION) at 12:06

## 2017-05-03 RX ADMIN — AMIODARONE HYDROCHLORIDE SCH MG: 200 TABLET ORAL at 08:30

## 2017-05-03 RX ADMIN — IPRATROPIUM BROMIDE AND ALBUTEROL SULFATE SCH ML: .5; 3 SOLUTION RESPIRATORY (INHALATION) at 07:55

## 2017-05-03 RX ADMIN — METOPROLOL TARTRATE SCH MG: 50 TABLET, FILM COATED ORAL at 21:04

## 2017-05-03 RX ADMIN — METOCLOPRAMIDE SCH MG: 5 INJECTION, SOLUTION INTRAMUSCULAR; INTRAVENOUS at 05:13

## 2017-05-03 RX ADMIN — PANTOPRAZOLE SODIUM SCH MG: 40 INJECTION, POWDER, FOR SOLUTION INTRAVENOUS at 08:31

## 2017-05-03 NOTE — PN
HOSPITAL COURSE/SUBJECTIVE DATA: This is a 61-year-old gentleman who 

is postoperative day 20 from CABG. Patient had a prolonged course on 

the ventilator, was extubated on day 14. Patient thereafter was 

slightly improved; however, his course was complicated with abdominal 

distention secondary to an ileus. Today patient was seen at bedside. 

Continues to have NG tube. Appears to be having bowel movements. 

Patient apparently was started on (      ) tube feeding. Patient has 

been maintained on TPN at this time.  



Patient states he has a cough with sputum production. 



No other abnormalities were reported. 



PHYSICAL EXAM: 

VITALS: Temperature 98.3, heart rate 64, respiratory rate 15, blood 

pressure 119/64. Saturating 95% on supplemental oxygen.  

GENERAL APPEARANCE: Appears to be answering questions appropriately. 

LUNGS: Diminished breath sounds. Diffuse wheezing is appreciated. No 

rhonchi or crackles.  

ABDOMEN: Distended. Bowel sounds are hypoactive. No organomegaly 

appreciated.  

LOWER EXTREMITIES: Trace edema appreciated. 

NEUROLOGIC: No focal motor or sensory deficits noted. 

EXTREMITIES: There is 1+ edema in the upper extremities as well. 



LABORATORY DATA: Hemoglobin 8.7, hematocrit 26, white count 12.4, 

platelets 326.  



ASSESSMENT AND PLAN: 

1. Coronary artery disease, status post coronary artery bypass graft, 

postoperative day 20.  

2. Acute hypoxic respiratory failure with prolonged ventilator 

dependence thereafter.  

3. Acute tracheobronchitis with Moraxella catarrhalis. 

4. Critical care polyneuropathy. 

5. Hypertension. 

6. Paroxysmal atrial fibrillation. 

7. Dyslipidemia. 

8. Ileus, likely a complication of prolonged ICU support with sedation. 

9. Postoperative anemia as expected. 

10. Delirium, improved. 

11. Gout. 

12. Diabetes mellitus, currently on insulin drip. 



PLAN: Continue insulin drip at this time. Patient's diet has been 

titrated and tapered off from TPN to possibly oral to NG tube feeding. 

Patient is to have another swallow study. If patient fails it, 

potential for either a Dobhoff tube placement for long-term feeding or 

even a PEG is to be considered. Rest of the management per 

Cardiothoracic Surgery and ICU.

## 2017-05-03 NOTE — XR
EXAMINATION TYPE: XR chest 1V portable

 

DATE OF EXAM: 5/3/2017 6:47 AM

 

Comparison: 5/2/2017

 

Clinical History: 61-year-old male postop CABG.

 

Findings:

NG tube courses below the diaphragms. Multiple EKG lines are looped over the mid chest. An ET tube is
 not clearly seen. Left PICC tip at the uppermost right atrium. Moderate cardiomegaly persists. Diffu
se interstitial prominence. Small left pleural effusion with retrocardiac atelectasis again noted. Mi
ld diffuse interstitial prominence.

 

 

Impression:

 

1. NG tube is present. ET tube not clearly seen but multiple EKG leads are looped over the mid chest.


2. Cardiomegaly and mild pulmonary vascular congestion.

3. Continued small left pleural effusion with retrocardiac atelectasis.

## 2017-05-03 NOTE — P.PN
Subjective


Principal diagnosis: 





Status post CABG, postoperative day # 20





61-year-old male patient with status post carotid bypass surgery and the 

patient is postop day #11.  The patient has failed to wean off the mechanical 

ventilator.  Immediately postop the patient had pulmonate complications 

including mucous plugs and atelectasis of the left lung.  He required 

bronchoscopy and therapeutic airway suctioning and all of the respiratory 

cultures came back negative.  This morning, the patient was on a volume cycled 

assist-control mode of ventilation at the rate of 20, tidal volume 450, FiO2 of 

40% and a PEEP of 5.  I reviewed the blood gases and I reviewed also the chest x

-ray.  I noted that the patient was unable to tolerate assist-control mode.  

Attempts to wean him off the sedation Haja as the patient was becoming 

restless and a successful the mechanical ventilator.  Based on this, I switched 

this patient to a pressure support mode of ventilation and I was able to 

completely wean him off sedation.  He was wide awake all he was hardly able to 

wiggle his toes or move his fingers and he was unable to raise his had or arms 

against gravity.  He was having copious amount of rest or secretions and he was 

requiring frequent suctioning.  At a later stage, switch this patient to a 

pressure control mode of ventilation with a PEEP of 7 and a pressure control of 

20 and his FiO2 was At 60%.  He remains hemodynamically stable.  He is 

producing adequate amount of urine output.  No pressors at this point.  No 

fever.  No chills.  Chest tubes have been all removed.  He is tolerating tube 

feeds.  Atelectatic discussion with the cardiothoracic surgeon and will plan to 

proceed with a PEG and trach if the patient ultimately failed weaning.  One 

concern is that he has significant neuromuscular weakness which is probably a 

critical illness polyneuropathy and myopathy.





On 04/25/2017 the patient remains intubated on a mechanical ventilator.  I was 

able to the sedation completely on the patient.  He is awake at this point and 

he had been awake throughout the night.  He is following simple commands.  

Motor functions are nearly absent.  The patient is extremely weak.  He can 

wiggle his toes and occasionally bend his knees.  Otherwise he cannot raise his 

arms and legs against gravity.  He has a cough reflex.  He can blink his eyes 

and raises eyebrows.  Reflexes are significantly diminished in the upper and 

lower extremities bilaterally.  Is on a pressure control mode of ventilation at 

the rate of 18, PC 18, PEEP of 7 and FiO2 of 50%.  Still having significant 

respiratory secretions through the orotracheal tube.  The chest x-ray from 

today shows moderate parenchymal opacity within the left lung base and the 

right lung base.  There is some atelectatic changes in the lung bases more so 

on the left.  The patient has also postop changes related to coronary artery 

bypass surgery.  No fever.  No chills.  Hemodynamically stable.  Producing 

adequate amount of urine output and the patient was diuresis with a combination 

of albumin and Lasix.  He is on tube feeds.





On 04/26/2017 the patient remains on a mechanical ventilator.  Seems to be much 

more awake compared to yesterday.  Motor functions remain weak over the patient 

is doing more activity and movement on today's evaluation.  We were able to 

bring the patient is sitting up position for a total of 2 hours today.  He 

tolerated well and following that he had a coughing spells and he had to be 

placed in bed.  He remains in the same vent setting with a pressure control of 

18, PEEP of 7, FiO2 of 50% and rate of 18.  Chest x-ray shows adequate 

expansion of the left lung with a few being in good location.  Sputum analysis 

showed Moraxella catarrhalis and the based on that the patient was started on 

Levaquin.  Note that he had also Klebsiella PNEUMONIA IN HIS URINE, AND BOTH OF 

THESE MICROORGANISMS SHOULD RESPOND TO LEVAQUIN.  Meanwhile, the patient is 

receiving enteral feeding for nutritional support.  IV Solu Medrol is been 

discontinued.  We'll doing daily physical therapy and passive range of motion.  

He remains on IV heparin.  He remains on IV insulin for blood sugar control.  

Morning blood gases showed a pH of 7.51 with a pCO2 of 26 and pO2 of 79.  

Weaning parameters are still poor as the patient has rapid shallow breathing 

index around 120 and the patient becomes tachypneic and the LOW tidal volumes.








On 04/27/2017 the patient is being seen in follow-up.  Earlier this morning the 

patient was still mechanical ventilator on a pressure control mode.  Chest x-

ray from earlier this morning showed no significant abnormalities.  There was 

improvement in aeration in lung bases bilaterally especially on the left.  NG 

tube was still in a good location below the diaphragm.  Meanwhile affect 

abdominal film was done this morning and that showed a component of ileus.  

Note that overnight, the patient's tube feeds were discontinued knowing that he 

had 2 bouts of emesis.  Nevertheless, despite ongoing gastrointestinal 

abnormalities, the patient is doing very well while being on mechanical 

ventilator and is awake and alert.  We checked his weaning parameters this 

morning and the numbers looked very well.  He was given a pressure support of 5 

and PEEP of 5 and a breathing trial for a total of 30 minutes and following 

that we made a decision to extubate this patient.  This was a difficult 

decision to make knowing that the patient had an underlying abdominal ileus and 

he is still having significant motor weakness in the upper and lower 

extremities.  Nevertheless, I noted that his motor function is improved his 

cough improved and he had very decent weaning parameters.  I suspected that 

this will be his last chance of being extubated and if not successful, he will 

need a PEG and trach with the next 24 hours.  Based on this, I extubated this 

patient.  He remains on IV heparin.  He remains on IV insulin.  He is also on 

Levaquin.  He is afebrile.  He is awake and following commands.  Motor function 

is gradually improving.  No other new complaints otherwise for now.  Noted post 

extubation, the patient was placed on 5 L of oxygen nasal cannula with 

saturation of 94-95%He continued to bleed comfortably.  He was placed in 

sitting up position in ICU.





04/28/2017, the patient is being seen in follow-up in the intensive care unit.  

The patient has been extubated and has been off the mechanical ventilator for 

the past 24 hours.  He is breathing comfortably.  No significant rest or 

distress.  NG tube is in place.  There is considerable amount of output from 

the NG tube still in the abdomen is distended although less compared to 

yesterday.  He had colonic ileus and some dilatation of the small bowel.  A 

rectal tube was inserted and abdomen was quite deflated.  He is producing some 

loose liquidy bowel movements yesterday and small amounts.  Rectal examination 

was done and the patient does not have any impacted stool.  No abdominal pain.  

No fever.  No chills.  He is in sinus rhythm for the time being and stable 

hemodynamics.  Is producing adequate amount of urine output.  All of the 

surgical wound sites are clean and intact.  Neurologically is awake.  There has 

been obvious improvement in the motor function and the patient is talking and 

communicating at this point.  He remains on IV heparin.  He remains on IV 

insulin for blood sugar control.  White cell count is at 15.1.  Hemoglobin is 

stable at 8.7.





On 04/29/2017, the patient is being seen in follow-up.  The patient is awake.  

Following commands and communicating.  As mentioned earlier, profoundly weak 

although that has been steady and slow improvement in his motor function.  NG 

tube in place.  Abdomen remains distended.  Flexeril of the abdomen shows ileus 

both large and small bowel.  No bowel functional mobility at all over the past 

12 hours.  The patient is on Reglan.  From the pulmonary standpoint, he is calm 

and comfortable.  No labored breathing.  Actually taking well on 3 L of oxygen 

by nasal cannula.  The chest exit from today shows stable at ectatic changes 

small effusion the lung bases bilaterally.  NG tube in place.  Output is 

minimal at this point.  Abdomen is tender get is soft and nontender.  Bowel 

sounds are very hypoactive.  Repeat abdominal films was reviewed and there is 

concern of ongoing large and small bowel ileus.  He is on IV heparin.  Remains 

on IV insulin for blood sugar control.  Remains nothing by mouth for the time 

being.  No leukocytosis.  Cardiac rhythm is sinus.





On 04/30/2017 I'm seeing this patient in follow-up.  He is awake.  He is 

communicating.  NG tube is in place.  Total amount of output from the NG tube 

is around 250 mL over the past 24 hours.  Abdomen remains distended and 

tympanic and bowel sounds are hypoactive.  CAT scan of the abdomen that was 

done showed a large bowel ileus.  The patient is on Reglan.  The patient was 

started on TPN for nutritional support.  The CAT scan of the chest also showed 

some early dehiscence in the lower surgical incision with some surrounding 

swelling/hematoma.  There is small better pleural effusion and atelectasis in 

the left lung base.  No fever.  No chills.  Still on insulin drip.  Still on a 

heparin drip.  Right upper extremity is swollen and Artline catheter needs to 

be removed.





On 5/1/2017, patient was seen on follow-up, continues to be about the same.  

Continues to have nasogastric tube in place, continues to have hypoactive bowel 

sounds and what seems to be an ileus.  Patient remains on TPN for nutritional 

support, ultrasound of the right upper extremity showed no evidence of DVT.  

Chest x-ray showed cardiomegaly and small left pleural effusion.  All labs were 

reviewed, PTT is therapeutic.  Hemoglobin is 8.8 today.  Basic metabolic 

profile is relatively normal.





On 5/2/2017, patient seems to be a bit more active, and bit more verbal, and 

more responsive.  Denies any shortness of breath, no cough no wheezing, no 

chest pain, no fever, no chills, no hemoptysis.  Continues to have nasogastric 

tube in place, and his abdomen remains a bit distended.  Remains on TPN, and I 

would like to have the swallow evaluation done by speech therapy hoping we 

could place the patient on oral diet.  All his labs were reviewed.  

Electrolytes are normal PTT therapeutic CBC is normal hemoglobin is 8.6.  

Patient remains on physical therapy





On 5/30/2017, patient seems to be doing relatively well, about the same 

compared to yesterday.  The main issue at this point seems to be issue of 

feeding, patient failed his swallow evaluation, and we would start feeding at 

least at a slow pace using his nasogastric tube, patient may eventually need a 

Dobbhoff or he may need a PEG tube placement.  CBC was reviewed, hemoglobin is 

8.7.  Electrolytes and renal profile are normal.  WBC count is 12.4.  Chest x-

ray showed small bilateral pleural effusions.








Objective





- Vital Signs


Vital signs: 


 Vital Signs











Temp  98.3 F   05/03/17 12:00


 


Pulse  64   05/03/17 13:00


 


Resp  15   05/03/17 13:00


 


BP  119/64   05/03/17 13:00


 


Pulse Ox  95   05/03/17 13:00








 Intake & Output











 05/02/17 05/03/17 05/03/17





 18:59 06:59 18:59


 


Intake Total 6523.349 7066.855 1262.431


 


Output Total 1735 2555 1600


 


Balance -22.375 -260.145 -337.569


 


Weight 99.4 kg 97 kg 


 


Intake:   


 


   420 140


 


    .9 NaCL 240 420 140


 


  Intake, IV Titration 7736.151 5703.855 1052.431





  Amount   


 


    Amino Acid 5%-D25w+Lytes* 75 600 





    E* 1,000 ml @ 75 mls/hr   





    IV .BY DURATION [facility] Rx#:   





    [id number]   


 


    Heparin Sodium,Porcine/  721.112 277.688





    D5w Pmx 25,000 unit In   





    Dextrose/Water 1 500ml.   





    bag @ 10 UNITS/KG/HR 19.   





    98 mls/hr IV .Q24H [facility] Rx   





    #:[id number]   


 


    Insulin Regular 100 unit 86.625 73.743 49.743





    In Sodium Chloride 0.9%   





    100 ml @ Per Protocol IV   





    .Q0M [facility] Rx#:[id number]   


 


    Magnesium Sulfate-D5w Pmx  300 75





    1 gm In Dextrose/Water 1   





    100ml.bag @ 100 mls/hr   





    IVPB Q1H [facility] Rx#:   





    [id number]   


 


    Magnesium Sulfate-D5w Pmx   200





    1 gm In Dextrose/Water 1   





    100ml.bag @ 100 mls/hr   





    IVPB Q1H [facility] Rx#:   





    [id number]   


 


    Mvi, Adult No.4 with Vit 1011  





    K 10 ml Trace (Conc-1Ml/   





    Dose) 1 ml In Amino Acid   





    5%-D25w+Lytes*E* 1,000 ml   





    @ 75 mls/hr IV .BY   





    DURATION [facility] Rx#:   





    [id number]   


 


    Mvi, Adult No.4 with Vit 300  450





    K 10 ml Trace (Conc-1Ml/   





    Dose) 1 ml Sodium Acetate   





    30 meq Potassium   





    Chloride 20 meq Calcium   





    Gluconate 1,000 mg In   





    Amino Acid 5%-D25w 1,000   





    ml @ 75 mls/hr IV .   





    B22H19L [facility] Rx#:[id number]   


 


  Tube Feeding   10


 


  Other  180 60


 


Output:   


 


  Gastric Drainage  500 


 


  Urine 1735 2055 1600


 


Other:   


 


  Voiding Method Indwelling Catheter Indwelling Catheter Indwelling Catheter








 ABP, PAP, CO, CI - Last Documented











Arterial Blood Pressure        172/58


 


Pulmonary Artery Pressure      46/23


 


Cardiac Output                 6.8


 


Cardiac Index                  3.3

















- Exam





Head exam was generally normal. There was no scleral icterus or corneal arcus. 

Mucous membranes were moist.Neck was supple and without jugular venous 

distension, thyromegaly, or carotid bruits. Carotids were easily palpable 

bilaterally. There was no adenopathy.  The patient has an NG tube in place and 

the patient has no stridor.  He has some degree of crowding of the posterior 

oropharynx.  Mucosal membranes are dry.  Lung sounds are diminished in lung 

bases bilaterally otherwise clear.  Heart sounds are regular, positive S1-S2, 

no cervical murmurs appreciated and sternum stable clean and intact.  Abdomen 

is  distended.  This positive tympany.  Bowel sounds are hypoactive.  There is 

no direct tenderness.  No rebound tensile guarding.Examination of the 

extremities revealed swelling in the right upper extremity compared to the 

left.  There is trace edema lower extremities bilaterally.. There was no 

cyanosis, clubbing or edema.  Neurologically, the patient is awake and alert.  

He is, indicating.  No focal neurological deficit.  Motor function is weak 

although improved compared to yesterday.  The patient is able to raise his arms 

and legs against gravity.  There is no focal neurological deficit at this 

point.  He has a decent cough.  He is also communicating and verbal.








- Labs


CBC & Chem 7: 


 05/03/17 04:55





 05/03/17 12:02


Labs: 


 Abnormal Lab Results - Last 24 Hours (Table)











  05/02/17 05/02/17 05/02/17 Range/Units





  16:26 18:13 19:45 


 


WBC     (3.8-10.6)  k/uL


 


RBC     (4.30-5.90)  m/uL


 


Hgb     (13.0-17.5)  gm/dL


 


Hct     (39.0-53.0)  %


 


Neutrophils #     (1.3-7.7)  k/uL


 


APTT     (22.0-30.0)  sec


 


Sodium     (137-145)  mmol/L


 


Potassium     (3.5-5.1)  mmol/L


 


Glucose     (74-99)  mg/dL


 


POC Glucose (mg/dL)  137 H  164 H  141 H  (75-99)  mg/dL


 


Calcium     (8.4-10.2)  mg/dL














  05/02/17 05/02/17 05/03/17 Range/Units





  21:15 22:02 00:01 


 


WBC     (3.8-10.6)  k/uL


 


RBC     (4.30-5.90)  m/uL


 


Hgb     (13.0-17.5)  gm/dL


 


Hct     (39.0-53.0)  %


 


Neutrophils #     (1.3-7.7)  k/uL


 


APTT     (22.0-30.0)  sec


 


Sodium     (137-145)  mmol/L


 


Potassium  3.4 L    (3.5-5.1)  mmol/L


 


Glucose     (74-99)  mg/dL


 


POC Glucose (mg/dL)   126 H  127 H  (75-99)  mg/dL


 


Calcium     (8.4-10.2)  mg/dL














  05/03/17 05/03/17 05/03/17 Range/Units





  02:04 04:25 04:55 


 


WBC     (3.8-10.6)  k/uL


 


RBC     (4.30-5.90)  m/uL


 


Hgb     (13.0-17.5)  gm/dL


 


Hct     (39.0-53.0)  %


 


Neutrophils #     (1.3-7.7)  k/uL


 


APTT     (22.0-30.0)  sec


 


Sodium    135 L  (137-145)  mmol/L


 


Potassium     (3.5-5.1)  mmol/L


 


Glucose    122 H  (74-99)  mg/dL


 


POC Glucose (mg/dL)  143 H  142 H   (75-99)  mg/dL


 


Calcium    8.0 L  (8.4-10.2)  mg/dL














  05/03/17 05/03/17 05/03/17 Range/Units





  04:55 04:55 05:43 


 


WBC  12.4 H    (3.8-10.6)  k/uL


 


RBC  2.66 L    (4.30-5.90)  m/uL


 


Hgb  8.7 L    (13.0-17.5)  gm/dL


 


Hct  26.0 L    (39.0-53.0)  %


 


Neutrophils #  8.4 H    (1.3-7.7)  k/uL


 


APTT   81.0 H   (22.0-30.0)  sec


 


Sodium     (137-145)  mmol/L


 


Potassium     (3.5-5.1)  mmol/L


 


Glucose     (74-99)  mg/dL


 


POC Glucose (mg/dL)    124 H  (75-99)  mg/dL


 


Calcium     (8.4-10.2)  mg/dL














  05/03/17 05/03/17 05/03/17 Range/Units





  08:02 10:13 11:48 


 


WBC     (3.8-10.6)  k/uL


 


RBC     (4.30-5.90)  m/uL


 


Hgb     (13.0-17.5)  gm/dL


 


Hct     (39.0-53.0)  %


 


Neutrophils #     (1.3-7.7)  k/uL


 


APTT     (22.0-30.0)  sec


 


Sodium     (137-145)  mmol/L


 


Potassium     (3.5-5.1)  mmol/L


 


Glucose     (74-99)  mg/dL


 


POC Glucose (mg/dL)  151 H  178 H  142 H  (75-99)  mg/dL


 


Calcium     (8.4-10.2)  mg/dL














  05/03/17 Range/Units





  12:02 


 


WBC   (3.8-10.6)  k/uL


 


RBC   (4.30-5.90)  m/uL


 


Hgb   (13.0-17.5)  gm/dL


 


Hct   (39.0-53.0)  %


 


Neutrophils #   (1.3-7.7)  k/uL


 


APTT  78.3 H  (22.0-30.0)  sec


 


Sodium   (137-145)  mmol/L


 


Potassium   (3.5-5.1)  mmol/L


 


Glucose   (74-99)  mg/dL


 


POC Glucose (mg/dL)   (75-99)  mg/dL


 


Calcium   (8.4-10.2)  mg/dL








 Microbiology - Last 24 Hours (Table)











 04/13/17 16:40 Acid Fast Bacilli Smear - Final





 Lung - Right Acid Fast Bacilli Culture - Preliminary














Assessment and Plan


Plan: 





1 Coronary artery disease status post cardiac bypass surgery and the patient is 

postop day # 20





2 prolonged postoperative ventilator-dependent history failure, multifactorial.

  Patient has extubated.  Nevertheless, the active ongoing problems for now is 

his neuromuscular weakness and ongoing ileus.  Patient has a combination of 

large bowel ileus as evident on the flat film the abdomen.  NG tube still in 

place.





3 recurrent mucous plugs with excessive respiratory secretions requiring 

bronchoscopy and a peak airway suctioning and a bronchoscopy cultures came back 

negative for any microbial growth. The most recent sputum analysis showing 

Moraxella catarrhalis and the patient is currently on Levaquin.  Chest x-ray 

from today is showing atelectatic changes in lung bases bilaterally.  

Nevertheless the patient is oxygenating well and there is no respiratory 

distress at this point.





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 

weakness, improving slowly





5 hypertension





6 hyperlipidemia





7 atrial fibrillation, paroxysmal,  currently on a heparin drip and the patient'

s rhythm is sinus with a controlled rate





8 ileus,  large bowel ileus.  Despite this, the patient is having a nontoxic 

abdomen.  No abdominal tenderness.  No leukocytosis.  No fever.  No acidosis.  

NG tube is in place and the patient is still nothing by mouth.  The patient was 

initiated on TPN for nutritional support





9 postoperative anemia, currently hemoglobin is stable





10 alcoholism, recovered from DT





11 gout





12 diabetes mellitus, currently on an insulin drip 





13 suspected early dehiscence of the lower sternal wound.  Doubt infection





Recommendation: We'll initiate tube feeding via nasogastric tube slowly, 

patient apparently failed his swallow evaluation, may need eventually a 

Dobbhoff tube or a PEG tube placement.  Continue to monitor in the ICU.


Time with Patient: Less than 30

## 2017-05-03 NOTE — P.PN
<Odell Gómez L - Last Filed: 05/03/17 11:35>





Progress Note - Text





CV Surgery Nursing





Principal diagnosis: 





Coronary artery disease, status post prior stenting to his right coronary 

artery.  Preserved left ventricular function.  Hyperlipidemia.  Hypertension.  

ETOH abuse.





POD #20 total arterial non-aortic patch off-pump double coronary artery bypass 

grafting using in situ skeletonized right internal mammary artery to the left 

anterior descending artery across the anterior midline in situ totally 

skeletonized left internal mammary artery to the first obtuse marginal artery.  

Intraoperative transesophageal echocardiogram and epi-aortic scanning.  

Intraoperative graft flow measurements using the Medistim system





POD #20 flexible bronchoscopy with bronchial washings secondary to 

postoperative mucous plugging with increasing oxygen demand the night of 

surgery.  Pt had acute hypoxic post operative respiratory failure as an 

unexpected post-surgical condition, related to the patient's underlying medical 

comorbidities. Sputum culture grew out Moraxella, blood cultures are negative 

to date, urine culture grew Klebsiella.  Patient placed on Levaquin per 

pulmonology on 4/25/2017.





Pt developed postoperative alcohol withdrawal requiring increased sedation and 

prolonged mechanical ventilation.





Patient developed postoperative ileus, an unexpected post-surgical condition, 

currently resolving.   Currently receiving TPN for nutrition.  General surgery 

are on consult, no intervention at this time.





Patient awake and alert, oriented 2 to person and place.  He was reoriented to 

time.  No distress noted, no specific complaints.





Vital Signs: Afebrile


 Vital Signs - 24 hr











  05/02/17 05/02/17 05/02/17





  12:00 12:47 13:00


 


Temperature 98.8 F  


 


Pulse Rate 69 68 72


 


Respiratory 16  16





Rate   


 


Blood Pressure 98/61  82/58


 


O2 Sat by Pulse 95  95





Oximetry   














  05/02/17 05/02/17 05/02/17





  14:00 15:00 16:00


 


Temperature   98.9 F


 


Pulse Rate 73 80 69


 


Respiratory 21 23 16





Rate   


 


Blood Pressure 94/62 113/61 109/62


 


O2 Sat by Pulse 94 L 93 L 92 L





Oximetry   














  05/02/17 05/02/17 05/02/17





  16:02 16:13 17:00


 


Temperature   


 


Pulse Rate 69 72 73


 


Respiratory   18





Rate   


 


Blood Pressure   86/57


 


O2 Sat by Pulse   94 L





Oximetry   














  05/02/17 05/02/17 05/02/17





  18:00 19:00 20:00


 


Temperature   99.4 F


 


Pulse Rate 78 74 76


 


Respiratory 21 16 21





Rate   


 


Blood Pressure 117/59 114/66 112/61


 


O2 Sat by Pulse 86 L 96 93 L





Oximetry   














  05/02/17 05/02/17 05/02/17





  20:13 20:26 21:00


 


Temperature   


 


Pulse Rate 77 69 65


 


Respiratory   18





Rate   


 


Blood Pressure   117/63


 


O2 Sat by Pulse   96





Oximetry   














  05/02/17 05/02/17 05/03/17





  22:00 23:00 00:00


 


Temperature   99.4 F


 


Pulse Rate 66 67 65


 


Respiratory 19 20 14





Rate   


 


Blood Pressure 106/53 116/80 83/47


 


O2 Sat by Pulse 95 94 L 96





Oximetry   














  05/03/17 05/03/17 05/03/17





  00:02 01:00 02:00


 


Temperature   


 


Pulse Rate 66 64 65


 


Respiratory 18 18 19





Rate   


 


Blood Pressure 83/47 99/47 116/64


 


O2 Sat by Pulse 97 97 95





Oximetry   














  05/03/17 05/03/17 05/03/17





  03:00 04:00 05:00


 


Temperature  99.1 F 


 


Pulse Rate 64 66 66


 


Respiratory 18 19 16





Rate   


 


Blood Pressure 111/58 103/58 111/60


 


O2 Sat by Pulse 93 L 96 93 L





Oximetry   














  05/03/17 05/03/17 05/03/17





  06:00 07:00 07:55


 


Temperature   


 


Pulse Rate 64 66 67


 


Respiratory 14 16 16





Rate   


 


Blood Pressure 126/61 131/64 


 


O2 Sat by Pulse 98 96 





Oximetry   














  05/03/17 05/03/17 05/03/17





  08:00 08:10 09:00


 


Temperature 98.6 F  


 


Pulse Rate 67 71 62


 


Respiratory 20  20





Rate   


 


Blood Pressure 121/65  135/67


 


O2 Sat by Pulse 97  98





Oximetry   














  05/03/17 05/03/17





  10:00 11:00


 


Temperature  


 


Pulse Rate 58 L 60


 


Respiratory 15 18





Rate  


 


Blood Pressure 119/60 100/54


 


O2 Sat by Pulse 93 L 95





Oximetry  














Labs: 


 Short CBC











  05/03/17 Range/Units





  04:55 


 


WBC  12.4 H  (3.8-10.6)  k/uL


 


Hgb  8.7 L  (13.0-17.5)  gm/dL


 


Hct  26.0 L  (39.0-53.0)  %


 


Plt Count  326  (150-450)  k/uL


 


Neutrophils #  8.4 H  (1.3-7.7)  k/uL








 BMP











  05/02/17 05/03/17





  21:15 04:55


 


Sodium   135 L


 


Potassium  3.4 L  3.8


 


Chloride   102


 


Carbon Dioxide   27


 


BUN   14


 


Creatinine   0.70


 


Glucose   122 H


 


Calcium   8.0 L











 Microbiology





04/13/17 16:40   Lung - Right   Acid Fast Bacilli Smear - Final


04/13/17 16:40   Lung - Right   Acid Fast Bacilli Culture - Preliminary


04/24/17 02:00   Blood   Blood Culture - Final


                            No Growth after 144 hours


04/24/17 04:40   Sputum   Gram Stain - Final


04/24/17 04:40   Sputum   Sputum Culture - Final


                            Moraxella(branhamella) catarra


04/23/17 17:44   Urine,Catheterized   Urine Culture - Final


                            Klebsiella pneumoniae


04/13/17 16:40   Lung - Right   Fungal Culture - Preliminary


                            Lisandra tropicalis


04/13/17 16:40   Lung Aspirate - Right   Gram Stain - Final


04/13/17 16:40   Lung Aspirate - Right   Bronchial Washings Culture - Final





IV Fluids: 


0.9% normal saline at 20 mL per hour


TPN at 75 mL per hour


Insulin drip at 7 units per hour


Heparin drip at 18 units per kilogram per hour.





Lungs: Coarse rhonchi throughout, diminished bilateral bases.  Respirations are 

symmetrical and unlabored.





O2 sat: 95% on 2 L nasal cannula.





I/S: 250-500 mL, reviewed with the patient important of using his incentive 

spirometry every hour while awake.  The patient did give a good return 

demonstration on his incentive spirometry but will need encouragement and 

coaching with the use.





Heart:  S1S2, regular rhythm and rate, negative for S3, gallop or murmur.  

Bedside telemetry showing normal sinus rhythm heart rate 67.





Sternum stable, chest incision clean with  dressing clean and dry.  Heart 

hugger in place, the patient is unable to use his heart hugger appropriately 

due to generalized upper body weakness and will need assistance and teaching 

with the heart hugger.





Knee-high CONNIE hose and sequential compression devices in place to bilateral 

lower extremities.  Generalized +1 to +2 edema, +2 to +3 edema to his right 

hand and right arm.  Right arm venous Doppler which was completed on 05/01/2017 

demonstrated no evidence of deep vein thrombosis.





Abdomen:  Soft, slightly distended, positive bowel sounds present in all 4 

quadrants. NG tube remains in place to low wall suction.  500 mL of output in 

the last 24 hours.





CBGs:  mg/dL in the last 24 hours.





U/O:  Adequate, Angelo catheter for accurate I&O.  1125 mL output in the last 8 

hours.





24 hr Total: 


 Intake & Output











 05/01/17 05/02/17 05/03/17 05/04/17





 06:59 06:59 06:59 06:59


 


Intake Total 2573.828 3991.746 4007.480 784.743


 


Output Total 4070 5245 4290 1325


 


Balance -1496.172 -1253.254 -282.520 -540.257


 


Weight 98.6 kg 99.4 kg 97 kg 








Active Medications





Albuterol/Ipratropium (Duoneb 0.5 Mg-3 Mg/3 Ml Soln)  3 ml INHALATION RT-QID [facility]


   Last Admin: 05/03/17 07:55 Dose:  3 ml


Albuterol/Ipratropium (Duoneb 0.5 Mg-3 Mg/3 Ml Soln)  3 ml INHALATION RT-QID PRN


   PRN Reason: Shortness Of Breath Or Wheezing


   Last Admin: 04/29/17 23:10 Dose:  3 ml


Amiodarone HCl (Cordarone)  200 mg PO DAILY [facility]


Aspirin (Aspirin)  325 mg PO DAILY [facility]


   Last Admin: 05/03/17 08:29 Dose:  325 mg


Atorvastatin Calcium (Lipitor)  40 mg PO DAILY [facility]


   Last Admin: 05/03/17 08:30 Dose:  40 mg


Benzocaine (Hurricaine Spray)  1 applic MUCOUS MEM QID PRN


   PRN Reason: Mouth Irritation


   Last Admin: 04/17/17 11:36 Dose:  1 applic


Bisacodyl (Dulcolax)  10 mg RECTAL DAILY PRN


   PRN Reason: Constipation


   Last Admin: 04/19/17 17:18 Dose:  10 mg


Budesonide (Pulmicort)  1 mg INHALATION RT-BID [facility]


   Last Admin: 05/03/17 07:53 Dose:  1 mg


Digoxin (Lanoxin)  250 mcg OG-TUBE DAILY [facility]


   Last Admin: 05/03/17 08:30 Dose:  250 mcg


Furosemide (Lasix)  20 mg IV Q12HR [facility]


   Last Admin: 05/03/17 08:30 Dose:  20 mg


Haloperidol Lactate (Haldol)  2 mg IVP HS PRN


   PRN Reason: Agitation or Acute Psychosis


Heparin Sodium (Porcine) (Heparin)  0 unit IV PER PROTOCOL PRN; Protocol


   PRN Reason: Low PTT


   Last Admin: 04/28/17 16:34 Dose:  2,440 unit


Hydralazine HCl (Apresoline)  10 mg IVP Q4HR PRN


   PRN Reason: Blood Pressure - High


   Last Admin: 04/27/17 12:22 Dose:  10 mg


Heparin Sodium/Dextrose 25,000 (unit/ IV Solution)  500 mls @ 19.98 mls/hr IV 

.Q24H [facility]; 10 UNITS/KG/HR


   PRN Reason: Protocol


   Last Titration: 05/03/17 06:04 Dose:  18 units/kg/hr, 35.96 mls/hr


Levofloxacin 750 mg/ IV (Solution)  150 mls @ 100 mls/hr IVPB Q24H [facility]


   Last Admin: 05/02/17 16:33 Dose:  100 mls/hr


Insulin Human Regular 100 unit (/ Sodium Chloride)  101 mls @ 0 mls/hr IV .Q0M 

[facility]; Per Protocol


   PRN Reason: Protocol


   Last Titration: 05/03/17 11:50 Dose:  9 unit/hr, 9.09 mls/hr


Fat Emulsion Intravenous 250 (ml/ IV Solution)  250 mls @ 21 mls/hr IV MoWeFr 

[facility]


   Last Admin: 05/01/17 17:09 Dose:  21 mls/hr


Parenteral Vitamin Supplement 10 ml/ Chromium/Copper/Manganese/Seleni/Zn 1 ml/

Amino Ac/Electrol/Dextrose/Calcium  1,011 mls @ 75 mls/hr IV .BY DURATION [facility]


   Last Admin: 05/03/17 03:19 Dose:  75 mls/hr


Amino Ac/Electrol/Dextrose/Calcium (Clinimix E 5%-D25% Solution)  1,000 mls @ 

75 mls/hr IV .BY DURATION [facility]


   Last Admin: 05/02/17 13:42 Dose:  75 mls/hr


Iron/Minerals/Multivitamins (Theragran Liquid)  15 ml PO DAILY@1200 [facility]


   Last Admin: 04/29/17 19:35 Dose:  15 ml


Lisinopril (Zestril)  10 mg PO BID [facility]


   Last Admin: 05/03/17 08:31 Dose:  10 mg


Magnesium Hydroxide (Milk Of Magnesia)  2,400 mg PO BID PRN


   PRN Reason: Constipation


Metoclopramide HCl (Reglan)  10 mg IVP Q6HR [facility]


   Last Admin: 05/03/17 11:50 Dose:  10 mg


Metoprolol Tartrate (Lopressor)  50 mg PO BID [facility]


   Last Admin: 05/03/17 08:30 Dose:  50 mg


Miscellaneous Information (Magnesium Per Protocol)  1 each MISCELLANE DAILY PRN

; Protocol


   PRN Reason: Per Protocol


Miscellaneous Information (Phosphorus Per Protocol)  1 each MISCELLANE DAILY PRN

; Protocol


   PRN Reason: Per Protocol


Miscellaneous Information (Potassium Per Protocol)  1 each MISCELLANE DAILY PRN

; Protocol


   PRN Reason: Per Protocol


Morphine Sulfate (Morphine Sulfate (Inj))  2 mg IVP Q4H PRN


   PRN Reason: Severe Pain


Ondansetron HCl (Zofran)  4 mg IVP Q6HR PRN


   PRN Reason: Nausea And Vomiting


Pantoprazole Sodium (Protonix)  40 mg IVP DAILY [facility]


   Last Admin: 05/03/17 08:31 Dose:  40 mg


Senna/Docusate Sodium (Senokot-S)  2 each PO HS [facility]


   Last Admin: 05/02/17 20:04 Dose:  Not Given


Sodium Chloride (Saline Flush)  10 ml IV BID [facility]


   Last Admin: 05/03/17 08:31 Dose:  Not Given


Sodium Chloride (Saline Flush)  20 ml IV Q4HR PRN


   PRN Reason: PICC Line


Sodium Chloride (Saline Flush)  10 ml IV WEEKLY [facility]


   Last Admin: 04/27/17 08:46 Dose:  Not Given


Sodium Chloride (Saline Flush)  10 ml IV Q4HR PRN


   PRN Reason: PICC Line


Thiamine HCl (Vitamin B-1)  100 mg PO BID [facility]


   Last Admin: 05/03/17 08:31 Dose:  100 mg





Plan: 





1.  Continue ASA, Lipitor, heparin, Lopressor, lisinopril, digoxin, and Lasix.


2.  Amiodarone will be decreased to 200 mg by mouth daily for atrial 

fibrillation prophylaxis.


3.  Wean O2 as tolerated.  Dr. Hall is following for pulmonary management.


4.  NG tube will remain in place at this time as the patient is going to have a 

barium swallow in a.m.  He had a successful swallow bedside study today, per 

the speech therapist.


5.  Continue TPN for until patient able to tolerate oral intake.


6.  Insulin/diabetic management per primary care service.


7.  Continue Levaquin, managed per pulmonology.


8.  Increase activity, out of bed to chair.  Physical therapy following.  


9.  Daily labs, chest x-rays.


10.  GI/DVT prophylaxis.


11.  Magnesium replacement per protocol.


12.  Further recommendations to follow as patient progresses.





<Oscar Porter - Last Filed: 05/03/17 16:39>





Progress Note - Text


The patient was seen and examined.  I agree with the above assessment and plan.

  Neurologically he appears to be slightly more awake since my last 

examination.  He is moving all extremities and answering questions.  He still 

has a fair amount of secretions for which he needs continued pulmonary toilet.  

He did pass his swallow test and will be undergoing additional testing 

tomorrow.  He will be started on clear liquids.  Otherwise he is receiving TPN.

  He'll likely be transferred to [facility] soon.

## 2017-05-03 NOTE — P.PN
Progress Note - Text





This is a 61-year-old gentleman who is status post prior to coronary bypass 

surgery.  Patient had a prolonged stay in ICU on mechanical ventilator support.

  He is also had some neuromuscular weakness.  


The patient was in and out atrial fibrillation with RVR.  But he was converted 

to normal sinus mechanism 3 days ago.


He is on metoprolol, amiodarone, and digoxin.


The blood pressure has been stable.


He is on heparin for anticoagulation





From the cardiovascular standpoint overview, we'll continue the current medical 

treatment.  The patient is feeling at her slowly.

## 2017-05-03 NOTE — P.PN
Subjective


Patient is evaluated at bedside.  Yesterday, orders were placed to discontinue 

nasogastric tube but patient failed swallow eval and decision was made to keep 

him nothing by mouth.  Today, patient has improved swallowing but speech 

therapist has recommended a modified barium swallow tomorrow with continued 

concern for aspiration.  Patient denies nausea, vomiting, or abdominal pain.  

Patient had a small bowel movement today.








Objective





- Vital Signs


Vital signs: 


 Vital Signs











Temp  99.3 F   05/03/17 16:00


 


Pulse  72   05/03/17 16:23


 


Resp  18   05/03/17 16:00


 


BP  168/83   05/03/17 16:00


 


Pulse Ox  97   05/03/17 16:00








 Intake & Output











 05/02/17 05/03/17 05/03/17





 18:59 06:59 18:59


 


Intake Total 1130.275 0911.855 1618.035


 


Output Total 1735 2555 1850


 


Balance -22.375 739.855 -231.965


 


Weight 99.4 kg 97 kg 


 


Intake:   


 


   420 200


 


    .9 NaCL 240 420 200


 


  Intake, IV Titration 4521.001 4721.855 1298.035





  Amount   


 


    Amino Acid 5%-D25w+Lytes* 75 1600 





    E* 1,000 ml @ 75 mls/hr   





    IV .BY DURATION [facility] Rx#:   





    [id number]   


 


    Heparin Sodium,Porcine/  721.112 277.688





    D5w Pmx 25,000 unit In   





    Dextrose/Water 1 500ml.   





    bag @ 10 UNITS/KG/HR 19.   





    98 mls/hr IV .Q24H [facility] Rx   





    #:[id number]   


 


    Insulin Regular 100 unit 86.625 73.743 70.347





    In Sodium Chloride 0.9%   





    100 ml @ Per Protocol IV   





    .Q0M [facility] Rx#:[id number]   


 


    Magnesium Sulfate-D5w Pmx  300 75





    1 gm In Dextrose/Water 1   





    100ml.bag @ 100 mls/hr   





    IVPB Q1H [facility] Rx#:   





    [id number]   


 


    Magnesium Sulfate-D5w Pmx   200





    1 gm In Dextrose/Water 1   





    100ml.bag @ 100 mls/hr   





    IVPB Q1H [facility] Rx#:   





    [id number]   


 


    Mvi, Adult No.4 with Vit 1011  





    K 10 ml Trace (Conc-1Ml/   





    Dose) 1 ml In Amino Acid   





    5%-D25w+Lytes*E* 1,000 ml   





    @ 75 mls/hr IV .BY   





    DURATION [facility] Rx#:   





    [id number]   


 


    Mvi, Adult No.4 with Vit 300  675





    K 10 ml Trace (Conc-1Ml/   





    Dose) 1 ml Sodium Acetate   





    30 meq Potassium   





    Chloride 20 meq Calcium   





    Gluconate 1,000 mg In   





    Amino Acid 5%-D25w 1,000   





    ml @ 75 mls/hr IV .   





    [id number] [facility] Rx#:[id number]   


 


  Tube Feeding   30


 


  Other  180 90


 


Output:   


 


  Gastric Drainage  500 


 


  Urine 1735 2055 1850


 


Other:   


 


  Voiding Method Indwelling Catheter Indwelling Catheter Indwelling Catheter


 


  # Bowel Movements   1








 ABP, PAP, CO, CI - Last Documented











Arterial Blood Pressure        172/58


 


Pulmonary Artery Pressure      46/23


 


Cardiac Output                 6.8


 


Cardiac Index                  3.3

















- Exam


GENERAL: Pt awake and alert, and in no acute distress.


ENT: Nasogastric tube in place currently clamped.  


LUNGS: Breath sounds diminished to auscultation bilaterally.  No wheezes, rales

, or rhonchi.


HEART: Heart S1, S2, no S3 or S4.  Regular rate and rhythm.  No murmurs, rubs 

or gallops.


ABDOMEN: Soft, nontender, distended, hypoactive bowel sounds.  No guarding, no 

rebound.  








- Labs


CBC & Chem 7: 


 05/03/17 04:55





 05/03/17 12:02


Labs: 


 Abnormal Lab Results - Last 24 Hours (Table)











  05/02/17 05/02/17 05/02/17 Range/Units





  16:26 18:13 19:45 


 


WBC     (3.8-10.6)  k/uL


 


RBC     (4.30-5.90)  m/uL


 


Hgb     (13.0-17.5)  gm/dL


 


Hct     (39.0-53.0)  %


 


Neutrophils #     (1.3-7.7)  k/uL


 


APTT     (22.0-30.0)  sec


 


Sodium     (137-145)  mmol/L


 


Potassium     (3.5-5.1)  mmol/L


 


Glucose     (74-99)  mg/dL


 


POC Glucose (mg/dL)  137 H  164 H  141 H  (75-99)  mg/dL


 


Calcium     (8.4-10.2)  mg/dL














  05/02/17 05/02/17 05/03/17 Range/Units





  21:15 22:02 00:01 


 


WBC     (3.8-10.6)  k/uL


 


RBC     (4.30-5.90)  m/uL


 


Hgb     (13.0-17.5)  gm/dL


 


Hct     (39.0-53.0)  %


 


Neutrophils #     (1.3-7.7)  k/uL


 


APTT     (22.0-30.0)  sec


 


Sodium     (137-145)  mmol/L


 


Potassium  3.4 L    (3.5-5.1)  mmol/L


 


Glucose     (74-99)  mg/dL


 


POC Glucose (mg/dL)   126 H  127 H  (75-99)  mg/dL


 


Calcium     (8.4-10.2)  mg/dL














  05/03/17 05/03/17 05/03/17 Range/Units





  02:04 04:25 04:55 


 


WBC     (3.8-10.6)  k/uL


 


RBC     (4.30-5.90)  m/uL


 


Hgb     (13.0-17.5)  gm/dL


 


Hct     (39.0-53.0)  %


 


Neutrophils #     (1.3-7.7)  k/uL


 


APTT     (22.0-30.0)  sec


 


Sodium    135 L  (137-145)  mmol/L


 


Potassium     (3.5-5.1)  mmol/L


 


Glucose    122 H  (74-99)  mg/dL


 


POC Glucose (mg/dL)  143 H  142 H   (75-99)  mg/dL


 


Calcium    8.0 L  (8.4-10.2)  mg/dL














  05/03/17 05/03/17 05/03/17 Range/Units





  04:55 04:55 05:43 


 


WBC  12.4 H    (3.8-10.6)  k/uL


 


RBC  2.66 L    (4.30-5.90)  m/uL


 


Hgb  8.7 L    (13.0-17.5)  gm/dL


 


Hct  26.0 L    (39.0-53.0)  %


 


Neutrophils #  8.4 H    (1.3-7.7)  k/uL


 


APTT   81.0 H   (22.0-30.0)  sec


 


Sodium     (137-145)  mmol/L


 


Potassium     (3.5-5.1)  mmol/L


 


Glucose     (74-99)  mg/dL


 


POC Glucose (mg/dL)    124 H  (75-99)  mg/dL


 


Calcium     (8.4-10.2)  mg/dL














  05/03/17 05/03/17 05/03/17 Range/Units





  08:02 10:13 11:48 


 


WBC     (3.8-10.6)  k/uL


 


RBC     (4.30-5.90)  m/uL


 


Hgb     (13.0-17.5)  gm/dL


 


Hct     (39.0-53.0)  %


 


Neutrophils #     (1.3-7.7)  k/uL


 


APTT     (22.0-30.0)  sec


 


Sodium     (137-145)  mmol/L


 


Potassium     (3.5-5.1)  mmol/L


 


Glucose     (74-99)  mg/dL


 


POC Glucose (mg/dL)  151 H  178 H  142 H  (75-99)  mg/dL


 


Calcium     (8.4-10.2)  mg/dL














  05/03/17 05/03/17 05/03/17 Range/Units





  12:02 14:02 16:12 


 


WBC     (3.8-10.6)  k/uL


 


RBC     (4.30-5.90)  m/uL


 


Hgb     (13.0-17.5)  gm/dL


 


Hct     (39.0-53.0)  %


 


Neutrophils #     (1.3-7.7)  k/uL


 


APTT  78.3 H    (22.0-30.0)  sec


 


Sodium     (137-145)  mmol/L


 


Potassium     (3.5-5.1)  mmol/L


 


Glucose     (74-99)  mg/dL


 


POC Glucose (mg/dL)   136 H  130 H  (75-99)  mg/dL


 


Calcium     (8.4-10.2)  mg/dL








 Microbiology - Last 24 Hours (Table)











 04/13/17 16:40 Acid Fast Bacilli Smear - Final





 Lung - Right Acid Fast Bacilli Culture - Preliminary














Assessment and Plan


Plan: 


Impression:





1.  Dysphagia.


2.  Ileus, improving.





Plan:





Patient is scheduled for moderate barium swallow study tomorrow.  From a 

surgical standpoint, patient may be fed through the NG tube.  Continue to 

follow with primary and consultants.





The above impression and plan have been discussed and directed by Dr. Inman. 

Camron HANNON acting as scribe for Dr. Inman.

## 2017-05-04 LAB
ANION GAP SERPL CALC-SCNC: 7 MMOL/L
BASOPHILS # BLD AUTO: 0 K/UL (ref 0–0.2)
BASOPHILS NFR BLD AUTO: 0 %
BUN SERPL-SCNC: 14 MG/DL (ref 9–20)
CALCIUM SPEC-MCNC: 8.4 MG/DL (ref 8.4–10.2)
CH: 32.3
CHCM: 33.9
CHLORIDE SERPL-SCNC: 99 MMOL/L (ref 98–107)
CO2 SERPL-SCNC: 28 MMOL/L (ref 22–30)
EOSINOPHIL # BLD AUTO: 0.5 K/UL (ref 0–0.7)
EOSINOPHIL NFR BLD AUTO: 4 %
ERYTHROCYTE [DISTWIDTH] IN BLOOD BY AUTOMATED COUNT: 2.91 M/UL (ref 4.3–5.9)
ERYTHROCYTE [DISTWIDTH] IN BLOOD: 14.9 % (ref 11.5–15.5)
GLUCOSE BLD-MCNC: 101 MG/DL (ref 75–99)
GLUCOSE BLD-MCNC: 102 MG/DL (ref 75–99)
GLUCOSE BLD-MCNC: 108 MG/DL (ref 75–99)
GLUCOSE BLD-MCNC: 112 MG/DL (ref 75–99)
GLUCOSE BLD-MCNC: 115 MG/DL (ref 75–99)
GLUCOSE BLD-MCNC: 115 MG/DL (ref 75–99)
GLUCOSE BLD-MCNC: 117 MG/DL (ref 75–99)
GLUCOSE BLD-MCNC: 121 MG/DL (ref 75–99)
GLUCOSE BLD-MCNC: 122 MG/DL (ref 75–99)
GLUCOSE BLD-MCNC: 123 MG/DL (ref 75–99)
GLUCOSE BLD-MCNC: 124 MG/DL (ref 75–99)
GLUCOSE BLD-MCNC: 127 MG/DL (ref 75–99)
GLUCOSE BLD-MCNC: 129 MG/DL (ref 75–99)
GLUCOSE BLD-MCNC: 130 MG/DL (ref 75–99)
GLUCOSE BLD-MCNC: 130 MG/DL (ref 75–99)
GLUCOSE BLD-MCNC: 150 MG/DL (ref 75–99)
GLUCOSE BLD-MCNC: 167 MG/DL (ref 75–99)
GLUCOSE BLD-MCNC: 175 MG/DL (ref 75–99)
GLUCOSE BLD-MCNC: 189 MG/DL (ref 75–99)
GLUCOSE SERPL-MCNC: 97 MG/DL (ref 74–99)
HCT VFR BLD AUTO: 27.9 % (ref 39–53)
HDW: 3.18
HGB BLD-MCNC: 9.5 GM/DL (ref 13–17.5)
LUC NFR BLD AUTO: 3 %
LYMPHOCYTES # SPEC AUTO: 2.3 K/UL (ref 1–4.8)
LYMPHOCYTES NFR SPEC AUTO: 18 %
MAGNESIUM SPEC-SCNC: 1.9 MG/DL (ref 1.6–2.3)
MCH RBC QN AUTO: 32.7 PG (ref 25–35)
MCHC RBC AUTO-ENTMCNC: 34.1 G/DL (ref 31–37)
MCV RBC AUTO: 95.9 FL (ref 80–100)
MONOCYTES # BLD AUTO: 0.6 K/UL (ref 0–1)
MONOCYTES NFR BLD AUTO: 5 %
NEUTROPHILS # BLD AUTO: 8.8 K/UL (ref 1.3–7.7)
NEUTROPHILS NFR BLD AUTO: 70 %
NON-AFRICAN AMERICAN GFR(MDRD): >60
PHOSPHATE SERPL-MCNC: 3.9 MG/DL (ref 2.5–4.5)
POTASSIUM SERPL-SCNC: 3.9 MMOL/L (ref 3.5–5.1)
SODIUM SERPL-SCNC: 134 MMOL/L (ref 137–145)
WBC # BLD AUTO: 0.36 10*3/UL
WBC # BLD AUTO: 12.6 K/UL (ref 3.8–10.6)
WBC (PEROX): 12.18

## 2017-05-04 RX ADMIN — ATORVASTATIN CALCIUM SCH MG: 40 TABLET, FILM COATED ORAL at 08:50

## 2017-05-04 RX ADMIN — LISINOPRIL SCH MG: 10 TABLET ORAL at 21:41

## 2017-05-04 RX ADMIN — PANTOPRAZOLE SODIUM SCH MG: 40 INJECTION, POWDER, FOR SOLUTION INTRAVENOUS at 08:51

## 2017-05-04 RX ADMIN — SERTRALINE HYDROCHLORIDE SCH MG: 50 TABLET, FILM COATED ORAL at 09:59

## 2017-05-04 RX ADMIN — SODIUM CHLORIDE, PRESERVATIVE FREE SCH: 5 INJECTION INTRAVENOUS at 08:52

## 2017-05-04 RX ADMIN — METOCLOPRAMIDE SCH MG: 5 INJECTION, SOLUTION INTRAMUSCULAR; INTRAVENOUS at 11:40

## 2017-05-04 RX ADMIN — MAGNESIUM SULFATE IN DEXTROSE SCH MLS/HR: 10 INJECTION, SOLUTION INTRAVENOUS at 09:59

## 2017-05-04 RX ADMIN — IPRATROPIUM BROMIDE AND ALBUTEROL SULFATE SCH: .5; 3 SOLUTION RESPIRATORY (INHALATION) at 17:17

## 2017-05-04 RX ADMIN — DIGOXIN SCH MCG: 250 TABLET ORAL at 08:50

## 2017-05-04 RX ADMIN — MAGNESIUM SULFATE IN DEXTROSE SCH MLS/HR: 10 INJECTION, SOLUTION INTRAVENOUS at 08:36

## 2017-05-04 RX ADMIN — FUROSEMIDE SCH MG: 10 INJECTION, SOLUTION INTRAMUSCULAR; INTRAVENOUS at 21:41

## 2017-05-04 RX ADMIN — SODIUM PHOSPHATE, MONOBASIC, MONOHYDRATE SCH: 276; 142 INJECTION, SOLUTION INTRAVENOUS at 15:23

## 2017-05-04 RX ADMIN — SODIUM CHLORIDE, PRESERVATIVE FREE SCH ML: 5 INJECTION INTRAVENOUS at 21:42

## 2017-05-04 RX ADMIN — LEVOFLOXACIN SCH MLS/HR: 750 INJECTION, SOLUTION INTRAVENOUS at 17:27

## 2017-05-04 RX ADMIN — SODIUM CHLORIDE SCH MLS/HR: 450 INJECTION, SOLUTION INTRAVENOUS at 22:58

## 2017-05-04 RX ADMIN — BUDESONIDE SCH MG: 1 SUSPENSION RESPIRATORY (INHALATION) at 20:02

## 2017-05-04 RX ADMIN — METOPROLOL TARTRATE SCH MG: 50 TABLET, FILM COATED ORAL at 21:41

## 2017-05-04 RX ADMIN — METOCLOPRAMIDE SCH MG: 5 INJECTION, SOLUTION INTRAMUSCULAR; INTRAVENOUS at 17:26

## 2017-05-04 RX ADMIN — INSULIN HUMAN SCH MLS/HR: 100 INJECTION, SOLUTION PARENTERAL at 08:36

## 2017-05-04 RX ADMIN — INSULIN HUMAN SCH MLS/HR: 100 INJECTION, SOLUTION PARENTERAL at 22:59

## 2017-05-04 RX ADMIN — METOCLOPRAMIDE SCH MG: 5 INJECTION, SOLUTION INTRAMUSCULAR; INTRAVENOUS at 05:28

## 2017-05-04 RX ADMIN — ASPIRIN 325 MG ORAL TABLET SCH MG: 325 PILL ORAL at 08:50

## 2017-05-04 RX ADMIN — SODIUM PHOSPHATE, MONOBASIC, MONOHYDRATE SCH MLS/HR: 276; 142 INJECTION, SOLUTION INTRAVENOUS at 15:22

## 2017-05-04 RX ADMIN — SODIUM CHLORIDE SCH MLS/HR: 450 INJECTION, SOLUTION INTRAVENOUS at 06:46

## 2017-05-04 RX ADMIN — AMIODARONE HYDROCHLORIDE SCH MG: 200 TABLET ORAL at 08:50

## 2017-05-04 RX ADMIN — DOCUSATE SODIUM AND SENNOSIDES SCH: 50; 8.6 TABLET ORAL at 21:42

## 2017-05-04 RX ADMIN — METOCLOPRAMIDE SCH MG: 5 INJECTION, SOLUTION INTRAMUSCULAR; INTRAVENOUS at 00:36

## 2017-05-04 RX ADMIN — FUROSEMIDE SCH MG: 10 INJECTION, SOLUTION INTRAMUSCULAR; INTRAVENOUS at 08:51

## 2017-05-04 RX ADMIN — IPRATROPIUM BROMIDE AND ALBUTEROL SULFATE SCH ML: .5; 3 SOLUTION RESPIRATORY (INHALATION) at 20:02

## 2017-05-04 RX ADMIN — LISINOPRIL SCH MG: 10 TABLET ORAL at 08:51

## 2017-05-04 RX ADMIN — METOPROLOL TARTRATE SCH MG: 50 TABLET, FILM COATED ORAL at 08:51

## 2017-05-04 RX ADMIN — Medication SCH MG: at 08:52

## 2017-05-04 RX ADMIN — IPRATROPIUM BROMIDE AND ALBUTEROL SULFATE SCH ML: .5; 3 SOLUTION RESPIRATORY (INHALATION) at 07:56

## 2017-05-04 RX ADMIN — Medication SCH MG: at 21:41

## 2017-05-04 RX ADMIN — BUDESONIDE SCH MG: 1 SUSPENSION RESPIRATORY (INHALATION) at 07:56

## 2017-05-04 RX ADMIN — SODIUM PHOSPHATE, MONOBASIC, MONOHYDRATE SCH MLS/HR: 276; 142 INJECTION, SOLUTION INTRAVENOUS at 06:49

## 2017-05-04 RX ADMIN — IPRATROPIUM BROMIDE AND ALBUTEROL SULFATE SCH ML: .5; 3 SOLUTION RESPIRATORY (INHALATION) at 11:30

## 2017-05-04 NOTE — P.PN
Subjective


Principal diagnosis: 





Coronary artery disease, status post prior stenting to his right coronary 

artery.  Preserved left ventricular function.  Hyperlipidemia.  Hypertension.  

ETOH abuse.





POD #21 total arterial non-aortic patch off-pump double coronary artery bypass 

grafting using in situ skeletonized right internal mammary artery to the left 

anterior descending artery across the anterior midline in situ totally 

skeletonized left internal mammary artery to the first obtuse marginal artery.  

Intraoperative transesophageal echocardiogram and epi-aortic scanning.  

Intraoperative graft flow measurements using the Medist[employer] system





POD #21 flexible bronchoscopy with bronchial washings secondary to 

postoperative mucous plugging with increasing oxygen demand the night of 

surgery.  Pt had acute hypoxic post operative respiratory failure as an 

unexpected post-surgical condition, related to the patient's underlying medical 

comorbidities. Sputum culture grew out Moraxella, blood cultures are negative 

to date, urine culture grew Klebsiella.  Patient placed on Levaquin per 

pulmonology.





Pt developed postoperative alcohol withdrawal requiring increased sedation and 

prolonged mechanical ventilation.





Patient developed postoperative ileus, an unexpected post-surgical condition, 

currently resolving.   Currently receiving TPN and tube feeding for nutrition.  

General surgery and consult, no intervention at this time.  Will have modified 

barium swallow today, if he passes will start oral diet and discontinue TPN and 

tube feeding.





Patient currently sitting up in bed in no apparent distress.  No new questions 

or concerns





Objective





- Vital Signs


Vital signs: 


 Vital Signs











Temp  99.6 F   05/04/17 04:00


 


Pulse  76   05/04/17 07:56


 


Resp  19   05/04/17 07:00


 


BP  101/63   05/04/17 07:00


 


Pulse Ox  95   05/04/17 07:56








 Intake & Output











 05/03/17 05/04/17 05/04/17





 18:59 06:59 18:59


 


Intake Total 3069.365 2313.853 90


 


Output Total 1970 1875 90


 


Balance 1099.365 438.853 0


 


Weight  97.1 kg 


 


Intake:   


 


   240 20


 


    .9 NaCL 240 240 20


 


  Intake, IV Titration 2689.365 1593.853 40





  Amount   


 


    Fat Emulsion 20% 250 ml 42 189 





    In Empty Bag 1 bag @ 21   





    mls/hr IV MoWeFr [facility] Rx#:   





    [id number]   


 


    Heparin Sodium,Porcine/ 277.688 500 





    D5w Pmx 25,000 unit In   





    Dextrose/Water 1 500ml.   





    bag @ 10 UNITS/KG/HR 19.   





    98 mls/hr IV .Q24H [facility] Rx   





    #:[id number]   


 


    Insulin Regular 100 unit 108.677 109.853 





    In Sodium Chloride 0.9%   





    100 ml @ Per Protocol IV   





    .Q0M [facility] Rx#:[id number]   


 


    Levofloxacin 750Mg-D5w 150  





    Pmx 750 mg In Dextrose/   





    Water 1 150ml.bag @ 100   





    mls/hr IVPB Q24H [facility] Rx#:   





    [id number]   


 


    Magnesium Sulfate-D5w Pmx 75  





    1 gm In Dextrose/Water 1   





    100ml.bag @ 100 mls/hr   





    IVPB Q1H [facility] Rx#:   





    [id number]   


 


    Magnesium Sulfate-D5w Pmx 200  





    1 gm In Dextrose/Water 1   





    100ml.bag @ 100 mls/hr   





    IVPB Q1H [facility] Rx#:   





    [id number]   


 


    Mvi, Adult No.4 with Vit 1011  





    K 10 ml Trace (Conc-1Ml/   





    Dose) 1 ml In Amino Acid   





    5%-D25w+Lytes*E* 1,000 ml   





    @ 75 mls/hr IV .BY   





    DURATION [facility] Rx#:   





    [id number]   


 


    Mvi, Adult No.4 with Vit 825 795 40





    K 10 ml Trace (Conc-1Ml/   





    Dose) 1 ml Sodium Acetate   





    30 meq Potassium   





    Chloride 20 meq Calcium   





    Gluconate 1,000 mg In   





    Amino Acid 5%-D25w 1,000   





    ml @ 75 mls/hr IV .   





    [id number] [facility] Rx#:[id number]   


 


  Tube Feeding 50 330 30


 


  Other 90 150 


 


Output:   


 


  Urine 1970 1875 90


 


Other:   


 


  Voiding Method Indwelling Catheter Indwelling Catheter 


 


  # Bowel Movements 1  








 ABP, PAP, CO, CI - Last Documented











Arterial Blood Pressure        172/58


 


Pulmonary Artery Pressure      46/23


 


Cardiac Output                 6.8


 


Cardiac Index                  3.3

















- Constitutional


General appearance: Present: cooperative, no acute distress, obese





- Respiratory


Details: 





Lungs sounds diminished bilaterally with coarse breath sounds in the bases.  

Respirations even, nonlabored.  Currently on 2 L nasal cannula with oxygen 

saturation 95%.  Able to achieve 750 mL on his incentive spirometry.  Effective 

cough





- Cardiovascular


Details: 





S1, S2 present.  Regular rate and rhythm, normal sinus rhythm on telemetry.  

Superior sternum stable, inferior sternum movable.  Heart hugger in place with 

patient unable to demonstrate appropriate use.  Teds/STDs present.





- Gastrointestinal


Gastrointestinal Comment(s): 





Abdomen soft, nontender, slightly distended.  Active bowel sounds 4 quadrants.

  Currently receiving tube feeding Glucerna at 30 mL/h via NG tube, TPN at 40 mL

/h per dietitian recommendation.  Patient had 2 bowel movements yesterday.





- Genitourinary


Genitourinary Comment(s): 





Angelo present draining clear, yellow urine.  Urine output  mL/h overnight.





- Integumentary


Integumentary Comment(s): 





Anterior chest incision covered with dry intact dressing.





- Neurologic


Neurologic Comment(s): 





Pupils equal, round, reactive to light and accommodation, 4 mm bilaterally.





- Musculoskeletal


Musculoskeletal: Present: generalized weakness





- Psychiatric


Psychiatric: Present: A&O x's 3, appropriate affect, intact judgment & insight





- Allied health notes


Allied health notes reviewed: nursing





- Labs


CBC & Chem 7: 


 05/04/17 04:59





 05/04/17 04:59


Labs: 


 Abnormal Lab Results - Last 24 Hours (Table)











  05/03/17 05/03/17 05/03/17 Range/Units





  10:13 11:48 12:02 


 


WBC     (3.8-10.6)  k/uL


 


RBC     (4.30-5.90)  m/uL


 


Hgb     (13.0-17.5)  gm/dL


 


Hct     (39.0-53.0)  %


 


Neutrophils #     (1.3-7.7)  k/uL


 


APTT    78.3 H  (22.0-30.0)  sec


 


Sodium     (137-145)  mmol/L


 


POC Glucose (mg/dL)  178 H  142 H   (75-99)  mg/dL














  05/03/17 05/03/17 05/03/17 Range/Units





  14:02 16:12 18:02 


 


WBC     (3.8-10.6)  k/uL


 


RBC     (4.30-5.90)  m/uL


 


Hgb     (13.0-17.5)  gm/dL


 


Hct     (39.0-53.0)  %


 


Neutrophils #     (1.3-7.7)  k/uL


 


APTT     (22.0-30.0)  sec


 


Sodium     (137-145)  mmol/L


 


POC Glucose (mg/dL)  136 H  130 H  165 H  (75-99)  mg/dL














  05/03/17 05/03/17 05/03/17 Range/Units





  19:01 19:50 21:50 


 


WBC     (3.8-10.6)  k/uL


 


RBC     (4.30-5.90)  m/uL


 


Hgb     (13.0-17.5)  gm/dL


 


Hct     (39.0-53.0)  %


 


Neutrophils #     (1.3-7.7)  k/uL


 


APTT  60.6 H    (22.0-30.0)  sec


 


Sodium     (137-145)  mmol/L


 


POC Glucose (mg/dL)   149 H  141 H  (75-99)  mg/dL














  05/04/17 05/04/17 05/04/17 Range/Units





  00:31 02:19 04:20 


 


WBC     (3.8-10.6)  k/uL


 


RBC     (4.30-5.90)  m/uL


 


Hgb     (13.0-17.5)  gm/dL


 


Hct     (39.0-53.0)  %


 


Neutrophils #     (1.3-7.7)  k/uL


 


APTT     (22.0-30.0)  sec


 


Sodium     (137-145)  mmol/L


 


POC Glucose (mg/dL)  130 H  130 H  115 H  (75-99)  mg/dL














  05/04/17 05/04/17 05/04/17 Range/Units





  04:59 04:59 04:59 


 


WBC    12.6 H  (3.8-10.6)  k/uL


 


RBC    2.91 L  (4.30-5.90)  m/uL


 


Hgb    9.5 L  (13.0-17.5)  gm/dL


 


Hct    27.9 L  (39.0-53.0)  %


 


Neutrophils #    8.8 H  (1.3-7.7)  k/uL


 


APTT   55.1 H   (22.0-30.0)  sec


 


Sodium  134 L    (137-145)  mmol/L


 


POC Glucose (mg/dL)     (75-99)  mg/dL














  05/04/17 05/04/17 Range/Units





  06:06 06:56 


 


WBC    (3.8-10.6)  k/uL


 


RBC    (4.30-5.90)  m/uL


 


Hgb    (13.0-17.5)  gm/dL


 


Hct    (39.0-53.0)  %


 


Neutrophils #    (1.3-7.7)  k/uL


 


APTT    (22.0-30.0)  sec


 


Sodium    (137-145)  mmol/L


 


POC Glucose (mg/dL)  101 H  112 H  (75-99)  mg/dL














- Imaging and Cardiology


Chest x-ray: report reviewed, image reviewed





Assessment and Plan


(1) Status post coronary artery bypass graft


Status: Acute   





(2) Coronary artery disease


Status: Acute   





(3) Family history of heart disease


Status: Acute   





(4) History of PTCA


Status: Acute   





(5) Hyperlipidemia


Status: Acute   





(6) Hypertension


Status: Acute   





(7) Nicotine dependence, chewing tobacco, uncomplicated


Status: Acute   


Plan: 





1.  Continue ASA, Lipitor, heparin, Lopressor, lisinopril, digoxin, Lasix.


2.  Continue amiodarone for atrial fibrillation prophylaxis, decreased to 200 

mg daily yesterday.


3.  Wean O2 as tolerated.


4.  NG tube okay to be removed once able to tolerate oral intake.  Modified 

barium swallow scheduled for this morning. 


5.  Continue TPN for until patient able to tolerate oral intake.


6.  Insulin/diabetic management per primary care service.


7.  Continue antibiotics per pulmonology.


8.  Increase activity, out of bed to chair.  Physical therapy following.  


9.  Daily labs, chest x-rays.


10.  GI/DVT prophylaxis.


11.  Potassium, magnesium replacement per protocol.  


12.  Will begin Zoloft 50 mg daily.


13.  Transfer to  [facility] soon.


14.  Discharge planning in process.  Patient will need rehabilitation upon 

discharge.





Time with Patient: Greater than 30

## 2017-05-04 NOTE — P.PN
Subjective


Principal diagnosis: 





Status post CABG, postoperative day # 21





61-year-old male patient with status post carotid bypass surgery and the 

patient is postop day #11.  The patient has failed to wean off the mechanical 

ventilator.  Immediately postop the patient had pulmonate complications 

including mucous plugs and atelectasis of the left lung.  He required 

bronchoscopy and therapeutic airway suctioning and all of the respiratory 

cultures came back negative.  This morning, the patient was on a volume cycled 

assist-control mode of ventilation at the rate of 20, tidal volume 450, FiO2 of 

40% and a PEEP of 5.  I reviewed the blood gases and I reviewed also the chest x

-ray.  I noted that the patient was unable to tolerate assist-control mode.  

Attempts to wean him off the sedation Haja as the patient was becoming 

restless and a successful the mechanical ventilator.  Based on this, I switched 

this patient to a pressure support mode of ventilation and I was able to 

completely wean him off sedation.  He was wide awake all he was hardly able to 

wiggle his toes or move his fingers and he was unable to raise his had or arms 

against gravity.  He was having copious amount of rest or secretions and he was 

requiring frequent suctioning.  At a later stage, switch this patient to a 

pressure control mode of ventilation with a PEEP of 7 and a pressure control of 

20 and his FiO2 was At 60%.  He remains hemodynamically stable.  He is 

producing adequate amount of urine output.  No pressors at this point.  No 

fever.  No chills.  Chest tubes have been all removed.  He is tolerating tube 

feeds.  Atelectatic discussion with the cardiothoracic surgeon and will plan to 

proceed with a PEG and trach if the patient ultimately failed weaning.  One 

concern is that he has significant neuromuscular weakness which is probably a 

critical illness polyneuropathy and myopathy.





On 04/25/2017 the patient remains intubated on a mechanical ventilator.  I was 

able to the sedation completely on the patient.  He is awake at this point and 

he had been awake throughout the night.  He is following simple commands.  

Motor functions are nearly absent.  The patient is extremely weak.  He can 

wiggle his toes and occasionally bend his knees.  Otherwise he cannot raise his 

arms and legs against gravity.  He has a cough reflex.  He can blink his eyes 

and raises eyebrows.  Reflexes are significantly diminished in the upper and 

lower extremities bilaterally.  Is on a pressure control mode of ventilation at 

the rate of 18, PC 18, PEEP of 7 and FiO2 of 50%.  Still having significant 

respiratory secretions through the orotracheal tube.  The chest x-ray from 

today shows moderate parenchymal opacity within the left lung base and the 

right lung base.  There is some atelectatic changes in the lung bases more so 

on the left.  The patient has also postop changes related to coronary artery 

bypass surgery.  No fever.  No chills.  Hemodynamically stable.  Producing 

adequate amount of urine output and the patient was diuresis with a combination 

of albumin and Lasix.  He is on tube feeds.





On 04/26/2017 the patient remains on a mechanical ventilator.  Seems to be much 

more awake compared to yesterday.  Motor functions remain weak over the patient 

is doing more activity and movement on today's evaluation.  We were able to 

bring the patient is sitting up position for a total of 2 hours today.  He 

tolerated well and following that he had a coughing spells and he had to be 

placed in bed.  He remains in the same vent setting with a pressure control of 

18, PEEP of 7, FiO2 of 50% and rate of 18.  Chest x-ray shows adequate 

expansion of the left lung with a few being in good location.  Sputum analysis 

showed Moraxella catarrhalis and the based on that the patient was started on 

Levaquin.  Note that he had also Klebsiella PNEUMONIA IN HIS URINE, AND BOTH OF 

THESE MICROORGANISMS SHOULD RESPOND TO LEVAQUIN.  Meanwhile, the patient is 

receiving enteral feeding for nutritional support.  IV Solu Medrol is been 

discontinued.  We'll doing daily physical therapy and passive range of motion.  

He remains on IV heparin.  He remains on IV insulin for blood sugar control.  

Morning blood gases showed a pH of 7.51 with a pCO2 of 26 and pO2 of 79.  

Weaning parameters are still poor as the patient has rapid shallow breathing 

index around 120 and the patient becomes tachypneic and the LOW tidal volumes.








On 04/27/2017 the patient is being seen in follow-up.  Earlier this morning the 

patient was still mechanical ventilator on a pressure control mode.  Chest x-

ray from earlier this morning showed no significant abnormalities.  There was 

improvement in aeration in lung bases bilaterally especially on the left.  NG 

tube was still in a good location below the diaphragm.  Meanwhile affect 

abdominal film was done this morning and that showed a component of ileus.  

Note that overnight, the patient's tube feeds were discontinued knowing that he 

had 2 bouts of emesis.  Nevertheless, despite ongoing gastrointestinal 

abnormalities, the patient is doing very well while being on mechanical 

ventilator and is awake and alert.  We checked his weaning parameters this 

morning and the numbers looked very well.  He was given a pressure support of 5 

and PEEP of 5 and a breathing trial for a total of 30 minutes and following 

that we made a decision to extubate this patient.  This was a difficult 

decision to make knowing that the patient had an underlying abdominal ileus and 

he is still having significant motor weakness in the upper and lower 

extremities.  Nevertheless, I noted that his motor function is improved his 

cough improved and he had very decent weaning parameters.  I suspected that 

this will be his last chance of being extubated and if not successful, he will 

need a PEG and trach with the next 24 hours.  Based on this, I extubated this 

patient.  He remains on IV heparin.  He remains on IV insulin.  He is also on 

Levaquin.  He is afebrile.  He is awake and following commands.  Motor function 

is gradually improving.  No other new complaints otherwise for now.  Noted post 

extubation, the patient was placed on 5 L of oxygen nasal cannula with 

saturation of 94-95%He continued to bleed comfortably.  He was placed in 

sitting up position in ICU.





04/28/2017, the patient is being seen in follow-up in the intensive care unit.  

The patient has been extubated and has been off the mechanical ventilator for 

the past 24 hours.  He is breathing comfortably.  No significant rest or 

distress.  NG tube is in place.  There is considerable amount of output from 

the NG tube still in the abdomen is distended although less compared to 

yesterday.  He had colonic ileus and some dilatation of the small bowel.  A 

rectal tube was inserted and abdomen was quite deflated.  He is producing some 

loose liquidy bowel movements yesterday and small amounts.  Rectal examination 

was done and the patient does not have any impacted stool.  No abdominal pain.  

No fever.  No chills.  He is in sinus rhythm for the time being and stable 

hemodynamics.  Is producing adequate amount of urine output.  All of the 

surgical wound sites are clean and intact.  Neurologically is awake.  There has 

been obvious improvement in the motor function and the patient is talking and 

communicating at this point.  He remains on IV heparin.  He remains on IV 

insulin for blood sugar control.  White cell count is at 15.1.  Hemoglobin is 

stable at 8.7.





On 04/29/2017, the patient is being seen in follow-up.  The patient is awake.  

Following commands and communicating.  As mentioned earlier, profoundly weak 

although that has been steady and slow improvement in his motor function.  NG 

tube in place.  Abdomen remains distended.  Flexeril of the abdomen shows ileus 

both large and small bowel.  No bowel functional mobility at all over the past 

12 hours.  The patient is on Reglan.  From the pulmonary standpoint, he is calm 

and comfortable.  No labored breathing.  Actually taking well on 3 L of oxygen 

by nasal cannula.  The chest exit from today shows stable at ectatic changes 

small effusion the lung bases bilaterally.  NG tube in place.  Output is 

minimal at this point.  Abdomen is tender get is soft and nontender.  Bowel 

sounds are very hypoactive.  Repeat abdominal films was reviewed and there is 

concern of ongoing large and small bowel ileus.  He is on IV heparin.  Remains 

on IV insulin for blood sugar control.  Remains nothing by mouth for the time 

being.  No leukocytosis.  Cardiac rhythm is sinus.





On 04/30/2017 I'm seeing this patient in follow-up.  He is awake.  He is 

communicating.  NG tube is in place.  Total amount of output from the NG tube 

is around 250 mL over the past 24 hours.  Abdomen remains distended and 

tympanic and bowel sounds are hypoactive.  CAT scan of the abdomen that was 

done showed a large bowel ileus.  The patient is on Reglan.  The patient was 

started on TPN for nutritional support.  The CAT scan of the chest also showed 

some early dehiscence in the lower surgical incision with some surrounding 

swelling/hematoma.  There is small better pleural effusion and atelectasis in 

the left lung base.  No fever.  No chills.  Still on insulin drip.  Still on a 

heparin drip.  Right upper extremity is swollen and Artline catheter needs to 

be removed.





On 5/1/2017, patient was seen on follow-up, continues to be about the same.  

Continues to have nasogastric tube in place, continues to have hypoactive bowel 

sounds and what seems to be an ileus.  Patient remains on TPN for nutritional 

support, ultrasound of the right upper extremity showed no evidence of DVT.  

Chest x-ray showed cardiomegaly and small left pleural effusion.  All labs were 

reviewed, PTT is therapeutic.  Hemoglobin is 8.8 today.  Basic metabolic 

profile is relatively normal.





On 5/2/2017, patient seems to be a bit more active, and bit more verbal, and 

more responsive.  Denies any shortness of breath, no cough no wheezing, no 

chest pain, no fever, no chills, no hemoptysis.  Continues to have nasogastric 

tube in place, and his abdomen remains a bit distended.  Remains on TPN, and I 

would like to have the swallow evaluation done by speech therapy hoping we 

could place the patient on oral diet.  All his labs were reviewed.  

Electrolytes are normal PTT therapeutic CBC is normal hemoglobin is 8.6.  

Patient remains on physical therapy





On 5/3/2017, patient seems to be doing relatively well, about the same compared 

to yesterday.  The main issue at this point seems to be issue of feeding, 

patient failed his swallow evaluation, and we would start feeding at least at a 

slow pace using his nasogastric tube, patient may eventually need a Dobbhoff or 

he may need a PEG tube placement.  CBC was reviewed, hemoglobin is 8.7.  

Electrolytes and renal profile are normal.  WBC count is 12.4.  Chest x-ray 

showed small bilateral pleural effusions.





On 5/4/2017 patient continues to slowly and gradually improve, he seems to be 

tolerating tube feeding well, he may have a modified barium swallow today, and 

if he does well may even consider taking the nasogastric tube out, and start 

feeding the patient.  In the meantime we have been able to cut down on his TPN, 

and 50% of his caloric requirement is given by enteral feeding, and 50% is by 

TPN at present.  Again were hoping we could potentially remove the nasogastric 

tube today.  In the meantime the patient has less abdominal discomfort, he had 

a few bowel movements, and his GI issues seems to be improving.  Labs were 

reviewed he had a relatively normal CBC and relatively normal basic metabolic 

profile.








Objective





- Vital Signs


Vital signs: 


 Vital Signs











Temp  98.1 F   05/04/17 08:00


 


Pulse  64   05/04/17 11:40


 


Resp  16   05/04/17 11:00


 


BP  105/58   05/04/17 11:00


 


Pulse Ox  96   05/04/17 11:00








 Intake & Output











 05/03/17 05/04/17 05/04/17





 18:59 06:59 18:59


 


Intake Total 3069.365 2313.853 308.368


 


Output Total 1970 1875 880


 


Balance 1099.365 438.853 -571.632


 


Weight  97.1 kg 


 


Intake:   


 


   240 100


 


    .9 NaCL 240 240 100


 


  Intake, IV Titration 2689.365 1593.853 88.368





  Amount   


 


    Fat Emulsion 20% 250 ml 42 189 





    In Empty Bag 1 bag @ 21   





    mls/hr IV MoWeFr [facility] Rx#:   





    [id number]   


 


    Heparin Sodium,Porcine/ 277.688 500 





    D5w Pmx 25,000 unit In   





    Dextrose/Water 1 500ml.   





    bag @ 10 UNITS/KG/HR 19.   





    98 mls/hr IV .Q24H [facility] Rx   





    #:[id number]   


 


    Insulin Regular 100 unit 108.677 109.853 38.368





    In Sodium Chloride 0.9%   





    100 ml @ Per Protocol IV   





    .Q0M [facility] Rx#:[id number]   


 


    Levofloxacin 750Mg-D5w 150  





    Pmx 750 mg In Dextrose/   





    Water 1 150ml.bag @ 100   





    mls/hr IVPB Q24H [facility] Rx#:   





    [id number]   


 


    Magnesium Sulfate-D5w Pmx 75  





    1 gm In Dextrose/Water 1   





    100ml.bag @ 100 mls/hr   





    IVPB Q1H [facility] Rx#:   





    [id number]   


 


    Magnesium Sulfate-D5w Pmx 200  





    1 gm In Dextrose/Water 1   





    100ml.bag @ 100 mls/hr   





    IVPB Q1H [facility] Rx#:   





    [id number]   


 


    Magnesium Sulfate-D5w Pmx   10





    1 gm In Dextrose/Water 1   





    100ml.bag @ 100 mls/hr   





    IVPB Q1H [facility] Rx#:   





    [id number]   


 


    Mvi, Adult No.4 with Vit 1011  





    K 10 ml Trace (Conc-1Ml/   





    Dose) 1 ml In Amino Acid   





    5%-D25w+Lytes*E* 1,000 ml   





    @ 75 mls/hr IV .BY   





    DURATION [facility] Rx#:   





    [id number]   


 


    Mvi, Adult No.4 with Vit 825 795 40





    K 10 ml Trace (Conc-1Ml/   





    Dose) 1 ml Sodium Acetate   





    30 meq Potassium   





    Chloride 20 meq Calcium   





    Gluconate 1,000 mg In   





    Amino Acid 5%-D25w 1,000   





    ml @ 75 mls/hr IV .   





    [id number] [facility] Rx#:[id number]   


 


  Tube Feeding 50 330 120


 


  Other 90 150 


 


Output:   


 


  Urine 1970 1875 880


 


Other:   


 


  Voiding Method Indwelling Catheter Indwelling Catheter Indwelling Catheter


 


  # Bowel Movements 1  








 ABP, PAP, CO, CI - Last Documented











Arterial Blood Pressure        172/58


 


Pulmonary Artery Pressure      46/23


 


Cardiac Output                 6.8


 


Cardiac Index                  3.3

















- Exam





Physical Exam: Revealed a 61-year-old white male, chronically ill, weak, but in 

no form of respiratory distress.


HEENT:[Neck is supple.] [No neck masses.] [No thyromegaly.] [No JVD.]  

Nasogastric tube is noted to be in place.


Chest: [Diminished breath sounds at the bases, no crackles, no rhonchi, no 

wheezes.]


Cardiac Exam: [Normal S1 and S2, no S3 gallop, no murmur.]


Abdomen: [Soft, nontender,  no megaly, no rebound, no guarding, normal bowel 

sounds.]


Extremities: [No clubbing, no edema, no cyanosis.]


Neurological Exam: [No focal neurologic deficit.]  However patient is noted to 

be generally weak.





- Labs


CBC & Chem 7: 


 05/04/17 04:59





 05/04/17 04:59


Labs: 


 Abnormal Lab Results - Last 24 Hours (Table)











  05/03/17 05/03/17 05/03/17 Range/Units





  14:02 16:12 18:02 


 


WBC     (3.8-10.6)  k/uL


 


RBC     (4.30-5.90)  m/uL


 


Hgb     (13.0-17.5)  gm/dL


 


Hct     (39.0-53.0)  %


 


Neutrophils #     (1.3-7.7)  k/uL


 


APTT     (22.0-30.0)  sec


 


Sodium     (137-145)  mmol/L


 


POC Glucose (mg/dL)  136 H  130 H  165 H  (75-99)  mg/dL














  05/03/17 05/03/17 05/03/17 Range/Units





  19:01 19:50 21:50 


 


WBC     (3.8-10.6)  k/uL


 


RBC     (4.30-5.90)  m/uL


 


Hgb     (13.0-17.5)  gm/dL


 


Hct     (39.0-53.0)  %


 


Neutrophils #     (1.3-7.7)  k/uL


 


APTT  60.6 H    (22.0-30.0)  sec


 


Sodium     (137-145)  mmol/L


 


POC Glucose (mg/dL)   149 H  141 H  (75-99)  mg/dL














  05/04/17 05/04/17 05/04/17 Range/Units





  00:31 02:19 04:20 


 


WBC     (3.8-10.6)  k/uL


 


RBC     (4.30-5.90)  m/uL


 


Hgb     (13.0-17.5)  gm/dL


 


Hct     (39.0-53.0)  %


 


Neutrophils #     (1.3-7.7)  k/uL


 


APTT     (22.0-30.0)  sec


 


Sodium     (137-145)  mmol/L


 


POC Glucose (mg/dL)  130 H  130 H  115 H  (75-99)  mg/dL














  05/04/17 05/04/17 05/04/17 Range/Units





  04:59 04:59 04:59 


 


WBC    12.6 H  (3.8-10.6)  k/uL


 


RBC    2.91 L  (4.30-5.90)  m/uL


 


Hgb    9.5 L  (13.0-17.5)  gm/dL


 


Hct    27.9 L  (39.0-53.0)  %


 


Neutrophils #    8.8 H  (1.3-7.7)  k/uL


 


APTT   55.1 H   (22.0-30.0)  sec


 


Sodium  134 L    (137-145)  mmol/L


 


POC Glucose (mg/dL)     (75-99)  mg/dL














  05/04/17 05/04/17 05/04/17 Range/Units





  06:06 06:56 08:16 


 


WBC     (3.8-10.6)  k/uL


 


RBC     (4.30-5.90)  m/uL


 


Hgb     (13.0-17.5)  gm/dL


 


Hct     (39.0-53.0)  %


 


Neutrophils #     (1.3-7.7)  k/uL


 


APTT     (22.0-30.0)  sec


 


Sodium     (137-145)  mmol/L


 


POC Glucose (mg/dL)  101 H  112 H  189 H  (75-99)  mg/dL














  05/04/17 05/04/17 05/04/17 Range/Units





  09:09 10:04 11:04 


 


WBC     (3.8-10.6)  k/uL


 


RBC     (4.30-5.90)  m/uL


 


Hgb     (13.0-17.5)  gm/dL


 


Hct     (39.0-53.0)  %


 


Neutrophils #     (1.3-7.7)  k/uL


 


APTT     (22.0-30.0)  sec


 


Sodium     (137-145)  mmol/L


 


POC Glucose (mg/dL)  175 H  167 H  150 H  (75-99)  mg/dL














  05/04/17 05/04/17 Range/Units





  12:35 13:29 


 


WBC    (3.8-10.6)  k/uL


 


RBC    (4.30-5.90)  m/uL


 


Hgb    (13.0-17.5)  gm/dL


 


Hct    (39.0-53.0)  %


 


Neutrophils #    (1.3-7.7)  k/uL


 


APTT    (22.0-30.0)  sec


 


Sodium    (137-145)  mmol/L


 


POC Glucose (mg/dL)  102 H  117 H  (75-99)  mg/dL














Assessment and Plan


Plan: 





1 Coronary artery disease status post cardiac bypass surgery and the patient is 

postop day # 21





2 prolonged postoperative ventilator-dependent history failure, multifactorial.

  Patient has extubated.  Nevertheless, the active ongoing problems for now is 

his neuromuscular weakness and ongoing ileus.  Patient has a combination of 

large bowel ileus as evident on the flat film the abdomen.  NG tube still in 

place.





3 recurrent mucous plugs with excessive respiratory secretions requiring 

bronchoscopy and a peak airway suctioning and a bronchoscopy cultures came back 

negative for any microbial growth. The most recent sputum analysis showing 

Moraxella catarrhalis and the patient is currently on Levaquin.  Chest x-ray 

from today is showing atelectatic changes in lung bases bilaterally.  

Nevertheless the patient is oxygenating well and there is no respiratory 

distress at this point.





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 

weakness, improving slowly





5 hypertension





6 hyperlipidemia





7 atrial fibrillation, paroxysmal,  currently on a heparin drip and the patient'

s rhythm is sinus with a controlled rate





8 ileus,  large bowel ileus.  Despite this, the patient is having a nontoxic 

abdomen.  No abdominal tenderness.  No leukocytosis.  No fever.  No acidosis.  

NG tube is in place and the patient is still nothing by mouth.  The patient was 

initiated on TPN for nutritional support





9 postoperative anemia, currently hemoglobin is stable





10 alcoholism, recovered from DT





11 gout





12 diabetes mellitus, currently on an insulin drip 





13 suspected early dehiscence of the lower sternal wound.  Doubt infection





Recommendation: Continue present supportive care measures, patient will have a 

modified barium swallow, and if tolerated may even consider taking the 

nasogastric tube out, feed the patient orally, and gradually discontinue TPN.  

In the meantime we will continue to work on possible transferring the patient 

to a rehab facility.  Patient will likely require a long course of rehab 

considering his critical illness polyneuropathy and myopathy.


Time with Patient: Less than 30

## 2017-05-04 NOTE — FL
Modified barium swallow

 

HISTORY: Extended mechanical ventilation

 

Patient was given barium mixed with solids and liquids and evaluated in the lateral projection under 
real-time fluoroscopy. There is no evident aspiration or laryngeal penetration. Swallowing mechanism 
is normal. 2 minutes 1 second fluoroscopy time supplied

 

IMPRESSION: See dictated report speech pathology

## 2017-05-04 NOTE — PN
HOSPITAL COURSE/SUBJECTIVE DATA: This is a 61-year-old gentleman who 

underwent a CABG, postoperative day 21, had a complicated course, 

including a prolonged ventilator-dependent respiratory failure. 

Patient thereafter had an ileus. Over the course of the last 4 days, 

patient initially had an NG tube placed with decompression thereafter. 

Due to prolonged requirement for NG tube, the patient thereafter was 

started on TPN. Patient today underwent a swallow eval and was able to 

tolerate a modified diet without significant aspiration. Today patient 

is off oxygen, states to have a cough with minimal sputum production. 

Denies having any headaches, blurry vision, nausea, vomiting. Denies 

having any abdominal discomfort.  



The patient does state to have some pain in his left foot. No other 

abnormalities are reported by the nursing staff.  



PHYSICAL EXAM:

VITALS: Temperature is 98.6, heart rate 65, respiratory rate 18, blood 

pressure 91/49. Saturating 92% on room air.  

GENERAL APPEARANCE: Alert, oriented x3. 

NECK: Supple. No JVD. 

LUNGS: Diminished breath sounds. Trace expiratory wheezing transmitted 

from the hypopharynx.  

HEART: Regular rate and rhythm. No murmurs appreciated. 

ABDOMEN: Distended. Tympanic to percussion. Bowel sounds are intact. 

LOWER EXTREMITIES: No significant edema appreciated. 

NEURO: Moves all 4 extremities. No focal motor or sensory deficits 

noted.  



Laboratory data, including the glucose levels, were reviewed. 



ASSESSMENT AND PLAN: 

1. Coronary artery disease, status post coronary artery bypass graft 

postoperative day 21.  

2. Acute hypoxic respiratory failure with a prolonged ventilator 

dependence due to the patient's comorbidities.  

3. Acute tracheobronchitis with Moraxella catarrhalis. 

4. Critical care polyneuropathy. 

5. Hypertension. 

6. Paroxysmal atrial fibrillation. 

7. Dyslipidemia. 

8. Ileus, which is improved. 

9. Dysphagia, which is retested today and he is able to tolerate a 

diet.  

10. Delirium, which is improved. 

11. Gout with concern for an acute flare. 

12. Diabetes mellitus. Continues to be on an insulin drip. 



PLAN: Continue insulin drip. Once TPN is completely tapered off, will 

start the patient on basal dose of insulin with pre-meal NovoLog. At 

this time, TPN has been slowly tapered off according to Cardiothoracic 

Surgery.  



Will start the patient on colchicine 0.6 mg q.8h. b.i.d. p.r.n. for 

gout pain. The patient should be monitored for any signs of bleeding 

with anti-inflammatory. If patient has not improved, will discontinue 

the medication tomorrow and likely given a burst of steroids, which do 

help gouty flares. Will follow.

## 2017-05-04 NOTE — XR
EXAMINATION TYPE: XR chest 1V portable

 

DATE OF EXAM: 5/4/2017 6:25 AM

 

COMPARISON: 5/3/2017

 

HISTORY: SOB, Follow Up

 

FINDINGS:

 

Indwelling tubes and catheters are unchanged.

 

No change in bibasilar opacities.  Improved pulmonary venous congestion.

 

Stable appearance of the cardio-mediastinal structures at this time.

 

Pleural effusion unchanged.

 

IMPRESSION:

1. Improved pulmonary venous congestion with persistent basilar atelectasis or infiltrates. Clinical 
correlation and follow up until resolution is recommended.

## 2017-05-05 LAB
ANION GAP SERPL CALC-SCNC: 8 MMOL/L
BASOPHILS # BLD AUTO: 0 K/UL (ref 0–0.2)
BASOPHILS NFR BLD AUTO: 0 %
BUN SERPL-SCNC: 13 MG/DL (ref 9–20)
CALCIUM SPEC-MCNC: 8 MG/DL (ref 8.4–10.2)
CH: 32.1
CHCM: 33.8
CHLORIDE SERPL-SCNC: 99 MMOL/L (ref 98–107)
CO2 SERPL-SCNC: 27 MMOL/L (ref 22–30)
EOSINOPHIL # BLD AUTO: 0.3 K/UL (ref 0–0.7)
EOSINOPHIL NFR BLD AUTO: 3 %
ERYTHROCYTE [DISTWIDTH] IN BLOOD BY AUTOMATED COUNT: 2.74 M/UL (ref 4.3–5.9)
ERYTHROCYTE [DISTWIDTH] IN BLOOD: 14.8 % (ref 11.5–15.5)
GLUCOSE BLD-MCNC: 107 MG/DL (ref 75–99)
GLUCOSE BLD-MCNC: 109 MG/DL (ref 75–99)
GLUCOSE BLD-MCNC: 110 MG/DL (ref 75–99)
GLUCOSE BLD-MCNC: 128 MG/DL (ref 75–99)
GLUCOSE BLD-MCNC: 129 MG/DL (ref 75–99)
GLUCOSE BLD-MCNC: 136 MG/DL (ref 75–99)
GLUCOSE BLD-MCNC: 148 MG/DL (ref 75–99)
GLUCOSE BLD-MCNC: 162 MG/DL (ref 75–99)
GLUCOSE BLD-MCNC: 221 MG/DL (ref 75–99)
GLUCOSE BLD-MCNC: 73 MG/DL (ref 75–99)
GLUCOSE SERPL-MCNC: 85 MG/DL (ref 74–99)
HCT VFR BLD AUTO: 26.2 % (ref 39–53)
HDW: 3.28
HGB BLD-MCNC: 9 GM/DL (ref 13–17.5)
INR PPP: 1.2 (ref ?–1.1)
LUC NFR BLD AUTO: 4 %
LYMPHOCYTES # SPEC AUTO: 2.3 K/UL (ref 1–4.8)
LYMPHOCYTES NFR SPEC AUTO: 21 %
MAGNESIUM SPEC-SCNC: 2 MG/DL (ref 1.6–2.3)
MCH RBC QN AUTO: 32.8 PG (ref 25–35)
MCHC RBC AUTO-ENTMCNC: 34.4 G/DL (ref 31–37)
MCV RBC AUTO: 95.4 FL (ref 80–100)
MONOCYTES # BLD AUTO: 0.7 K/UL (ref 0–1)
MONOCYTES NFR BLD AUTO: 7 %
NEUTROPHILS # BLD AUTO: 7.1 K/UL (ref 1.3–7.7)
NEUTROPHILS NFR BLD AUTO: 66 %
NON-AFRICAN AMERICAN GFR(MDRD): >60
PHOSPHATE SERPL-MCNC: 4.5 MG/DL (ref 2.5–4.5)
POTASSIUM SERPL-SCNC: 3.8 MMOL/L (ref 3.5–5.1)
PT BLD: 12.1 SEC (ref 9–12)
SODIUM SERPL-SCNC: 134 MMOL/L (ref 137–145)
WBC # BLD AUTO: 0.38 10*3/UL
WBC # BLD AUTO: 10.8 K/UL (ref 3.8–10.6)
WBC (PEROX): 10.78

## 2017-05-05 RX ADMIN — SERTRALINE HYDROCHLORIDE SCH MG: 50 TABLET, FILM COATED ORAL at 08:42

## 2017-05-05 RX ADMIN — METOCLOPRAMIDE SCH MG: 5 INJECTION, SOLUTION INTRAMUSCULAR; INTRAVENOUS at 01:41

## 2017-05-05 RX ADMIN — SODIUM PHOSPHATE, MONOBASIC, MONOHYDRATE SCH MLS/HR: 276; 142 INJECTION, SOLUTION INTRAVENOUS at 17:08

## 2017-05-05 RX ADMIN — LISINOPRIL SCH MG: 10 TABLET ORAL at 08:42

## 2017-05-05 RX ADMIN — BUDESONIDE SCH MG: 1 SUSPENSION RESPIRATORY (INHALATION) at 19:09

## 2017-05-05 RX ADMIN — FUROSEMIDE SCH MG: 10 INJECTION, SOLUTION INTRAMUSCULAR; INTRAVENOUS at 08:41

## 2017-05-05 RX ADMIN — SOYBEAN OIL SCH MLS/HR: 20 INJECTION, SOLUTION INTRAVENOUS at 16:11

## 2017-05-05 RX ADMIN — FUROSEMIDE SCH MG: 10 INJECTION, SOLUTION INTRAMUSCULAR; INTRAVENOUS at 21:11

## 2017-05-05 RX ADMIN — METOPROLOL TARTRATE SCH MG: 50 TABLET, FILM COATED ORAL at 22:06

## 2017-05-05 RX ADMIN — METOPROLOL TARTRATE SCH MG: 50 TABLET, FILM COATED ORAL at 08:41

## 2017-05-05 RX ADMIN — PANTOPRAZOLE SODIUM SCH MG: 40 TABLET, DELAYED RELEASE ORAL at 08:41

## 2017-05-05 RX ADMIN — IPRATROPIUM BROMIDE AND ALBUTEROL SULFATE SCH ML: .5; 3 SOLUTION RESPIRATORY (INHALATION) at 19:09

## 2017-05-05 RX ADMIN — COLCHICINE PRN MG: 0.6 TABLET, FILM COATED ORAL at 01:42

## 2017-05-05 RX ADMIN — IPRATROPIUM BROMIDE AND ALBUTEROL SULFATE SCH ML: .5; 3 SOLUTION RESPIRATORY (INHALATION) at 15:45

## 2017-05-05 RX ADMIN — BUDESONIDE SCH MG: 1 SUSPENSION RESPIRATORY (INHALATION) at 07:44

## 2017-05-05 RX ADMIN — METOCLOPRAMIDE SCH: 5 INJECTION, SOLUTION INTRAMUSCULAR; INTRAVENOUS at 16:14

## 2017-05-05 RX ADMIN — LISINOPRIL SCH MG: 10 TABLET ORAL at 22:06

## 2017-05-05 RX ADMIN — IPRATROPIUM BROMIDE AND ALBUTEROL SULFATE SCH ML: .5; 3 SOLUTION RESPIRATORY (INHALATION) at 07:44

## 2017-05-05 RX ADMIN — IPRATROPIUM BROMIDE AND ALBUTEROL SULFATE SCH ML: .5; 3 SOLUTION RESPIRATORY (INHALATION) at 10:52

## 2017-05-05 RX ADMIN — INSULIN HUMAN SCH MLS/HR: 100 INJECTION, SOLUTION PARENTERAL at 22:08

## 2017-05-05 RX ADMIN — INSULIN HUMAN SCH MLS/HR: 100 INJECTION, SOLUTION PARENTERAL at 16:11

## 2017-05-05 RX ADMIN — AMIODARONE HYDROCHLORIDE SCH MG: 200 TABLET ORAL at 08:42

## 2017-05-05 RX ADMIN — METOCLOPRAMIDE SCH MG: 5 INJECTION, SOLUTION INTRAMUSCULAR; INTRAVENOUS at 06:20

## 2017-05-05 RX ADMIN — SODIUM CHLORIDE, PRESERVATIVE FREE SCH ML: 5 INJECTION INTRAVENOUS at 08:43

## 2017-05-05 RX ADMIN — Medication SCH MG: at 21:12

## 2017-05-05 RX ADMIN — ATORVASTATIN CALCIUM SCH MG: 40 TABLET, FILM COATED ORAL at 08:41

## 2017-05-05 RX ADMIN — ASPIRIN 325 MG ORAL TABLET SCH MG: 325 PILL ORAL at 08:41

## 2017-05-05 RX ADMIN — METOCLOPRAMIDE SCH MG: 5 INJECTION, SOLUTION INTRAMUSCULAR; INTRAVENOUS at 13:19

## 2017-05-05 RX ADMIN — SODIUM CHLORIDE, PRESERVATIVE FREE SCH ML: 5 INJECTION INTRAVENOUS at 21:12

## 2017-05-05 RX ADMIN — DIGOXIN SCH MCG: 250 TABLET ORAL at 08:41

## 2017-05-05 RX ADMIN — DOCUSATE SODIUM AND SENNOSIDES SCH: 50; 8.6 TABLET ORAL at 23:57

## 2017-05-05 RX ADMIN — Medication SCH MG: at 08:44

## 2017-05-05 NOTE — XR
EXAMINATION TYPE: XR chest 1V portable

 

DATE OF EXAM: 5/5/2017 6:44 AM

 

HISTORY: post op cabg.

 

REFERENCE: Previous study dated 5/4/2017.

 

FINDINGS: There has been a midline sternotomy.

 

The heart is enlarged. There is left basilar airspace disease. There is a small left effusion. The ri
ght lung is clear.

 

IMPRESSION: 

 

NO SIGNIFICANT INTERVAL CHANGE IN APPEARANCE OF THE CHEST.

## 2017-05-05 NOTE — P.PN
Subjective


Patient is evaluated at bedside sitting up in a chair.  Patient is status post 

modified barium swallow x-ray with no evidence of leak or aspiration.  Patient 

has been started on a mechanical diet.  Tolerating well. Patient denies nausea, 

vomiting, or abdominal pain.  Patient passing flatus with bowel movement.








Objective





- Vital Signs


Vital signs: 


 Vital Signs











Temp  98.0 F   05/05/17 16:00


 


Pulse  68   05/05/17 16:04


 


Resp  21   05/05/17 16:00


 


BP  112/63   05/05/17 16:00


 


Pulse Ox  93 L  05/05/17 16:00








 Intake & Output











 05/04/17 05/05/17 05/05/17





 18:59 06:59 18:59


 


Intake Total 866.651 5170.971 290


 


Output Total 1310 1140 530


 


Balance -340.487 -28.029 -240


 


Weight  95.8 kg 95.8 kg


 


Intake:   


 


   240 200


 


    .9 NaCL 240 240 200


 


  Intake, IV Titration 609.513 571.971 





  Amount   


 


    Heparin Sodium,Porcine/  500 





    D5w Pmx 25,000 unit In   





    Dextrose/Water 1 500ml.   





    bag @ 10 UNITS/KG/HR 19.   





    98 mls/hr IV .Q24H [facility] Rx   





    #:[id number]   


 


    Insulin Regular 100 unit 67.513 71.971 





    In Sodium Chloride 0.9%   





    100 ml @ Per Protocol IV   





    .Q0M [facility] Rx#:[id number]   


 


    Levofloxacin 750Mg-D5w 150  





    Pmx 750 mg In Dextrose/   





    Water 1 150ml.bag @ 100   





    mls/hr IVPB Q24H [facility] Rx#:   





    [id number]   


 


    Magnesium Sulfate-D5w Pmx 10  





    1 gm In Dextrose/Water 1   





    100ml.bag @ 100 mls/hr   





    IVPB Q1H [facility] Rx#:   





    [id number]   


 


    Mvi, Adult No.4 with Vit 342  





    K 10 ml Trace (Conc-1Ml/   





    Dose) 1 ml In Amino Acid   





    5%-D25w+Lytes*E* 1,000 ml   





    @ 75 mls/hr IV .BY   





    DURATION [facility] Rx#:   





    [id number]   


 


    Mvi, Adult No.4 with Vit 40  





    K 10 ml Trace (Conc-1Ml/   





    Dose) 1 ml Sodium Acetate   





    30 meq Potassium   





    Chloride 20 meq Calcium   





    Gluconate 1,000 mg In   





    Amino Acid 5%-D25w 1,000   





    ml @ 75 mls/hr IV .   





    [id number] [facility] Rx#:[id number]   


 


  Oral  300 60


 


  Tube Feeding 120  30


 


Output:   


 


  Urine 1310 1140 530


 


Other:   


 


  Voiding Method Indwelling Catheter Indwelling Catheter Indwelling Catheter


 


  # Bowel Movements 1  1








 ABP, PAP, CO, CI - Last Documented











Arterial Blood Pressure        172/58


 


Pulmonary Artery Pressure      46/23


 


Cardiac Output                 6.8


 


Cardiac Index                  3.3

















- Exam


GENERAL: Pt awake and alert, and in no acute distress.


LUNGS: Breath sounds diminished to auscultation bilaterally.  No wheezes, rales

, or rhonchi.


HEART: Heart S1, S2, no S3 or S4.  Regular rate and rhythm.  No murmurs, rubs 

or gallops.


ABDOMEN: Soft, nontender, distended, hyperactive bowel sounds.  No guarding, no 

rebound.  








- Labs


CBC & Chem 7: 


 05/05/17 05:20





 05/05/17 05:20


Labs: 


 Abnormal Lab Results - Last 24 Hours (Table)











  05/04/17 05/04/17 05/04/17 Range/Units





  18:18 19:04 20:02 


 


WBC     (3.8-10.6)  k/uL


 


RBC     (4.30-5.90)  m/uL


 


Hgb     (13.0-17.5)  gm/dL


 


Hct     (39.0-53.0)  %


 


PT     (9.0-12.0)  sec


 


APTT     (22.0-30.0)  sec


 


Sodium     (137-145)  mmol/L


 


POC Glucose (mg/dL)  108 H  122 H  127 H  (75-99)  mg/dL


 


Calcium     (8.4-10.2)  mg/dL














  05/04/17 05/04/17 05/05/17 Range/Units





  21:24 22:00 01:04 


 


WBC     (3.8-10.6)  k/uL


 


RBC     (4.30-5.90)  m/uL


 


Hgb     (13.0-17.5)  gm/dL


 


Hct     (39.0-53.0)  %


 


PT     (9.0-12.0)  sec


 


APTT     (22.0-30.0)  sec


 


Sodium     (137-145)  mmol/L


 


POC Glucose (mg/dL)  115 H  129 H  110 H  (75-99)  mg/dL


 


Calcium     (8.4-10.2)  mg/dL














  05/05/17 05/05/17 05/05/17 Range/Units





  04:32 05:20 05:20 


 


WBC   10.8 H   (3.8-10.6)  k/uL


 


RBC   2.74 L   (4.30-5.90)  m/uL


 


Hgb   9.0 L   (13.0-17.5)  gm/dL


 


Hct   26.2 L   (39.0-53.0)  %


 


PT     (9.0-12.0)  sec


 


APTT    54.1 H  (22.0-30.0)  sec


 


Sodium     (137-145)  mmol/L


 


POC Glucose (mg/dL)  73 L    (75-99)  mg/dL


 


Calcium     (8.4-10.2)  mg/dL














  05/05/17 05/05/17 05/05/17 Range/Units





  05:20 05:20 06:15 


 


WBC     (3.8-10.6)  k/uL


 


RBC     (4.30-5.90)  m/uL


 


Hgb     (13.0-17.5)  gm/dL


 


Hct     (39.0-53.0)  %


 


PT   12.1 H   (9.0-12.0)  sec


 


APTT     (22.0-30.0)  sec


 


Sodium  134 L    (137-145)  mmol/L


 


POC Glucose (mg/dL)    107 H  (75-99)  mg/dL


 


Calcium  8.0 L    (8.4-10.2)  mg/dL














  05/05/17 05/05/17 Range/Units





  13:20 15:59 


 


WBC    (3.8-10.6)  k/uL


 


RBC    (4.30-5.90)  m/uL


 


Hgb    (13.0-17.5)  gm/dL


 


Hct    (39.0-53.0)  %


 


PT    (9.0-12.0)  sec


 


APTT    (22.0-30.0)  sec


 


Sodium    (137-145)  mmol/L


 


POC Glucose (mg/dL)  162 H  221 H  (75-99)  mg/dL


 


Calcium    (8.4-10.2)  mg/dL














Assessment and Plan


Plan: 


Impression:





1.  Dysphagia, resolved.


2.  Ileus, improving.





Plan:





Continue diet per speech therapist.  Continue medical treatment.  Continue to 

observe.





The above impression and plan have been discussed and directed by Dr. Inman. 

Birgitte NP-C acting as scribe for Dr. Inman.

## 2017-05-05 NOTE — P.PN
Subjective


Principal diagnosis: 





Status post CABG, postoperative day # 22





61-year-old male patient with status post carotid bypass surgery and the 

patient is postop day #11.  The patient has failed to wean off the mechanical 

ventilator.  Immediately postop the patient had pulmonate complications 

including mucous plugs and atelectasis of the left lung.  He required 

bronchoscopy and therapeutic airway suctioning and all of the respiratory 

cultures came back negative.  This morning, the patient was on a volume cycled 

assist-control mode of ventilation at the rate of 20, tidal volume 450, FiO2 of 

40% and a PEEP of 5.  I reviewed the blood gases and I reviewed also the chest x

-ray.  I noted that the patient was unable to tolerate assist-control mode.  

Attempts to wean him off the sedation Haja as the patient was becoming 

restless and a successful the mechanical ventilator.  Based on this, I switched 

this patient to a pressure support mode of ventilation and I was able to 

completely wean him off sedation.  He was wide awake all he was hardly able to 

wiggle his toes or move his fingers and he was unable to raise his had or arms 

against gravity.  He was having copious amount of rest or secretions and he was 

requiring frequent suctioning.  At a later stage, switch this patient to a 

pressure control mode of ventilation with a PEEP of 7 and a pressure control of 

20 and his FiO2 was At 60%.  He remains hemodynamically stable.  He is 

producing adequate amount of urine output.  No pressors at this point.  No 

fever.  No chills.  Chest tubes have been all removed.  He is tolerating tube 

feeds.  Atelectatic discussion with the cardiothoracic surgeon and will plan to 

proceed with a PEG and trach if the patient ultimately failed weaning.  One 

concern is that he has significant neuromuscular weakness which is probably a 

critical illness polyneuropathy and myopathy.





On 04/25/2017 the patient remains intubated on a mechanical ventilator.  I was 

able to the sedation completely on the patient.  He is awake at this point and 

he had been awake throughout the night.  He is following simple commands.  

Motor functions are nearly absent.  The patient is extremely weak.  He can 

wiggle his toes and occasionally bend his knees.  Otherwise he cannot raise his 

arms and legs against gravity.  He has a cough reflex.  He can blink his eyes 

and raises eyebrows.  Reflexes are significantly diminished in the upper and 

lower extremities bilaterally.  Is on a pressure control mode of ventilation at 

the rate of 18, PC 18, PEEP of 7 and FiO2 of 50%.  Still having significant 

respiratory secretions through the orotracheal tube.  The chest x-ray from 

today shows moderate parenchymal opacity within the left lung base and the 

right lung base.  There is some atelectatic changes in the lung bases more so 

on the left.  The patient has also postop changes related to coronary artery 

bypass surgery.  No fever.  No chills.  Hemodynamically stable.  Producing 

adequate amount of urine output and the patient was diuresis with a combination 

of albumin and Lasix.  He is on tube feeds.





On 04/26/2017 the patient remains on a mechanical ventilator.  Seems to be much 

more awake compared to yesterday.  Motor functions remain weak over the patient 

is doing more activity and movement on today's evaluation.  We were able to 

bring the patient is sitting up position for a total of 2 hours today.  He 

tolerated well and following that he had a coughing spells and he had to be 

placed in bed.  He remains in the same vent setting with a pressure control of 

18, PEEP of 7, FiO2 of 50% and rate of 18.  Chest x-ray shows adequate 

expansion of the left lung with a few being in good location.  Sputum analysis 

showed Moraxella catarrhalis and the based on that the patient was started on 

Levaquin.  Note that he had also Klebsiella PNEUMONIA IN HIS URINE, AND BOTH OF 

THESE MICROORGANISMS SHOULD RESPOND TO LEVAQUIN.  Meanwhile, the patient is 

receiving enteral feeding for nutritional support.  IV Solu Medrol is been 

discontinued.  We'll doing daily physical therapy and passive range of motion.  

He remains on IV heparin.  He remains on IV insulin for blood sugar control.  

Morning blood gases showed a pH of 7.51 with a pCO2 of 26 and pO2 of 79.  

Weaning parameters are still poor as the patient has rapid shallow breathing 

index around 120 and the patient becomes tachypneic and the LOW tidal volumes.








On 04/27/2017 the patient is being seen in follow-up.  Earlier this morning the 

patient was still mechanical ventilator on a pressure control mode.  Chest x-

ray from earlier this morning showed no significant abnormalities.  There was 

improvement in aeration in lung bases bilaterally especially on the left.  NG 

tube was still in a good location below the diaphragm.  Meanwhile affect 

abdominal film was done this morning and that showed a component of ileus.  

Note that overnight, the patient's tube feeds were discontinued knowing that he 

had 2 bouts of emesis.  Nevertheless, despite ongoing gastrointestinal 

abnormalities, the patient is doing very well while being on mechanical 

ventilator and is awake and alert.  We checked his weaning parameters this 

morning and the numbers looked very well.  He was given a pressure support of 5 

and PEEP of 5 and a breathing trial for a total of 30 minutes and following 

that we made a decision to extubate this patient.  This was a difficult 

decision to make knowing that the patient had an underlying abdominal ileus and 

he is still having significant motor weakness in the upper and lower 

extremities.  Nevertheless, I noted that his motor function is improved his 

cough improved and he had very decent weaning parameters.  I suspected that 

this will be his last chance of being extubated and if not successful, he will 

need a PEG and trach with the next 24 hours.  Based on this, I extubated this 

patient.  He remains on IV heparin.  He remains on IV insulin.  He is also on 

Levaquin.  He is afebrile.  He is awake and following commands.  Motor function 

is gradually improving.  No other new complaints otherwise for now.  Noted post 

extubation, the patient was placed on 5 L of oxygen nasal cannula with 

saturation of 94-95%He continued to bleed comfortably.  He was placed in 

sitting up position in ICU.





04/28/2017, the patient is being seen in follow-up in the intensive care unit.  

The patient has been extubated and has been off the mechanical ventilator for 

the past 24 hours.  He is breathing comfortably.  No significant rest or 

distress.  NG tube is in place.  There is considerable amount of output from 

the NG tube still in the abdomen is distended although less compared to 

yesterday.  He had colonic ileus and some dilatation of the small bowel.  A 

rectal tube was inserted and abdomen was quite deflated.  He is producing some 

loose liquidy bowel movements yesterday and small amounts.  Rectal examination 

was done and the patient does not have any impacted stool.  No abdominal pain.  

No fever.  No chills.  He is in sinus rhythm for the time being and stable 

hemodynamics.  Is producing adequate amount of urine output.  All of the 

surgical wound sites are clean and intact.  Neurologically is awake.  There has 

been obvious improvement in the motor function and the patient is talking and 

communicating at this point.  He remains on IV heparin.  He remains on IV 

insulin for blood sugar control.  White cell count is at 15.1.  Hemoglobin is 

stable at 8.7.





On 04/29/2017, the patient is being seen in follow-up.  The patient is awake.  

Following commands and communicating.  As mentioned earlier, profoundly weak 

although that has been steady and slow improvement in his motor function.  NG 

tube in place.  Abdomen remains distended.  Flexeril of the abdomen shows ileus 

both large and small bowel.  No bowel functional mobility at all over the past 

12 hours.  The patient is on Reglan.  From the pulmonary standpoint, he is calm 

and comfortable.  No labored breathing.  Actually taking well on 3 L of oxygen 

by nasal cannula.  The chest exit from today shows stable at ectatic changes 

small effusion the lung bases bilaterally.  NG tube in place.  Output is 

minimal at this point.  Abdomen is tender get is soft and nontender.  Bowel 

sounds are very hypoactive.  Repeat abdominal films was reviewed and there is 

concern of ongoing large and small bowel ileus.  He is on IV heparin.  Remains 

on IV insulin for blood sugar control.  Remains nothing by mouth for the time 

being.  No leukocytosis.  Cardiac rhythm is sinus.





On 04/30/2017 I'm seeing this patient in follow-up.  He is awake.  He is 

communicating.  NG tube is in place.  Total amount of output from the NG tube 

is around 250 mL over the past 24 hours.  Abdomen remains distended and 

tympanic and bowel sounds are hypoactive.  CAT scan of the abdomen that was 

done showed a large bowel ileus.  The patient is on Reglan.  The patient was 

started on TPN for nutritional support.  The CAT scan of the chest also showed 

some early dehiscence in the lower surgical incision with some surrounding 

swelling/hematoma.  There is small better pleural effusion and atelectasis in 

the left lung base.  No fever.  No chills.  Still on insulin drip.  Still on a 

heparin drip.  Right upper extremity is swollen and Artline catheter needs to 

be removed.





On 5/1/2017, patient was seen on follow-up, continues to be about the same.  

Continues to have nasogastric tube in place, continues to have hypoactive bowel 

sounds and what seems to be an ileus.  Patient remains on TPN for nutritional 

support, ultrasound of the right upper extremity showed no evidence of DVT.  

Chest x-ray showed cardiomegaly and small left pleural effusion.  All labs were 

reviewed, PTT is therapeutic.  Hemoglobin is 8.8 today.  Basic metabolic 

profile is relatively normal.





On 5/2/2017, patient seems to be a bit more active, and bit more verbal, and 

more responsive.  Denies any shortness of breath, no cough no wheezing, no 

chest pain, no fever, no chills, no hemoptysis.  Continues to have nasogastric 

tube in place, and his abdomen remains a bit distended.  Remains on TPN, and I 

would like to have the swallow evaluation done by speech therapy hoping we 

could place the patient on oral diet.  All his labs were reviewed.  

Electrolytes are normal PTT therapeutic CBC is normal hemoglobin is 8.6.  

Patient remains on physical therapy





On 5/3/2017, patient seems to be doing relatively well, about the same compared 

to yesterday.  The main issue at this point seems to be issue of feeding, 

patient failed his swallow evaluation, and we would start feeding at least at a 

slow pace using his nasogastric tube, patient may eventually need a Dobbhoff or 

he may need a PEG tube placement.  CBC was reviewed, hemoglobin is 8.7.  

Electrolytes and renal profile are normal.  WBC count is 12.4.  Chest x-ray 

showed small bilateral pleural effusions.





On 5/4/2017 patient continues to slowly and gradually improve, he seems to be 

tolerating tube feeding well, he may have a modified barium swallow today, and 

if he does well may even consider taking the nasogastric tube out, and start 

feeding the patient.  In the meantime we have been able to cut down on his TPN, 

and 50% of his caloric requirement is given by enteral feeding, and 50% is by 

TPN at present.  Again were hoping we could potentially remove the nasogastric 

tube today.  In the meantime the patient has less abdominal discomfort, he had 

a few bowel movements, and his GI issues seems to be improving.  Labs were 

reviewed he had a relatively normal CBC and relatively normal basic metabolic 

profile.





On 5/5/2017, patient is doing relatively well, improving but very slowly, 

remains generally weak, and he is not eating much to be able to discontinue his 

TPN at this point.  Hence we felt it would be best to keep TPN at the high dose 

to meet his caloric requirements, since his oral intake is not much.  His 

nasogastric tube was removed.  Patient continues to have physical therapy at 

bedside.  Labs today were reviewed normal CBC noted normal basic metabolic 

profile noted PTT is therapeutic at 54.1.  Coumadin has not been restarted yet.








Objective





- Vital Signs


Vital signs: 


 Vital Signs











Temp  98.9 F   05/05/17 04:00


 


Pulse  62   05/05/17 11:03


 


Resp  14   05/05/17 08:00


 


BP  105/54   05/05/17 04:00


 


Pulse Ox  94 L  05/05/17 04:00








 Intake & Output











 05/04/17 05/05/17 05/05/17





 18:59 06:59 18:59


 


Intake Total 332.518 5518.971 80


 


Output Total 1310 1140 210


 


Balance -340.487 -28.029 -130


 


Weight  95.8 kg 95.8 kg


 


Intake:   


 


   240 80


 


    .9 NaCL 240 240 80


 


  Intake, IV Titration 609.513 571.971 





  Amount   


 


    Heparin Sodium,Porcine/  500 





    D5w Pmx 25,000 unit In   





    Dextrose/Water 1 500ml.   





    bag @ 10 UNITS/KG/HR 19.   





    98 mls/hr IV .Q24H [facility] Rx   





    #:[id number]   


 


    Insulin Regular 100 unit 67.513 71.971 





    In Sodium Chloride 0.9%   





    100 ml @ Per Protocol IV   





    .Q0M [facility] Rx#:[id number]   


 


    Levofloxacin 750Mg-D5w 150  





    Pmx 750 mg In Dextrose/   





    Water 1 150ml.bag @ 100   





    mls/hr IVPB Q24H [facility] Rx#:   





    [id number]   


 


    Magnesium Sulfate-D5w Pmx 10  





    1 gm In Dextrose/Water 1   





    100ml.bag @ 100 mls/hr   





    IVPB Q1H [facility] Rx#:   





    [id number]   


 


    Mvi, Adult No.4 with Vit 342  





    K 10 ml Trace (Conc-1Ml/   





    Dose) 1 ml In Amino Acid   





    5%-D25w+Lytes*E* 1,000 ml   





    @ 75 mls/hr IV .BY   





    DURATION [facility] Rx#:   





    [id number]   


 


    Mvi, Adult No.4 with Vit 40  





    K 10 ml Trace (Conc-1Ml/   





    Dose) 1 ml Sodium Acetate   





    30 meq Potassium   





    Chloride 20 meq Calcium   





    Gluconate 1,000 mg In   





    Amino Acid 5%-D25w 1,000   





    ml @ 75 mls/hr IV .   





    [id number] [facility] Rx#:[id number]   


 


  Oral  300 


 


  Tube Feeding 120  


 


Output:   


 


  Urine 1310 1140 210


 


Other:   


 


  Voiding Method Indwelling Catheter Indwelling Catheter Indwelling Catheter


 


  # Bowel Movements 1  








 ABP, PAP, CO, CI - Last Documented











Arterial Blood Pressure        172/58


 


Pulmonary Artery Pressure      46/23


 


Cardiac Output                 6.8


 


Cardiac Index                  3.3

















- Exam





Physical Exam: Revealed a 61-year-old white male, chronically ill, weak, but in 

no form of respiratory distress.


HEENT:[Neck is supple.] [No neck masses.] [No thyromegaly.] [No JVD.]  

Nasogastric tube is noted to be in place.


Chest: [Diminished breath sounds at the bases, no crackles, no rhonchi, no 

wheezes.]


Cardiac Exam: [Normal S1 and S2, no S3 gallop, no murmur.]


Abdomen: [Soft, nontender,  no megaly, no rebound, no guarding, normal bowel 

sounds.]


Extremities: [No clubbing, no edema, no cyanosis.]


Neurological Exam: [No focal neurologic deficit.]  However patient is noted to 

be generally weak.





- Labs


CBC & Chem 7: 


 05/05/17 05:20





 05/05/17 05:20


Labs: 


 Abnormal Lab Results - Last 24 Hours (Table)











  05/04/17 05/04/17 05/04/17 Range/Units





  13:29 14:27 15:08 


 


WBC     (3.8-10.6)  k/uL


 


RBC     (4.30-5.90)  m/uL


 


Hgb     (13.0-17.5)  gm/dL


 


Hct     (39.0-53.0)  %


 


PT     (9.0-12.0)  sec


 


APTT     (22.0-30.0)  sec


 


Sodium     (137-145)  mmol/L


 


POC Glucose (mg/dL)  117 H  123 H  121 H  (75-99)  mg/dL


 


Calcium     (8.4-10.2)  mg/dL














  05/04/17 05/04/17 05/04/17 Range/Units





  16:03 18:18 19:04 


 


WBC     (3.8-10.6)  k/uL


 


RBC     (4.30-5.90)  m/uL


 


Hgb     (13.0-17.5)  gm/dL


 


Hct     (39.0-53.0)  %


 


PT     (9.0-12.0)  sec


 


APTT     (22.0-30.0)  sec


 


Sodium     (137-145)  mmol/L


 


POC Glucose (mg/dL)  124 H  108 H  122 H  (75-99)  mg/dL


 


Calcium     (8.4-10.2)  mg/dL














  05/04/17 05/04/17 05/04/17 Range/Units





  20:02 21:24 22:00 


 


WBC     (3.8-10.6)  k/uL


 


RBC     (4.30-5.90)  m/uL


 


Hgb     (13.0-17.5)  gm/dL


 


Hct     (39.0-53.0)  %


 


PT     (9.0-12.0)  sec


 


APTT     (22.0-30.0)  sec


 


Sodium     (137-145)  mmol/L


 


POC Glucose (mg/dL)  127 H  115 H  129 H  (75-99)  mg/dL


 


Calcium     (8.4-10.2)  mg/dL














  05/05/17 05/05/17 05/05/17 Range/Units





  01:04 04:32 05:20 


 


WBC    10.8 H  (3.8-10.6)  k/uL


 


RBC    2.74 L  (4.30-5.90)  m/uL


 


Hgb    9.0 L  (13.0-17.5)  gm/dL


 


Hct    26.2 L  (39.0-53.0)  %


 


PT     (9.0-12.0)  sec


 


APTT     (22.0-30.0)  sec


 


Sodium     (137-145)  mmol/L


 


POC Glucose (mg/dL)  110 H  73 L   (75-99)  mg/dL


 


Calcium     (8.4-10.2)  mg/dL














  05/05/17 05/05/17 05/05/17 Range/Units





  05:20 05:20 05:20 


 


WBC     (3.8-10.6)  k/uL


 


RBC     (4.30-5.90)  m/uL


 


Hgb     (13.0-17.5)  gm/dL


 


Hct     (39.0-53.0)  %


 


PT    12.1 H  (9.0-12.0)  sec


 


APTT  54.1 H    (22.0-30.0)  sec


 


Sodium   134 L   (137-145)  mmol/L


 


POC Glucose (mg/dL)     (75-99)  mg/dL


 


Calcium   8.0 L   (8.4-10.2)  mg/dL














  05/05/17 05/05/17 Range/Units





  06:15 13:20 


 


WBC    (3.8-10.6)  k/uL


 


RBC    (4.30-5.90)  m/uL


 


Hgb    (13.0-17.5)  gm/dL


 


Hct    (39.0-53.0)  %


 


PT    (9.0-12.0)  sec


 


APTT    (22.0-30.0)  sec


 


Sodium    (137-145)  mmol/L


 


POC Glucose (mg/dL)  107 H  162 H  (75-99)  mg/dL


 


Calcium    (8.4-10.2)  mg/dL














Assessment and Plan


Plan: 





1 Coronary artery disease status post cardiac bypass surgery and the patient is 

postop day # 22





2 prolonged postoperative ventilator-dependent history failure, multifactorial.

  Patient has extubated.  Nevertheless, the active ongoing problems for now is 

his neuromuscular weakness and ongoing ileus.  Patient has a combination of 

large bowel ileus as evident on the flat film the abdomen.  NG tube still in 

place.





3 recurrent mucous plugs with excessive respiratory secretions requiring 

bronchoscopy and a peak airway suctioning and a bronchoscopy cultures came back 

negative for any microbial growth. The most recent sputum analysis showing 

Moraxella catarrhalis and the patient is currently on Levaquin.  Chest x-ray 

from today is showing atelectatic changes in lung bases bilaterally.  

Nevertheless the patient is oxygenating well and there is no respiratory 

distress at this point.





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 

weakness, improving slowly





5 hypertension





6 hyperlipidemia





7 atrial fibrillation, paroxysmal,  currently on a heparin drip and the patient'

s rhythm is sinus with a controlled rate





8 ileus,  large bowel ileus.  Despite this, the patient is having a nontoxic 

abdomen.  No abdominal tenderness.  No leukocytosis.  No fever.  No acidosis.  

NG tube is in place and the patient is still nothing by mouth.  The patient was 

initiated on TPN for nutritional support





9 postoperative anemia, currently hemoglobin is stable





10 alcoholism, recovered from DT





11 gout





12 diabetes mellitus, currently on an insulin drip 





13 suspected early dehiscence of the lower sternal wound.  Doubt infection





Recommendation: Continue present supportive care measures, continue TPN, 

continue physical therapy and occupational therapy patient obviously has a long 

way to go, and eventually we should consider referral to ECF.  Hopefully this 

will happen next week.


Time with Patient: Less than 30

## 2017-05-05 NOTE — P.PN
Progress Note - Text





This is a 61-year-old gentleman who is status post prior to coronary bypass 

surgery.  Patient had a prolonged stay in ICU on mechanical ventilator support.

  He is also had some neuromuscular weakness.  


The patient was in and out atrial fibrillation with RVR.  But he was converted 

to normal sinus mechanism 3 days ago.


He is on metoprolol, amiodarone, and digoxin.


The blood pressure has been stable.


The heparin is going to be stopped and the patient will be started on Coumadin.





From the cardiovascular standpoint overview, we'll continue the current medical 

treatment.  The patient is feeling at her slowly.

## 2017-05-06 LAB
ANION GAP SERPL CALC-SCNC: 8 MMOL/L
APTT BLD: 49.6 SEC (ref 22–30)
BUN SERPL-SCNC: 14 MG/DL (ref 9–20)
CALCIUM SPEC-MCNC: 8.1 MG/DL (ref 8.4–10.2)
CH: 31.9
CHCM: 33.4
CHLORIDE SERPL-SCNC: 98 MMOL/L (ref 98–107)
CO2 SERPL-SCNC: 27 MMOL/L (ref 22–30)
ERYTHROCYTE [DISTWIDTH] IN BLOOD BY AUTOMATED COUNT: 2.78 M/UL (ref 4.3–5.9)
ERYTHROCYTE [DISTWIDTH] IN BLOOD: 14.8 % (ref 11.5–15.5)
GLUCOSE BLD-MCNC: 104 MG/DL (ref 75–99)
GLUCOSE BLD-MCNC: 116 MG/DL (ref 75–99)
GLUCOSE BLD-MCNC: 119 MG/DL (ref 75–99)
GLUCOSE BLD-MCNC: 128 MG/DL (ref 75–99)
GLUCOSE BLD-MCNC: 130 MG/DL (ref 75–99)
GLUCOSE BLD-MCNC: 134 MG/DL (ref 75–99)
GLUCOSE BLD-MCNC: 134 MG/DL (ref 75–99)
GLUCOSE BLD-MCNC: 141 MG/DL (ref 75–99)
GLUCOSE BLD-MCNC: 148 MG/DL (ref 75–99)
GLUCOSE BLD-MCNC: 154 MG/DL (ref 75–99)
GLUCOSE BLD-MCNC: 157 MG/DL (ref 75–99)
GLUCOSE BLD-MCNC: 187 MG/DL (ref 75–99)
GLUCOSE SERPL-MCNC: 118 MG/DL (ref 74–99)
HCT VFR BLD AUTO: 26.6 % (ref 39–53)
HDW: 3.18
HGB BLD-MCNC: 8.9 GM/DL (ref 13–17.5)
INR PPP: 1.3 (ref ?–1.1)
MAGNESIUM SPEC-SCNC: 2 MG/DL (ref 1.6–2.3)
MCH RBC QN AUTO: 32.1 PG (ref 25–35)
MCHC RBC AUTO-ENTMCNC: 33.5 G/DL (ref 31–37)
MCV RBC AUTO: 95.8 FL (ref 80–100)
NON-AFRICAN AMERICAN GFR(MDRD): >60
PHOSPHATE SERPL-MCNC: 3.9 MG/DL (ref 2.5–4.5)
POTASSIUM SERPL-SCNC: 3.7 MMOL/L (ref 3.5–5.1)
PT BLD: 12.6 SEC (ref 9–12)
SODIUM SERPL-SCNC: 133 MMOL/L (ref 137–145)
WBC # BLD AUTO: 10.5 K/UL (ref 3.8–10.6)

## 2017-05-06 RX ADMIN — DIGOXIN SCH MCG: 250 TABLET ORAL at 09:03

## 2017-05-06 RX ADMIN — METOCLOPRAMIDE SCH MG: 5 INJECTION, SOLUTION INTRAMUSCULAR; INTRAVENOUS at 13:27

## 2017-05-06 RX ADMIN — SODIUM CHLORIDE SCH MLS/HR: 450 INJECTION, SOLUTION INTRAVENOUS at 20:22

## 2017-05-06 RX ADMIN — IPRATROPIUM BROMIDE AND ALBUTEROL SULFATE SCH ML: .5; 3 SOLUTION RESPIRATORY (INHALATION) at 11:18

## 2017-05-06 RX ADMIN — METOCLOPRAMIDE SCH: 5 INJECTION, SOLUTION INTRAMUSCULAR; INTRAVENOUS at 17:07

## 2017-05-06 RX ADMIN — METOCLOPRAMIDE SCH: 5 INJECTION, SOLUTION INTRAMUSCULAR; INTRAVENOUS at 02:34

## 2017-05-06 RX ADMIN — SODIUM CHLORIDE, PRESERVATIVE FREE SCH: 5 INJECTION INTRAVENOUS at 09:05

## 2017-05-06 RX ADMIN — METOCLOPRAMIDE SCH MG: 5 INJECTION, SOLUTION INTRAMUSCULAR; INTRAVENOUS at 23:47

## 2017-05-06 RX ADMIN — SODIUM PHOSPHATE, MONOBASIC, MONOHYDRATE SCH MLS/HR: 276; 142 INJECTION, SOLUTION INTRAVENOUS at 09:54

## 2017-05-06 RX ADMIN — BUDESONIDE SCH MG: 1 SUSPENSION RESPIRATORY (INHALATION) at 08:44

## 2017-05-06 RX ADMIN — LISINOPRIL SCH: 10 TABLET ORAL at 10:51

## 2017-05-06 RX ADMIN — Medication SCH MG: at 20:22

## 2017-05-06 RX ADMIN — INSULIN HUMAN SCH MLS/HR: 100 INJECTION, SOLUTION PARENTERAL at 19:08

## 2017-05-06 RX ADMIN — SODIUM CHLORIDE SCH MLS/HR: 450 INJECTION, SOLUTION INTRAVENOUS at 05:55

## 2017-05-06 RX ADMIN — LEVOFLOXACIN SCH MG: 750 TABLET, FILM COATED ORAL at 18:45

## 2017-05-06 RX ADMIN — ASPIRIN 325 MG ORAL TABLET SCH MG: 325 PILL ORAL at 09:03

## 2017-05-06 RX ADMIN — METOPROLOL TARTRATE SCH MG: 50 TABLET, FILM COATED ORAL at 13:25

## 2017-05-06 RX ADMIN — FUROSEMIDE SCH MG: 10 INJECTION, SOLUTION INTRAMUSCULAR; INTRAVENOUS at 20:22

## 2017-05-06 RX ADMIN — FUROSEMIDE SCH MG: 10 INJECTION, SOLUTION INTRAMUSCULAR; INTRAVENOUS at 09:03

## 2017-05-06 RX ADMIN — LISINOPRIL SCH MG: 10 TABLET ORAL at 20:22

## 2017-05-06 RX ADMIN — BUDESONIDE SCH MG: 1 SUSPENSION RESPIRATORY (INHALATION) at 19:35

## 2017-05-06 RX ADMIN — ATORVASTATIN CALCIUM SCH MG: 40 TABLET, FILM COATED ORAL at 09:03

## 2017-05-06 RX ADMIN — DOCUSATE SODIUM AND SENNOSIDES SCH EACH: 50; 8.6 TABLET ORAL at 20:27

## 2017-05-06 RX ADMIN — METOPROLOL TARTRATE SCH MG: 50 TABLET, FILM COATED ORAL at 20:23

## 2017-05-06 RX ADMIN — SODIUM CHLORIDE, PRESERVATIVE FREE SCH ML: 5 INJECTION INTRAVENOUS at 20:23

## 2017-05-06 RX ADMIN — SERTRALINE HYDROCHLORIDE SCH MG: 50 TABLET, FILM COATED ORAL at 09:04

## 2017-05-06 RX ADMIN — IPRATROPIUM BROMIDE AND ALBUTEROL SULFATE SCH ML: .5; 3 SOLUTION RESPIRATORY (INHALATION) at 19:35

## 2017-05-06 RX ADMIN — IPRATROPIUM BROMIDE AND ALBUTEROL SULFATE SCH ML: .5; 3 SOLUTION RESPIRATORY (INHALATION) at 08:44

## 2017-05-06 RX ADMIN — PANTOPRAZOLE SODIUM SCH MG: 40 TABLET, DELAYED RELEASE ORAL at 09:04

## 2017-05-06 RX ADMIN — METOCLOPRAMIDE SCH: 5 INJECTION, SOLUTION INTRAMUSCULAR; INTRAVENOUS at 07:06

## 2017-05-06 RX ADMIN — Medication SCH MG: at 09:05

## 2017-05-06 RX ADMIN — IPRATROPIUM BROMIDE AND ALBUTEROL SULFATE SCH ML: .5; 3 SOLUTION RESPIRATORY (INHALATION) at 15:27

## 2017-05-06 RX ADMIN — AMIODARONE HYDROCHLORIDE SCH MG: 200 TABLET ORAL at 09:02

## 2017-05-06 NOTE — P.PN
Subjective


Principal diagnosis: 





Status post CABG, postoperative day # 23





61-year-old male patient with status post carotid bypass surgery and the 

patient is postop day #11.  The patient has failed to wean off the mechanical 

ventilator.  Immediately postop the patient had pulmonate complications 

including mucous plugs and atelectasis of the left lung.  He required 

bronchoscopy and therapeutic airway suctioning and all of the respiratory 

cultures came back negative.  This morning, the patient was on a volume cycled 

assist-control mode of ventilation at the rate of 20, tidal volume 450, FiO2 of 

40% and a PEEP of 5.  I reviewed the blood gases and I reviewed also the chest x

-ray.  I noted that the patient was unable to tolerate assist-control mode.  

Attempts to wean him off the sedation Haja as the patient was becoming 

restless and a successful the mechanical ventilator.  Based on this, I switched 

this patient to a pressure support mode of ventilation and I was able to 

completely wean him off sedation.  He was wide awake all he was hardly able to 

wiggle his toes or move his fingers and he was unable to raise his had or arms 

against gravity.  He was having copious amount of rest or secretions and he was 

requiring frequent suctioning.  At a later stage, switch this patient to a 

pressure control mode of ventilation with a PEEP of 7 and a pressure control of 

20 and his FiO2 was At 60%.  He remains hemodynamically stable.  He is 

producing adequate amount of urine output.  No pressors at this point.  No 

fever.  No chills.  Chest tubes have been all removed.  He is tolerating tube 

feeds.  Atelectatic discussion with the cardiothoracic surgeon and will plan to 

proceed with a PEG and trach if the patient ultimately failed weaning.  One 

concern is that he has significant neuromuscular weakness which is probably a 

critical illness polyneuropathy and myopathy.





On 04/25/2017 the patient remains intubated on a mechanical ventilator.  I was 

able to the sedation completely on the patient.  He is awake at this point and 

he had been awake throughout the night.  He is following simple commands.  

Motor functions are nearly absent.  The patient is extremely weak.  He can 

wiggle his toes and occasionally bend his knees.  Otherwise he cannot raise his 

arms and legs against gravity.  He has a cough reflex.  He can blink his eyes 

and raises eyebrows.  Reflexes are significantly diminished in the upper and 

lower extremities bilaterally.  Is on a pressure control mode of ventilation at 

the rate of 18, PC 18, PEEP of 7 and FiO2 of 50%.  Still having significant 

respiratory secretions through the orotracheal tube.  The chest x-ray from 

today shows moderate parenchymal opacity within the left lung base and the 

right lung base.  There is some atelectatic changes in the lung bases more so 

on the left.  The patient has also postop changes related to coronary artery 

bypass surgery.  No fever.  No chills.  Hemodynamically stable.  Producing 

adequate amount of urine output and the patient was diuresis with a combination 

of albumin and Lasix.  He is on tube feeds.





On 04/26/2017 the patient remains on a mechanical ventilator.  Seems to be much 

more awake compared to yesterday.  Motor functions remain weak over the patient 

is doing more activity and movement on today's evaluation.  We were able to 

bring the patient is sitting up position for a total of 2 hours today.  He 

tolerated well and following that he had a coughing spells and he had to be 

placed in bed.  He remains in the same vent setting with a pressure control of 

18, PEEP of 7, FiO2 of 50% and rate of 18.  Chest x-ray shows adequate 

expansion of the left lung with a few being in good location.  Sputum analysis 

showed Moraxella catarrhalis and the based on that the patient was started on 

Levaquin.  Note that he had also Klebsiella PNEUMONIA IN HIS URINE, AND BOTH OF 

THESE MICROORGANISMS SHOULD RESPOND TO LEVAQUIN.  Meanwhile, the patient is 

receiving enteral feeding for nutritional support.  IV Solu Medrol is been 

discontinued.  We'll doing daily physical therapy and passive range of motion.  

He remains on IV heparin.  He remains on IV insulin for blood sugar control.  

Morning blood gases showed a pH of 7.51 with a pCO2 of 26 and pO2 of 79.  

Weaning parameters are still poor as the patient has rapid shallow breathing 

index around 120 and the patient becomes tachypneic and the LOW tidal volumes.








On 04/27/2017 the patient is being seen in follow-up.  Earlier this morning the 

patient was still mechanical ventilator on a pressure control mode.  Chest x-

ray from earlier this morning showed no significant abnormalities.  There was 

improvement in aeration in lung bases bilaterally especially on the left.  NG 

tube was still in a good location below the diaphragm.  Meanwhile affect 

abdominal film was done this morning and that showed a component of ileus.  

Note that overnight, the patient's tube feeds were discontinued knowing that he 

had 2 bouts of emesis.  Nevertheless, despite ongoing gastrointestinal 

abnormalities, the patient is doing very well while being on mechanical 

ventilator and is awake and alert.  We checked his weaning parameters this 

morning and the numbers looked very well.  He was given a pressure support of 5 

and PEEP of 5 and a breathing trial for a total of 30 minutes and following 

that we made a decision to extubate this patient.  This was a difficult 

decision to make knowing that the patient had an underlying abdominal ileus and 

he is still having significant motor weakness in the upper and lower 

extremities.  Nevertheless, I noted that his motor function is improved his 

cough improved and he had very decent weaning parameters.  I suspected that 

this will be his last chance of being extubated and if not successful, he will 

need a PEG and trach with the next 24 hours.  Based on this, I extubated this 

patient.  He remains on IV heparin.  He remains on IV insulin.  He is also on 

Levaquin.  He is afebrile.  He is awake and following commands.  Motor function 

is gradually improving.  No other new complaints otherwise for now.  Noted post 

extubation, the patient was placed on 5 L of oxygen nasal cannula with 

saturation of 94-95%He continued to bleed comfortably.  He was placed in 

sitting up position in ICU.





04/28/2017, the patient is being seen in follow-up in the intensive care unit.  

The patient has been extubated and has been off the mechanical ventilator for 

the past 24 hours.  He is breathing comfortably.  No significant rest or 

distress.  NG tube is in place.  There is considerable amount of output from 

the NG tube still in the abdomen is distended although less compared to 

yesterday.  He had colonic ileus and some dilatation of the small bowel.  A 

rectal tube was inserted and abdomen was quite deflated.  He is producing some 

loose liquidy bowel movements yesterday and small amounts.  Rectal examination 

was done and the patient does not have any impacted stool.  No abdominal pain.  

No fever.  No chills.  He is in sinus rhythm for the time being and stable 

hemodynamics.  Is producing adequate amount of urine output.  All of the 

surgical wound sites are clean and intact.  Neurologically is awake.  There has 

been obvious improvement in the motor function and the patient is talking and 

communicating at this point.  He remains on IV heparin.  He remains on IV 

insulin for blood sugar control.  White cell count is at 15.1.  Hemoglobin is 

stable at 8.7.





On 04/29/2017, the patient is being seen in follow-up.  The patient is awake.  

Following commands and communicating.  As mentioned earlier, profoundly weak 

although that has been steady and slow improvement in his motor function.  NG 

tube in place.  Abdomen remains distended.  Flexeril of the abdomen shows ileus 

both large and small bowel.  No bowel functional mobility at all over the past 

12 hours.  The patient is on Reglan.  From the pulmonary standpoint, he is calm 

and comfortable.  No labored breathing.  Actually taking well on 3 L of oxygen 

by nasal cannula.  The chest exit from today shows stable at ectatic changes 

small effusion the lung bases bilaterally.  NG tube in place.  Output is 

minimal at this point.  Abdomen is tender get is soft and nontender.  Bowel 

sounds are very hypoactive.  Repeat abdominal films was reviewed and there is 

concern of ongoing large and small bowel ileus.  He is on IV heparin.  Remains 

on IV insulin for blood sugar control.  Remains nothing by mouth for the time 

being.  No leukocytosis.  Cardiac rhythm is sinus.





On 04/30/2017 I'm seeing this patient in follow-up.  He is awake.  He is 

communicating.  NG tube is in place.  Total amount of output from the NG tube 

is around 250 mL over the past 24 hours.  Abdomen remains distended and 

tympanic and bowel sounds are hypoactive.  CAT scan of the abdomen that was 

done showed a large bowel ileus.  The patient is on Reglan.  The patient was 

started on TPN for nutritional support.  The CAT scan of the chest also showed 

some early dehiscence in the lower surgical incision with some surrounding 

swelling/hematoma.  There is small better pleural effusion and atelectasis in 

the left lung base.  No fever.  No chills.  Still on insulin drip.  Still on a 

heparin drip.  Right upper extremity is swollen and Artline catheter needs to 

be removed.





On 5/1/2017, patient was seen on follow-up, continues to be about the same.  

Continues to have nasogastric tube in place, continues to have hypoactive bowel 

sounds and what seems to be an ileus.  Patient remains on TPN for nutritional 

support, ultrasound of the right upper extremity showed no evidence of DVT.  

Chest x-ray showed cardiomegaly and small left pleural effusion.  All labs were 

reviewed, PTT is therapeutic.  Hemoglobin is 8.8 today.  Basic metabolic 

profile is relatively normal.





On 5/2/2017, patient seems to be a bit more active, and bit more verbal, and 

more responsive.  Denies any shortness of breath, no cough no wheezing, no 

chest pain, no fever, no chills, no hemoptysis.  Continues to have nasogastric 

tube in place, and his abdomen remains a bit distended.  Remains on TPN, and I 

would like to have the swallow evaluation done by speech therapy hoping we 

could place the patient on oral diet.  All his labs were reviewed.  

Electrolytes are normal PTT therapeutic CBC is normal hemoglobin is 8.6.  

Patient remains on physical therapy





On 5/3/2017, patient seems to be doing relatively well, about the same compared 

to yesterday.  The main issue at this point seems to be issue of feeding, 

patient failed his swallow evaluation, and we would start feeding at least at a 

slow pace using his nasogastric tube, patient may eventually need a Dobbhoff or 

he may need a PEG tube placement.  CBC was reviewed, hemoglobin is 8.7.  

Electrolytes and renal profile are normal.  WBC count is 12.4.  Chest x-ray 

showed small bilateral pleural effusions.





On 5/4/2017 patient continues to slowly and gradually improve, he seems to be 

tolerating tube feeding well, he may have a modified barium swallow today, and 

if he does well may even consider taking the nasogastric tube out, and start 

feeding the patient.  In the meantime we have been able to cut down on his TPN, 

and 50% of his caloric requirement is given by enteral feeding, and 50% is by 

TPN at present.  Again were hoping we could potentially remove the nasogastric 

tube today.  In the meantime the patient has less abdominal discomfort, he had 

a few bowel movements, and his GI issues seems to be improving.  Labs were 

reviewed he had a relatively normal CBC and relatively normal basic metabolic 

profile.





On 5/5/2017, patient is doing relatively well, improving but very slowly, 

remains generally weak, and he is not eating much to be able to discontinue his 

TPN at this point.  Hence we felt it would be best to keep TPN at the high dose 

to meet his caloric requirements, since his oral intake is not much.  His 

nasogastric tube was removed.  Patient continues to have physical therapy at 

bedside.  Labs today were reviewed normal CBC noted normal basic metabolic 

profile noted PTT is therapeutic at 54.1.  Coumadin has not been restarted yet.





On 5/6/2017, no major change over the last 24 hours, patient seems to be more 

and more awake, however he remained generally weak.  Remains on TPN, oral 

intake is minimal.  But he is able to swallow fine.  Abdomen is less distended, 

hence Coumadin was started today, and presently remains on heparin.  Hemoglobin 

is 8.9 today.  Basic metabolic profile is normal.  WBC count is 10.5.








Objective





- Vital Signs


Vital signs: 


 Vital Signs











Temp  98.0 F   05/06/17 08:00


 


Pulse  72   05/06/17 10:00


 


Resp  22   05/06/17 10:00


 


BP  101/58   05/06/17 10:00


 


Pulse Ox  91 L  05/06/17 10:00








 Intake & Output











 05/05/17 05/06/17 05/06/17





 18:59 06:59 18:59


 


Intake Total 839.167 910.217 217.117


 


Output Total 630 1295 180


 


Balance 209.167 -384.783 37.117


 


Weight 95.8 kg 94.8 kg 94.8 kg


 


Intake:   


 


   120 40


 


    .9 NaCL 240 120 40


 


  Intake, IV Titration 509.167 790.217 177.117





  Amount   


 


    Fat Emulsion 20% 250 ml  231 





    In Empty Bag 1 bag @ 21   





    mls/hr IV MoWeFr [facility] Rx#:   





    [id number]   


 


    Heparin Sodium,Porcine/ 500  





    D5w Pmx 25,000 unit In   





    Dextrose/Water 1 500ml.   





    bag @ 10 UNITS/KG/HR 19.   





    98 mls/hr IV .Q24H [facility] Rx   





    #:[id number]   


 


    Insulin Regular 100 unit 9.167 79.217 17.117





    In Sodium Chloride 0.9%   





    100 ml @ Per Protocol IV   





    .Q0M [facility] Rx#:[id number]   


 


    Mvi, Adult No.4 with Vit  480 120





    K 10 ml Trace (Conc-1Ml/   





    Dose) 1 ml Potassium   





    Chloride 20 meq In Amino   





    Acid 5%-D25w+Lytes*E* 1,   





    000 ml @ 40 mls/hr IV .   





    Q24H [facility] Rx#:[id number]   


 


    Mvi, Adult No.4 with Vit   40





    K 10 ml Trace (Conc-1Ml/   





    Dose) 1 ml Sodium Acetate   





    30 meq Potassium   





    Chloride 20 meq Calcium   





    Gluconate 1,000 mg In   





    Amino Acid 5%-D25w 1,000   





    ml @ 75 mls/hr IV .   





    [id number] [facility] Rx#:[id number]   


 


  Oral 60  


 


  Tube Feeding 30  


 


Output:   


 


  Urine 630 1295 180


 


Other:   


 


  Voiding Method Indwelling Catheter Indwelling Catheter Indwelling Catheter


 


  # Bowel Movements 1  








 ABP, PAP, CO, CI - Last Documented











Arterial Blood Pressure        172/58


 


Pulmonary Artery Pressure      46/23


 


Cardiac Output                 6.8


 


Cardiac Index                  3.3

















- Exam





Physical Exam: Revealed a 61-year-old white male, chronically ill, weak, but in 

no form of respiratory distress.


HEENT:[Neck is supple.] [No neck masses.] [No thyromegaly.] [No JVD.]  

Nasogastric tube is noted to be in place.


Chest: [Diminished breath sounds at the bases, no crackles, no rhonchi, no 

wheezes.]


Cardiac Exam: [Normal S1 and S2, no S3 gallop, no murmur.]


Abdomen: [Soft, nontender,  no megaly, no rebound, no guarding, normal bowel 

sounds.]


Extremities: [No clubbing, no edema, no cyanosis.]


Neurological Exam: [No focal neurologic deficit.]  However patient is noted to 

be generally weak.





- Labs


CBC & Chem 7: 


 05/06/17 04:00





 05/06/17 04:00


Labs: 


 Abnormal Lab Results - Last 24 Hours (Table)











  05/05/17 05/05/17 05/05/17 Range/Units





  13:20 15:59 17:05 


 


RBC     (4.30-5.90)  m/uL


 


Hgb     (13.0-17.5)  gm/dL


 


Hct     (39.0-53.0)  %


 


PT     (9.0-12.0)  sec


 


APTT     (22.0-30.0)  sec


 


Sodium     (137-145)  mmol/L


 


Glucose     (74-99)  mg/dL


 


POC Glucose (mg/dL)  162 H  221 H  136 H  (75-99)  mg/dL


 


Calcium     (8.4-10.2)  mg/dL














  05/05/17 05/05/17 05/05/17 Range/Units





  18:13 20:10 22:08 


 


RBC     (4.30-5.90)  m/uL


 


Hgb     (13.0-17.5)  gm/dL


 


Hct     (39.0-53.0)  %


 


PT     (9.0-12.0)  sec


 


APTT     (22.0-30.0)  sec


 


Sodium     (137-145)  mmol/L


 


Glucose     (74-99)  mg/dL


 


POC Glucose (mg/dL)  128 H  148 H  129 H  (75-99)  mg/dL


 


Calcium     (8.4-10.2)  mg/dL














  05/05/17 05/06/17 05/06/17 Range/Units





  23:55 01:07 02:08 


 


RBC     (4.30-5.90)  m/uL


 


Hgb     (13.0-17.5)  gm/dL


 


Hct     (39.0-53.0)  %


 


PT     (9.0-12.0)  sec


 


APTT     (22.0-30.0)  sec


 


Sodium     (137-145)  mmol/L


 


Glucose     (74-99)  mg/dL


 


POC Glucose (mg/dL)  109 H  134 H  130 H  (75-99)  mg/dL


 


Calcium     (8.4-10.2)  mg/dL














  05/06/17 05/06/17 05/06/17 Range/Units





  04:00 04:00 04:00 


 


RBC    2.78 L  (4.30-5.90)  m/uL


 


Hgb    8.9 L  (13.0-17.5)  gm/dL


 


Hct    26.6 L  (39.0-53.0)  %


 


PT  12.6 H    (9.0-12.0)  sec


 


APTT  49.6 H    (22.0-30.0)  sec


 


Sodium   133 L   (137-145)  mmol/L


 


Glucose   118 H   (74-99)  mg/dL


 


POC Glucose (mg/dL)     (75-99)  mg/dL


 


Calcium   8.1 L   (8.4-10.2)  mg/dL














  05/06/17 05/06/17 05/06/17 Range/Units





  04:09 05:53 08:33 


 


RBC     (4.30-5.90)  m/uL


 


Hgb     (13.0-17.5)  gm/dL


 


Hct     (39.0-53.0)  %


 


PT     (9.0-12.0)  sec


 


APTT     (22.0-30.0)  sec


 


Sodium     (137-145)  mmol/L


 


Glucose     (74-99)  mg/dL


 


POC Glucose (mg/dL)  119 H  128 H  104 H  (75-99)  mg/dL


 


Calcium     (8.4-10.2)  mg/dL














  05/06/17 Range/Units





  10:12 


 


RBC   (4.30-5.90)  m/uL


 


Hgb   (13.0-17.5)  gm/dL


 


Hct   (39.0-53.0)  %


 


PT   (9.0-12.0)  sec


 


APTT   (22.0-30.0)  sec


 


Sodium   (137-145)  mmol/L


 


Glucose   (74-99)  mg/dL


 


POC Glucose (mg/dL)  187 H  (75-99)  mg/dL


 


Calcium   (8.4-10.2)  mg/dL














Assessment and Plan


Plan: 





1 Coronary artery disease status post cardiac bypass surgery and the patient is 

postop day # 23





2 prolonged postoperative ventilator-dependent history failure, multifactorial.

  Patient has extubated.  Nevertheless, the active ongoing problems for now is 

his neuromuscular weakness and ongoing ileus.  Patient has a combination of 

large bowel ileus as evident on the flat film the abdomen.  NG tube still in 

place.





3 recurrent mucous plugs with excessive respiratory secretions requiring 

bronchoscopy and a peak airway suctioning and a bronchoscopy cultures came back 

negative for any microbial growth. The most recent sputum analysis showing 

Moraxella catarrhalis and the patient is currently on Levaquin.  Chest x-ray 

from today is showing atelectatic changes in lung bases bilaterally.  

Nevertheless the patient is oxygenating well and there is no respiratory 

distress at this point.





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 

weakness, improving slowly





5 hypertension





6 hyperlipidemia





7 atrial fibrillation, paroxysmal,  currently on a heparin drip and the patient'

s rhythm is sinus with a controlled rate





8 ileus,  large bowel ileus.  Despite this, the patient is having a nontoxic 

abdomen.  No abdominal tenderness.  No leukocytosis.  No fever.  No acidosis.  

NG tube is in place and the patient is still nothing by mouth.  The patient was 

initiated on TPN for nutritional support





9 postoperative anemia, currently hemoglobin is stable





10 alcoholism, recovered from DT





11 gout





12 diabetes mellitus, currently on an insulin drip 





13 suspected early dehiscence of the lower sternal wound.  Doubt infection





Recommendation: Continue present supportive care measures, continue TPN, 

continue physical therapy and occupational therapy patient obviously has a long 

way to go, and eventually we should consider referral to ECF.  Hopefully this 

will happen next week.


Time with Patient: Less than 30

## 2017-05-06 NOTE — P.PN
Progress Note - Text





This is a 61-year-old gentleman who is status post prior to coronary bypass 

surgery.  Patient had a prolonged stay in ICU on mechanical ventilator support.

  He is also had some neuromuscular weakness.  


The patient was in and out atrial fibrillation with RVR.  But he was converted 

to normal sinus mechanism 3 days ago.


He is on metoprolol, amiodarone, and digoxin.


The blood pressure has been stable.


He continues to be on heparin IV and he was started on Coumadin.





From the cardiovascular standpoint overview, we'll continue the current medical 

treatment.  The patient is feeling at her slowly.

## 2017-05-06 NOTE — XR
EXAMINATION TYPE: XR chest 1V portable

 

DATE OF EXAM: 5/6/2017 6:37 AM

 

COMPARISON: Prior chest x-ray 5 May 2017

 

HISTORY: Status post coronary artery bypass graft

 

TECHNIQUE: Single frontal view of the chest is obtained.

 

FINDINGS:  The patient is post median sternotomy and the heart is enlarged. There may be some improve
d aeration at the left lung base. Left-sided PICC line remains in place, distal tip overlies the aureliano
on of the right atrium. No pneumothorax or sizable pleural effusion.

 

IMPRESSION:  There may be left lower lobe atelectasis and small effusion. Cardiomegaly. There may be 
some improvement in aeration.

## 2017-05-06 NOTE — P.PN
<Odell Gómez - Last Filed: 05/06/17 11:01>





Progress Note - Text





CV Surgery Nursing





Principal diagnosis: 





Coronary artery disease, status post prior stenting to his right coronary 

artery.  Preserved left ventricular function.  Hyperlipidemia.  Hypertension.  

ETOH abuse.





POD #23 total arterial non-aortic patch off-pump double coronary artery bypass 

grafting using in situ skeletonized right internal mammary artery to the left 

anterior descending artery across the anterior midline in situ totally 

skeletonized left internal mammary artery to the first obtuse marginal artery.  

Intraoperative transesophageal echocardiogram and epi-aortic scanning.  

Intraoperative graft flow measurements using the [employer]ist[employer] system





POD #23 flexible bronchoscopy with bronchial washings secondary to 

postoperative mucous plugging with increasing oxygen demand the night of 

surgery.  Pt had acute hypoxic post operative respiratory failure as an 

unexpected post-surgical condition, related to the patient's underlying medical 

comorbidities. Unable to determine if ventilator associated pneumonia vs. 

trachobronchitis, an unexpected post-surgical condition.  Sputum culture grew 

out Moraxella, blood cultures are negative to date, urine culture grew 

Klebsiella.  Patient placed on Levaquin per pulmonology.





Pt developed postoperative alcohol withdrawal requiring increased sedation and 

prolonged mechanical ventilation.





Patient developed postoperative ileus, an unexpected post-surgical condition, 

currently resolving.   Currently receiving TPN.  General surgery on consult, no 

intervention at this time.  Passed modified barium swallow NGT has been 

removed.  He is tolerating his diet.  TPN continues and calorie count in place 

until determination that patient is meeting caloric needs. 





Patient awake and alert, no distress noted, no specific complaints.  Oriented 

3.  He is sitting up to the bedside chair.  Physical therapy is otherwise 

bedside at the moment working with patient.





Vital Signs: Afebrile


 Vital Signs - 24 hr











  05/05/17 05/05/17 05/05/17





  09:00 10:00 10:53


 


Temperature   


 


Pulse Rate 83 64 71


 


Pulse Rate [   





Bilateral   





Radial]   


 


Respiratory 19 16 





Rate   


 


Blood Pressure 119/64 99/63 


 


O2 Sat by Pulse 95  





Oximetry   














  05/05/17 05/05/17 05/05/17





  11:00 11:03 12:00


 


Temperature   98.2 F


 


Pulse Rate 69 62 67


 


Pulse Rate [   65





Bilateral   





Radial]   


 


Respiratory 19  18





Rate   


 


Blood Pressure 117/62  100/57


 


O2 Sat by Pulse   94 L





Oximetry   














  05/05/17 05/05/17 05/05/17





  13:00 14:00 15:00


 


Temperature   


 


Pulse Rate 67 69 68


 


Pulse Rate [   





Bilateral   





Radial]   


 


Respiratory 20 18 18





Rate   


 


Blood Pressure 116/64 100/59 124/78


 


O2 Sat by Pulse   95





Oximetry   














  05/05/17 05/05/17 05/05/17





  15:48 16:00 16:04


 


Temperature  98.0 F 


 


Pulse Rate 68 72 68


 


Pulse Rate [  65 





Bilateral   





Radial]   


 


Respiratory  21 





Rate   


 


Blood Pressure  112/63 


 


O2 Sat by Pulse  93 L 





Oximetry   














  05/05/17 05/05/17 05/05/17





  17:00 18:00 19:00


 


Temperature   


 


Pulse Rate 70 70 77


 


Pulse Rate [   





Bilateral   





Radial]   


 


Respiratory 16 17 26 H





Rate   


 


Blood Pressure 136/67 122/63 106/61


 


O2 Sat by Pulse 95 95 94 L





Oximetry   














  05/05/17 05/05/17 05/05/17





  19:09 19:31 20:00


 


Temperature   97.3 F L


 


Pulse Rate 74 78 90


 


Pulse Rate [   





Bilateral   





Radial]   


 


Respiratory   25 H





Rate   


 


Blood Pressure   95/58


 


O2 Sat by Pulse   93 L





Oximetry   














  05/05/17 05/05/17 05/05/17





  21:00 22:00 23:00


 


Temperature   


 


Pulse Rate 74 69 66


 


Pulse Rate [   





Bilateral   





Radial]   


 


Respiratory 18 14 20





Rate   


 


Blood Pressure 99/58 120/62 109/60


 


O2 Sat by Pulse 94 L 94 L 95





Oximetry   














  05/06/17 05/06/17 05/06/17





  00:00 01:00 02:00


 


Temperature 99.1 F  


 


Pulse Rate 62 63 62


 


Pulse Rate [   





Bilateral   





Radial]   


 


Respiratory 18 19 15





Rate   


 


Blood Pressure 99/57 105/64 102/61


 


O2 Sat by Pulse 94 L 92 L 96





Oximetry   














  05/06/17 05/06/17 05/06/17





  03:00 04:00 05:00


 


This morning Temperature  98.8 F 


 


Pulse Rate 63 62 60


 


Pulse Rate [   





Bilateral   





Radial]   


 


Respiratory 21 20 17





Rate   


 


Blood Pressure 98/61 172/86 104/61


 


O2 Sat by Pulse 96 95 94 L





Oximetry   














  05/06/17 05/06/17 05/06/17





  06:00 07:00 08:00


 


Temperature   98.0 F


 


Pulse Rate 61 61 131 H


 


Pulse Rate [   





Bilateral   





Radial]   


 


Respiratory 15 15 20





Rate   


 


Blood Pressure 144/71 125/65 109/68


 


O2 Sat by Pulse 94 L 96 96





Oximetry   














  05/06/17





  08:46


 


Temperature 


 


Pulse Rate 65


 


Pulse Rate [ 





Bilateral 





Radial] 


 


Respiratory 





Rate 


 


Blood Pressure 


 


O2 Sat by Pulse 





Oximetry 














Labs: 


 Short CBC











  05/06/17 Range/Units





  04:00 


 


WBC  10.5  (3.8-10.6)  k/uL


 


Hgb  8.9 L  (13.0-17.5)  gm/dL


 


Hct  26.6 L  (39.0-53.0)  %


 


Plt Count  365  (150-450)  k/uL








 BMP











  05/06/17





  04:00


 


Sodium  133 L


 


Potassium  3.7


 


Chloride  98


 


Carbon Dioxide  27


 


BUN  14


 


Creatinine  0.70


 


Glucose  118 H


 


Calcium  8.1 L











ABG











ABG pH  7.46  (7.35-7.45)  H  04/27/17  05:19    


 


ABG pCO2  30 mmHg (35-45)  L  04/27/17  05:19    


 


ABG pO2  108 mmHg ()   04/27/17  05:19    


 


ABG O2 Saturation  99.0 % (94-97)  H  04/27/17  05:19    





PT/INR, D-dimer











PT  12.6 sec (9.0-12.0)  H  05/06/17  04:00    


 


INR  1.3  (<1.1)   05/06/17  04:00    








 Microbiology





04/13/17 16:40   Lung - Right   Acid Fast Bacilli Smear - Final


04/13/17 16:40   Lung - Right   Acid Fast Bacilli Culture - Preliminary


04/24/17 02:00   Blood   Blood Culture - Final


                            No Growth after 144 hours


04/24/17 04:40   Sputum   Gram Stain - Final


04/24/17 04:40   Sputum   Sputum Culture - Final


                            Moraxella(branhamella) catarra


04/23/17 17:44   Urine,Catheterized   Urine Culture - Final


                            Klebsiella pneumoniae


04/13/17 16:40   Lung - Right   Fungal Culture - Preliminary


                            Lisandra tropicalis


04/13/17 16:40   Lung Aspirate - Right   Gram Stain - Final


04/13/17 16:40   Lung Aspirate - Right   Bronchial Washings Culture - Final











IV Fluids: 


TPN at 40 mL per hour


Heparin drip at 16 units per kilogram per hour


0.9% normal saline at 10 mL per hour


Insulin drip at 6 units per hour.





Lungs: Expiratory wheezes throughout, diminished bilateral bases right greater 

than left.  Respirations are symmetrical and unlabored.





O2 sat: 96% on room air.





I/S: 750 mL, reviewed with the patient important of using his incentive 

spirometry every hour while awake.  The patient did give good return 

demonstration on his incentive spirometry but will need a lot of assistance and 

coaching with it due to his generalized weakness.





Heart:  S1S2, regular rhythm and rate, negative for S3, gallop or murmur.  

Bedside telemetry showing normal sinus rhythm heart rate 65.





Sternum stable to his proximal sternum, occasional clicking felt to his distal 

sternum near his xiphoid. Chest incision clean with dressing clean and dry.  No 

drainage noted.  His heart hugger remains in place but will need encouragement 

with use due to his generalized weakness.





Abdomen:  Soft, Positive bowel sounds present in all 4 quadrants.  His last 

bowel movement was 05/05/2017.  He denies any nausea.  He is tolerating oral 

intake.





CBGs: 109-134 mg/dL in the last 24 hours.





U/O:  Adequate, Angelo catheter for accurate I&O.  475 mL output in the last 8 

hours.





24 hr Total: 


 Intake & Output











 05/04/17 05/05/17 05/06/17 05/07/17





 06:59 06:59 06:59 06:59


 


Intake Total 6383.218 2081.484 1749.384 117.117


 


Output Total 3845 2450 1925 90


 


Balance 2538.218 -368.516 -175.616 27.117


 


Weight 97.1 kg 95.8 kg 94.8 kg 








Active Medications





Albuterol/Ipratropium (Duoneb 0.5 Mg-3 Mg/3 Ml Soln)  3 ml INHALATION RT-QID [facility]


   Last Admin: 05/06/17 08:44 Dose:  3 ml


Albuterol/Ipratropium (Duoneb 0.5 Mg-3 Mg/3 Ml Soln)  3 ml INHALATION RT-QID PRN


   PRN Reason: Shortness Of Breath Or Wheezing


   Last Admin: 04/29/17 23:10 Dose:  3 ml


Amiodarone HCl (Cordarone)  200 mg PO DAILY [facility]


   Last Admin: 05/05/17 08:42 Dose:  200 mg


Aspirin (Aspirin)  325 mg PO DAILY [facility]


   Last Admin: 05/05/17 08:41 Dose:  325 mg


Atorvastatin Calcium (Lipitor)  40 mg PO DAILY [facility]


   Last Admin: 05/05/17 08:41 Dose:  40 mg


Benzocaine (Hurricaine Spray)  1 applic MUCOUS MEM QID PRN


   PRN Reason: Mouth Irritation


   Last Admin: 04/17/17 11:36 Dose:  1 applic


Bisacodyl (Dulcolax)  10 mg RECTAL DAILY PRN


   PRN Reason: Constipation


   Last Admin: 04/19/17 17:18 Dose:  10 mg


Budesonide (Pulmicort)  1 mg INHALATION RT-BID [facility]


   Last Admin: 05/06/17 08:44 Dose:  1 mg


Colchicine (Colcrys)  0.6 mg PO BID PRN


   PRN Reason: Gout pain


   Last Admin: 05/05/17 01:42 Dose:  0.6 mg


Digoxin (Lanoxin)  250 mcg OG-TUBE DAILY [facility]


   Last Admin: 05/05/17 08:41 Dose:  250 mcg


Furosemide (Lasix)  20 mg IV Q12HR [facility]


   Last Admin: 05/05/17 21:11 Dose:  20 mg


Haloperidol Lactate (Haldol)  2 mg IVP HS PRN


   PRN Reason: Agitation or Acute Psychosis


Heparin Sodium (Porcine) (Heparin)  0 unit IV PER PROTOCOL PRN; Protocol


   PRN Reason: Low PTT


   Last Admin: 04/28/17 16:34 Dose:  2,440 unit


Hydralazine HCl (Apresoline)  10 mg IVP Q4HR PRN


   PRN Reason: Blood Pressure - High


   Last Admin: 04/27/17 12:22 Dose:  10 mg


Heparin Sodium/Dextrose 25,000 (unit/ IV Solution)  500 mls @ 19.98 mls/hr IV 

.Q24H [facility]; 10 UNITS/KG/HR


   PRN Reason: Protocol


   Last Admin: 05/06/17 05:55 Dose:  16 units/kg/hr, 31.96 mls/hr


Fat Emulsion Intravenous 250 (ml/ IV Solution)  250 mls @ 21 mls/hr IV MoWeFr 

[facility]


   Last Admin: 05/05/17 16:11 Dose:  21 mls/hr


Parenteral Vitamin Supplement 10 ml/ Chromium/Copper/Manganese/Seleni/Zn 1 ml/

Potassium Chloride 20 meq/Amino Ac/Electrol/Dextrose/Calcium  1,021 mls @ 40 mls

/hr IV .Q24H [facility]


Insulin Human Regular 100 unit (/ Sodium Chloride)  101 mls @ 0 mls/hr IV .Q0M 

ELIER; Per Protocol


   PRN Reason: Protocol


   Last Titration: 05/06/17 08:34 Dose:  0 ml/hr, 0 mls/hr


Iron/Minerals/Multivitamins (Theragran Liquid)  15 ml PO DAILY@1200 [facility]


   Last Admin: 04/29/17 19:35 Dose:  15 ml


Levofloxacin (Levaquin)  750 mg PO DAILY@1700 [facility]


Lisinopril (Zestril)  10 mg PO BID [facility]


   Last Admin: 05/05/17 22:06 Dose:  10 mg


Magnesium Hydroxide (Milk Of Magnesia)  2,400 mg PO BID PRN


   PRN Reason: Constipation


Metoclopramide HCl (Reglan)  10 mg IVP Q6HR [facility]


   Last Admin: 05/06/17 07:06 Dose:  Not Given


Metoprolol Tartrate (Lopressor)  50 mg PO BID [facility]


   Last Admin: 05/05/17 22:06 Dose:  50 mg


Miscellaneous Information (Magnesium Per Protocol)  1 each MISCELLANE DAILY PRN

; Protocol


   PRN Reason: Per Protocol


Miscellaneous Information (Phosphorus Per Protocol)  1 each MISCELLANE DAILY PRN

; Protocol


   PRN Reason: Per Protocol


Miscellaneous Information (Potassium Per Protocol)  1 each MISCELLANE DAILY PRN

; Protocol


   PRN Reason: Per Protocol


Morphine Sulfate (Morphine Sulfate (Inj))  2 mg IVP Q4H PRN


   PRN Reason: Severe Pain


Ondansetron HCl (Zofran)  4 mg IVP Q6HR PRN


   PRN Reason: Nausea And Vomiting


Pantoprazole Sodium (Protonix)  40 mg PO DAILY [facility]


   Last Admin: 05/05/17 08:41 Dose:  40 mg


Potassium Chloride (K-Dur 20)  20 meq PO ONCE ONE


   Stop: 05/06/17 09:01


Senna/Docusate Sodium (Senokot-S)  2 each PO HS [facility]


   Last Admin: 05/05/17 23:57 Dose:  Not Given


Sertraline HCl (Zoloft)  50 mg PO DAILY [facility]


   Last Admin: 05/05/17 08:42 Dose:  50 mg


Sodium Chloride (Saline Flush)  10 ml IV BID [facility]


   Last Admin: 05/05/17 21:12 Dose:  10 ml


Sodium Chloride (Saline Flush)  20 ml IV Q4HR PRN


   PRN Reason: PICC Line


Sodium Chloride (Saline Flush)  10 ml IV WEEKLY [facility]


   Last Admin: 05/04/17 08:52 Dose:  Not Given


Sodium Chloride (Saline Flush)  10 ml IV Q4HR PRN


   PRN Reason: PICC Line


Thiamine HCl (Vitamin B-1)  100 mg PO BID [facility]


   Last Admin: 05/05/17 21:12 Dose:  100 mg





Plan: 





1.  Continue ASA, Lipitor, heparin, Lopressor, lisinopril, digoxin, Lasix. 


2.  Continue amiodarone for atrial fibrillation prophylaxis.


3.  Wean O2 as tolerated.


4.  Coumadin INR today is 1.3.  He received Coumadin 5 mg by mouth 1 yesterday.


5.  Continue TPN for until patient able to take in enough calories. Calorie 

count in place.


6.  Insulin/diabetic management per primary care service.


7.  Continue antibiotics per pulmonology management.  Positive sputum culture 

on 04/24/2017 for Moraxella.


8.  Increase activity, out of bed to chair.  Physical therapy following.  


9.  Daily labs, chest x-rays.  Digoxin level in the a.m.


10.  GI/DVT prophylaxis.


11.  Potassium replacement per protocol.  His potassium today was 3.7.


12.  Transfer to  [facility] soon.


13.  Discharge planning in process.  Patient will need rehabilitation upon 

discharge.





<Oscar Porter - Last Filed: 05/06/17 14:35>





Progress Note - Text


The patient was seen and examined.  I agree with the above assessment and plan.

  Mr. Bear appears more alert today.  He is moving all extremities and 

answering questions appropriately.  He is currently tolerating a diet and we 

are keeping calorie counts.  He remains on TPN which has been reduced.  His INR 

is 1.3.  He will receive 5 mg of Coumadin tonight.  He remains on intravenous 

heparin.  He is currently on antibiotics as directed by infectious disease 

secondary to Moraxella from his sputum.  His abdomen is still slightly 

distended.  He remains debilitated but is receiving physical therapy.

## 2017-05-07 LAB
ANION GAP SERPL CALC-SCNC: 7 MMOL/L
APTT BLD: 63.2 SEC (ref 22–30)
BUN SERPL-SCNC: 15 MG/DL (ref 9–20)
CALCIUM SPEC-MCNC: 7.9 MG/DL (ref 8.4–10.2)
CH: 31.9
CHCM: 32.4
CHLORIDE SERPL-SCNC: 100 MMOL/L (ref 98–107)
CO2 SERPL-SCNC: 26 MMOL/L (ref 22–30)
DIGOXIN SERPL-MCNC: 1.1 NG/ML
ERYTHROCYTE [DISTWIDTH] IN BLOOD BY AUTOMATED COUNT: 2.55 M/UL (ref 4.3–5.9)
ERYTHROCYTE [DISTWIDTH] IN BLOOD: 15 % (ref 11.5–15.5)
GLUCOSE BLD-MCNC: 104 MG/DL (ref 75–99)
GLUCOSE BLD-MCNC: 114 MG/DL (ref 75–99)
GLUCOSE BLD-MCNC: 126 MG/DL (ref 75–99)
GLUCOSE BLD-MCNC: 126 MG/DL (ref 75–99)
GLUCOSE BLD-MCNC: 134 MG/DL (ref 75–99)
GLUCOSE BLD-MCNC: 144 MG/DL (ref 75–99)
GLUCOSE BLD-MCNC: 146 MG/DL (ref 75–99)
GLUCOSE BLD-MCNC: 151 MG/DL (ref 75–99)
GLUCOSE BLD-MCNC: 170 MG/DL (ref 75–99)
GLUCOSE BLD-MCNC: 171 MG/DL (ref 75–99)
GLUCOSE BLD-MCNC: 89 MG/DL (ref 75–99)
GLUCOSE SERPL-MCNC: 125 MG/DL (ref 74–99)
HCT VFR BLD AUTO: 25.3 % (ref 39–53)
HDW: 3.15
HGB BLD-MCNC: 8.1 GM/DL (ref 13–17.5)
INR PPP: 1.8 (ref ?–1.1)
MAGNESIUM SPEC-SCNC: 1.9 MG/DL (ref 1.6–2.3)
MCH RBC QN AUTO: 31.9 PG (ref 25–35)
MCHC RBC AUTO-ENTMCNC: 32.1 G/DL (ref 31–37)
MCV RBC AUTO: 99.1 FL (ref 80–100)
NON-AFRICAN AMERICAN GFR(MDRD): >60
PHOSPHATE SERPL-MCNC: 3.8 MG/DL (ref 2.5–4.5)
POTASSIUM SERPL-SCNC: 3.5 MMOL/L (ref 3.5–5.1)
PT BLD: 17.4 SEC (ref 9–12)
SODIUM SERPL-SCNC: 133 MMOL/L (ref 137–145)
WBC # BLD AUTO: 8 K/UL (ref 3.8–10.6)

## 2017-05-07 RX ADMIN — MAGNESIUM SULFATE IN DEXTROSE SCH MLS/HR: 10 INJECTION, SOLUTION INTRAVENOUS at 12:59

## 2017-05-07 RX ADMIN — LISINOPRIL SCH MG: 10 TABLET ORAL at 22:03

## 2017-05-07 RX ADMIN — SODIUM CHLORIDE, PRESERVATIVE FREE SCH ML: 5 INJECTION INTRAVENOUS at 22:14

## 2017-05-07 RX ADMIN — INSULIN HUMAN SCH MLS/HR: 100 INJECTION, SOLUTION PARENTERAL at 15:30

## 2017-05-07 RX ADMIN — IPRATROPIUM BROMIDE AND ALBUTEROL SULFATE SCH: .5; 3 SOLUTION RESPIRATORY (INHALATION) at 11:38

## 2017-05-07 RX ADMIN — AMIODARONE HYDROCHLORIDE SCH MG: 200 TABLET ORAL at 08:45

## 2017-05-07 RX ADMIN — PANTOPRAZOLE SODIUM SCH MG: 40 TABLET, DELAYED RELEASE ORAL at 08:46

## 2017-05-07 RX ADMIN — BUDESONIDE SCH MG: 1 SUSPENSION RESPIRATORY (INHALATION) at 09:21

## 2017-05-07 RX ADMIN — DIGOXIN SCH MCG: 250 TABLET ORAL at 08:46

## 2017-05-07 RX ADMIN — FUROSEMIDE SCH MG: 10 INJECTION, SOLUTION INTRAMUSCULAR; INTRAVENOUS at 08:46

## 2017-05-07 RX ADMIN — Medication SCH MG: at 08:46

## 2017-05-07 RX ADMIN — SERTRALINE HYDROCHLORIDE SCH MG: 50 TABLET, FILM COATED ORAL at 08:46

## 2017-05-07 RX ADMIN — IPRATROPIUM BROMIDE AND ALBUTEROL SULFATE SCH ML: .5; 3 SOLUTION RESPIRATORY (INHALATION) at 09:21

## 2017-05-07 RX ADMIN — LEVOFLOXACIN SCH MG: 750 TABLET, FILM COATED ORAL at 16:47

## 2017-05-07 RX ADMIN — LISINOPRIL SCH: 10 TABLET ORAL at 13:00

## 2017-05-07 RX ADMIN — METOCLOPRAMIDE SCH MG: 5 INJECTION, SOLUTION INTRAMUSCULAR; INTRAVENOUS at 07:06

## 2017-05-07 RX ADMIN — ASPIRIN 325 MG ORAL TABLET SCH MG: 325 PILL ORAL at 08:45

## 2017-05-07 RX ADMIN — IPRATROPIUM BROMIDE AND ALBUTEROL SULFATE SCH ML: .5; 3 SOLUTION RESPIRATORY (INHALATION) at 14:47

## 2017-05-07 RX ADMIN — POTASSIUM CHLORIDE SCH MEQ: 1.5 SOLUTION ORAL at 08:44

## 2017-05-07 RX ADMIN — DOCUSATE SODIUM AND SENNOSIDES SCH EACH: 50; 8.6 TABLET ORAL at 20:28

## 2017-05-07 RX ADMIN — METOPROLOL TARTRATE SCH MG: 50 TABLET, FILM COATED ORAL at 08:46

## 2017-05-07 RX ADMIN — MAGNESIUM SULFATE IN DEXTROSE SCH MLS/HR: 10 INJECTION, SOLUTION INTRAVENOUS at 08:44

## 2017-05-07 RX ADMIN — SODIUM PHOSPHATE, MONOBASIC, MONOHYDRATE SCH MLS/HR: 276; 142 INJECTION, SOLUTION INTRAVENOUS at 13:43

## 2017-05-07 RX ADMIN — BUDESONIDE SCH MG: 1 SUSPENSION RESPIRATORY (INHALATION) at 19:23

## 2017-05-07 RX ADMIN — IPRATROPIUM BROMIDE AND ALBUTEROL SULFATE SCH ML: .5; 3 SOLUTION RESPIRATORY (INHALATION) at 19:23

## 2017-05-07 RX ADMIN — ATORVASTATIN CALCIUM SCH MG: 40 TABLET, FILM COATED ORAL at 08:45

## 2017-05-07 RX ADMIN — Medication SCH MG: at 20:28

## 2017-05-07 RX ADMIN — POTASSIUM CHLORIDE SCH MEQ: 1.5 SOLUTION ORAL at 12:59

## 2017-05-07 RX ADMIN — METOPROLOL TARTRATE SCH MG: 50 TABLET, FILM COATED ORAL at 20:28

## 2017-05-07 RX ADMIN — SODIUM PHOSPHATE, MONOBASIC, MONOHYDRATE SCH: 276; 142 INJECTION, SOLUTION INTRAVENOUS at 13:00

## 2017-05-07 RX ADMIN — METOCLOPRAMIDE SCH MG: 5 INJECTION, SOLUTION INTRAMUSCULAR; INTRAVENOUS at 16:48

## 2017-05-07 RX ADMIN — METOCLOPRAMIDE SCH MG: 5 INJECTION, SOLUTION INTRAMUSCULAR; INTRAVENOUS at 13:43

## 2017-05-07 RX ADMIN — SODIUM CHLORIDE, PRESERVATIVE FREE SCH: 5 INJECTION INTRAVENOUS at 08:46

## 2017-05-07 NOTE — P.PN
Subjective


Principal diagnosis: 





Status post CABG, postoperative day # 24





61-year-old male patient with status post carotid bypass surgery and the 

patient is postop day #11.  The patient has failed to wean off the mechanical 

ventilator.  Immediately postop the patient had pulmonate complications 

including mucous plugs and atelectasis of the left lung.  He required 

bronchoscopy and therapeutic airway suctioning and all of the respiratory 

cultures came back negative.  This morning, the patient was on a volume cycled 

assist-control mode of ventilation at the rate of 20, tidal volume 450, FiO2 of 

40% and a PEEP of 5.  I reviewed the blood gases and I reviewed also the chest x

-ray.  I noted that the patient was unable to tolerate assist-control mode.  

Attempts to wean him off the sedation Haja as the patient was becoming 

restless and a successful the mechanical ventilator.  Based on this, I switched 

this patient to a pressure support mode of ventilation and I was able to 

completely wean him off sedation.  He was wide awake all he was hardly able to 

wiggle his toes or move his fingers and he was unable to raise his had or arms 

against gravity.  He was having copious amount of rest or secretions and he was 

requiring frequent suctioning.  At a later stage, switch this patient to a 

pressure control mode of ventilation with a PEEP of 7 and a pressure control of 

20 and his FiO2 was At 60%.  He remains hemodynamically stable.  He is 

producing adequate amount of urine output.  No pressors at this point.  No 

fever.  No chills.  Chest tubes have been all removed.  He is tolerating tube 

feeds.  Atelectatic discussion with the cardiothoracic surgeon and will plan to 

proceed with a PEG and trach if the patient ultimately failed weaning.  One 

concern is that he has significant neuromuscular weakness which is probably a 

critical illness polyneuropathy and myopathy.





On 04/25/2017 the patient remains intubated on a mechanical ventilator.  I was 

able to the sedation completely on the patient.  He is awake at this point and 

he had been awake throughout the night.  He is following simple commands.  

Motor functions are nearly absent.  The patient is extremely weak.  He can 

wiggle his toes and occasionally bend his knees.  Otherwise he cannot raise his 

arms and legs against gravity.  He has a cough reflex.  He can blink his eyes 

and raises eyebrows.  Reflexes are significantly diminished in the upper and 

lower extremities bilaterally.  Is on a pressure control mode of ventilation at 

the rate of 18, PC 18, PEEP of 7 and FiO2 of 50%.  Still having significant 

respiratory secretions through the orotracheal tube.  The chest x-ray from 

today shows moderate parenchymal opacity within the left lung base and the 

right lung base.  There is some atelectatic changes in the lung bases more so 

on the left.  The patient has also postop changes related to coronary artery 

bypass surgery.  No fever.  No chills.  Hemodynamically stable.  Producing 

adequate amount of urine output and the patient was diuresis with a combination 

of albumin and Lasix.  He is on tube feeds.





On 04/26/2017 the patient remains on a mechanical ventilator.  Seems to be much 

more awake compared to yesterday.  Motor functions remain weak over the patient 

is doing more activity and movement on today's evaluation.  We were able to 

bring the patient is sitting up position for a total of 2 hours today.  He 

tolerated well and following that he had a coughing spells and he had to be 

placed in bed.  He remains in the same vent setting with a pressure control of 

18, PEEP of 7, FiO2 of 50% and rate of 18.  Chest x-ray shows adequate 

expansion of the left lung with a few being in good location.  Sputum analysis 

showed Moraxella catarrhalis and the based on that the patient was started on 

Levaquin.  Note that he had also Klebsiella PNEUMONIA IN HIS URINE, AND BOTH OF 

THESE MICROORGANISMS SHOULD RESPOND TO LEVAQUIN.  Meanwhile, the patient is 

receiving enteral feeding for nutritional support.  IV Solu Medrol is been 

discontinued.  We'll doing daily physical therapy and passive range of motion.  

He remains on IV heparin.  He remains on IV insulin for blood sugar control.  

Morning blood gases showed a pH of 7.51 with a pCO2 of 26 and pO2 of 79.  

Weaning parameters are still poor as the patient has rapid shallow breathing 

index around 120 and the patient becomes tachypneic and the LOW tidal volumes.








On 04/27/2017 the patient is being seen in follow-up.  Earlier this morning the 

patient was still mechanical ventilator on a pressure control mode.  Chest x-

ray from earlier this morning showed no significant abnormalities.  There was 

improvement in aeration in lung bases bilaterally especially on the left.  NG 

tube was still in a good location below the diaphragm.  Meanwhile affect 

abdominal film was done this morning and that showed a component of ileus.  

Note that overnight, the patient's tube feeds were discontinued knowing that he 

had 2 bouts of emesis.  Nevertheless, despite ongoing gastrointestinal 

abnormalities, the patient is doing very well while being on mechanical 

ventilator and is awake and alert.  We checked his weaning parameters this 

morning and the numbers looked very well.  He was given a pressure support of 5 

and PEEP of 5 and a breathing trial for a total of 30 minutes and following 

that we made a decision to extubate this patient.  This was a difficult 

decision to make knowing that the patient had an underlying abdominal ileus and 

he is still having significant motor weakness in the upper and lower 

extremities.  Nevertheless, I noted that his motor function is improved his 

cough improved and he had very decent weaning parameters.  I suspected that 

this will be his last chance of being extubated and if not successful, he will 

need a PEG and trach with the next 24 hours.  Based on this, I extubated this 

patient.  He remains on IV heparin.  He remains on IV insulin.  He is also on 

Levaquin.  He is afebrile.  He is awake and following commands.  Motor function 

is gradually improving.  No other new complaints otherwise for now.  Noted post 

extubation, the patient was placed on 5 L of oxygen nasal cannula with 

saturation of 94-95%He continued to bleed comfortably.  He was placed in 

sitting up position in ICU.





04/28/2017, the patient is being seen in follow-up in the intensive care unit.  

The patient has been extubated and has been off the mechanical ventilator for 

the past 24 hours.  He is breathing comfortably.  No significant rest or 

distress.  NG tube is in place.  There is considerable amount of output from 

the NG tube still in the abdomen is distended although less compared to 

yesterday.  He had colonic ileus and some dilatation of the small bowel.  A 

rectal tube was inserted and abdomen was quite deflated.  He is producing some 

loose liquidy bowel movements yesterday and small amounts.  Rectal examination 

was done and the patient does not have any impacted stool.  No abdominal pain.  

No fever.  No chills.  He is in sinus rhythm for the time being and stable 

hemodynamics.  Is producing adequate amount of urine output.  All of the 

surgical wound sites are clean and intact.  Neurologically is awake.  There has 

been obvious improvement in the motor function and the patient is talking and 

communicating at this point.  He remains on IV heparin.  He remains on IV 

insulin for blood sugar control.  White cell count is at 15.1.  Hemoglobin is 

stable at 8.7.





On 04/29/2017, the patient is being seen in follow-up.  The patient is awake.  

Following commands and communicating.  As mentioned earlier, profoundly weak 

although that has been steady and slow improvement in his motor function.  NG 

tube in place.  Abdomen remains distended.  Flexeril of the abdomen shows ileus 

both large and small bowel.  No bowel functional mobility at all over the past 

12 hours.  The patient is on Reglan.  From the pulmonary standpoint, he is calm 

and comfortable.  No labored breathing.  Actually taking well on 3 L of oxygen 

by nasal cannula.  The chest exit from today shows stable at ectatic changes 

small effusion the lung bases bilaterally.  NG tube in place.  Output is 

minimal at this point.  Abdomen is tender get is soft and nontender.  Bowel 

sounds are very hypoactive.  Repeat abdominal films was reviewed and there is 

concern of ongoing large and small bowel ileus.  He is on IV heparin.  Remains 

on IV insulin for blood sugar control.  Remains nothing by mouth for the time 

being.  No leukocytosis.  Cardiac rhythm is sinus.





On 04/30/2017 I'm seeing this patient in follow-up.  He is awake.  He is 

communicating.  NG tube is in place.  Total amount of output from the NG tube 

is around 250 mL over the past 24 hours.  Abdomen remains distended and 

tympanic and bowel sounds are hypoactive.  CAT scan of the abdomen that was 

done showed a large bowel ileus.  The patient is on Reglan.  The patient was 

started on TPN for nutritional support.  The CAT scan of the chest also showed 

some early dehiscence in the lower surgical incision with some surrounding 

swelling/hematoma.  There is small better pleural effusion and atelectasis in 

the left lung base.  No fever.  No chills.  Still on insulin drip.  Still on a 

heparin drip.  Right upper extremity is swollen and Artline catheter needs to 

be removed.





On 5/1/2017, patient was seen on follow-up, continues to be about the same.  

Continues to have nasogastric tube in place, continues to have hypoactive bowel 

sounds and what seems to be an ileus.  Patient remains on TPN for nutritional 

support, ultrasound of the right upper extremity showed no evidence of DVT.  

Chest x-ray showed cardiomegaly and small left pleural effusion.  All labs were 

reviewed, PTT is therapeutic.  Hemoglobin is 8.8 today.  Basic metabolic 

profile is relatively normal.





On 5/2/2017, patient seems to be a bit more active, and bit more verbal, and 

more responsive.  Denies any shortness of breath, no cough no wheezing, no 

chest pain, no fever, no chills, no hemoptysis.  Continues to have nasogastric 

tube in place, and his abdomen remains a bit distended.  Remains on TPN, and I 

would like to have the swallow evaluation done by speech therapy hoping we 

could place the patient on oral diet.  All his labs were reviewed.  

Electrolytes are normal PTT therapeutic CBC is normal hemoglobin is 8.6.  

Patient remains on physical therapy





On 5/3/2017, patient seems to be doing relatively well, about the same compared 

to yesterday.  The main issue at this point seems to be issue of feeding, 

patient failed his swallow evaluation, and we would start feeding at least at a 

slow pace using his nasogastric tube, patient may eventually need a Dobbhoff or 

he may need a PEG tube placement.  CBC was reviewed, hemoglobin is 8.7.  

Electrolytes and renal profile are normal.  WBC count is 12.4.  Chest x-ray 

showed small bilateral pleural effusions.





On 5/4/2017 patient continues to slowly and gradually improve, he seems to be 

tolerating tube feeding well, he may have a modified barium swallow today, and 

if he does well may even consider taking the nasogastric tube out, and start 

feeding the patient.  In the meantime we have been able to cut down on his TPN, 

and 50% of his caloric requirement is given by enteral feeding, and 50% is by 

TPN at present.  Again were hoping we could potentially remove the nasogastric 

tube today.  In the meantime the patient has less abdominal discomfort, he had 

a few bowel movements, and his GI issues seems to be improving.  Labs were 

reviewed he had a relatively normal CBC and relatively normal basic metabolic 

profile.





On 5/5/2017, patient is doing relatively well, improving but very slowly, 

remains generally weak, and he is not eating much to be able to discontinue his 

TPN at this point.  Hence we felt it would be best to keep TPN at the high dose 

to meet his caloric requirements, since his oral intake is not much.  His 

nasogastric tube was removed.  Patient continues to have physical therapy at 

bedside.  Labs today were reviewed normal CBC noted normal basic metabolic 

profile noted PTT is therapeutic at 54.1.  Coumadin has not been restarted yet.





On 5/6/2017, no major change over the last 24 hours, patient seems to be more 

and more awake, however he remained generally weak.  Remains on TPN, oral 

intake is minimal.  But he is able to swallow fine.  Abdomen is less distended, 

hence Coumadin was started today, and presently remains on heparin.  Hemoglobin 

is 8.9 today.  Basic metabolic profile is normal.  WBC count is 10.5.





On 5/7/2017, patient is basically about the same, his oral intake seems to be a 

bit better and picking up a bit more.  But still not enough to discontinue TPN 

at this point yet.  Patient is almost therapeutic on Coumadin, but not quite 

yet.  No abdominal pain, no shortness of breath, no cough, no wheezing.  

Patient remains generally weak.  Labs were reviewed hemoglobin is 8.1 INR is 

1.8 basic metabolic profile is relatively normal except for slightly low 

potassium of 3.5.








Objective





- Vital Signs


Vital signs: 


 Vital Signs











Temp  98.1 F   05/07/17 08:00


 


Pulse  69   05/07/17 10:00


 


Resp  18   05/07/17 10:00


 


BP  102/61   05/07/17 10:00


 


Pulse Ox  98   05/07/17 10:00








 Intake & Output











 05/06/17 05/07/17 05/07/17





 18:59 06:59 18:59


 


Intake Total 884.817 3300.664 280


 


Output Total 805 723 555


 


Balance -107.034 383.664 -275


 


Weight 94.8 kg 94.2 kg 94.2 kg


 


Intake:   


 


   120 40


 


    .9 NaCL 120 120 40


 


  Intake, IV Titration 577.966 986.664 240





  Amount   


 


    Fat Emulsion 20% 250 ml 80  





    In Empty Bag 1 bag @ 21   





    mls/hr IV MoWeFr [facility] Rx#:   





    [id number]   


 


    Heparin Sodium,Porcine/  461.822 





    D5w Pmx 25,000 unit In   





    Dextrose/Water 1 500ml.   





    bag @ 10 UNITS/KG/HR 19.   





    98 mls/hr IV .Q24H [facility] Rx   





    #:[id number]   


 


    Insulin Regular 100 unit 57.966 44.842 





    In Sodium Chloride 0.9%   





    100 ml @ Per Protocol IV   





    .Q0M [facility] Rx#:[id number]   


 


    Magnesium Sulfate-D5w Pmx   200





    1 gm In Dextrose/Water 1   





    100ml.bag @ 100 mls/hr   





    IVPB Q1H [facility] Rx#:   





    [id number]   


 


    Mvi, Adult No.4 with Vit 400 480 40





    K 10 ml Trace (Conc-1Ml/   





    Dose) 1 ml Potassium   





    Chloride 20 meq In Amino   





    Acid 5%-D25w+Lytes*E* 1,   





    000 ml @ 40 mls/hr IV .   





    Q24H [facility] Rx#:[id number]   


 


    Mvi, Adult No.4 with Vit 40  





    K 10 ml Trace (Conc-1Ml/   





    Dose) 1 ml Sodium Acetate   





    30 meq Potassium   





    Chloride 20 meq Calcium   





    Gluconate 1,000 mg In   





    Amino Acid 5%-D25w 1,000   





    ml @ 75 mls/hr IV .   





    [id number] [facility] Rx#:[id number]   


 


Output:   


 


  Urine 805 723 555


 


Other:   


 


  Voiding Method Indwelling Catheter Indwelling Catheter Indwelling Catheter


 


  # Bowel Movements 2  








 ABP, PAP, CO, CI - Last Documented











Arterial Blood Pressure        172/58


 


Pulmonary Artery Pressure      46/23


 


Cardiac Output                 6.8


 


Cardiac Index                  3.3

















- Exam





Physical Exam: Revealed a 61-year-old white male, chronically ill, weak, but in 

no form of respiratory distress.


HEENT:[Neck is supple.] [No neck masses.] [No thyromegaly.] [No JVD.]  

Nasogastric tube is noted to be in place.


Chest: [Diminished breath sounds at the bases, no crackles, no rhonchi, no 

wheezes.]


Cardiac Exam: [Normal S1 and S2, no S3 gallop, no murmur.]


Abdomen: [Soft, nontender,  no megaly, no rebound, no guarding, normal bowel 

sounds.]


Extremities: [No clubbing, no edema, no cyanosis.]


Neurological Exam: [No focal neurologic deficit.]  However patient is noted to 

be generally weak.





- Labs


CBC & Chem 7: 


 05/07/17 05:40





 05/07/17 05:10


Labs: 


 Abnormal Lab Results - Last 24 Hours (Table)











  05/06/17 05/06/17 05/06/17 Range/Units





  12:42 16:54 18:41 


 


RBC     (4.30-5.90)  m/uL


 


Hgb     (13.0-17.5)  gm/dL


 


Hct     (39.0-53.0)  %


 


PT     (9.0-12.0)  sec


 


APTT     (22.0-30.0)  sec


 


Sodium     (137-145)  mmol/L


 


Glucose     (74-99)  mg/dL


 


POC Glucose (mg/dL)  141 H  148 H  157 H  (75-99)  mg/dL


 


Calcium     (8.4-10.2)  mg/dL














  05/06/17 05/06/17 05/06/17 Range/Units





  20:12 22:04 23:49 


 


RBC     (4.30-5.90)  m/uL


 


Hgb     (13.0-17.5)  gm/dL


 


Hct     (39.0-53.0)  %


 


PT     (9.0-12.0)  sec


 


APTT     (22.0-30.0)  sec


 


Sodium     (137-145)  mmol/L


 


Glucose     (74-99)  mg/dL


 


POC Glucose (mg/dL)  154 H  134 H  116 H  (75-99)  mg/dL


 


Calcium     (8.4-10.2)  mg/dL














  05/07/17 05/07/17 05/07/17 Range/Units





  03:51 05:10 05:40 


 


RBC     (4.30-5.90)  m/uL


 


Hgb     (13.0-17.5)  gm/dL


 


Hct     (39.0-53.0)  %


 


PT    17.4 H  (9.0-12.0)  sec


 


APTT    63.2 H  (22.0-30.0)  sec


 


Sodium   133 L   (137-145)  mmol/L


 


Glucose   125 H   (74-99)  mg/dL


 


POC Glucose (mg/dL)  126 H    (75-99)  mg/dL


 


Calcium   7.9 L   (8.4-10.2)  mg/dL














  05/07/17 05/07/17 05/07/17 Range/Units





  05:40 05:57 09:05 


 


RBC  2.55 L    (4.30-5.90)  m/uL


 


Hgb  8.1 L    (13.0-17.5)  gm/dL


 


Hct  25.3 L    (39.0-53.0)  %


 


PT     (9.0-12.0)  sec


 


APTT     (22.0-30.0)  sec


 


Sodium     (137-145)  mmol/L


 


Glucose     (74-99)  mg/dL


 


POC Glucose (mg/dL)   126 H  171 H  (75-99)  mg/dL


 


Calcium     (8.4-10.2)  mg/dL














  05/07/17 Range/Units





  10:02 


 


RBC   (4.30-5.90)  m/uL


 


Hgb   (13.0-17.5)  gm/dL


 


Hct   (39.0-53.0)  %


 


PT   (9.0-12.0)  sec


 


APTT   (22.0-30.0)  sec


 


Sodium   (137-145)  mmol/L


 


Glucose   (74-99)  mg/dL


 


POC Glucose (mg/dL)  170 H  (75-99)  mg/dL


 


Calcium   (8.4-10.2)  mg/dL














Assessment and Plan


Plan: 





1 Coronary artery disease status post cardiac bypass surgery and the patient is 

postop day # 24





2 prolonged postoperative ventilator-dependent history failure, multifactorial.

  Patient has extubated.  Nevertheless, the active ongoing problems for now is 

his neuromuscular weakness and ongoing ileus.  Patient has a combination of 

large bowel ileus as evident on the flat film the abdomen.  NG tube still in 

place.





3 recurrent mucous plugs with excessive respiratory secretions requiring 

bronchoscopy and a peak airway suctioning and a bronchoscopy cultures came back 

negative for any microbial growth. The most recent sputum analysis showing 

Moraxella catarrhalis and the patient is currently on Levaquin.  Chest x-ray 

from today is showing atelectatic changes in lung bases bilaterally.  

Nevertheless the patient is oxygenating well and there is no respiratory 

distress at this point.





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 

weakness, improving slowly





5 hypertension





6 hyperlipidemia





7 atrial fibrillation, paroxysmal,  currently on a heparin drip and the patient'

s rhythm is sinus with a controlled rate





8 ileus,  large bowel ileus.  Despite this, the patient is having a nontoxic 

abdomen.  No abdominal tenderness.  No leukocytosis.  No fever.  No acidosis.  

NG tube is in place and the patient is still nothing by mouth.  The patient was 

initiated on TPN for nutritional support





9 postoperative anemia, currently hemoglobin is stable





10 alcoholism, recovered from DT





11 gout





12 diabetes mellitus, currently on an insulin drip 





13 suspected early dehiscence of the lower sternal wound.  Doubt infection





Recommendation: Continue present supportive care measures, continue TPN, 

continue physical therapy and occupational therapy patient obviously has a long 

way to go, and eventually we should consider referral to ECF.  Hopefully this 

will happen next week.


Time with Patient: Less than 30

## 2017-05-07 NOTE — P.PN
Progress Note - Text





This is a 61-year-old gentleman who is status post prior to coronary bypass 

surgery.  


The patient had a prolonged stay in ICU on mechanical ventilator support.  


The patient was in and out atrial fibrillation with RVR.  But he was converted 

to normal sinus mechanism 5 days ago.


He is on metoprolol, amiodarone, and digoxin.


He was started on Coumadin and was still given him heparin until the INR is 

therapeutic.





From the cardiovascular standpoint overview, we'll continue the current medical 

treatment.  The patient is feeling better slowly.

## 2017-05-07 NOTE — P.PN
<Odell Gómez - Last Filed: 05/07/17 11:41>





Progress Note - Text





CV Surgery Nursing





Principal diagnosis: 





Coronary artery disease, status post prior stenting to his right coronary 

artery.  Preserved left ventricular function.  Hyperlipidemia.  Hypertension.  

ETOH abuse.





POD #23 total arterial non-aortic patch off-pump double coronary artery bypass 

grafting using in situ skeletonized right internal mammary artery to the left 

anterior descending artery across the anterior midline in situ totally 

skeletonized left internal mammary artery to the first obtuse marginal artery.  

Intraoperative transesophageal echocardiogram and epi-aortic scanning.  

Intraoperative graft flow measurements using the [employer]ist[employer] system





POD #23 flexible bronchoscopy with bronchial washings secondary to 

postoperative mucous plugging with increasing oxygen demand the night of 

surgery.  Pt had acute hypoxic post operative respiratory failure as an 

unexpected post-surgical condition, related to the patient's underlying medical 

comorbidities. Unable to determine if ventilator associated pneumonia vs. 

trachobronchitis, an unexpected post-surgical condition.  Sputum culture grew 

out Moraxella, blood cultures are negative to date, urine culture grew 

Klebsiella.  Patient placed on Levaquin per pulmonology.





Pt developed postoperative alcohol withdrawal requiring increased sedation and 

prolonged mechanical ventilation.





Patient developed postoperative ileus, an unexpected post-surgical condition, 

currently resolving.   Currently receiving TPN.  General surgery on consult, no 

intervention at this time.  Passed modified barium swallow NGT has been 

removed.  He is tolerating his diet.  TPN continues and calorie count in place 

until determination that patient is meeting caloric needs. 





Patient awake and alert, no distress noted, no specific complaints.  He is 

sitting up to bedside chair.  Physical therapy worked with the patient this 

a.m. and the patient was able to stand at his bedside.





Vital Signs: Afebrile


 Vital Signs - 24 hr











  05/06/17 05/06/17 05/06/17





  12:00 13:00 14:00


 


Temperature 98.1 F  


 


Pulse Rate 67 75 73


 


Pulse Rate [ 65  





Bilateral   





Radial]   


 


Respiratory 16 19 17





Rate   


 


Blood Pressure 82/49 149/68 124/68


 


O2 Sat by Pulse 97 93 L 95





Oximetry   














  05/06/17 05/06/17 05/06/17





  15:00 15:28 15:37


 


Temperature   


 


Pulse Rate 81 73 71


 


Pulse Rate [   





Bilateral   





Radial]   


 


Respiratory 22  





Rate   


 


Blood Pressure 111/56  


 


O2 Sat by Pulse 95  





Oximetry   














  05/06/17 05/06/17 05/06/17





  16:00 17:00 18:00


 


Temperature   


 


Pulse Rate 71 75 71


 


Pulse Rate [ 65  





Bilateral   





Radial]   


 


Respiratory 16 16 18





Rate   


 


Blood Pressure 105/57 125/55 143/69


 


O2 Sat by Pulse 96 94 L 95





Oximetry   














  05/06/17 05/06/17 05/06/17





  19:00 19:37 19:51


 


Temperature   


 


Pulse Rate 63 60 61


 


Pulse Rate [   





Bilateral   





Radial]   


 


Respiratory 16  





Rate   


 


Blood Pressure 109/59  


 


O2 Sat by Pulse 98  





Oximetry   














  05/06/17 05/06/17 05/06/17





  20:00 21:00 22:00


 


Temperature 99.5 F  


 


Pulse Rate 62 61 57 L


 


Pulse Rate [   





Bilateral   





Radial]   


 


Respiratory 15 14 15





Rate   


 


Blood Pressure 102/59 98/56 86/48


 


O2 Sat by Pulse 96 98 97





Oximetry   














  05/06/17 05/07/17 05/07/17





  23:00 00:00 01:00


 


Temperature  99.5 F 


 


Pulse Rate 55 L 55 L 56 L


 


Pulse Rate [   





Bilateral   





Radial]   


 


Respiratory 18 14 15





Rate   


 


Blood Pressure 82/50 85/50 99/62


 


O2 Sat by Pulse 96 96 96





Oximetry   














  05/07/17 05/07/17 05/07/17





  02:00 03:00 04:00


 


Temperature   99.5 F


 


Pulse Rate 56 L 61 58 L


 


Pulse Rate [   





Bilateral   





Radial]   


 


Respiratory 13 21 13





Rate   


 


Blood Pressure 96/55 120/68 99/48


 


O2 Sat by Pulse 95 94 L 92 L





Oximetry   














  05/07/17 05/07/17 05/07/17





  05:00 06:00 07:00


 


Temperature   


 


Pulse Rate 58 L 57 L 58 L


 


Pulse Rate [   





Bilateral   





Radial]   


 


Respiratory 13 14 13





Rate   


 


Blood Pressure 87/42 87/48 111/52


 


O2 Sat by Pulse 96 95 95





Oximetry   














  05/07/17 05/07/17 05/07/17





  08:00 09:00 09:22


 


Temperature 98.1 F  


 


Pulse Rate 67 69 79


 


Pulse Rate [   





Bilateral   





Radial]   


 


Respiratory 19 18 





Rate   


 


Blood Pressure 111/52 114/66 


 


O2 Sat by Pulse 97 98 





Oximetry   














  05/07/17 05/07/17





  09:30 10:00


 


Temperature  


 


Pulse Rate 70 69


 


Pulse Rate [  





Bilateral  





Radial]  


 


Respiratory  18





Rate  


 


Blood Pressure  102/61


 


O2 Sat by Pulse  98





Oximetry  














Labs: 


 Short CBC











  05/07/17 Range/Units





  05:40 


 


WBC  8.0  (3.8-10.6)  k/uL


 


Hgb  8.1 L  (13.0-17.5)  gm/dL


 


Hct  25.3 L  (39.0-53.0)  %


 


Plt Count  308  (150-450)  k/uL








 BMP











  05/07/17





  05:10


 


Sodium  133 L


 


Potassium  3.5


 


Chloride  100


 


Carbon Dioxide  26


 


BUN  15


 


Creatinine  0.80


 


Glucose  125 H


 


Calcium  7.9 L











ABG











ABG pH  7.46  (7.35-7.45)  H  04/27/17  05:19    


 


ABG pCO2  30 mmHg (35-45)  L  04/27/17  05:19    


 


ABG pO2  108 mmHg ()   04/27/17  05:19    


 


ABG O2 Saturation  99.0 % (94-97)  H  04/27/17  05:19    





PT/INR, D-dimer











PT  17.4 sec (9.0-12.0)  H  05/07/17  05:40    


 


INR  1.8  (<1.1)   05/07/17  05:40    








 Microbiology





04/13/17 16:40   Lung - Right   Acid Fast Bacilli Smear - Final


04/13/17 16:40   Lung - Right   Acid Fast Bacilli Culture - Preliminary


04/24/17 02:00   Blood   Blood Culture - Final


                            No Growth after 144 hours


04/24/17 04:40   Sputum   Gram Stain - Final


04/24/17 04:40   Sputum   Sputum Culture - Final


                            Moraxella(branhamella) catarra


04/23/17 17:44   Urine,Catheterized   Urine Culture - Final


                            Klebsiella pneumoniae


04/13/17 16:40   Lung - Right   Fungal Culture - Preliminary


                            Lisandra tropicalis


04/13/17 16:40   Lung Aspirate - Right   Gram Stain - Final


04/13/17 16:40   Lung Aspirate - Right   Bronchial Washings Culture - Final











IV Fluids: 


0.9% normal saline at 10 mL per hour


TPN at 40 mL per hour


Heparin drip at 16 units per kilogram per hour


Insulin drip at 7.5 units per hour.





Lungs: Essentially clear throughout, diminished bilateral bases.  Respirations 

are symmetrical and unlabored.





O2 sat: 98% on room air.





I/S: 600 mL, reviewed with the patient important of using his incentive 

spirometry every hour while awake.  The patient did give a good return 

demonstration on his incentive spirometry but will need lots of encouragement 

and assistance in using it due to his generalized weakness.





Heart:  S1S2, regular rhythm and rate, negative for S3, gallop or murmur.  

Bedside telemetry showing sinus bradycardia heart rate 59.





Sternum stable to his proximal sternum, occasional clicking felt to his distal 

sternum near his xiphoid. Chest incision clean with dressing clean and dry.  No 

drainage noted.  His heart hugger remains in place but will need encouragement 

with use due to his generalized weakness.





Knee-high CONNIE hose and sequential compression devices in place to bilateral 

lower extremities.





Abdomen:  Soft and distended. Positive tympanic bowel sounds present in all 4 

quadrants.  Positive bowel movement 3 yesterday 05/06/2017.  Patient 

tolerating oral intake without difficulty.  Denies any complaints of nausea or 

vomiting.





CBGs: 116-170 mg/dL in the last 24 hours.





U/O:  Adequate, Angelo catheter for accurate I&O.





24 hr Total: 


 Intake & Output











 05/05/17 05/06/17 05/07/17 05/08/17





 06:59 06:59 06:59 06:59


 


Intake Total 2081.484 6835.998 4760.630 280


 


Output Total 2450 1925 1528 555


 


Balance -368.516 -175.616 276.630 -275


 


Weight 95.8 kg 94.8 kg 94.2 kg 94.2 kg








Active Medications





Albuterol/Ipratropium (Duoneb 0.5 Mg-3 Mg/3 Ml Soln)  3 ml INHALATION RT-QID [facility]


   Last Admin: 05/07/17 11:38 Dose:  Not Given


Albuterol/Ipratropium (Duoneb 0.5 Mg-3 Mg/3 Ml Soln)  3 ml INHALATION RT-QID PRN


   PRN Reason: Shortness Of Breath Or Wheezing


   Last Admin: 04/29/17 23:10 Dose:  3 ml


Amiodarone HCl (Cordarone)  200 mg PO DAILY [facility]


   Last Admin: 05/07/17 08:45 Dose:  200 mg


Aspirin (Aspirin)  325 mg PO DAILY [facility]


   Last Admin: 05/07/17 08:45 Dose:  325 mg


Atorvastatin Calcium (Lipitor)  40 mg PO DAILY [facility]


   Last Admin: 05/07/17 08:45 Dose:  40 mg


Benzocaine (Hurricaine Spray)  1 applic MUCOUS MEM QID PRN


   PRN Reason: Mouth Irritation


   Last Admin: 04/17/17 11:36 Dose:  1 applic


Bisacodyl (Dulcolax)  10 mg RECTAL DAILY PRN


   PRN Reason: Constipation


   Last Admin: 04/19/17 17:18 Dose:  10 mg


Budesonide (Pulmicort)  1 mg INHALATION RT-BID [facility]


   Last Admin: 05/07/17 09:21 Dose:  1 mg


Colchicine (Colcrys)  0.6 mg PO BID PRN


   PRN Reason: Gout pain


   Last Admin: 05/05/17 01:42 Dose:  0.6 mg


Digoxin (Lanoxin)  250 mcg OG-TUBE DAILY [facility]


   Last Admin: 05/07/17 08:46 Dose:  250 mcg


Furosemide (Lasix)  20 mg IV Q12HR [facility]


   Last Admin: 05/07/17 08:46 Dose:  20 mg


Haloperidol Lactate (Haldol)  2 mg IVP HS PRN


   PRN Reason: Agitation or Acute Psychosis


Heparin Sodium (Porcine) (Heparin)  0 unit IV PER PROTOCOL PRN; Protocol


   PRN Reason: Low PTT


   Last Admin: 04/28/17 16:34 Dose:  2,440 unit


Hydralazine HCl (Apresoline)  10 mg IVP Q4HR PRN


   PRN Reason: Blood Pressure - High


   Last Admin: 04/27/17 12:22 Dose:  10 mg


Heparin Sodium/Dextrose 25,000 (unit/ IV Solution)  500 mls @ 19.98 mls/hr IV 

.Q24H [facility]; 10 UNITS/KG/HR


   PRN Reason: Protocol


   Last Admin: 05/06/17 20:22 Dose:  16 units/kg/hr, 31.96 mls/hr


Fat Emulsion Intravenous 250 (ml/ IV Solution)  250 mls @ 21 mls/hr IV MoWeFr 

[facility]


   Last Admin: 05/05/17 16:11 Dose:  21 mls/hr


Insulin Human Regular 100 unit (/ Sodium Chloride)  101 mls @ 0 mls/hr IV .Q0M 

[facility]; Per Protocol


   PRN Reason: Protocol


   Last Titration: 05/07/17 03:53 Dose:  5.5 ml/hr, 5.5 mls/hr


Parenteral Vitamin Supplement 10 ml/ Chromium/Copper/Manganese/Seleni/Zn 1 ml/

Potassium Chloride 40 meq/Sodium Chloride 20 meq/ Amino Ac/Electrol/Dextrose/

Calcium  1,036 mls @ 40 mls/hr IV .Q24H [facility]


Iron/Minerals/Multivitamins (Theragran Liquid)  15 ml PO DAILY@1200 [facility]


   Last Admin: 04/29/17 19:35 Dose:  15 ml


Levofloxacin (Levaquin)  750 mg PO DAILY@1700 [facility]


   Last Admin: 05/06/17 18:45 Dose:  750 mg


Lisinopril (Zestril)  10 mg PO BID [facility]


   Last Admin: 05/06/17 20:22 Dose:  10 mg


Magnesium Hydroxide (Milk Of Magnesia)  2,400 mg PO BID PRN


   PRN Reason: Constipation


Metoclopramide HCl (Reglan)  10 mg IVP Q6HR [facility]


   Last Admin: 05/07/17 07:06 Dose:  10 mg


Metoprolol Tartrate (Lopressor)  50 mg PO BID [facility]


   Last Admin: 05/07/17 08:46 Dose:  50 mg


Miscellaneous Information (Magnesium Per Protocol)  1 each MISCELLANE DAILY PRN

; Protocol


   PRN Reason: Per Protocol


Miscellaneous Information (Phosphorus Per Protocol)  1 each MISCELLANE DAILY PRN

; Protocol


   PRN Reason: Per Protocol


Miscellaneous Information (Potassium Per Protocol)  1 each MISCELLANE DAILY PRN

; Protocol


   PRN Reason: Per Protocol


Morphine Sulfate (Morphine Sulfate (Inj))  2 mg IVP Q4H PRN


   PRN Reason: Severe Pain


Ondansetron HCl (Zofran)  4 mg IVP Q6HR PRN


   PRN Reason: Nausea And Vomiting


Pantoprazole Sodium (Protonix)  40 mg PO DAILY [facility]


   Last Admin: 05/07/17 08:46 Dose:  40 mg


Senna/Docusate Sodium (Senokot-S)  2 each PO HS [facility]


   Last Admin: 05/06/17 20:27 Dose:  2 each


Sertraline HCl (Zoloft)  50 mg PO DAILY [facility]


   Last Admin: 05/07/17 08:46 Dose:  50 mg


Sodium Chloride (Saline Flush)  10 ml IV BID [facility]


   Last Admin: 05/07/17 08:46 Dose:  Not Given


Sodium Chloride (Saline Flush)  20 ml IV Q4HR PRN


   PRN Reason: PICC Line


Sodium Chloride (Saline Flush)  10 ml IV WEEKLY [facility]


   Last Admin: 05/04/17 08:52 Dose:  Not Given


Sodium Chloride (Saline Flush)  10 ml IV Q4HR PRN


   PRN Reason: PICC Line


Thiamine HCl (Vitamin B-1)  100 mg PO BID [facility]


   Last Admin: 05/07/17 08:46 Dose:  100 mg








Plan: 





1.  Continue ASA, Lipitor, heparin, Lopressor, lisinopril, digoxin (digoxin 

level was 1.1), Lasix. 


2.  Continue amiodarone for atrial fibrillation prophylaxis.


3.  Wean O2 as tolerated.


4.  Coumadin 5 mg by mouth today for INR today is 1.8.  He received Coumadin 5 

mg by mouth 1 yesterday.


5.  Continue TPN for until patient able to take in enough calories. Calorie 

count in place.


6.  Insulin/diabetic management per primary care service.


7.  Continue antibiotics per pulmonology management.  Positive sputum culture 

on 04/24/2017 for Moraxella.


8.  Increase activity, out of bed to chair.  Physical therapy following.  


9.  Daily labs, chest x-rays. 


10.  GI/DVT prophylaxis.


11.  Potassium replacement per protocol.  His potassium today was 3.5.


12.  Angelo catheter will be removed in the a.m., may place a condom catheter if 

needed.


13.  Transfer to  E. selective care soon.


14.  Discharge planning in process.  Patient will need rehabilitation upon 

discharge.





<Oscar Porter - Last Filed: 05/07/17 15:54>





Progress Note - Text





The patient was seen and examined.I agree with the above assessment and plan.  

Overall he appears more alert.  He continues to increase his oral intake and is 

still inadequate.  Her remains on TPN for supplementation.  INR today is 1.8.  

5 mg of Coumadin will be given tonight.  His intravenous heparin was 

discontinued.  He remains on antibiotics as directed by infectious disease.  

Angelo catheter will be removed and transitioned to a condom catheter.  We'll 

decrease his Lasix.  He is still debilitated and is receiving physical therapy.

  Evaluation for inpatient rehab has been ordered.

## 2017-05-08 LAB
ANION GAP SERPL CALC-SCNC: 4 MMOL/L
BUN SERPL-SCNC: 16 MG/DL (ref 9–20)
CALCIUM SPEC-MCNC: 8.1 MG/DL (ref 8.4–10.2)
CH: 31.7
CHCM: 32.7
CHLORIDE SERPL-SCNC: 103 MMOL/L (ref 98–107)
CO2 SERPL-SCNC: 27 MMOL/L (ref 22–30)
ERYTHROCYTE [DISTWIDTH] IN BLOOD BY AUTOMATED COUNT: 2.63 M/UL (ref 4.3–5.9)
ERYTHROCYTE [DISTWIDTH] IN BLOOD: 14.9 % (ref 11.5–15.5)
GLUCOSE BLD-MCNC: 101 MG/DL (ref 75–99)
GLUCOSE BLD-MCNC: 104 MG/DL (ref 75–99)
GLUCOSE BLD-MCNC: 117 MG/DL (ref 75–99)
GLUCOSE BLD-MCNC: 118 MG/DL (ref 75–99)
GLUCOSE BLD-MCNC: 126 MG/DL (ref 75–99)
GLUCOSE BLD-MCNC: 127 MG/DL (ref 75–99)
GLUCOSE BLD-MCNC: 128 MG/DL (ref 75–99)
GLUCOSE BLD-MCNC: 130 MG/DL (ref 75–99)
GLUCOSE BLD-MCNC: 156 MG/DL (ref 75–99)
GLUCOSE BLD-MCNC: 156 MG/DL (ref 75–99)
GLUCOSE BLD-MCNC: 91 MG/DL (ref 75–99)
GLUCOSE BLD-MCNC: 98 MG/DL (ref 75–99)
GLUCOSE SERPL-MCNC: 86 MG/DL (ref 74–99)
HCT VFR BLD AUTO: 25.7 % (ref 39–53)
HDW: 3.14
HGB BLD-MCNC: 8.3 GM/DL (ref 13–17.5)
INR PPP: 2.7 (ref ?–1.1)
MAGNESIUM SPEC-SCNC: 2.1 MG/DL (ref 1.6–2.3)
MCH RBC QN AUTO: 31.5 PG (ref 25–35)
MCHC RBC AUTO-ENTMCNC: 32.3 G/DL (ref 31–37)
MCV RBC AUTO: 97.5 FL (ref 80–100)
NON-AFRICAN AMERICAN GFR(MDRD): >60
PHOSPHATE SERPL-MCNC: 3.2 MG/DL (ref 2.5–4.5)
POTASSIUM SERPL-SCNC: 4.5 MMOL/L (ref 3.5–5.1)
PT BLD: 26.1 SEC (ref 9–12)
SODIUM SERPL-SCNC: 134 MMOL/L (ref 137–145)
WBC # BLD AUTO: 8 K/UL (ref 3.8–10.6)

## 2017-05-08 RX ADMIN — SODIUM CHLORIDE, PRESERVATIVE FREE SCH ML: 5 INJECTION INTRAVENOUS at 08:25

## 2017-05-08 RX ADMIN — BUDESONIDE SCH: 1 SUSPENSION RESPIRATORY (INHALATION) at 08:41

## 2017-05-08 RX ADMIN — METOPROLOL TARTRATE SCH MG: 50 TABLET, FILM COATED ORAL at 08:23

## 2017-05-08 RX ADMIN — METOCLOPRAMIDE SCH MG: 5 INJECTION, SOLUTION INTRAMUSCULAR; INTRAVENOUS at 17:07

## 2017-05-08 RX ADMIN — LISINOPRIL SCH MG: 10 TABLET ORAL at 08:23

## 2017-05-08 RX ADMIN — SODIUM PHOSPHATE, MONOBASIC, MONOHYDRATE SCH: 276; 142 INJECTION, SOLUTION INTRAVENOUS at 14:43

## 2017-05-08 RX ADMIN — IPRATROPIUM BROMIDE AND ALBUTEROL SULFATE SCH: .5; 3 SOLUTION RESPIRATORY (INHALATION) at 21:50

## 2017-05-08 RX ADMIN — Medication SCH MG: at 08:24

## 2017-05-08 RX ADMIN — ASPIRIN 325 MG ORAL TABLET SCH MG: 325 PILL ORAL at 08:23

## 2017-05-08 RX ADMIN — PANTOPRAZOLE SODIUM SCH MG: 40 TABLET, DELAYED RELEASE ORAL at 08:24

## 2017-05-08 RX ADMIN — Medication SCH MG: at 20:24

## 2017-05-08 RX ADMIN — METOPROLOL TARTRATE SCH MG: 50 TABLET, FILM COATED ORAL at 20:24

## 2017-05-08 RX ADMIN — METOCLOPRAMIDE SCH MG: 5 INJECTION, SOLUTION INTRAMUSCULAR; INTRAVENOUS at 08:23

## 2017-05-08 RX ADMIN — ATORVASTATIN CALCIUM SCH MG: 40 TABLET, FILM COATED ORAL at 08:24

## 2017-05-08 RX ADMIN — IPRATROPIUM BROMIDE AND ALBUTEROL SULFATE SCH ML: .5; 3 SOLUTION RESPIRATORY (INHALATION) at 11:39

## 2017-05-08 RX ADMIN — AMIODARONE HYDROCHLORIDE SCH MG: 200 TABLET ORAL at 08:24

## 2017-05-08 RX ADMIN — IPRATROPIUM BROMIDE AND ALBUTEROL SULFATE SCH ML: .5; 3 SOLUTION RESPIRATORY (INHALATION) at 16:10

## 2017-05-08 RX ADMIN — SODIUM CHLORIDE, PRESERVATIVE FREE SCH ML: 5 INJECTION INTRAVENOUS at 20:24

## 2017-05-08 RX ADMIN — METOCLOPRAMIDE SCH MG: 5 INJECTION, SOLUTION INTRAMUSCULAR; INTRAVENOUS at 00:21

## 2017-05-08 RX ADMIN — SERTRALINE HYDROCHLORIDE SCH MG: 50 TABLET, FILM COATED ORAL at 08:24

## 2017-05-08 RX ADMIN — LEVOFLOXACIN SCH MG: 750 TABLET, FILM COATED ORAL at 17:51

## 2017-05-08 RX ADMIN — DOCUSATE SODIUM AND SENNOSIDES SCH EACH: 50; 8.6 TABLET ORAL at 20:24

## 2017-05-08 RX ADMIN — METOCLOPRAMIDE SCH MG: 5 INJECTION, SOLUTION INTRAMUSCULAR; INTRAVENOUS at 12:17

## 2017-05-08 RX ADMIN — DIGOXIN SCH MCG: 250 TABLET ORAL at 08:24

## 2017-05-08 RX ADMIN — MULTIVITAMIN SCH ML: LIQUID ORAL at 12:44

## 2017-05-08 RX ADMIN — IPRATROPIUM BROMIDE AND ALBUTEROL SULFATE SCH: .5; 3 SOLUTION RESPIRATORY (INHALATION) at 08:42

## 2017-05-08 RX ADMIN — LISINOPRIL SCH MG: 10 TABLET ORAL at 20:24

## 2017-05-08 RX ADMIN — FUROSEMIDE SCH MG: 10 INJECTION, SOLUTION INTRAMUSCULAR; INTRAVENOUS at 08:24

## 2017-05-08 RX ADMIN — INSULIN HUMAN SCH MLS/HR: 100 INJECTION, SOLUTION PARENTERAL at 15:21

## 2017-05-08 NOTE — P.PN
Subjective


This is a 60-year-old gentleman that is status post CABG postop day 11.  

Failure to wean from mechanical ventilator.





Patient is seen in consultation for medical management.  Patient is currently 

on pressure control ventilation with PTCA of 18 cm water Depo 7 FiO2 50% and 

respiratory rate of 18.





Patient is awake however not responsive appropriately.





Is moving his all 4 extremities to command





This a.m. patient apparently was having episodes of hypotension as patient was 

going into atrial fibrillation.





Weaning trials were attempted this a.m. with a pressure support of 7 according 

to verbal report patient apparently had tachypnea within 5 minutes hence was 

restarted on full pressure-controlled ventilation.





4/27/17





No new overnight events





Pt was extubated


doing well


appears to be confused





Concern for abdominal distention


2 episodes of emesis 





No fevers, chills.





NG in place





4/28/17





On 7 lo2


has a cough, with thick sputum, however pt is unable to produce it due to a 

weak cough





Is more appropriate today





No significant abdominal tenderness reported.








Interval course





Patient has had some dysphagia.  Was started on TPN.  Is currently restarted on 

oral diet after a swallow eval





Currently calorie count and progress currently off oxygen





Patient was also noted to have abdominal distention there was concern for ileus 

which is resolved





Patient is currently seen in selective floor or graft denies having any 

additional complaints today





No fevers chills nausea vomiting or diarrhea reported











Objective





- Vital Signs


Vital signs: 


 Vital Signs











Temp  97.0 F L  05/08/17 11:30


 


Pulse  68   05/08/17 11:53


 


Resp  22   05/08/17 09:00


 


BP  103/57   05/08/17 11:30


 


Pulse Ox  95   05/08/17 11:41








 Intake & Output











 05/07/17 05/08/17 05/08/17





 18:59 06:59 18:59


 


Intake Total 412.108 210.175 84.875


 


Output Total 845 985 600


 


Balance -432.892 -774.825 -515.125


 


Weight 94.2 kg 95.8 kg 


 


Intake:   


 


   170 40


 


    .9 NaCL 100 170 40


 


  Intake, IV Titration 312.108 40.175 44.875





  Amount   


 


    Insulin Regular 100 unit 72.108 40.175 44.875





    In Sodium Chloride 0.9%   





    100 ml @ Per Protocol IV   





    .Q0M [facility] Rx#:[id number]   


 


    Magnesium Sulfate-D5w Pmx 200  





    1 gm In Dextrose/Water 1   





    100ml.bag @ 100 mls/hr   





    IVPB Q1H [facility] Rx#:   





    [id number]   


 


    Mvi, Adult No.4 with Vit 40  





    K 10 ml Trace (Conc-1Ml/   





    Dose) 1 ml Potassium   





    Chloride 20 meq In Amino   





    Acid 5%-D25w+Lytes*E* 1,   





    000 ml @ 40 mls/hr IV .   





    Q24H [facility] Rx#:[id number]   


 


Output:   


 


  Urine 845 985 600


 


Other:   


 


  Voiding Method Indwelling Catheter Indwelling Catheter 


 


  # Bowel Movements 1  








 ABP, PAP, CO, CI - Last Documented











Arterial Blood Pressure        172/58


 


Pulmonary Artery Pressure      46/23


 


Cardiac Output                 6.8


 


Cardiac Index                  3.3

















- Exam


Gen. appearance awake answers some questions appropriately





Lungs diminished breath sounds wheezing appreciated








Heart S1-S2 heard no significant murmurs appreciated





Abdomen is soft nontender no organomegaly, some distention.





Lower extremities no significant edema





Neuro failure nurse to till 12 grossly intact no focal motor or sensory 

deficits noted




















- Labs


CBC & Chem 7: 


 05/08/17 04:35





 05/08/17 04:35


Labs: 


 Abnormal Lab Results - Last 24 Hours (Table)











  05/07/17 05/07/17 05/07/17 Range/Units





  13:03 14:19 16:05 


 


RBC     (4.30-5.90)  m/uL


 


Hgb     (13.0-17.5)  gm/dL


 


Hct     (39.0-53.0)  %


 


PT     (9.0-12.0)  sec


 


Sodium     (137-145)  mmol/L


 


POC Glucose (mg/dL)  104 H  134 H  144 H  (75-99)  mg/dL


 


Calcium     (8.4-10.2)  mg/dL














  05/07/17 05/07/17 05/07/17 Range/Units





  18:19 20:01 22:09 


 


RBC     (4.30-5.90)  m/uL


 


Hgb     (13.0-17.5)  gm/dL


 


Hct     (39.0-53.0)  %


 


PT     (9.0-12.0)  sec


 


Sodium     (137-145)  mmol/L


 


POC Glucose (mg/dL)  114 H  146 H  151 H  (75-99)  mg/dL


 


Calcium     (8.4-10.2)  mg/dL














  05/08/17 05/08/17 05/08/17 Range/Units





  00:10 02:08 03:05 


 


RBC     (4.30-5.90)  m/uL


 


Hgb     (13.0-17.5)  gm/dL


 


Hct     (39.0-53.0)  %


 


PT     (9.0-12.0)  sec


 


Sodium     (137-145)  mmol/L


 


POC Glucose (mg/dL)  128 H  118 H  104 H  (75-99)  mg/dL


 


Calcium     (8.4-10.2)  mg/dL














  05/08/17 05/08/17 05/08/17 Range/Units





  04:01 04:35 04:35 


 


RBC    2.63 L  (4.30-5.90)  m/uL


 


Hgb    8.3 L  (13.0-17.5)  gm/dL


 


Hct    25.7 L  (39.0-53.0)  %


 


PT     (9.0-12.0)  sec


 


Sodium   134 L   (137-145)  mmol/L


 


POC Glucose (mg/dL)  101 H    (75-99)  mg/dL


 


Calcium   8.1 L   (8.4-10.2)  mg/dL














  05/08/17 05/08/17 05/08/17 Range/Units





  04:35 05:59 07:58 


 


RBC     (4.30-5.90)  m/uL


 


Hgb     (13.0-17.5)  gm/dL


 


Hct     (39.0-53.0)  %


 


PT  26.1 H    (9.0-12.0)  sec


 


Sodium     (137-145)  mmol/L


 


POC Glucose (mg/dL)   117 H  117 H  (75-99)  mg/dL


 


Calcium     (8.4-10.2)  mg/dL














  05/08/17 05/08/17 Range/Units





  10:04 11:57 


 


RBC    (4.30-5.90)  m/uL


 


Hgb    (13.0-17.5)  gm/dL


 


Hct    (39.0-53.0)  %


 


PT    (9.0-12.0)  sec


 


Sodium    (137-145)  mmol/L


 


POC Glucose (mg/dL)  156 H  130 H  (75-99)  mg/dL


 


Calcium    (8.4-10.2)  mg/dL














Assessment and Plan


Plan: 


#1 acute hypoxic respiratory failure as expected however prolonged due to other 

comorbidities.





#2 CAD status post CABG postop day 25





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





#4 history of hypertension





#5 dyslipidemia





#6 proximal atrial fibrillation recurrent episodes of A. fib with RVR.  However 

rate controlled anticoagulation to be decided by cardiothoracic surgery





#7 history of significant alcohol use status post a prolonged episode of 

delirium tremens





#8 gout





#9 diabetes mellitus currently on insulin drip.





#10 dysphagia , resolved currently tolerating oral intake





#11 M .catarrhalis tracheo bronchitis, Health care acquired tracheobronchitis.





12.  ileus.  Resolved





Plan








Continue with insulin drip for the next 24 hours once patient's TPN is titrated 

off will assess the insulin requirement with oral intake thereafter start the 

patient on a basal dose tomorrow in the a.m.


Prognosis guarded





Disposition nursing home versus inpatient rehab

## 2017-05-08 NOTE — P.CONS
History of Present Illness





- Chief Complaint


Cardiac debility





- History of Present Illness





I had the opportunity to see patient for inpatient rehab consultation with 

regard to cardiac debility.  He was admitted to [facility]  and underwent 

coronary bypassing.  Seen by pulmonary for ventilatory and ICU assistance.  

Seen by Dr. Dr. Inman who notes dysphagia and ileus, improving.  PT reports 

2 person maximal assistance for functional mobility.  Chest x-rays followed for 

left lower lobe infiltrate and effusion.





Previous functional history, as elicited from wife and patient: 61-year-old 

right-handed white male who is  lives and one for home with wife.  

Retired.  During driving and they share the cooking.  Patient chews tobacco and 

perhaps 8-15 beers a day.  Otherwise independent with dressing, standing shower 

and gait without device.  Regular doctors Dr. Ventura.





Family history of father  with heart attack.





Review of Systems





Review of systems:


ENT: Denies sneezes or discharge.


Eyes: Denies discharge or photophobia.


Cardiac: Some chest or sternal discomfort.  Wearing harness.


Pulmonary: Least mild shortness of breath.


Gastrointestinal: Denies nausea, emesis, constipation, diarrhea.


Genitourinary: Denies discharge or frequency.


Musculoskeletal: Denies muscle or bone aches.


Neurologic: Generalized weakness.


Endocrine: Denies shakes or sweats.


Oncology: Denies cancers.


Dermatologic: Denies rash, itching, pruritus.


ALLERGY/immunology: Denies sneezes, rashes.








Past Medical History


Past Medical History: Coronary Artery Disease (CAD), Hypertension, Myocardial 

Infarction (MI)


Additional Past Medical History / Comment(s): gout


Last Myocardial Infarction Date:: 


History of Any Multi-Drug Resistant Organisms: None Reported


Past Surgical History: Heart Catheterization With Stent


Additional Past Surgical History / Comment(s): stent x1


Past Anesthesia/Blood Transfusion Reactions: No Reported Reaction


Date of Last Stent Placement:: 


Past Psychological History: No Psychological Hx Reported


Smoking Status: Current every day smoker


Past Alcohol Use History: Heavy


Additional Past Alcohol Use History / Comment(s): chews tobacco, one tin last 3-

4 days,   drinks 6-8 beers a day


Past Drug Use History: None Reported





- Past Family History


  ** Sister(s)


Family Medical History: Cancer


Additional Family Medical History / Comment(s): thyroid





Medications and Allergies


 Home Medications











 Medication  Instructions  Recorded  Confirmed  Type


 


Aspirin EC [Ecotrin] 325 mg PO DAILY 16 History


 


Metoprolol Tartrate [Lopressor] 25 mg PO BID 16 History


 


Atorvastatin [Lipitor] 40 mg PO HS 17 History


 


Enalapril/Hydrochlorothiazide 1 tab PO DAILY 17 History





[Vaseretic 10-25 mg]    


 


Thiamine [Vitamin B-1] 100 mg PO DAILY 04/10/17 04/13/17 History











 Allergies











Allergy/AdvReac Type Severity Reaction Status Date / Time


 


Penicillins Allergy  Rash/Hives Verified 04/10/17 14:29














Physical Exam


Vitals: 


 Vital Signs











  Temp Pulse Resp BP Pulse Ox


 


 17 09:00   73  22  152/82  95


 


 17 08:00  98.2 F  74  12   94 L


 


 17 07:00   65  18   93 L


 


 17 06:00   61  17  117/69  94 L


 


 17 05:00   61  19  117/69  94 L


 


 17 04:00   57 L  15  108/57  96


 


 17 03:17    15  


 


 17 03:00   58 L  10 L  106/60  95


 


 17 02:00   59 L  14  103/62  95


 


 17 01:00   58 L  15  120/61  95


 


 17 00:00   61  19  122/61  97


 


 17 23:00   63  19  125/69  96


 


 17 22:00   65  20  137/64  96


 


 17 21:00   69  20  121/59  94 L


 


 17 20:45   69  18  128/61  95


 


 17 20:00  97.7 F  71  25 H  128/61 


 


 17 19:23   68   


 


 17 19:00   66  17  112/61 


 


 17 18:00   68  22  159/71 


 


 17 17:00   62  17  105/63 


 


 17 16:00   59 L  15  115/58  98


 


 17 15:00   59 L  14  108/56  98


 


 17 14:48   61   


 


 17 14:00   62  23  117/63  100


 


 17 13:00   65  20  105/65 


 


 17 12:00  98.2 F  60  15  111/60  100


 


 17 11:00   87  20  105/54  100








 Intake and Output











 17





 22:59 06:59 14:59


 


Intake Total 135.55 110.575 40


 


Output Total 540 535 250


 


Balance -404.45 -424.425 -210


 


Intake:   


 


   80 40


 


    .9 NaCL 110 80 40


 


  Intake, IV Titration 25.55 30.575 





  Amount   


 


    Insulin Regular 100 unit 25.55 30.575 





    In Sodium Chloride 0.9%   





    100 ml @ Per Protocol IV   





    .Q0M [facility] Rx#:[id number]   


 


Output:   


 


  Urine 540 535 250


 


Other:   


 


  Voiding Method Indwelling Catheter Indwelling Catheter 


 


  # Bowel Movements 1  


 


  Weight  95.8 kg 














Skin: Good color, texture, turgor.


General: Obese and comfortable appearance and fatigued affect.


Head: Normocephalic, atraumatic.


Eyes: Symmetric.  Pupils equal round.


Ears: Symmetric.  Hearing within normal limits.


Mouth: Clear.


Neck: Supple.  Carotid without bruit.


Cardiac: Regular rate and rhythm.  Wearing harness.


Sternotomy scar clean and dressed.


Lungs: Clear anteriorly and posteriorly.


Abdomen: Soft active nontender.


Extremities: Normal tone.  2+ edema forelegs and feet.  At least moderate 

arthritic changes shoulders and knees.


Neurological: Mental status: Alert, cooperative, pleasant.


Cranial nerves: Symmetric facial tone and trapezius.


Motor: Active movement but all limbs but has difficulty elevating legs more so 

than arms off of lazy boy chair..


Sensation: Intact throughout.


DTRs: Symmetric and equal throughout.


Mobility: Did not attempt to stand on my own is at least a two-person 

assistance and a large individual.





Results


CBC & Chem 7: 


 17 04:35





 17 04:35


Labs: 


 Abnormal Lab Results - Last 24 Hours (Table)











  17 Range/Units





  10:02 13:03 14:19 


 


RBC     (4.30-5.90)  m/uL


 


Hgb     (13.0-17.5)  gm/dL


 


Hct     (39.0-53.0)  %


 


PT     (9.0-12.0)  sec


 


Sodium     (137-145)  mmol/L


 


POC Glucose (mg/dL)  170 H  104 H  134 H  (75-99)  mg/dL


 


Calcium     (8.4-10.2)  mg/dL














  17 Range/Units





  16:05 18:19 20:01 


 


RBC     (4.30-5.90)  m/uL


 


Hgb     (13.0-17.5)  gm/dL


 


Hct     (39.0-53.0)  %


 


PT     (9.0-12.0)  sec


 


Sodium     (137-145)  mmol/L


 


POC Glucose (mg/dL)  144 H  114 H  146 H  (75-99)  mg/dL


 


Calcium     (8.4-10.2)  mg/dL














  17 Range/Units





  22:09 00:10 02:08 


 


RBC     (4.30-5.90)  m/uL


 


Hgb     (13.0-17.5)  gm/dL


 


Hct     (39.0-53.0)  %


 


PT     (9.0-12.0)  sec


 


Sodium     (137-145)  mmol/L


 


POC Glucose (mg/dL)  151 H  128 H  118 H  (75-99)  mg/dL


 


Calcium     (8.4-10.2)  mg/dL














  17 Range/Units





  03:05 04:01 04:35 


 


RBC     (4.30-5.90)  m/uL


 


Hgb     (13.0-17.5)  gm/dL


 


Hct     (39.0-53.0)  %


 


PT     (9.0-12.0)  sec


 


Sodium    134 L  (137-145)  mmol/L


 


POC Glucose (mg/dL)  104 H  101 H   (75-99)  mg/dL


 


Calcium    8.1 L  (8.4-10.2)  mg/dL














  17 Range/Units





  04:35 04:35 05:59 


 


RBC  2.63 L    (4.30-5.90)  m/uL


 


Hgb  8.3 L    (13.0-17.5)  gm/dL


 


Hct  25.7 L    (39.0-53.0)  %


 


PT   26.1 H   (9.0-12.0)  sec


 


Sodium     (137-145)  mmol/L


 


POC Glucose (mg/dL)    117 H  (75-99)  mg/dL


 


Calcium     (8.4-10.2)  mg/dL














  17 Range/Units





  07:58 


 


RBC   (4.30-5.90)  m/uL


 


Hgb   (13.0-17.5)  gm/dL


 


Hct   (39.0-53.0)  %


 


PT   (9.0-12.0)  sec


 


Sodium   (137-145)  mmol/L


 


POC Glucose (mg/dL)  117 H  (75-99)  mg/dL


 


Calcium   (8.4-10.2)  mg/dL











Chest x-ray: report reviewed (Serial chest x-rays followed for left infiltrate 

and)





Assessment and Plan


(1) Status post coronary artery bypass graft


Status: Acute   


Plan: 





Ruperto:


1.  Cardiac debility.


2.  Status post coronary bypassing.


3.  Obese.


4.  Coronary disease with history of MI.


5.  Hypertension.





Comments and plan: At this time PT is already undergoing and OT and speech 

prescribed.  Safety concerns obvious.  We'll follow for the ability to tolerate 

and benefit from therapies out of bed.

## 2017-05-08 NOTE — P.PN
Progress Note - Text





This is a 61-year-old gentleman who is status post prior to coronary bypass 

surgery.  


The patient had a prolonged stay in ICU on mechanical ventilator support.  


The patient was in and out atrial fibrillation with RVR.  But he was converted 

to normal sinus mechanism 5 days ago.


He is on metoprolol, amiodarone, and digoxin.


He was started on Coumadin and the INR today is therapeutic.  





From the cardiovascular standpoint overview, we'll continue the current medical 

treatment.  The patient is feeling better slowly.

## 2017-05-08 NOTE — PN
A 61-year-old gentleman who I saw many days back. He was actually here 

the last time I was on call. He is postop day #25. He is status post a 

coronary bypass grafting. The patient has had numerous complications 

since he has been here. Anyway, he finally has been extubated and 

doing reasonably well. The patient is a selective overflow. He is 

postop day #25 status post bypass grafting. He has a history of 

hypoxemic respiratory failure with ventilator dependence. He probably 

developed some postoperative acute respiratory distress syndrome, 

although the etiology of that it is not clear. In addition, he has a 

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 

syndrome. In addition, he has a history of recurrent mucous plugging 

with numerous respiratory secretions requiring airway suctioning via 

bronchoscopy. He did grew out some Moraxella catarrhalis. In addition, 

he has a history of hypertension, hyperlipidemia, atrial fibrillation 

and ileus.  



The patient's doing much better today. Feeling much better. Eating 

more.  



Seen by my partner yesterday Dr. Hall. 



Hopefully we can get him off of the TPN. 



Current vital signs include a temperature of 98.2, heart rate 73, 

respiratory rate is about 16, blood pressure 152/82, mean 105, 

room-air saturation 95%. He is doing relatively well.  

HEENT examination is grossly unremarkable. Mucous membranes are moist. 

Neck is supple. Full range of motion. 

Cardiovascular examination reveals regular rhythm and rate. Heart 

sounds are distant.  

Lungs are clear, breath sounds equal. 

ABDOMEN: Obese. Bowel sounds are heard. 

Extremities are intact. No cyanosis, clubbing, or edema. 



He was admitted back on the 13th with his bypass grafting on the 13th. 

He is currently receiving no supplemental oxygen. His IV is 0.9 at 10 

mL an hour. He has got an insulin drip running at 3.5 units an hour 

and TPN at 40 mL an hour.  



Lab data is reviewed. White count 8, hemoglobin 8.3, hematocrit 25.7, 

platelet count 360,000. PT, INR is 26.1 and 2.7. Sodium 134, 

potassium, chloride and CO2 all normal. BUN and creatinine were 

normal. His calcium 8.1. Phosphorus 3.2. Magnesium 2.1.  



Medications are reviewed. 



His most recent chest x-ray was a couple days ago on the 6th, which 

showed basically postop changes. There is some minimal left lower lobe 

atelectasis.  



Medications will be reviewed. 



ASSESSMENT: 

1. Postoperative day #25, status post bypass grafting. 

2. Daquan postoperative course complicated by number of things 

including infection, ileus and acute respiratory distress syndrome, 

all resolved.  

3. History of ileus. 

4. History of significant airway secretions requiring multiple 

bronchoscopies.  

5. Critical illness polyneuropathy. 

6. Hypertension. 

7. Hyperlipidemia. 

8. History of atrial fibrillation. 

9. Postoperative anemia likely related to a combination of excessive 

blood draws, phlebotomy, etc. as well as bone marrow dysfunction from 

the critical illness.  



PLAN: The patient is doing well. Medications are reviewed. Unnecessary 

medications will be discontinued. As soon as the patient is able to 

adequately take in adequate calories, will DC the TPN, even if he is 

taking less than expected calories orally, the TPN is likely 

pro-inflammatory and is not recommended. Anyway, I will continue to 

follow. No additional recommendations are made. Medications are 

reviewed. Prognosis is still guarded but overall he is showing 

significant improvement.

## 2017-05-08 NOTE — P.PN
Subjective


Principal diagnosis: 





Coronary artery disease, status post prior stenting to his right coronary 

artery.  Preserved left ventricular function.  Hyperlipidemia.  Hypertension.  

ETOH abuse.





POD #26 total arterial non-aortic patch off-pump double coronary artery bypass 

grafting using in situ skeletonized right internal mammary artery to the left 

anterior descending artery across the anterior midline in situ totally 

skeletonized left internal mammary artery to the first obtuse marginal artery.  

Intraoperative transesophageal echocardiogram and epi-aortic scanning.  

Intraoperative graft flow measurements using the Medistim system





POD #26 flexible bronchoscopy with bronchial washings secondary to 

postoperative mucous plugging with increasing oxygen demand the night of 

surgery.  Pt had acute hypoxic post operative respiratory failure as an 

unexpected post-surgical condition, related to the patient's underlying medical 

comorbidities. Unable to determine if ventilator associated pneumonia vs. 

trachobronchitis, an unexpected post-surgical condition.  Sputum culture grew 

out Moraxella, blood cultures are negative to date, urine culture grew 

Klebsiella.  Patient placed on Levaquin per pulmonology.





Pt developed postoperative alcohol withdrawal requiring increased sedation and 

prolonged mechanical ventilation.





Patient developed postoperative ileus, an unexpected post-surgical condition, 

currently resolving.   Currently receiving TPN.  General surgery and consult, 

no intervention at this time.  Passed modified barium swallow yesterday.  NGT 

removed.  Placed on diet.  TPN continues and calorie count in place until 

determination that patient is meeting caloric needs. 





Patient currently sitting up in cardiac chair in no apparent distress.  States 

feeling better everyday.  Just had Angelo catheter removed, due to void.





Objective





- Vital Signs


Vital signs: 


 Vital Signs











Temp  97.7 F   05/07/17 20:00


 


Pulse  57 L  05/08/17 04:00


 


Resp  15   05/08/17 04:00


 


BP  108/57   05/08/17 04:00


 


Pulse Ox  96   05/08/17 04:00








 Intake & Output











 05/07/17 05/08/17 05/08/17





 18:59 06:59 18:59


 


Intake Total 412.108 210.175 


 


Output Total 845 985 


 


Balance -432.892 -774.825 


 


Weight 94.2 kg 95.8 kg 


 


Intake:   


 


   170 


 


    .9 NaCL 100 170 


 


  Intake, IV Titration 312.108 40.175 





  Amount   


 


    Insulin Regular 100 unit 72.108 40.175 





    In Sodium Chloride 0.9%   





    100 ml @ Per Protocol IV   





    .Q0M [facility] Rx#:[id number]   


 


    Magnesium Sulfate-D5w Pmx 200  





    1 gm In Dextrose/Water 1   





    100ml.bag @ 100 mls/hr   





    IVPB Q1H [facility] Rx#:   





    [id number]   


 


    Mvi, Adult No.4 with Vit 40  





    K 10 ml Trace (Conc-1Ml/   





    Dose) 1 ml Potassium   





    Chloride 20 meq In Amino   





    Acid 5%-D25w+Lytes*E* 1,   





    000 ml @ 40 mls/hr IV .   





    Q24H [facility] Rx#:[id number]   


 


Output:   


 


  Urine 845 985 


 


Other:   


 


  Voiding Method Indwelling Catheter Indwelling Catheter 


 


  # Bowel Movements 1  








 ABP, PAP, CO, CI - Last Documented











Arterial Blood Pressure        172/58


 


Pulmonary Artery Pressure      46/23


 


Cardiac Output                 6.8


 


Cardiac Index                  3.3

















- Constitutional


General appearance: Present: cooperative, no acute distress





- Respiratory


Details: 





Lungs sounds diminished bilaterally.  Respirations even, nonlabored.  Currently 

on room air with oxygen saturation 96%.  Effective cough.





- Cardiovascular


Details: 





S1, S2 present.  Regular rate and rhythm, normal sinus rhythm on telemetry.  

Trace bilateral lower extremity edema still present.  Sternum stable superiorly

, movable inferiorly.  Heart hugger in place with patient beginning to 

demonstrate appropriate use.  Teds/SCDs present.





- Gastrointestinal


Gastrointestinal Comment(s): 





Abdomen soft, nontender, slightly distended.  Active bowel sounds 4 quadrants.

  Stooling daily.  Tolerating diet.





- Genitourinary


Genitourinary Comment(s): 





Angelo DC'd at 5 AM.  Due to void.  Urinal at bedside.





- Integumentary


Integumentary Comment(s): 





Anterior chest incision well approximated with dry intact dressing present.





- Musculoskeletal


Musculoskeletal: Present: generalized weakness, strength equal bilaterally





- Psychiatric


Psychiatric: Present: A&O x's 3, appropriate affect, intact judgment & insight





- Allied health notes


Allied health notes reviewed: nursing





- Labs


CBC & Chem 7: 


 05/08/17 04:35





 05/08/17 04:35


Labs: 


 Abnormal Lab Results - Last 24 Hours (Table)











  05/07/17 05/07/17 05/07/17 Range/Units





  09:05 10:02 13:03 


 


RBC     (4.30-5.90)  m/uL


 


Hgb     (13.0-17.5)  gm/dL


 


Hct     (39.0-53.0)  %


 


PT     (9.0-12.0)  sec


 


Sodium     (137-145)  mmol/L


 


POC Glucose (mg/dL)  171 H  170 H  104 H  (75-99)  mg/dL


 


Calcium     (8.4-10.2)  mg/dL














  05/07/17 05/07/17 05/07/17 Range/Units





  14:19 16:05 18:19 


 


RBC     (4.30-5.90)  m/uL


 


Hgb     (13.0-17.5)  gm/dL


 


Hct     (39.0-53.0)  %


 


PT     (9.0-12.0)  sec


 


Sodium     (137-145)  mmol/L


 


POC Glucose (mg/dL)  134 H  144 H  114 H  (75-99)  mg/dL


 


Calcium     (8.4-10.2)  mg/dL














  05/07/17 05/07/17 05/08/17 Range/Units





  20:01 22:09 00:10 


 


RBC     (4.30-5.90)  m/uL


 


Hgb     (13.0-17.5)  gm/dL


 


Hct     (39.0-53.0)  %


 


PT     (9.0-12.0)  sec


 


Sodium     (137-145)  mmol/L


 


POC Glucose (mg/dL)  146 H  151 H  128 H  (75-99)  mg/dL


 


Calcium     (8.4-10.2)  mg/dL














  05/08/17 05/08/17 05/08/17 Range/Units





  02:08 03:05 04:01 


 


RBC     (4.30-5.90)  m/uL


 


Hgb     (13.0-17.5)  gm/dL


 


Hct     (39.0-53.0)  %


 


PT     (9.0-12.0)  sec


 


Sodium     (137-145)  mmol/L


 


POC Glucose (mg/dL)  118 H  104 H  101 H  (75-99)  mg/dL


 


Calcium     (8.4-10.2)  mg/dL














  05/08/17 05/08/17 05/08/17 Range/Units





  04:35 04:35 04:35 


 


RBC   2.63 L   (4.30-5.90)  m/uL


 


Hgb   8.3 L   (13.0-17.5)  gm/dL


 


Hct   25.7 L   (39.0-53.0)  %


 


PT    26.1 H  (9.0-12.0)  sec


 


Sodium  134 L    (137-145)  mmol/L


 


POC Glucose (mg/dL)     (75-99)  mg/dL


 


Calcium  8.1 L    (8.4-10.2)  mg/dL














  05/08/17 Range/Units





  05:59 


 


RBC   (4.30-5.90)  m/uL


 


Hgb   (13.0-17.5)  gm/dL


 


Hct   (39.0-53.0)  %


 


PT   (9.0-12.0)  sec


 


Sodium   (137-145)  mmol/L


 


POC Glucose (mg/dL)  117 H  (75-99)  mg/dL


 


Calcium   (8.4-10.2)  mg/dL














Assessment and Plan


(1) Status post coronary artery bypass graft


Status: Acute   





(2) Coronary artery disease


Status: Acute   





(3) Family history of heart disease


Status: Acute   





(4) History of PTCA


Status: Acute   





(5) Hyperlipidemia


Status: Acute   





(6) Hypertension


Status: Acute   





(7) Nicotine dependence, chewing tobacco, uncomplicated


Status: Acute   


Plan: 





1.  Continue ASA, Lipitor, Lopressor, lisinopril, digoxin, Lasix, Coumadin. 


2.  Continue amiodarone for atrial fibrillation prophylaxis.


3.  Wean O2 as tolerated.


4.  Monitor for urinary retention.


5.  Continue TPN for until patient able to take in enough calories. Calorie 

count in place.


6.  Insulin/diabetic management per primary care service.  Would recommend 

discontinuing insulin drip, transition to subcu insulin.


7.  Continue antibiotics per pulmonology.


8.  Increase activity, out of bed to chair.  Physical therapy following.  


9.  Daily labs, chest x-rays.


10.  GI/DVT prophylaxis.


11.  Transfer to  [facility] selective care soon.


12.  Discharge planning in process.  Patient will need rehabilitation upon 

discharge.





Time with Patient: Greater than 30

## 2017-05-09 LAB
ANION GAP SERPL CALC-SCNC: 7 MMOL/L
BUN SERPL-SCNC: 14 MG/DL (ref 9–20)
CALCIUM SPEC-MCNC: 8.3 MG/DL (ref 8.4–10.2)
CH: 31.6
CHCM: 32.8
CHLORIDE SERPL-SCNC: 104 MMOL/L (ref 98–107)
CO2 SERPL-SCNC: 24 MMOL/L (ref 22–30)
ERYTHROCYTE [DISTWIDTH] IN BLOOD BY AUTOMATED COUNT: 2.63 M/UL (ref 4.3–5.9)
ERYTHROCYTE [DISTWIDTH] IN BLOOD: 15.3 % (ref 11.5–15.5)
GLUCOSE BLD-MCNC: 104 MG/DL (ref 75–99)
GLUCOSE BLD-MCNC: 107 MG/DL (ref 75–99)
GLUCOSE BLD-MCNC: 108 MG/DL (ref 75–99)
GLUCOSE BLD-MCNC: 108 MG/DL (ref 75–99)
GLUCOSE BLD-MCNC: 109 MG/DL (ref 75–99)
GLUCOSE BLD-MCNC: 110 MG/DL (ref 75–99)
GLUCOSE BLD-MCNC: 126 MG/DL (ref 75–99)
GLUCOSE BLD-MCNC: 129 MG/DL (ref 75–99)
GLUCOSE BLD-MCNC: 134 MG/DL (ref 75–99)
GLUCOSE BLD-MCNC: 151 MG/DL (ref 75–99)
GLUCOSE BLD-MCNC: 156 MG/DL (ref 75–99)
GLUCOSE BLD-MCNC: 92 MG/DL (ref 75–99)
GLUCOSE SERPL-MCNC: 105 MG/DL (ref 74–99)
HCT VFR BLD AUTO: 25.5 % (ref 39–53)
HDW: 3.2
HGB BLD-MCNC: 8.2 GM/DL (ref 13–17.5)
INR PPP: 3.2 (ref ?–1.1)
MCH RBC QN AUTO: 31.2 PG (ref 25–35)
MCHC RBC AUTO-ENTMCNC: 32.3 G/DL (ref 31–37)
MCV RBC AUTO: 96.8 FL (ref 80–100)
NON-AFRICAN AMERICAN GFR(MDRD): >60
POTASSIUM SERPL-SCNC: 4.2 MMOL/L (ref 3.5–5.1)
PT BLD: 31.2 SEC (ref 9–12)
SODIUM SERPL-SCNC: 135 MMOL/L (ref 137–145)
WBC # BLD AUTO: 7 K/UL (ref 3.8–10.6)

## 2017-05-09 RX ADMIN — ATORVASTATIN CALCIUM SCH MG: 40 TABLET, FILM COATED ORAL at 07:48

## 2017-05-09 RX ADMIN — INSULIN LISPRO SCH UNIT: 100 INJECTION, SOLUTION INTRAVENOUS; SUBCUTANEOUS at 21:25

## 2017-05-09 RX ADMIN — METOCLOPRAMIDE SCH: 5 INJECTION, SOLUTION INTRAMUSCULAR; INTRAVENOUS at 06:19

## 2017-05-09 RX ADMIN — METOPROLOL TARTRATE SCH MG: 50 TABLET, FILM COATED ORAL at 21:26

## 2017-05-09 RX ADMIN — MULTIVITAMIN SCH ML: LIQUID ORAL at 07:50

## 2017-05-09 RX ADMIN — IPRATROPIUM BROMIDE AND ALBUTEROL SULFATE SCH ML: .5; 3 SOLUTION RESPIRATORY (INHALATION) at 08:23

## 2017-05-09 RX ADMIN — IPRATROPIUM BROMIDE AND ALBUTEROL SULFATE SCH ML: .5; 3 SOLUTION RESPIRATORY (INHALATION) at 11:37

## 2017-05-09 RX ADMIN — METOCLOPRAMIDE SCH: 5 INJECTION, SOLUTION INTRAMUSCULAR; INTRAVENOUS at 00:13

## 2017-05-09 RX ADMIN — INSULIN DETEMIR SCH UNIT: 100 INJECTION, SOLUTION SUBCUTANEOUS at 10:29

## 2017-05-09 RX ADMIN — LEVOFLOXACIN SCH MG: 750 TABLET, FILM COATED ORAL at 17:05

## 2017-05-09 RX ADMIN — METOCLOPRAMIDE SCH MG: 5 INJECTION, SOLUTION INTRAMUSCULAR; INTRAVENOUS at 17:06

## 2017-05-09 RX ADMIN — LISINOPRIL SCH MG: 10 TABLET ORAL at 21:26

## 2017-05-09 RX ADMIN — SERTRALINE HYDROCHLORIDE SCH MG: 50 TABLET, FILM COATED ORAL at 07:48

## 2017-05-09 RX ADMIN — Medication SCH MG: at 07:49

## 2017-05-09 RX ADMIN — DOCUSATE SODIUM AND SENNOSIDES SCH EACH: 50; 8.6 TABLET ORAL at 21:26

## 2017-05-09 RX ADMIN — LISINOPRIL SCH MG: 10 TABLET ORAL at 08:32

## 2017-05-09 RX ADMIN — INSULIN LISPRO SCH UNIT: 100 INJECTION, SOLUTION INTRAVENOUS; SUBCUTANEOUS at 17:05

## 2017-05-09 RX ADMIN — FUROSEMIDE SCH MG: 10 INJECTION, SOLUTION INTRAMUSCULAR; INTRAVENOUS at 07:49

## 2017-05-09 RX ADMIN — INSULIN LISPRO SCH: 100 INJECTION, SOLUTION INTRAVENOUS; SUBCUTANEOUS at 11:59

## 2017-05-09 RX ADMIN — DIGOXIN SCH MCG: 250 TABLET ORAL at 07:49

## 2017-05-09 RX ADMIN — PANTOPRAZOLE SODIUM SCH MG: 40 TABLET, DELAYED RELEASE ORAL at 07:49

## 2017-05-09 RX ADMIN — SODIUM CHLORIDE, PRESERVATIVE FREE SCH ML: 5 INJECTION INTRAVENOUS at 21:26

## 2017-05-09 RX ADMIN — AMIODARONE HYDROCHLORIDE SCH MG: 200 TABLET ORAL at 07:48

## 2017-05-09 RX ADMIN — SODIUM CHLORIDE, PRESERVATIVE FREE SCH ML: 5 INJECTION INTRAVENOUS at 07:49

## 2017-05-09 RX ADMIN — METOPROLOL TARTRATE SCH MG: 50 TABLET, FILM COATED ORAL at 07:48

## 2017-05-09 RX ADMIN — IPRATROPIUM BROMIDE AND ALBUTEROL SULFATE SCH ML: .5; 3 SOLUTION RESPIRATORY (INHALATION) at 16:44

## 2017-05-09 RX ADMIN — IPRATROPIUM BROMIDE AND ALBUTEROL SULFATE SCH ML: .5; 3 SOLUTION RESPIRATORY (INHALATION) at 20:46

## 2017-05-09 RX ADMIN — METOCLOPRAMIDE SCH MG: 5 INJECTION, SOLUTION INTRAMUSCULAR; INTRAVENOUS at 11:15

## 2017-05-09 RX ADMIN — ASPIRIN 325 MG ORAL TABLET SCH MG: 325 PILL ORAL at 07:48

## 2017-05-09 RX ADMIN — Medication SCH MG: at 21:27

## 2017-05-09 NOTE — P.PN
Subjective


Principal diagnosis: 





Coronary artery disease, status post prior stenting to his right coronary 

artery.  Preserved left ventricular function.  Hyperlipidemia.  Hypertension.  

ETOH abuse.





POD #27 total arterial non-aortic patch off-pump double coronary artery bypass 

grafting using in situ skeletonized right internal mammary artery to the left 

anterior descending artery across the anterior midline in situ totally 

skeletonized left internal mammary artery to the first obtuse marginal artery.  

Intraoperative transesophageal echocardiogram and epi-aortic scanning.  

Intraoperative graft flow measurements using the Medistim system





POD #27 flexible bronchoscopy with bronchial washings secondary to 

postoperative mucous plugging with increasing oxygen demand the night of 

surgery.  Pt had acute hypoxic post operative respiratory failure as an 

unexpected post-surgical condition, related to the patient's underlying medical 

comorbidities. Unable to determine if ventilator associated pneumonia vs. 

trachobronchitis, an unexpected post-surgical condition.  Sputum culture grew 

out Moraxella, blood cultures are negative to date, urine culture grew 

Klebsiella.  Patient placed on Levaquin per pulmonology.





Pt developed postoperative alcohol withdrawal requiring increased sedation and 

prolonged mechanical ventilation.





Patient developed postoperative ileus, an unexpected post-surgical condition, 

currently resolved.  General surgery consulted, no intervention at this time.  

Passed modified barium swallow.  NGT removed.  Placed on diet.  TPN 

discontinued yesterday secondary to patient meeting caloric needs. 





Patient transferred to [facility] yesterday.  Currently sitting up in 

bed in no apparent distress.  Pain controlled.  Angelo DC'd yesterday, patient 

continues to void.  Tolerating diet.





Objective





- Vital Signs


Vital signs: 


 Vital Signs











Temp  98.8 F   05/09/17 04:00


 


Pulse  64   05/09/17 04:00


 


Resp  18   05/09/17 04:00


 


BP  102/54   05/09/17 04:00


 


Pulse Ox  94 L  05/09/17 04:00








 Intake & Output











 05/08/17 05/09/17 05/09/17





 18:59 06:59 18:59


 


Intake Total 265.558 135.417 


 


Output Total 600 200 


 


Balance -334.442 -64.583 


 


Weight 95.8 kg 93.5 kg 


 


Intake:   


 


  IV 40 130 


 


    .9 NaCL 40 130 


 


  Intake, IV Titration 45.558 5.417 





  Amount   


 


    Insulin Regular 100 unit 45.558 5.417 





    In Sodium Chloride 0.9%   





    100 ml @ Per Protocol IV   





    .Q0M [facility] Rx#:[id number]   


 


  Oral 180  


 


Output:   


 


  Urine 600  


 


  Stool  200 


 


Other:   


 


  Voiding Method Indwelling Catheter  








 ABP, PAP, CO, CI - Last Documented











Arterial Blood Pressure        172/58


 


Pulmonary Artery Pressure      46/23


 


Cardiac Output                 6.8


 


Cardiac Index                  3.3

















- Constitutional


General appearance: Present: cooperative, no acute distress, obese





- Respiratory


Details: 





Lungs sounds diminished bilaterally.  Respirations even, nonlabored.  Remains 

on room air.  Oxygen saturation 94%.  Able to achieve 1000 mL on incentive 

spirometry.  Effective cough.





- Cardiovascular


Details: 





S1, S2 present.  Regular rate and rhythm, normal sinus rhythm on telemetry.  

Sternum stable superiorly, movable inferiorly.  Heart hugger in place with 

patient demonstrating appropriate use.  Trace bilateral lower extremity edema 

still present.





- Gastrointestinal


Gastrointestinal Comment(s): 





Abdomen soft, nontender, slightly distended.  Active bowel sounds 4 quadrants.

  Tolerating diet.  Bowel movement yesterday 3





- Genitourinary


Genitourinary Comment(s): 





Voiding per urinal.





- Integumentary


Integumentary Comment(s): 





Anterior chest incision well approximated with dry intact sternal dressing.





- Musculoskeletal


Musculoskeletal: Present: generalized weakness, strength equal bilaterally





- Psychiatric


Psychiatric: Present: A&O x's 3, appropriate affect, intact judgment & insight





- Allied health notes


Allied health notes reviewed: nursing





- Labs


CBC & Chem 7: 


 05/09/17 06:32





 05/09/17 06:32


Labs: 


 Abnormal Lab Results - Last 24 Hours (Table)











  05/08/17 05/08/17 05/08/17 Range/Units





  07:58 10:04 11:57 


 


RBC     (4.30-5.90)  m/uL


 


Hgb     (13.0-17.5)  gm/dL


 


Hct     (39.0-53.0)  %


 


PT     (9.0-12.0)  sec


 


Sodium     (137-145)  mmol/L


 


Glucose     (74-99)  mg/dL


 


POC Glucose (mg/dL)  117 H  156 H  130 H  (75-99)  mg/dL


 


Calcium     (8.4-10.2)  mg/dL














  05/08/17 05/08/17 05/08/17 Range/Units





  14:53 15:58 17:59 


 


RBC     (4.30-5.90)  m/uL


 


Hgb     (13.0-17.5)  gm/dL


 


Hct     (39.0-53.0)  %


 


PT     (9.0-12.0)  sec


 


Sodium     (137-145)  mmol/L


 


Glucose     (74-99)  mg/dL


 


POC Glucose (mg/dL)  127 H  117 H  126 H  (75-99)  mg/dL


 


Calcium     (8.4-10.2)  mg/dL














  05/08/17 05/09/17 05/09/17 Range/Units





  20:03 01:29 02:14 


 


RBC     (4.30-5.90)  m/uL


 


Hgb     (13.0-17.5)  gm/dL


 


Hct     (39.0-53.0)  %


 


PT     (9.0-12.0)  sec


 


Sodium     (137-145)  mmol/L


 


Glucose     (74-99)  mg/dL


 


POC Glucose (mg/dL)  156 H  109 H  108 H  (75-99)  mg/dL


 


Calcium     (8.4-10.2)  mg/dL














  05/09/17 05/09/17 05/09/17 Range/Units





  03:09 04:11 05:06 


 


RBC     (4.30-5.90)  m/uL


 


Hgb     (13.0-17.5)  gm/dL


 


Hct     (39.0-53.0)  %


 


PT     (9.0-12.0)  sec


 


Sodium     (137-145)  mmol/L


 


Glucose     (74-99)  mg/dL


 


POC Glucose (mg/dL)  104 H  107 H  110 H  (75-99)  mg/dL


 


Calcium     (8.4-10.2)  mg/dL














  05/09/17 05/09/17 05/09/17 Range/Units





  06:19 06:32 06:32 


 


RBC     (4.30-5.90)  m/uL


 


Hgb     (13.0-17.5)  gm/dL


 


Hct     (39.0-53.0)  %


 


PT   31.2 H   (9.0-12.0)  sec


 


Sodium    135 L  (137-145)  mmol/L


 


Glucose    105 H  (74-99)  mg/dL


 


POC Glucose (mg/dL)  109 H    (75-99)  mg/dL


 


Calcium    8.3 L  (8.4-10.2)  mg/dL














  05/09/17 05/09/17 Range/Units





  06:32 07:25 


 


RBC  2.63 L   (4.30-5.90)  m/uL


 


Hgb  8.2 L   (13.0-17.5)  gm/dL


 


Hct  25.5 L   (39.0-53.0)  %


 


PT    (9.0-12.0)  sec


 


Sodium    (137-145)  mmol/L


 


Glucose    (74-99)  mg/dL


 


POC Glucose (mg/dL)   109 H  (75-99)  mg/dL


 


Calcium    (8.4-10.2)  mg/dL














Assessment and Plan


(1) Status post coronary artery bypass graft


Status: Acute   





(2) Coronary artery disease


Status: Acute   





(3) Family history of heart disease


Status: Acute   





(4) History of PTCA


Status: Acute   





(5) Hyperlipidemia


Status: Acute   





(6) Hypertension


Status: Acute   





(7) Nicotine dependence, chewing tobacco, uncomplicated


Status: Acute   


Plan: 





1.  Continue ASA, Lipitor, Lopressor, lisinopril, digoxin, Lasix, Coumadin. 


2.  Continue amiodarone for atrial fibrillation prophylaxis.


3.  INR 3.2 today, no Coumadin.


4.  Encourage incentive spirometry use.


5.  Continue antibiotics per pulmonology.


6.  Insulin/diabetic management per primary care service.  


7.  Increase activity, ambulate in hallway.  Physical therapy following.  


8.  Daily labs


9.  GI/DVT prophylaxis.


10. Discharge planning in process.  Patient will need rehabilitation upon 

discharge.  Dr. Giordano following patient





Time with Patient: Greater than 30

## 2017-05-09 NOTE — P.PN
Subjective


This is a 60-year-old gentleman that is status post CABG postop day 11.  

Failure to wean from mechanical ventilator.





Patient is seen in consultation for medical management.  Patient is currently 

on pressure control ventilation with PTCA of 18 cm water Depo 7 FiO2 50% and 

respiratory rate of 18.





Patient is awake however not responsive appropriately.





Is moving his all 4 extremities to command





This a.m. patient apparently was having episodes of hypotension as patient was 

going into atrial fibrillation.





Weaning trials were attempted this a.m. with a pressure support of 7 according 

to verbal report patient apparently had tachypnea within 5 minutes hence was 

restarted on full pressure-controlled ventilation.





4/27/17





No new overnight events





Pt was extubated


doing well


appears to be confused





Concern for abdominal distention


2 episodes of emesis 





No fevers, chills.





NG in place





4/28/17





On 7 lo2


has a cough, with thick sputum, however pt is unable to produce it due to a 

weak cough





Is more appropriate today





No significant abdominal tenderness reported.








Interval course





Patient has had some dysphagia.  Was started on TPN.  Is currently restarted on 

oral diet after a swallow eval





Currently calorie count and progress currently off oxygen





Patient was also noted to have abdominal distention there was concern for ileus 

which is resolved





Patient is currently seen in selective floor or graft denies having any 

additional complaints today





No fevers chills nausea vomiting or diarrhea reported





5/9/17





doing well


no new overnight events














Objective





- Vital Signs


Vital signs: 


 Vital Signs











Temp  97.7 F   05/09/17 15:13


 


Pulse  80   05/09/17 16:44


 


Resp  18   05/09/17 15:13


 


BP  124/68   05/09/17 15:13


 


Pulse Ox  96   05/09/17 15:13








 Intake & Output











 05/08/17 05/09/17 05/09/17





 18:59 06:59 18:59


 


Intake Total 265.558 135.417 337


 


Output Total 


 


Balance -334.442 -64.583 -1213


 


Weight 95.8 kg 93.5 kg 


 


Intake:   


 


  IV 40 130 


 


    .9 NaCL 40 130 


 


  Intake, IV Titration 45.558 5.417 0





  Amount   


 


    Insulin Regular 100 unit 45.558 5.417 0





    In Sodium Chloride 0.9%   





    100 ml @ Per Protocol IV   





    .Q0M [facility] Rx#:[id number]   


 


  Oral 180  337


 


Output:   


 


  Urine 600  1550


 


  Stool  200 


 


Other:   


 


  Voiding Method Indwelling Catheter  


 


  # Bowel Movements   2








 ABP, PAP, CO, CI - Last Documented











Arterial Blood Pressure        172/58


 


Pulmonary Artery Pressure      46/23


 


Cardiac Output                 6.8


 


Cardiac Index                  3.3

















- Exam


Gen. appearance awake alert oriented times 3





Lungs diminished breath sounds at bases No wheezing appreciated








Heart S1-S2 heard no significant murmurs appreciated





Abdomen is soft nontender no organomegaly, some distention.





Lower extremities no significant edema





Neuro 2 till 12 grossly intact no focal motor or sensory deficits noted




















- Labs


CBC & Chem 7: 


 05/09/17 06:32





 05/09/17 06:32


Labs: 


 Abnormal Lab Results - Last 24 Hours (Table)











  05/08/17 05/08/17 05/09/17 Range/Units





  17:59 20:03 01:29 


 


RBC     (4.30-5.90)  m/uL


 


Hgb     (13.0-17.5)  gm/dL


 


Hct     (39.0-53.0)  %


 


PT     (9.0-12.0)  sec


 


Sodium     (137-145)  mmol/L


 


Glucose     (74-99)  mg/dL


 


POC Glucose (mg/dL)  126 H  156 H  109 H  (75-99)  mg/dL


 


Calcium     (8.4-10.2)  mg/dL














  05/09/17 05/09/17 05/09/17 Range/Units





  02:14 03:09 04:11 


 


RBC     (4.30-5.90)  m/uL


 


Hgb     (13.0-17.5)  gm/dL


 


Hct     (39.0-53.0)  %


 


PT     (9.0-12.0)  sec


 


Sodium     (137-145)  mmol/L


 


Glucose     (74-99)  mg/dL


 


POC Glucose (mg/dL)  108 H  104 H  107 H  (75-99)  mg/dL


 


Calcium     (8.4-10.2)  mg/dL














  05/09/17 05/09/17 05/09/17 Range/Units





  05:06 06:19 06:32 


 


RBC     (4.30-5.90)  m/uL


 


Hgb     (13.0-17.5)  gm/dL


 


Hct     (39.0-53.0)  %


 


PT    31.2 H  (9.0-12.0)  sec


 


Sodium     (137-145)  mmol/L


 


Glucose     (74-99)  mg/dL


 


POC Glucose (mg/dL)  110 H  109 H   (75-99)  mg/dL


 


Calcium     (8.4-10.2)  mg/dL














  05/09/17 05/09/17 05/09/17 Range/Units





  06:32 06:32 07:25 


 


RBC   2.63 L   (4.30-5.90)  m/uL


 


Hgb   8.2 L   (13.0-17.5)  gm/dL


 


Hct   25.5 L   (39.0-53.0)  %


 


PT     (9.0-12.0)  sec


 


Sodium  135 L    (137-145)  mmol/L


 


Glucose  105 H    (74-99)  mg/dL


 


POC Glucose (mg/dL)    109 H  (75-99)  mg/dL


 


Calcium  8.3 L    (8.4-10.2)  mg/dL














  05/09/17 05/09/17 05/09/17 Range/Units





  08:11 09:03 09:59 


 


RBC     (4.30-5.90)  m/uL


 


Hgb     (13.0-17.5)  gm/dL


 


Hct     (39.0-53.0)  %


 


PT     (9.0-12.0)  sec


 


Sodium     (137-145)  mmol/L


 


Glucose     (74-99)  mg/dL


 


POC Glucose (mg/dL)  108 H  129 H  134 H  (75-99)  mg/dL


 


Calcium     (8.4-10.2)  mg/dL














  05/09/17 05/09/17 Range/Units





  11:36 16:31 


 


RBC    (4.30-5.90)  m/uL


 


Hgb    (13.0-17.5)  gm/dL


 


Hct    (39.0-53.0)  %


 


PT    (9.0-12.0)  sec


 


Sodium    (137-145)  mmol/L


 


Glucose    (74-99)  mg/dL


 


POC Glucose (mg/dL)  126 H  156 H  (75-99)  mg/dL


 


Calcium    (8.4-10.2)  mg/dL














Assessment and Plan


Plan: 


#1 acute hypoxic respiratory failure as expected however prolonged due to other 

comorbidities.





#2 CAD status post CABG postop day 26





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





#4 history of hypertension





#5 dyslipidemia





#6 proximal atrial fibrillation recurrent episodes of A. fib with RVR.  However 

rate controlled anticoagulation to be decided by cardiothoracic surgery





#7 history of significant alcohol use status post a prolonged episode of 

delirium tremens





#8 gout





#9 diabetes mellitus currently on insulin drip.





#10 dysphagia , resolved currently tolerating oral intake





#11 M .catarrhalis tracheo bronchitis, Health care acquired tracheobronchitis.





12.  ileus.  Resolved





Plan





start levemir 10units 


novolog sliding scale











Disposition nursing home versus inpatient rehab

## 2017-05-09 NOTE — P.PN
Subjective


Principal diagnosis: 





Status post coronary artery bypass grafting, postoperative day #4.





This is a 61-year-old white male status post CABG, total arterial non-aortic 

patch off pump double coronary artery bypass grafting using in situ 

skeletonized right internal mammary artery to the left anterior descending 

across the anterior midline in situ totally skeletonized left internal mammary 

artery to the first obtuse marginal artery.  His postoperative day #27.  The 

patient is seen in follow-up on the selective care unit.  He is much more awake 

and alert.  He is able to stand at the bedside with assistance.  Is any 

shortness breath, cough or congestion.  He is maintaining good O2 saturations 

in the upper 90s on room air.  Afebrile.





Objective





- Vital Signs


Vital signs: 


 Vital Signs











Temp  97.3 F L  05/09/17 11:10


 


Pulse  65   05/09/17 11:51


 


Resp  18   05/09/17 11:10


 


BP  129/71   05/09/17 11:10


 


Pulse Ox  97   05/09/17 11:10








 Intake & Output











 05/08/17 05/09/17 05/09/17





 18:59 06:59 18:59


 


Intake Total 265.558 135.417 100


 


Output Total 


 


Balance -334.442 -64.583 -1450


 


Weight 95.8 kg 93.5 kg 


 


Intake:   


 


  IV 40 130 


 


    .9 NaCL 40 130 


 


  Intake, IV Titration 45.558 5.417 0





  Amount   


 


    Insulin Regular 100 unit 45.558 5.417 0





    In Sodium Chloride 0.9%   





    100 ml @ Per Protocol IV   





    .Q0M [facility] Rx#:[id number]   


 


  Oral 180  100


 


Output:   


 


  Urine 600  1550


 


  Stool  200 


 


Other:   


 


  Voiding Method Indwelling Catheter  


 


  # Bowel Movements   2








 ABP, PAP, CO, CI - Last Documented











Arterial Blood Pressure        172/58


 


Pulmonary Artery Pressure      46/23


 


Cardiac Output                 6.8


 


Cardiac Index                  3.3

















- Exam





GENERAL EXAM: Awake, alert.  No acute distress.


HEAD: Normocephalic.


EYES: Sluggish reaction of pupils, equal size.


NOSE: Clear with pink turbinates.


THROAT: No erythema or exudates.


NECK: No masses, no JVD.


CHEST: No chest wall deformity.


LUNGS: Equal air entry with faint crackles in the bilateral posterior bases, 

more so on the left.


CVS: S1 and S2 normal with no audible murmurs, regular rhythm.


ABDOMEN: No hepatosplenomegaly, normal bowel sounds, no guarding or rigidity.


Extremities: There is trace peripheral edema.  No clubbing, no cyanosis.  

Peripheral pulses are intact.





- Labs


CBC & Chem 7: 


 05/09/17 06:32





 05/09/17 06:32


Labs: 


 Abnormal Lab Results - Last 24 Hours (Table)











  05/08/17 05/08/17 05/08/17 Range/Units





  14:53 15:58 17:59 


 


RBC     (4.30-5.90)  m/uL


 


Hgb     (13.0-17.5)  gm/dL


 


Hct     (39.0-53.0)  %


 


PT     (9.0-12.0)  sec


 


Sodium     (137-145)  mmol/L


 


Glucose     (74-99)  mg/dL


 


POC Glucose (mg/dL)  127 H  117 H  126 H  (75-99)  mg/dL


 


Calcium     (8.4-10.2)  mg/dL














  05/08/17 05/09/17 05/09/17 Range/Units





  20:03 01:29 02:14 


 


RBC     (4.30-5.90)  m/uL


 


Hgb     (13.0-17.5)  gm/dL


 


Hct     (39.0-53.0)  %


 


PT     (9.0-12.0)  sec


 


Sodium     (137-145)  mmol/L


 


Glucose     (74-99)  mg/dL


 


POC Glucose (mg/dL)  156 H  109 H  108 H  (75-99)  mg/dL


 


Calcium     (8.4-10.2)  mg/dL














  05/09/17 05/09/17 05/09/17 Range/Units





  03:09 04:11 05:06 


 


RBC     (4.30-5.90)  m/uL


 


Hgb     (13.0-17.5)  gm/dL


 


Hct     (39.0-53.0)  %


 


PT     (9.0-12.0)  sec


 


Sodium     (137-145)  mmol/L


 


Glucose     (74-99)  mg/dL


 


POC Glucose (mg/dL)  104 H  107 H  110 H  (75-99)  mg/dL


 


Calcium     (8.4-10.2)  mg/dL














  05/09/17 05/09/17 05/09/17 Range/Units





  06:19 06:32 06:32 


 


RBC     (4.30-5.90)  m/uL


 


Hgb     (13.0-17.5)  gm/dL


 


Hct     (39.0-53.0)  %


 


PT   31.2 H   (9.0-12.0)  sec


 


Sodium    135 L  (137-145)  mmol/L


 


Glucose    105 H  (74-99)  mg/dL


 


POC Glucose (mg/dL)  109 H    (75-99)  mg/dL


 


Calcium    8.3 L  (8.4-10.2)  mg/dL














  05/09/17 05/09/17 05/09/17 Range/Units





  06:32 07:25 08:11 


 


RBC  2.63 L    (4.30-5.90)  m/uL


 


Hgb  8.2 L    (13.0-17.5)  gm/dL


 


Hct  25.5 L    (39.0-53.0)  %


 


PT     (9.0-12.0)  sec


 


Sodium     (137-145)  mmol/L


 


Glucose     (74-99)  mg/dL


 


POC Glucose (mg/dL)   109 H  108 H  (75-99)  mg/dL


 


Calcium     (8.4-10.2)  mg/dL














  05/09/17 05/09/17 05/09/17 Range/Units





  09:03 09:59 11:36 


 


RBC     (4.30-5.90)  m/uL


 


Hgb     (13.0-17.5)  gm/dL


 


Hct     (39.0-53.0)  %


 


PT     (9.0-12.0)  sec


 


Sodium     (137-145)  mmol/L


 


Glucose     (74-99)  mg/dL


 


POC Glucose (mg/dL)  129 H  134 H  126 H  (75-99)  mg/dL


 


Calcium     (8.4-10.2)  mg/dL














Assessment and Plan


Plan: 





Impression:





1 status post CABG, postoperative day #27





2 history of nicotine dependence and suspect some component of COPD





3 history of documented coronary artery disease based on a recent cardiac 

catheterization





4 history of percutaneous revascularization of the proximal right coronary 

artery in 2001





5 history of hypertension





6 history of myocardial infarction





7 status post bronchoscopy and bronchoalveolar lavage of the right upper lobe 

read lobe and right lower lobe upon arrival to the ICU.  





8 failure to wean mostly secondary to relative hypoxemia, O2 saturation is very 

marginal, and it is in the 70s at 50%.  And because of the mental status and 

suspected alcohol withdrawal is another major factor that delaying the weaning 

process.  Proved, maintaining good O2 saturations on room air.





9 suspect acute alcohol withdrawal considering the patient is a heavy drinker





Plan:





The patient was seen and evaluated by Dr. Munoz.  Doing well from the pulmonary 

and critical care standpoint.  Continue with his bronchodilators.  Continue 

diuretics.  Continue the increased use of the incentive spirometer and cough 

and deep breathing exercises.  We'll increase his activity as tolerated.  

Continue to follow and make further recommendations based on his clinical 

status.  Discharge planning is in place.

## 2017-05-09 NOTE — P.PN
Subjective


Principal diagnosis: 


CABG





This a 61-year-old  gentleman who is status post coronary bypass 

grafting surgery.  He had a prolonged stay in the intensive care unit on 

mechanical ventilator support.  He has been in and out of atrial fibrillation 

but remains in normal sinus rhythm.  He is currently being followed today on 

the telemetry unit.  Overall doing well.  He is currently on metoprolol tartrate

, amiodarone, and Lanoxin.  He is on Coumadin for anticoagulation, INR is 3.2 

today.  Hemoglobin 8.2, potassium 4.2, BUN 14, creatinine 0.7.








Objective





- Vital Signs


Vital signs: 


 Vital Signs











Temp  97.3 F L  05/09/17 11:10


 


Pulse  65   05/09/17 11:51


 


Resp  18   05/09/17 11:10


 


BP  129/71   05/09/17 11:10


 


Pulse Ox  97   05/09/17 11:10








 Intake & Output











 05/08/17 05/09/17 05/09/17





 18:59 06:59 18:59


 


Intake Total 265.558 135.417 337


 


Output Total 


 


Balance -334.442 -64.583 -1213


 


Weight 95.8 kg 93.5 kg 


 


Intake:   


 


  IV 40 130 


 


    .9 NaCL 40 130 


 


  Intake, IV Titration 45.558 5.417 0





  Amount   


 


    Insulin Regular 100 unit 45.558 5.417 0





    In Sodium Chloride 0.9%   





    100 ml @ Per Protocol IV   





    [facility][id number]   


 


  Oral 180  337


 


Output:   


 


  Urine 600  1550


 


  Stool  200 


 


Other:   


 


  Voiding Method Indwelling Catheter  


 


  # Bowel Movements   2








 ABP, PAP, CO, CI - Last Documented











Arterial Blood Pressure        172/58


 


Pulmonary Artery Pressure      46/23


 


Cardiac Output                 6.8


 


Cardiac Index                  3.3

















- Exam





PHYSICAL EXAMINATION: 





HEENT: Head is atraumatic, normocephalic.  Pupils equal, round.  Neck is 

supple.  There is no elevated jugular venous pressure.





HEART EXAMINATION: Heart S1, S2 normal.  No murmur or gallop heard.





CHEST EXAMINATION: lungs reveal fine crackles to bilateral bases.





ABDOMEN:  Soft, nontender. Bowel sounds are heard. No organomegaly noted.


 


EXTREMITIES: 2+ peripheral pulses with no evidence of peripheral edema and no 

calf tenderness noted.





NEUROLOGIC patient is awake, alert and oriented -3.


 


.


 








- Labs


CBC & Chem 7: 


 05/09/17 06:32





 05/09/17 06:32


Labs: 


 Abnormal Lab Results - Last 24 Hours (Table)











  05/08/17 05/08/17 05/08/17 Range/Units





  14:53 15:58 17:59 


 


RBC     (4.30-5.90)  m/uL


 


Hgb     (13.0-17.5)  gm/dL


 


Hct     (39.0-53.0)  %


 


PT     (9.0-12.0)  sec


 


Sodium     (137-145)  mmol/L


 


Glucose     (74-99)  mg/dL


 


POC Glucose (mg/dL)  127 H  117 H  126 H  (75-99)  mg/dL


 


Calcium     (8.4-10.2)  mg/dL














  05/08/17 05/09/17 05/09/17 Range/Units





  20:03 01:29 02:14 


 


RBC     (4.30-5.90)  m/uL


 


Hgb     (13.0-17.5)  gm/dL


 


Hct     (39.0-53.0)  %


 


PT     (9.0-12.0)  sec


 


Sodium     (137-145)  mmol/L


 


Glucose     (74-99)  mg/dL


 


POC Glucose (mg/dL)  156 H  109 H  108 H  (75-99)  mg/dL


 


Calcium     (8.4-10.2)  mg/dL














  05/09/17 05/09/17 05/09/17 Range/Units





  03:09 04:11 05:06 


 


RBC     (4.30-5.90)  m/uL


 


Hgb     (13.0-17.5)  gm/dL


 


Hct     (39.0-53.0)  %


 


PT     (9.0-12.0)  sec


 


Sodium     (137-145)  mmol/L


 


Glucose     (74-99)  mg/dL


 


POC Glucose (mg/dL)  104 H  107 H  110 H  (75-99)  mg/dL


 


Calcium     (8.4-10.2)  mg/dL














  05/09/17 05/09/17 05/09/17 Range/Units





  06:19 06:32 06:32 


 


RBC     (4.30-5.90)  m/uL


 


Hgb     (13.0-17.5)  gm/dL


 


Hct     (39.0-53.0)  %


 


PT   31.2 H   (9.0-12.0)  sec


 


Sodium    135 L  (137-145)  mmol/L


 


Glucose    105 H  (74-99)  mg/dL


 


POC Glucose (mg/dL)  109 H    (75-99)  mg/dL


 


Calcium    8.3 L  (8.4-10.2)  mg/dL














  05/09/17 05/09/17 05/09/17 Range/Units





  06:32 07:25 08:11 


 


RBC  2.63 L    (4.30-5.90)  m/uL


 


Hgb  8.2 L    (13.0-17.5)  gm/dL


 


Hct  25.5 L    (39.0-53.0)  %


 


PT     (9.0-12.0)  sec


 


Sodium     (137-145)  mmol/L


 


Glucose     (74-99)  mg/dL


 


POC Glucose (mg/dL)   109 H  108 H  (75-99)  mg/dL


 


Calcium     (8.4-10.2)  mg/dL














  05/09/17 05/09/17 05/09/17 Range/Units





  09:03 09:59 11:36 


 


RBC     (4.30-5.90)  m/uL


 


Hgb     (13.0-17.5)  gm/dL


 


Hct     (39.0-53.0)  %


 


PT     (9.0-12.0)  sec


 


Sodium     (137-145)  mmol/L


 


Glucose     (74-99)  mg/dL


 


POC Glucose (mg/dL)  129 H  134 H  126 H  (75-99)  mg/dL


 


Calcium     (8.4-10.2)  mg/dL














Assessment and Plan


(1) S/P CABG (coronary artery bypass graft)


Status: Acute   





(2) Nicotine dependence


Status: Acute   





(3) CAD (coronary artery disease)


Status: Acute   





(4) Failure to wean


Status: Acute   





(5) Family history of heart disease


Status: Acute   





(6) History of PTCA


Status: Acute   





(7) Hyperlipidemia


Status: Acute   





(8) Hypertension


Status: Acute   





(9) Paroxysmal a-fib


Status: Acute   


Plan: 


Cardiology's perspective, patient has been encouraged in the use of his 

incentive spirometry.  His current medications have been reviewed we will 

continue with those.








DNP note has been reviewed, I agree with a documented findings and plan of 

care.  Patient was seen and examined.

## 2017-05-10 LAB
ANION GAP SERPL CALC-SCNC: 8 MMOL/L
BUN SERPL-SCNC: 15 MG/DL (ref 9–20)
CALCIUM SPEC-MCNC: 8.3 MG/DL (ref 8.4–10.2)
CH: 32.4
CHCM: 33.6
CHLORIDE SERPL-SCNC: 103 MMOL/L (ref 98–107)
CO2 SERPL-SCNC: 26 MMOL/L (ref 22–30)
ERYTHROCYTE [DISTWIDTH] IN BLOOD BY AUTOMATED COUNT: 2.7 M/UL (ref 4.3–5.9)
ERYTHROCYTE [DISTWIDTH] IN BLOOD: 15.1 % (ref 11.5–15.5)
GLUCOSE BLD-MCNC: 102 MG/DL (ref 75–99)
GLUCOSE BLD-MCNC: 150 MG/DL (ref 75–99)
GLUCOSE BLD-MCNC: 175 MG/DL (ref 75–99)
GLUCOSE BLD-MCNC: 215 MG/DL (ref 75–99)
GLUCOSE BLD-MCNC: 95 MG/DL (ref 75–99)
GLUCOSE SERPL-MCNC: 98 MG/DL (ref 74–99)
HCT VFR BLD AUTO: 26.1 % (ref 39–53)
HDW: 3.33
HGB BLD-MCNC: 8.7 GM/DL (ref 13–17.5)
INR PPP: 2.7 (ref ?–1.1)
MCH RBC QN AUTO: 32.4 PG (ref 25–35)
MCHC RBC AUTO-ENTMCNC: 33.5 G/DL (ref 31–37)
MCV RBC AUTO: 96.7 FL (ref 80–100)
NON-AFRICAN AMERICAN GFR(MDRD): >60
POTASSIUM SERPL-SCNC: 4.2 MMOL/L (ref 3.5–5.1)
PT BLD: 25.8 SEC (ref 9–12)
SODIUM SERPL-SCNC: 137 MMOL/L (ref 137–145)
WBC # BLD AUTO: 7.2 K/UL (ref 3.8–10.6)

## 2017-05-10 RX ADMIN — METOCLOPRAMIDE SCH MG: 5 INJECTION, SOLUTION INTRAMUSCULAR; INTRAVENOUS at 23:08

## 2017-05-10 RX ADMIN — DOCUSATE SODIUM AND SENNOSIDES SCH EACH: 50; 8.6 TABLET ORAL at 21:20

## 2017-05-10 RX ADMIN — IPRATROPIUM BROMIDE AND ALBUTEROL SULFATE SCH ML: .5; 3 SOLUTION RESPIRATORY (INHALATION) at 08:54

## 2017-05-10 RX ADMIN — PANTOPRAZOLE SODIUM SCH MG: 40 TABLET, DELAYED RELEASE ORAL at 10:27

## 2017-05-10 RX ADMIN — INSULIN LISPRO SCH UNIT: 100 INJECTION, SOLUTION INTRAVENOUS; SUBCUTANEOUS at 17:31

## 2017-05-10 RX ADMIN — SERTRALINE HYDROCHLORIDE SCH MG: 50 TABLET, FILM COATED ORAL at 10:28

## 2017-05-10 RX ADMIN — LISINOPRIL SCH MG: 10 TABLET ORAL at 10:28

## 2017-05-10 RX ADMIN — Medication SCH MG: at 21:20

## 2017-05-10 RX ADMIN — MULTIVITAMIN SCH ML: LIQUID ORAL at 13:20

## 2017-05-10 RX ADMIN — AMIODARONE HYDROCHLORIDE SCH MG: 200 TABLET ORAL at 10:27

## 2017-05-10 RX ADMIN — METOCLOPRAMIDE SCH MG: 5 INJECTION, SOLUTION INTRAMUSCULAR; INTRAVENOUS at 00:14

## 2017-05-10 RX ADMIN — SODIUM CHLORIDE, PRESERVATIVE FREE SCH ML: 5 INJECTION INTRAVENOUS at 21:20

## 2017-05-10 RX ADMIN — SODIUM CHLORIDE, PRESERVATIVE FREE SCH ML: 5 INJECTION INTRAVENOUS at 10:29

## 2017-05-10 RX ADMIN — LEVOFLOXACIN SCH MG: 750 TABLET, FILM COATED ORAL at 17:30

## 2017-05-10 RX ADMIN — METOPROLOL TARTRATE SCH MG: 50 TABLET, FILM COATED ORAL at 10:28

## 2017-05-10 RX ADMIN — INSULIN LISPRO SCH: 100 INJECTION, SOLUTION INTRAVENOUS; SUBCUTANEOUS at 06:30

## 2017-05-10 RX ADMIN — FUROSEMIDE SCH MG: 40 TABLET ORAL at 10:28

## 2017-05-10 RX ADMIN — IPRATROPIUM BROMIDE AND ALBUTEROL SULFATE SCH ML: .5; 3 SOLUTION RESPIRATORY (INHALATION) at 13:03

## 2017-05-10 RX ADMIN — INSULIN DETEMIR SCH UNIT: 100 INJECTION, SOLUTION SUBCUTANEOUS at 21:21

## 2017-05-10 RX ADMIN — LISINOPRIL SCH MG: 10 TABLET ORAL at 21:20

## 2017-05-10 RX ADMIN — IPRATROPIUM BROMIDE AND ALBUTEROL SULFATE SCH ML: .5; 3 SOLUTION RESPIRATORY (INHALATION) at 20:03

## 2017-05-10 RX ADMIN — COLCHICINE PRN MG: 0.6 TABLET, FILM COATED ORAL at 06:34

## 2017-05-10 RX ADMIN — METOCLOPRAMIDE SCH MG: 5 INJECTION, SOLUTION INTRAMUSCULAR; INTRAVENOUS at 13:19

## 2017-05-10 RX ADMIN — IPRATROPIUM BROMIDE AND ALBUTEROL SULFATE SCH ML: .5; 3 SOLUTION RESPIRATORY (INHALATION) at 16:07

## 2017-05-10 RX ADMIN — ASPIRIN 325 MG ORAL TABLET SCH MG: 325 PILL ORAL at 10:27

## 2017-05-10 RX ADMIN — METOPROLOL TARTRATE SCH MG: 50 TABLET, FILM COATED ORAL at 21:20

## 2017-05-10 RX ADMIN — INSULIN LISPRO SCH UNIT: 100 INJECTION, SOLUTION INTRAVENOUS; SUBCUTANEOUS at 13:19

## 2017-05-10 RX ADMIN — METOCLOPRAMIDE SCH MG: 5 INJECTION, SOLUTION INTRAMUSCULAR; INTRAVENOUS at 17:31

## 2017-05-10 RX ADMIN — METOCLOPRAMIDE SCH MG: 5 INJECTION, SOLUTION INTRAMUSCULAR; INTRAVENOUS at 06:34

## 2017-05-10 RX ADMIN — INSULIN LISPRO SCH UNIT: 100 INJECTION, SOLUTION INTRAVENOUS; SUBCUTANEOUS at 21:21

## 2017-05-10 RX ADMIN — Medication SCH MG: at 10:28

## 2017-05-10 RX ADMIN — ATORVASTATIN CALCIUM SCH MG: 40 TABLET, FILM COATED ORAL at 10:27

## 2017-05-10 NOTE — P.PN
Subjective


Principal diagnosis: 


CABG





This a 61-year-old  gentleman who is status post coronary bypass 

grafting surgery.  He had a prolonged stay in the intensive care unit on 

mechanical ventilator support.  He has been in and out of atrial fibrillation 

but remains in normal sinus rhythm.  He is currently being followed today on 

the telemetry unit.  Overall doing well.  He is currently on metoprolol tartrate

, amiodarone, and Lanoxin.  He is on Coumadin for anticoagulation, INR is 2.7 

today.  Hemoglobin 8.7, potassium 4.2, BUN 14, creatinine 0.7.  Doing very well 

overall.








Objective





- Vital Signs


Vital signs: 


 Vital Signs











Temp  97.2 F L  05/10/17 08:00


 


Pulse  68   05/10/17 13:18


 


Resp  14   05/10/17 12:00


 


BP  101/55   05/10/17 12:00


 


Pulse Ox  95   05/10/17 12:00








 Intake & Output











 05/09/17 05/10/17 05/10/17





 18:59 06:59 18:59


 


Intake Total 574 20 355


 


Output Total 1550  600


 


Balance -976 20 -245


 


Weight  95 kg 95 kg


 


Intake:   


 


  IV  20 


 


    .9 NaCL  20 


 


  Intake, IV Titration 0  





  Amount   


 


    Insulin Regular 100 unit 0  





    In Sodium Chloride 0.9%   





    100 ml @ Per Protocol IV   





    .Q0M [facility] Rx#:[id number]   


 


  Oral 574  355


 


Output:   


 


  Urine 1550  600


 


Other:   


 


  Voiding Method   Indwelling Catheter


 


  # Bowel Movements 2  








 ABP, PAP, CO, CI - Last Documented











Arterial Blood Pressure        172/58


 


Pulmonary Artery Pressure      46/23


 


Cardiac Output                 6.8


 


Cardiac Index                  3.3

















- Exam





PHYSICAL EXAMINATION: 





HEENT: Head is atraumatic, normocephalic.  Pupils equal, round.  Neck is 

supple.  There is no elevated jugular venous pressure.





HEART EXAMINATION: Heart S1, S2 normal.  No murmur or gallop heard.





CHEST EXAMINATION: lungs reveal fine crackles to bilateral bases.





ABDOMEN:  Soft, nontender. Bowel sounds are heard. No organomegaly noted.


 


EXTREMITIES: 2+ peripheral pulses with no evidence of peripheral edema and no 

calf tenderness noted.





NEUROLOGIC patient is awake, alert and oriented -3.


 


.


 








- Labs


CBC & Chem 7: 


 05/10/17 05:37





 05/10/17 05:37


Labs: 


 Abnormal Lab Results - Last 24 Hours (Table)











  05/09/17 05/09/17 05/10/17 Range/Units





  16:31 21:05 05:37 


 


RBC     (4.30-5.90)  m/uL


 


Hgb     (13.0-17.5)  gm/dL


 


Hct     (39.0-53.0)  %


 


PT    25.8 H  (9.0-12.0)  sec


 


POC Glucose (mg/dL)  156 H  151 H   (75-99)  mg/dL


 


Calcium     (8.4-10.2)  mg/dL














  05/10/17 05/10/17 05/10/17 Range/Units





  05:37 05:37 06:23 


 


RBC   2.70 L   (4.30-5.90)  m/uL


 


Hgb   8.7 L   (13.0-17.5)  gm/dL


 


Hct   26.1 L   (39.0-53.0)  %


 


PT     (9.0-12.0)  sec


 


POC Glucose (mg/dL)    102 H  (75-99)  mg/dL


 


Calcium  8.3 L    (8.4-10.2)  mg/dL














  05/10/17 Range/Units





  11:40 


 


RBC   (4.30-5.90)  m/uL


 


Hgb   (13.0-17.5)  gm/dL


 


Hct   (39.0-53.0)  %


 


PT   (9.0-12.0)  sec


 


POC Glucose (mg/dL)  175 H  (75-99)  mg/dL


 


Calcium   (8.4-10.2)  mg/dL














Assessment and Plan


(1) S/P CABG (coronary artery bypass graft)


Status: Acute   





(2) Nicotine dependence


Status: Acute   





(3) CAD (coronary artery disease)


Status: Acute   





(4) Failure to wean


Status: Acute   





(5) Family history of heart disease


Status: Acute   





(6) History of PTCA


Status: Acute   





(7) Hyperlipidemia


Status: Acute   





(8) Hypertension


Status: Acute   





(9) Paroxysmal a-fib


Status: Acute   


Plan: 


Cardiology's perspective, patient has been encouraged in the use of his 

incentive spirometry.  His current medications have been reviewed we will 

continue with those.








DNP note has been reviewed, I agree with a documented findings and plan of 

care.  Patient was seen and examined.

## 2017-05-10 NOTE — P.PN
<Makayla Enamorado - Last Filed: 05/10/17 08:30>





Subjective


Principal diagnosis: 





Coronary artery disease, status post prior stenting to his right coronary 

artery.  Preserved left ventricular function.  Hyperlipidemia.  Hypertension.  

ETOH abuse.





POD #28 total arterial non-aortic patch off-pump double coronary artery bypass 

grafting using in situ skeletonized right internal mammary artery to the left 

anterior descending artery across the anterior midline in situ totally 

skeletonized left internal mammary artery to the first obtuse marginal artery.  

Intraoperative transesophageal echocardiogram and epi-aortic scanning.  

Intraoperative graft flow measurements using the [employer]ist[employer] system





POD #28 flexible bronchoscopy with bronchial washings secondary to 

postoperative mucous plugging with increasing oxygen demand the night of 

surgery.  Pt had acute hypoxic post operative respiratory failure as an 

unexpected post-surgical condition, related to the patient's underlying medical 

comorbidities. Unable to determine if ventilator associated pneumonia vs. 

trachobronchitis, an unexpected post-surgical condition.  Sputum culture grew 

out Moraxella, blood cultures are negative to date, urine culture grew 

Klebsiella.  Patient placed on Levaquin per pulmonology.





Pt developed postoperative alcohol withdrawal requiring increased sedation and 

prolonged mechanical ventilation.





Patient developed postoperative ileus, an unexpected post-surgical condition, 

currently resolved.  General surgery consulted, no intervention at this time.  

Passed modified barium swallow.  NGT removed.  Placed on diet.  TPN 

discontinued secondary to patient meeting caloric needs. 





Patient currently sitting up in bed in no apparent distress.  Denies chest pain/

shortness of breath.  States pain is controlled.  Continues to tolerate his 

diet.





Objective





- Vital Signs


Vital signs: 


 Vital Signs











Temp  97.0 F L  05/09/17 20:00


 


Pulse  63   05/10/17 04:00


 


Resp  18   05/10/17 04:00


 


BP  122/58   05/10/17 04:00


 


Pulse Ox  96   05/10/17 04:00








 Intake & Output











 05/09/17 05/10/17 05/10/17





 18:59 06:59 18:59


 


Intake Total 574 20 


 


Output Total 1550  


 


Balance -976 20 


 


Weight  95 kg 


 


Intake:   


 


  IV  20 


 


    .9 NaCL  20 


 


  Intake, IV Titration 0  





  Amount   


 


    Insulin Regular 100 unit 0  





    In Sodium Chloride 0.9%   





    100 ml @ Per Protocol IV   





    .Q0M [facility] Rx#:[id number]   


 


  Oral 574  


 


Output:   


 


  Urine 1550  


 


Other:   


 


  # Bowel Movements 2  








 ABP, PAP, CO, CI - Last Documented











Arterial Blood Pressure        172/58


 


Pulmonary Artery Pressure      46/23


 


Cardiac Output                 6.8


 


Cardiac Index                  3.3

















- Constitutional


General appearance: Present: cooperative, no acute distress, obese





- Respiratory


Details: 





Lungs sounds diminished bilaterally.  Respirations even, nonlabored.  Currently 

on room air with oxygen saturation 96%.  Able to achieve 1000 mL on his 

incentive's premature.  Effective cough.





- Cardiovascular


Details: 





S1, S2 present.  Regular rate and rhythm, normal sinus rhythm on telemetry.  

Sternum stable superiorly, movable inferiorly.  Heart hugger in place with 

patient attempting to demonstrate appropriate use.  No lower extremity edema 

present.  Teds/SCDs present.





- Gastrointestinal


Gastrointestinal Comment(s): 





Abdomen soft, nontender, nondistended.  Active bowel sounds 4 quadrants.  

Bowel movement 2 yesterday.  Tolerating diet.





- Genitourinary


Genitourinary Comment(s): 





Continues to void clear, yellow urine.





- Musculoskeletal


Musculoskeletal: Present: generalized weakness, strength equal bilaterally





- Psychiatric


Psychiatric: Present: A&O x's 3, appropriate affect, intact judgment & insight





- Allied health notes


Allied health notes reviewed: nursing





- Labs


CBC & Chem 7: 


 05/10/17 05:37





 05/10/17 05:37


Labs: 


 Abnormal Lab Results - Last 24 Hours (Table)











  05/09/17 05/09/17 05/09/17 Range/Units





  09:03 09:59 11:36 


 


RBC     (4.30-5.90)  m/uL


 


Hgb     (13.0-17.5)  gm/dL


 


Hct     (39.0-53.0)  %


 


PT     (9.0-12.0)  sec


 


POC Glucose (mg/dL)  129 H  134 H  126 H  (75-99)  mg/dL


 


Calcium     (8.4-10.2)  mg/dL














  05/09/17 05/09/17 05/10/17 Range/Units





  16:31 21:05 05:37 


 


RBC     (4.30-5.90)  m/uL


 


Hgb     (13.0-17.5)  gm/dL


 


Hct     (39.0-53.0)  %


 


PT    25.8 H  (9.0-12.0)  sec


 


POC Glucose (mg/dL)  156 H  151 H   (75-99)  mg/dL


 


Calcium     (8.4-10.2)  mg/dL














  05/10/17 05/10/17 05/10/17 Range/Units





  05:37 05:37 06:23 


 


RBC   2.70 L   (4.30-5.90)  m/uL


 


Hgb   8.7 L   (13.0-17.5)  gm/dL


 


Hct   26.1 L   (39.0-53.0)  %


 


PT     (9.0-12.0)  sec


 


POC Glucose (mg/dL)    102 H  (75-99)  mg/dL


 


Calcium  8.3 L    (8.4-10.2)  mg/dL














Assessment and Plan


(1) Status post coronary artery bypass graft


Status: Acute   





(2) Coronary artery disease


Status: Acute   





(3) Family history of heart disease


Status: Acute   





(4) History of PTCA


Status: Acute   





(5) Hyperlipidemia


Status: Acute   





(6) Hypertension


Status: Acute   





(7) Nicotine dependence, chewing tobacco, uncomplicated


Status: Acute   


Plan: 





1.  Continue ASA, Lipitor, Lopressor, lisinopril, Coumadin.  Lasix changed to 

40 mg by mouth daily.  Digoxin DC'd. 


2.  Continue amiodarone for atrial fibrillation prophylaxis.


3.  INR 2.7 today, will give Coumadin 1 mg today.


4.  Encourage incentive spirometry use.


5.  Continue antibiotics per pulmonology.


6.  Insulin/diabetic management per primary care service.  


7.  Increase activity, ambulate in hallway.  Physical therapy following.  


8.  Labs every 3 days


9.  GI/DVT prophylaxis.


10. Discharge planning in process.  Patient will need rehabilitation upon 

discharge.  Dr. Giordano following patient





Time with Patient: Greater than 30





<Oscar Porter - Last Filed: 05/11/17 18:53>





Objective





- Vital Signs


Vital signs: 


 Vital Signs











Temp  98.0 F   05/11/17 16:00


 


Pulse  68   05/11/17 16:28


 


Resp  16   05/11/17 16:00


 


BP  113/59   05/11/17 16:00


 


Pulse Ox  97   05/11/17 16:00








 Intake & Output











 05/10/17 05/11/17 05/11/17





 18:59 06:59 18:59


 


Intake Total 355 10 660


 


Output Total 600  


 


Balance -245 10 660


 


Weight 95 kg 94.5 kg 94.5 kg


 


Intake:   


 


  IV  10 


 


    .9 NaCL  10 


 


  Oral 355  660


 


Output:   


 


  Urine 600  


 


Other:   


 


  Voiding Method Indwelling Catheter  Indwelling Catheter








 ABP, PAP, CO, CI - Last Documented











Arterial Blood Pressure        172/58


 


Pulmonary Artery Pressure      46/23


 


Cardiac Output                 6.8


 


Cardiac Index                  3.3

















- Labs


CBC & Chem 7: 


 05/10/17 05:37





 05/10/17 05:37


Labs: 


 Abnormal Lab Results - Last 24 Hours (Table)











  05/10/17 05/11/17 05/11/17 Range/Units





  20:46 05:53 06:17 


 


PT   23.7 H   (9.0-12.0)  sec


 


POC Glucose (mg/dL)  215 H   104 H  (75-99)  mg/dL














  05/11/17 05/11/17 Range/Units





  11:40 16:53 


 


PT    (9.0-12.0)  sec


 


POC Glucose (mg/dL)  136 H  103 H  (75-99)  mg/dL














Assessment and Plan


Plan: 


The patient was seen and examined.  I agree with the above assessment and plan.

  We will continue with physical therapy.  He is currently being evaluated for 

inpatient rehab.

## 2017-05-10 NOTE — P.PN
Subjective


Principal diagnosis: 





Status post coronary artery bypass grafting, postoperative day #28





This is a 61-year-old white male status post CABG, total arterial non-aortic 

patch off pump double coronary artery bypass grafting using in situ 

skeletonized right internal mammary artery to the left anterior descending 

across the anterior midline in situ totally skeletonized left internal mammary 

artery to the first obtuse marginal artery.  His postoperative day #28.  The 

patient is seen again today 05/10/2017 in follow-up on the selective care unit.

  He is much more awake and alert.  He is able to stand at the bedside with 

assistance.  He is currently sitting up at the bedside.  He is tolerating his 

lunch well.  He denies any shortness breath, cough or congestion.  He is 

maintaining good O2 saturations in the upper 90s on room air.  Afebrile.  Gold 

and stable at 8.7.  INR 2.7.





Objective





- Vital Signs


Vital signs: 


 Vital Signs











Temp  97.2 F L  05/10/17 08:00


 


Pulse  68   05/10/17 13:18


 


Resp  14   05/10/17 12:00


 


BP  101/55   05/10/17 12:00


 


Pulse Ox  95   05/10/17 12:00








 Intake & Output











 05/09/17 05/10/17 05/10/17





 18:59 06:59 18:59


 


Intake Total 574 20 355


 


Output Total 1550  600


 


Balance -976 20 -245


 


Weight  95 kg 95 kg


 


Intake:   


 


  IV  20 


 


    .9 NaCL  20 


 


  Intake, IV Titration 0  





  Amount   


 


    Insulin Regular 100 unit 0  





    In Sodium Chloride 0.9%   





    100 ml @ Per Protocol IV   





    .Q0M [facility] Rx#:[id number]   


 


  Oral 574  355


 


Output:   


 


  Urine 1550  600


 


Other:   


 


  Voiding Method   Indwelling Catheter


 


  # Bowel Movements 2  








 ABP, PAP, CO, CI - Last Documented











Arterial Blood Pressure        172/58


 


Pulmonary Artery Pressure      46/23


 


Cardiac Output                 6.8


 


Cardiac Index                  3.3

















- Exam





GENERAL EXAM: Awake, alert.  No acute distress.


HEAD: Normocephalic.


EYES: Sluggish reaction of pupils, equal size.


NOSE: Clear with pink turbinates.


THROAT: No erythema or exudates.


NECK: No masses, no JVD.


CHEST: No chest wall deformity.


LUNGS: Equal air entry with faint crackles in the bilateral posterior bases, 

more so on the left.


CVS: S1 and S2 normal with no audible murmurs, regular rhythm.


ABDOMEN: No hepatosplenomegaly, normal bowel sounds, no guarding or rigidity.


Extremities: There is trace peripheral edema.  No clubbing, no cyanosis.  

Peripheral pulses are intact.





- Labs


CBC & Chem 7: 


 05/10/17 05:37





 05/10/17 05:37


Labs: 


 Abnormal Lab Results - Last 24 Hours (Table)











  05/09/17 05/09/17 05/10/17 Range/Units





  16:31 21:05 05:37 


 


RBC     (4.30-5.90)  m/uL


 


Hgb     (13.0-17.5)  gm/dL


 


Hct     (39.0-53.0)  %


 


PT    25.8 H  (9.0-12.0)  sec


 


POC Glucose (mg/dL)  156 H  151 H   (75-99)  mg/dL


 


Calcium     (8.4-10.2)  mg/dL














  05/10/17 05/10/17 05/10/17 Range/Units





  05:37 05:37 06:23 


 


RBC   2.70 L   (4.30-5.90)  m/uL


 


Hgb   8.7 L   (13.0-17.5)  gm/dL


 


Hct   26.1 L   (39.0-53.0)  %


 


PT     (9.0-12.0)  sec


 


POC Glucose (mg/dL)    102 H  (75-99)  mg/dL


 


Calcium  8.3 L    (8.4-10.2)  mg/dL














  05/10/17 Range/Units





  11:40 


 


RBC   (4.30-5.90)  m/uL


 


Hgb   (13.0-17.5)  gm/dL


 


Hct   (39.0-53.0)  %


 


PT   (9.0-12.0)  sec


 


POC Glucose (mg/dL)  175 H  (75-99)  mg/dL


 


Calcium   (8.4-10.2)  mg/dL














Assessment and Plan


Plan: 





Impression:





1 status post CABG, postoperative day #28





2 history of nicotine dependence and suspect some component of COPD





3 history of documented coronary artery disease based on a recent cardiac 

catheterization





4 history of percutaneous revascularization of the proximal right coronary 

artery in 2001





5 history of hypertension





6 history of myocardial infarction





7 status post bronchoscopy and bronchoalveolar lavage of the right upper lobe 

read lobe and right lower lobe upon arrival to the ICU.  





8 failure to wean mostly secondary to relative hypoxemia, O2 saturation is very 

marginal, and it is in the 70s at 50%.  And because of the mental status and 

suspected alcohol withdrawal is another major factor that delaying the weaning 

process.  Proved, maintaining good O2 saturations on room air.





9 suspect acute alcohol withdrawal considering the patient is a heavy drinker





Plan:





The patient was seen and evaluated by Dr. Munoz.  He is stable from the 

pulmonary standpoint. Continue with his bronchodilators.  Continue diuretics.  

Continue the increased use of the incentive spirometer and cough and deep 

breathing exercises.  We'll increase his activity as tolerated.  Continue to 

follow and make further recommendations based on his clinical status.  

Discharge planning is in place.

## 2017-05-11 LAB
GLUCOSE BLD-MCNC: 103 MG/DL (ref 75–99)
GLUCOSE BLD-MCNC: 104 MG/DL (ref 75–99)
GLUCOSE BLD-MCNC: 136 MG/DL (ref 75–99)
GLUCOSE BLD-MCNC: 142 MG/DL (ref 75–99)
INR PPP: 2.5 (ref ?–1.1)
PT BLD: 23.7 SEC (ref 9–12)

## 2017-05-11 RX ADMIN — IPRATROPIUM BROMIDE AND ALBUTEROL SULFATE SCH ML: .5; 3 SOLUTION RESPIRATORY (INHALATION) at 08:40

## 2017-05-11 RX ADMIN — METOPROLOL TARTRATE SCH MG: 50 TABLET, FILM COATED ORAL at 09:15

## 2017-05-11 RX ADMIN — MULTIVITAMIN SCH ML: LIQUID ORAL at 12:28

## 2017-05-11 RX ADMIN — METOCLOPRAMIDE SCH MG: 5 INJECTION, SOLUTION INTRAMUSCULAR; INTRAVENOUS at 12:27

## 2017-05-11 RX ADMIN — INSULIN LISPRO SCH: 100 INJECTION, SOLUTION INTRAVENOUS; SUBCUTANEOUS at 17:32

## 2017-05-11 RX ADMIN — DOCUSATE SODIUM AND SENNOSIDES SCH EACH: 50; 8.6 TABLET ORAL at 21:46

## 2017-05-11 RX ADMIN — FUROSEMIDE SCH MG: 40 TABLET ORAL at 09:15

## 2017-05-11 RX ADMIN — SERTRALINE HYDROCHLORIDE SCH MG: 50 TABLET, FILM COATED ORAL at 09:16

## 2017-05-11 RX ADMIN — INSULIN LISPRO SCH: 100 INJECTION, SOLUTION INTRAVENOUS; SUBCUTANEOUS at 06:21

## 2017-05-11 RX ADMIN — PANTOPRAZOLE SODIUM SCH MG: 40 TABLET, DELAYED RELEASE ORAL at 09:16

## 2017-05-11 RX ADMIN — METOPROLOL TARTRATE SCH MG: 50 TABLET, FILM COATED ORAL at 21:46

## 2017-05-11 RX ADMIN — ATORVASTATIN CALCIUM SCH MG: 40 TABLET, FILM COATED ORAL at 09:15

## 2017-05-11 RX ADMIN — SODIUM CHLORIDE, PRESERVATIVE FREE SCH: 5 INJECTION INTRAVENOUS at 09:17

## 2017-05-11 RX ADMIN — METOCLOPRAMIDE SCH MG: 5 INJECTION, SOLUTION INTRAMUSCULAR; INTRAVENOUS at 23:16

## 2017-05-11 RX ADMIN — INSULIN LISPRO SCH UNIT: 100 INJECTION, SOLUTION INTRAVENOUS; SUBCUTANEOUS at 21:46

## 2017-05-11 RX ADMIN — IPRATROPIUM BROMIDE AND ALBUTEROL SULFATE SCH: .5; 3 SOLUTION RESPIRATORY (INHALATION) at 20:30

## 2017-05-11 RX ADMIN — LISINOPRIL SCH MG: 10 TABLET ORAL at 21:46

## 2017-05-11 RX ADMIN — ASPIRIN 325 MG ORAL TABLET SCH MG: 325 PILL ORAL at 09:15

## 2017-05-11 RX ADMIN — INSULIN DETEMIR SCH UNIT: 100 INJECTION, SOLUTION SUBCUTANEOUS at 21:45

## 2017-05-11 RX ADMIN — INSULIN LISPRO SCH UNIT: 100 INJECTION, SOLUTION INTRAVENOUS; SUBCUTANEOUS at 12:27

## 2017-05-11 RX ADMIN — SODIUM CHLORIDE, PRESERVATIVE FREE SCH ML: 5 INJECTION INTRAVENOUS at 21:46

## 2017-05-11 RX ADMIN — LEVOFLOXACIN SCH MG: 750 TABLET, FILM COATED ORAL at 17:30

## 2017-05-11 RX ADMIN — METOCLOPRAMIDE SCH MG: 5 INJECTION, SOLUTION INTRAMUSCULAR; INTRAVENOUS at 06:22

## 2017-05-11 RX ADMIN — LISINOPRIL SCH MG: 10 TABLET ORAL at 09:16

## 2017-05-11 RX ADMIN — Medication SCH MG: at 21:46

## 2017-05-11 RX ADMIN — Medication SCH MG: at 09:15

## 2017-05-11 RX ADMIN — AMIODARONE HYDROCHLORIDE SCH MG: 200 TABLET ORAL at 09:16

## 2017-05-11 RX ADMIN — METOCLOPRAMIDE SCH MG: 5 INJECTION, SOLUTION INTRAMUSCULAR; INTRAVENOUS at 17:30

## 2017-05-11 RX ADMIN — IPRATROPIUM BROMIDE AND ALBUTEROL SULFATE SCH ML: .5; 3 SOLUTION RESPIRATORY (INHALATION) at 13:00

## 2017-05-11 RX ADMIN — IPRATROPIUM BROMIDE AND ALBUTEROL SULFATE SCH ML: .5; 3 SOLUTION RESPIRATORY (INHALATION) at 16:15

## 2017-05-11 RX ADMIN — SODIUM CHLORIDE, PRESERVATIVE FREE SCH ML: 5 INJECTION INTRAVENOUS at 09:16

## 2017-05-11 NOTE — PN
Mr. Bear is a 61-year-old male who underwent coronary artery 

bypass graft and had prolonged stay in the intensive care unit with (  

  ) ventilator support, currently extubated.  The patient otherwise 

denied any complaint of chest pain or shortness of breath today. The 

patient is on and off for atrial fibrillation and cardiology and 

pulmonary are following this patient. Patient also does complain of 

left great toe pain.  Today, no fever. No chills. Patient is on oral 

diet after swallow evaluation.   Abdominal ileus has been resolved 

now. Patient is currently in the selective care unit. Denied any 

complaints of nausea or vomiting. No fever. No chills. No acute 

overnight issues. Complete review of systems negative except as above. 

 



Current medications include:

1. Norco 5/325.

2. DuoNeb.

3. Amiodarone.

4. Aspirin.

5. Lidocaine.

6. Hurricaine spray.

7. Dulcolax.

8. Colchicine.

9. Lasix.

10. Levemir.

11. Humalog.

12. Theragran.

13. Levofloxacin.

14. Zestril.

15. Milk of magnesia p.r.n. 

16. Metoclopramide.

17. Metoprolol.

18. Protonix.

19. Zofran. 

20. Senokot.

21. Zoloft.

22. Thiamine. 



PHYSICAL EXAMINATION: A 61-year-old female sitting in a chair 

comfortably. Awake, alert, oriented, x3, appears to be in no apparent 

distress.  

VITALS: Blood pressure is  92/55.  Pulse 65.  Respiratory  rate 16, 

temperature afebrile.  Pulse ox 96% on room air.  

HEENT: Atraumatic, normocephalic. Neck is supple. No JVD. CVS S1, S2 

heard. No murmurs, no gallop.  

LUNGS: Bilateral air entry is present, crackles at the bases, 

nonlabored breathing.  

ABDOMEN: Soft, obese. Bowel sounds are present. No palpable 

organomegaly. CNS: Awake, alert, oriented, x3. No (     ).  

EXTREMITIES: Bilateral trace edema. Pulses palpable bilaterally. No 

clubbing or cyanosis.  

PSYCHIATRIC: Cooperative.   Left great toe slight warmth noted.  

Minimal redness as well.  Tender to palpation.  



LABORATORY DATA: WBC 7.2, hemoglobin 8.7, platelets are 386, INR 2.7. 

Sodium 137, potassium 4.2, chloride 103, bicarb is 26. BUN 15, 

creatinine 0.7, blood sugar is 98, calcium 8.3. 



IMPRESSION: 

1. Coronary artery disease, status post coronary artery bypass graft, 

postop day 27.  

2. Acute hypoxic respiratory failure post surgery. Currently 

saturating on room air. 

3. History of hypertension. Currently hypotensive. 

4. Hyperlipidemia. 

5. Paroxysmal atrial fibrillation, currently rate controlled on 

anticoagulation on Coumadin, therapeutic INR level.  

6. History of significant alcohol use, status post prolonged episode 

of delirium tremens.  

7. Diabetes mellitus with uncontrolled blood sugars.

8. Dysphagia is resolved. Currently tolerating oral intake.

9. Catarrhalis tracheobronchitis.

10. Ileus resolved. 



DISCUSSION AND PLAN:  The patient will continue with insulin dosing 

and Coumadin dosing. Continue to monitor CBC.  Continue the colchicine 

at this time p.r.n.  for gouty arthritis and encourage incentive 

spirometry and encourage ambulation and follow closely.  Further 

recommendations based on clinical course.  



MTDD

## 2017-05-11 NOTE — P.PN
Subjective


Principal diagnosis: 





Status post coronary artery bypass grafting, postoperative day #29





This is a 61-year-old white male status post CABG, total arterial non-aortic 

patch off pump double coronary artery bypass grafting using in situ 

skeletonized right internal mammary artery to the left anterior descending 

across the anterior midline in situ totally skeletonized left internal mammary 

artery to the first obtuse marginal artery.  His postoperative day #28.  The 

patient is seen again today 05/10/2017 in follow-up on the selective care unit.

  He is much more awake and alert.  He is able to stand at the bedside with 

assistance.  He is currently sitting up at the bedside.  He is tolerating his 

lunch well.  He denies any shortness breath, cough or congestion.  He is 

maintaining good O2 saturations in the upper 90s on room air.  Afebrile.  Gold 

and stable at 8.7.  INR 2.7.





The patient is seen again today 05/11/2017 in follow-up on the selective care 

unit.  This is postoperative day #29.  He is more awake and alert.  He is 

currently sitting up in the chair at the bedside.  Eyes any worsening shortness 

of breath, cough or congestion.  Maintaining O2 saturations in the low 90s on 

room air.  He has been afebrile.  He has been working well with physical 

therapy.





Objective





- Vital Signs


Vital signs: 


 Vital Signs











Temp  98.2 F   05/11/17 11:26


 


Pulse  68   05/11/17 13:13


 


Resp  16   05/11/17 12:00


 


BP  120/58   05/11/17 11:26


 


Pulse Ox  94 L  05/11/17 11:26








 Intake & Output











 05/10/17 05/11/17 05/11/17





 18:59 06:59 18:59


 


Intake Total 355 10 660


 


Output Total 600  


 


Balance -245 10 660


 


Weight 95 kg 94.5 kg 94.5 kg


 


Intake:   


 


  IV  10 


 


    .9 NaCL  10 


 


  Oral 355  660


 


Output:   


 


  Urine 600  


 


Other:   


 


  Voiding Method Indwelling Catheter  Indwelling Catheter








 ABP, PAP, CO, CI - Last Documented











Arterial Blood Pressure        172/58


 


Pulmonary Artery Pressure      46/23


 


Cardiac Output                 6.8


 


Cardiac Index                  3.3

















- Exam





GENERAL EXAM: Awake, alert.  No acute distress.


HEAD: Normocephalic.


EYES: Sluggish reaction of pupils, equal size.


NOSE: Clear with pink turbinates.


THROAT: No erythema or exudates.


NECK: No masses, no JVD.


CHEST: No chest wall deformity.


LUNGS: Equal air entry with faint crackles in the bilateral posterior bases, 

more so on the left.


CVS: S1 and S2 normal with no audible murmurs, regular rhythm.


ABDOMEN: No hepatosplenomegaly, normal bowel sounds, no guarding or rigidity.


Extremities: There is trace peripheral edema.  No clubbing, no cyanosis.  

Peripheral pulses are intact.





- Labs


CBC & Chem 7: 


 05/10/17 05:37





 05/10/17 05:37


Labs: 


 Abnormal Lab Results - Last 24 Hours (Table)











  05/10/17 05/10/17 05/11/17 Range/Units





  16:35 20:46 05:53 


 


PT    23.7 H  (9.0-12.0)  sec


 


POC Glucose (mg/dL)  150 H  215 H   (75-99)  mg/dL














  05/11/17 05/11/17 Range/Units





  06:17 11:40 


 


PT    (9.0-12.0)  sec


 


POC Glucose (mg/dL)  104 H  136 H  (75-99)  mg/dL














Assessment and Plan


Plan: 





Impression:





1 status post CABG, postoperative day #29





2 history of nicotine dependence and suspect some component of COPD





3 history of documented coronary artery disease based on a recent cardiac 

catheterization





4 history of percutaneous revascularization of the proximal right coronary 

artery in 2001





5 history of hypertension





6 history of myocardial infarction





7 status post bronchoscopy and bronchoalveolar lavage of the right upper lobe 

read lobe and right lower lobe upon arrival to the ICU.  





8 failure to wean mostly secondary to relative hypoxemia, O2 saturation is very 

marginal, and it is in the 70s at 50%.  And because of the mental status and 

suspected alcohol withdrawal is another major factor that delaying the weaning 

process.  Proved, maintaining good O2 saturations on room air.





9 suspect acute alcohol withdrawal considering the patient is a heavy drinker





Plan:





The patient was seen and evaluated by Dr. Munoz. Continue with his 

bronchodilators.  Continue diuretics.  Continue the increased use of the 

incentive spirometer and cough and deep breathing exercises.  We'll increase 

his activity as tolerated.  Continue to follow and make further recommendations 

based on his clinical status.  He is awaiting placement for inpatient 

rehabilitation.

## 2017-05-11 NOTE — P.PN
<Makayla Enamorado - Last Filed: 05/11/17 08:53>





Subjective


Principal diagnosis: 





Coronary artery disease, status post prior stenting to his right coronary 

artery.  Preserved left ventricular function.  Hyperlipidemia.  Hypertension.  

ETOH abuse.





POD #29 total arterial non-aortic patch off-pump double coronary artery bypass 

grafting using in situ skeletonized right internal mammary artery to the left 

anterior descending artery across the anterior midline in situ totally 

skeletonized left internal mammary artery to the first obtuse marginal artery.  

Intraoperative transesophageal echocardiogram and epi-aortic scanning.  

Intraoperative graft flow measurements using the [employer] system





POD #29 flexible bronchoscopy with bronchial washings secondary to 

postoperative mucous plugging with increasing oxygen demand the night of 

surgery.  Pt had acute hypoxic post operative respiratory failure as an 

unexpected post-surgical condition, related to the patient's underlying medical 

comorbidities. Unable to determine if ventilator associated pneumonia vs. 

trachobronchitis, an unexpected post-surgical condition.  Sputum culture grew 

out Moraxella, blood cultures are negative to date, urine culture grew 

Klebsiella.  Patient placed on Levaquin per pulmonology.





Pt developed postoperative alcohol withdrawal requiring increased sedation and 

prolonged mechanical ventilation.





Patient developed postoperative ileus, an unexpected post-surgical condition, 

currently resolved.  General surgery consulted, no intervention at this time.  

Passed modified barium swallow.  NGT removed.  Placed on diet.  TPN 

discontinued secondary to patient meeting caloric needs. 





Patient currently sitting up in bed in no apparent distress.  Denies chest pain/

shortness of breath.  States pain is controlled.  Continues to tolerate his 

diet.  Awaiting rehab placement.  Wife at bedside this morning, updated on 

progress.





Objective





- Vital Signs


Vital signs: 


 Vital Signs











Temp  98.6 F   05/10/17 20:00


 


Pulse  60   05/11/17 04:00


 


Resp  16   05/11/17 04:00


 


BP  103/58   05/11/17 04:00


 


Pulse Ox  96   05/11/17 04:00








 Intake & Output











 05/10/17 05/11/17 05/11/17





 18:59 06:59 18:59


 


Intake Total 355 10 


 


Output Total 600  


 


Balance -245 10 


 


Weight 95 kg 94.5 kg 


 


Intake:   


 


  IV  10 


 


    .9 NaCL  10 


 


  Oral 355  


 


Output:   


 


  Urine 600  


 


Other:   


 


  Voiding Method Indwelling Catheter  








 ABP, PAP, CO, CI - Last Documented











Arterial Blood Pressure        172/58


 


Pulmonary Artery Pressure      46/23


 


Cardiac Output                 6.8


 


Cardiac Index                  3.3

















- Constitutional


General appearance: Present: cooperative, no acute distress, obese





- Respiratory


Details: 





Lungs sounds diminished bilaterally.  Respirations even, nonlabored.  Currently 

on room air with oxygen saturation 96%.  Able to achieve 1000 mL on his 

incentive spirometry.  Effective cough.





- Cardiovascular


Details: 





S1, S2 present.  Regular rate and rhythm, normal sinus rhythm on telemetry.  

Chest sternum stable superiorly, movable inferiorly.  Heart hugger in place 

with patient demonstrating appropriate use.  No peripheral edema present.





- Gastrointestinal


Gastrointestinal Comment(s): 





Abdomen soft, nontender, nondistended.  Active bowel sounds 4 quadrants.  

Tolerating diet.  Bowel movement 2 yesterday per patient.





- Genitourinary


Genitourinary Comment(s): 





Continues to void clear, yellow urine.





- Integumentary


Integumentary Comment(s): 





Anterior chest incision well approximated.





- Musculoskeletal


Musculoskeletal: Present: generalized weakness, strength equal bilaterally





- Psychiatric


Psychiatric: Present: A&O x's 3, appropriate affect, intact judgment & insight





- Allied health notes


Allied health notes reviewed: nursing





- Labs


CBC & Chem 7: 


 05/10/17 05:37





 05/10/17 05:37


Labs: 


 Abnormal Lab Results - Last 24 Hours (Table)











  05/10/17 05/10/17 05/10/17 Range/Units





  11:40 16:35 20:46 


 


PT     (9.0-12.0)  sec


 


POC Glucose (mg/dL)  175 H  150 H  215 H  (75-99)  mg/dL














  05/11/17 05/11/17 Range/Units





  05:53 06:17 


 


PT  23.7 H   (9.0-12.0)  sec


 


POC Glucose (mg/dL)   104 H  (75-99)  mg/dL














Assessment and Plan


(1) Status post coronary artery bypass graft


Status: Acute   





(2) Coronary artery disease


Status: Acute   





(3) Family history of heart disease


Status: Acute   





(4) History of PTCA


Status: Acute   





(5) Hyperlipidemia


Status: Acute   





(6) Hypertension


Status: Acute   





(7) Nicotine dependence, chewing tobacco, uncomplicated


Status: Acute   


Plan: 





1.  Continue ASA, Lipitor, Lopressor, lisinopril, Coumadin, lasix.. 


2.  Continue amiodarone for atrial fibrillation prophylaxis.


3.  INR 2.5 today, will give Coumadin 1 mg today.


4.  Encourage incentive spirometry use.


5.  Continue antibiotics per pulmonology.


6.  Insulin/diabetic management per primary care service.  


7.  Increase activity, ambulate in hallway.  Physical therapy following.  


8.  Labs every 3 days


9.  GI/DVT prophylaxis.


10. Discharge planning in process.  Patient will need rehabilitation upon 

discharge.  Dr. Giordano following patient





Time with Patient: Greater than 30





<CharlotteOscar - Last Filed: 05/11/17 18:52>





Objective





- Vital Signs


Vital signs: 


 Vital Signs











Temp  98.0 F   05/11/17 16:00


 


Pulse  68   05/11/17 16:28


 


Resp  16   05/11/17 16:00


 


BP  113/59   05/11/17 16:00


 


Pulse Ox  97   05/11/17 16:00








 Intake & Output











 05/10/17 05/11/17 05/11/17





 18:59 06:59 18:59


 


Intake Total 355 10 660


 


Output Total 600  


 


Balance -245 10 660


 


Weight 95 kg 94.5 kg 94.5 kg


 


Intake:   


 


  IV  10 


 


    .9 NaCL  10 


 


  Oral 355  660


 


Output:   


 


  Urine 600  


 


Other:   


 


  Voiding Method Indwelling Catheter  Indwelling Catheter








 ABP, PAP, CO, CI - Last Documented











Arterial Blood Pressure        172/58


 


Pulmonary Artery Pressure      46/23


 


Cardiac Output                 6.8


 


Cardiac Index                  3.3

















- Labs


CBC & Chem 7: 


 05/10/17 05:37





 05/10/17 05:37


Labs: 


 Abnormal Lab Results - Last 24 Hours (Table)











  05/10/17 05/11/17 05/11/17 Range/Units





  20:46 05:53 06:17 


 


PT   23.7 H   (9.0-12.0)  sec


 


POC Glucose (mg/dL)  215 H   104 H  (75-99)  mg/dL














  05/11/17 05/11/17 Range/Units





  11:40 16:53 


 


PT    (9.0-12.0)  sec


 


POC Glucose (mg/dL)  136 H  103 H  (75-99)  mg/dL














Assessment and Plan


Plan: 


The patient was seen and examined.  I agree with the above assessment and plan.

  There is no significant change in his care today.  We will give him 1 mg of 

Coumadin for an INR of 2.5.  We are awaiting inpatient rehab placement.  He may 

be discharged once he is accepted.  Otherwise we will continue with physical 

therapy.

## 2017-05-11 NOTE — P.PN
Subjective


Principal diagnosis: 


CABG





This a 61-year-old  gentleman who is status post coronary bypass 

grafting surgery.  He had a prolonged stay in the intensive care unit on 

mechanical ventilator support.  He has been in and out of atrial fibrillation 

but remains in normal sinus rhythm.  He is currently being followed today on 

the telemetry unit.  Overall doing well.  He is currently on metoprolol tartrate

, amiodarone, and Lanoxin.  He is on Coumadin for anticoagulation, INR is 2.5 

today.    Doing very well overall. Up with physical therapy today.








Objective





- Vital Signs


Vital signs: 


 Vital Signs











Temp  98.2 F   05/11/17 11:26


 


Pulse  68   05/11/17 13:13


 


Resp  16   05/11/17 12:00


 


BP  120/58   05/11/17 11:26


 


Pulse Ox  94 L  05/11/17 11:26








 Intake & Output











 05/10/17 05/11/17 05/11/17





 18:59 06:59 18:59


 


Intake Total 355 10 660


 


Output Total 600  


 


Balance -245 10 660


 


Weight 95 kg 94.5 kg 94.5 kg


 


Intake:   


 


  IV  10 


 


    .9 NaCL  10 


 


  Oral 355  660


 


Output:   


 


  Urine 600  


 


Other:   


 


  Voiding Method Indwelling Catheter  Indwelling Catheter








 ABP, PAP, CO, CI - Last Documented











Arterial Blood Pressure        172/58


 


Pulmonary Artery Pressure      46/23


 


Cardiac Output                 6.8


 


Cardiac Index                  3.3

















- Exam





PHYSICAL EXAMINATION: 





HEENT: Head is atraumatic, normocephalic.  Pupils equal, round.  Neck is 

supple.  There is no elevated jugular venous pressure.





HEART EXAMINATION: Heart S1, S2 normal.  No murmur or gallop heard.





CHEST EXAMINATION: lungs reveal fine crackles to bilateral bases.





ABDOMEN:  Soft, nontender. Bowel sounds are heard. No organomegaly noted.


 


EXTREMITIES: 2+ peripheral pulses with no evidence of peripheral edema and no 

calf tenderness noted.





NEUROLOGIC patient is awake, alert and oriented -3.


 


.


 








- Labs


CBC & Chem 7: 


 05/10/17 05:37





 05/10/17 05:37


Labs: 


 Abnormal Lab Results - Last 24 Hours (Table)











  05/10/17 05/10/17 05/11/17 Range/Units





  16:35 20:46 05:53 


 


PT    23.7 H  (9.0-12.0)  sec


 


POC Glucose (mg/dL)  150 H  215 H   (75-99)  mg/dL














  05/11/17 05/11/17 Range/Units





  06:17 11:40 


 


PT    (9.0-12.0)  sec


 


POC Glucose (mg/dL)  104 H  136 H  (75-99)  mg/dL














Assessment and Plan


(1) S/P CABG (coronary artery bypass graft)


Status: Acute   





(2) Nicotine dependence


Status: Acute   





(3) CAD (coronary artery disease)


Status: Acute   





(4) Failure to wean


Status: Acute   





(5) Family history of heart disease


Status: Acute   





(6) History of PTCA


Status: Acute   





(7) Hyperlipidemia


Status: Acute   





(8) Hypertension


Status: Acute   





(9) Paroxysmal a-fib


Status: Acute   


Plan: 


Cardiology's perspective, patient has been encouraged in the use of his 

incentive spirometry.  His current medications have been reviewed we will 

continue with those.








DNP note has been reviewed, I agree with a documented findings and plan of 

care.  Patient was seen and examined.

## 2017-05-12 VITALS — SYSTOLIC BLOOD PRESSURE: 102 MMHG | RESPIRATION RATE: 18 BRPM | DIASTOLIC BLOOD PRESSURE: 55 MMHG | TEMPERATURE: 98.8 F

## 2017-05-12 VITALS — HEART RATE: 57 BPM

## 2017-05-12 LAB
GLUCOSE BLD-MCNC: 105 MG/DL (ref 75–99)
GLUCOSE BLD-MCNC: 112 MG/DL (ref 75–99)
GLUCOSE BLD-MCNC: 139 MG/DL (ref 75–99)
GLUCOSE BLD-MCNC: 89 MG/DL (ref 75–99)
INR PPP: 2.1 (ref ?–1.1)
PT BLD: 20.5 SEC (ref 9–12)

## 2017-05-12 RX ADMIN — FUROSEMIDE SCH MG: 40 TABLET ORAL at 09:12

## 2017-05-12 RX ADMIN — IPRATROPIUM BROMIDE AND ALBUTEROL SULFATE SCH: .5; 3 SOLUTION RESPIRATORY (INHALATION) at 09:20

## 2017-05-12 RX ADMIN — METOPROLOL TARTRATE SCH MG: 50 TABLET, FILM COATED ORAL at 09:13

## 2017-05-12 RX ADMIN — MULTIVITAMIN SCH ML: LIQUID ORAL at 09:14

## 2017-05-12 RX ADMIN — METOCLOPRAMIDE SCH MG: 5 INJECTION, SOLUTION INTRAMUSCULAR; INTRAVENOUS at 05:11

## 2017-05-12 RX ADMIN — INSULIN LISPRO SCH: 100 INJECTION, SOLUTION INTRAVENOUS; SUBCUTANEOUS at 06:34

## 2017-05-12 RX ADMIN — AMIODARONE HYDROCHLORIDE SCH MG: 200 TABLET ORAL at 09:12

## 2017-05-12 RX ADMIN — SODIUM CHLORIDE, PRESERVATIVE FREE SCH ML: 5 INJECTION INTRAVENOUS at 09:13

## 2017-05-12 RX ADMIN — METOCLOPRAMIDE SCH MG: 5 INJECTION, SOLUTION INTRAMUSCULAR; INTRAVENOUS at 12:16

## 2017-05-12 RX ADMIN — PANTOPRAZOLE SODIUM SCH MG: 40 TABLET, DELAYED RELEASE ORAL at 09:13

## 2017-05-12 RX ADMIN — ASPIRIN 325 MG ORAL TABLET SCH MG: 325 PILL ORAL at 09:12

## 2017-05-12 RX ADMIN — ATORVASTATIN CALCIUM SCH MG: 40 TABLET, FILM COATED ORAL at 09:12

## 2017-05-12 RX ADMIN — Medication SCH MG: at 09:14

## 2017-05-12 RX ADMIN — IPRATROPIUM BROMIDE AND ALBUTEROL SULFATE SCH: .5; 3 SOLUTION RESPIRATORY (INHALATION) at 14:14

## 2017-05-12 RX ADMIN — SERTRALINE HYDROCHLORIDE SCH MG: 50 TABLET, FILM COATED ORAL at 09:13

## 2017-05-12 RX ADMIN — LISINOPRIL SCH MG: 10 TABLET ORAL at 09:12

## 2017-05-12 RX ADMIN — INSULIN LISPRO SCH UNIT: 100 INJECTION, SOLUTION INTRAVENOUS; SUBCUTANEOUS at 12:17

## 2017-05-12 NOTE — P.DS
Providers


Date of admission: 


04/13/17 05:40





Attending physician: 


Ines Madsen





Consults: 





 





04/13/17 13:56


Consult Physician Routine 


   Consulting Provider: Mayda Hall


   Consult Reason/Comments: Intensivist Consult: post cardiac surgery


   Do you want consulting provider notified?: Yes


Consult Physician Routine 


   Consulting Provider: Jessica Hess


   Consult Reason/Comments: medical management


   Do you want consulting provider notified?: Yes


Consult Physician Routine 


   Consulting Provider: Lv Almodovar


   Consult Reason/Comments: Cardiologist Consult: post cardiac surgery


   Do you want consulting provider notified?: Yes





05/08/17 08:21


Consult Physician Routine 


   Consulting Provider: Renard Giordano


   Consult Reason/Comments: rehab placement


   Do you want consulting provider notified?: Yes











Primary care physician: 


Vikas Ventura





[facility] Course: 





FINAL DIAGNOSIS: 


1.[Symptomatic multivessel coronary artery disease, status post prior stenting 

to his right coronary artery in 2001.]  


2.[Preserved left ventricular function]


3.[Hypertension ]


4.[Hyperlipidemia]


5.[EtOH abuse]


6.[History of nicotine dependence]


7.[Postoperative ileus, resolved]


8.[Postoperative paroxysmal atrial fibrillation]


9.[Postoperative acute tracheobronchitis with positive culture for Moraxella 

Catarra]


10.[Postoperative acute right lung collapse secondary to mucous plugging 

involving mostly the right upper lobe and right middle lobe]


11.[Postoperative acute hypoxic respiratory failure with prolonged mechanical 

ventilator dependence.]





PRINCIPAL PROCEDURE: 


1.[Elective total arterial non-aortic touch off-pump double coronary artery 

bypass grafting using the in situ skeletonized right internal mammary artery to 

the left anterior descending coronary artery across the anterior midline, in 

situ totally skeletonized left internal mammary artery to the first obtuse 

marginal coronary artery.]


2.[Intraoperative transesophageal echocardiogram and epi-aortic scanning]


3.[Intraoperative graft flow measurement using the medistim system]


4.[Bronchoscopy and bronchial Alveolar lavage of the right upper lobe, right 

middle lobe and right lower lobe]


5.[Insertion of peripherally inserted central catheter]





HISTORY OF PRESENT ILLNESS: [This is a 61-year-old gentleman who is followed by 

Dr. Vikas Ventura on an outpatient basis.  The patient has a history of 

coronary artery disease with previous stenting to his right coronary artery in 

2001.  He has been followed closely on an outpatient basis by Dr. Almodovar from 

[facility].  Recently, he has had complaints of progressive chest 

pain and dyspnea particularly with exertion.  Subsequently the patient 

underwent a stress test on 03/20/2017 which showed him to have a fixed 

anteroapical defect.  Due to the patient's above-mentioned symptoms and history 

of coronary artery disease the patient underwent a 2-D echocardiogram on 04/06/ 2017 which showed trace tricuspid regurgitation, borderline concentric left 

ventricular hypertrophy, and an overall left ventricular systolic function to 

be normal with an ejection fraction of 50-55%.  Also on 04/06/2017 the patient 

underwent elective heart catheterization which demonstrated a 50-60% stenosis 

to his left main coronary artery, a 99% stenosis to the takeoff of his left 

anterior descending coronary artery and a 60-70% proximal left anterior 

descending coronary artery stenosis, his first obtuse marginal coronary artery 

showed a 95% stenosis in his right coronary artery demonstrated a 40% stenosis.

  Also during the heart catheterization a left ventriculogram was repleted 

which demonstrated mild anterior apical hypokinesis with an estimated ejection 

fraction of 45-50%.  The above-mentioned studies were reviewed with the patient 

by Dr. Almodovar and by Dr. Madsen an elective coronary artery bypass grafting 

surgery was recommended.]





HOSPITAL COURSE:[ The patient was admitted to the hospital and after obtaining 

consent he was taken to the operating room by Dr. Ines Madsen with the patient 

underwent an elective  total arterial non-aortic touch off-pump double coronary 

artery bypass grafting using the in situ skeletonized right internal mammary 

artery to the left anterior descending coronary artery across the anterior 

midline, in situ totally skeletonized left internal mammary artery to the first 

obtuse marginal coronary artery.  Patient also underwent an intraoperative 

transesophageal echocardiogram and epi-aortic scanning, and graft flow 

measurement using the medistim system.  Postoperatively the patient was 

transferred to the cardiovascular intensive care unit where he had somewhat of 

a stormy recovery.  Upon his arrival to the cardiovascular intensive care unit 

he developed a mucous plug involving the right upper lobe and right middle lobe 

requiring a bronchoscopy with bronchial alveolar lavage of the right upper lobe

, right middle lobe and right lower lobe.  The patient also developed a 

postoperative acute hypoxic respiratory failure and issues are due to his DTs , 

requiring prolonged mechanical ventilation.  The patient was subsequently 

extubated on 04/27/2017 at 10:15 AM.  He did develop a postoperative acute 

tracheobronchitis with a positive culture for Moraxella Catarra which was 

treated accordingly.  After the patient was extubated T developed some 

abdominal distention accompanied by some abdominal pain and was subsequently 

diagnosed with a colonic ileus which was treated accordingly.  The patient 

eventually passed his swallow study and progressed with an oral diet which he 

tolerated well as his ileus had resolved.  The patient was subsequently 

transferred to [address] [place] [address] where he progressed working with physical 

therapy.]





COMPLICATIONS: [His postoperative period was complicated by a right middle lobe 

and right lower lobe mucous plug requiring postoperative bronchoscopy and 

bronchial alveolar lavage.  An acute tracheobronchitis with positive culture 

for Moraxella Catarra which was treated with Levaquin.  Postoperative prolonged 

mechanical ventilator dependence due to acute hypoxic respiratory failure and 

on withdrawal DTs, which was treated accordingly.  Postoperative paroxysmal 

atrial fibrillation which was treated accordingly.  ]





CONSULTATIONS: 


1.[ Dr. Almodovar for cardiology management]


2.[Dr. Hall for pulmonary and ventilator management ]


3.[Dr. Griffin for medical management]


4.[Dr. Inman for general surgical management]





DISCHARGE INSTRUCTIONS: 


1. Daily PT and INRs with results called to Dr. Almodovar's office at [phone] 

for Coumadin dosing.  


2. The patient should sleep in their own bed, no medical bed needed upon 

discharge home .


3.  Stairs are not an issue.  If the bedroom is upstairs, it is advised that 

the patient go up at night and down in the morning for the first week.  Go 

slowly, using handrail and take 1 step at a time.


4.  CONNIE hose are to be worn for 30 days or until physician discontinues.


5.  Heart hugger is to be worn 100% of the time until physician discontinues.(

excepet when showering)


6.  No lifting, pushing, or pulling more than 10 pounds for 12 weeks.  The 

physician will advise of any restriction changes.


7.  The patient is expected to continue the prescribed walking program.


8.  Continue pain control per as needed orders.


9.  Continue with incentive spirometry and splinting/heart hugger until 

otherwise directed by the physician.


10. Must shower daily using liquid antibacterial soap and a separate white 

washcloth for each individual incision.


11. Please remove sternal chest dressing daily with routine sternal incision 

care thereafter.


12. Physical therapy and occupational therapy to evaluate and treat.


13. Respiratory therapy to evaluate and treat 





NURSING SERVICES TO PROVIDE:  


   RN SKILLED HOME CARE SERVICES FOR POST-OP SURGICAL PATIENTS WITH THE 

FOLLOWING: Coronary Artery Bypass Surgery (CABG), Mitral Valve Replacement/

Repair ( MVR), Aortic Valve Replacement/Repair (AVR)


   RN TO CONTINUE EDUCATION FROM ``ROAD TO A HEALTH HEART PATIENT EDUCATION 

MANUAL (GIVEN TO PATIENT IN THE HOSPITAL)


   MEDICATION RECONCILIATION WITH EDUCATION AS NEEDED ON FIRST HOME VISIT


   EMPHASIZE IMPORTANCE OF WEARING BREAST SUPPORT/HEART HUGGER


   ENCOURAGE USE OF INCENTIVE SPIROMETER 10 X EVERY HOUR WHILE AWAKE


   ENCOURAGE UTILIZATION OF LOWER EXTREMITY COMPRESSION STOCKINGS/CONNIE HOSE and 

ELEVATE LEGS ABOVE LEVEL OF HEART WHILE AT REST.  


   ENCOURAGE AMBULATION 3-5x/day  INCREASING AS TOLERATES, WHILE AVOID 

EXTREMES IN TEMPERATURE





LABORATORY:


   CBC, CMP TO BE DRAWN EVERY 3 DAYS,  WITH DAILY PT AND INRS.  FAX RESULTS TO 

[phone].  Please call PT and INRs results to Dr. Almodovar's office as 

indicated for Coumadin dosing 





HEALTH PARAMETERS:


   WEIGHT: NOTIFY MD OF WEIGHT GAIN OF 2 LBS IN 24 HOURS OR 5 LBS IN ONE WEEK


   HR: NOTIFY MD OF HR <55 BPM OR HR>100 BPM


   BP: NOTIFY MD IF BP <90/55 OR BP>140/100


   O2 SAT: NOTIFY MD IF PO2<93% ON ROOM AIR











Plan - Discharge Summary


Discharge Medication List





Amiodarone [Cordarone] 200 mg PO DAILY  tab 05/12/17 [Rx]


Aspirin 325 mg PO DAILY  tab 05/12/17 [Rx]


Atorvastatin [Lipitor] 40 mg PO DAILY  tab 05/12/17 [Rx]


Bisacodyl [Dulcolax] 10 mg RECTAL DAILY PRN #0 supp 05/12/17 [Rx]


Colchicine [Colcrys] 0.6 mg PO BID PRN #0 tab 05/12/17 [Rx]


Furosemide [Lasix] 40 mg PO DAILY  tab 05/12/17 [Rx]


INSULIN LISPRO (humaLOG) [humaLOG (formulary)] 0 unit SQ ACHS  vial 05/12/17 [Rx

]


Insulin Detemir [Levemir] 10 unit SQ HS  vial 05/12/17 [Rx]


Ipratropium-Albuterol Nebulize [Duoneb 0.5 mg-3 mg/3 ml Soln] 3 ml INHALATION RT

-QID  ampul.neb 05/12/17 [Rx]


Ipratropium-Albuterol Nebulize [Duoneb 0.5 mg-3 mg/3 ml Soln] 3 ml INHALATION RT

-QID PRN #0 ampul.neb 05/12/17 [Rx]


Lisinopril [Zestril] 10 mg PO BID  tab 05/12/17 [Rx]


Magnesium Hydroxide [Milk of Magnesia Concentrate] 2,400 mg PO BID PRN #0 ml 05/ 12/17 [Rx]


Metoprolol Tartrate [Lopressor] 50 mg PO BID  tab 05/12/17 [Rx]


Multivitamins, Thera Liquid [Theragran Liquid (formulary)] 15 ml PO DAILY@1200  

ml 05/12/17 [Rx]


Pantoprazole [Protonix] 40 mg PO DAILY  tablet. 05/12/17 [Rx]


Sennosides-Docusate Sodium [Senokot-S] 2 each PO HS  tab 05/12/17 [Rx]


Sertraline [Zoloft] 50 mg PO DAILY  tab 05/12/17 [Rx]


Thiamine [Vitamin B-1] 100 mg PO BID  tab 05/12/17 [Rx]


Warfarin Sodium [Coumadin] 2 mg PO AS DIRECTED #30 tablet 05/12/17 [Rx]








Follow up Appointment(s)/Referral(s): 


Mayda Hall MD [STAFF PHYSICIAN] - 06/05/17 1:45 pm


Makayla Enamorado NPC [Nurse Practitioner] - 05/19/17 11:45 am (Follow-up in the 

office at [address] [place] [address], [address], [facility], [address].  Office number 

is a [phone])


Lv Almodovar MD [STAFF PHYSICIAN] - 2 Weeks (Dr. Almodovar's office will call 

with a follow-up appointment.)


Ines Madsen MD [STAFF PHYSICIAN] - 06/02/17 11:45 am


Vikas Ventura DO [Primary Care Provider] - 05/22/17 2:40 pm


Discharge Disposition: TRANSFER TO SNF/ECF

## 2017-05-12 NOTE — P.PN
Subjective


Principal diagnosis: 





Status post coronary artery bypass grafting, postoperative day #30





This is a 61-year-old white male status post CABG, total arterial non-aortic 

patch off pump double coronary artery bypass grafting using in situ 

skeletonized right internal mammary artery to the left anterior descending 

across the anterior midline in situ totally skeletonized left internal mammary 

artery to the first obtuse marginal artery.  His postoperative day #28.  The 

patient is seen again today 05/10/2017 in follow-up on the selective care unit.

  He is much more awake and alert.  He is able to stand at the bedside with 

assistance.  He is currently sitting up at the bedside.  He is tolerating his 

lunch well.  He denies any shortness breath, cough or congestion.  He is 

maintaining good O2 saturations in the upper 90s on room air.  Afebrile.  Gold 

and stable at 8.7.  INR 2.7.





The patient is seen again today 05/11/2017 in follow-up on the selective care 

unit.  This is postoperative day #29.  He is more awake and alert.  He is 

currently sitting up in the chair at the bedside.  Denies any worsening 

shortness of breath, cough or congestion.  Maintaining O2 saturations in the 

low 90s on room air.  He has been afebrile.  He has been working well with 

physical therapy.





He is seen again today 05/12/2017 in follow-up.  He is currently sitting up in 

the chair at the bedside.  He is awake and alert in no acute distress.  Working 

well with physical therapy.  He spent maintaining good O2 saturations in the 

90s on room air.  He's been afebrile.  INR 2.1.





Objective





- Vital Signs


Vital signs: 


 Vital Signs











Temp  98.0 F   05/11/17 20:00


 


Pulse  64   05/12/17 04:00


 


Resp  16   05/12/17 04:00


 


BP  111/59   05/12/17 04:00


 


Pulse Ox  94 L  05/12/17 04:00








 Intake & Output











 05/11/17 05/12/17 05/12/17





 18:59 06:59 18:59


 


Intake Total 660 20 


 


Output Total  400 


 


Balance 660 -380 


 


Weight 94.5 kg 95 kg 


 


Intake:   


 


  IV  20 


 


    .9 NaCL  20 


 


  Oral 660  


 


Output:   


 


  Urine  400 


 


Other:   


 


  Voiding Method Indwelling Catheter  


 


  # Voids  1 








 ABP, PAP, CO, CI - Last Documented











Arterial Blood Pressure        172/58


 


Pulmonary Artery Pressure      46/23


 


Cardiac Output                 6.8


 


Cardiac Index                  3.3

















- Exam





GENERAL EXAM: Awake, alert.  No acute distress.


HEAD: Normocephalic.


EYES: Sluggish reaction of pupils, equal size.


NOSE: Clear with pink turbinates.


THROAT: No erythema or exudates.


NECK: No masses, no JVD.


CHEST: No chest wall deformity.


LUNGS: Equal air entry with faint crackles in the bilateral posterior bases, 

more so on the left.


CVS: S1 and S2 normal with no audible murmurs, regular rhythm.


ABDOMEN: No hepatosplenomegaly, normal bowel sounds, no guarding or rigidity.


Extremities: There is trace peripheral edema.  No clubbing, no cyanosis.  

Peripheral pulses are intact.





- Labs


CBC & Chem 7: 


 05/10/17 05:37





 05/10/17 05:37


Labs: 


 Abnormal Lab Results - Last 24 Hours (Table)











  05/11/17 05/11/17 05/12/17 Range/Units





  16:53 21:35 05:37 


 


PT    20.5 H  (9.0-12.0)  sec


 


POC Glucose (mg/dL)  103 H  142 H   (75-99)  mg/dL














  05/12/17 05/12/17 Range/Units





  05:55 11:34 


 


PT    (9.0-12.0)  sec


 


POC Glucose (mg/dL)  105 H  139 H  (75-99)  mg/dL














Assessment and Plan


Plan: 





Impression:





1 status post CABG, postoperative day #30





2 history of nicotine dependence and suspect some component of COPD





3 history of documented coronary artery disease based on a recent cardiac 

catheterization





4 history of percutaneous revascularization of the proximal right coronary 

artery in 2001





5 history of hypertension





6 history of myocardial infarction





7 status post bronchoscopy and bronchoalveolar lavage of the right upper lobe 

read lobe and right lower lobe upon arrival to the ICU.  





8 failure to wean mostly secondary to relative hypoxemia, Improved, maintaining 

good O2 saturations on room air.





9 suspect acute alcohol withdrawal considering the patient is a heavy drinker





Plan:





The patient was seen and evaluated by Dr. Munoz. Continue with his 

bronchodilators.  Continue the increased use of the incentive spirometer and 

cough and deep breathing exercises.  We'll increase his activity as tolerated.  

Continue to follow and make further recommendations based on his clinical 

status.  He is awaiting placement for inpatient rehabilitation.

## 2017-05-12 NOTE — P.PN
<Odell Gómez - Last Filed: 05/12/17 11:30>





Progress Note - Text





CV Surgery Nursing





Principal diagnosis: 





Coronary artery disease, status post prior stenting to his right coronary 

artery.  Preserved left ventricular function.  Hyperlipidemia.  Hypertension.  

ETOH abuse.





POD #30 total arterial non-aortic patch off-pump double coronary artery bypass 

grafting using in situ skeletonized right internal mammary artery to the left 

anterior descending artery across the anterior midline in situ totally 

skeletonized left internal mammary artery to the first obtuse marginal artery.  

Intraoperative transesophageal echocardiogram and epi-aortic scanning.  

Intraoperative graft flow measurements using the Medistim system





POD #30 flexible bronchoscopy with bronchial washings secondary to 

postoperative mucous plugging with increasing oxygen demand the night of 

surgery.  Pt had acute hypoxic post operative respiratory failure as an 

unexpected post-surgical condition, related to the patient's underlying medical 

comorbidities. Unable to determine if ventilator associated pneumonia vs. 

trachobronchitis, an unexpected post-surgical condition.  Sputum culture grew 

out Moraxella, blood cultures are negative to date, urine culture grew 

Klebsiella.  Patient placed on Levaquin per pulmonology.





Patientt developed postoperative alcohol withdrawal requiring increased 

sedation and prolonged mechanical ventilation.





Patient developed postoperative ileus, an unexpected post-surgical condition, 

currently resolved.  General surgery consulted, no intervention at this time.  

Passed modified barium swallow.  NGT removed.  Placed on diet.  TPN 

discontinued secondary to patient meeting caloric needs.  He is tolerating an 

oral diet at this time.





Patient awake and alert, no distress noted, no specific complaints.  He is 

sitting up to the bedside chair.  The patient's wife is at his bedside.





Vital Signs: Afebrile


 Vital Signs - 24 hr











  05/11/17 05/11/17 05/11/17





  12:00 13:02 13:13


 


Temperature   


 


Pulse Rate  68 68


 


Pulse Rate [ 67  





Cardiac Monitor   





]   


 


Respiratory 16  





Rate   


 


Blood Pressure   





[Right Arm   





Sitting]   


 


O2 Sat by Pulse   





Oximetry   














  05/11/17 05/11/17 05/11/17





  16:00 16:16 16:28


 


Temperature 98.0 F  


 


Pulse Rate  67 68


 


Pulse Rate [ 58 L  





Cardiac Monitor   





]   


 


Respiratory 16  





Rate   


 


Blood Pressure 113/59  





[Right Arm   





Sitting]   


 


O2 Sat by Pulse 97  





Oximetry   














  05/11/17 05/12/17 05/12/17





  20:00 00:00 04:00


 


Temperature 98.0 F  


 


Pulse Rate   


 


Pulse Rate [ 66 65 64





Cardiac Monitor   





]   


 


Respiratory 18 18 16





Rate   


 


Blood Pressure 106/59 107/58 111/59





[Right Arm   





Sitting]   


 


O2 Sat by Pulse 98 96 94 L





Oximetry   














Labs: 


ABG











ABG pH  7.46  (7.35-7.45)  H  04/27/17  05:19    


 


ABG pCO2  30 mmHg (35-45)  L  04/27/17  05:19    


 


ABG pO2  108 mmHg ()   04/27/17  05:19    


 


ABG O2 Saturation  99.0 % (94-97)  H  04/27/17  05:19    





PT/INR, D-dimer











PT  20.5 sec (9.0-12.0)  H  05/12/17  05:37    


 


INR  2.1  (<1.1)   05/12/17  05:37    








Lungs: Essentially clear throughout, with few scattered expiratory wheezes and 

diminished to his bilateral bases.  Respirations are symmetrical and unlabored.





O2 sat: 94% on room air.





I/S: 1000 mL, reviewed with the patient important of using his incentive 

spirometry every hour while awake.  The patient did demonstrate proper use of 

his incentive spirometry but will need further coaching and encouragement to 

use his incentive spirometry.





Heart:  S1S2, regular rhythm and rate, negative for S3, gallop or murmur.  

Remote telemetry showing normal sinus rhythm heart rate 65.





Sternum stable, chest incision clean with dressing clean and dry.  His incision 

is well approximated.  Heart hugger in place.  The patient is demonstrating 

proper use of his heart hugger but he will need further coaching and 

encouragement on the use of his heart hugger.





Abdomen:  Soft, Positive bowel sounds present in all 4 quadrants.  Last bowel 

movement 05/09/2017.





CBGs:  mg/dL in the last 24 hours.





U/O:  Adequate,





24 hr Total: 


 Intake & Output











 05/10/17 05/11/17 05/12/17 05/13/17





 06:59 06:59 06:59 06:59


 


Intake Total 594 365 680 


 


Output Total 1550 600 400 


 


Balance -956 -235 280 


 


Weight 95 kg 94.5 kg 95 kg 








Active Medications





Hydrocodone Bitart/Acetaminophen (Norco 5-325)  1 each PO Q4HR PRN


   PRN Reason: Pain


   Last Admin: 05/10/17 21:25 Dose:  1 each


Hydrocodone Bitart/Acetaminophen (Norco 5-325)  2 each PO Q4HR PRN


   PRN Reason: MODERATE TO SEVERE PAIN


Albuterol/Ipratropium (Duoneb 0.5 Mg-3 Mg/3 Ml Soln)  3 ml INHALATION RT-QID [facility]


   Last Admin: 05/12/17 09:20 Dose:  Not Given


Albuterol/Ipratropium (Duoneb 0.5 Mg-3 Mg/3 Ml Soln)  3 ml INHALATION RT-QID PRN


   PRN Reason: Shortness Of Breath Or Wheezing


   Last Admin: 04/29/17 23:10 Dose:  3 ml


Amiodarone HCl (Cordarone)  200 mg PO DAILY [facility]


   Last Admin: 05/12/17 09:12 Dose:  200 mg


Aspirin (Aspirin)  325 mg PO DAILY [facility]


   Last Admin: 05/12/17 09:12 Dose:  325 mg


Atorvastatin Calcium (Lipitor)  40 mg PO DAILY [facility]


   Last Admin: 05/12/17 09:12 Dose:  40 mg


Benzocaine (Hurricaine Spray)  1 applic MUCOUS MEM QID PRN


   PRN Reason: Mouth Irritation


   Last Admin: 04/17/17 11:36 Dose:  1 applic


Bisacodyl (Dulcolax)  10 mg RECTAL DAILY PRN


   PRN Reason: Constipation


   Last Admin: 04/19/17 17:18 Dose:  10 mg


Colchicine (Colcrys)  0.6 mg PO BID PRN


   PRN Reason: Gout pain


   Last Admin: 05/10/17 06:34 Dose:  0.6 mg


Furosemide (Lasix)  40 mg PO DAILY [facility]


   Last Admin: 05/12/17 09:12 Dose:  40 mg


Insulin Detemir (Levemir)  10 unit SQ HS [facility]


   Last Admin: 05/11/17 21:45 Dose:  10 unit


Insulin Human Lispro (Humalog)  0 unit SQ ACHS [facility]


   PRN Reason: Protocol


   Last Admin: 05/12/17 06:34 Dose:  Not Given


Iron/Minerals/Multivitamins (Theragran Liquid)  15 ml PO DAILY@1200 [facility]


   Last Admin: 05/12/17 09:14 Dose:  15 ml


Lisinopril (Zestril)  10 mg PO BID [facility]


   Last Admin: 05/12/17 09:12 Dose:  10 mg


Magnesium Hydroxide (Milk Of Magnesia)  2,400 mg PO BID PRN


   PRN Reason: Constipation


Metoclopramide HCl (Reglan)  10 mg IVP Q6HR [facility]


   Last Admin: 05/12/17 05:11 Dose:  10 mg


Metoprolol Tartrate (Lopressor)  50 mg PO BID [facility]


   Last Admin: 05/12/17 09:13 Dose:  50 mg


Miscellaneous Information (Magnesium Per Protocol)  1 each MISCELLANE DAILY PRN

; Protocol


   PRN Reason: Per Protocol


Miscellaneous Information (Phosphorus Per Protocol)  1 each MISCELLANE DAILY PRN

; Protocol


   PRN Reason: Per Protocol


Miscellaneous Information (Potassium Per Protocol)  1 each MISCELLANE DAILY PRN

; Protocol


   PRN Reason: Per Protocol


Ondansetron HCl (Zofran)  4 mg IVP Q6HR PRN


   PRN Reason: Nausea And Vomiting


Pantoprazole Sodium (Protonix)  40 mg PO DAILY [facility]


   Last Admin: 05/12/17 09:13 Dose:  40 mg


Senna/Docusate Sodium (Senokot-S)  2 each PO HS [facility]


   Last Admin: 05/11/17 21:46 Dose:  2 each


Sertraline HCl (Zoloft)  50 mg PO DAILY [facility]


   Last Admin: 05/12/17 09:13 Dose:  50 mg


Sodium Chloride (Saline Flush)  10 ml IV BID [facility]


   Last Admin: 05/12/17 09:13 Dose:  10 ml


Sodium Chloride (Saline Flush)  10 ml IV WEEKLY [facility]


   Last Admin: 05/11/17 09:17 Dose:  Not Given


Sodium Chloride (Saline Flush)  10 ml IV Q4HR PRN


   PRN Reason: PICC Line


Thiamine HCl (Vitamin B-1)  100 mg PO BID [facility]


   Last Admin: 05/12/17 09:14 Dose:  100 mg


Warfarin Sodium (Coumadin)  3 mg PO ONCE@1800 [facility]


   Stop: 05/12/17 18:01





Plan: 





1.  Continue ASA, Lipitor, Lopressor, lisinopril, lasix.


2.  Continue amiodarone for atrial fibrillation prophylaxis.


3.  INR 2.1 today, will give Coumadin 3 mg today.


4.  Encourage incentive spirometry use every hour while awake.


5.  We will discontinue the Levaquin today per Dr. Munoz from pulmonary 

medicine.


6.  Insulin/diabetic management per primary care service.  


7.  Increase activity, ambulate in hallway.  Physical therapy following.  


8.  Labs every 3 days, daily PT and INR.


9.  GI/DVT prophylaxis.


10.  Two-view chest x-ray in a.m.


11.  Discharge planning in process.  Patient will need rehabilitation upon 

discharge.  [facility] rehabilitation liaison in assessing the 

patient this morning. 





<Oscar Porter - Last Filed: 05/12/17 15:15>





Progress Note - Text


The patient was seen and examined.  I agree with the above assessment and plan.

  We will give him 3 mg of Coumadin for an INR of 2.1.  Antibiotics will be 

discontinued per pulmonary.  He will be discharged to rehab today.

## 2017-05-12 NOTE — P.PN
Subjective


Principal diagnosis: 


CABG





This a 61-year-old  gentleman who is status post coronary bypass 

grafting surgery.  He had a prolonged stay in the intensive care unit on 

mechanical ventilator support.  He has been in and out of atrial fibrillation 

but remains in normal sinus rhythm.  He is currently being followed today on 

the telemetry unit.  Overall doing well.  He is currently on metoprolol tartrate

, amiodarone, and Lanoxin.  He is on Coumadin for anticoagulation, INR is 2.1 

today.    Doing very well overall.  Possible transferred to [facility] rehab today.





Objective





- Vital Signs


Vital signs: 


 Vital Signs











Temp  98.0 F   05/11/17 20:00


 


Pulse  64   05/12/17 04:00


 


Resp  16   05/12/17 04:00


 


BP  111/59   05/12/17 04:00


 


Pulse Ox  94 L  05/12/17 04:00








 Intake & Output











 05/11/17 05/12/17 05/12/17





 18:59 06:59 18:59


 


Intake Total 660 20 960


 


Output Total  400 850


 


Balance 660 -380 110


 


Weight 94.5 kg 95 kg 


 


Intake:   


 


  IV  20 


 


    .9 NaCL  20 


 


  Oral 660  960


 


Output:   


 


  Urine  400 850


 


Other:   


 


  Voiding Method Indwelling Catheter  


 


  # Voids  1 2


 


  # Bowel Movements   1








 ABP, PAP, CO, CI - Last Documented











Arterial Blood Pressure        172/58


 


Pulmonary Artery Pressure      46/23


 


Cardiac Output                 6.8


 


Cardiac Index                  3.3

















- Exam





PHYSICAL EXAMINATION: 





HEENT: Head is atraumatic, normocephalic.  Pupils equal, round.  Neck is 

supple.  There is no elevated jugular venous pressure.





HEART EXAMINATION: Heart S1, S2 normal.  No murmur or gallop heard.





CHEST EXAMINATION: lungs reveal fine crackles to bilateral bases.





ABDOMEN:  Soft, nontender. Bowel sounds are heard. No organomegaly noted.


 


EXTREMITIES: 2+ peripheral pulses with no evidence of peripheral edema and no 

calf tenderness noted.





NEUROLOGIC patient is awake, alert and oriented -3.


 


.


 








- Labs


CBC & Chem 7: 


 05/10/17 05:37





 05/10/17 05:37


Labs: 


 Abnormal Lab Results - Last 24 Hours (Table)











  05/11/17 05/11/17 05/12/17 Range/Units





  16:53 21:35 05:37 


 


PT    20.5 H  (9.0-12.0)  sec


 


POC Glucose (mg/dL)  103 H  142 H   (75-99)  mg/dL














  05/12/17 05/12/17 Range/Units





  05:55 11:34 


 


PT    (9.0-12.0)  sec


 


POC Glucose (mg/dL)  105 H  139 H  (75-99)  mg/dL














Assessment and Plan


(1) S/P CABG (coronary artery bypass graft)


Status: Acute   





(2) Nicotine dependence


Status: Acute   





(3) CAD (coronary artery disease)


Status: Acute   





(4) Failure to wean


Status: Acute   





(5) Family history of heart disease


Status: Acute   





(6) History of PTCA


Status: Acute   





(7) Hyperlipidemia


Status: Acute   





(8) Hypertension


Status: Acute   





(9) Paroxysmal a-fib


Status: Acute   


Plan: 


Cardiology's perspective, patient has been encouraged in the use of his 

incentive spirometry.  His current medications have been reviewed we will 

continue with those.








DNP note has been reviewed, I agree with a documented findings and plan of 

care.  Patient was seen and examined.

## 2017-05-12 NOTE — PN
DATE OF SERVICE: 05/11/2017



INTERVAL HISTORY: Mr. Bear is a 61-year-old male who underwent 

coronary artery bypass graft. He had a prolonged stay in the intensive 

care unit due to hypoxemia and unable to wean. The patient underwent ( 

   ) in the ER. Currently the patient is extubated and transferred to 

selective care unit. Patient's respiratory status has been stable at 

this time. The patient is also atrial fibrillation, currently 

maintained in sinus rhythm. Patient also had ileus, which has been 

resolved as well. Currently, patient is complaining of left great toe 

pain, for which the patient is being treated for gouty arthritis. 

Otherwise, no fever. No chills. No acute overnight issues. Patient 

will most likely be discharged to cardiac rehabilitation.  



REVIEW OF SYSTEM: 

CONSTITUTIONAL: No fevers, chills, weakness. 

RESPIRATORY: No cough or sputum production. 

CARDIOVASCULAR: No chest pain. No shortness of breath. Patient does 

have leg swelling.  

ABDOMEN: No nausea, vomiting, abdominal pain. 

GENITOURINARY: Negative. 

ENDOCRINE: Negative. 

PSYCHIATRY: Negative. 

All other 14-point review of systems is negative except as above. 



Current medications are reviewed. 



PHYSICAL EXAMINATION: A 61-year-old male, sitting in a chair 

comfortably. Awake, alert, oriented x3. He appears to be in no 

apparent distress.  

VITALS: Blood pressure is 113/59, pulse is 58, respirations 16, 

temperature afebrile, pulse ox 97% on room air.  

HEENT: Atraumatic, normocephalic. 

NECK: Supple. No JVD. 

CVS: S1, S2 heard. No murmurs, no gallops. 

LUNGS: Bilateral air entry is present. No wheezing. No crackles. 

Nonlabored breathing.  

ABDOMEN: Soft, nontender. Bowel sounds heard. 

CNS: Awake, alert, oriented x3. 

EXTREMITIES: Bilateral lower extremity 2+ edema. Pulses palpable 

bilaterally. No clubbing or cyanosis.  

PSYCHIATRY: Cooperative. 



LABORATORY DATA: Reviewed. 



IMPRESSION: 

1. Coronary artery disease, status post coronary artery bypass, postop 

day 29. 

2. Acute hypoxic respiratory failure post surgery, currently on room 

air, extubated.  

3. Hypertension. 

4. Hyperlipidemia. 

5. Atrial fibrillation, on anticoagulation with Coumadin. 

6. History of severe alcohol abuse. Prolonged episode of delirium 

tremens in the ICU.  

7. Diabetes mellitus with uncontrolled blood sugars. 

8. Dysphagia, resolved. 

9. Moraxella catarrhalis tracheobronchitis, improved now. 

10. Ileus, resolved. 



DISCUSSION AND PLAN: The patient will be continued on current 

management including (    ) dosing and Coumadin dosing. Increase 

ambulation. Continue with the incentive spirometry. Possible transfer 

to cardiac rehabilitation. Continue current management.

## 2017-05-13 NOTE — PN
DATE OF SERVICE:  05/12/2017



INTERVAL HISTORY:  Mr. Bear is a 61-year-old male who underwent 

coronary artery bypass graft and had a prolonged hospital stay in the 

intensive care unit due to hypovolemia and unable to wean. Patient 

also had prolonged DT's as well due to history of alcohol abuse.  

Subsequently patient was transferred to the selective care unit and 

patient's respiratory status is much improved now.  Patient also 

developed atrial fibrillation and was started on anticoagulation.  

Patient was also having ileus which is resolved as well.  Currently, 

patient hemodynamically stable and denied any chest pain or short of 

breath and leg swelling and pain is much improved now. The patient is 

being transferred to rehab for cardiac rehab.  Patient is being 

transferred to a facility today.   



Complete review of systems negative except as above. 



Current medications are reviewed. 



PHYSICAL EXAMINATION: 

A 61-year-old male lying in bed comfortably, awake, alert, oriented 

x3, appears to be in no apparent distress.  

VITALS: Blood pressure is 102/55, pulse is 57, respirations 18, 

temperature afebrile, pulse ox 97% on room air.   

HEENT: Atraumatic, normocephalic. Neck is supple. No JVD. 

CVS EXAM: S1, S2 heard. No murmurs, no gallop. 

LUNGS: Bilateral air entry is present. No wheezing, no crackles. 

Abdomen is soft, nontender. Bowel sounds are heard. 

CNS:  Awake, alert and oriented x3.  No focal deficits. 

EXTREMITIES: 2+ edema. Pulses palpable bilaterally. Stockings in place. 

PSYCHIATRIC: Cooperative. 



LABORATORY DATA: Reviewed. 



IMPRESSION: 

1. Coronary artery disease status post coronary artery bypass graft, 

postoperative day 30.  

2. Acute hypoxic respiratory failure post surgery with prolonged 

respiratory failure, extubated, weaned successfully. 

3. Hypertension. 

4. Hyperlipidemia. 

5. Atrial fibrillation, anticoagulated with Coumadin. 

6. History of severe alcohol abuse, prolonged episodes of delirium 

tremens in the ICU. 

7. Ileus, resolved.

8. Dysphagia, resolved. 

9. Diabetes mellitus, uncontrolled, with elevated blood sugar s. 

10. Catarrhalis tracheobronchitis, improved now. 

11. Deep venous thrombosis prophylaxis currently on Coumadin. 



DISCUSSION AND PLAN: Patient will be continued on the current 

management.  Patient has been transferred to rehab today. Continue 

with the incentive spirometry and encourage ambulation and follow.  

Otherwise, patient is being transferred to rehab today.

## 2017-06-08 NOTE — CDI
In responding to this query, please exercise your independent professional 
judgment. The [facility] Coding Staff and Clinical Documentation Specialists 
appreciate your assistance in clarifying documentation, maintaining compliance 
with coding guidelines, accurately documenting patients condition and 
capturing severity of illness. The fact that a question is asked does not imply 
that any particular answer is desired or expected. Communication forms are a 
method of clarifying documentation and are not made part of the [employer] 
Record. Thank you in advance for your clarification.

 Last Revision, November 2015

Clinical Validation



Date: 6/8/2017 5:48:00 PM

From: Marylee Wilson, Deborah Biskner,       

Phone: [phone]

MRN: [id number]

Admit Date: 4/13/2017 5:40:00 AM

Patient Name: Epi Bear

Visit Number: [id number]

Discharge Date:



Dr. Mayda Hall



Patient presented for CABG.  His postoperative course was complicated and there 
is documentation that needs clarification.  Possible early dehiscense is 
documented in several progress notes starting from 4/30 to 5/7.   Quality 
reviewed the chart and noted documentation that wound was clean and dry. 



In your professional opinion, can you please clarify ____?



   Wound dehiscence ruled in



   Wound dehiscence ruled out



   Other

   Unable to determine



Please document as an addendum to the discharge summary



FYI: Press F11 to launch patient chart.



_____ Place X here if this finding has no clinical significance, is not 
applicable or if you are not able to provide any additional documentation.

BHAVESHD

## 2017-06-09 NOTE — CDI
Date: 6/8/2017 5:48:00 PM

From: Marylee Wilson

Phone: 

MRN: [id number]

Admit Date: 4/13/2017 5:40:00 AM

Patient Name: Epi Bear

Visit Number: [id number]

Discharge Date:



Dr. Ines Madsen



Patient presented for CABG.  His postoperative course was complicated and there 
is documentation that needs clarification.  Possible early dehiscense is 
documented in several progress notes starting from 4/30 to 5/7.   Quality 
reviewed the chart and noted documentation that wound was clean and dry. 



In your professional opinion, can you please clarify ____?



   Wound dehiscence ruled in



   Wound dehiscence ruled out



   Other

   Unable to determine





Please document as an addendum to the discharge summary.

FYI: Press F11 to launch patient chart.



__X___ Place X here if this finding has no clinical significance, is not 
applicable or if you are not able to provide any additional documentation.



ARELI

## 2017-10-05 ENCOUNTER — HOSPITAL ENCOUNTER (OUTPATIENT)
Dept: HOSPITAL 47 - CATHCVL | Age: 62
Discharge: HOME | End: 2017-10-05
Payer: COMMERCIAL

## 2017-10-05 VITALS — SYSTOLIC BLOOD PRESSURE: 100 MMHG | HEART RATE: 52 BPM | DIASTOLIC BLOOD PRESSURE: 58 MMHG

## 2017-10-05 VITALS — BODY MASS INDEX: 28 KG/M2

## 2017-10-05 VITALS — RESPIRATION RATE: 16 BRPM

## 2017-10-05 VITALS — TEMPERATURE: 98.9 F

## 2017-10-05 DIAGNOSIS — Z79.01: ICD-10-CM

## 2017-10-05 DIAGNOSIS — I10: ICD-10-CM

## 2017-10-05 DIAGNOSIS — Z95.5: ICD-10-CM

## 2017-10-05 DIAGNOSIS — Z95.1: ICD-10-CM

## 2017-10-05 DIAGNOSIS — I08.3: Primary | ICD-10-CM

## 2017-10-05 DIAGNOSIS — Z79.82: ICD-10-CM

## 2017-10-05 DIAGNOSIS — Z88.0: ICD-10-CM

## 2017-10-05 DIAGNOSIS — E78.2: ICD-10-CM

## 2017-10-05 DIAGNOSIS — Z79.899: ICD-10-CM

## 2017-10-05 DIAGNOSIS — I48.3: ICD-10-CM

## 2017-10-05 DIAGNOSIS — Z82.49: ICD-10-CM

## 2017-10-05 DIAGNOSIS — F17.221: ICD-10-CM

## 2017-10-05 LAB
ANION GAP SERPL CALC-SCNC: 11 MMOL/L
BUN SERPL-SCNC: 19 MG/DL (ref 9–20)
CALCIUM SPEC-MCNC: 9.5 MG/DL (ref 8.4–10.2)
CHLORIDE SERPL-SCNC: 105 MMOL/L (ref 98–107)
CO2 SERPL-SCNC: 23 MMOL/L (ref 22–30)
GLUCOSE SERPL-MCNC: 108 MG/DL (ref 74–99)
INR PPP: 6.1 (ref ?–1.2)
NON-AFRICAN AMERICAN GFR(MDRD): >60
POTASSIUM SERPL-SCNC: 4.6 MMOL/L (ref 3.5–5.1)
PT BLD: 61.8 SEC (ref 9–12)
SODIUM SERPL-SCNC: 139 MMOL/L (ref 137–145)

## 2017-10-05 PROCEDURE — 93312 ECHO TRANSESOPHAGEAL: CPT

## 2017-10-05 PROCEDURE — 93005 ELECTROCARDIOGRAM TRACING: CPT

## 2017-10-05 PROCEDURE — 93325 DOPPLER ECHO COLOR FLOW MAPG: CPT

## 2017-10-05 PROCEDURE — 92960 CARDIOVERSION ELECTRIC EXT: CPT

## 2017-10-05 PROCEDURE — 80048 BASIC METABOLIC PNL TOTAL CA: CPT

## 2017-10-05 PROCEDURE — 93320 DOPPLER ECHO COMPLETE: CPT

## 2017-10-05 PROCEDURE — 85610 PROTHROMBIN TIME: CPT

## 2017-10-05 NOTE — CE
CARDIAC ELECTROPHYSIOLOGY REPORT



INDICATION:

Atrial flutter.



PROCEDURE:

After explaining the procedure to the patient, it's risks and complication and after

obtaining sedation per Anesthesia Department and performing transesophageal

echocardiogram, a synchronized biphasic cardioversion using 200 joules was performed

with restoration of normal sinus rhythm.  There was no immediate complication.





MARLO / SOLIS: [id number] / Job#: [id number]

## 2017-10-05 NOTE — ECHOT
TRANSESOPHAGEAL ECHOCARDIOGRAM



INDICATION:

Evaluation of left atrial appendage.



PROCEDURE:

After explaining the procedure to the patient, it's risks and complications, his blood

pressure, heart rate, O2 saturation was monitored.  The throat was sprayed with

Cetacaine.  He received sedation per Anesthesia Department.  The probe was introduced

into the esophagus without difficulty.  Images were obtained.  Following that, the

probe was removed.



FINDINGS:

Left atrial size is dilated, left atrial appendage is normal, left ventricular size is

normal.  There is evidence of global hypokinesis with estimated ejection fraction of

40%.  The aortic valve with mild fibrocalcific change over the aortic cusp.  The mitral

valve appears to be normal.  Tricuspid valve is normal.  Descending thoracic aorta

appears to be normal.  Contrast bubble study revealed no shunting across the

interatrial septum.  No pericardial effusion was noted.



Doppler pulse workup obtained revealed mild to moderate mitral with mild tricuspid

regurgitation.  There was no shunting by color Doppler study.



CONCLUSION:

1. The left atrium was normal with normal appearing left atrial appendage.

2. Normal left ventricular size with moderate global hypokinesis.

3. Aortic sclerosis with known stenosis.

4. Mild to moderate mitral regurgitation with mild tricuspid regurgitation.

5. No shunting across the interatrial septum.

6. No pericardial effusion.





MMODL / IJN: [id number] / Job#: [id number]

## 2018-03-31 ENCOUNTER — HOSPITAL ENCOUNTER (EMERGENCY)
Dept: HOSPITAL 47 - EC | Age: 63
Discharge: HOME | End: 2018-03-31
Payer: COMMERCIAL

## 2018-03-31 VITALS — TEMPERATURE: 97.2 F | HEART RATE: 52 BPM | SYSTOLIC BLOOD PRESSURE: 141 MMHG | DIASTOLIC BLOOD PRESSURE: 69 MMHG

## 2018-03-31 VITALS — RESPIRATION RATE: 18 BRPM
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
## 2018-03-31 DIAGNOSIS — Z79.82: ICD-10-CM

## 2018-03-31 DIAGNOSIS — Z79.01: ICD-10-CM
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
## 2018-03-31 DIAGNOSIS — Z95.1: ICD-10-CM

## 2018-03-31 DIAGNOSIS — I10: ICD-10-CM

## 2018-03-31 DIAGNOSIS — I25.2: ICD-10-CM

## 2018-03-31 DIAGNOSIS — Z88.0: ICD-10-CM

## 2018-03-31 DIAGNOSIS — Z79.899: ICD-10-CM

## 2018-03-31 DIAGNOSIS — I48.91: ICD-10-CM

## 2018-03-31 DIAGNOSIS — M10.9: ICD-10-CM

## 2018-03-31 DIAGNOSIS — Z87.891: ICD-10-CM

## 2018-03-31 DIAGNOSIS — S00.03XA: Primary | ICD-10-CM

## 2018-03-31 PROCEDURE — 70450 CT HEAD/BRAIN W/O DYE: CPT

## 2018-03-31 PROCEDURE — 99284 EMERGENCY DEPT VISIT MOD MDM: CPT

## 2018-03-31 PROCEDURE — 82075 ASSAY OF BREATH ETHANOL: CPT

## 2018-03-31 NOTE — CT
EXAMINATION TYPE: CT brain wo con

 

DATE OF EXAM: 3/31/2018

 

COMPARISON: 12/3/2017

 

HISTORY: Prior on synapse, ETOH, fall

 

CT DLP: 1029.90 mGycm

Automated exposure control for dose reduction was used.

 

FINDINGS: 

There is mild cerebral cortical atrophy. There is no mass effect nor midline shift. There is no sign 
of intracranial hemorrhage. There is large left frontal scalp hematoma. I see no skull fracture.

 

IMPRESSION: 

CEREBRAL ATROPHY. NO ACUTE INTRACRANIAL ABNORMALITY. LEFT FRONTAL SCALP HEMATOMA. BRAIN IS STABLE COM
PARED TO OLD EXAM.

## 2018-03-31 NOTE — ED
Fall HPI





- General


Chief Complaint: Fall


Stated Complaint: Fall/ETOH


Time Seen by Provider: 03/31/18 00:27


Source: patient, EMS, RN notes reviewed


Mode of arrival: EMS





- History of Present Illness


Initial Comments: 


This is a 62-year-old male who presents to the emergency department via EMS 

with chief complaint of fall injury.  Patient states that this evening he had 

multiple alcoholic beverages.  He states that he fell in his bathroom at home 

and struck his forehead on what he believes to be the shower.  His son-in-law 

helped him walk back to bed and patient refused to go to the hospital.  Son-in-

law then noticed a goose egg on patient's left side of his forehead and 

contacted EMS.  Patient denies any other injury or trauma.  Denies neck or back 

pain.  He does admit to some forehead pain.  Patient is currently on warfarin 

after undergoing bypass surgery. Denies fever, chills, chest pain, shortness of 

breath, abdominal pain, nausea or vomiting, constipation or diarrhea, dysuria 

or hematuria, numbness or tingling,  or vision changes.  








- Related Data


 Home Medications











 Medication  Instructions  Recorded  Confirmed


 


Warfarin [Coumadin] 1.25 mg PO [id number] 10/04/17 12/03/17


 


Aspirin 81 mg PO DAILY 10/05/17 12/03/17


 


Lisinopril [Zestril] 10 mg PO DAILY 12/03/17 12/03/17


 


Multivitamins, Thera [Multivitamin 1 tab PO DAILY 12/03/17 12/03/17





(formulary)]   


 


Potassium Chloride [Klor-Con 10] 20 meq PO BID 12/03/17 12/03/17








 Previous Rx's











 Medication  Instructions  Recorded


 


Amiodarone [Cordarone] 200 mg PO DAILY  tab 05/12/17


 


Atorvastatin [Lipitor] 40 mg PO DAILY  tab 05/12/17


 


Colchicine [Colcrys] 0.6 mg PO BID PRN #0 tab 05/12/17


 


Furosemide [Lasix] 40 mg PO DAILY  tab 05/12/17


 


Metoprolol Tartrate [Lopressor] 50 mg PO BID  tab 05/12/17


 


Pantoprazole [Protonix] 40 mg PO DAILY  tablet. 05/12/17


 


Sertraline [Zoloft] 50 mg PO DAILY  tab 05/12/17











 Allergies











Allergy/AdvReac Type Severity Reaction Status Date / Time


 


Penicillins Allergy  Rash/Hives Verified 03/31/18 00:37














Review of Systems


ROS Statement: 


Those systems with pertinent positive or pertinent negative responses have been 

documented in the HPI.





ROS Other: All systems not noted in ROS Statement are negative.





Past Medical History


Past Medical History: Atrial Fibrillation, Hyperlipidemia, Hypertension, 

Myocardial Infarction (MI)


Additional Past Medical History / Comment(s): gout, "borderline diabetic"-diet 

control


Last Myocardial Infarction Date:: 2000


History of Any Multi-Drug Resistant Organisms: None Reported


Past Surgical History: Coronary Bypass/CABG, Heart Catheterization With Stent


Additional Past Surgical History / Comment(s): stent x1


Past Anesthesia/Blood Transfusion Reactions: No Reported Reaction


Date of Last Stent Placement:: 2000


Past Psychological History: No Psychological Hx Reported


Smoking Status: Former smoker


Past Alcohol Use History: Abuse, Daily


Past Drug Use History: None Reported





- Past Family History


  ** Sister(s)


Family Medical History: Cancer


Additional Family Medical History / Comment(s): THYROID





General Exam





- General Exam Comments


Initial Comments: 


General: Awake and alert, well-developed; in no apparent distress.


HEENT: Approximately 4" x 2.5" hematoma left forehead.  Pupils are equal, round 

and reactive to light. Extraocular movements intact. Oropharynx moist without 

erythema or exudate. 


Neck: Supple. Normal ROM.  No tenderness.


Cardiovascular: Regular rate and rhythm. No murmurs, rubs or gallops. Chest 

symmetrical.  


Respiratory: Lungs clear to auscultation bilaterally. No wheezes, rales or 

rhonchi. Normal respiratory effort with no use of accessory muscles.  


Musculoskeletal: Normal ROM, no tenderness bilateral upper and lower 

extremities. 


Skin: Pink, warm and dry without rashes or lesions. 


Neurological: Alert and oriented x3. CN II-XII grossly intact. Speech is fluent 

and answers are appropriate. No focal neuro deficits. 


Psychiatric: Normal mood and affect. No overt signs of depression or anxiety 

noted. 














Limitations: no limitations





Course


 Vital Signs











  03/31/18





  00:32


 


Temperature 97.3 F L


 


Pulse Rate 53 L


 


Respiratory 18





Rate 


 


Blood Pressure 154/73


 


O2 Sat by Pulse 98





Oximetry 














Medical Decision Making





- Medical Decision Making


This is a 62-year-old male who presented to the emergency department for 

evaluation of fall injury.  Patient is currently on warfarin.  He fell while in 

the bathroom this evening after having multiple alcoholic beverages and hit his 

forehead.  He sustained a large left frontal scalp hematoma.  Computed 

tomography scan of the brain was obtained and revealed no evidence for skull 

fractures or intracranial hemorrhages.  Patient's family members were contacted 

and he does have a ride home.  Gait is steady.  Vital signs are stable and 

patient is in no acute distress.  He will be discharged home with a safe ride 

in place.  Recommended follow-up with primary care provider.  He is to apply 

ice to the scalp multiple times daily.  Patient is in agreement with plan and 

voices understanding.  All questions were answered.








- Radiology Data


Radiology results: report reviewed


CT brain without contrast findings: There is mild cerebral cortical atrophy.  

There is no mass effect or midline shift.  There is no sign of intracranial 

hemorrhage.  There is a large left frontal scalp hematoma.  I see no skull 

fracture.  Impression: Cerebral atrophy.  No acute intracranial abnormality.  

Left frontal scalp hematoma.  Brain is stable compared to old exam.





Disposition


Clinical Impression: 


 Scalp hematoma





Disposition: HOME SELF-CARE


Condition: Good


Instructions:  Hematoma (ED), Head Injury (ED)


Additional Instructions: 


Please apply ice multiple times daily to the scalp hematoma.  Please return to 

the emergency department if you develop severe headache, episodes of vomiting 

or any other concerning symptoms. Please follow up with primary care provider 

within 1-2 days. Return to emergency department if symptoms should worsen or 

any concerns arise. 


Referrals: 


Vikas Ventura DO [Primary Care Provider] - 1-2 days


Time of Disposition: 01:48

## 2018-04-04 ENCOUNTER — HOSPITAL ENCOUNTER (OUTPATIENT)
Dept: HOSPITAL 47 - RADXRYALE | Age: 63
Discharge: HOME | End: 2018-04-04
Attending: PHYSICIAN ASSISTANT
Payer: COMMERCIAL

## 2018-04-04 DIAGNOSIS — M79.642: Primary | ICD-10-CM

## 2018-04-04 NOTE — XR
EXAMINATION TYPE: XR hand complete LT

 

DATE OF EXAM: 4/4/2018

 

COMPARISON: NONE

 

HISTORY: Left hand pain, injury fall

 

TECHNIQUE: Three-view left hand

 

FINDINGS: No acute fractures are evident. Old fracture of the proximal fifth metacarpal is not exclud
ed. Joint spaces are preserved. Some mild soft tissue swelling is over the dorsum of the hand.

 

IMPRESSION:

1.  No acute osseous abnormality.

## 2018-09-20 ENCOUNTER — HOSPITAL ENCOUNTER (OUTPATIENT)
Dept: HOSPITAL 47 - RADUSWWP | Age: 63
Discharge: HOME | End: 2018-09-20
Attending: FAMILY MEDICINE
Payer: COMMERCIAL

## 2018-09-20 DIAGNOSIS — F10.20: ICD-10-CM

## 2018-09-20 DIAGNOSIS — K76.0: Primary | ICD-10-CM

## 2018-09-20 PROCEDURE — 76705 ECHO EXAM OF ABDOMEN: CPT

## 2018-09-20 NOTE — US
EXAMINATION TYPE: US abdomen limited

 

DATE OF EXAM: 9/20/2018

 

COMPARISON: CT 4/29/2017

 

CLINICAL HISTORY: R945 ABN LIVER RESULTS, ALCOHOL DEPENDANT.

 

EXAM MEASUREMENTS:

 

Liver Length:  15.3 cm   

Gallbladder Wall:  0.4 cm   

CBD:  0.6 cm

Right Kidney:  9.3 x 5.3 x 6.0 cm

 

 

 

Pancreas:  Obscured by bowel gas

Liver:  Diffusely heterogeneous echotexture.  

Gallbladder:  Wall is minimally thickened. No stones visualized

**Evidence for sonographic Dobson's sign:  No

CBD:  wnl for age, distal portion is obscured by bowel gas  

Right Kidney:  Hypoechoic area visualized upper pole measuring 1.4 x 1.5 x 1.6, cyst vs other   

 

Visualized pancreas heterogeneous, portions are obscured by overlying bowel gas on images saved. Subo
ptimal study due to patient's large body habitus and overlying bowel gas. Visualized liver is heterog
eneously hyperechoic likely reflecting diffuse fatty infiltration. No intrahepatic ductal dilatation 
is seen. Evaluation for focal masses is suboptimal due to the heterogeneity. The common bile duct hilda
sures upper limits of normal. Gallbladder is seen without shadowing mobile gallstones. Gallbladder wa
ll is thickened. No surrounding fluid is present.

 

IMPRESSION: Suboptimal study, diffuse fatty infiltration of liver is felt present.

## 2020-01-31 NOTE — P.CNPUL
History of Present Illness


Consult date: 04/13/17


Requesting physician: Ines aMdsen


Reason for consult: other (status post CABG, patient is on mechanical 

ventilation.)


Chief complaint: status post CABG


History of present illness: 





this is a 61-year-old white male with recent cardiac evaluation by Dr. Almodovar, 

patient was found to have significant coronary artery disease.  He underwent 

elective myocardial revascularization today, postoperatively he was on 

mechanical ventilation, and I was asked to see him on consultation.  Based on a 

consultation dated 4/6/2017done by the nurse practitioner of Dr. Madsen, 

patient was scheduled for bypass grafting using internal mammary artery, 

patient is known to have history of hyperlipidemia, hypertension, nicotine 

dependence, patient chews tobacco, and he had a previous history of PTCA.  

Patient is also known to have strong family history of coronary artery disease.

  Upon arrival to the ICU post surgery, patient was noted to have significant 

opacification of the right lung, and we clearly determined that the patient has 

a mucus plugging involving mostly the right upper lobe, and right middle lobe.  

Upon arrival to the ICU, stat bronchoscopy was done, and I was able to clear 

mucous plugs from the right upper lobe mostly, some mucous plugs from the right 

middle lobe, and minimal purulent secretions in the right lower lobe.  Left 

side was noted to be unremarkable.





Review of Systems


ROS unobtainable: due to endotracheal tube





Past Medical History


Past Medical History: Coronary Artery Disease (CAD), Hypertension, Myocardial 

Infarction (MI)


Additional Past Medical History / Comment(s): gout


Last Myocardial Infarction Date:: 2000


History of Any Multi-Drug Resistant Organisms: None Reported


Past Surgical History: Heart Catheterization With Stent


Additional Past Surgical History / Comment(s): stent x1


Past Anesthesia/Blood Transfusion Reactions: No Reported Reaction


Date of Last Stent Placement:: 2000


Past Psychological History: No Psychological Hx Reported


Smoking Status: Current every day smoker


Past Alcohol Use History: Heavy


Additional Past Alcohol Use History / Comment(s): chews tobacco, one tin last 3-

4 days,   drinks 6-8 beers a day


Past Drug Use History: None Reported





- Past Family History


  ** Sister(s)


Family Medical History: Cancer


Additional Family Medical History / Comment(s): thyroid





Medications and Allergies


 Home Medications











 Medication  Instructions  Recorded  Confirmed  Type


 


Aspirin EC [Ecotrin] 325 mg PO DAILY 04/03/16 04/13/17 History


 


Metoprolol Tartrate [Lopressor] 25 mg PO BID 04/03/16 04/13/17 History


 


Atorvastatin [Lipitor] 40 mg PO HS 04/06/17 04/13/17 History


 


Enalapril/Hydrochlorothiazide 1 tab PO DAILY 04/06/17 04/13/17 History





[Vaseretic 10-25 mg]    


 


Thiamine [Vitamin B-1] 100 mg PO DAILY 04/10/17 04/13/17 History











 Allergies











Allergy/AdvReac Type Severity Reaction Status Date / Time


 


Penicillins Allergy  Rash/Hives Verified 04/10/17 14:29














Physical Exam


Vitals: 


 Vital Signs











  Temp Pulse Pulse Resp BP BP BP


 


 04/13/17 17:30   77   19   


 


 04/13/17 17:15   74   20   


 


 04/13/17 17:13   72     


 


 04/13/17 17:00   75   19   


 


 04/13/17 16:45   70   13   


 


 04/13/17 16:30   76   16   


 


 04/13/17 16:15   67   11 L   


 


 04/13/17 16:00  97.2 F L  65  75  11 L  117/77  


 


 04/13/17 15:45   65   12   


 


 04/13/17 15:30   65   12   


 


 04/13/17 15:15   67   12   


 


 04/13/17 15:00   71   24   


 


 04/13/17 14:45   65   12   


 


 04/13/17 14:30   75   17  117/77  


 


 04/13/17 14:15   73   12  117/77  


 


 04/13/17 13:56  95.9 F L      


 


 04/13/17 06:02  98.1 F   53 L  18   163/81  176/81














  Pulse Ox


 


 04/13/17 17:30  97


 


 04/13/17 17:15  100


 


 04/13/17 17:13 


 


 04/13/17 17:00 


 


 04/13/17 16:45  100


 


 04/13/17 16:30  97


 


 04/13/17 16:15  99


 


 04/13/17 16:00  99


 


 04/13/17 15:45  98


 


 04/13/17 15:30  98


 


 04/13/17 15:15  99


 


 04/13/17 15:00  97


 


 04/13/17 14:45  100


 


 04/13/17 14:30  99


 


 04/13/17 14:15  100


 


 04/13/17 13:56  99


 


 04/13/17 06:02  99








 Intake and Output











 04/13/17 04/13/17 04/13/17





 06:59 14:59 22:59


 


Intake Total  148.5 187.75


 


Output Total  1671 400


 


Balance  -1522.5 -212.25


 


Intake:   


 


  Intake, IV Titration  148.5 187.75





  Amount   


 


    Clevidipine Butyrate 25  12 12





    mg In Empty Bag 1 bag @ 1   





    MG/HR 2 mls/hr IV .Q24H   





    [facility] Rx#:[id number]   


 


    Insulin Regular 100 unit   2





    In Sodium Chloride 0.9%   





    100 ml @ Per Protocol IV   





    .Q0M [facility] Rx#:[id number]   


 


    Lactated Ringers 1,000 ml  100 100





    @ 50 mls/hr IV .Q20H [facility]   





    Rx#:[id number]   


 


    Nitroglycerin-D5w Pmx 50  4.5 4





    mg In Dextrose/Water 1   





    250ml.bag @ 5 MCG/MIN 1.5   





    mls/hr IV .Q24H [facility] Rx#:   





    [id number]   


 


    Propofol 500 mg In Empty  32 69.75





    Bag 1 bag @ Titrate IV .   





    Q0M [facility] Rx#:[id number]   


 


Output:   


 


  Chest Tube Drainage  161 100


 


    Left Lateral Chest  20 0


 


    Mediastinal  70 55


 


    Right Lateral Chest  71 45


 


  Urine  1010 300


 


  Estimated Blood Loss  500 


 


Other:   


 


  Voiding Method   Indwelling Catheter


 


  Weight 89.61 kg  89.61 kg








 Patient Weight











 04/14/17





 06:59


 


Weight 89.61 kg








 ABP, PAP, CO, CI - Last 8 Hours











Arterial Blood Pressure        119/58


 


Arterial Blood Pressure        130/63


 


Arterial Blood Pressure        119/61


 


Arterial Blood Pressure        98/57


 


Arterial Blood Pressure        126/69


 


Arterial Blood Pressure        110/55


 


Arterial Blood Pressure        113/54


 


Arterial Blood Pressure        113/55


 


Arterial Blood Pressure        115/53


 


Arterial Blood Pressure        115/54


 


Arterial Blood Pressure        131/68


 


Arterial Blood Pressure        116/56


 


Arterial Blood Pressure        168/63


 


Arterial Blood Pressure        156/75


 


Pulmonary Artery Pressure      45/25


 


Pulmonary Artery Pressure      37/22


 


Pulmonary Artery Pressure      34/21


 


Pulmonary Artery Pressure      39/27


 


Pulmonary Artery Pressure      49/28


 


Pulmonary Artery Pressure      36/21


 


Pulmonary Artery Pressure      26/18


 


Pulmonary Artery Pressure      34/24


 


Pulmonary Artery Pressure      36/20


 


Pulmonary Artery Pressure      37/21


 


Pulmonary Artery Pressure      44/30


 


Pulmonary Artery Pressure      36/21


 


Pulmonary Artery Pressure      41/23


 


Pulmonary Artery Pressure      38/25


 


Cardiac Output                 5.4


 


Cardiac Output                 5.4


 


Cardiac Output                 5.4


 


Cardiac Output                 5.1


 


Cardiac Output                 5.1


 


Cardiac Output                 5.1


 


Cardiac Output                 5.1


 


Cardiac Output                 6


 


Cardiac Output                 5.5


 


Cardiac Output                 5.9


 


Cardiac Index                  2.6


 


Cardiac Index                  2.4


 


Cardiac Index                  2.9


 


Cardiac Index                  2.6


 


Cardiac Index                  2.8

















Physical Exam: Revealed a 61-year-old white male on mechanical ventilation, in 

no distress.


HEENT:[Neck is supple.] [No neck masses.] [No thyromegaly.] [No JVD.]

endotracheal tube was noted to be intact.


Chest: [diminished breath sounds on the right side with expiratory rhonchi and 

wheezes noted bilaterally.]


Cardiac Exam: [Normal S1 and S2, no S3 gallop, 2/6 systolic murmur throughout 

the precordium, positive pericardial rub]


Abdomen: [Soft, nontender,  no megaly, no rebound, no guarding, normal bowel 

sounds.]


Extremities: [No clubbing, no edema, no cyanosis.]


Neurological Exam: [cannot be assessed, patient is fully sedated on mechanical 

ventilation.





Results





- Laboratory Findings


CBC and BMP: 


 04/13/17 16:55





 04/13/17 14:11


ABG











ABG pH  7.40  (7.35-7.45)   04/13/17  14:52    


 


ABG pCO2  39 mmHg (35-45)   04/13/17  14:52    


 


ABG pO2  149 mmHg ()  H  04/13/17  14:52    


 


ABG O2 Saturation  99.3 % (94-97)  H  04/13/17  14:52    





PT/INR, D-dimer











PT  11.8 sec (9.0-12.0)   04/13/17  14:11    


 


INR  1.2  (<1.1)   04/13/17  14:11    








Abnormal lab findings: 


 Abnormal Labs











  04/06/17 04/13/17 04/13/17





  10:10 09:02 09:09


 


RBC   


 


Hgb   


 


Hct   


 


ABG pO2   


 


ABG HCO3    26 H


 


ABG Total CO2   


 


ABG O2 Saturation   


 


Sodium   


 


Glucose   


 


POC Glucose (mg/dL)   108 H 


 


Calcium   


 


Alkaline Phosphatase   


 


Total Protein   


 


Crossmatch  See Detail  














  04/13/17 04/13/17 04/13/17





  10:35 11:14 11:58


 


RBC   


 


Hgb   


 


Hct   


 


ABG pO2   


 


ABG HCO3   


 


ABG Total CO2   


 


ABG O2 Saturation   


 


Sodium   


 


Glucose   


 


POC Glucose (mg/dL)  128 H  116 H  129 H


 


Calcium   


 


Alkaline Phosphatase   


 


Total Protein   


 


Crossmatch   














  04/13/17 04/13/17 04/13/17





  12:52 14:10 14:11


 


RBC    3.07 L


 


Hgb    10.0 L D


 


Hct    29.5 L


 


ABG pO2   


 


ABG HCO3   


 


ABG Total CO2   


 


ABG O2 Saturation   


 


Sodium   


 


Glucose   


 


POC Glucose (mg/dL)  128 H  117 H 


 


Calcium   


 


Alkaline Phosphatase   


 


Total Protein   


 


Crossmatch   














  04/13/17 04/13/17 04/13/17





  14:11 14:52 14:53


 


RBC   


 


Hgb   


 


Hct   


 


ABG pO2   149 H 


 


ABG HCO3   


 


ABG Total CO2   25 H 


 


ABG O2 Saturation   99.3 H 


 


Sodium  135 L  


 


Glucose  116 H  


 


POC Glucose (mg/dL)    138 H


 


Calcium  8.1 L  


 


Alkaline Phosphatase  35 L  


 


Total Protein  5.7 L  


 


Crossmatch   














  04/13/17 04/13/17 04/13/17





  16:03 16:55 16:55


 


RBC    3.39 L


 


Hgb    11.4 L


 


Hct    32.0 L


 


ABG pO2   


 


ABG HCO3   


 


ABG Total CO2   


 


ABG O2 Saturation   


 


Sodium   


 


Glucose   


 


POC Glucose (mg/dL)  126 H  128 H 


 


Calcium   


 


Alkaline Phosphatase   


 


Total Protein   


 


Crossmatch   














  04/13/17





  17:59


 


RBC 


 


Hgb 


 


Hct 


 


ABG pO2 


 


ABG HCO3 


 


ABG Total CO2 


 


ABG O2 Saturation 


 


Sodium 


 


Glucose 


 


POC Glucose (mg/dL)  140 H


 


Calcium 


 


Alkaline Phosphatase 


 


Total Protein 


 


Crossmatch 














- Diagnostic Findings


Chest x-ray: image reviewed (significant opacification noted of the right lung 

related to mucus plugging mostly involving the right upper lobe and right 

middle lobe)





Assessment and Plan


Plan: 





impression:





1 status post CABG, postoperative day #0.





2 history of nicotine dependence and suspect some component of COPD





3 history of documented coronary artery disease based on a recent cardiac 

catheterization





4 history ofpercutaneous revascularization of the proximal right coronary 

artery in 2001





5history of hypertension





6 history of myocardial infarction





7 status post bronchoscopy and bronchoalveolar lavage of the right upper lobe 

read lobe and right lower lobe upon arrival to the ICU.  From the OR





Recommendation: Patient will be kept on mechanical ventilation tonight, will be 

placed on bronchodilators, steroids, keep the patient on relatively high PEEP, 

and we will use a relatively higher tidal volumes.  Based on the chest x-ray in 

a.m., may consider weaning and extubation.  Discussed his condition with the 

family prior to bronchoscopy.


Time with Patient: Greater than 30 Destinee Steady

## 2020-03-04 ENCOUNTER — HOSPITAL ENCOUNTER (OUTPATIENT)
Dept: HOSPITAL 47 - RADCTMAIN | Age: 65
Discharge: HOME | End: 2020-03-04
Attending: INTERNAL MEDICINE
Payer: COMMERCIAL

## 2020-03-04 DIAGNOSIS — I71.2: Primary | ICD-10-CM

## 2020-03-04 DIAGNOSIS — Z88.0: ICD-10-CM

## 2020-03-04 DIAGNOSIS — Z95.1: ICD-10-CM

## 2020-03-04 DIAGNOSIS — S22.23XK: ICD-10-CM

## 2020-03-04 DIAGNOSIS — K44.9: ICD-10-CM

## 2020-03-04 LAB — BUN SERPL-SCNC: 9 MG/DL (ref 9–20)

## 2020-03-04 PROCEDURE — 71275 CT ANGIOGRAPHY CHEST: CPT

## 2020-03-04 PROCEDURE — 82565 ASSAY OF CREATININE: CPT

## 2020-03-04 PROCEDURE — 36415 COLL VENOUS BLD VENIPUNCTURE: CPT

## 2020-03-04 PROCEDURE — 84520 ASSAY OF UREA NITROGEN: CPT

## 2020-03-05 NOTE — CT
EXAMINATION TYPE: CT angio chest

 

DATE OF EXAM: 3/4/2020

 

COMPARISON: 4/29/2017

 

HISTORY: 64-year-old male follow-up for thoracic aortic aneurysm.

 

TECHNIQUE: Contiguous axial scanning of the chest performed without and with IV Contrast, patient inj
ected with 100ml mL of Isovue 370. Coronal/sagittal reconstructions performed. 3-D reconstructions ge
nerated on a dedicated independent workstation.

 

CT DLP: 1208.3 mGycm

Automated exposure control for dose reduction was used.

 

FINDINGS: 

Heart is borderline enlarged without pericardial effusion. Prominent epicardial fat pad. Median john
otomy wires are present. Unchanged chronic diastases along the inferior half of the sternum.

 

Aortic root is ectatic at 3.9 cm.

Ascending aorta ectatic at 3.8 cm.

Descending thoracic aorta normal caliber.

 

No thoracic lymphadenopathy by CT size criteria.

 

Evaluation of the lung shows mild bronchial wall thickening. Tiny calcified granuloma along the right
 middle lobe. No consolidation or pleural effusion.

 

Small hiatal hernia and tiny anterior splenule within the upper abdomen.

 

Bones: Old healed right-sided lateral rib fractures. Moderate anterior endplate spondylosis lower tho
racic spine.

 

 

IMPRESSION: 

 

1. POST-CABG CHANGES. CHRONIC DIASTASES ALONG THE INFERIOR HALF OF THE STERNUM.

2. STABLE ECTASIA OF THE AORTIC ROOT AND ASCENDING THORACIC AORTA MEASURING UP TO 3.9 CM.

3. MILD BRONCHIAL WALL THICKENING CAN BE SEEN WITH BRONCHITIS OR ASTHMA.

4. SMALL HIATAL HERNIA.

## 2020-05-29 ENCOUNTER — HOSPITAL ENCOUNTER (OUTPATIENT)
Dept: HOSPITAL 47 - EC | Age: 65
Setting detail: OBSERVATION
LOS: 1 days | Discharge: HOME | End: 2020-05-30
Attending: HOSPITALIST | Admitting: HOSPITALIST
Payer: COMMERCIAL

## 2020-05-29 DIAGNOSIS — Z87.891: ICD-10-CM

## 2020-05-29 DIAGNOSIS — R20.0: ICD-10-CM

## 2020-05-29 DIAGNOSIS — Z95.5: ICD-10-CM

## 2020-05-29 DIAGNOSIS — E87.1: ICD-10-CM

## 2020-05-29 DIAGNOSIS — Z80.8: ICD-10-CM

## 2020-05-29 DIAGNOSIS — E66.9: ICD-10-CM

## 2020-05-29 DIAGNOSIS — Z88.0: ICD-10-CM

## 2020-05-29 DIAGNOSIS — Z79.01: ICD-10-CM

## 2020-05-29 DIAGNOSIS — R73.03: ICD-10-CM

## 2020-05-29 DIAGNOSIS — Z79.899: ICD-10-CM

## 2020-05-29 DIAGNOSIS — Z79.82: ICD-10-CM

## 2020-05-29 DIAGNOSIS — I25.2: ICD-10-CM

## 2020-05-29 DIAGNOSIS — I48.0: ICD-10-CM

## 2020-05-29 DIAGNOSIS — E78.5: ICD-10-CM

## 2020-05-29 DIAGNOSIS — R07.89: Primary | ICD-10-CM

## 2020-05-29 DIAGNOSIS — M10.9: ICD-10-CM

## 2020-05-29 DIAGNOSIS — R00.1: ICD-10-CM

## 2020-05-29 DIAGNOSIS — I25.10: ICD-10-CM

## 2020-05-29 DIAGNOSIS — Z95.1: ICD-10-CM

## 2020-05-29 DIAGNOSIS — I10: ICD-10-CM

## 2020-05-29 LAB
ALBUMIN SERPL-MCNC: 4.2 G/DL (ref 3.5–5)
ALP SERPL-CCNC: 103 U/L (ref 38–126)
ALT SERPL-CCNC: 40 U/L (ref 4–49)
ANION GAP SERPL CALC-SCNC: 8 MMOL/L
APTT BLD: 31.4 SEC (ref 22–30)
AST SERPL-CCNC: 65 U/L (ref 17–59)
BASOPHILS # BLD AUTO: 0 K/UL (ref 0–0.2)
BASOPHILS NFR BLD AUTO: 1 %
BUN SERPL-SCNC: 9 MG/DL (ref 9–20)
CALCIUM SPEC-MCNC: 9.6 MG/DL (ref 8.4–10.2)
CHLORIDE SERPL-SCNC: 99 MMOL/L (ref 98–107)
CO2 SERPL-SCNC: 27 MMOL/L (ref 22–30)
EOSINOPHIL # BLD AUTO: 0.1 K/UL (ref 0–0.7)
EOSINOPHIL NFR BLD AUTO: 1 %
ERYTHROCYTE [DISTWIDTH] IN BLOOD BY AUTOMATED COUNT: 3.96 M/UL (ref 4.3–5.9)
ERYTHROCYTE [DISTWIDTH] IN BLOOD: 13.4 % (ref 11.5–15.5)
GLUCOSE SERPL-MCNC: 141 MG/DL (ref 74–99)
HCT VFR BLD AUTO: 40.2 % (ref 39–53)
HGB BLD-MCNC: 13 GM/DL (ref 13–17.5)
INR PPP: 2.5 (ref ?–1.2)
LYMPHOCYTES # SPEC AUTO: 1.8 K/UL (ref 1–4.8)
LYMPHOCYTES NFR SPEC AUTO: 26 %
MAGNESIUM SPEC-SCNC: 1.9 MG/DL (ref 1.6–2.3)
MCH RBC QN AUTO: 32.9 PG (ref 25–35)
MCHC RBC AUTO-ENTMCNC: 32.4 G/DL (ref 31–37)
MCV RBC AUTO: 101.5 FL (ref 80–100)
MONOCYTES # BLD AUTO: 0.6 K/UL (ref 0–1)
MONOCYTES NFR BLD AUTO: 8 %
NEUTROPHILS # BLD AUTO: 4.2 K/UL (ref 1.3–7.7)
NEUTROPHILS NFR BLD AUTO: 61 %
PLATELET # BLD AUTO: 245 K/UL (ref 150–450)
POTASSIUM SERPL-SCNC: 4.6 MMOL/L (ref 3.5–5.1)
PROT SERPL-MCNC: 7.5 G/DL (ref 6.3–8.2)
PT BLD: 24.2 SEC (ref 9–12)
SODIUM SERPL-SCNC: 134 MMOL/L (ref 137–145)
WBC # BLD AUTO: 6.9 K/UL (ref 3.8–10.6)

## 2020-05-29 PROCEDURE — 85730 THROMBOPLASTIN TIME PARTIAL: CPT

## 2020-05-29 PROCEDURE — 85610 PROTHROMBIN TIME: CPT

## 2020-05-29 PROCEDURE — 93005 ELECTROCARDIOGRAM TRACING: CPT

## 2020-05-29 PROCEDURE — 80053 COMPREHEN METABOLIC PANEL: CPT

## 2020-05-29 PROCEDURE — 80061 LIPID PANEL: CPT

## 2020-05-29 PROCEDURE — 85025 COMPLETE CBC W/AUTO DIFF WBC: CPT

## 2020-05-29 PROCEDURE — 36415 COLL VENOUS BLD VENIPUNCTURE: CPT

## 2020-05-29 PROCEDURE — 84484 ASSAY OF TROPONIN QUANT: CPT

## 2020-05-29 PROCEDURE — 83735 ASSAY OF MAGNESIUM: CPT

## 2020-05-29 PROCEDURE — 71046 X-RAY EXAM CHEST 2 VIEWS: CPT

## 2020-05-29 PROCEDURE — 99285 EMERGENCY DEPT VISIT HI MDM: CPT

## 2020-05-29 NOTE — ED
General Adult HPI





- General


Chief complaint: Chest Pain


Stated complaint: Chest Pain


Time Seen by Provider: 05/29/20 17:29


Source: patient


Mode of arrival: ambulatory


Limitations: no limitations





- History of Present Illness


Initial comments: 


Dictation was produced using dragon dictation software. please excuse any 

grammatical, word or spelling errors. 





This patient was cared for during a federal and state declared state of 

emergency secondary to Covid 19





Chief Complaint: 64-year-old male past medical history of coronary artery disea

se presents with chest pain.





History of Present Illness: Patient 64-year-old male is told by his primary care

physician to come to the emergency department for evaluation of chest pain.  

Last night patient had episode of chest pain that was associated with numbness 

down the right upper extremity.  Patient has history of coronary artery disease 

and bypass.  Patient currently takes Coumadin.  States that his pain lasted for 

about 5 minutes and then resolved on its own.  Patient denies any complaints at 

this time.  States he does feel generally weak.  No nausea or vomiting.  No 

associated diaphoresis.





The ROS documented in this emergency department record has been reviewed and 

confirmed by me.  Those systems with pertinent positive or negative responses 

have been documented in the HPI.  All other systems are other negative and/or 

noncontributory.








PHYSICAL EXAM:


General Impression: Alert and oriented x3, not in acute distress


HEENT: Normocephalic atraumatic, extra-ocular movements intact, pupils equal and

reactive to light bilaterally, mucous membranes moist.


Cardiovascular: Heart regular rate and rhythm


Chest: Able to complete full sentences, no retractions, no tachypnea


Abdomen: abdomen soft, non-tender, non-distended, no organomegaly


Musculoskeletal: Pulses present and equal in all extremities, no peripheral 

edema


Motor:  no focal deficits noted


Neurological: CN II-XII grossly intact, no focal motor or sensory deficits noted


Skin: Intact with no visualized rashes


Psych: Normal affect and mood





ED course: 64-year-old male presents with atypical chest pain, with typical fe

atures.  Vital Signs upon arrival are within acceptable limits.


Laboratory evaluation obtained.  CBC, coag panel, metabolic panel is within 

acceptable limits.  Cardiac enzyme negative.  Chest x-ray is nonacute.  Results 

were discussed with patient.  It's my recommendation for patient be admitted to 

observation overnight for serial troponins and cardiology consultation.  Patient

is agreeable.  Patient given an aspirin.  Patient be admitted to [place] 

hospitalist group.





EKG interpretation: Ventricular rate 50, sinus bradycardia,.  Interval to 18, 

QRS 90, QTc 448. No WA prolongation, no QTC prolongation, no ST or T-wave 

changes noted.  EKG compared to 03/31/2019 showing no changes.  Overall, this 

EKG is unremarkable











- Related Data


                                Home Medications











 Medication  Instructions  Recorded  Confirmed


 


Warfarin [Coumadin] 1.25 mg PO SUTUWETHFRSA 10/04/17 12/03/17


 


Aspirin 81 mg PO DAILY 10/05/17 12/03/17


 


Lisinopril [Zestril] 10 mg PO DAILY 12/03/17 12/03/17


 


Multivitamins, Thera [Multivitamin 1 tab PO DAILY 12/03/17 12/03/17





(formulary)]   


 


Potassium Chloride [Klor-Con 10] 20 meq PO BID 12/03/17 12/03/17








                                  Previous Rx's











 Medication  Instructions  Recorded


 


Amiodarone [Cordarone] 200 mg PO DAILY  tab 05/12/17


 


Atorvastatin [Lipitor] 40 mg PO DAILY  tab 05/12/17


 


Colchicine [Colcrys] 0.6 mg PO BID PRN #0 tab 05/12/17


 


Furosemide [Lasix] 40 mg PO DAILY  tab 05/12/17


 


Metoprolol Tartrate [Lopressor] 50 mg PO BID  tab 05/12/17


 


Pantoprazole [Protonix] 40 mg PO DAILY  tablet. 05/12/17


 


Sertraline [Zoloft] 50 mg PO DAILY  tab 05/12/17











                                    Allergies











Allergy/AdvReac Type Severity Reaction Status Date / Time


 


Penicillins Allergy  Rash/Hives Verified 05/29/20 17:18














Review of Systems


ROS Statement: 


Those systems with pertinent positive or pertinent negative responses have been 

documented in the HPI.





ROS Other: All systems not noted in ROS Statement are negative.





Past Medical History


Past Medical History: Atrial Fibrillation, Hyperlipidemia, Hypertension, 

Myocardial Infarction (MI)


Additional Past Medical History / Comment(s): gout, "borderline diabetic"-diet 

control


Last Myocardial Infarction Date:: 2000


History of Any Multi-Drug Resistant Organisms: None Reported


Past Surgical History: Coronary Bypass/CABG, Heart Catheterization With Stent


Additional Past Surgical History / Comment(s): stent x1


Past Anesthesia/Blood Transfusion Reactions: No Reported Reaction


Date of Last Stent Placement:: 2000


Past Psychological History: No Psychological Hx Reported


Smoking Status: Former smoker


Past Alcohol Use History: Abuse, Daily


Past Drug Use History: None Reported





- Past Family History


  ** Sister(s)


Family Medical History: Cancer


Additional Family Medical History / Comment(s): THYROID





General Exam


Limitations: no limitations





Course


                                   Vital Signs











  05/29/20 05/29/20





  17:15 18:32


 


Temperature 98.3 F 


 


Pulse Rate 53 L 


 


Pulse Rate [  55 L





Cardiac Monitor  





]  


 


Respiratory 18 





Rate  


 


Blood Pressure 196/85 


 


O2 Sat by Pulse 98 





Oximetry  














Medical Decision Making





- Lab Data


Result diagrams: 


                                 05/29/20 17:45





                                 05/29/20 17:45


                                   Lab Results











  05/29/20 05/29/20 05/29/20 Range/Units





  17:45 17:45 17:45 


 


WBC  6.9    (3.8-10.6)  k/uL


 


RBC  3.96 L    (4.30-5.90)  m/uL


 


Hgb  13.0    (13.0-17.5)  gm/dL


 


Hct  40.2    (39.0-53.0)  %


 


MCV  101.5 H    (80.0-100.0)  fL


 


MCH  32.9    (25.0-35.0)  pg


 


MCHC  32.4    (31.0-37.0)  g/dL


 


RDW  13.4    (11.5-15.5)  %


 


Plt Count  245    (150-450)  k/uL


 


Neutrophils %  61    %


 


Lymphocytes %  26    %


 


Monocytes %  8    %


 


Eosinophils %  1    %


 


Basophils %  1    %


 


Neutrophils #  4.2    (1.3-7.7)  k/uL


 


Lymphocytes #  1.8    (1.0-4.8)  k/uL


 


Monocytes #  0.6    (0-1.0)  k/uL


 


Eosinophils #  0.1    (0-0.7)  k/uL


 


Basophils #  0.0    (0-0.2)  k/uL


 


Macrocytosis  Slight    


 


PT   24.2 H   (9.0-12.0)  sec


 


INR   2.5 H   (<1.2)  


 


APTT   31.4 H   (22.0-30.0)  sec


 


Sodium    134 L  (137-145)  mmol/L


 


Potassium    4.6  (3.5-5.1)  mmol/L


 


Chloride    99  ()  mmol/L


 


Carbon Dioxide    27  (22-30)  mmol/L


 


Anion Gap    8  mmol/L


 


BUN    9  (9-20)  mg/dL


 


Creatinine    0.79  (0.66-1.25)  mg/dL


 


Est GFR (CKD-EPI)AfAm    >90  (>60 ml/min/1.73 sqM)  


 


Est GFR (CKD-EPI)NonAf    >90  (>60 ml/min/1.73 sqM)  


 


Glucose    141 H  (74-99)  mg/dL


 


Calcium    9.6  (8.4-10.2)  mg/dL


 


Magnesium    1.9  (1.6-2.3)  mg/dL


 


Total Bilirubin    0.9  (0.2-1.3)  mg/dL


 


AST    65 H  (17-59)  U/L


 


ALT    40  (4-49)  U/L


 


Alkaline Phosphatase    103  ()  U/L


 


Troponin I     (0.000-0.034)  ng/mL


 


Total Protein    7.5  (6.3-8.2)  g/dL


 


Albumin    4.2  (3.5-5.0)  g/dL














  05/29/20 Range/Units





  17:45 


 


WBC   (3.8-10.6)  k/uL


 


RBC   (4.30-5.90)  m/uL


 


Hgb   (13.0-17.5)  gm/dL


 


Hct   (39.0-53.0)  %


 


MCV   (80.0-100.0)  fL


 


MCH   (25.0-35.0)  pg


 


MCHC   (31.0-37.0)  g/dL


 


RDW   (11.5-15.5)  %


 


Plt Count   (150-450)  k/uL


 


Neutrophils %   %


 


Lymphocytes %   %


 


Monocytes %   %


 


Eosinophils %   %


 


Basophils %   %


 


Neutrophils #   (1.3-7.7)  k/uL


 


Lymphocytes #   (1.0-4.8)  k/uL


 


Monocytes #   (0-1.0)  k/uL


 


Eosinophils #   (0-0.7)  k/uL


 


Basophils #   (0-0.2)  k/uL


 


Macrocytosis   


 


PT   (9.0-12.0)  sec


 


INR   (<1.2)  


 


APTT   (22.0-30.0)  sec


 


Sodium   (137-145)  mmol/L


 


Potassium   (3.5-5.1)  mmol/L


 


Chloride   ()  mmol/L


 


Carbon Dioxide   (22-30)  mmol/L


 


Anion Gap   mmol/L


 


BUN   (9-20)  mg/dL


 


Creatinine   (0.66-1.25)  mg/dL


 


Est GFR (CKD-EPI)AfAm   (>60 ml/min/1.73 sqM)  


 


Est GFR (CKD-EPI)NonAf   (>60 ml/min/1.73 sqM)  


 


Glucose   (74-99)  mg/dL


 


Calcium   (8.4-10.2)  mg/dL


 


Magnesium   (1.6-2.3)  mg/dL


 


Total Bilirubin   (0.2-1.3)  mg/dL


 


AST   (17-59)  U/L


 


ALT   (4-49)  U/L


 


Alkaline Phosphatase   ()  U/L


 


Troponin I  <0.012  (0.000-0.034)  ng/mL


 


Total Protein   (6.3-8.2)  g/dL


 


Albumin   (3.5-5.0)  g/dL














Disposition


Clinical Impression: 


 Chest pain





Disposition: ADMITTED AS IP TO THIS HOSP


Condition: Fair


Referrals: 


Vikas Ventura DO [Primary Care Provider] - 1-2 days


Decision Time: 18:42

## 2020-05-29 NOTE — XR
EXAMINATION TYPE: XR chest 2V

 

DATE OF EXAM: 5/29/2020

 

COMPARISON: 3/31/2019

 

HISTORY: Chest pain

 

TECHNIQUE:  Frontal and lateral views of the chest are obtained.

 

FINDINGS:  Cardiomediastinal silhouette is enlarged with post CABG change. No focal consolidation, pl
eural effusion or pneumothorax. Mild degenerative change of the spine.

 

IMPRESSION:  Persistently enlarged cardiomediastinal silhouette with post CABG change. No acute pulmo
nary process.

## 2020-05-30 VITALS — TEMPERATURE: 98 F | DIASTOLIC BLOOD PRESSURE: 75 MMHG | SYSTOLIC BLOOD PRESSURE: 129 MMHG

## 2020-05-30 VITALS — HEART RATE: 54 BPM | RESPIRATION RATE: 16 BRPM

## 2020-05-30 LAB
CHOLEST SERPL-MCNC: 135 MG/DL (ref ?–200)
HDLC SERPL-MCNC: 71 MG/DL (ref 40–60)
LDLC SERPL CALC-MCNC: 51 MG/DL (ref 0–99)
TRIGL SERPL-MCNC: 66 MG/DL (ref ?–150)

## 2020-05-30 NOTE — P.HPIM
History of Present Illness


64-year-old male with known history of coronary artery disease and stenting CABG

atrial fibrillation on anticoagulation came in with complaints of mild chest 

pain on the side of the chest noncardiac resolved within a few seconds denied 

any shortness of breath excessive dizziness, short diaphoresis associated with 

that.  Chest x-ray did not show any significant abnormality patient the denied 

any fever chills cough.  Patient was evaluated by cardiology rule out acute 

coronary syndromes no significant ST-T wave changes troponins are negative 

patient is cleared for discharge from cardiology perspective patient blood 

pressure is bit elevated because of which lisinopril was added by cardiology 

patient will be discharged on lisinopril








Review of Systems











REVIEW OF SYSTEMS: 


CONSTITUTIONAL: No fever, no malaise, no fatigue. 


HEENT: No recent visual problems or hearing problems. Denied any sore throat. 


CARDIOVASCULAR: No, orthopnea, PND, no palpitations, no syncope. 


PULMONARY: No shortness of breath, no cough, no hemoptysis. 


GASTROINTESTINAL: No diarrhea, no nausea, no vomiting, no abdominal pain. 


NEUROLOGICAL: No headaches, no weakness, no numbness. 


HEMATOLOGICAL: Denies any bleeding or petechiae. 


GENITOURINARY: Denies any burning micturition, frequency, or urgency. 


MUSCULOSKELETAL/RHEUMATOLOGICAL: Denies any joint pain, swelling, or any muscle 

pain. 


ENDOCRINE: Denies any polyuria or polydipsia. 





The rest of the 14-point review of systems is negative.








Past Medical History


Past Medical History: Atrial Fibrillation, Hyperlipidemia, Hypertension, 

Myocardial Infarction (MI)


Additional Past Medical History / Comment(s): gout, "borderline diabetic"-diet 

control


Last Myocardial Infarction Date:: 2000


History of Any Multi-Drug Resistant Organisms: None Reported


Past Surgical History: Coronary Bypass/CABG, Heart Catheterization With Stent


Additional Past Surgical History / Comment(s): stent x1


Past Anesthesia/Blood Transfusion Reactions: No Reported Reaction


Date of Last Stent Placement:: 2000


Past Psychological History: No Psychological Hx Reported


Smoking Status: Never smoker


Past Alcohol Use History: Abuse, Daily


Past Drug Use History: None Reported





- Past Family History


  ** Sister(s)


Family Medical History: Cancer


Additional Family Medical History / Comment(s): THYROID





Medications and Allergies


                                Home Medications











 Medication  Instructions  Recorded  Confirmed  Type


 


Metoprolol Tartrate [Lopressor] 50 mg PO BID  tab 05/12/17 05/29/20 Rx


 


Warfarin [Coumadin] 1.25 mg PO SUTUWETHFRSA 10/04/17 05/29/20 History


 


Aspirin 81 mg PO DAILY 10/05/17 05/29/20 History


 


Multivitamins, Thera [Multivitamin 1 tab PO DAILY 12/03/17 05/29/20 History





(formulary)]    


 


Amiodarone [Cordarone] 100 mg PO DAILY 05/30/20 05/30/20 History


 


Atorvastatin [Lipitor] 40 mg PO HS 05/30/20 05/30/20 History


 


Lisinopril [Zestril] 10 mg PO DAILY #30 tab 05/30/20  Rx








                                    Allergies











Allergy/AdvReac Type Severity Reaction Status Date / Time


 


Penicillins Allergy  Rash/Hives Verified 05/30/20 10:56














Physical Exam


Vitals: 


                                   Vital Signs











  Temp Pulse Pulse Resp BP BP BP


 


 05/30/20 11:45  98 F   54 L  16    129/75


 


 05/30/20 07:51  98.2 F   54 L  16   173/76 


 


 05/30/20 04:00  96.7 F L   48 L  18    170/78


 


 05/30/20 00:00  96.7 F L   51 L  18    165/74


 


 05/29/20 20:00  98.7 F   49 L  18    184/84


 


 05/29/20 19:00   48 L   10 L  126/70  


 


 05/29/20 18:57   51 L   18  126/70  


 


 05/29/20 18:32    55 L    


 


 05/29/20 17:15  98.3 F  53 L   18  196/85  














  Pulse Ox


 


 05/30/20 11:45  99


 


 05/30/20 07:51  97


 


 05/30/20 04:00  98


 


 05/30/20 00:00  98


 


 05/29/20 20:00  98


 


 05/29/20 19:00  98


 


 05/29/20 18:57  97


 


 05/29/20 18:32 


 


 05/29/20 17:15  98








                                Intake and Output











 05/29/20 05/30/20 05/30/20





 22:59 06:59 14:59


 


Intake Total   1920


 


Balance   1920


 


Intake:   


 


  Oral   1920


 


Other:   


 


  Voiding Method   Toilet


 


  # Voids  1 1


 


  Weight 92.986 kg  

















PHYSICAL EXAMINATION: 





GENERAL: The patient is alert and oriented x3, not in any acute distress. Well 

developed, well nourished. 


HEENT: Pupils are round and equally reacting to light. EOMI. No scleral icterus.

 No conjunctival pallor. Normocephalic, atraumatic. No pharyngeal erythema. No 

thyromegaly. 


CARDIOVASCULAR: S1 and S2 present. No murmurs, rubs, or gallops. 


PULMONARY: Chest is clear to auscultation, no wheezing or crackles. 


ABDOMEN: Soft, nontender, nondistended, normoactive bowel sounds. No palpable 

organomegaly. 


MUSCULOSKELETAL: No joint swelling or deformity.


EXTREMITIES: No cyanosis, clubbing, or pedal edema. 


NEUROLOGICAL: Gross neurological examination did not reveal any focal deficits. 


SKIN: No rashes. 

















Results


CBC & Chem 7: 


                                 05/29/20 17:45





                                 05/29/20 17:45


Labs: 


                  Abnormal Lab Results - Last 24 Hours (Table)











  05/29/20 05/29/20 05/29/20 Range/Units





  17:45 17:45 17:45 


 


RBC  3.96 L    (4.30-5.90)  m/uL


 


MCV  101.5 H    (80.0-100.0)  fL


 


PT   24.2 H   (9.0-12.0)  sec


 


INR   2.5 H   (<1.2)  


 


APTT   31.4 H   (22.0-30.0)  sec


 


Sodium    134 L  (137-145)  mmol/L


 


Glucose    141 H  (74-99)  mg/dL


 


AST    65 H  (17-59)  U/L


 


HDL Cholesterol     (40-60)  mg/dL














  05/30/20 Range/Units





  06:30 


 


RBC   (4.30-5.90)  m/uL


 


MCV   (80.0-100.0)  fL


 


PT   (9.0-12.0)  sec


 


INR   (<1.2)  


 


APTT   (22.0-30.0)  sec


 


Sodium   (137-145)  mmol/L


 


Glucose   (74-99)  mg/dL


 


AST   (17-59)  U/L


 


HDL Cholesterol  71 H  (40-60)  mg/dL














Thrombosis Risk Factor Assmnt





- Choose All That Apply


Any of the Below Risk Factors Present?: Yes


Each Factor Represents 1 point: Acute MI, Obesity (BMI >25)


Other Risk Factors: Yes


Each Risk Factor Represents 2 Points: Age 61-74 years


Other congenital or acquired thrombophilia - If yes, enter type in comment: No


Thrombosis Risk Factor Assessment Total Risk Factor Score: 4


Thrombosis Risk Factor Assessment Level: Moderate Risk





Assessment and Plan


Plan: 


-Chest pain atypical probably musculoskeletal rule out acute coronary syndromes 

patient is cleared for discharge patient will be discharged today.


-Hypertension patient will be started on lisinopril


-Mild hyponatremia probably hypovolemic hyponatremia patient doesn't appear to 

be in heart failure exacerbation 


-chronic A. fib presently rate controlled and patient is on anticoagulationand m

edications are being made patient's smile bradycardia patient on amiodarone and 

metoprolol which can be continued.  Patient is therapeutic on Coumadin


-Hyperlipidemia


-Coronary artery disease with previous stents

## 2020-05-30 NOTE — P.CRDCN
History of Present Illness


Consult date: 05/30/20


Chief complaint: Chest discomfort


History of present illness: 





This is a very pleasant 64-year-old gentleman with coronary artery disease and 

prior coronary artery stenting as well as coronary artery that is grafting where

in 2017 the patient underwent CABG 2 with FAHEEM to LAD and LIMA to OM.  He is 

known to have stenting in the RCA in the past.  Beside that he does have 

hypertension, dyslipidemia, and paroxysmal atrial fibrillation on oral a

nticoagulation was Coumadin.  He was in his usual state of health until 

yesterday when he was camping when he started experiencing discomfort in the 

chest.  He described the discomfort as sharp, in the mid of the chest, without 

any radiation, without any associated symptoms with the discomfort lasted for 

about an hour and it was resolved and the patient continues to be chest pain-

free after that and during his hospital stay.  Currently is chest pain-free.  He

stated that the chest discomfort this time is totally different from what he 

experienced in the past before he is stent and open heart.  No assisted symptoms

of shortness of breath, sweating, dizziness, or syncope.  He underwent a workup 

including chest x-ray showed chronic finding.  The EKG showed sinus rhythm was 

sinus bradycardia.  No significant ST or T-wave abnormalities.  The cardiac 

enzymes were checked and came in to be unremarkable.  The patient blood pressure

this morning is elevated but he was not giving his blood pressure medications 

since he was admitted to the hospital.  I am going to restart him on his 

medications.  We'll get the patient up and around and if he is asymptomatic, he 

possibly can be discharged home and follow-up with Dr. Almodovar his cardiologist. 

As a matter fact patient would like to go home.





Past Medical History


Past Medical History: Atrial Fibrillation, Hyperlipidemia, Hypertension, 

Myocardial Infarction (MI)


Additional Past Medical History / Comment(s): gout, "borderline diabetic"-diet 

control


Last Myocardial Infarction Date:: 2000


History of Any Multi-Drug Resistant Organisms: None Reported


Past Surgical History: Coronary Bypass/CABG, Heart Catheterization With Stent


Additional Past Surgical History / Comment(s): stent x1


Past Anesthesia/Blood Transfusion Reactions: No Reported Reaction


Date of Last Stent Placement:: 2000


Past Psychological History: No Psychological Hx Reported


Smoking Status: Never smoker


Past Alcohol Use History: Abuse, Daily


Past Drug Use History: None Reported





- Past Family History


  ** Sister(s)


Family Medical History: Cancer


Additional Family Medical History / Comment(s): THYROID





Medications and Allergies


                                Home Medications











 Medication  Instructions  Recorded  Confirmed  Type


 


Amiodarone [Cordarone] 200 mg PO DAILY  tab 05/12/17 05/29/20 Rx


 


Atorvastatin [Lipitor] 40 mg PO DAILY  tab 05/12/17 05/29/20 Rx


 


Colchicine [Colcrys] 0.6 mg PO BID PRN #0 tab 05/12/17 05/29/20 Rx


 


Furosemide [Lasix] 40 mg PO DAILY  tab 05/12/17 05/29/20 Rx


 


Metoprolol Tartrate [Lopressor] 50 mg PO BID  tab 05/12/17 05/29/20 Rx


 


Sertraline [Zoloft] 50 mg PO DAILY  tab 05/12/17 05/29/20 Rx


 


Warfarin [Coumadin] 1.25 mg PO SUTUWETHFRSA 10/04/17 05/29/20 History


 


Aspirin 81 mg PO DAILY 10/05/17 05/29/20 History


 


Lisinopril [Zestril] 10 mg PO DAILY 12/03/17 05/29/20 History


 


Multivitamins, Thera [Multivitamin 1 tab PO DAILY 12/03/17 05/29/20 History





(formulary)]    


 


Potassium Chloride [Klor-Con 10] 20 meq PO BID 12/03/17 05/29/20 History








                                    Allergies











Allergy/AdvReac Type Severity Reaction Status Date / Time


 


Penicillins Allergy  Rash/Hives Verified 05/29/20 17:18














Physical Exam


Vitals: 


                                   Vital Signs











  Temp Pulse Pulse Resp BP BP BP


 


 05/30/20 07:51  98.2 F   54 L  16   173/76 


 


 05/30/20 04:00  96.7 F L   48 L  18    170/78


 


 05/30/20 00:00  96.7 F L   51 L  18    165/74


 


 05/29/20 20:00  98.7 F   49 L  18    184/84


 


 05/29/20 19:00   48 L   10 L  126/70  


 


 05/29/20 18:57   51 L   18  126/70  


 


 05/29/20 18:32    55 L    


 


 05/29/20 17:15  98.3 F  53 L   18  196/85  














  Pulse Ox


 


 05/30/20 07:51  97


 


 05/30/20 04:00  98


 


 05/30/20 00:00  98


 


 05/29/20 20:00  98


 


 05/29/20 19:00  98


 


 05/29/20 18:57  97


 


 05/29/20 18:32 


 


 05/29/20 17:15  98








                                Intake and Output











 05/29/20 05/30/20 05/30/20





 22:59 06:59 14:59


 


Other:   


 


  # Voids  1 


 


  Weight 92.986 kg  














- Constitutional


General appearance: no acute distress





- Respiratory


Respiratory: bilateral: CTA





- Cardiovascular


Rhythm: regular


Heart sounds: normal: S1, S2


Abnormal Heart Sounds: systolic murmur





Results





                                 05/29/20 17:45





                                 05/29/20 17:45


                                 Cardiac Enzymes











  05/29/20 05/29/20 05/29/20 Range/Units





  17:45 17:45 23:41 


 


AST  65 H    (17-59)  U/L


 


Troponin I   <0.012  <0.012  (0.000-0.034)  ng/mL














  05/30/20 Range/Units





  06:30 


 


AST   (17-59)  U/L


 


Troponin I  <0.012  (0.000-0.034)  ng/mL








                                   Coagulation











  05/29/20 Range/Units





  17:45 


 


PT  24.2 H  (9.0-12.0)  sec


 


APTT  31.4 H  (22.0-30.0)  sec








                                     Lipids











  05/30/20 Range/Units





  06:30 


 


Triglycerides  66  (<150)  mg/dL


 


Cholesterol  135  (<200)  mg/dL


 


HDL Cholesterol  71 H  (40-60)  mg/dL








                                       CBC











  05/29/20 Range/Units





  17:45 


 


WBC  6.9  (3.8-10.6)  k/uL


 


RBC  3.96 L  (4.30-5.90)  m/uL


 


Hgb  13.0  (13.0-17.5)  gm/dL


 


Hct  40.2  (39.0-53.0)  %


 


Plt Count  245  (150-450)  k/uL








                          Comprehensive Metabolic Panel











  05/29/20 Range/Units





  17:45 


 


Sodium  134 L  (137-145)  mmol/L


 


Potassium  4.6  (3.5-5.1)  mmol/L


 


Chloride  99  ()  mmol/L


 


Carbon Dioxide  27  (22-30)  mmol/L


 


BUN  9  (9-20)  mg/dL


 


Creatinine  0.79  (0.66-1.25)  mg/dL


 


Glucose  141 H  (74-99)  mg/dL


 


Calcium  9.6  (8.4-10.2)  mg/dL


 


AST  65 H  (17-59)  U/L


 


ALT  40  (4-49)  U/L


 


Alkaline Phosphatase  103  ()  U/L


 


Total Protein  7.5  (6.3-8.2)  g/dL


 


Albumin  4.2  (3.5-5.0)  g/dL








                               Current Medications











Generic Name Dose Route Start Last Admin





  Trade Name Freq  PRN Reason Stop Dose Admin


 


Aspirin  325 mg  05/30/20 09:00 





  Aspirin  PO  





  DAILY ELIER  


 


Nitroglycerin  0.4 mg  05/29/20 18:42 





  Nitrostat  SUBLINGUAL  





  Q5M PRN  





  Chest Pain  








                                Intake and Output











 05/29/20 05/30/20 05/30/20





 22:59 06:59 14:59


 


Other:   


 


  # Voids  1 


 


  Weight 92.986 kg  








                                        





                                 05/29/20 17:45 





                                 05/29/20 17:45 











Assessment and Plan


Assessment: 





Assessment


#1 chest discomfort seems to be atypical for angina


#2 coronary artery disease and preoperative vascularization


#3 paroxysmal atrial fibrillation


#4 hypertension


#5 dyslipidemia





Plan


#1 acute coronary event was ruled out


#2 would get the patient up and around and if he is asymptomatic he possibly can

be discharged home





Thank you for allowing us participate in his care

## 2020-05-30 NOTE — P.DS
Providers


Date of admission: 


05/29/20 18:42





Attending physician: 


Jessica Hess





Consults: 





                                        





05/29/20 18:42


Consult Physician Urgent 


   Consulting Provider: Scott Araujo


   Consult Reason/Comments: chest pain


   Do you want consulting provider notified?: Yes











Primary care physician: 


Vikas Ventura





[facility] Course: 


As mentioned in HPI





Patient Condition at Discharge: Fair





Plan - Discharge Summary


Discharge Rx Participant: No


New Discharge Prescriptions: 


New


   Lisinopril [Zestril] 10 mg PO DAILY #30 tab





Continue


   Metoprolol Tartrate [Lopressor] 50 mg PO BID  tab


   Warfarin [Coumadin] 1.25 mg PO SUTUWETHFRSA


   Aspirin 81 mg PO DAILY


   Multivitamins, Thera [Multivitamin (formulary)] 1 tab PO DAILY


   Atorvastatin [Lipitor] 40 mg PO HS


   Amiodarone [Cordarone] 100 mg PO DAILY





Discontinued


   Potassium Chloride [Klor-Con 10] 20 meq PO BID


Discharge Medication List





Metoprolol Tartrate [Lopressor] 50 mg PO BID  tab 05/12/17 [Rx]


Warfarin [Coumadin] 1.25 mg PO SUTUWETHFRSA 10/04/17 [History]


Aspirin 81 mg PO DAILY 10/05/17 [History]


Multivitamins, Thera [Multivitamin (formulary)] 1 tab PO DAILY 12/03/17 

[History]


Amiodarone [Cordarone] 100 mg PO DAILY 05/30/20 [History]


Atorvastatin [Lipitor] 40 mg PO HS 05/30/20 [History]


Lisinopril [Zestril] 10 mg PO DAILY #30 tab 05/30/20 [Rx]








Follow up Appointment(s)/Referral(s): 


Vikas Ventura DO [Primary Care Provider] - 3 Days


Patient Instructions/Handouts:  Chest Pain (DC)


Discharge Disposition: HOME SELF-CARE

## 2021-05-03 ENCOUNTER — HOSPITAL ENCOUNTER (OUTPATIENT)
Dept: HOSPITAL 47 - RADXRYALE | Age: 66
Discharge: HOME | End: 2021-05-03
Attending: PHYSICIAN ASSISTANT
Payer: COMMERCIAL

## 2021-05-03 DIAGNOSIS — K59.00: Primary | ICD-10-CM

## 2021-05-03 PROCEDURE — 74019 RADEX ABDOMEN 2 VIEWS: CPT

## 2021-12-17 ENCOUNTER — HOSPITAL ENCOUNTER (EMERGENCY)
Dept: HOSPITAL 47 - EC | Age: 66
Discharge: HOME | End: 2021-12-17
Payer: COMMERCIAL

## 2021-12-17 VITALS — RESPIRATION RATE: 18 BRPM | HEART RATE: 50 BPM | SYSTOLIC BLOOD PRESSURE: 116 MMHG | DIASTOLIC BLOOD PRESSURE: 81 MMHG

## 2021-12-17 VITALS — TEMPERATURE: 97.7 F

## 2021-12-17 DIAGNOSIS — F10.129: Primary | ICD-10-CM

## 2021-12-17 DIAGNOSIS — Z95.1: ICD-10-CM
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
## 2021-12-17 DIAGNOSIS — I25.2: ICD-10-CM

## 2021-12-17 DIAGNOSIS — Z79.82: ICD-10-CM

## 2021-12-17 DIAGNOSIS — Z79.01: ICD-10-CM

## 2021-12-17 DIAGNOSIS — Z20.822: ICD-10-CM

## 2021-12-17 DIAGNOSIS — E78.5: ICD-10-CM

## 2021-12-17 DIAGNOSIS — I10: ICD-10-CM

## 2021-12-17 DIAGNOSIS — I48.91: ICD-10-CM

## 2021-12-17 DIAGNOSIS — Z88.0: ICD-10-CM

## 2021-12-17 LAB
ALBUMIN SERPL-MCNC: 3.2 G/DL (ref 3.5–5)
ALP SERPL-CCNC: 143 U/L (ref 38–126)
ALT SERPL-CCNC: 108 U/L (ref 4–49)
ANION GAP SERPL CALC-SCNC: 12 MMOL/L
APTT BLD: 29.4 SEC (ref 22–30)
AST SERPL-CCNC: 203 U/L (ref 17–59)
BASOPHILS # BLD AUTO: 0 K/UL (ref 0–0.2)
BASOPHILS NFR BLD AUTO: 1 %
BUN SERPL-SCNC: 23 MG/DL (ref 9–20)
CALCIUM SPEC-MCNC: 8.5 MG/DL (ref 8.4–10.2)
CHLORIDE SERPL-SCNC: 101 MMOL/L (ref 98–107)
CO2 SERPL-SCNC: 18 MMOL/L (ref 22–30)
EOSINOPHIL # BLD AUTO: 0 K/UL (ref 0–0.7)
EOSINOPHIL NFR BLD AUTO: 1 %
ERYTHROCYTE [DISTWIDTH] IN BLOOD BY AUTOMATED COUNT: 3.62 M/UL (ref 4.3–5.9)
ERYTHROCYTE [DISTWIDTH] IN BLOOD: 15.1 % (ref 11.5–15.5)
GLUCOSE SERPL-MCNC: 125 MG/DL (ref 74–99)
HCT VFR BLD AUTO: 38.5 % (ref 39–53)
HGB BLD-MCNC: 13.1 GM/DL (ref 13–17.5)
INR PPP: 1.9 (ref ?–1.2)
LYMPHOCYTES # SPEC AUTO: 1.2 K/UL (ref 1–4.8)
LYMPHOCYTES NFR SPEC AUTO: 31 %
MAGNESIUM SPEC-SCNC: 1.7 MG/DL (ref 1.6–2.3)
MCH RBC QN AUTO: 36.1 PG (ref 25–35)
MCHC RBC AUTO-ENTMCNC: 34 G/DL (ref 31–37)
MCV RBC AUTO: 106.2 FL (ref 80–100)
MONOCYTES # BLD AUTO: 0.2 K/UL (ref 0–1)
MONOCYTES NFR BLD AUTO: 6 %
NEUTROPHILS # BLD AUTO: 2.3 K/UL (ref 1.3–7.7)
NEUTROPHILS NFR BLD AUTO: 60 %
PLATELET # BLD AUTO: 165 K/UL (ref 150–450)
POTASSIUM SERPL-SCNC: 4.8 MMOL/L (ref 3.5–5.1)
PROT SERPL-MCNC: 6.7 G/DL (ref 6.3–8.2)
PT BLD: 18.6 SEC (ref 9–12)
SODIUM SERPL-SCNC: 131 MMOL/L (ref 137–145)
WBC # BLD AUTO: 3.9 K/UL (ref 3.8–10.6)

## 2021-12-17 PROCEDURE — 71046 X-RAY EXAM CHEST 2 VIEWS: CPT

## 2021-12-17 PROCEDURE — 96361 HYDRATE IV INFUSION ADD-ON: CPT

## 2021-12-17 PROCEDURE — 93005 ELECTROCARDIOGRAM TRACING: CPT

## 2021-12-17 PROCEDURE — 83605 ASSAY OF LACTIC ACID: CPT

## 2021-12-17 PROCEDURE — 85025 COMPLETE CBC W/AUTO DIFF WBC: CPT

## 2021-12-17 PROCEDURE — 87635 SARS-COV-2 COVID-19 AMP PRB: CPT

## 2021-12-17 PROCEDURE — 84484 ASSAY OF TROPONIN QUANT: CPT

## 2021-12-17 PROCEDURE — 36415 COLL VENOUS BLD VENIPUNCTURE: CPT

## 2021-12-17 PROCEDURE — 85730 THROMBOPLASTIN TIME PARTIAL: CPT

## 2021-12-17 PROCEDURE — 80320 DRUG SCREEN QUANTALCOHOLS: CPT

## 2021-12-17 PROCEDURE — 96374 THER/PROPH/DIAG INJ IV PUSH: CPT

## 2021-12-17 PROCEDURE — 99285 EMERGENCY DEPT VISIT HI MDM: CPT

## 2021-12-17 PROCEDURE — 83735 ASSAY OF MAGNESIUM: CPT

## 2021-12-17 PROCEDURE — 85610 PROTHROMBIN TIME: CPT

## 2021-12-17 PROCEDURE — 80053 COMPREHEN METABOLIC PANEL: CPT

## 2021-12-17 NOTE — XR
EXAMINATION TYPE: XR chest 2V

 

DATE OF EXAM: 12/17/2021

 

COMPARISON: Chest x-ray May 29, 2020. CTA chest March 4, 2020

 

HISTORY: Weakness.

 

TECHNIQUE:  Frontal and lateral views of the chest are obtained.

 

FINDINGS: Overlying sternal wires and mediastinal clips are redemonstrated. There is mild chronic par
enchymal changes without suspicious new focal air space opacity, pleural effusion, or pneumothorax se
en. Mild cardiomegaly redemonstrated.   The osseous structures are intact.

 

IMPRESSION:  Mild cardiomegaly and chronic parenchymal changes without new acute pulmonary process.

## 2021-12-17 NOTE — ED
General Adult HPI





- General


Chief complaint: Dizziness


Stated complaint: Fall, Weakness


Time Seen by Provider: 12/17/21 12:15


Source: patient, EMS


Mode of arrival: EMS


Limitations: no limitations





- History of Present Illness


Initial comments: 





66-year-old male past medical history of coronary artery disease with bypass, A.

fib on Coumadin and daily alcohol abuse presents to the emergency Department 

with reported weakness.  States that he drank heavily yesterday.  Drank 10 beers

which is atypical for him.  Normally drinks 3-4 times per week, 5 beers at a 

time.  His morning he woke up and was nauseated.  Went outside himself.  Came 

back inside and attempted to use restroom.  He was unable to get up off the 

toilet and his wife had a system to the bed.  He ended up sustaining a fall and 

fell onto his back.  Denies hitting his head or losing consciousness.  States 

his been unable to eat, drink or take his medications today.  His wife called 

EMS.  Upon arrival patient seems asymptomatic at this time





- Related Data


                                Home Medications











 Medication  Instructions  Recorded  Confirmed


 


Multivitamins, Thera [Multivitamin 1 tab PO DAILY 12/03/17 12/17/21





(formulary)]   


 


Amiodarone [Cordarone] 100 mg PO DAILY 05/30/20 12/17/21


 


Atorvastatin [Lipitor] 40 mg PO DAILY 05/30/20 12/17/21


 


Aspirin EC [Ecotrin Low Dose] 81 mg PO DAILY 12/17/21 12/17/21


 


Warfarin [Coumadin] 1 mg PO HS 12/17/21 12/17/21


 


lisinopriL [Zestril] 10 mg PO BID 12/17/21 12/17/21








                                  Previous Rx's











 Medication  Instructions  Recorded


 


Metoprolol Tartrate [Lopressor] 50 mg PO BID  tab 05/12/17











                                    Allergies











Allergy/AdvReac Type Severity Reaction Status Date / Time


 


Penicillins Allergy  Unknown Verified 12/17/21 13:12





   Childhood  














Review of Systems


ROS Statement: 


Those systems with pertinent positive or pertinent negative responses have been 

documented in the HPI.





ROS Other: All systems not noted in ROS Statement are negative.





Past Medical History


Past Medical History: Atrial Fibrillation, Hyperlipidemia, Hypertension, 

Myocardial Infarction (MI)


Additional Past Medical History / Comment(s): gout, "borderline diabetic"-diet 

control


Last Myocardial Infarction Date:: 2000


History of Any Multi-Drug Resistant Organisms: None Reported


Past Surgical History: Coronary Bypass/CABG, Heart Catheterization With Stent


Additional Past Surgical History / Comment(s): stent x1


Past Anesthesia/Blood Transfusion Reactions: No Reported Reaction


Date of Last Stent Placement:: 2000


Past Psychological History: No Psychological Hx Reported


Smoking Status: Never smoker


Past Alcohol Use History: Abuse, Daily


Past Drug Use History: None Reported





- Past Family History


  ** Sister(s)


Family Medical History: Cancer


Additional Family Medical History / Comment(s): THYROID





General Exam


Limitations: no limitations





Course


                                   Vital Signs











  12/17/21 12/17/21 12/17/21





  12:12 13:37 15:57


 


Temperature 97.7 F  


 


Pulse Rate 53 L 51 L 50 L


 


Respiratory 18 16 18





Rate   


 


Blood Pressure 115/72 110/61 116/81


 


O2 Sat by Pulse 98 100 97





Oximetry   














EKG Findings





- EKG Comments:


EKG Findings:: EKG demonstrated a sinus bradycardia with a ventricular rate of 

49.  DE interval 246.  QRS 98.  QTC of 464.  No acute ST segment elevations or 

depressions.  High degree block





Medical Decision Making





- Medical Decision Making





Upon arrival the patient is placed into room 3. A thorough history and physical 

exam was performed.  IV is established and laboratory studies are conducted.  

Lactic acid 2.4.  AST and ALT mildly elevated.  Alcohol 230.  INR 1.9.  Patient 

is given a liter bolus of normal saline and 4 mg of Zofran.  Chest x-ray p

erformed.  Mild cardiomegaly and chronic parenchymal changes.  Patient is able 

to get up and ambulate on his own.  His wife is at bedside.  I did discuss the 

laboratory studies.  Patient's will be discharged home in the care of his wife. 

 Wife feels comfortable taking his home.  Instructed that he should stop 

drinking.  Follow-up with his primary care doctor 2-4 days.  Return for any new 

worsening symptoms.  Patient agreed to this was completely asymptomatic and 

states that he was eager to go home.  Patient discharged in stable condition





- Lab Data


Result diagrams: 


                                 12/17/21 13:02





                                 12/17/21 13:02


                                   Lab Results











  12/17/21 12/17/21 12/17/21 Range/Units





  12:50 13:00 13:02 


 


WBC    3.9  (3.8-10.6)  k/uL


 


RBC    3.62 L  (4.30-5.90)  m/uL


 


Hgb    13.1  (13.0-17.5)  gm/dL


 


Hct    38.5 L  (39.0-53.0)  %


 


MCV    106.2 H  (80.0-100.0)  fL


 


MCH    36.1 H  (25.0-35.0)  pg


 


MCHC    34.0  (31.0-37.0)  g/dL


 


RDW    15.1  (11.5-15.5)  %


 


Plt Count    165  (150-450)  k/uL


 


MPV    8.5  


 


Neutrophils %    60  %


 


Lymphocytes %    31  %


 


Monocytes %    6  %


 


Eosinophils %    1  %


 


Basophils %    1  %


 


Neutrophils #    2.3  (1.3-7.7)  k/uL


 


Lymphocytes #    1.2  (1.0-4.8)  k/uL


 


Monocytes #    0.2  (0-1.0)  k/uL


 


Eosinophils #    0.0  (0-0.7)  k/uL


 


Basophils #    0.0  (0-0.2)  k/uL


 


Macrocytosis    Moderate  


 


PT     (9.0-12.0)  sec


 


INR     (<1.2)  


 


APTT     (22.0-30.0)  sec


 


Sodium     (137-145)  mmol/L


 


Potassium     (3.5-5.1)  mmol/L


 


Chloride     ()  mmol/L


 


Carbon Dioxide     (22-30)  mmol/L


 


Anion Gap     mmol/L


 


BUN     (9-20)  mg/dL


 


Creatinine     (0.66-1.25)  mg/dL


 


Est GFR (CKD-EPI)AfAm     (>60 ml/min/1.73 sqM)  


 


Est GFR (CKD-EPI)NonAf     (>60 ml/min/1.73 sqM)  


 


Glucose     (74-99)  mg/dL


 


Lactic Ac Sepsis Rflx     


 


Plasma Lactic Acid Steven  2.4 H*    (0.7-2.0)  mmol/L


 


Calcium     (8.4-10.2)  mg/dL


 


Magnesium     (1.6-2.3)  mg/dL


 


Total Bilirubin     (0.2-1.3)  mg/dL


 


AST     (17-59)  U/L


 


ALT     (4-49)  U/L


 


Alkaline Phosphatase     ()  U/L


 


Troponin I     (0.000-0.034)  ng/mL


 


Total Protein     (6.3-8.2)  g/dL


 


Albumin     (3.5-5.0)  g/dL


 


Serum Alcohol     mg/dL


 


Coronavirus (PCR)   Not Detected   (Not Detectd)  














  12/17/21 12/17/21 12/17/21 Range/Units





  13:02 13:02 13:21 


 


WBC     (3.8-10.6)  k/uL


 


RBC     (4.30-5.90)  m/uL


 


Hgb     (13.0-17.5)  gm/dL


 


Hct     (39.0-53.0)  %


 


MCV     (80.0-100.0)  fL


 


MCH     (25.0-35.0)  pg


 


MCHC     (31.0-37.0)  g/dL


 


RDW     (11.5-15.5)  %


 


Plt Count     (150-450)  k/uL


 


MPV     


 


Neutrophils %     %


 


Lymphocytes %     %


 


Monocytes %     %


 


Eosinophils %     %


 


Basophils %     %


 


Neutrophils #     (1.3-7.7)  k/uL


 


Lymphocytes #     (1.0-4.8)  k/uL


 


Monocytes #     (0-1.0)  k/uL


 


Eosinophils #     (0-0.7)  k/uL


 


Basophils #     (0-0.2)  k/uL


 


Macrocytosis     


 


PT    18.6 H  (9.0-12.0)  sec


 


INR    1.9 H  (<1.2)  


 


APTT    29.4  (22.0-30.0)  sec


 


Sodium  131 L    (137-145)  mmol/L


 


Potassium  4.8    (3.5-5.1)  mmol/L


 


Chloride  101    ()  mmol/L


 


Carbon Dioxide  18 L    (22-30)  mmol/L


 


Anion Gap  12    mmol/L


 


BUN  23 H    (9-20)  mg/dL


 


Creatinine  1.30 H    (0.66-1.25)  mg/dL


 


Est GFR (CKD-EPI)AfAm  65    (>60 ml/min/1.73 sqM)  


 


Est GFR (CKD-EPI)NonAf  56    (>60 ml/min/1.73 sqM)  


 


Glucose  125 H    (74-99)  mg/dL


 


Lactic Ac Sepsis Rflx     


 


Plasma Lactic Acid Steven     (0.7-2.0)  mmol/L


 


Calcium  8.5    (8.4-10.2)  mg/dL


 


Magnesium  1.7    (1.6-2.3)  mg/dL


 


Total Bilirubin  1.0    (0.2-1.3)  mg/dL


 


AST  203 H    (17-59)  U/L


 


ALT  108 H    (4-49)  U/L


 


Alkaline Phosphatase  143 H    ()  U/L


 


Troponin I   <0.012   (0.000-0.034)  ng/mL


 


Total Protein  6.7    (6.3-8.2)  g/dL


 


Albumin  3.2 L    (3.5-5.0)  g/dL


 


Serum Alcohol  230 H*    mg/dL


 


Coronavirus (PCR)     (Not Detectd)  














  12/17/21 Range/Units





  13:59 


 


WBC   (3.8-10.6)  k/uL


 


RBC   (4.30-5.90)  m/uL


 


Hgb   (13.0-17.5)  gm/dL


 


Hct   (39.0-53.0)  %


 


MCV   (80.0-100.0)  fL


 


MCH   (25.0-35.0)  pg


 


MCHC   (31.0-37.0)  g/dL


 


RDW   (11.5-15.5)  %


 


Plt Count   (150-450)  k/uL


 


MPV   


 


Neutrophils %   %


 


Lymphocytes %   %


 


Monocytes %   %


 


Eosinophils %   %


 


Basophils %   %


 


Neutrophils #   (1.3-7.7)  k/uL


 


Lymphocytes #   (1.0-4.8)  k/uL


 


Monocytes #   (0-1.0)  k/uL


 


Eosinophils #   (0-0.7)  k/uL


 


Basophils #   (0-0.2)  k/uL


 


Macrocytosis   


 


PT   (9.0-12.0)  sec


 


INR   (<1.2)  


 


APTT   (22.0-30.0)  sec


 


Sodium   (137-145)  mmol/L


 


Potassium   (3.5-5.1)  mmol/L


 


Chloride   ()  mmol/L


 


Carbon Dioxide   (22-30)  mmol/L


 


Anion Gap   mmol/L


 


BUN   (9-20)  mg/dL


 


Creatinine   (0.66-1.25)  mg/dL


 


Est GFR (CKD-EPI)AfAm   (>60 ml/min/1.73 sqM)  


 


Est GFR (CKD-EPI)NonAf   (>60 ml/min/1.73 sqM)  


 


Glucose   (74-99)  mg/dL


 


Lactic Ac Sepsis Rflx  Y  


 


Plasma Lactic Acid Steven   (0.7-2.0)  mmol/L


 


Calcium   (8.4-10.2)  mg/dL


 


Magnesium   (1.6-2.3)  mg/dL


 


Total Bilirubin   (0.2-1.3)  mg/dL


 


AST   (17-59)  U/L


 


ALT   (4-49)  U/L


 


Alkaline Phosphatase   ()  U/L


 


Troponin I   (0.000-0.034)  ng/mL


 


Total Protein   (6.3-8.2)  g/dL


 


Albumin   (3.5-5.0)  g/dL


 


Serum Alcohol   mg/dL


 


Coronavirus (PCR)   (Not Detectd)  














Disposition


Clinical Impression: 


 Alcohol intoxication





Disposition: HOME SELF-CARE


Condition: Stable


Instructions (If sedation given, give patient instructions):  Alcohol 

Intoxication (ED)


Additional Instructions: 


I recommend that you stop drinking.  Please follow-up to primary care doctor in 

2-4 days.  Return to the emergency room for any new or worsening symptoms


Is patient prescribed a controlled substance at d/c from ED?: No


Referrals: 


Vikas Ventura DO [Primary Care Provider] - 1-2 days


Time of Disposition: 15:54

## 2022-11-17 NOTE — XR
Date:November 18, 2022      Patient was self referred, no letter generated. Do not send.        [facility] Health Information       EXAMINATION TYPE: XR abdomen 2V

 

DATE OF EXAM: 5/3/2021

 

COMPARISON: 4/29/2017

 

INDICATION: Constipation

 

TECHNIQUE: Single view abdomen supine and upright views.

 

FINDINGS:  

Nonspecific bowel gas pattern. Air is within small bowel loops as well as the colon. No mass effect i
s evident. No free air is evident. No suspicious differential air-fluid levels are present.

Psoas margins are normal.

No organomegaly is present.

 

 

IMPRESSION: 

1. Nonspecific abdomen.